# Patient Record
Sex: MALE | Race: WHITE | Employment: OTHER | URBAN - METROPOLITAN AREA
[De-identification: names, ages, dates, MRNs, and addresses within clinical notes are randomized per-mention and may not be internally consistent; named-entity substitution may affect disease eponyms.]

---

## 2017-09-05 ENCOUNTER — OFFICE VISIT (OUTPATIENT)
Dept: URGENT CARE | Facility: URGENT CARE | Age: 67
End: 2017-09-05
Payer: MEDICARE

## 2017-09-05 VITALS — SYSTOLIC BLOOD PRESSURE: 148 MMHG | DIASTOLIC BLOOD PRESSURE: 66 MMHG | HEART RATE: 80 BPM

## 2017-09-05 DIAGNOSIS — S01.81XA FACIAL LACERATION, INITIAL ENCOUNTER: Primary | ICD-10-CM

## 2017-09-05 PROCEDURE — 12011 RPR F/E/E/N/L/M 2.5 CM/<: CPT | Performed by: FAMILY MEDICINE

## 2017-09-05 NOTE — MR AVS SNAPSHOT
"              After Visit Summary   2017    Perfecto Reese    MRN: [id number]           Patient Information     Date Of Birth          1950        Visit Information        Provider Department      2017 8:20 PM Chela Sargent MD [facility]        Today's Diagnoses     Facial laceration, initial encounter    -  1       Follow-ups after your visit        Who to contact     If you have questions or need follow up information about today's clinic visit or your schedule please contact [facility] directly at [phone].  Normal or non-critical lab and imaging results will be communicated to you by 4Lesshart, letter or phone within 4 business days after the clinic has received the results. If you do not hear from us within 7 days, please contact the clinic through 4Lesshart or phone. If you have a critical or abnormal lab result, we will notify you by phone as soon as possible.  Submit refill requests through Lomaki or call your pharmacy and they will forward the refill request to us. Please allow 3 business days for your refill to be completed.          Additional Information About Your Visit        MyChart Information     Lomaki lets you send messages to your doctor, view your test results, renew your prescriptions, schedule appointments and more. To sign up, go to [email or web address] . Click on \"Log in\" on the left side of the screen, which will take you to the Welcome page. Then click on \"Sign up Now\" on the right side of the page.     You will be asked to enter the access code listed below, as well as some personal information. Please follow the directions to create your username and password.     Your access code is: [email or web address]  Expires: 2017  9:28 PM     Your access code will  in 90 days. If you need help or a new code, please call your [place] clinic or [phone].        Care EveryWhere ID     This is your Care EveryWhere " ID. This could be used by other organizations to access your [address] medical records  [id number]        Your Vitals Were     Pulse                   80            Blood Pressure from Last 3 Encounters:   09/05/17 148/66   01/21/16 125/53   01/20/16 106/60    Weight from Last 3 Encounters:   01/21/16 155 lb (70.3 kg)   01/20/16 155 lb (70.3 kg)   12/08/15 155 lb (70.3 kg)              We Performed the Following     CHEM CAUTERY GRANULATION TISSUE        Primary Care Provider Office Phone # Fax #    Polo Neil [phone]       [facility] [id number]        Equal Access to Services     FARRAH PARSON : Adelita Daniel, erika dukes, higinio méndez, sukhwinder pink. So [facility] [phone].    ATENCIÓN: Si habla español, tiene a avendano disposición servicios gratuitos de asistencia lingüística. Llame al [phone].    We comply with applicable federal civil rights laws and [place] laws. We do not discriminate on the basis of race, color, national origin, age, disability sex, sexual orientation or gender identity.            Thank you!     Thank you for choosing [facility]  for your care. Our goal is always to provide you with excellent care. Hearing back from our patients is one way we can continue to improve our services. Please take a few minutes to complete the written survey that you may receive in the mail after your visit with us. Thank you!             Your Updated Medication List - Protect others around you: Learn how to safely use, store and throw away your medicines at www.disposemymeds.org.          This list is accurate as of: 9/5/17  9:28 PM.  Always use your most recent med list.                   Brand Name Dispense Instructions for use Diagnosis    ALBUTEROL SULFATE HFA IN      Inhale into the lungs as needed        cyclobenzaprine 5 MG tablet    FLEXERIL    30 tablet     Take 1 tablet (5 mg) by mouth 3 times daily as needed    Other back pain       * ipratropium - albuterol 0.5 mg/2.5 mg/3 mL 0.5-2.5 (3) MG/3ML neb solution    DUONEB    1 vial    1 vial in neb    COPD exacerbation (H)       * Ipratropium-Albuterol  MCG/ACT inhaler    COMBIVENT RESPIMAT    1 Inhaler    Inhale 1 puff into the lungs 4 times daily Not to exceed 6 doses per day.    COPD exacerbation (H), Rales       * morphine 15 MG IR tablet    MSIR     Take 15 mg by mouth every 12 hours 75mg total, BID        * morphine 60 MG 12 hr tablet    MS CONTIN     Take 60 mg by mouth 2 times daily        Multi-vitamin Tabs tablet   Generic drug:  multivitamin, therapeutic with minerals      1 tablet daily        ondansetron 4 MG tablet    ZOFRAN    1 tablet    Once in clinic    Nausea       OXYCONTIN PO           predniSONE 20 MG tablet    DELTASONE    15 tablet    1 tab po bid for 5 days, then 1 tab po qd for 3 days, then 1/2 tab po qd for 4 days    COPD exacerbation (H), Rales       TYLENOL 325 MG tablet   Generic drug:  acetaminophen      prn        UNKNOWN TO PATIENT      2 inhalers and a nebulizer medication - doesn't know the name)        Zinc 15 MG Caps      Take  by mouth.        * Notice:  This list has 4 medication(s) that are the same as other medications prescribed for you. Read the directions carefully, and ask your doctor or other care provider to review them with you.

## 2017-09-06 NOTE — PROGRESS NOTES
Chief Complaint   Patient presents with     WOUND CARE     bleeding on chin,cut face while shaving aprox 2 hrs ago     SUBJECTIVE:     Chief Complaint   Patient presents with     WOUND CARE     bleeding on chin,cut face while shaving aprox 2 hrs ago     Perfecto Reese is a 67 year old male who presents to the clinic with a laceration on the chin , and left cheek area sustained 2 hours(s) ago.  This is a non-work related injury.    Mechanism of injury: razor blade while shaving .    Associated symptoms: Denies numbness, weakness, or loss of function  Last tetanus booster within 10 years: yes    EXAM:   The patient appears today in alert,no apparent distress distress  VITALS: /66 (BP Location: Right arm, Patient Position: Chair, Cuff Size: Adult Regular)  Pulse 80    Size of laceration: 0.25  Centimeters over the chin , 1mm over the left upper cheek and 1mm over the left lateral cheek area   Characteristics of the laceration: active bleeding  Tendon function intact: not applicable  Sensation to light touch intact: yes  Pulses intact: not applicable  Picture included in patient's chart: no    Assessment:  Facial laceration, initial encounter    PLAN:  PROCEDURE NOTE::    Wound cleaned with HIBICLENS  lumicaine was used to stop the bleeding   Area dressed with bandaid   After care instructions:  Signs of infection discussed today  Follow up if  symptoms fail to improve or worsens   Pt understood and agreed with plan

## 2017-09-06 NOTE — NURSING NOTE
"Chief Complaint   Patient presents with     WOUND CARE     bleeding on chin,cut face while shaving aprox 2 hrs ago       Initial /66 (BP Location: Right arm, Patient Position: Chair, Cuff Size: Adult Regular)  Pulse 80 Estimated body mass index is 25.79 kg/(m^2) as calculated from the following:    Height as of 1/21/16: 5' 5\" (1.651 m).    Weight as of 1/21/16: 155 lb (70.3 kg).  Medication Reconciliation: orlando Curtis LPN    "

## 2018-03-22 ENCOUNTER — OFFICE VISIT (OUTPATIENT)
Dept: URGENT CARE | Facility: URGENT CARE | Age: 68
End: 2018-03-22
Payer: MEDICARE

## 2018-03-22 VITALS
RESPIRATION RATE: 18 BRPM | OXYGEN SATURATION: 93 % | BODY MASS INDEX: 34.11 KG/M2 | WEIGHT: 205 LBS | TEMPERATURE: 98 F | DIASTOLIC BLOOD PRESSURE: 50 MMHG | SYSTOLIC BLOOD PRESSURE: 130 MMHG | HEART RATE: 76 BPM

## 2018-03-22 DIAGNOSIS — H61.23 BILATERAL IMPACTED CERUMEN: Primary | ICD-10-CM

## 2018-03-22 PROCEDURE — 69209 REMOVE IMPACTED EAR WAX UNI: CPT | Performed by: PHYSICIAN ASSISTANT

## 2018-03-22 NOTE — MR AVS SNAPSHOT
"              After Visit Summary   3/22/2018    Perfecto Reese    MRN: [id number]           Patient Information     Date Of Birth          1950        Visit Information        Provider Department      3/22/2018 2:00 PM Yola Nava PA-C [facility]        Today's Diagnoses     Bilateral impacted cerumen    -  1       Follow-ups after your visit        Who to contact     If you have questions or need follow up information about today's clinic visit or your schedule please contact [facility] directly at [phone].  Normal or non-critical lab and imaging results will be communicated to you by MyChart, letter or phone within 4 business days after the clinic has received the results. If you do not hear from us within 7 days, please contact the clinic through Pareto Biotechnologieshart or phone. If you have a critical or abnormal lab result, we will notify you by phone as soon as possible.  Submit refill requests through WorkThink or call your pharmacy and they will forward the refill request to us. Please allow 3 business days for your refill to be completed.          Additional Information About Your Visit        MyChart Information     WorkThink lets you send messages to your doctor, view your test results, renew your prescriptions, schedule appointments and more. To sign up, go to [email or web address] . Click on \"Log in\" on the left side of the screen, which will take you to the Welcome page. Then click on \"Sign up Now\" on the right side of the page.     You will be asked to enter the access code listed below, as well as some personal information. Please follow the directions to create your username and password.     Your access code is: [email or web address]  Expires: 2018  4:31 PM     Your access code will  in 90 days. If you need help or a new code, please call your [place] clinic or [phone].        Care EveryWhere ID     This is your Care " EveryWhere ID. This could be used by other organizations to access your [place] medical records  [id number]        Your Vitals Were     Pulse Temperature Respirations Pulse Oximetry BMI (Body Mass Index)       76 98  F (36.7  C) (Tympanic) 18 93% 34.11 kg/m2        Blood Pressure from Last 3 Encounters:   03/22/18 130/50   09/05/17 148/66   01/21/16 125/53    Weight from Last 3 Encounters:   03/22/18 205 lb (93 kg)   01/21/16 155 lb (70.3 kg)   01/20/16 155 lb (70.3 kg)              We Performed the Following     HC REMOVAL IMPACTED CERUMEN IRRIGATION/LVG UNILAT        Primary Care Provider Office Phone # Fax #    Polo Neil [phone]       [facility] [id number]        Equal Access to Services     FARRAH [facility] : Hadii aad ku hadasho Soranjana, waaxda luqadaha, qaybta kaalmada dev, sukhwinder farrell . So [facility] [phone].    ATENCIÓN: Si habla español, tiene a avendano disposición servicios gratuitos de asistencia lingüística. Rocky al [phone].    We comply with applicable federal civil rights laws and [place] laws. We do not discriminate on the basis of race, color, national origin, age, disability, sex, sexual orientation, or gender identity.            Thank you!     Thank you for choosing [facility]  for your care. Our goal is always to provide you with excellent care. Hearing back from our patients is one way we can continue to improve our services. Please take a few minutes to complete the written survey that you may receive in the mail after your visit with us. Thank you!             Your Updated Medication List - Protect others around you: Learn how to safely use, store and throw away your medicines at www.disposemymeds.org.          This list is accurate as of 3/22/18  4:31 PM.  Always use your most recent med list.                   Brand Name Dispense Instructions for use Diagnosis     ALBUTEROL SULFATE HFA IN      Inhale into the lungs as needed        cyclobenzaprine 5 MG tablet    FLEXERIL    30 tablet    Take 1 tablet (5 mg) by mouth 3 times daily as needed    Other back pain       * ipratropium - albuterol 0.5 mg/2.5 mg/3 mL 0.5-2.5 (3) MG/3ML neb solution    DUONEB    1 vial    1 vial in neb    COPD exacerbation (H)       * Ipratropium-Albuterol  MCG/ACT inhaler    COMBIVENT RESPIMAT    1 Inhaler    Inhale 1 puff into the lungs 4 times daily Not to exceed 6 doses per day.    COPD exacerbation (H), [place]       * morphine 15 MG IR tablet    MSIR     Take 15 mg by mouth every 12 hours 75mg total, BID        * morphine 60 MG 12 hr tablet    MS CONTIN     Take 60 mg by mouth 2 times daily        Multi-vitamin Tabs tablet   Generic drug:  multivitamin, therapeutic with minerals      1 tablet daily        ondansetron 4 MG tablet    ZOFRAN    1 tablet    Once in clinic    Nausea       OXYCONTIN PO           predniSONE 20 MG tablet    DELTASONE    15 tablet    1 tab po bid for 5 days, then 1 tab po qd for 3 days, then 1/2 tab po qd for 4 days    COPD exacerbation (H), [place]       TYLENOL 325 MG tablet   Generic drug:  acetaminophen      prn        UNKNOWN TO PATIENT      2 inhalers and a nebulizer medication - doesn't know the name)        Zinc 15 MG Caps      Take  by mouth.        * Notice:  This list has 4 medication(s) that are the same as other medications prescribed for you. Read the directions carefully, and ask your doctor or other care provider to review them with you.

## 2020-01-07 ENCOUNTER — TRANSFERRED RECORDS (OUTPATIENT)
Dept: HEALTH INFORMATION MANAGEMENT | Facility: CLINIC | Age: 70
End: 2020-01-07

## 2020-12-02 ENCOUNTER — APPOINTMENT (OUTPATIENT)
Dept: GENERAL RADIOLOGY | Facility: CLINIC | Age: 70
DRG: 871 | End: 2020-12-02
Attending: EMERGENCY MEDICINE
Payer: MEDICARE

## 2020-12-02 ENCOUNTER — HOSPITAL ENCOUNTER (INPATIENT)
Facility: CLINIC | Age: 70
LOS: 29 days | Discharge: NURSING, REHAB OR LONG-TERM CARE | DRG: 871 | End: 2020-12-31
Attending: EMERGENCY MEDICINE | Admitting: HOSPITALIST
Payer: MEDICARE

## 2020-12-02 ENCOUNTER — APPOINTMENT (OUTPATIENT)
Dept: CT IMAGING | Facility: CLINIC | Age: 70
DRG: 871 | End: 2020-12-02
Attending: EMERGENCY MEDICINE
Payer: MEDICARE

## 2020-12-02 DIAGNOSIS — R52 PAIN: ICD-10-CM

## 2020-12-02 DIAGNOSIS — I10 ESSENTIAL HYPERTENSION: Primary | ICD-10-CM

## 2020-12-02 DIAGNOSIS — R19.7 DIARRHEA, UNSPECIFIED TYPE: ICD-10-CM

## 2020-12-02 DIAGNOSIS — N17.9 ACUTE RENAL INJURY (H): ICD-10-CM

## 2020-12-02 DIAGNOSIS — J96.11 CHRONIC RESPIRATORY FAILURE WITH HYPOXIA (H): ICD-10-CM

## 2020-12-02 DIAGNOSIS — J18.9 PNEUMONIA OF RIGHT LOWER LOBE DUE TO INFECTIOUS ORGANISM: ICD-10-CM

## 2020-12-02 LAB
ALBUMIN SERPL-MCNC: 2.6 G/DL (ref 3.4–5)
ALP SERPL-CCNC: 98 U/L (ref 40–150)
ALT SERPL W P-5'-P-CCNC: 8 U/L (ref 0–70)
ANION GAP SERPL CALCULATED.3IONS-SCNC: 11 MMOL/L (ref 3–14)
AST SERPL W P-5'-P-CCNC: 6 U/L (ref 0–45)
BASOPHILS # BLD AUTO: 0 10E9/L (ref 0–0.2)
BASOPHILS NFR BLD AUTO: 0.2 %
BILIRUB SERPL-MCNC: 0.7 MG/DL (ref 0.2–1.3)
BUN SERPL-MCNC: 35 MG/DL (ref 7–30)
CALCIUM SERPL-MCNC: 7.9 MG/DL (ref 8.5–10.1)
CHLORIDE SERPL-SCNC: 109 MMOL/L (ref 94–109)
CO2 SERPL-SCNC: 20 MMOL/L (ref 20–32)
CREAT SERPL-MCNC: 2.89 MG/DL (ref 0.66–1.25)
DIFFERENTIAL METHOD BLD: ABNORMAL
EOSINOPHIL # BLD AUTO: 0 10E9/L (ref 0–0.7)
EOSINOPHIL NFR BLD AUTO: 0.1 %
ERYTHROCYTE [DISTWIDTH] IN BLOOD BY AUTOMATED COUNT: 14.2 % (ref 10–15)
GFR SERPL CREATININE-BSD FRML MDRD: 21 ML/MIN/{1.73_M2}
GLUCOSE SERPL-MCNC: 134 MG/DL (ref 70–99)
HCT VFR BLD AUTO: 28.5 % (ref 40–53)
HGB BLD-MCNC: 9.6 G/DL (ref 13.3–17.7)
IMM GRANULOCYTES # BLD: 0 10E9/L (ref 0–0.4)
IMM GRANULOCYTES NFR BLD: 0.2 %
LACTATE BLD-SCNC: 1.6 MMOL/L (ref 0.7–2)
LYMPHOCYTES # BLD AUTO: 0.7 10E9/L (ref 0.8–5.3)
LYMPHOCYTES NFR BLD AUTO: 4 %
MCH RBC QN AUTO: 30.7 PG (ref 26.5–33)
MCHC RBC AUTO-ENTMCNC: 33.7 G/DL (ref 31.5–36.5)
MCV RBC AUTO: 91 FL (ref 78–100)
MONOCYTES # BLD AUTO: 1.1 10E9/L (ref 0–1.3)
MONOCYTES NFR BLD AUTO: 6.6 %
NEUTROPHILS # BLD AUTO: 15.3 10E9/L (ref 1.6–8.3)
NEUTROPHILS NFR BLD AUTO: 88.9 %
NRBC # BLD AUTO: 0 10*3/UL
NRBC BLD AUTO-RTO: 0 /100
PLATELET # BLD AUTO: 201 10E9/L (ref 150–450)
POTASSIUM SERPL-SCNC: 3.3 MMOL/L (ref 3.4–5.3)
PROT SERPL-MCNC: 7.3 G/DL (ref 6.8–8.8)
RBC # BLD AUTO: 3.13 10E12/L (ref 4.4–5.9)
SARS-COV-2 RNA SPEC QL NAA+PROBE: NORMAL
SODIUM SERPL-SCNC: 140 MMOL/L (ref 133–144)
SPECIMEN SOURCE: NORMAL
WBC # BLD AUTO: 17.2 10E9/L (ref 4–11)

## 2020-12-02 PROCEDURE — 96361 HYDRATE IV INFUSION ADD-ON: CPT

## 2020-12-02 PROCEDURE — 85025 COMPLETE CBC W/AUTO DIFF WBC: CPT | Performed by: EMERGENCY MEDICINE

## 2020-12-02 PROCEDURE — 87040 BLOOD CULTURE FOR BACTERIA: CPT | Performed by: EMERGENCY MEDICINE

## 2020-12-02 PROCEDURE — 99223 1ST HOSP IP/OBS HIGH 75: CPT | Mod: AI | Performed by: HOSPITALIST

## 2020-12-02 PROCEDURE — 96375 TX/PRO/DX INJ NEW DRUG ADDON: CPT

## 2020-12-02 PROCEDURE — 83605 ASSAY OF LACTIC ACID: CPT | Performed by: EMERGENCY MEDICINE

## 2020-12-02 PROCEDURE — 96365 THER/PROPH/DIAG IV INF INIT: CPT

## 2020-12-02 PROCEDURE — U0003 INFECTIOUS AGENT DETECTION BY NUCLEIC ACID (DNA OR RNA); SEVERE ACUTE RESPIRATORY SYNDROME CORONAVIRUS 2 (SARS-COV-2) (CORONAVIRUS DISEASE [COVID-19]), AMPLIFIED PROBE TECHNIQUE, MAKING USE OF HIGH THROUGHPUT TECHNOLOGIES AS DESCRIBED BY CMS-2020-01-R: HCPCS | Performed by: EMERGENCY MEDICINE

## 2020-12-02 PROCEDURE — 80053 COMPREHEN METABOLIC PANEL: CPT | Performed by: EMERGENCY MEDICINE

## 2020-12-02 PROCEDURE — 250N000011 HC RX IP 250 OP 636: Performed by: EMERGENCY MEDICINE

## 2020-12-02 PROCEDURE — C9803 HOPD COVID-19 SPEC COLLECT: HCPCS

## 2020-12-02 PROCEDURE — 250N000013 HC RX MED GY IP 250 OP 250 PS 637: Performed by: EMERGENCY MEDICINE

## 2020-12-02 PROCEDURE — 99285 EMERGENCY DEPT VISIT HI MDM: CPT | Mod: 25

## 2020-12-02 PROCEDURE — 120N000001 HC R&B MED SURG/OB

## 2020-12-02 PROCEDURE — 94640 AIRWAY INHALATION TREATMENT: CPT

## 2020-12-02 PROCEDURE — 74176 CT ABD & PELVIS W/O CONTRAST: CPT

## 2020-12-02 PROCEDURE — 258N000003 HC RX IP 258 OP 636: Performed by: EMERGENCY MEDICINE

## 2020-12-02 PROCEDURE — 71045 X-RAY EXAM CHEST 1 VIEW: CPT

## 2020-12-02 PROCEDURE — 87493 C DIFF AMPLIFIED PROBE: CPT | Performed by: EMERGENCY MEDICINE

## 2020-12-02 RX ORDER — LIDOCAINE 50 MG/G
1 PATCH TOPICAL EVERY 24 HOURS
COMMUNITY
End: 2021-01-05

## 2020-12-02 RX ORDER — ALBUTEROL SULFATE 90 UG/1
6 AEROSOL, METERED RESPIRATORY (INHALATION)
Status: DISCONTINUED | OUTPATIENT
Start: 2020-12-02 | End: 2020-12-03

## 2020-12-02 RX ORDER — FERROUS GLUCONATE 324(37.5)
324 TABLET ORAL EVERY OTHER DAY
COMMUNITY
End: 2021-01-07 | Stop reason: ALTCHOICE

## 2020-12-02 RX ORDER — TIOTROPIUM BROMIDE 18 UG/1
18 CAPSULE ORAL; RESPIRATORY (INHALATION) DAILY
COMMUNITY
End: 2021-01-05

## 2020-12-02 RX ORDER — HYDROMORPHONE HYDROCHLORIDE 1 MG/ML
0.5 INJECTION, SOLUTION INTRAMUSCULAR; INTRAVENOUS; SUBCUTANEOUS
Status: COMPLETED | OUTPATIENT
Start: 2020-12-02 | End: 2020-12-02

## 2020-12-02 RX ORDER — IBUPROFEN 200 MG
4 CAPSULE ORAL DAILY
Status: ON HOLD | COMMUNITY
End: 2021-03-22

## 2020-12-02 RX ORDER — DULOXETIN HYDROCHLORIDE 30 MG/1
60 CAPSULE, DELAYED RELEASE ORAL DAILY
Status: ON HOLD | COMMUNITY
End: 2021-02-15

## 2020-12-02 RX ORDER — MORPHINE SULFATE 10 MG/5ML
2.5 SOLUTION ORAL 2 TIMES DAILY PRN
COMMUNITY
End: 2020-12-31

## 2020-12-02 RX ORDER — PRIMIDONE 50 MG/1
50 TABLET ORAL AT BEDTIME
Status: ON HOLD | COMMUNITY
End: 2021-03-22

## 2020-12-02 RX ORDER — AZITHROMYCIN 500 MG/1
500 INJECTION, POWDER, LYOPHILIZED, FOR SOLUTION INTRAVENOUS ONCE
Status: COMPLETED | OUTPATIENT
Start: 2020-12-02 | End: 2020-12-03

## 2020-12-02 RX ORDER — GABAPENTIN 300 MG/1
300 CAPSULE ORAL 3 TIMES DAILY
Status: ON HOLD | COMMUNITY
End: 2020-12-31

## 2020-12-02 RX ORDER — LIDOCAINE 50 MG/G
OINTMENT TOPICAL 3 TIMES DAILY PRN
Status: ON HOLD | COMMUNITY
End: 2021-03-22

## 2020-12-02 RX ORDER — OXYCODONE HYDROCHLORIDE 5 MG/1
5 TABLET ORAL EVERY 6 HOURS PRN
Status: ON HOLD | COMMUNITY
End: 2020-12-31

## 2020-12-02 RX ORDER — MULTIVIT-MIN/IRON/FOLIC ACID/K 18-600-40
1000 CAPSULE ORAL 2 TIMES DAILY
COMMUNITY
End: 2021-01-15

## 2020-12-02 RX ORDER — ACETAMINOPHEN 325 MG/1
975 TABLET ORAL ONCE
Status: COMPLETED | OUTPATIENT
Start: 2020-12-02 | End: 2020-12-02

## 2020-12-02 RX ORDER — LOSARTAN POTASSIUM 25 MG/1
12.5 TABLET ORAL DAILY
Status: ON HOLD | COMMUNITY
End: 2021-02-15

## 2020-12-02 RX ORDER — DEXAMETHASONE SODIUM PHOSPHATE 10 MG/ML
10 INJECTION, SOLUTION INTRAMUSCULAR; INTRAVENOUS ONCE
Status: COMPLETED | OUTPATIENT
Start: 2020-12-02 | End: 2020-12-02

## 2020-12-02 RX ORDER — CLOBETASOL PROPIONATE 0.5 MG/ML
SOLUTION TOPICAL AT BEDTIME
COMMUNITY

## 2020-12-02 RX ORDER — BUDESONIDE AND FORMOTEROL FUMARATE DIHYDRATE 160; 4.5 UG/1; UG/1
2 AEROSOL RESPIRATORY (INHALATION) 2 TIMES DAILY
COMMUNITY
End: 2021-01-05

## 2020-12-02 RX ORDER — TRIAMCINOLONE ACETONIDE 1 MG/G
CREAM TOPICAL PRN
COMMUNITY
End: 2021-01-05

## 2020-12-02 RX ORDER — PIPERACILLIN SODIUM, TAZOBACTAM SODIUM 4; .5 G/20ML; G/20ML
4.5 INJECTION, POWDER, LYOPHILIZED, FOR SOLUTION INTRAVENOUS ONCE
Status: COMPLETED | OUTPATIENT
Start: 2020-12-02 | End: 2020-12-02

## 2020-12-02 RX ORDER — TAMSULOSIN HYDROCHLORIDE 0.4 MG/1
0.8 CAPSULE ORAL DAILY
Status: ON HOLD | COMMUNITY
End: 2021-03-22

## 2020-12-02 RX ORDER — AMLODIPINE BESYLATE 10 MG/1
10 TABLET ORAL AT BEDTIME
Status: ON HOLD | COMMUNITY
End: 2020-12-17

## 2020-12-02 RX ORDER — ALBUTEROL SULFATE 90 UG/1
2 AEROSOL, METERED RESPIRATORY (INHALATION) EVERY 4 HOURS PRN
Status: ON HOLD | COMMUNITY
End: 2021-03-22

## 2020-12-02 RX ORDER — HYDROCORTISONE 20 MG/1
20 TABLET ORAL EVERY MORNING
Status: ON HOLD | COMMUNITY
End: 2021-03-22

## 2020-12-02 RX ORDER — ASPIRIN 81 MG/1
81 TABLET ORAL DAILY
COMMUNITY
End: 2021-01-15

## 2020-12-02 RX ADMIN — DEXAMETHASONE SODIUM PHOSPHATE 10 MG: 10 INJECTION, SOLUTION INTRAMUSCULAR; INTRAVENOUS at 18:49

## 2020-12-02 RX ADMIN — AZITHROMYCIN MONOHYDRATE 500 MG: 500 INJECTION, POWDER, LYOPHILIZED, FOR SOLUTION INTRAVENOUS at 23:01

## 2020-12-02 RX ADMIN — PIPERACILLIN SODIUM AND TAZOBACTAM SODIUM 4.5 G: 4; .5 INJECTION, POWDER, LYOPHILIZED, FOR SOLUTION INTRAVENOUS at 19:05

## 2020-12-02 RX ADMIN — SODIUM CHLORIDE 1000 ML: 9 INJECTION, SOLUTION INTRAVENOUS at 18:52

## 2020-12-02 RX ADMIN — ACETAMINOPHEN 975 MG: 325 TABLET, FILM COATED ORAL at 18:48

## 2020-12-02 RX ADMIN — SODIUM CHLORIDE 1000 ML: 9 INJECTION, SOLUTION INTRAVENOUS at 20:16

## 2020-12-02 RX ADMIN — HYDROMORPHONE HYDROCHLORIDE 0.5 MG: 1 INJECTION, SOLUTION INTRAMUSCULAR; INTRAVENOUS; SUBCUTANEOUS at 20:17

## 2020-12-02 RX ADMIN — ALBUTEROL SULFATE 6 PUFF: 90 AEROSOL, METERED RESPIRATORY (INHALATION) at 18:50

## 2020-12-02 ASSESSMENT — ENCOUNTER SYMPTOMS
ABDOMINAL PAIN: 1
HEADACHES: 0
SORE THROAT: 0
VOMITING: 0
CHILLS: 1
BLOOD IN STOOL: 0
DIARRHEA: 1
DYSURIA: 0
FEVER: 1
FATIGUE: 1
COUGH: 1
SHORTNESS OF BREATH: 1
NAUSEA: 0
PALPITATIONS: 0

## 2020-12-02 ASSESSMENT — MIFFLIN-ST. JEOR: SCORE: 1339.85

## 2020-12-03 ENCOUNTER — APPOINTMENT (OUTPATIENT)
Dept: CT IMAGING | Facility: CLINIC | Age: 70
DRG: 871 | End: 2020-12-03
Attending: PHYSICIAN ASSISTANT
Payer: MEDICARE

## 2020-12-03 PROBLEM — D50.9 IRON DEFICIENCY ANEMIA, UNSPECIFIED: Status: ACTIVE | Noted: 2020-12-03

## 2020-12-03 LAB
ALBUMIN UR-MCNC: 30 MG/DL
APPEARANCE UR: ABNORMAL
BILIRUB UR QL STRIP: NEGATIVE
COLOR UR AUTO: YELLOW
GLUCOSE UR STRIP-MCNC: NEGATIVE MG/DL
GRAM STN SPEC: NORMAL
HGB UR QL STRIP: ABNORMAL
HYALINE CASTS #/AREA URNS LPF: 2 /LPF (ref 0–2)
KETONES UR STRIP-MCNC: NEGATIVE MG/DL
L PNEUMO1 AG UR QL IA: NORMAL
LABORATORY COMMENT REPORT: NORMAL
LACTATE BLD-SCNC: 1.3 MMOL/L (ref 0.7–2)
LEUKOCYTE ESTERASE UR QL STRIP: ABNORMAL
Lab: NORMAL
NITRATE UR QL: NEGATIVE
PH UR STRIP: 5.5 PH (ref 5–7)
RBC #/AREA URNS AUTO: >182 /HPF (ref 0–2)
SARS-COV-2 RNA SPEC QL NAA+PROBE: NEGATIVE
SOURCE: ABNORMAL
SP GR UR STRIP: 1.01 (ref 1–1.03)
SPECIMEN SOURCE: NORMAL
SQUAMOUS #/AREA URNS AUTO: <1 /HPF (ref 0–1)
UROBILINOGEN UR STRIP-MCNC: NORMAL MG/DL (ref 0–2)
WBC #/AREA URNS AUTO: 34 /HPF (ref 0–5)

## 2020-12-03 PROCEDURE — 250N000011 HC RX IP 250 OP 636: Performed by: HOSPITALIST

## 2020-12-03 PROCEDURE — 87086 URINE CULTURE/COLONY COUNT: CPT | Performed by: HOSPITALIST

## 2020-12-03 PROCEDURE — 250N000013 HC RX MED GY IP 250 OP 250 PS 637: Performed by: PHYSICIAN ASSISTANT

## 2020-12-03 PROCEDURE — 250N000011 HC RX IP 250 OP 636: Performed by: EMERGENCY MEDICINE

## 2020-12-03 PROCEDURE — 87205 SMEAR GRAM STAIN: CPT | Performed by: HOSPITALIST

## 2020-12-03 PROCEDURE — 99207 PR NO CHARGE LOS: CPT | Performed by: PHYSICIAN ASSISTANT

## 2020-12-03 PROCEDURE — 87070 CULTURE OTHR SPECIMN AEROBIC: CPT | Performed by: HOSPITALIST

## 2020-12-03 PROCEDURE — G0463 HOSPITAL OUTPT CLINIC VISIT: HCPCS

## 2020-12-03 PROCEDURE — 87899 AGENT NOS ASSAY W/OPTIC: CPT | Performed by: EMERGENCY MEDICINE

## 2020-12-03 PROCEDURE — 258N000003 HC RX IP 258 OP 636: Performed by: INTERNAL MEDICINE

## 2020-12-03 PROCEDURE — 87077 CULTURE AEROBIC IDENTIFY: CPT | Performed by: HOSPITALIST

## 2020-12-03 PROCEDURE — 36415 COLL VENOUS BLD VENIPUNCTURE: CPT | Performed by: INTERNAL MEDICINE

## 2020-12-03 PROCEDURE — 120N000001 HC R&B MED SURG/OB

## 2020-12-03 PROCEDURE — 250N000013 HC RX MED GY IP 250 OP 250 PS 637: Performed by: HOSPITALIST

## 2020-12-03 PROCEDURE — 81001 URINALYSIS AUTO W/SCOPE: CPT | Performed by: EMERGENCY MEDICINE

## 2020-12-03 PROCEDURE — 71250 CT THORAX DX C-: CPT

## 2020-12-03 PROCEDURE — 87186 SC STD MICRODIL/AGAR DIL: CPT | Performed by: HOSPITALIST

## 2020-12-03 PROCEDURE — 80048 BASIC METABOLIC PNL TOTAL CA: CPT | Performed by: HOSPITALIST

## 2020-12-03 PROCEDURE — 258N000003 HC RX IP 258 OP 636: Performed by: HOSPITALIST

## 2020-12-03 PROCEDURE — 83605 ASSAY OF LACTIC ACID: CPT | Performed by: INTERNAL MEDICINE

## 2020-12-03 PROCEDURE — 99233 SBSQ HOSP IP/OBS HIGH 50: CPT | Performed by: INTERNAL MEDICINE

## 2020-12-03 RX ORDER — TAMSULOSIN HYDROCHLORIDE 0.4 MG/1
0.8 CAPSULE ORAL DAILY
Status: DISCONTINUED | OUTPATIENT
Start: 2020-12-03 | End: 2020-12-31 | Stop reason: HOSPADM

## 2020-12-03 RX ORDER — ACETAMINOPHEN 325 MG/1
650 TABLET ORAL EVERY 4 HOURS PRN
Status: DISCONTINUED | OUTPATIENT
Start: 2020-12-03 | End: 2020-12-31 | Stop reason: HOSPADM

## 2020-12-03 RX ORDER — HYDROCODONE BITARTRATE AND ACETAMINOPHEN 5; 325 MG/1; MG/1
1-2 TABLET ORAL EVERY 4 HOURS PRN
Status: DISCONTINUED | OUTPATIENT
Start: 2020-12-03 | End: 2020-12-03

## 2020-12-03 RX ORDER — ASPIRIN 81 MG/1
81 TABLET ORAL DAILY
Status: DISCONTINUED | OUTPATIENT
Start: 2020-12-03 | End: 2020-12-04

## 2020-12-03 RX ORDER — HYDROMORPHONE HYDROCHLORIDE 2 MG/1
2-4 TABLET ORAL
Status: DISCONTINUED | OUTPATIENT
Start: 2020-12-03 | End: 2020-12-08

## 2020-12-03 RX ORDER — AMOXICILLIN 250 MG
1 CAPSULE ORAL 2 TIMES DAILY
Status: DISCONTINUED | OUTPATIENT
Start: 2020-12-03 | End: 2020-12-03

## 2020-12-03 RX ORDER — POLYETHYLENE GLYCOL 3350 17 G/17G
17 POWDER, FOR SOLUTION ORAL DAILY
Status: DISCONTINUED | OUTPATIENT
Start: 2020-12-03 | End: 2020-12-31 | Stop reason: HOSPADM

## 2020-12-03 RX ORDER — OXYCODONE HYDROCHLORIDE 5 MG/1
5 TABLET ORAL EVERY 4 HOURS PRN
Status: DISCONTINUED | OUTPATIENT
Start: 2020-12-03 | End: 2020-12-03

## 2020-12-03 RX ORDER — PRIMIDONE 50 MG/1
50 TABLET ORAL AT BEDTIME
Status: DISCONTINUED | OUTPATIENT
Start: 2020-12-03 | End: 2020-12-31 | Stop reason: HOSPADM

## 2020-12-03 RX ORDER — PROCHLORPERAZINE MALEATE 5 MG
5 TABLET ORAL EVERY 6 HOURS PRN
Status: DISCONTINUED | OUTPATIENT
Start: 2020-12-03 | End: 2020-12-21

## 2020-12-03 RX ORDER — ALBUTEROL SULFATE 90 UG/1
2 AEROSOL, METERED RESPIRATORY (INHALATION) EVERY 4 HOURS PRN
Status: DISCONTINUED | OUTPATIENT
Start: 2020-12-03 | End: 2020-12-31 | Stop reason: HOSPADM

## 2020-12-03 RX ORDER — NALOXONE HYDROCHLORIDE 0.4 MG/ML
0.2 INJECTION, SOLUTION INTRAMUSCULAR; INTRAVENOUS; SUBCUTANEOUS
Status: DISCONTINUED | OUTPATIENT
Start: 2020-12-03 | End: 2020-12-31 | Stop reason: HOSPADM

## 2020-12-03 RX ORDER — ONDANSETRON 4 MG/1
4 TABLET, ORALLY DISINTEGRATING ORAL EVERY 6 HOURS PRN
Status: DISCONTINUED | OUTPATIENT
Start: 2020-12-03 | End: 2020-12-31 | Stop reason: HOSPADM

## 2020-12-03 RX ORDER — TRIAMCINOLONE ACETONIDE 1 MG/G
CREAM TOPICAL 2 TIMES DAILY PRN
Status: DISCONTINUED | OUTPATIENT
Start: 2020-12-03 | End: 2020-12-31 | Stop reason: HOSPADM

## 2020-12-03 RX ORDER — SODIUM CHLORIDE 9 MG/ML
INJECTION, SOLUTION INTRAVENOUS CONTINUOUS
Status: DISCONTINUED | OUTPATIENT
Start: 2020-12-03 | End: 2020-12-03

## 2020-12-03 RX ORDER — HYDROMORPHONE HYDROCHLORIDE 1 MG/ML
0.5 INJECTION, SOLUTION INTRAMUSCULAR; INTRAVENOUS; SUBCUTANEOUS
Status: DISCONTINUED | OUTPATIENT
Start: 2020-12-03 | End: 2020-12-09

## 2020-12-03 RX ORDER — LIDOCAINE 40 MG/G
CREAM TOPICAL
Status: DISCONTINUED | OUTPATIENT
Start: 2020-12-03 | End: 2020-12-31 | Stop reason: HOSPADM

## 2020-12-03 RX ORDER — PIPERACILLIN SODIUM, TAZOBACTAM SODIUM 3; .375 G/15ML; G/15ML
3.38 INJECTION, POWDER, LYOPHILIZED, FOR SOLUTION INTRAVENOUS EVERY 8 HOURS
Status: DISCONTINUED | OUTPATIENT
Start: 2020-12-03 | End: 2020-12-03

## 2020-12-03 RX ORDER — PROCHLORPERAZINE 25 MG
12.5 SUPPOSITORY, RECTAL RECTAL EVERY 12 HOURS PRN
Status: DISCONTINUED | OUTPATIENT
Start: 2020-12-03 | End: 2020-12-21

## 2020-12-03 RX ORDER — NALOXONE HYDROCHLORIDE 0.4 MG/ML
0.4 INJECTION, SOLUTION INTRAMUSCULAR; INTRAVENOUS; SUBCUTANEOUS
Status: DISCONTINUED | OUTPATIENT
Start: 2020-12-03 | End: 2020-12-31 | Stop reason: HOSPADM

## 2020-12-03 RX ORDER — HYDROCORTISONE 20 MG/1
20 TABLET ORAL EVERY MORNING
Status: DISCONTINUED | OUTPATIENT
Start: 2020-12-03 | End: 2020-12-31 | Stop reason: HOSPADM

## 2020-12-03 RX ORDER — AMOXICILLIN 250 MG
2 CAPSULE ORAL 2 TIMES DAILY
Status: DISCONTINUED | OUTPATIENT
Start: 2020-12-03 | End: 2020-12-03

## 2020-12-03 RX ORDER — ONDANSETRON 2 MG/ML
4 INJECTION INTRAMUSCULAR; INTRAVENOUS EVERY 6 HOURS PRN
Status: DISCONTINUED | OUTPATIENT
Start: 2020-12-03 | End: 2020-12-31 | Stop reason: HOSPADM

## 2020-12-03 RX ORDER — SODIUM CHLORIDE 9 MG/ML
INJECTION, SOLUTION INTRAVENOUS CONTINUOUS
Status: DISCONTINUED | OUTPATIENT
Start: 2020-12-03 | End: 2020-12-05

## 2020-12-03 RX ORDER — PIPERACILLIN SODIUM, TAZOBACTAM SODIUM 3; .375 G/15ML; G/15ML
3.38 INJECTION, POWDER, LYOPHILIZED, FOR SOLUTION INTRAVENOUS EVERY 6 HOURS
Status: DISCONTINUED | OUTPATIENT
Start: 2020-12-03 | End: 2020-12-05

## 2020-12-03 RX ADMIN — TAMSULOSIN HYDROCHLORIDE 0.8 MG: 0.4 CAPSULE ORAL at 08:01

## 2020-12-03 RX ADMIN — AZITHROMYCIN MONOHYDRATE 250 MG: 500 INJECTION, POWDER, LYOPHILIZED, FOR SOLUTION INTRAVENOUS at 22:40

## 2020-12-03 RX ADMIN — PIPERACILLIN SODIUM AND TAZOBACTAM SODIUM 3.38 G: 3; .375 INJECTION, POWDER, LYOPHILIZED, FOR SOLUTION INTRAVENOUS at 09:22

## 2020-12-03 RX ADMIN — PIPERACILLIN SODIUM AND TAZOBACTAM SODIUM 3.38 G: 3; .375 INJECTION, POWDER, LYOPHILIZED, FOR SOLUTION INTRAVENOUS at 21:06

## 2020-12-03 RX ADMIN — HYDROMORPHONE HYDROCHLORIDE 0.5 MG: 1 INJECTION, SOLUTION INTRAMUSCULAR; INTRAVENOUS; SUBCUTANEOUS at 05:43

## 2020-12-03 RX ADMIN — UMECLIDINIUM 1 PUFF: 62.5 AEROSOL, POWDER ORAL at 09:22

## 2020-12-03 RX ADMIN — PIPERACILLIN SODIUM AND TAZOBACTAM SODIUM 3.38 G: 3; .375 INJECTION, POWDER, LYOPHILIZED, FOR SOLUTION INTRAVENOUS at 01:51

## 2020-12-03 RX ADMIN — HYDROMORPHONE HYDROCHLORIDE 0.5 MG: 1 INJECTION, SOLUTION INTRAMUSCULAR; INTRAVENOUS; SUBCUTANEOUS at 17:14

## 2020-12-03 RX ADMIN — PIPERACILLIN SODIUM AND TAZOBACTAM SODIUM 3.38 G: 3; .375 INJECTION, POWDER, LYOPHILIZED, FOR SOLUTION INTRAVENOUS at 15:23

## 2020-12-03 RX ADMIN — TRIAMCINOLONE ACETONIDE: 1 CREAM TOPICAL at 02:27

## 2020-12-03 RX ADMIN — ACETAMINOPHEN 650 MG: 325 TABLET, FILM COATED ORAL at 08:01

## 2020-12-03 RX ADMIN — HYDROMORPHONE HYDROCHLORIDE 0.5 MG: 1 INJECTION, SOLUTION INTRAMUSCULAR; INTRAVENOUS; SUBCUTANEOUS at 19:20

## 2020-12-03 RX ADMIN — FLUTICASONE FUROATE AND VILANTEROL TRIFENATATE 1 PUFF: 200; 25 POWDER RESPIRATORY (INHALATION) at 09:22

## 2020-12-03 RX ADMIN — HYDROMORPHONE HYDROCHLORIDE 0.5 MG: 1 INJECTION, SOLUTION INTRAMUSCULAR; INTRAVENOUS; SUBCUTANEOUS at 01:52

## 2020-12-03 RX ADMIN — SODIUM CHLORIDE: 9 INJECTION, SOLUTION INTRAVENOUS at 01:51

## 2020-12-03 RX ADMIN — HYDROCODONE BITARTRATE AND ACETAMINOPHEN 1 TABLET: 5; 325 TABLET ORAL at 08:11

## 2020-12-03 RX ADMIN — HYDROMORPHONE HYDROCHLORIDE 0.5 MG: 1 INJECTION, SOLUTION INTRAMUSCULAR; INTRAVENOUS; SUBCUTANEOUS at 11:10

## 2020-12-03 RX ADMIN — HYDROCORTISONE 20 MG: 20 TABLET ORAL at 08:02

## 2020-12-03 RX ADMIN — HYDROMORPHONE HYDROCHLORIDE 4 MG: 2 TABLET ORAL at 22:26

## 2020-12-03 RX ADMIN — ASPIRIN 81 MG: 81 TABLET ORAL at 08:01

## 2020-12-03 RX ADMIN — PRIMIDONE 50 MG: 50 TABLET ORAL at 21:06

## 2020-12-03 RX ADMIN — HYDROMORPHONE HYDROCHLORIDE 0.5 MG: 1 INJECTION, SOLUTION INTRAMUSCULAR; INTRAVENOUS; SUBCUTANEOUS at 21:06

## 2020-12-03 RX ADMIN — SODIUM CHLORIDE: 9 INJECTION, SOLUTION INTRAVENOUS at 15:23

## 2020-12-03 RX ADMIN — OMEPRAZOLE 20 MG: 20 CAPSULE, DELAYED RELEASE ORAL at 08:02

## 2020-12-03 ASSESSMENT — ACTIVITIES OF DAILY LIVING (ADL)
ADLS_ACUITY_SCORE: 16

## 2020-12-03 NOTE — ED NOTES
Patient has home health nurse, and the patient told this nurse that he was having fevers and diarrhea. Patient has temp of 102.5 here orally, and is coughing frequently in the room.

## 2020-12-03 NOTE — PROGRESS NOTES
Thoracic Surgery:    Consult received and chart reviewed    Ordered CT chest without contrast to better define fluid collection in RLL vs in major fissure    Will see after CT completed    Sarah Biswas PA-C with Dr. Mejia Bar  [facility]  Cell [phone]

## 2020-12-03 NOTE — PROGRESS NOTES
Thoracic Surgery:    CT chest reviewed by myself and Dr. Bar. Imaging demonstrates an intraparenchymal right lower lobe lung abscess. No pleural space problem. No surgical intervention indicated. Recommend ID consult for prolonged antibiotic course. D/W Dr. Sinclair.    Sarah Biswas PA-C with Dr. Mejia Bar  [facility]  Cell [phone]

## 2020-12-03 NOTE — ED PROVIDER NOTES
History     Chief Complaint:  Fever and Diarrhea       The history is provided by the patient.     Perfecto Reese is a 70 year old male with a history of anemia, chronic hepatitis C, left BKA and COPD among others who presents for evaluation of fever, shortness of breath, chest pain, generalized abdominal pain, diarrhea and fatigue.  Patient was having his right lower extremity dressings changed by home health care nurse when she noted that he looked quite sick today.  Took a temperature at home and noted that it was 103.  EMS noted that he seemed to be struggling breathing with tachypnea.  In the ER the patient reports centralized chest pain and tightness as well as a productive cough.  He notes his sputum is white but occasionally will have some mild streaking of blood.  He also notes he has had multiple episodes of diarrhea today but denies any melena or blood in his stool.  Patient notes diffuse abdominal pain as well.  Patient reports that at baseline he is on 3 L of oxygen for his COPD but due to his increased work of breathing EMS put him on 10 L nasal cannula.      Allergies:  Darvocet   Vicodin      Medications:   Albuterol inhaler  Flexeril   Duoneb solution  Combivent respimat inhaler  Morphine  Zofran   Oxycodone  Prednisone    Medical History:   Chronic pain  Anemia  Insomnia  Low back pain  Hepatitis C, chronic  Psoriasis  Hyperlipidemia   GERD  Migraine  Thrombocytopenia  COPD    Surgical History   Sinus accessory procedure  Subtalar arthroereisis  ORIF left forearm/wrist  Tonsillectomy   Colonoscopy     Hardware removal left ankle  Upper dentures  Below knee amputation - left     Family History:   Father -  Colon cancer  Mother -  Osteoporosis, dementia   Brother - liver cancer    Social History:  The patient was accompanied to the ED by EMS.  Smoking Status: Former - 0.50 packs/day   Smokeless Tobacco: Never   Alcohol Use: No  Drug Use: No   Marital Status: Single  PCP: Polo Neil     Review of  "Systems   Constitutional: Positive for chills, fatigue and fever.   HENT: Negative for sore throat.    Respiratory: Positive for cough and shortness of breath.    Cardiovascular: Positive for chest pain. Negative for palpitations and leg swelling.   Gastrointestinal: Positive for abdominal pain and diarrhea. Negative for blood in stool, nausea and vomiting.   Genitourinary: Negative for dysuria.   Skin: Negative for rash.   Neurological: Negative for headaches.   All other systems reviewed and are negative.    Physical Exam     Patient Vitals for the past 24 hrs:   BP Temp Temp src Pulse Resp SpO2 Height Weight   12/02/20 1818 -- -- -- -- -- 97 % -- --   12/02/20 1817 132/84 102.5  F (39.2  C) Oral 127 14 -- 1.664 m (5' 5.5\") 63.5 kg (140 lb)        Physical Exam  General: Chronically ill-appearing gentleman in mild distress.  Head: The scalp, face, and head appear normal  Eyes: The pupils are equal, round, and reactive to light.  Dry cough crust surrounding the right eye.  ENT: Pressure sore behind the left ear the oropharynx is normal without erythema. Uvula is in the midline.  Mucous membranes appear dry  Neck: Normal range of motion. No anterior cervical lymphadenopathy noted  CV: Tachycardic but regular.  Resp: Tachypnea but generally clear lungs. mild respiratory distress.  Nasal cannula in place  GI: Abdomen is soft, no rigidity, guarding, or rebound. No distension. No tenderness to palpation in any quadrant.  Patient reports generalized discomfort    MS: Left-sided BKA.  Distal left lower extremity does not have any significant  Erythema, tenderness or ecchymosis.  Right lower extremity is wrapped and overlying a chronic pressure wound.   Skin: Pressure wound on the distal right lower extremity. Normal capillary refill noted  Neuro: Speech is normal and fluent. Face is symmetric.   Psych: Anxious appearing    Emergency Department Course     Imaging:  Radiology results were communicated with the patient who " voiced understanding of the findings.    XR Chest Port 1 View:  IMPRESSION: Right lower lobe infiltrate consistent with pneumonia. Lung volumes are low. No definite left lung infiltrate.   Reading per radiology.    CT Abdomen Pelvis w/o Contrast:  IMPRESSION:   1.  Extensive right lower lobe pneumonia. Mild infiltrate in the left   lung base as well.   2.  Air-fluid cavity at the right lung base appears to be loculated in   the major fissure. Suggest thoracic surgery consultation.   3.  Cirrhosis with signs of portal hypertension including   splenomegaly. No ascites.   Reading per radiology.    Laboratory:  Laboratory findings were communicated with the patient who voiced understanding of the findings.    CBC: WBC 17.2 (H), HGB 9.6 (L),   CMP: Potassium 3.3 (L), Glucose 134 (H), BUN 35 (H), Creatinine 2.89 (H), GFR 21 (L), Calcium 7.9 (L), Albumin 2.6 (L), o/w WNL   Lactic Acid (Resulted 1844): 1.6   Blood Culture x2: Pending     C difficile Toxin B PCR: Pending    UA with Microscopic: Pending  Legionella pneumonia antigen urine: Pending     Symptomatic COVID-19 (Coronavirus) PCR by Nasopharyngeal Swab: Pending     Interventions:   1848 Tylenol 975 mg PO   1849 Dexamethasone 10 mg IV  1850 Albuterol inhaler 6 puffs  1852 Normal Saline 1000 mL IV   1905 Zosyn 4.5 g IV  2016 Normal Saline 1000 mL IV   2017 Dilaudid 0.5 mg IV   2301 Azithromycin 500 mg IV    Emergency Department Course:    Nursing notes and vitals reviewed.    1825 I performed an exam of the patient as documented above.     IV was inserted and blood was drawn for laboratory testing, results above.    The patient provided a urine sample here in the emergency department. This was sent for laboratory testing, findings above.     The patient was sent for XR while in the emergency department, results above.     2002 The patient is refusing a catheter for collection of a urine sample.     The patient's nose was swabbed and this sample was sent for  COVID testing, findings above.      2239  I consulted with Dr. Vasquez of the hospitalist services. They are in agreement to accept the patient for admission. Findings and plan explained to the Patient who consents to admission. Discussed the patient with Dr. Vasquez, who will admit the patient to a bed for further monitoring, evaluation, and treatment.    Impression & Plan     Medical Decision Making:  Patient is a 70-year-old gentleman who appears chronically ill with a history of COPD who presents emergency department with fever, shortness of breath, chest pain, sided flank pain, diarrhea and fatigue.  Upon initial evaluation patient is febrile and tachycardic and has increased work of breathing.  He is on baseline 3 L of oxygen at home and came in on 10 L of nonrebreather.  We were able to titrate this down to 5 L nonrebreather and the patient remained stable with SPO2 levels in the mid 90s.  Patient was evaluated for sepsis and started on Zosyn based on my initial evaluation of the patient.  Blood cultures were collected and patient was started on fluid resuscitation.  Ultimately patient was found to have significant right-sided pneumonia which is likely the source of his infection.  I consulted with pharmacy and we will add on azithromycin to give us atypical coverage.  However his antibiotic regime can be further fine-tuned as an inpatient notably no clear source.  Covid 19 was also strongly considered and the patient was swabbed and kept under precautions.  Due to the patient's clinical presentation, he will be will be admitted for further evaluation and treatment.    Covid-19  Perfecto Reese was evaluated during a global COVID-19 pandemic, which necessitated consideration that the patient might be at risk for infection with the SARS-CoV-2 virus that causes COVID-19.   Applicable protocols for evaluation were followed during the patient's care.   COVID-19 was considered as part of the patient's evaluation. The  plan for testing is:  a test was obtained during this visit.     Diagnosis:     ICD-10-CM    1. Pneumonia of right lower lobe due to infectious organism  J18.9    2. Acute renal injury (H)  N17.9    3. Diarrhea, unspecified type  R19.7         Disposition:  Admitted to Dr. Vasquez.    Scribe Disclosure:  I, Abigail Mueller, am serving as a scribe at 6:22 PM on 12/2/2020 to document services personally performed by Roe Adam MD based on my observations and the provider's statements to me.      Roe Adam MD  12/03/20 0053

## 2020-12-03 NOTE — ED NOTES
Bed: ED11  Expected date:   Expected time:   Means of arrival:   Comments:  534-70M Fever, Nausea, Poss Covid

## 2020-12-03 NOTE — PROVIDER NOTIFICATION
RECEIVING UNIT ED HANDOFF REVIEW    ED Nurse Handoff Report was reviewed by: Tosin Mena RN on December 2, 2020 at 11:57 PM

## 2020-12-03 NOTE — H&P
[facility]    History and Physical  Hospitalist       Date of Admission:  12/2/2020    Assessment & Plan   Perfecto Reese is a 70 year old male with a history of chronic kidney disease stage III, right lower extremity wound, left ear wound, anemia, severe COPD, chronic hypoxic respiratory failure, adrenal insufficiency, tobacco use, psoriasis, hypertension, BPH, GERD, hepatitis, and cirrhosis who was sent into the ER due to fevers, shortness of breath, cough, and diarrhea.  In the ER, there was concern for community-acquired pneumonia and acute kidney injury.  He was given IV fluids and started on empiric antibiotics.  The hospitalist service was contacted to admit him for further evaluation management.    Community-acquired pneumonia  Possible empyema  -Has been feeling sick for a couple weeks with fevers, shortness of breath, cough, diarrhea, fatigue.  -Symptoms not improving.  -Home care nurse saw him on the day of admission for wound care and sent him to the ER.  -CXR shows right lower lobe pneumonia.  -CT abdomen/pelvis shows RLL pneumonia, air fluid cavity at the right lung base.  -Initially required 10 lpm of supplemental oxygen with EMS, now down to 3 lpm of supplemental oxygen.  -Admit to inpatient.  -Started on Zosyn and azithromycin in the ER, will continue the same for now.  -Consult thoracic surgery regarding possible empyema.  -Wean supplemental oxygen as able.  -Check urine antigen for legionella.  -Symptomatic COVID-19 testing obtained in the ER. Low suspicion for COVID-19 as he appears to have a bacterial pneumonia with possible empyema. If COVID-19 testing is negative, I would discontinue isolation precautions.    Suspected UTI  -Denies urinary symptoms.  -UA suggestive of UTI.  -Will be on Zosyn as noted above which would also cover possible UTI.  -Await urine culture results.    Diarrhea  -Check stool for c.diff and enteric panel.  -Check urine antigen for legionella  in setting of fever, pneumonia, diarrhea.  -Symptomatic management if above noted testing is negative.    Acute kidney injury  Chronic kidney disease, stage 3  -MICHAEL appears to be pre-renal due to dehydration.  -IV fluids.  -Monitor I&O.  -Recheck labs in AM.    Right lower extremity wound  Left ear wound  -Consult [facility] nurse regarding wound care.    Anemia  -Hemoglobin was about 6-7 in January 2020.  -Hemoglobin 9.6 in the ER, recheck in AM.  -Denies any recent signs/symptoms of bleeding.    Severe COPD  Chronic hypoxic respiratory failure  -Chronically on 3 L/min of supplemental oxygen.  -Continue PTA Symbicort, Spiriva, and PRN albuterol.    Adrenal insufficiency  -Continue PTA hydrocortisone 20 mg PO daily.  -Blood pressure has been OK, do not think he needs stress dose steroids at the moment, although will need to keep this in mind going forward.    Tobacco use  -Has not had a cigarette in the past few weeks since he has been feeling sick.  -Briefly counseled on tobacco cessation.    Psoariasis  -Appears to be on Humira, possibly for this.  -[facility]    Hypertension  -Blood pressure OK in the ER.  -Hold PTA amlodipine and losartan for now in setting of relatively low BP and MICHAEL.    BPH  -Continue PTA tamsulosin.    GERD  -Continue PTA omeprazole.    Hepatitis C  Cirrhosis  -Treated with interferon on the past.    DVT Prophylaxis: Pneumatic Compression Devices  Code Status: Full Code  Expected discharge: 2 - 3 days pending improvement in infection, consultant recommendations    Telly Vasquez MD    Primary Care Physician   Polo Neil    Chief Complaint   Fever, shortness of breath, diarrhea    History is obtained from the patient    History of Present Illness   Perfecto Reese is a 70 year old male with a history of chronic kidney disease stage III, right lower extremity wound, left ear wound, anemia, severe COPD, chronic hypoxic respiratory failure, adrenal insufficiency, tobacco  use, psoriasis, hypertension, BPH, GERD, hepatitis, and cirrhosis who was sent into the ER due to fevers, shortness of breath, cough, and diarrhea.  He has not been feeling well for couple weeks.  He has had intermittent fevers.  He has had worsening shortness of breath and a cough.  Cough has been productive of white phlegm mostly but has noticed some blood-tinged sputum over the last 24 hours.  He has also noted loose stools and abdominal discomfort.  He has felt fatigued.  He has chronic wounds on his left ear and his right lower extremity.  A home health care nurse came today for wound care and noted that he appeared quite sick and short of breath.  EMS was called and he was brought into the ER for further evaluation and management.    In the ER, he was evaluated by Dr. Roe Adam.  He initially had a fever of 102.5 and was tachycardic.  O2 sats were okay on 10 L initially and he was able to be weaned down to 5 L/min of supplemental oxygen.  Labs revealed acute kidney injury, leukocytosis, anemia.  UA was suggestive of UTI.  CT abdomen pelvis did not reveal any acute intra-abdominal issues, but did show extensive right lower lobe pneumonia and an air-fluid cavity at the right lung base.  Chest x-ray showed right lower lobe infiltrate.  He was started on empiric Zosyn and azithromycin.  Hospitalist service was contacted to admit him for further evaluation and management.    Past Medical History    I have reviewed this patient's medical history and updated it with pertinent information if needed.   Past Medical History:   Diagnosis Date     Adrenal insufficiency (H)      Anemia, iron deficiency      Back pain      COPD (chronic obstructive pulmonary disease) (H)      Depression      GERD (gastroesophageal reflux disease)      Hyperlipidemia      Migraine      Past Surgical History   I have reviewed this patient's surgical history and updated it with pertinent information if needed.  Past Surgical  History:   Procedure Laterality Date     AMPUTATION BELOW KNEE RT/LT Left      AS ARTHROSCOPY SUBTALAR JOINT SUBTALAR ARTHRODESIS       OPEN REDUCTION INTERNAL FIXATION FOREARM      left forearm/wrist     TONSILLECTOMY       Prior to Admission Medications   Prior to Admission Medications   Prescriptions Last Dose Informant Patient Reported? Taking?   DULoxetine (CYMBALTA) 30 MG capsule Last dispense 9/19/20 90 day supply  Yes No   Sig: Take 30 mg by mouth daily   Ferrous Gluconate 324 (37.5 Fe) MG TABS Last dispense 9/19/20 90 day supply  Yes No   Sig: Take 324 mg by mouth every other day   MENTHOL-METHYL SALICYLATE EX Last dispense 9/10/20 30 day supply  Yes No   Sig: Externally apply topically 2 times daily Menthol-methyl saliycylate 10-15% cream twice daily to lower back   Vitamin D, Cholecalciferol, 25 MCG (1000 UT) TABS Last dispense 9/19/20 90 day supply  Yes No   Sig: Take 1,000 Units by mouth 2 times daily   adalimumab (HUMIRA) 40 MG/0.8ML prefilled syringe kit Last dispense 9/19/20 30 day supply  Yes No   Sig: Inject 40 mg Subcutaneous every 14 days   albuterol (PROAIR HFA/PROVENTIL HFA/VENTOLIN HFA) 108 (90 Base) MCG/ACT inhaler Last dispense 9/19/20 30 day supply  Yes No   Sig: Inhale 2 puffs into the lungs every 4 hours as needed for shortness of breath / dyspnea or wheezing   amLODIPine (NORVASC) 10 MG tablet Last dispense 9/19/20 90 day supply  Yes No   Sig: Take 10 mg by mouth At Bedtime   aspirin 81 MG EC tablet Last dispense 9/19/20 #120 tab  Yes No   Sig: Take 81 mg by mouth daily   budesonide-formoterol (SYMBICORT) 160-4.5 MCG/ACT Inhaler Last dispense 9/19/20 30 day supply  Yes No   Sig: Inhale 2 puffs into the lungs 2 times daily   calcium citrate (CITRACAL) 950 MG tablet Last dispense 9/19/20 90 day supply  Yes No   Sig: Take 4 tablets by mouth daily   clobetasol (TEMOVATE) 0.05 % external solution Last dispense 9/19/20 30 day supply  Yes No   Sig: Apply topically At Bedtime Apply to scalp    diclofenac (VOLTAREN) 1 % topical gel Last dispense 9/19/20 30 day supply  Yes No   Sig: Apply 4 g topically every 6 hours as needed for moderate pain (R shoulder)   gabapentin (NEURONTIN) 300 MG capsule Last dispense 9/19/20 90 day supply  Yes No   Sig: Take 300 mg by mouth 3 times daily   hydrocortisone (CORTEF) 20 MG tablet Last dispense 9/19/20 90 day supply  Yes No   Sig: Take 20 mg by mouth every morning   lidocaine (LIDODERM) 5 % patch Last dispense 9/19/20 30 day supply  Yes No   Sig: Place 1 patch onto the skin every 24 hours To prevent lidocaine toxicity, patient should be patch free for 12 hrs daily.  Apply to right shoulder   lidocaine (XYLOCAINE) 5 % external ointment Last dispense 9/19/20 30 day supply  Yes No   Sig: Apply topically 3 times daily as needed for moderate pain (rib pain)   losartan (COZAAR) 25 MG tablet Last dispense 9/19/20 90 day supply  Yes No   Sig: Take 12.5 mg by mouth daily   morphine 10 MG/5ML solution Last dispense 9/19/20  Yes No   Sig: Take 2.5 mg by mouth 2 times daily as needed for severe pain (with dressing change)   nicotine (NICORETTE) 2 MG gum Last dispense 9/27/20  Yes No   Sig: Place 2 mg inside cheek as needed for smoking cessation   omeprazole (PRILOSEC) 20 MG DR capsule Last dispense 9/19/20 90 day supply  Yes No   Sig: Take 20 mg by mouth daily   oxyCODONE (ROXICODONE) 5 MG tablet Last dispense 9/19/20  Yes No   Sig: Take 5 mg by mouth every 6 hours as needed for severe pain Separate from morphine by 4 hours   primidone (MYSOLINE) 50 MG tablet Last dispense 9/19/20 90 day supply  Yes No   Sig: Take 50 mg by mouth At Bedtime   sodium hypochlorite (HYSEPT) external solution Last dispense 9/19/20 30 day supply  Yes No   Sig: Irrigate with as directed daily 0.25% for wound care   tamsulosin (FLOMAX) 0.4 MG capsule Last dispense 9/19/20 90 day supply  Yes No   Sig: Take 0.8 mg by mouth daily   tiotropium (SPIRIVA) 18 MCG inhaled capsule Last dispense 9/19/20 30 day  supply  Yes No   Sig: Inhale 18 mcg into the lungs daily   triamcinolone (KENALOG) 0.1 % external cream Last dispense 9/27/20  Yes No   Sig: Apply topically as needed for irritation (wound care)   vitamin B-12 (CYANOCOBALAMIN) 1000 MCG tablet Last dispense 9/19/20 90 day supply  Yes No   Sig: Take 1,000 mcg by mouth daily      Facility-Administered Medications: None     Allergies   Allergies   Allergen Reactions     Darvocet [Propoxyphene N-Apap] Nausea     Vicodin [Hydrocodone-Acetaminophen] Nausea     Can use if he eats with it     Social History   I have reviewed this patient's social history and updated it with pertinent information if needed. Perfecto Reese  reports that he quit smoking about 3 years ago. His smoking use included cigarettes. He has a 15.00 pack-year smoking history. He has never used smokeless tobacco. He reports that he does not drink alcohol or use drugs.    Family History   I have reviewed this patient's family history and updated it with pertinent information if needed.   Family History   Problem Relation Age of Onset     Colon Cancer Father      Coronary Artery Disease Father      Chronic Obstructive Pulmonary Disease Father      Osteoporosis Mother      Dementia Mother      Review of Systems   The 10 point Review of Systems is negative other than noted in the HPI or here.    Physical Exam   Temp: 98  F (36.7  C) Temp src: Oral BP: 115/48 Pulse: 89   Resp: 24 SpO2: 96 % O2 Device: Oxymask Oxygen Delivery: 3 LPM  Vital Signs with Ranges  Temp:  [98  F (36.7  C)-102.5  F (39.2  C)] 98  F (36.7  C)  Pulse:  [] 89  Resp:  [14-27] 24  BP: ()/(48-84) 115/48  SpO2:  [94 %-99 %] 96 %  140 lbs 0 oz    Constitutional: awake, alert, cooperative, mild distress, laying in bed  Eyes: pupils equal, round and reactive to light, conjunctiva normal  ENT: oral pharynx with moist mucous membranes, left ear with chronic pressure wound  Respiratory: clear to auscultation bilaterally, no crackles  or wheezing, slightly tachypneic  Cardiovascular: regular rate and rhythm, normal S1 and S2, no murmur noted  GI: normal bowel sounds, soft, non-distended, non-tender  Skin: warm, dry  Musculoskeletal: no lower extremity pitting edema present, left BKA, RLE with dressing and protective boot over chronic wound  Neurologic: awake, alert, answers questions appropriately    Data   Data reviewed today:  I personally reviewed the chest x-ray image(s) showing right lower lobe infiltrate.    Recent Labs   Lab 12/02/20  1845   WBC 17.2*   HGB 9.6*   MCV 91         POTASSIUM 3.3*   CHLORIDE 109   CO2 20   BUN 35*   CR 2.89*   ANIONGAP 11   MARTINE 7.9*   *   ALBUMIN 2.6*   PROTTOTAL 7.3   BILITOTAL 0.7   ALKPHOS 98   ALT 8   AST 6     Recent Results (from the past 24 hour(s))   XR Chest Port 1 View    Narrative    XR CHEST PORT 1 VW  12/2/2020 7:25 PM       INDICATION: Cough, fever, SOB  COMPARISON: 10/8/2015       Impression    IMPRESSION: Right lower lobe infiltrate consistent with pneumonia.  Lung volumes are low. No definite left lung infiltrate.    RAEANN MAYORGA MD   CT Abdomen Pelvis w/o Contrast    Narrative    CT ABDOMEN AND PELVIS WITHOUT CONTRAST 12/2/2020 9:08 PM    CLINICAL HISTORY: Left flank pain and fever.    TECHNIQUE: CT scan of the abdomen and pelvis was performed without IV  contrast. Multiplanar reformats were obtained. Dose reduction  techniques were used.    CONTRAST: None.    COMPARISON: None.    FINDINGS:   LOWER CHEST: Extensive airspace infiltrates in the right lower lobe  consistent with pneumonia. There is a 7.5 cm fluid collection with an  air-fluid level at the right lung base, which appears to be loculated  in the major fissure. Minimal infiltrate left lung base.    HEPATOBILIARY: Cirrhotic appearance of the liver. Cholelithiasis.    PANCREAS: Normal.    SPLEEN: Mild splenomegaly.    ADRENAL GLANDS: Normal.    KIDNEYS/BLADDER: Bilateral presumed renal cysts. No hydronephrosis  or  urinary tract calculi.    BOWEL: Normal.    LYMPH NODES: Normal.    VASCULATURE: Unremarkable.    PELVIC ORGANS: Normal.    OTHER: None.    MUSCULOSKELETAL: Degenerative changes in the spine. Mild  chronic-appearing compression fracture of L1. Old left ischial  fracture. No discrete bone lesions.      Impression    IMPRESSION:   1.  Extensive right lower lobe pneumonia. Mild infiltrate in the left  lung base as well.  2.  Air-fluid cavity at the right lung base appears to be loculated in  the major fissure. Suggest thoracic surgery consultation.  3.  Cirrhosis with signs of portal hypertension including  splenomegaly. No ascites.    RAEANN MAYORGA MD

## 2020-12-03 NOTE — PROGRESS NOTES
[facility]    Medicine Progress Note - Hospitalist Service       Date of Admission:  12/2/2020    Assessment & Plan   Perfecto Reese is a 70 year old male with complex PMHx including severe COPD on 3L home O2 with ongoing tobacco use, adrenal insufficiency on chronic steroids, psoriasis on Humira, chronic Hep C with cirrhosis, chronic wounds, and non-compliance who was admitted on 12/2/2020 for pneumonia with concern for empyema    Perfecto is a LOW SUSPICION PUI.  Follow these instructions:    If COVID test positive -> continue isolation precautions    If COVID test negative -> discontinue COVID-specific isolation precautions      # Acute on chronic hypoxic respiratory failure due to RLL community-acquired bacterial pneumonia with possible empyema, rule out COVID, Improved  # Severe COPD  * At baseline on 3 lpm/NC  * EMS activated by his home care RN for fever, cough, and acute respiratory distress (placed on 10L per OM in the field). Fever to 102.5, tachycardia, tachypnea, and leukocytosis (17.2k) present on arrival. CXR on arrival showed RLL infiltrate and CT abd/pelvis showed air fluid cavity at the right lung base. On admission, he was initiated on IV pip-tazo and azithromycin, and Thoracic Surgery was consulted  - Now at baseline 3L  - Chest CT ordered by Thoracic Surgery  - Continues on IV pip-tazo, 12/2 to present  - Continues on azithromycin to complete 5 day course (last day: 12/6)  - Continues on PTA inhalers  - Encourage incentive spirometry, ambulate as tolerated  - 12/2 blood cultures x2 NGTD  - Thoracic Surgery following, appreciate assistance     Possible UTI  UA on arrival grossly dirty  - On IV pip-tazo for pneumonia as noted above  - 12/3 urine culture pending    MICHAEL stage II  Creatinine 2.89 from baseline 1.2-1.3 in Jan 2020. Suspect pre-renal state in setting of pneumonia/sepsis and compounded by PTA losartan  - Continues on IV fluids  - Holding PTA losartan  - Repeat  BMP in AM     Essential hypertension  [PTA: amlodipine 10 mg qhs, losartan 12.5 mg daily]  - Intermittent hypotension noted on admission. Now improved and stable  - Continue holding amlodipine and losartan for now    Chronic adrenal insufficiency  - Continues on PTA hydrocortisone 20 mg PO daily, no plan for stress dose steroids at this time    History of psoriasis   - Holding PTA Humira    Chronic RLE wound  Chronic left ear wound  Present on admission. Receives wound cares per home care RN  - WOCN consult     Chronic anemia  Hgb 9.6 on admission, no recent baseline. He has had no s/sx of bleeding  - Check iron studies, B12, folate  - Repeat CBC in AM    Chronic Hepatitis C with cirrhosis  Treated with interferon on the past  - Appears compensated    GERD  Chronic and stable on omeprazole    BPH  Chronic and stable on tamsulosin      Tobacco use disorder  Has not had a cigarette in the past few weeks since he has been feeling sick.    Chronic pain syndrome  [PTA: lidocaine patch, oxycodone 5 mg q6h prn, morphine 2.5 mg BID prn]  - Continues on lidocaine patch, oxycodone prn  - Holding morphine due to MICHAEL, IV Dilaudid available     Diet: Regular Diet Adult    DVT Prophylaxis: Pneumatic Compression Devices  Bruce Catheter: not present  Code Status: Full Code      Disposition: Expected discharge pending ongoing improvement in pneumonia/sepsis, PT assessment tomorrow - possibly over the weekend    The patient's care was discussed with the Bedside Nurse and Patient.    Charis Sinclair MD  Hospitalist Service  [facility]  Contact information available via [facility] Paging/Directory    ______________________________________________________________________    Interval History   Admitted last night. Feels better. Still with productive cough, but shortness of breath improved. Denies loose stools. Wanting to eat.   - continue antibiotics, IV fluids  - chest CT as per Thoracic Surgery  - d/c stool  studies, diarrhea likely due to adrenal insufficiency which has now resolved/improved since resuming PTA dexamethasone  - check iron studies, B12/folate    Data reviewed today: I reviewed all medications, new labs and imaging results over the last 24 hours. I personally reviewed no images or EKG's today.    Physical Exam   Vital Signs: Temp: 96  F (35.6  C) Temp src: Oral BP: 115/46 Pulse: 71   Resp: 20 SpO2: (P) 97 % O2 Device: (P) Oxymask Oxygen Delivery: (P) 3 LPM  Weight: 140 lbs 0 oz  Constitutional: Resting comfortably, NAD  Respiratory: Breathing non-labored. +bibasilar crackles  Cardiovascular: Heart RRR, no m/r/g. No pedal edema  GI: +BS, abd soft/NT  Skin/Integument: RLE dressing in place for chronic wound  Musculoskeletal: Normal muscle bulk and tone  Neuro: Alert and appropriate, TSEWART  Psych: Calm and cooperative, irritable    Data   Recent Labs   Lab 12/02/20  1845   WBC 17.2*   HGB 9.6*   MCV 91         POTASSIUM 3.3*   CHLORIDE 109   CO2 20   BUN 35*   CR 2.89*   ANIONGAP 11   MARTINE 7.9*   *   ALBUMIN 2.6*   PROTTOTAL 7.3   BILITOTAL 0.7   ALKPHOS 98   ALT 8   AST 6         Recent Results (from the past 24 hour(s))   XR Chest Port 1 View    Narrative    XR CHEST PORT 1 VW  12/2/2020 7:25 PM       INDICATION: Cough, fever, SOB  COMPARISON: 10/8/2015       Impression    IMPRESSION: Right lower lobe infiltrate consistent with pneumonia.  Lung volumes are low. No definite left lung infiltrate.    RAEANN MAYORGA MD   CT Abdomen Pelvis w/o Contrast    Narrative    CT ABDOMEN AND PELVIS WITHOUT CONTRAST 12/2/2020 9:08 PM    CLINICAL HISTORY: Left flank pain and fever.    TECHNIQUE: CT scan of the abdomen and pelvis was performed without IV  contrast. Multiplanar reformats were obtained. Dose reduction  techniques were used.    CONTRAST: None.    COMPARISON: None.    FINDINGS:   LOWER CHEST: Extensive airspace infiltrates in the right lower lobe  consistent with pneumonia. There is a 7.5 cm  fluid collection with an  air-fluid level at the right lung base, which appears to be loculated  in the major fissure. Minimal infiltrate left lung base.    HEPATOBILIARY: Cirrhotic appearance of the liver. Cholelithiasis.    PANCREAS: Normal.    SPLEEN: Mild splenomegaly.    ADRENAL GLANDS: Normal.    KIDNEYS/BLADDER: Bilateral presumed renal cysts. No hydronephrosis or  urinary tract calculi.    BOWEL: Normal.    LYMPH NODES: Normal.    VASCULATURE: Unremarkable.    PELVIC ORGANS: Normal.    OTHER: None.    MUSCULOSKELETAL: Degenerative changes in the spine. Mild  chronic-appearing compression fracture of L1. Old left ischial  fracture. No discrete bone lesions.      Impression    IMPRESSION:   1.  Extensive right lower lobe pneumonia. Mild infiltrate in the left  lung base as well.  2.  Air-fluid cavity at the right lung base appears to be loculated in  the major fissure. Suggest thoracic surgery consultation.  3.  Cirrhosis with signs of portal hypertension including  splenomegaly. No ascites.    RAEANN MAYORGA MD   CT Chest w/o Contrast    Narrative    CT CHEST WITHOUT CONTRAST 12/3/2020 11:55 AM     HISTORY: Pneumonia, unresolved or complicated. 7.5 cm fluid collection  with air-fluid level seen on CT abdomen/pelvis.    COMPARISON: Abdomen and pelvis CT from December 2, 2020    TECHNIQUE: Volumetric helical acquisition of CT images of the chest  from the clavicles to the kidneys were acquired without IV contrast.  Radiation dose for this scan was reduced using automated exposure  control, adjustment of the mA and/or kV according to patient size, or  iterative reconstruction technique.    FINDINGS: Air-fluid level in a cavitary lesion in the right lower lobe  may represent a pulmonary abscess measuring 6.4 x 5.3 cm. Extensive  infiltrates throughout the right lower lobe. Mild dependent  atelectasis and/or additional infiltrate in the posterior aspect of  the right upper lobe. Trace right pleural fluid. Mild  left lower lobe  atelectasis and/or infiltrate. Marked bronchial wall thickening and  bronchiectasis seen in the left lower lobe with extensive areas of  mucous plugging. Emphysema. A few mildly prominent lymph nodes are  seen in the mediastinum which are nonspecific and may be reactive in  this setting. No left pleural or pericardial effusion. Possible  splenomegaly in the upper abdomen. Question of nodular contour to the  liver which could indicate cirrhosis. Cholelithiasis without  cholecystitis.      Impression    IMPRESSION:  1. Air-fluid level in a cavitary lesion in the right lower lobe  measuring 6.4 x 5.3 cm, differential includes a pulmonary abscess.  2. Extensive consolidation in the right lower lobe.  3. Extensive mucous plugging and bronchial wall thickening in the left  lower lobe with mild associated infiltrate.       Medications       aspirin  81 mg Oral Daily     azithromycin  250 mg Intravenous Q24H     fluticasone-vilanterol  1 puff Inhalation Daily     hydrocortisone  20 mg Oral QAM     omeprazole  20 mg Oral Daily     piperacillin-tazobactam  3.375 g Intravenous Q6H     polyethylene glycol  17 g Oral Daily     primidone  50 mg Oral At Bedtime     senna-docusate  1 tablet Oral BID    Or     senna-docusate  2 tablet Oral BID     sodium chloride (PF)  3 mL Intracatheter Q8H     tamsulosin  0.8 mg Oral Daily     umeclidinium  1 puff Inhalation Daily

## 2020-12-03 NOTE — PROVIDER NOTIFICATION
MD Notification    Notified Person: MD    Notified Person Name: Dr. Sinclair     Notification Date/Time: 12/3/2020 3:40pm     Notification Interaction: web page     Purpose of Notification: Obs 12 COVID negative. Low suspicion. OK to discontinue precautions and transfer? Please advise thanks MR RN    Orders Received: Patient is COVID-19 Negative. Dr. Sinclair notified of status. Received order from Provider to discontinue Special Precautions and transfer patient to med surg unit. Charge RN and patient/family updated, and Special Precautions door sign turned over.     Comments:

## 2020-12-03 NOTE — PROVIDER NOTIFICATION
MD Notification    Notified Person: MD    Notified Person Name:Pedro    Notification Date/Time: 12/3/20 0253    Notification Interaction: amcon text page    Purpose of Notification: FYI UA results available    Orders Received: No call back expected.    Comments:

## 2020-12-03 NOTE — CONSULTS
[facility] WO Nurse Inpatient Wound Assessment   Reason for consultation: R) lower post leg  wound     Assessment  R) lower post leg  Vascular wound   Status: initial assessment    Treatment Plan (Continue current plan of care)  1. R) lower post leg wound: Daily  (please premedicate)    Use MicroKlenz wound cleanser #[id number] to saturate dressing for removal and to clean wound    Dry and protect surrounding skin with no sting barrier film wipe #[id number]    Apply Aquacel AG 4x4 (x2 dressings) #[id number]    Cover with ABDs (x2)      Wrap with kerlix roll    Apply sween 24 lotion to dry skin on feet    Wrap feet and leg with ace bandage    Apply Rooke boot  2. L) ear scab-keep clean, dry, intact monitor for signs of infection  3. Pressure Injury prevention (please order supplies if not in room)    Turn every 2 hours, side to side avoid supine on pressure redistribution support surface     Float heels off bed with use of pillows under legs    Prevent sliding by limiting HOB to 30 degrees or less unless contraindicated, use knee gatch first if not contraindicated    Protective foam dressings to sacrum PRN,Change at least q 4 days, peel back, peek and replace for daily skin inspection.    Chair cushion (#[id number]) as needed    Optimize nutrition per RD    Orders Written  Recommended provider order: None, at this time  WO Nurse follow-up plan while in the hospital:weekly  Nursing to notify the Provider(s) and re-consult the WOC Nurse if wound(s) deteriorates or new skin concern.    Patient History  According to provider note(s):  70 year old male with complex PMHx including severe COPD on 3L home O2 with ongoing tobacco use, adrenal insufficiency on chronic steroids, psoriasis on Humira, chronic Hep C with cirrhosis, chronic wounds, and non-compliance  admitted on 12/2/2020 for pneumonia. Covid neg, diarrhea likely due to adrenal insufficiency resolved. CT showed lung abscess anticipating need for IV  "atb. ID consulted.    [facility]: [facility] at [facility] for \"Chronic Venous Hypertension (Idiopathic) with ulcer of right lower extremity\". L) BKA. Next scheduled visit is at the end of December. He has home care for dressing changes daily.    Data  Documented Allergies: Darvocet [propoxyphene n-apap] and Vicodin [hydrocodone-acetaminophen]     Recent Labs   Lab Test 20  1845   ALBUMIN 2.6*   HGB 9.6*   WBC 17.2*     Recent Labs   Lab 20  1845   *       Temperature: Temp (24hrs), Av.6  F (37  C), Min:96  F (35.6  C), Max:102.5  F (39.2  C)    Intake/Output Summary (Last 24 hours) at 12/3/2020 1801  Last data filed at 12/3/2020 1116  Gross per 24 hour   Intake 638 ml   Output 350 ml   Net 288 ml       Orders Placed This Encounter      Regular Diet Adult      Containment of urine/stool: Brief and Urinal   Medical Devices:Bruce Catheter: not present    Catheter securement Not applicable    Pressure Injury Risk Assessment (Donn Scale):  Sensory Perception: 3-->slightly limited    Moisture: 4-->rarely moist   Activity: 2-->chairfast     Mobility: 2-->very limited   Nutrition: 2-->probably inadequate   Friction and Shear: 2-->potential problem  Donn Score: 15    Current support surface: Standard  Atmos Air mattress  Current off-loading measures in bed: reposition side to side with use of Pillows  Current off-loading heels: Pillows under calves  Current off-loading measures chair: Chair cushion  Ordered as need  Focused WOC Nurse Skin/Wound Exam:   Subjective/Objective: Patient turns with assist x 1 sacrum intact, no erythema at this time, evidence of previously healed scar tissue    Wound Location:  R) posterior LE   L) Ear  Date of last photo 12/3/2020  Wound History:[facility] scheduled end of month. According to patient, receives home care for daily dressing changes  Measurements (length x width x depth, in cm) 15 x 6  x  0.2 cm   Wound Base: 100 % " non-granular tissue smooth, pale, pink  Tunneling N/A  Undermining N/A  Palpation of the wound bed: normal   Periwound skin: intact  Wound edge 11 o'clock adherent scab   Periwound Temperature: normal   Drainage:, moderate   Description of drainage: yellow  Odor: none  Pain: no grimacing or signs of discomfort, with dressing change after premed given  Leg warm, no edema. Feet and heels with dry scaly xerosis   Local signs of infection: None    Interventions  Visual inspection and assessment completed   Wound care completed  Orders with supply numbers placed in  orders for nursing staff  Education provided: importance of repositioning and plan of care  Discussed plan of care with Patient and Nurse    Susan Bae MS RN CWOCN

## 2020-12-03 NOTE — PROVIDER NOTIFICATION
MD Notification    Notified Person: MD    Notified Person Name: Ronald    Notification Date/Time: 12/3/20 0201    Notification Interaction: amcon text page    Purpose of Notification: Pt request to restart home kenalog cream for generalized itching.     Orders Received: Home kenalog cream reordered.    Comments:

## 2020-12-03 NOTE — PROGRESS NOTES
Hospitalist update note      Discussed CT findings with thoracic surgery. Appears to have lung abscess. No indication for surgery. Will likely need prolonged course of IV antibiotics, will consult ID in AM      COVID negative. d/c special precautions      Discontinue C.diff PCR and enteric stool viral panel. Diarrhea likely due to adrenal insufficiency. BP has recovered and diarrhea has resolved since resuming PTA hydrocortisone which patient was probably not taking. D/c enteric precautions    OK to transfer to floor    MINERVA Sinclair MD  Hospitalist  [phone]

## 2020-12-03 NOTE — PLAN OF CARE
[id number]: A&Ox4. VSS on 3L oxymask (baseline 3L NC). Reports moderate/severe L ribcage pain with deep breathing. LS diminished. ISIDRO. Loose prod cough, thick brown sputum, sample sent to lab. COVID swab pending, special precautions in place. R foot wound, ace wrapped, boot in place. L ear pressure injury, severe, open to air. R buttock blanchable erythema, mepilex applied. Reports generalized itching (baseline per pt), given prn kenalog cream. UA sent, looks negative, already on zosyn.    Need stool sample.

## 2020-12-03 NOTE — PHARMACY-ADMISSION MEDICATION HISTORY
"Pharmacy Medication History  Admission medication history interview status for the 12/2/2020  admission is complete. See EPIC admission navigator for prior to admission medications       Medication history sources: Patient and [facility] ([facility])  Location of interview: Phone, discussion thru RN in patient room  Adherence Assessment: Poor    Significant changes made to the medication list:  -Added all medications.    Additional medication history information:   -Medications [facility] are per [facility], last dispensed as noted in September. [facility], all medications put in a holding pattern in [facility] system, [facility] at some point, appears he is now at home. Medications have not yet been \"re-activated\" in [facility] system since September admission. He reports \"Not taking meds for a very long time but should be\".    Medication reconciliation completed by provider prior to medication history? No    Time spent in this activity: 45 min      Prior to Admission medications    Medication Sig Last Dose Taking? Auth Provider   adalimumab (HUMIRA) 40 MG/0.8ML prefilled syringe kit Inject 40 mg Subcutaneous every 14 days Last dispense 9/19/20 30 day supply  Unknown, Entered By History   albuterol (PROAIR HFA/PROVENTIL HFA/VENTOLIN HFA) 108 (90 Base) MCG/ACT inhaler Inhale 2 puffs into the lungs every 4 hours as needed for shortness of breath / dyspnea or wheezing Last dispense 9/19/20 30 day supply  Unknown, Entered By History   amLODIPine (NORVASC) 10 MG tablet Take 10 mg by mouth At Bedtime Last dispense 9/19/20 90 day supply  Unknown, Entered By History   aspirin 81 MG EC tablet Take 81 mg by mouth daily Last dispense 9/19/20 #120 tab  Unknown, Entered By History   budesonide-formoterol (SYMBICORT) 160-4.5 MCG/ACT Inhaler Inhale 2 puffs into the lungs 2 times daily Last dispense 9/19/20 30 day supply  Unknown, Entered By History   calcium citrate (CITRACAL) 950 MG tablet Take 4 tablets by mouth daily Last " dispense 9/19/20 90 day supply  Unknown, Entered By History   clobetasol (TEMOVATE) 0.05 % external solution Apply topically At Bedtime Apply to scalp Last dispense 9/19/20 30 day supply  Unknown, Entered By History   diclofenac (VOLTAREN) 1 % topical gel Apply 4 g topically every 6 hours as needed for moderate pain (R shoulder) Last dispense 9/19/20 30 day supply  Unknown, Entered By History   DULoxetine (CYMBALTA) 30 MG capsule Take 30 mg by mouth daily Last dispense 9/19/20 90 day supply  Unknown, Entered By History   Ferrous Gluconate 324 (37.5 Fe) MG TABS Take 324 mg by mouth every other day Last dispense 9/19/20 90 day supply  Unknown, Entered By History   gabapentin (NEURONTIN) 300 MG capsule Take 300 mg by mouth 3 times daily Last dispense 9/19/20 90 day supply  Unknown, Entered By History   hydrocortisone (CORTEF) 20 MG tablet Take 20 mg by mouth every morning Last dispense 9/19/20 90 day supply  Unknown, Entered By History   lidocaine (LIDODERM) 5 % patch Place 1 patch onto the skin every 24 hours To prevent lidocaine toxicity, patient should be patch free for 12 hrs daily.  Apply to right shoulder Last dispense 9/19/20 30 day supply  Unknown, Entered By History   lidocaine (XYLOCAINE) 5 % external ointment Apply topically 3 times daily as needed for moderate pain (rib pain) Last dispense 9/19/20 30 day supply  Unknown, Entered By History   losartan (COZAAR) 25 MG tablet Take 12.5 mg by mouth daily Last dispense 9/19/20 90 day supply  Unknown, Entered By History   MENTHOL-METHYL SALICYLATE EX Externally apply topically 2 times daily Menthol-methyl saliycylate 10-15% cream twice daily to lower back Last dispense 9/10/20 30 day supply  Unknown, Entered By History   morphine 10 MG/5ML solution Take 2.5 mg by mouth 2 times daily as needed for severe pain (with dressing change) Last dispense 9/19/20  Unknown, Entered By History   nicotine (NICORETTE) 2 MG gum Place 2 mg inside cheek as needed for smoking  cessation Last dispense 9/27/20  Unknown, Entered By History   omeprazole (PRILOSEC) 20 MG DR capsule Take 20 mg by mouth daily Last dispense 9/19/20 90 day supply  Unknown, Entered By History   oxyCODONE (ROXICODONE) 5 MG tablet Take 5 mg by mouth every 6 hours as needed for severe pain Separate from morphine by 4 hours Last dispense 9/19/20  Unknown, Entered By History   primidone (MYSOLINE) 50 MG tablet Take 50 mg by mouth At Bedtime Last dispense 9/19/20 90 day supply  Unknown, Entered By History   sodium hypochlorite (HYSEPT) external solution Irrigate with as directed daily 0.25% for wound care Last dispense 9/19/20 30 day supply  Unknown, Entered By History   tamsulosin (FLOMAX) 0.4 MG capsule Take 0.8 mg by mouth daily Last dispense 9/19/20 90 day supply  Unknown, Entered By History   tiotropium (SPIRIVA) 18 MCG inhaled capsule Inhale 18 mcg into the lungs daily Last dispense 9/19/20 30 day supply  Unknown, Entered By History   triamcinolone (KENALOG) 0.1 % external cream Apply topically as needed for irritation (wound care) Last dispense 9/27/20  Unknown, Entered By History   vitamin B-12 (CYANOCOBALAMIN) 1000 MCG tablet Take 1,000 mcg by mouth daily Last dispense 9/19/20 90 day supply  Unknown, Entered By History   Vitamin D, Cholecalciferol, 25 MCG (1000 UT) TABS Take 1,000 Units by mouth 2 times daily Last dispense 9/19/20 90 day supply  Unknown, Entered By History       The information provided in this note is only as accurate as the sources available at the time of the update(s).

## 2020-12-03 NOTE — PROGRESS NOTES
"BRIEF NUTRITION ASSESSMENT      REASON FOR ASSESSMENT:  Perfecto Reese is a 70 year old male assessed by Registered Dietitian for Admission Nutrition Risk Screen for stageable pressure injuries or large/non-healing wound or burn    NUTRITION HISTORY:  Chart reviewed  Note pt has not been feeling well for the past few weeks  Currently in COVID isolation  Attempted to reach pt via phone - no answer  Unable to obtain diet hx    CURRENT DIET AND INTAKE:  Diet:  NPO              Thoracic surgery consult pending (possible empyema)  WOCN consult pending (right foot wound, left ear wound)    IVF @ 100 mL/hr    ANTHROPOMETRICS:  Height: 5' 5\"  Weight:(12/2) 63.5 kg /  140 lbs 0 oz  Body mass index is 23.3 kg/m .   Weight Status: Normal BMI  IBW:  57.5 kg (pt with left BKA)  %IBW: 110%  Weight History:   Wt Readings from Last 10 Encounters:   12/02/20 63.5 kg (140 lb)   03/22/18 93 kg (205 lb)   01/21/16 70.3 kg (155 lb)   01/20/16 70.3 kg (155 lb)   12/08/15 70.3 kg (155 lb)   10/08/15 72.1 kg (159 lb)   07/15/15 76.2 kg (168 lb)   05/23/15 76.7 kg (169 lb)   03/01/15 78.9 kg (174 lb)   02/07/14 78.9 kg (174 lb)         LABS:  12/2: K 3.3 (L)           BUN 35 (H)           Cr 2.89 (pt with CKD)    MALNUTRITION:  Unable to assess malnutrition at this time    NUTRITION INTERVENTION:  Nutrition Diagnosis:  No nutrition diagnosis at this time.    Implementation:  Nutrition Education ---> Per Provider order if indicated  Recommend daily multivitamin  Will trial nutrition supplement once diet advanced    FOLLOW UP/MONITORING:   Will re-evaluate in 7 - 10 days, or sooner, if re-consulted.          "

## 2020-12-03 NOTE — PROGRESS NOTES
0023 - Pt admitted from ED in special COVID and enteric precautions. VSS on 3L oxymask (due to large L ear pressure injury). RR 20s. Reports moderate/severe L ribcage pain with deep breathing.

## 2020-12-03 NOTE — ED NOTES
[facility]  ED Nurse Handoff Report    ED Chief complaint: Fever and Diarrhea      ED Diagnosis:   Final diagnoses:   None       Code Status: to be assessed by admitting provider    Allergies:   Allergies   Allergen Reactions     Darvocet [Propoxyphene N-Apap] Nausea     Vicodin [Hydrocodone-Acetaminophen] Nausea     Can use if he eats with it       Patient Story: Patient arrives to the ED today from home with complaints of chest pain, shortness of breath, fever, generalized abdominal pain, diarrhea and fatigue. Patient was at home when his home health care nurse discovered that the patient had a temperature of 103. Patient is on 3L of home o2 at baseline however is on 5L o2 in the ED to maintain his sats while in the ED. Patient reports centralized chest pain and tightness as well as a frequent productive cough. Patient also notes that he has been having frequent episodes of diarrhea and abdominal pain. Of note patient has a wound on his left ear due to chronic o2 use.  Focused Assessment:    Resp: increased shortness of breath- 5L o2 via face mask- shortness of breath noted   Cardiac; Patient has chest pain that is midsternal   Neuro: Fatigue and generalized weakness   GI: patient complains of generalized abdominal pain and frequent episodes of diarrhea   :WDL   Musculoskeletal: patient has a BKA on his right leg. Patient gets wound care from home health care nurse   HEENT: Patient has a wound on his left ear.     Treatments and/or interventions provided: abx, fluids, pain meds   Patient's response to treatments and/or interventions: maintains status     To be done/followed up on inpatient unit:  control pain, continue to monitor     Does this patient have any cognitive concerns?: rosemary     Activity level - Baseline/Home:  Total Care  Activity Level - Current:   Total Care    Patient's Preferred language: English   Needed?: No    Isolation: COVID 19 Rule out and Enteric   Infection:    COVID r/o and special precautions  Patient tested for COVID 19 prior to admission:yes   Bariatric?: No    Vital Signs:   Vitals:    12/02/20 1930 12/02/20 1940 12/02/20 1950 12/02/20 2000   BP: 116/50   114/64   Pulse: 110 114 112 111   Resp:       Temp:       TempSrc:       SpO2: 97% 96% 97% 96%   Weight:       Height:           Cardiac Rhythm:Cardiac Rhythm: Sinus tachycardia    Was the PSS-3 completed:   Yes  What interventions are required if any?               Family Comments: none   OBS brochure/video discussed/provided to patient/family: No              Name of person given brochure if not patient: n.a               Relationship to patient: n.a     For the majority of the shift this patient's behavior was Green.   Behavioral interventions performed were n.a .    ED NURSE PHONE NUMBER: *[id number]

## 2020-12-03 NOTE — ED NOTES
Bed: ED08  Expected date:   Expected time:   Means of arrival:   Comments:  446-Altered Mental Status

## 2020-12-04 LAB
ANION GAP SERPL CALCULATED.3IONS-SCNC: 9 MMOL/L (ref 3–14)
BACTERIA SPEC CULT: NORMAL
BASOPHILS # BLD AUTO: 0 10E9/L (ref 0–0.2)
BASOPHILS NFR BLD AUTO: 0 %
BLD PROD TYP BPU: NORMAL
BLD UNIT ID BPU: 0
BLOOD PRODUCT CODE: NORMAL
BPU ID: NORMAL
BUN SERPL-MCNC: 47 MG/DL (ref 7–30)
CALCIUM SERPL-MCNC: 6.9 MG/DL (ref 8.5–10.1)
CHLORIDE SERPL-SCNC: 114 MMOL/L (ref 94–109)
CO2 SERPL-SCNC: 17 MMOL/L (ref 20–32)
CREAT SERPL-MCNC: 2.61 MG/DL (ref 0.66–1.25)
DIFFERENTIAL METHOD BLD: ABNORMAL
EOSINOPHIL # BLD AUTO: 0 10E9/L (ref 0–0.7)
EOSINOPHIL NFR BLD AUTO: 0.3 %
ERYTHROCYTE [DISTWIDTH] IN BLOOD BY AUTOMATED COUNT: 14.3 % (ref 10–15)
ERYTHROCYTE [DISTWIDTH] IN BLOOD BY AUTOMATED COUNT: 14.5 % (ref 10–15)
FERRITIN SERPL-MCNC: 515 NG/ML (ref 26–388)
FOLATE SERPL-MCNC: 6.7 NG/ML
GFR SERPL CREATININE-BSD FRML MDRD: 24 ML/MIN/{1.73_M2}
GLUCOSE SERPL-MCNC: 167 MG/DL (ref 70–99)
HCT VFR BLD AUTO: 18.6 % (ref 40–53)
HCT VFR BLD AUTO: 19.1 % (ref 40–53)
HGB BLD-MCNC: 6.1 G/DL (ref 13.3–17.7)
HGB BLD-MCNC: 6.4 G/DL (ref 13.3–17.7)
IMM GRANULOCYTES # BLD: 0 10E9/L (ref 0–0.4)
IMM GRANULOCYTES NFR BLD: 0.6 %
IRON SATN MFR SERPL: 27 % (ref 15–46)
IRON SERPL-MCNC: 30 UG/DL (ref 35–180)
LYMPHOCYTES # BLD AUTO: 0.2 10E9/L (ref 0.8–5.3)
LYMPHOCYTES NFR BLD AUTO: 6.6 %
Lab: NORMAL
MAGNESIUM SERPL-MCNC: 1.6 MG/DL (ref 1.6–2.3)
MCH RBC QN AUTO: 30.7 PG (ref 26.5–33)
MCH RBC QN AUTO: 31.1 PG (ref 26.5–33)
MCHC RBC AUTO-ENTMCNC: 32.8 G/DL (ref 31.5–36.5)
MCHC RBC AUTO-ENTMCNC: 33.5 G/DL (ref 31.5–36.5)
MCV RBC AUTO: 93 FL (ref 78–100)
MCV RBC AUTO: 94 FL (ref 78–100)
MONOCYTES # BLD AUTO: 0.2 10E9/L (ref 0–1.3)
MONOCYTES NFR BLD AUTO: 5.7 %
NEUTROPHILS # BLD AUTO: 2.8 10E9/L (ref 1.6–8.3)
NEUTROPHILS NFR BLD AUTO: 86.8 %
PLATELET # BLD AUTO: 70 10E9/L (ref 150–450)
PLATELET # BLD AUTO: 74 10E9/L (ref 150–450)
POTASSIUM SERPL-SCNC: 3.7 MMOL/L (ref 3.4–5.3)
RBC # BLD AUTO: 1.99 10E12/L (ref 4.4–5.9)
RBC # BLD AUTO: 2.06 10E12/L (ref 4.4–5.9)
RETICS # AUTO: 17.9 10E9/L (ref 25–95)
RETICS/RBC NFR AUTO: 0.9 % (ref 0.5–2)
SODIUM SERPL-SCNC: 140 MMOL/L (ref 133–144)
SPECIMEN SOURCE: NORMAL
TIBC SERPL-MCNC: 112 UG/DL (ref 240–430)
TRANSFUSION STATUS PATIENT QL: NORMAL
TRANSFUSION STATUS PATIENT QL: NORMAL
VIT B12 SERPL-MCNC: 1117 PG/ML (ref 193–986)
WBC # BLD AUTO: 3.2 10E9/L (ref 4–11)
WBC # BLD AUTO: 3.5 10E9/L (ref 4–11)

## 2020-12-04 PROCEDURE — 83550 IRON BINDING TEST: CPT | Performed by: INTERNAL MEDICINE

## 2020-12-04 PROCEDURE — P9016 RBC LEUKOCYTES REDUCED: HCPCS | Performed by: HOSPITALIST

## 2020-12-04 PROCEDURE — 99233 SBSQ HOSP IP/OBS HIGH 50: CPT | Performed by: INTERNAL MEDICINE

## 2020-12-04 PROCEDURE — 82746 ASSAY OF FOLIC ACID SERUM: CPT | Performed by: INTERNAL MEDICINE

## 2020-12-04 PROCEDURE — 85027 COMPLETE CBC AUTOMATED: CPT | Performed by: INTERNAL MEDICINE

## 2020-12-04 PROCEDURE — 250N000013 HC RX MED GY IP 250 OP 250 PS 637: Performed by: INTERNAL MEDICINE

## 2020-12-04 PROCEDURE — 258N000003 HC RX IP 258 OP 636: Performed by: INTERNAL MEDICINE

## 2020-12-04 PROCEDURE — 36415 COLL VENOUS BLD VENIPUNCTURE: CPT | Performed by: INTERNAL MEDICINE

## 2020-12-04 PROCEDURE — 120N000001 HC R&B MED SURG/OB

## 2020-12-04 PROCEDURE — 82728 ASSAY OF FERRITIN: CPT | Performed by: INTERNAL MEDICINE

## 2020-12-04 PROCEDURE — 82607 VITAMIN B-12: CPT | Performed by: INTERNAL MEDICINE

## 2020-12-04 PROCEDURE — 85060 BLOOD SMEAR INTERPRETATION: CPT | Performed by: PATHOLOGY

## 2020-12-04 PROCEDURE — 86900 BLOOD TYPING SEROLOGIC ABO: CPT | Performed by: HOSPITALIST

## 2020-12-04 PROCEDURE — 83540 ASSAY OF IRON: CPT | Performed by: INTERNAL MEDICINE

## 2020-12-04 PROCEDURE — 999N001109 HC STATISTIC MORPHOLOGY W/INTERP HISTOLOGY TC 85060: Performed by: INTERNAL MEDICINE

## 2020-12-04 PROCEDURE — 83735 ASSAY OF MAGNESIUM: CPT | Performed by: INTERNAL MEDICINE

## 2020-12-04 PROCEDURE — 85045 AUTOMATED RETICULOCYTE COUNT: CPT | Performed by: INTERNAL MEDICINE

## 2020-12-04 PROCEDURE — 86901 BLOOD TYPING SEROLOGIC RH(D): CPT | Performed by: HOSPITALIST

## 2020-12-04 PROCEDURE — 99221 1ST HOSP IP/OBS SF/LOW 40: CPT | Performed by: INTERNAL MEDICINE

## 2020-12-04 PROCEDURE — 86850 RBC ANTIBODY SCREEN: CPT | Performed by: HOSPITALIST

## 2020-12-04 PROCEDURE — 85004 AUTOMATED DIFF WBC COUNT: CPT | Performed by: INTERNAL MEDICINE

## 2020-12-04 PROCEDURE — 250N000011 HC RX IP 250 OP 636: Performed by: EMERGENCY MEDICINE

## 2020-12-04 PROCEDURE — 250N000013 HC RX MED GY IP 250 OP 250 PS 637: Performed by: PHYSICIAN ASSISTANT

## 2020-12-04 PROCEDURE — 80048 BASIC METABOLIC PNL TOTAL CA: CPT | Performed by: INTERNAL MEDICINE

## 2020-12-04 PROCEDURE — 36415 COLL VENOUS BLD VENIPUNCTURE: CPT | Performed by: HOSPITALIST

## 2020-12-04 PROCEDURE — 86923 COMPATIBILITY TEST ELECTRIC: CPT | Performed by: HOSPITALIST

## 2020-12-04 PROCEDURE — 250N000011 HC RX IP 250 OP 636: Performed by: INTERNAL MEDICINE

## 2020-12-04 RX ORDER — AZITHROMYCIN 250 MG/1
250 TABLET, FILM COATED ORAL DAILY
Status: DISCONTINUED | OUTPATIENT
Start: 2020-12-04 | End: 2020-12-04

## 2020-12-04 RX ORDER — MULTIPLE VITAMINS W/ MINERALS TAB 9MG-400MCG
1 TAB ORAL DAILY
Status: DISCONTINUED | OUTPATIENT
Start: 2020-12-04 | End: 2020-12-31 | Stop reason: HOSPADM

## 2020-12-04 RX ORDER — GABAPENTIN 100 MG/1
200 CAPSULE ORAL 3 TIMES DAILY
Status: DISCONTINUED | OUTPATIENT
Start: 2020-12-04 | End: 2020-12-31 | Stop reason: HOSPADM

## 2020-12-04 RX ORDER — DULOXETIN HYDROCHLORIDE 30 MG/1
30 CAPSULE, DELAYED RELEASE ORAL DAILY
Status: DISCONTINUED | OUTPATIENT
Start: 2020-12-04 | End: 2020-12-21

## 2020-12-04 RX ADMIN — PIPERACILLIN SODIUM AND TAZOBACTAM SODIUM 3.38 G: 3; .375 INJECTION, POWDER, LYOPHILIZED, FOR SOLUTION INTRAVENOUS at 10:38

## 2020-12-04 RX ADMIN — ASPIRIN 81 MG: 81 TABLET ORAL at 08:38

## 2020-12-04 RX ADMIN — HYDROMORPHONE HYDROCHLORIDE 4 MG: 2 TABLET ORAL at 04:23

## 2020-12-04 RX ADMIN — GABAPENTIN 200 MG: 100 CAPSULE ORAL at 21:34

## 2020-12-04 RX ADMIN — DULOXETINE HYDROCHLORIDE 30 MG: 30 CAPSULE, DELAYED RELEASE ORAL at 12:27

## 2020-12-04 RX ADMIN — HYDROMORPHONE HYDROCHLORIDE 4 MG: 2 TABLET ORAL at 21:33

## 2020-12-04 RX ADMIN — HYDROMORPHONE HYDROCHLORIDE 4 MG: 2 TABLET ORAL at 01:29

## 2020-12-04 RX ADMIN — HYDROCORTISONE 20 MG: 20 TABLET ORAL at 08:38

## 2020-12-04 RX ADMIN — PIPERACILLIN SODIUM AND TAZOBACTAM SODIUM 3.38 G: 3; .375 INJECTION, POWDER, LYOPHILIZED, FOR SOLUTION INTRAVENOUS at 04:22

## 2020-12-04 RX ADMIN — TRIAMCINOLONE ACETONIDE: 1 CREAM TOPICAL at 13:44

## 2020-12-04 RX ADMIN — TAMSULOSIN HYDROCHLORIDE 0.8 MG: 0.4 CAPSULE ORAL at 08:38

## 2020-12-04 RX ADMIN — HYDROMORPHONE HYDROCHLORIDE 4 MG: 2 TABLET ORAL at 10:40

## 2020-12-04 RX ADMIN — PRIMIDONE 50 MG: 50 TABLET ORAL at 21:34

## 2020-12-04 RX ADMIN — UMECLIDINIUM 1 PUFF: 62.5 AEROSOL, POWDER ORAL at 08:40

## 2020-12-04 RX ADMIN — FLUTICASONE FUROATE AND VILANTEROL TRIFENATATE 1 PUFF: 200; 25 POWDER RESPIRATORY (INHALATION) at 08:40

## 2020-12-04 RX ADMIN — MULTIPLE VITAMINS W/ MINERALS TAB 1 TABLET: TAB at 08:38

## 2020-12-04 RX ADMIN — SODIUM CHLORIDE: 9 INJECTION, SOLUTION INTRAVENOUS at 23:56

## 2020-12-04 RX ADMIN — POLYETHYLENE GLYCOL 3350 17 G: 17 POWDER, FOR SOLUTION ORAL at 08:38

## 2020-12-04 RX ADMIN — OMEPRAZOLE 20 MG: 20 CAPSULE, DELAYED RELEASE ORAL at 08:38

## 2020-12-04 RX ADMIN — PIPERACILLIN SODIUM AND TAZOBACTAM SODIUM 3.38 G: 3; .375 INJECTION, POWDER, LYOPHILIZED, FOR SOLUTION INTRAVENOUS at 17:20

## 2020-12-04 RX ADMIN — PIPERACILLIN SODIUM AND TAZOBACTAM SODIUM 3.38 G: 3; .375 INJECTION, POWDER, LYOPHILIZED, FOR SOLUTION INTRAVENOUS at 23:59

## 2020-12-04 RX ADMIN — AZITHROMYCIN 250 MG: 250 TABLET, FILM COATED ORAL at 12:27

## 2020-12-04 RX ADMIN — GABAPENTIN 200 MG: 100 CAPSULE ORAL at 17:20

## 2020-12-04 RX ADMIN — SODIUM CHLORIDE: 9 INJECTION, SOLUTION INTRAVENOUS at 04:23

## 2020-12-04 ASSESSMENT — ACTIVITIES OF DAILY LIVING (ADL)
ADLS_ACUITY_SCORE: 16

## 2020-12-04 ASSESSMENT — MIFFLIN-ST. JEOR: SCORE: 1347.88

## 2020-12-04 NOTE — PROGRESS NOTES
Paged by RN to discuss pain regimen. Pt utilizing IV dilaudid, states that oral oxy and norco are not working for him. Will try oral dilaudid. Orders placed.

## 2020-12-04 NOTE — PLAN OF CARE
Patient is A&O, VSS on 3L oxymask, CMS intact in upper arm, R leg wrapped and dressing CDI, and regular diet. IV fluid infusing, L BKA, L ear wound open to air, and PO and IV dilaudid given for pain control, and voiding in the urinal. He refuses to turn, but shift in bed himself. Will continue to monitor

## 2020-12-04 NOTE — PLAN OF CARE
Pt. A&o, Koi, vss, taking po dilaudid for pain, RLE wound dressing CDI, pt. Refused to change dressing, voiding adequate amount in urinal, Hemoglobin 6.1  and platelet count  74 MD notified, and MD wanted to recheck again. Will continue to monitor.

## 2020-12-04 NOTE — PROVIDER NOTIFICATION
.MD Notification    Notified Person: MD    Notified Person Name:  Dr. Sinclair    Notification Date/Time: 12/04/2020 9490    Notification Interaction: Text page    Purpose of Notification: Hemoglobin of 6.1     and platelet of 74    Orders Received:     Comments: MD wanted to rechck the hemoglobin

## 2020-12-04 NOTE — PROGRESS NOTES
[facility]    Medicine Progress Note - Hospitalist Service       Date of Admission:  12/2/2020    Assessment & Plan   Perfecto Reese is a 70 year old male with complex PMHx including severe COPD on 3L home O2 with ongoing tobacco use, adrenal insufficiency on chronic steroids, psoriasis on Humira, chronic Hep C with cirrhosis, PVD s/p left BKA, chronic wounds, and non-compliance who was admitted on 12/2/2020 for pneumonia, found to have a RLL lung abscess      # Acute on chronic hypoxic respiratory failure due to bilateral community-acquired bacterial pneumonia with RLL abscess, Improved  # Severe COPD  * At baseline on 3 lpm/NC  * EMS activated by his home care RN for fever, cough, and acute respiratory distress (placed on 10L per OM in the field). Fever to 102.5, tachycardia, tachypnea, and leukocytosis (17.2k) present on arrival. CXR on arrival showed RLL infiltrate and CT abd/pelvis showed air fluid cavity at the right lung base. On admission, he was initiated on IV pip-tazo and azithromycin, and Thoracic Surgery was consulted. Chest CT was ordered (12/3) which showed a 6.4 cm RLL pulmonary abscess and extensive left-sided mucus plugging with associated infiltrate. ID then consulted.  - On 3-6 lpm/OM, wean as tolerated  - Continues on IV pip-tazo, 12/2 to present. Will need extended course of IV antibiotics  - Azithromycin d/c'd by ID today, 12/4  - Continues on PTA inhalers  - Encourage incentive spirometry, ambulate as tolerated, RCAT  - 12/2 blood cultures x2 NGTD  - ID following, appreciate assistance    Asymptomatic pyuria  UA on arrival grossly dirty, but urine culture grew <10,000 normal adelina    MICHAEL stage II, Improved  Creatinine 2.89 from baseline 1.2-1.3 in Jan 2020. Suspect pre-renal state in setting of pneumonia/sepsis with possible progression to ATN in setting of hypotension. Improved with IV fluids  - Creatinine 2.65, will follow  - Continues on IV fluids   - Holding  PTA losartan    Non-anion gap metabolic acidosis  HCO3 17, AG 9. Likely due to recent diarrhea  - Diarrhea resolving  - Repeat BMP in AM     Chronic adrenal insufficiency  Intermittent hypotension and diarrhea noted on admission, likely due to adrenal crisis in the setting of acute illness and steroid non-compliance. Improved upon resumption of previously prescribed hydrocortisone  - Continues on PTA hydrocortisone 20 mg PO daily, no plan for stress dose steroids at this time    Essential hypertension  [PTA: amlodipine 10 mg qhs, losartan 12.5 mg daily]  - Intermittent hypotension noted on admission, likely due to adrenal insufficiency. Now improved and stable  - Continue holding amlodipine and losartan due to borderline blood pressures and MICHAEL    Chronic anemia  Hgb 9.6 on admission, no recent baseline. He has had no s/sx of bleeding  - Repeat labs pending, will follow  - Iron studies, B12, folate ordered and pending  Addendum: Notified that labs have returned, now with apparent pancytopenia with wbc 3.5, Hgb 6.1, platelets 74k. No s/sx of active bleeding. CBC normal on admission, recheck CBC ordered and pancytopenia confirmed. Blood consent signed, will transfuse 1u prbcs. Conditional unit in place for Hgb 7 or less. Peripheral smear and Heme/Onc consult also ordered    History of psoriasis   - Holding PTA Humira    Peripheral vascular disease s/p left BKA  Chronic RLE wound  Chronic left ear wound  Present on admission. Receives wound cares per home care RN  - Wound cares in place as per WOCN     Chronic Hepatitis C with cirrhosis  Treated with interferon on the past  - Appears compensated    GERD  Chronic and stable on omeprazole    BPH  Chronic and stable on tamsulosin      Tobacco use disorder  Has not had a cigarette in the past few weeks since he has been feeling sick.    Chronic pain syndrome  [PTA: gabapentin 300 mg TID, duloxetine 30 mg daily, lidocaine patch, oxycodone 5 mg q6h prn, morphine 2.5 mg BID  prn]  - Gabapentin decreased to 200 mg TID in the setting of renal failure  - Continues on lidocaine patch, duloxetine, oxycodone prn, PO morphine changed to Dilaudid PO prn due to MICHAEL     Diet: Regular Diet Adult  Snacks/Supplements Adult: Boost Plus; Between Meals    DVT Prophylaxis: Pneumatic Compression Devices  Bruce Catheter: not present  Code Status: Full Code      Disposition: Expected discharge in 2-3 days pending ongoing improvement in pneumonia/sepsis, PT assessment, definitive plan for IV antibiotics. Will need extended course of IV antibiotics, CC/SW consulted    The patient's care was discussed with the Bedside Nurse and Patient.    Charis Sinclair MD  Hospitalist Service  [facility]  Contact information available via [facility] Paging/Directory    ______________________________________________________________________    Interval History   No events overnight. Reports persistent productive cough and diffuse chest discomfort, but overall feels better. Tolerating PO.     Data reviewed today: I reviewed all medications, new labs and imaging results over the last 24 hours. I personally reviewed no images or EKG's today.    Physical Exam   Vital Signs: Temp: 97.7  F (36.5  C) Temp src: Axillary BP: 100/41 Pulse: 74   Resp: 16 SpO2: 96 % O2 Device: Oxymask Oxygen Delivery: 6 LPM  Weight: 143 lbs 8.31 oz  Constitutional: Resting comfortably, NAD  Respiratory: Breathing non-labored. Lungs CTAB no wheezes/crackles/rhonchi  Cardiovascular: Heart RRR, no m/r/g. No pedal edema  GI: +BS, abd soft/NT  Skin/Integument: RLE dressing/boot in place  Musculoskeletal: s/p LLE BKA  Neuro: Alert and appropriate, STEWART  Psych: Calm and cooperative, irritable    Data   Recent Labs   Lab 12/02/20  1845   WBC 17.2*   HGB 9.6*   MCV 91         POTASSIUM 3.3*   CHLORIDE 109   CO2 20   BUN 35*   CR 2.89*   ANIONGAP 11   MARTINE 7.9*   *   ALBUMIN 2.6*   PROTTOTAL 7.3   BILITOTAL 0.7    ALKPHOS 98   ALT 8   AST 6         Recent Results (from the past 24 hour(s))   CT Chest w/o Contrast    Narrative    CT CHEST WITHOUT CONTRAST 12/3/2020 11:55 AM     HISTORY: Pneumonia, unresolved or complicated. 7.5 cm fluid collection  with air-fluid level seen on CT abdomen/pelvis.    COMPARISON: Abdomen and pelvis CT from December 2, 2020    TECHNIQUE: Volumetric helical acquisition of CT images of the chest  from the clavicles to the kidneys were acquired without IV contrast.  Radiation dose for this scan was reduced using automated exposure  control, adjustment of the mA and/or kV according to patient size, or  iterative reconstruction technique.    FINDINGS: Air-fluid level in a cavitary lesion in the right lower lobe  may represent a pulmonary abscess measuring 6.4 x 5.3 cm. Extensive  infiltrates throughout the right lower lobe. Mild dependent  atelectasis and/or additional infiltrate in the posterior aspect of  the right upper lobe. Trace right pleural fluid. Mild left lower lobe  atelectasis and/or infiltrate. Marked bronchial wall thickening and  bronchiectasis seen in the left lower lobe with extensive areas of  mucous plugging. Emphysema. A few mildly prominent lymph nodes are  seen in the mediastinum which are nonspecific and may be reactive in  this setting. No left pleural or pericardial effusion. Possible  splenomegaly in the upper abdomen. Question of nodular contour to the  liver which could indicate cirrhosis. Cholelithiasis without  cholecystitis.      Impression    IMPRESSION:  1. Air-fluid level in a cavitary lesion in the right lower lobe  measuring 6.4 x 5.3 cm, differential includes a pulmonary abscess.  2. Extensive consolidation in the right lower lobe.  3. Extensive mucous plugging and bronchial wall thickening in the left  lower lobe with mild associated infiltrate.    LORRI TRINIDAD MD       Medications     sodium chloride 100 mL/hr at 12/04/20 0423       aspirin  81 mg Oral Daily      azithromycin  250 mg Oral Daily     fluticasone-vilanterol  1 puff Inhalation Daily     hydrocortisone  20 mg Oral QAM     multivitamin w/minerals  1 tablet Oral Daily     omeprazole  20 mg Oral Daily     piperacillin-tazobactam  3.375 g Intravenous Q6H     polyethylene glycol  17 g Oral Daily     primidone  50 mg Oral At Bedtime     sodium chloride (PF)  3 mL Intracatheter Q8H     tamsulosin  0.8 mg Oral Daily     umeclidinium  1 puff Inhalation Daily

## 2020-12-04 NOTE — PROVIDER NOTIFICATION
.MD Notification    Notified Person: MD    Notified Person Name: Dr. Sinclair    Notification Date/Time: 12/4/2020  7:40 am    Notification Interaction: Text page    Purpose of Notification: Low K+ 3.3    Orders Received: No    Comments:

## 2020-12-04 NOTE — CONSULTS
[facility]    Infectious Disease Consultation     Date of Admission:  12/2/2020  Date of Consult (When I saw the patient): 12/04/20    Assessment & Plan   Perfecto Reese is a 70 year old male who was admitted on 12/2/2020.     Impression:  1. 70 y.o male with COPD on Home oxygen.   2. Ongoing tobacco abuse.   3. Adrenal insufficiency on chronic steroids   4. Psoriasis on humira.   5. Chronic hep C. Cirrhosis.   6. PVD, S/P BKA.   7. Non compliance.   8. Admitted with pneumonia symptoms and imaging with lung abscesses.   9. On zosyn. And on azithromycin.   10. Sputum culture with proteus.     Recommendations:   For now continue on zosyn alone, stop azithro.   Follow up on the JIGNA on the proteus.       Rc Hu MD    Reason for Consult   Reason for consult: I was asked to evaluate this patient for lung abscess.    Primary Care Physician   Polo Neil    Chief Complaint   Cough     History is obtained from the patient and medical records    History of Present Illness    Perfecto Reese is a 70 year old male with PMHx including severe COPD on 3L home O2 with ongoing tobacco use, adrenal insufficiency on chronic steroids, psoriasis on Humira, chronic Hep C with cirrhosis, PVD s/p left BKA, chronic wounds, and non-compliance who was admitted on 12/2/2020 for pneumonia, found to have a RLL lung abscess    Past Medical History   I have reviewed this patient's medical history and updated it with pertinent information if needed.   Past Medical History:   Diagnosis Date     Adrenal insufficiency (H)      Anemia, iron deficiency      Back pain      COPD (chronic obstructive pulmonary disease) (H)      Depression      GERD (gastroesophageal reflux disease)      Hyperlipidemia      Migraine        Past Surgical History   I have reviewed this patient's surgical history and updated it with pertinent information if needed.  Past Surgical History:   Procedure Laterality Date     AMPUTATION BELOW KNEE  RT/LT Left      AS ARTHROSCOPY SUBTALAR JOINT SUBTALAR ARTHRODESIS       OPEN REDUCTION INTERNAL FIXATION FOREARM      left forearm/wrist     TONSILLECTOMY         Prior to Admission Medications   Prior to Admission Medications   Prescriptions Last Dose Informant Patient Reported? Taking?   DULoxetine (CYMBALTA) 30 MG capsule Last dispense 9/19/20 90 day supply  Yes No   Sig: Take 30 mg by mouth daily   Ferrous Gluconate 324 (37.5 Fe) MG TABS Last dispense 9/19/20 90 day supply  Yes No   Sig: Take 324 mg by mouth every other day   MENTHOL-METHYL SALICYLATE EX Last dispense 9/10/20 30 day supply  Yes No   Sig: Externally apply topically 2 times daily Menthol-methyl saliycylate 10-15% cream twice daily to lower back   Vitamin D, Cholecalciferol, 25 MCG (1000 UT) TABS Last dispense 9/19/20 90 day supply  Yes No   Sig: Take 1,000 Units by mouth 2 times daily   adalimumab (HUMIRA) 40 MG/0.8ML prefilled syringe kit Last dispense 9/19/20 30 day supply  Yes No   Sig: Inject 40 mg Subcutaneous every 14 days   albuterol (PROAIR HFA/PROVENTIL HFA/VENTOLIN HFA) 108 (90 Base) MCG/ACT inhaler Last dispense 9/19/20 30 day supply  Yes No   Sig: Inhale 2 puffs into the lungs every 4 hours as needed for shortness of breath / dyspnea or wheezing   amLODIPine (NORVASC) 10 MG tablet Last dispense 9/19/20 90 day supply  Yes No   Sig: Take 10 mg by mouth At Bedtime   aspirin 81 MG EC tablet Last dispense 9/19/20 #120 tab  Yes No   Sig: Take 81 mg by mouth daily   budesonide-formoterol (SYMBICORT) 160-4.5 MCG/ACT Inhaler Last dispense 9/19/20 30 day supply  Yes No   Sig: Inhale 2 puffs into the lungs 2 times daily   calcium citrate (CITRACAL) 950 MG tablet Last dispense 9/19/20 90 day supply  Yes No   Sig: Take 4 tablets by mouth daily   clobetasol (TEMOVATE) 0.05 % external solution Last dispense 9/19/20 30 day supply  Yes No   Sig: Apply topically At Bedtime Apply to scalp   diclofenac (VOLTAREN) 1 % topical gel Last dispense 9/19/20 30  day supply  Yes No   Sig: Apply 4 g topically every 6 hours as needed for moderate pain (R shoulder)   gabapentin (NEURONTIN) 300 MG capsule Last dispense 9/19/20 90 day supply  Yes No   Sig: Take 300 mg by mouth 3 times daily   hydrocortisone (CORTEF) 20 MG tablet Last dispense 9/19/20 90 day supply  Yes No   Sig: Take 20 mg by mouth every morning   lidocaine (LIDODERM) 5 % patch Last dispense 9/19/20 30 day supply  Yes No   Sig: Place 1 patch onto the skin every 24 hours To prevent lidocaine toxicity, patient should be patch free for 12 hrs daily.  Apply to right shoulder   lidocaine (XYLOCAINE) 5 % external ointment Last dispense 9/19/20 30 day supply  Yes No   Sig: Apply topically 3 times daily as needed for moderate pain (rib pain)   losartan (COZAAR) 25 MG tablet Last dispense 9/19/20 90 day supply  Yes No   Sig: Take 12.5 mg by mouth daily   morphine 10 MG/5ML solution Last dispense 9/19/20  Yes No   Sig: Take 2.5 mg by mouth 2 times daily as needed for severe pain (with dressing change)   nicotine (NICORETTE) 2 MG gum Last dispense 9/27/20  Yes No   Sig: Place 2 mg inside cheek as needed for smoking cessation   omeprazole (PRILOSEC) 20 MG DR capsule Last dispense 9/19/20 90 day supply  Yes No   Sig: Take 20 mg by mouth daily   oxyCODONE (ROXICODONE) 5 MG tablet Last dispense 9/19/20  Yes No   Sig: Take 5 mg by mouth every 6 hours as needed for severe pain Separate from morphine by 4 hours   primidone (MYSOLINE) 50 MG tablet Last dispense 9/19/20 90 day supply  Yes No   Sig: Take 50 mg by mouth At Bedtime   sodium hypochlorite (HYSEPT) external solution Last dispense 9/19/20 30 day supply  Yes No   Sig: Irrigate with as directed daily 0.25% for wound care   tamsulosin (FLOMAX) 0.4 MG capsule Last dispense 9/19/20 90 day supply  Yes No   Sig: Take 0.8 mg by mouth daily   tiotropium (SPIRIVA) 18 MCG inhaled capsule Last dispense 9/19/20 30 day supply  Yes No   Sig: Inhale 18 mcg into the lungs daily    triamcinolone (KENALOG) 0.1 % external cream Last dispense 9/27/20  Yes No   Sig: Apply topically as needed for irritation (wound care)   vitamin B-12 (CYANOCOBALAMIN) 1000 MCG tablet Last dispense 9/19/20 90 day supply  Yes No   Sig: Take 1,000 mcg by mouth daily      Facility-Administered Medications: None     Allergies   Allergies   Allergen Reactions     Darvocet [Propoxyphene N-Apap] Nausea     Vicodin [Hydrocodone-Acetaminophen] Nausea     Can use if he eats with it       Immunization History     There is no immunization history on file for this patient.    Social History   I have reviewed this patient's social history and updated it with pertinent information if needed. Perfecto Reese  reports that he quit smoking about 3 years ago. His smoking use included cigarettes. He has a 15.00 pack-year smoking history. He has never used smokeless tobacco. He reports that he does not drink alcohol or use drugs.    Family History   I have reviewed this patient's family history and updated it with pertinent information if needed.   Family History   Problem Relation Age of Onset     Colon Cancer Father      Coronary Artery Disease Father      Chronic Obstructive Pulmonary Disease Father      Osteoporosis Mother      Dementia Mother        Review of Systems   The 10 point Review of Systems is negative other than noted in the HPI or here.     Physical Exam   Temp: 98.5  F (36.9  C) Temp src: Oral BP: 102/46 Pulse: 80   Resp: 16 SpO2: 93 % O2 Device: Oxymask Oxygen Delivery: 6 LPM  Vital Signs with Ranges  Temp:  [97.5  F (36.4  C)-98.5  F (36.9  C)] 98.5  F (36.9  C)  Pulse:  [71-80] 80  Resp:  [16-20] 16  BP: (100-119)/(38-52) 102/46  SpO2:  [93 %-98 %] 93 %  143 lbs 8.31 oz  Body mass index is 23.88 kg/m .    GENERAL APPEARANCE:  On oxygen with oxymask appears older than the stated age   EYES: Eyes grossly normal to inspection, PERRL and conjunctivae and sclerae normal  HENT: ear canals and TM's normal and nose and  mouth without ulcers or lesions  NECK: no adenopathy, no asymmetry, masses, or scars and thyroid normal to palpation  RESP: Diminished bilateral  CV: regular rates and rhythm, normal S1 S2, no S3 or S4 and no murmur, click or rub  LYMPHATICS: normal ant/post cervical and supraclavicular nodes  ABDOMEN: soft, nontender, without hepatosplenomegaly or masses and bowel sounds normal  MS: multiple tattoos   SKIN: no suspicious lesions or rashes      Data   Lab Results   Component Value Date    WBC 17.2 (H) 12/02/2020    HGB 9.6 (L) 12/02/2020    HCT 28.5 (L) 12/02/2020     12/02/2020     12/02/2020    POTASSIUM 3.3 (L) 12/02/2020    CHLORIDE 109 12/02/2020    CO2 20 12/02/2020    BUN 35 (H) 12/02/2020    CR 2.89 (H) 12/02/2020     (H) 12/02/2020    SED 8 01/26/2005    AST 6 12/02/2020    ALT 8 12/02/2020    ALKPHOS 98 12/02/2020    BILITOTAL 0.7 12/02/2020     Recent Labs   Lab 12/03/20  0540 12/03/20  0230 12/02/20  1904 12/02/20  1844   CULT Light growth  Normal adelina    Light growth  Proteus mirabilis  Susceptibility testing in progress  *  Culture in progress <10,000 colonies/mL  mixed urogenital adelina  Susceptibility testing not routinely done   No growth after 2 days No growth after 2 days     Recent Labs   Lab Test 12/03/20  0540 12/03/20  0230 12/02/20  1904 12/02/20  1844   CULT Light growth  Normal adelina    Light growth  Proteus mirabilis  Susceptibility testing in progress  *  Culture in progress <10,000 colonies/mL  mixed urogenital adelina  Susceptibility testing not routinely done   No growth after 2 days No growth after 2 days       Amount of time performed on this consult: 45 minutes. This includes face to face assessment and care coordination with the primary team.

## 2020-12-04 NOTE — PLAN OF CARE
COVID pending. A&Ox4, VSS on 3L oxymask; baseline 3L NC. On oxymask due to L ear wound. C/o 9/10 chest and abdominal pain managed with PRN norco and tylenol and IV dilaudid. L BKA. R leg wound covered in ACE wrap and boot. Up Ax2, uses electric scooter at baseline. Reg diet, voiding adequately. No diarrhea this shift, stool sample and enteric precautions canceled by ID. IV SL, on intermittent abx. R buttock blanchable erythema, mepilex CDI. Offered turning/repositioning, pt refused stating he is comfortable. Able to turn with minimal assistance. Thoracic deferred surgery, plan for ID consult for lung abcess. Plan for PT and WOC consult.

## 2020-12-04 NOTE — CONSULTS
[facility]    Hematology/Oncology Consult Note      Date of Admission:  12/2/2020  Date of Consult:  12/04/20  Reason for Consult: pancytopenia      ASSESSMENT/PLAN:  Perfecto Reese is a 70 year old male with:    Pancytopenia: Patient with what seems like an acute drop in counts, but on chart review in Care Everywhere, back in January 2020, he also had similar counts, with WBC of 3.1K, hemoglobin 5.4, platelet count in the 60K's, so wonder if this is actually closer to his baseline.  He had thrombocytopenia in the 70K-80K range even dating back to 2006.  He does have history of liver cirrhosis with portal hypertension and splenomegaly, as well as chronic kidney disease and autoimmune disorder.  Pancytopenia may be from his chronic illnesses, and now acute illness with pneumonia with abscess on antibiotics on top of that.  He currently denies any bleeding symptoms.   -agree with transfusion support for hemoglobin <7  -agree with current lab work-up, including peripheral smear, iron and ferritin labs  -follow daily CBC/diff for now.  If there continues to significant downward trend or if concerning findings on peripheral smear, might consider bone marrow biopsy, but will hold off for now.    -we will continue to follow        HISTORY OF PRESENT ILLNESS: Perfecto Reese is a 70 year old male with PMHx of severe COPD, adrenal insufficiency on chronic steroids, psoriasis on Humira, chronic hepatitis C with cirrhosis, PVD s/p left BKA, chronic wounds, who presents with pneumonia, found to have a RLL lung abscess.   Patient is on antibiotics, ID following.      Hematology was consulted for pancytopenia.  On 12/2/20, his WBC was 17.2K, hemoglobin 9.6, platelet of 201K.  On 12/4/20, counts dropped significantly to WBC of 3.5K, hemoglobin of 6.1, platelet count of 74K.  Repeat check later today was similar.        REVIEW OF SYSTEMS:   14 point ROS was reviewed and is negative other than as noted  above in HPI.       MEDICATIONS:  Current Facility-Administered Medications   Medication     acetaminophen (TYLENOL) tablet 650 mg     albuterol (PROAIR HFA/PROVENTIL HFA/VENTOLIN HFA) 108 (90 Base) MCG/ACT inhaler 2 puff     DULoxetine (CYMBALTA) DR capsule 30 mg     fluticasone-vilanterol (BREO ELLIPTA) 200-25 MCG/INH inhaler 1 puff     gabapentin (NEURONTIN) capsule 200 mg     hydrocortisone (CORTEF) tablet 20 mg     HYDROmorphone (DILAUDID) tablet 2-4 mg     HYDROmorphone (PF) (DILAUDID) injection 0.5 mg     lidocaine (LMX4) cream     lidocaine 1 % 0.1-1 mL     melatonin tablet 1 mg     multivitamin w/minerals (THERA-VIT-M) tablet 1 tablet     naloxone (NARCAN) injection 0.2 mg    Or     naloxone (NARCAN) injection 0.4 mg    Or     naloxone (NARCAN) injection 0.2 mg    Or     naloxone (NARCAN) injection 0.4 mg     nicotine (NICORETTE) gum 2 mg     omeprazole (priLOSEC) CR capsule 20 mg     ondansetron (ZOFRAN-ODT) ODT tab 4 mg    Or     ondansetron (ZOFRAN) injection 4 mg     piperacillin-tazobactam (ZOSYN) 3.375 g vial to attach to  mL bag     polyethylene glycol (MIRALAX) Packet 17 g     primidone (MYSOLINE) tablet 50 mg     prochlorperazine (COMPAZINE) injection 5 mg    Or     prochlorperazine (COMPAZINE) tablet 5 mg    Or     prochlorperazine (COMPAZINE) suppository 12.5 mg     sodium chloride (PF) 0.9% PF flush 3 mL     sodium chloride (PF) 0.9% PF flush 3 mL     sodium chloride 0.9% infusion     tamsulosin (FLOMAX) capsule 0.8 mg     triamcinolone (KENALOG) 0.1 % cream     umeclidinium (INCRUSE ELLIPTA) 62.5 MCG/INH inhaler 1 puff         ALLERGIES:  Allergies   Allergen Reactions     Darvocet [Propoxyphene N-Apap] Nausea     Vicodin [Hydrocodone-Acetaminophen] Nausea     Can use if he eats with it         PAST MEDICAL HISTORY:  Past Medical History:   Diagnosis Date     Adrenal insufficiency (H)      Anemia, iron deficiency      Back pain      COPD (chronic obstructive pulmonary disease) (H)       Depression      GERD (gastroesophageal reflux disease)      Hyperlipidemia      Migraine          PAST SURGICAL HISTORY:  Past Surgical History:   Procedure Laterality Date     AMPUTATION BELOW KNEE RT/LT Left      AS ARTHROSCOPY SUBTALAR JOINT SUBTALAR ARTHRODESIS       OPEN REDUCTION INTERNAL FIXATION FOREARM      left forearm/wrist     TONSILLECTOMY           SOCIAL HISTORY:  Social History     Socioeconomic History     Marital status: Single     Spouse name: Not on file     Number of children: Not on file     Years of education: Not on file     Highest education level: Not on file   Occupational History     Not on file   Social Needs     Financial resource strain: Not on file     Food insecurity     Worry: Not on file     Inability: Not on file     Transportation needs     Medical: Not on file     Non-medical: Not on file   Tobacco Use     Smoking status: Former Smoker     Packs/day: 0.50     Years: 30.00     Pack years: 15.00     Types: Cigarettes     Quit date: 4/1/2017     Years since quitting: 3.6     Smokeless tobacco: Never Used     Tobacco comment: states he is down to 3QD   Substance and Sexual Activity     Alcohol use: No     Drug use: No     Sexual activity: Never   Lifestyle     Physical activity     Days per week: Not on file     Minutes per session: Not on file     Stress: Not on file   Relationships     Social connections     Talks on phone: Not on file     Gets together: Not on file     Attends Buddhist service: Not on file     Active member of club or organization: Not on file     Attends meetings of clubs or organizations: Not on file     Relationship status: Not on file     Intimate partner violence     Fear of current or ex partner: Not on file     Emotionally abused: Not on file     Physically abused: Not on file     Forced sexual activity: Not on file   Other Topics Concern     Parent/sibling w/ CABG, MI or angioplasty before 65F 55M? Not Asked   Social History Narrative     Not on file  "        FAMILY HISTORY:  Family History   Problem Relation Age of Onset     Colon Cancer Father      Coronary Artery Disease Father      Chronic Obstructive Pulmonary Disease Father      Osteoporosis Mother      Dementia Mother          PHYSICAL EXAM:  Vital signs:  Temp: 98.2  F (36.8  C) Temp src: Oral BP: 110/50 Pulse: 94   Resp: 16 SpO2: 96 % O2 Device: Oxymask Oxygen Delivery: 6 LPM Height: 165.1 cm (5' 5\") Weight: 65.1 kg (143 lb 8.3 oz)  Estimated body mass index is 23.88 kg/m  as calculated from the following:    Height as of this encounter: 1.651 m (5' 5\").    Weight as of this encounter: 65.1 kg (143 lb 8.3 oz).    GENERAL/CONSTITUTIONAL: No acute distress.  EYES: No scleral icterus.  ENT/MOUTH: Hard of hearing.  Oxygen mask in place.  NEUROLOGIC: Alert, oriented, answers questions appropriately.  INTEGUMENTARY: No jaundice.      LABS:  CBC RESULTS:   Recent Labs   Lab Test 12/04/20  1526   WBC 3.2*   RBC 2.06*   HGB 6.4*   HCT 19.1*   MCV 93   MCH 31.1   MCHC 33.5   RDW 14.3   PLT 70*       Recent Labs   Lab Test 12/04/20  1245 12/02/20  1845    140   POTASSIUM 3.7 3.3*   CHLORIDE 114* 109   CO2 17* 20   ANIONGAP 9 11   * 134*   BUN 47* 35*   CR 2.61* 2.89*   MARTINE 6.9* 7.9*       IMAGING:  CT chest 12/3/20:  1. Air-fluid level in a cavitary lesion in the right lower lobe  measuring 6.4 x 5.3 cm, differential includes a pulmonary abscess.  2. Extensive consolidation in the right lower lobe.  3. Extensive mucous plugging and bronchial wall thickening in the left  lower lobe with mild associated infiltrate.    CT abdomen/pelvis 12/2/20:  1.  Extensive right lower lobe pneumonia. Mild infiltrate in the left  lung base as well.  2.  Air-fluid cavity at the right lung base appears to be loculated in  the major fissure. Suggest thoracic surgery consultation.  3.  Cirrhosis with signs of portal hypertension including  splenomegaly. No ascites.      Thank you for the opportunity to participate in this " patient's care.  Please call with any questions.    Ina Mariscal MD  Hematology/Oncology  [facility]

## 2020-12-04 NOTE — PROGRESS NOTES
Care Management Follow Up    Length of Stay (days): 2    Expected Discharge Date: 12/07/20     Concerns to be Addressed:       Patient plan of care discussed at interdisciplinary rounds: Yes    Anticipated Discharge Disposition:       Anticipated Discharge Services:    Anticipated Discharge DME:      Patient/family educated on Medicare website which has current facility and service quality ratings:    Education Provided on the Discharge Plan:    Patient/Family in Agreement with the Plan:      Referrals Placed by CM/SW:    Private pay costs discussed: Not applicable    Additional Information:  Writer received a call from [facility] ([facility] [phone]) who stated pt is open to them for RN/PT/OT.      DIMITRI Black

## 2020-12-04 NOTE — PLAN OF CARE
PT: Orders received and chart reviewed. Attempted PT evaluation, pt states he is too fatigued to participate in PT session this AM. Pt on 5 LPM via oxymask, 3 LPM at baseline. Pt reports he lives alone in an apartment at baseline. Utilizes a manual wheelchair in the apartment and electric scooter outside of the apartment. Pt agreeable to participate in PT evaluation tomorrow, pt reports afternoons work best.

## 2020-12-05 ENCOUNTER — APPOINTMENT (OUTPATIENT)
Dept: PHYSICAL THERAPY | Facility: CLINIC | Age: 70
DRG: 871 | End: 2020-12-05
Payer: MEDICARE

## 2020-12-05 LAB
ANION GAP SERPL CALCULATED.3IONS-SCNC: 11 MMOL/L (ref 3–14)
BUN SERPL-MCNC: 47 MG/DL (ref 7–30)
C DIFF TOX B STL QL: NEGATIVE
CALCIUM SERPL-MCNC: 7.7 MG/DL (ref 8.5–10.1)
CHLORIDE SERPL-SCNC: 113 MMOL/L (ref 94–109)
CO2 SERPL-SCNC: 15 MMOL/L (ref 20–32)
CREAT SERPL-MCNC: 2.45 MG/DL (ref 0.66–1.25)
ERYTHROCYTE [DISTWIDTH] IN BLOOD BY AUTOMATED COUNT: 14.4 % (ref 10–15)
GFR SERPL CREATININE-BSD FRML MDRD: 26 ML/MIN/{1.73_M2}
GLUCOSE SERPL-MCNC: 110 MG/DL (ref 70–99)
HCT VFR BLD AUTO: 21.6 % (ref 40–53)
HGB BLD-MCNC: 7.2 G/DL (ref 13.3–17.7)
MCH RBC QN AUTO: 30.5 PG (ref 26.5–33)
MCHC RBC AUTO-ENTMCNC: 33.3 G/DL (ref 31.5–36.5)
MCV RBC AUTO: 92 FL (ref 78–100)
PLATELET # BLD AUTO: 55 10E9/L (ref 150–450)
POTASSIUM SERPL-SCNC: 3.5 MMOL/L (ref 3.4–5.3)
RBC # BLD AUTO: 2.36 10E12/L (ref 4.4–5.9)
SODIUM SERPL-SCNC: 139 MMOL/L (ref 133–144)
SPECIMEN SOURCE: NORMAL
WBC # BLD AUTO: 2.7 10E9/L (ref 4–11)

## 2020-12-05 PROCEDURE — 250N000011 HC RX IP 250 OP 636: Performed by: INTERNAL MEDICINE

## 2020-12-05 PROCEDURE — 97161 PT EVAL LOW COMPLEX 20 MIN: CPT | Mod: GP

## 2020-12-05 PROCEDURE — 258N000001 HC RX 258: Performed by: INTERNAL MEDICINE

## 2020-12-05 PROCEDURE — 80048 BASIC METABOLIC PNL TOTAL CA: CPT | Performed by: INTERNAL MEDICINE

## 2020-12-05 PROCEDURE — 99232 SBSQ HOSP IP/OBS MODERATE 35: CPT | Performed by: INTERNAL MEDICINE

## 2020-12-05 PROCEDURE — 250N000013 HC RX MED GY IP 250 OP 250 PS 637: Performed by: INTERNAL MEDICINE

## 2020-12-05 PROCEDURE — 250N000009 HC RX 250: Performed by: INTERNAL MEDICINE

## 2020-12-05 PROCEDURE — 258N000003 HC RX IP 258 OP 636: Performed by: INTERNAL MEDICINE

## 2020-12-05 PROCEDURE — 85027 COMPLETE CBC AUTOMATED: CPT | Performed by: INTERNAL MEDICINE

## 2020-12-05 PROCEDURE — 87493 C DIFF AMPLIFIED PROBE: CPT | Performed by: INTERNAL MEDICINE

## 2020-12-05 PROCEDURE — 250N000013 HC RX MED GY IP 250 OP 250 PS 637: Performed by: PHYSICIAN ASSISTANT

## 2020-12-05 PROCEDURE — 36415 COLL VENOUS BLD VENIPUNCTURE: CPT | Performed by: INTERNAL MEDICINE

## 2020-12-05 PROCEDURE — 120N000001 HC R&B MED SURG/OB

## 2020-12-05 RX ORDER — CLOBETASOL PROPIONATE 0.5 MG/ML
SOLUTION TOPICAL AT BEDTIME
Status: DISCONTINUED | OUTPATIENT
Start: 2020-12-05 | End: 2020-12-31 | Stop reason: HOSPADM

## 2020-12-05 RX ORDER — AMPICILLIN AND SULBACTAM 2; 1 G/1; G/1
3 INJECTION, POWDER, FOR SOLUTION INTRAMUSCULAR; INTRAVENOUS EVERY 12 HOURS
Status: DISCONTINUED | OUTPATIENT
Start: 2020-12-05 | End: 2020-12-08

## 2020-12-05 RX ADMIN — SODIUM CHLORIDE: 9 INJECTION, SOLUTION INTRAVENOUS at 10:37

## 2020-12-05 RX ADMIN — TAMSULOSIN HYDROCHLORIDE 0.8 MG: 0.4 CAPSULE ORAL at 08:44

## 2020-12-05 RX ADMIN — HYDROMORPHONE HYDROCHLORIDE 4 MG: 2 TABLET ORAL at 00:27

## 2020-12-05 RX ADMIN — SODIUM BICARBONATE: 84 INJECTION, SOLUTION INTRAVENOUS at 17:23

## 2020-12-05 RX ADMIN — DULOXETINE HYDROCHLORIDE 30 MG: 30 CAPSULE, DELAYED RELEASE ORAL at 08:43

## 2020-12-05 RX ADMIN — FLUTICASONE FUROATE AND VILANTEROL TRIFENATATE 1 PUFF: 200; 25 POWDER RESPIRATORY (INHALATION) at 08:44

## 2020-12-05 RX ADMIN — HYDROMORPHONE HYDROCHLORIDE 4 MG: 2 TABLET ORAL at 22:17

## 2020-12-05 RX ADMIN — GABAPENTIN 200 MG: 100 CAPSULE ORAL at 08:43

## 2020-12-05 RX ADMIN — GABAPENTIN 200 MG: 100 CAPSULE ORAL at 16:51

## 2020-12-05 RX ADMIN — AMPICILLIN AND SULBACTAM 3 G: 2; 1 INJECTION, POWDER, FOR SOLUTION INTRAMUSCULAR; INTRAVENOUS at 13:28

## 2020-12-05 RX ADMIN — UMECLIDINIUM 1 PUFF: 62.5 AEROSOL, POWDER ORAL at 08:44

## 2020-12-05 RX ADMIN — GABAPENTIN 200 MG: 100 CAPSULE ORAL at 21:42

## 2020-12-05 RX ADMIN — OMEPRAZOLE 20 MG: 20 CAPSULE, DELAYED RELEASE ORAL at 08:43

## 2020-12-05 RX ADMIN — PIPERACILLIN SODIUM AND TAZOBACTAM SODIUM 3.38 G: 3; .375 INJECTION, POWDER, LYOPHILIZED, FOR SOLUTION INTRAVENOUS at 05:49

## 2020-12-05 RX ADMIN — HYDROCORTISONE 20 MG: 20 TABLET ORAL at 08:43

## 2020-12-05 RX ADMIN — HYDROMORPHONE HYDROCHLORIDE 4 MG: 2 TABLET ORAL at 17:33

## 2020-12-05 RX ADMIN — MULTIPLE VITAMINS W/ MINERALS TAB 1 TABLET: TAB at 08:44

## 2020-12-05 RX ADMIN — HYDROMORPHONE HYDROCHLORIDE 4 MG: 2 TABLET ORAL at 05:49

## 2020-12-05 RX ADMIN — PRIMIDONE 50 MG: 50 TABLET ORAL at 21:42

## 2020-12-05 ASSESSMENT — ACTIVITIES OF DAILY LIVING (ADL)
ADLS_ACUITY_SCORE: 18
ADLS_ACUITY_SCORE: 16
ADLS_ACUITY_SCORE: 18
ADLS_ACUITY_SCORE: 16
ADLS_ACUITY_SCORE: 18
ADLS_ACUITY_SCORE: 16

## 2020-12-05 ASSESSMENT — MIFFLIN-ST. JEOR: SCORE: 1331.92

## 2020-12-05 NOTE — PROGRESS NOTES
[facility]    Medicine Progress Note - Hospitalist Service       Date of Admission:  12/2/2020    Assessment & Plan   Perfecto Reese is a 70 year old male with complex PMHx including severe COPD on 3L home O2 with ongoing tobacco use, adrenal insufficiency on chronic steroids, psoriasis on Humira, chronic Hep C with cirrhosis, PVD s/p left BKA, chronic wounds, and non-compliance who was admitted on 12/2/2020 for pneumonia, found to have a RLL lung abscess      # Acute on chronic hypoxic respiratory failure due to bilateral community-acquired bacterial pneumonia with RLL abscess, Improved  # Severe COPD  * At baseline on 3 lpm/NC  * EMS activated by his home care RN for fever, cough, and acute respiratory distress (placed on 10L per OM in the field). Fever to 102.5, tachycardia, tachypnea, and leukocytosis (17.2k) present on arrival. CXR on arrival showed RLL infiltrate and CT abd/pelvis showed air fluid cavity at the right lung base. On admission, he was initiated on IV pip-tazo and azithromycin, and Thoracic Surgery was consulted. Chest CT was ordered (12/3) which showed a 6.4 cm RLL pulmonary abscess and extensive left-sided mucus plugging with associated infiltrate. ID then consulted  - CT surgery saw, no surgical intervention indicated  - On 3-6 lpm/OM, wean as tolerated  - Azithromycin d/c'd by ID12/4  - Continues on PTA inhalers  - Encourage incentive spirometry, ambulate as tolerated, RCAT  - 12/2 blood cultures x2 NGTD 12/5  - sputum cx 12/4 growing proteus, fluoroquinolone resistant  - initial zosyn-> transitioned 12/5 to unasyn, possible discharge on oral augmentin x 4-6 weeks  - ID following, appreciate assistance    Asymptomatic pyuria  UA on arrival grossly dirty, but urine culture grew <10,000 normal adelina    MICHAEL stage II, Improving  Creatinine 2.89 from baseline 1.2-1.3 in Jan 2020. Suspect pre-renal state in setting of pneumonia/sepsis with possible progression to ATN in  setting of hypotension. Improved with IV fluids  - Creatinine 2.65->2.45 12/5  - Continues on IV fluids   - Holding PTA losartan    Non-anion gap metabolic acidosis  HCO3 17, AG 9. Likely due to recent diarrhea and IV fluids  - Diarrhea resolving  - add bicarb to fluids     Chronic adrenal insufficiency  Intermittent hypotension and diarrhea noted on admission, likely due to adrenal crisis in the setting of acute illness and steroid non-compliance. Improved upon resumption of previously prescribed hydrocortisone  - Continues on PTA hydrocortisone 20 mg PO daily, no plan for stress dose steroids at this time    Essential hypertension  [PTA: amlodipine 10 mg qhs, losartan 12.5 mg daily]  - Intermittent hypotension noted on admission, likely due to adrenal insufficiency. Now improved and stable  - Continue holding amlodipine and losartan due to borderline blood pressures and MICHAEL 12/5    Pancytopenia  Chronic anemia  Hgb 9.6 on admission, no recent baseline. He has had no s/sx of bleeding. Repeat labs  wbc 3.5, Hgb 6.1, platelets 74k 12/4, no signs of bleeding. Review of chart demonstrate pancytopenia 1/2020. Has h/o cirrhosis with portal hypotension, splenomegaly, CKD, autoimmune disorder.   - B12/ folate normal  - tsat 27, ferritin 515 c/w chronic disease  - peripheral smear is pending  - s/p transfusion 1 unit pRBCs 12/4  - transfuse hgb <7, consent signed and in chart  - hematology following, greatly appreciate assistance    History of psoriasis   - Holding PTA Humira  - topical clobetasol at HS    Peripheral vascular disease s/p left BKA  Chronic RLE wound  Chronic left ear wound  Present on admission. Receives wound cares per home care RN  - Wound cares in place as per WOCN     Chronic Hepatitis C with cirrhosis  Treated with interferon on the past  - Appears compensated    GERD  Chronic and stable on omeprazole 20 mg daily    BPH  Chronic and stable on tamsulosin 0.8 mg daily      Tobacco use disorder  Has not  had a cigarette in the past few weeks since he has been feeling sick.    Chronic pain syndrome  [PTA: gabapentin 300 mg TID, duloxetine 30 mg daily, lidocaine patch, oxycodone 5 mg q6h prn, morphine 2.5 mg BID prn]  - Gabapentin decreased to 200 mg TID in the setting of renal failure  - Continues on lidocaine patch, duloxetine, oxycodone prn, PO morphine changed to Dilaudid PO prn due to MICHAEL     Diet: Regular Diet Adult  Snacks/Supplements Adult: Boost Plus; Between Meals    DVT Prophylaxis: Pneumatic Compression Devices  Bruce Catheter: not present  Code Status: Full Code      Disposition: Expected discharge in 2-3 days pending ongoing improvement in pneumonia/sepsis, PT assessment, improvement in renal function and breathing    The patient's care was discussed with the Bedside Nurse and Patient.    Felix Palacio MD  Hospitalist Service  [facility]  Contact information available via [facility] Paging/Directory    ______________________________________________________________________    Interval History   Overnight events reviewed. Still sob and chest pain over R flank area. Eating ok. C/o some abdominal pain and diarrhea    Data reviewed today: I reviewed all medications, new labs and imaging results over the last 24 hours. I personally reviewed no images or EKG's today.    Physical Exam   Vital Signs: Temp: 97.6  F (36.4  C) Temp src: Oral BP: 120/65 Pulse: 69   Resp: 16 SpO2: 96 % O2 Device: Oxymask Oxygen Delivery: 4 LPM  Weight: 140 lbs 0 oz  Constitutional: Resting comfortably, NAD  Respiratory: Breathing non-labored. Lungs with diffuse wheezing, mildly diminished air movement  Cardiovascular: Heart RRR, soft PRANAY noted. No pedal edema  GI: +BS, abd soft/NT  Skin/Integument: RLE dressing/boot in place  Musculoskeletal: s/p LLE BKA  Neuro: Alert and appropriate, STEWART  Psych: Calm and cooperative    Data   Recent Labs   Lab 12/05/20  0804 12/04/20  1526 12/04/20  1245 12/02/20  1845   WBC  2.7* 3.2* 3.5* 17.2*   HGB 7.2* 6.4* 6.1* 9.6*   MCV 92 93 94 91   PLT 55* 70* 74* 201     --  140 140   POTASSIUM 3.5  --  3.7 3.3*   CHLORIDE 113*  --  114* 109   CO2 15*  --  17* 20   BUN 47*  --  47* 35*   CR 2.45*  --  2.61* 2.89*   ANIONGAP 11  --  9 11   MARTINE 7.7*  --  6.9* 7.9*   *  --  167* 134*   ALBUMIN  --   --   --  2.6*   PROTTOTAL  --   --   --  7.3   BILITOTAL  --   --   --  0.7   ALKPHOS  --   --   --  98   ALT  --   --   --  8   AST  --   --   --  6         No results found for this or any previous visit (from the past 24 hour(s)).    Medications     sodium chloride 100 mL/hr at 12/05/20 1037       ampicillin-sulbactam (UNASYN) IV  3 g Intravenous Q12H     DULoxetine  30 mg Oral Daily     fluticasone-vilanterol  1 puff Inhalation Daily     gabapentin  200 mg Oral TID     hydrocortisone  20 mg Oral QAM     multivitamin w/minerals  1 tablet Oral Daily     omeprazole  20 mg Oral Daily     polyethylene glycol  17 g Oral Daily     primidone  50 mg Oral At Bedtime     sodium chloride (PF)  3 mL Intracatheter Q8H     tamsulosin  0.8 mg Oral Daily     umeclidinium  1 puff Inhalation Daily

## 2020-12-05 NOTE — PROGRESS NOTES
12/05/20 1401   Quick Adds   Type of Visit Initial PT Evaluation   Living Environment   People in home alone   Current Living Arrangements apartment   Home Accessibility no concerns   Living Environment Comments has family/neighbors who could pop in to help, but no one to stay 24/7   Self-Care   Usual Activity Tolerance moderate   Current Activity Tolerance moderate   Equipment Currently Used at Home wheelchair, manual;other (see comments);grab bar, toilet;grab bar, tub/shower;raised toilet seat;shower chair  (power scooter)   Activity/Exercise/Self-Care Comment uses WC in home and power scooter out of home; 3L O2 at baseline   Disability/Function   Hearing Difficulty or Deaf no   Wear Glasses or Blind no   Concentrating, Remembering or Making Decisions Difficulty no   Difficulty Communicating no   Difficulty Eating/Swallowing no   Walking or Climbing Stairs Difficulty yes   Walking or Climbing Stairs ambulation difficulty, dependent;stair climbing difficulty, dependent   Mobility Management uses manual WC IND   Dressing/Bathing Difficulty no   Toileting issues no   Doing Errands Independently Difficulty (such as shopping) no   Errands Management friend does errands   Fall history within last six months no   Change in Functional Status Since Onset of Current Illness/Injury yes   General Information   Onset of Illness/Injury or Date of Surgery 12/02/20   Referring Physician Charis Sinclair MD   Patient/Family Therapy Goals Statement (PT) go back home   Pertinent History of Current Problem (include personal factors and/or comorbidities that impact the POC) Pt reports that his HH RN was looking at his leg wound and noted fever and recommended he go to the ED.  Per MD note 70 year old male with PMHx of severe COPD, adrenal insufficiency on chronic steroids, psoriasis on Humira, chronic hepatitis C with cirrhosis, PVD s/p left BKA, chronic wounds, who presents with pneumonia, found to have a RLL lung abscess.    Existing Precautions/Restrictions fall;oxygen therapy device and L/min;other (see comments)  (4LPM; L BKA)   General Observations Pt having frequent drops in O2 throughout evaluation while answering questions about PLOF and working to set up for his lunch. O2 drops to mid 80s and then recovers to 90+ with deep breaths.  Pt became agitated and did not want to stop and take breaths despite low O2 sats   Cognition   Orientation Status (Cognition) oriented to;person;place;situation;other (see comments)  (knows month and year)   Affect/Mental Status (Cognition) WNL;agitated  (becomes more agitated as eval progressed)   Follows Commands (Cognition) WFL   Behavioral Issues other (see comments)  (frustrated he wanted to eat lunch when directed to move)   Safety Deficit (Cognition) unable/difficult to assess  (pt refusing OOB transfer)   Pain Assessment   Patient Currently in Pain Yes, see Vital Sign flowsheet  (8/10 in R side and shoulder)   Integumentary/Edema   Integumentary/Edema Comments pt has boot on RLE with known wounds   Range of Motion (ROM)   ROM Comment WFL RLE knee and hip but unable to assess ankle d/t boot, WFL LLE knee and hip   Strength   Strength Comments MMT grossly 4+/5 RLE/LLE, functional strength assessment not completed d/t refusal to transfer today   Bed Mobility   Comment (Bed Mobility) Pt performs supine<>sit with SBA.  scoots higher in bed modIND   Transfers   Transfer Safety Comments refused transfer to chair   Gait/Stairs (Locomotion)   Distance in Feet (Required for LE Total Joints) WC bound at baseline, but does self propel in apartment.   Balance   Balance Comments sitting balance WFL, able to perform sitting EOB without UE support, does very minial reaching to ipsilateral side without LOB   Sensory Examination   Sensory Perception patient reports no sensory changes   Clinical Impression   Criteria for Skilled Therapeutic Intervention yes, treatment indicated   PT Diagnosis (PT) impaired  transfers   Influenced by the following impairments L BKA, mild weakness, drops in O2 sats   Functional limitations due to impairments decreased IND and safety with pt requiring SBA for bed mobility at this time   Clinical Presentation Stable/Uncomplicated   Clinical Decision Making (Complexity) low complexity   Therapy Frequency (PT) Daily   Predicted Duration of Therapy Intervention (days/wks) 5days   Planned Therapy Interventions (PT) bed mobility training;neuromuscular re-education;strengthening;transfer training   Risk & Benefits of therapy have been explained care plan/treatment goals reviewed;participants voiced agreement with care plan;participants included;patient   PT Discharge Planning    PT Discharge Recommendation (DC Rec)   (anticipate home with assist/HH, see rationale)   PT Rationale for DC Rec anticiapte with continued therapy pt to be able to perform necessary transfers to be able to return home, however he refused to trasnfer OOB to chair during evaluation today so unable to fully assess ability.  He sat EOB without LOB and perfoms supine<>sit with SBA.   PT Brief overview of current status  O2 sats dropping to mid 80s on 4LPM periodically throughout eval as pt talking and trying to set up his lunch.  Occasional recoveries into 90+ and was >90 upon entry before start of eval.   Total Evaluation Time   Total Evaluation Time (Minutes) 20

## 2020-12-05 NOTE — PLAN OF CARE
Patient alert and oriented, vitals are wnl, hemoglobin 6.4, a unit of red blood cell transfused, recheck with am labs, Left below the knee amputee, and right leg wound. Iv antibiotics and fluids.

## 2020-12-05 NOTE — PLAN OF CARE
"Patient sleeping most of this shift but oriented x4 and able to communicate needs. On 6L supplemental oxygen via OM. He refuses to switch O2 to nasal cannula. O2 sats drops to 85% when eating because he doesn't want nasal cannula and he takes the OM off. Otherwise vitally stable, afebrile. IVF infusing at 100mL/hr. No acute events this shift.     /65   Pulse 69   Temp 97.6  F (36.4  C) (Oral)   Resp 16   Ht 1.651 m (5' 5\")   Wt 63.5 kg (140 lb)   SpO2 96%   BMI 23.30 kg/m       "

## 2020-12-05 NOTE — PROGRESS NOTES
[facility]    Infectious Disease Progress Note    Date of Service (when I saw the patient): 12/05/2020     Assessment & Plan   Perfecto Reese is a 70 year old male who was admitted on 12/2/2020.       Impression:  1. 70 y.o male with COPD on Home oxygen.   2. Ongoing tobacco abuse.   3. Adrenal insufficiency on chronic steroids   4. Psoriasis on humira.   5. Chronic hep C. Cirrhosis.   6. PVD, S/P BKA.   7. Non compliance.   8. Admitted with pneumonia symptoms and imaging with lung abscesses.   9. On zosyn. And on azithromycin.   10. Sputum culture with proteus.      Recommendations:   Switch to Unasyn   Anticipate IV unasyn while admitted with possible discharge on oral Augmentin for 4- 6 weeks      Rc Hu MD    Interval History   Afebrile   Cultures noted     Physical Exam   Temp: 97.8  F (36.6  C) Temp src: Oral BP: 113/52 Pulse: 71   Resp: 16 SpO2: 96 % O2 Device: Oxymask Oxygen Delivery: 4 LPM  Vitals:    12/02/20 1817 12/04/20 0300 12/05/20 0500   Weight: 63.5 kg (140 lb) 65.1 kg (143 lb 8.3 oz) 63.5 kg (140 lb)     Vital Signs with Ranges  Temp:  [97.7  F (36.5  C)-98.7  F (37.1  C)] 97.8  F (36.6  C)  Pulse:  [64-94] 71  Resp:  [14-16] 16  BP: (110-126)/(41-56) 113/52  SpO2:  [92 %-98 %] 96 %    Constitutional: Awake  Lungs: Clear to auscultation bilaterally, no crackles or wheezing  Cardiovascular: Regular rate and rhythm, normal S1 and S2, and no murmur noted  Abdomen: Normal bowel sounds, soft, non-distended, non-tender  Skin: No rashes, no cyanosis, no edema  Other:    Medications     sodium chloride 100 mL/hr at 12/05/20 1037       ampicillin-sulbactam (UNASYN) IV  3 g Intravenous Q12H     DULoxetine  30 mg Oral Daily     fluticasone-vilanterol  1 puff Inhalation Daily     gabapentin  200 mg Oral TID     hydrocortisone  20 mg Oral QAM     multivitamin w/minerals  1 tablet Oral Daily     omeprazole  20 mg Oral Daily     polyethylene glycol  17 g Oral Daily     primidone   50 mg Oral At Bedtime     sodium chloride (PF)  3 mL Intracatheter Q8H     tamsulosin  0.8 mg Oral Daily     umeclidinium  1 puff Inhalation Daily       Data   All microbiology laboratory data reviewed.  Recent Labs   Lab Test 12/05/20  0804 12/04/20  1526 12/04/20  1245   WBC 2.7* 3.2* 3.5*   HGB 7.2* 6.4* 6.1*   HCT 21.6* 19.1* 18.6*   MCV 92 93 94   PLT 55* 70* 74*     Recent Labs   Lab Test 12/05/20  0804 12/04/20  1245 12/02/20  1845   CR 2.45* 2.61* 2.89*     No lab results found.  Recent Labs   Lab Test 12/03/20  0540 12/03/20  0230 12/02/20  1904 12/02/20  1844   CULT Light growth  Normal adelina    Light growth  Proteus mirabilis  *  Culture in progress <10,000 colonies/mL  mixed urogenital adelina  Susceptibility testing not routinely done   No growth after 3 days No growth after 3 days       Attestation:  Total time on the floor involved in the patient's care: 35 minutes. Total time spent in counseling/care coordination: >50%

## 2020-12-05 NOTE — PROGRESS NOTES
[facility]    Hematology/Oncology chart check      Date of Admission:  12/2/2020  Date of visit:  12/05/20  Reason for Consult: pancytopenia      ASSESSMENT/PLAN:  Perfecto Reese is a 70 year old male with:    Pancytopenia: Patient with what seems like an acute drop in counts, but on chart review in Care Everywhere, back in January 2020, he also had similar counts, with WBC of 3.1K, hemoglobin 5.4, platelet count in the 60K's, so wonder if this is actually closer to his baseline.  He had thrombocytopenia in the 70K-80K range even dating back to 2006.  He does have history of liver cirrhosis with portal hypertension and splenomegaly, as well as chronic kidney disease and autoimmune disorder.  Pancytopenia may be from his chronic illnesses, and now acute illness with pneumonia with abscess on antibiotics on top of that.  B12 and folate normal, iron studies c/w chronic disease, peripheral smear pending.  -agree with transfusion support for hemoglobin <7  -agree with current lab work-up, including peripheral smear, iron and ferritin labs  -follow daily CBC/diff for now.  While counts are slightly lower today we will just continue to observe for now.        HISTORY OF PRESENT ILLNESS: Perfecto Reese is a 70 year old male with PMHx of severe COPD, adrenal insufficiency on chronic steroids, psoriasis on Humira, chronic hepatitis C with cirrhosis, PVD s/p left BKA, chronic wounds, who presents with pneumonia, found to have a RLL lung abscess.   Patient is on antibiotics, ID following.      Hematology was consulted for pancytopenia.  On 12/2/20, his WBC was 17.2K, hemoglobin 9.6, platelet of 201K.  On 12/4/20, counts dropped significantly to WBC of 3.5K, hemoglobin of 6.1, platelet count of 74K.  Repeat check later today was similar.          MEDICATIONS:  Current Facility-Administered Medications   Medication     acetaminophen (TYLENOL) tablet 650 mg     albuterol (PROAIR HFA/PROVENTIL  HFA/VENTOLIN HFA) 108 (90 Base) MCG/ACT inhaler 2 puff     DULoxetine (CYMBALTA) DR capsule 30 mg     fluticasone-vilanterol (BREO ELLIPTA) 200-25 MCG/INH inhaler 1 puff     gabapentin (NEURONTIN) capsule 200 mg     hydrocortisone (CORTEF) tablet 20 mg     HYDROmorphone (DILAUDID) tablet 2-4 mg     HYDROmorphone (PF) (DILAUDID) injection 0.5 mg     lidocaine (LMX4) cream     lidocaine 1 % 0.1-1 mL     melatonin tablet 1 mg     multivitamin w/minerals (THERA-VIT-M) tablet 1 tablet     naloxone (NARCAN) injection 0.2 mg    Or     naloxone (NARCAN) injection 0.4 mg    Or     naloxone (NARCAN) injection 0.2 mg    Or     naloxone (NARCAN) injection 0.4 mg     nicotine (NICORETTE) gum 2 mg     omeprazole (priLOSEC) CR capsule 20 mg     ondansetron (ZOFRAN-ODT) ODT tab 4 mg    Or     ondansetron (ZOFRAN) injection 4 mg     piperacillin-tazobactam (ZOSYN) 3.375 g vial to attach to  mL bag     polyethylene glycol (MIRALAX) Packet 17 g     primidone (MYSOLINE) tablet 50 mg     prochlorperazine (COMPAZINE) injection 5 mg    Or     prochlorperazine (COMPAZINE) tablet 5 mg    Or     prochlorperazine (COMPAZINE) suppository 12.5 mg     sodium chloride (PF) 0.9% PF flush 3 mL     sodium chloride (PF) 0.9% PF flush 3 mL     sodium chloride 0.9% infusion     tamsulosin (FLOMAX) capsule 0.8 mg     triamcinolone (KENALOG) 0.1 % cream     umeclidinium (INCRUSE ELLIPTA) 62.5 MCG/INH inhaler 1 puff         ALLERGIES:  Allergies   Allergen Reactions     Darvocet [Propoxyphene N-Apap] Nausea     Vicodin [Hydrocodone-Acetaminophen] Nausea     Can use if he eats with it         PAST MEDICAL HISTORY:  Past Medical History:   Diagnosis Date     Adrenal insufficiency (H)      Anemia, iron deficiency      Back pain      COPD (chronic obstructive pulmonary disease) (H)      Depression      GERD (gastroesophageal reflux disease)      Hyperlipidemia      Migraine          PAST SURGICAL HISTORY:  Past Surgical History:   Procedure Laterality  Date     AMPUTATION BELOW KNEE RT/LT Left      AS ARTHROSCOPY SUBTALAR JOINT SUBTALAR ARTHRODESIS       OPEN REDUCTION INTERNAL FIXATION FOREARM      left forearm/wrist     TONSILLECTOMY           SOCIAL HISTORY:  Social History     Socioeconomic History     Marital status: Single     Spouse name: Not on file     Number of children: Not on file     Years of education: Not on file     Highest education level: Not on file   Occupational History     Not on file   Social Needs     Financial resource strain: Not on file     Food insecurity     Worry: Not on file     Inability: Not on file     Transportation needs     Medical: Not on file     Non-medical: Not on file   Tobacco Use     Smoking status: Former Smoker     Packs/day: 0.50     Years: 30.00     Pack years: 15.00     Types: Cigarettes     Quit date: 4/1/2017     Years since quitting: 3.6     Smokeless tobacco: Never Used     Tobacco comment: states he is down to 3QD   Substance and Sexual Activity     Alcohol use: No     Drug use: No     Sexual activity: Never   Lifestyle     Physical activity     Days per week: Not on file     Minutes per session: Not on file     Stress: Not on file   Relationships     Social connections     Talks on phone: Not on file     Gets together: Not on file     Attends Mu-ism service: Not on file     Active member of club or organization: Not on file     Attends meetings of clubs or organizations: Not on file     Relationship status: Not on file     Intimate partner violence     Fear of current or ex partner: Not on file     Emotionally abused: Not on file     Physically abused: Not on file     Forced sexual activity: Not on file   Other Topics Concern     Parent/sibling w/ CABG, MI or angioplasty before 65F 55M? Not Asked   Social History Narrative     Not on file         FAMILY HISTORY:  Family History   Problem Relation Age of Onset     Colon Cancer Father      Coronary Artery Disease Father      Chronic Obstructive Pulmonary  "Disease Father      Osteoporosis Mother      Dementia Mother          PHYSICAL EXAM:  Vital signs:  Temp: 97.8  F (36.6  C) Temp src: Oral BP: 113/52 Pulse: 71   Resp: 16 SpO2: 96 % O2 Device: Oxymask Oxygen Delivery: 4 LPM Height: 165.1 cm (5' 5\") Weight: 63.5 kg (140 lb)  Estimated body mass index is 23.3 kg/m  as calculated from the following:    Height as of this encounter: 1.651 m (5' 5\").    Weight as of this encounter: 63.5 kg (140 lb).        LABS:  Recent Labs   Lab Test 12/05/20  0804 12/04/20  1245 12/02/20  1845    140 140   POTASSIUM 3.5 3.7 3.3*   CHLORIDE 113* 114* 109   CO2 15* 17* 20   ANIONGAP 11 9 11   BUN 47* 47* 35*   CR 2.45* 2.61* 2.89*   * 167* 134*   MARTINE 7.7* 6.9* 7.9*     Recent Labs   Lab Test 12/04/20  1245   MAG 1.6     Recent Labs   Lab Test 12/05/20  0804 12/04/20  1526 12/04/20  1245 12/02/20  1845   WBC 2.7* 3.2* 3.5* 17.2*   HGB 7.2* 6.4* 6.1* 9.6*   PLT 55* 70* 74* 201   MCV 92 93 94 91   NEUTROPHIL  --  86.8  --  88.9     Recent Labs   Lab Test 12/02/20  1845   BILITOTAL 0.7   ALKPHOS 98   ALT 8   AST 6   ALBUMIN 2.6*     No results found for: TSH  No results for input(s): CEA in the last 82336 hours.  Results for orders placed or performed during the hospital encounter of 12/02/20   XR Chest Port 1 View    Narrative    XR CHEST PORT 1 VW  12/2/2020 7:25 PM       INDICATION: Cough, fever, SOB  COMPARISON: 10/8/2015       Impression    IMPRESSION: Right lower lobe infiltrate consistent with pneumonia.  Lung volumes are low. No definite left lung infiltrate.    RAEANN MAYORGA MD   CT Abdomen Pelvis w/o Contrast    Narrative    CT ABDOMEN AND PELVIS WITHOUT CONTRAST 12/2/2020 9:08 PM    CLINICAL HISTORY: Left flank pain and fever.    TECHNIQUE: CT scan of the abdomen and pelvis was performed without IV  contrast. Multiplanar reformats were obtained. Dose reduction  techniques were used.    CONTRAST: None.    COMPARISON: None.    FINDINGS:   LOWER CHEST: Extensive " airspace infiltrates in the right lower lobe  consistent with pneumonia. There is a 7.5 cm fluid collection with an  air-fluid level at the right lung base, which appears to be loculated  in the major fissure. Minimal infiltrate left lung base.    HEPATOBILIARY: Cirrhotic appearance of the liver. Cholelithiasis.    PANCREAS: Normal.    SPLEEN: Mild splenomegaly.    ADRENAL GLANDS: Normal.    KIDNEYS/BLADDER: Bilateral presumed renal cysts. No hydronephrosis or  urinary tract calculi.    BOWEL: Normal.    LYMPH NODES: Normal.    VASCULATURE: Unremarkable.    PELVIC ORGANS: Normal.    OTHER: None.    MUSCULOSKELETAL: Degenerative changes in the spine. Mild  chronic-appearing compression fracture of L1. Old left ischial  fracture. No discrete bone lesions.      Impression    IMPRESSION:   1.  Extensive right lower lobe pneumonia. Mild infiltrate in the left  lung base as well.  2.  Air-fluid cavity at the right lung base appears to be loculated in  the major fissure. Suggest thoracic surgery consultation.  3.  Cirrhosis with signs of portal hypertension including  splenomegaly. No ascites.    RAEANN MAYORGA MD   CT Chest w/o Contrast    Narrative    CT CHEST WITHOUT CONTRAST 12/3/2020 11:55 AM     HISTORY: Pneumonia, unresolved or complicated. 7.5 cm fluid collection  with air-fluid level seen on CT abdomen/pelvis.    COMPARISON: Abdomen and pelvis CT from December 2, 2020    TECHNIQUE: Volumetric helical acquisition of CT images of the chest  from the clavicles to the kidneys were acquired without IV contrast.  Radiation dose for this scan was reduced using automated exposure  control, adjustment of the mA and/or kV according to patient size, or  iterative reconstruction technique.    FINDINGS: Air-fluid level in a cavitary lesion in the right lower lobe  may represent a pulmonary abscess measuring 6.4 x 5.3 cm. Extensive  infiltrates throughout the right lower lobe. Mild dependent  atelectasis and/or additional  infiltrate in the posterior aspect of  the right upper lobe. Trace right pleural fluid. Mild left lower lobe  atelectasis and/or infiltrate. Marked bronchial wall thickening and  bronchiectasis seen in the left lower lobe with extensive areas of  mucous plugging. Emphysema. A few mildly prominent lymph nodes are  seen in the mediastinum which are nonspecific and may be reactive in  this setting. No left pleural or pericardial effusion. Possible  splenomegaly in the upper abdomen. Question of nodular contour to the  liver which could indicate cirrhosis. Cholelithiasis without  cholecystitis.      Impression    IMPRESSION:  1. Air-fluid level in a cavitary lesion in the right lower lobe  measuring 6.4 x 5.3 cm, differential includes a pulmonary abscess.  2. Extensive consolidation in the right lower lobe.  3. Extensive mucous plugging and bronchial wall thickening in the left  lower lobe with mild associated infiltrate.    LORIR TRINIDAD MD

## 2020-12-05 NOTE — PLAN OF CARE
A&Ox4. VSS on 4L oxy mask, baseline 3L NC. Oral dilaudid given for pain. Rt leg wound covered with ACE wrap and boot. Dressing CDI. Voiding in urinal. NS infusing at 100 ml/hr. Continue to monitor.

## 2020-12-06 ENCOUNTER — APPOINTMENT (OUTPATIENT)
Dept: PHYSICAL THERAPY | Facility: CLINIC | Age: 70
DRG: 871 | End: 2020-12-06
Payer: MEDICARE

## 2020-12-06 LAB
ANION GAP SERPL CALCULATED.3IONS-SCNC: 10 MMOL/L (ref 3–14)
BACTERIA SPEC CULT: ABNORMAL
BASOPHILS # BLD AUTO: 0 10E9/L (ref 0–0.2)
BASOPHILS NFR BLD AUTO: 0 %
BUN SERPL-MCNC: 42 MG/DL (ref 7–30)
CALCIUM SERPL-MCNC: 7.8 MG/DL (ref 8.5–10.1)
CHLORIDE SERPL-SCNC: 112 MMOL/L (ref 94–109)
CO2 SERPL-SCNC: 18 MMOL/L (ref 20–32)
CREAT SERPL-MCNC: 2.32 MG/DL (ref 0.66–1.25)
CREAT UR-MCNC: 61 MG/DL
DIFFERENTIAL METHOD BLD: ABNORMAL
EOSINOPHIL # BLD AUTO: 0.2 10E9/L (ref 0–0.7)
EOSINOPHIL NFR BLD AUTO: 4.5 %
ERYTHROCYTE [DISTWIDTH] IN BLOOD BY AUTOMATED COUNT: 14.4 % (ref 10–15)
GFR SERPL CREATININE-BSD FRML MDRD: 28 ML/MIN/{1.73_M2}
GLUCOSE SERPL-MCNC: 119 MG/DL (ref 70–99)
HCT VFR BLD AUTO: 22.6 % (ref 40–53)
HGB BLD-MCNC: 7.3 G/DL (ref 13.3–17.7)
IMM GRANULOCYTES # BLD: 0 10E9/L (ref 0–0.4)
IMM GRANULOCYTES NFR BLD: 0.9 %
LYMPHOCYTES # BLD AUTO: 0.2 10E9/L (ref 0.8–5.3)
LYMPHOCYTES NFR BLD AUTO: 6.9 %
MCH RBC QN AUTO: 29.8 PG (ref 26.5–33)
MCHC RBC AUTO-ENTMCNC: 32.3 G/DL (ref 31.5–36.5)
MCV RBC AUTO: 92 FL (ref 78–100)
MONOCYTES # BLD AUTO: 0.3 10E9/L (ref 0–1.3)
MONOCYTES NFR BLD AUTO: 7.5 %
NEUTROPHILS # BLD AUTO: 2.7 10E9/L (ref 1.6–8.3)
NEUTROPHILS NFR BLD AUTO: 80.2 %
NRBC # BLD AUTO: 0 10*3/UL
NRBC BLD AUTO-RTO: 1 /100
PLATELET # BLD AUTO: 62 10E9/L (ref 150–450)
POTASSIUM SERPL-SCNC: 3.3 MMOL/L (ref 3.4–5.3)
PROT UR-MCNC: 0.72 G/L
PROT/CREAT 24H UR: 1.18 G/G CR (ref 0–0.2)
RBC # BLD AUTO: 2.45 10E12/L (ref 4.4–5.9)
SODIUM SERPL-SCNC: 140 MMOL/L (ref 133–144)
SPECIMEN SOURCE: ABNORMAL
WBC # BLD AUTO: 3.4 10E9/L (ref 4–11)

## 2020-12-06 PROCEDURE — 85025 COMPLETE CBC W/AUTO DIFF WBC: CPT | Performed by: INTERNAL MEDICINE

## 2020-12-06 PROCEDURE — 258N000001 HC RX 258: Performed by: INTERNAL MEDICINE

## 2020-12-06 PROCEDURE — 36415 COLL VENOUS BLD VENIPUNCTURE: CPT | Performed by: INTERNAL MEDICINE

## 2020-12-06 PROCEDURE — 250N000013 HC RX MED GY IP 250 OP 250 PS 637: Performed by: INTERNAL MEDICINE

## 2020-12-06 PROCEDURE — 97110 THERAPEUTIC EXERCISES: CPT | Mod: GP

## 2020-12-06 PROCEDURE — 99232 SBSQ HOSP IP/OBS MODERATE 35: CPT | Performed by: INTERNAL MEDICINE

## 2020-12-06 PROCEDURE — 250N000009 HC RX 250: Performed by: INTERNAL MEDICINE

## 2020-12-06 PROCEDURE — 80048 BASIC METABOLIC PNL TOTAL CA: CPT | Performed by: INTERNAL MEDICINE

## 2020-12-06 PROCEDURE — 97530 THERAPEUTIC ACTIVITIES: CPT | Mod: GP

## 2020-12-06 PROCEDURE — 84156 ASSAY OF PROTEIN URINE: CPT | Performed by: INTERNAL MEDICINE

## 2020-12-06 PROCEDURE — 99233 SBSQ HOSP IP/OBS HIGH 50: CPT | Performed by: INTERNAL MEDICINE

## 2020-12-06 PROCEDURE — 250N000011 HC RX IP 250 OP 636: Performed by: INTERNAL MEDICINE

## 2020-12-06 PROCEDURE — 120N000001 HC R&B MED SURG/OB

## 2020-12-06 PROCEDURE — 250N000013 HC RX MED GY IP 250 OP 250 PS 637: Performed by: PHYSICIAN ASSISTANT

## 2020-12-06 RX ORDER — POTASSIUM CHLORIDE 1.5 G/1.58G
20 POWDER, FOR SOLUTION ORAL ONCE
Status: COMPLETED | OUTPATIENT
Start: 2020-12-06 | End: 2020-12-06

## 2020-12-06 RX ORDER — LOPERAMIDE HCL 2 MG
2 CAPSULE ORAL 4 TIMES DAILY PRN
Status: DISCONTINUED | OUTPATIENT
Start: 2020-12-06 | End: 2020-12-31 | Stop reason: HOSPADM

## 2020-12-06 RX ADMIN — SODIUM BICARBONATE: 84 INJECTION, SOLUTION INTRAVENOUS at 18:54

## 2020-12-06 RX ADMIN — HYDROMORPHONE HYDROCHLORIDE 4 MG: 2 TABLET ORAL at 20:13

## 2020-12-06 RX ADMIN — MULTIPLE VITAMINS W/ MINERALS TAB 1 TABLET: TAB at 08:25

## 2020-12-06 RX ADMIN — TAMSULOSIN HYDROCHLORIDE 0.8 MG: 0.4 CAPSULE ORAL at 08:25

## 2020-12-06 RX ADMIN — CLOBETASOL PROPIONATE: 0.5 SOLUTION TOPICAL at 22:34

## 2020-12-06 RX ADMIN — POTASSIUM CHLORIDE 20 MEQ: 1.5 POWDER, FOR SOLUTION ORAL at 17:39

## 2020-12-06 RX ADMIN — HYDROMORPHONE HYDROCHLORIDE 4 MG: 2 TABLET ORAL at 15:57

## 2020-12-06 RX ADMIN — GABAPENTIN 200 MG: 100 CAPSULE ORAL at 22:33

## 2020-12-06 RX ADMIN — SODIUM BICARBONATE: 84 INJECTION, SOLUTION INTRAVENOUS at 06:07

## 2020-12-06 RX ADMIN — DULOXETINE HYDROCHLORIDE 30 MG: 30 CAPSULE, DELAYED RELEASE ORAL at 08:25

## 2020-12-06 RX ADMIN — UMECLIDINIUM 1 PUFF: 62.5 AEROSOL, POWDER ORAL at 08:28

## 2020-12-06 RX ADMIN — AMPICILLIN AND SULBACTAM 3 G: 2; 1 INJECTION, POWDER, FOR SOLUTION INTRAMUSCULAR; INTRAVENOUS at 00:55

## 2020-12-06 RX ADMIN — OMEPRAZOLE 20 MG: 20 CAPSULE, DELAYED RELEASE ORAL at 08:25

## 2020-12-06 RX ADMIN — HYDROMORPHONE HYDROCHLORIDE 4 MG: 2 TABLET ORAL at 11:59

## 2020-12-06 RX ADMIN — GABAPENTIN 200 MG: 100 CAPSULE ORAL at 15:57

## 2020-12-06 RX ADMIN — GABAPENTIN 200 MG: 100 CAPSULE ORAL at 08:25

## 2020-12-06 RX ADMIN — TRIAMCINOLONE ACETONIDE: 1 CREAM TOPICAL at 08:27

## 2020-12-06 RX ADMIN — HYDROCORTISONE 20 MG: 20 TABLET ORAL at 08:25

## 2020-12-06 RX ADMIN — HYDROMORPHONE HYDROCHLORIDE 4 MG: 2 TABLET ORAL at 04:48

## 2020-12-06 RX ADMIN — AMPICILLIN AND SULBACTAM 3 G: 2; 1 INJECTION, POWDER, FOR SOLUTION INTRAMUSCULAR; INTRAVENOUS at 11:59

## 2020-12-06 RX ADMIN — PRIMIDONE 50 MG: 50 TABLET ORAL at 22:33

## 2020-12-06 RX ADMIN — FLUTICASONE FUROATE AND VILANTEROL TRIFENATATE 1 PUFF: 200; 25 POWDER RESPIRATORY (INHALATION) at 08:28

## 2020-12-06 ASSESSMENT — ACTIVITIES OF DAILY LIVING (ADL)
ADLS_ACUITY_SCORE: 18
ADLS_ACUITY_SCORE: 19
ADLS_ACUITY_SCORE: 19
ADLS_ACUITY_SCORE: 18

## 2020-12-06 NOTE — PLAN OF CARE
A/Ox4. VSS on 4 L oxymask. Voiding in urinal. L BKA, dressing to RLE CDI. PRN dilaudid for pain given x1. WOC consult for L ear and RLE wound, also small area of breakdown on R buttock. T/R q2, pt refused at times. Will continue to monitor.

## 2020-12-06 NOTE — PROGRESS NOTES
[facility]    Hematology/Oncology chart check      Date of Admission:  12/2/2020  Date of visit:  12/06/20  Reason for Consult: pancytopenia      ASSESSMENT/PLAN:  Perfecto Reese is a 70 year old male with:    Pancytopenia: Patient with what seems like an acute drop in counts, but on chart review in Care Everywhere, back in January 2020, he also had similar counts, with WBC of 3.1K, hemoglobin 5.4, platelet count in the 60K's, so wonder if this is actually closer to his baseline.  He had thrombocytopenia in the 70K-80K range even dating back to 2006.  He does have history of liver cirrhosis with portal hypertension and splenomegaly, as well as chronic kidney disease and autoimmune disorder.  Pancytopenia may be from his chronic illnesses, and now acute illness with pneumonia with abscess on antibiotics on top of that.  B12 and folate normal, iron studies c/w chronic disease, peripheral smear pending.    -agree with transfusion support for hemoglobin <7  -peripheral smear pending  -follow daily CBC/diff for now.  Counts are slightly better today we will just continue to observe for now.        HISTORY OF PRESENT ILLNESS: Perfecto Reese is a 70 year old male with PMHx of severe COPD, adrenal insufficiency on chronic steroids, psoriasis on Humira, chronic hepatitis C with cirrhosis, PVD s/p left BKA, chronic wounds, who presents with pneumonia, found to have a RLL lung abscess.   Patient is on antibiotics, ID following.      Hematology was consulted for pancytopenia.  On 12/2/20, his WBC was 17.2K, hemoglobin 9.6, platelet of 201K.  On 12/4/20, counts dropped significantly to WBC of 3.5K, hemoglobin of 6.1, platelet count of 74K.  Repeat check later today was similar.          MEDICATIONS:  Current Facility-Administered Medications   Medication     acetaminophen (TYLENOL) tablet 650 mg     albuterol (PROAIR HFA/PROVENTIL HFA/VENTOLIN HFA) 108 (90 Base) MCG/ACT inhaler 2 puff      ampicillin-sulbactam (UNASYN) 3 g vial to attach to  mL bag     clobetasol (TEMOVATE) 0.05 % external solution     dextrose 5% and 0.45% NaCl 1,000 mL with sodium bicarbonate 75 mEq/L infusion     DULoxetine (CYMBALTA) DR capsule 30 mg     fluticasone-vilanterol (BREO ELLIPTA) 200-25 MCG/INH inhaler 1 puff     gabapentin (NEURONTIN) capsule 200 mg     hydrocortisone (CORTEF) tablet 20 mg     HYDROmorphone (DILAUDID) tablet 2-4 mg     HYDROmorphone (PF) (DILAUDID) injection 0.5 mg     lidocaine (LMX4) cream     lidocaine 1 % 0.1-1 mL     melatonin tablet 1 mg     multivitamin w/minerals (THERA-VIT-M) tablet 1 tablet     naloxone (NARCAN) injection 0.2 mg    Or     naloxone (NARCAN) injection 0.4 mg    Or     naloxone (NARCAN) injection 0.2 mg    Or     naloxone (NARCAN) injection 0.4 mg     nicotine (NICORETTE) gum 2 mg     omeprazole (priLOSEC) CR capsule 20 mg     ondansetron (ZOFRAN-ODT) ODT tab 4 mg    Or     ondansetron (ZOFRAN) injection 4 mg     polyethylene glycol (MIRALAX) Packet 17 g     primidone (MYSOLINE) tablet 50 mg     prochlorperazine (COMPAZINE) injection 5 mg    Or     prochlorperazine (COMPAZINE) tablet 5 mg    Or     prochlorperazine (COMPAZINE) suppository 12.5 mg     sodium chloride (PF) 0.9% PF flush 3 mL     sodium chloride (PF) 0.9% PF flush 3 mL     tamsulosin (FLOMAX) capsule 0.8 mg     triamcinolone (KENALOG) 0.1 % cream     umeclidinium (INCRUSE ELLIPTA) 62.5 MCG/INH inhaler 1 puff         ALLERGIES:  Allergies   Allergen Reactions     Darvocet [Propoxyphene N-Apap] Nausea     Vicodin [Hydrocodone-Acetaminophen] Nausea     Can use if he eats with it         PAST MEDICAL HISTORY:  Past Medical History:   Diagnosis Date     Adrenal insufficiency (H)      Anemia, iron deficiency      Back pain      COPD (chronic obstructive pulmonary disease) (H)      Depression      GERD (gastroesophageal reflux disease)      Hyperlipidemia      Migraine          PAST SURGICAL HISTORY:  Past Surgical  History:   Procedure Laterality Date     AMPUTATION BELOW KNEE RT/LT Left      AS ARTHROSCOPY SUBTALAR JOINT SUBTALAR ARTHRODESIS       OPEN REDUCTION INTERNAL FIXATION FOREARM      left forearm/wrist     TONSILLECTOMY           SOCIAL HISTORY:  Social History     Socioeconomic History     Marital status: Single     Spouse name: Not on file     Number of children: Not on file     Years of education: Not on file     Highest education level: Not on file   Occupational History     Not on file   Social Needs     Financial resource strain: Not on file     Food insecurity     Worry: Not on file     Inability: Not on file     Transportation needs     Medical: Not on file     Non-medical: Not on file   Tobacco Use     Smoking status: Former Smoker     Packs/day: 0.50     Years: 30.00     Pack years: 15.00     Types: Cigarettes     Quit date: 4/1/2017     Years since quitting: 3.6     Smokeless tobacco: Never Used     Tobacco comment: states he is down to 3QD   Substance and Sexual Activity     Alcohol use: No     Drug use: No     Sexual activity: Never   Lifestyle     Physical activity     Days per week: Not on file     Minutes per session: Not on file     Stress: Not on file   Relationships     Social connections     Talks on phone: Not on file     Gets together: Not on file     Attends Scientologist service: Not on file     Active member of club or organization: Not on file     Attends meetings of clubs or organizations: Not on file     Relationship status: Not on file     Intimate partner violence     Fear of current or ex partner: Not on file     Emotionally abused: Not on file     Physically abused: Not on file     Forced sexual activity: Not on file   Other Topics Concern     Parent/sibling w/ CABG, MI or angioplasty before 65F 55M? Not Asked   Social History Narrative     Not on file         FAMILY HISTORY:  Family History   Problem Relation Age of Onset     Colon Cancer Father      Coronary Artery Disease Father       "Chronic Obstructive Pulmonary Disease Father      Osteoporosis Mother      Dementia Mother          PHYSICAL EXAM:  Vital signs:  Temp: 98  F (36.7  C) Temp src: Oral BP: 134/57 Pulse: 99   Resp: 16 SpO2: 94 % O2 Device: Oxymask Oxygen Delivery: 3 LPM Height: 165.1 cm (5' 5\") Weight: 63.5 kg (140 lb)  Estimated body mass index is 23.3 kg/m  as calculated from the following:    Height as of this encounter: 1.651 m (5' 5\").    Weight as of this encounter: 63.5 kg (140 lb).        LABS:  Recent Labs   Lab Test 12/05/20  0804 12/04/20  1245 12/02/20  1845    140 140   POTASSIUM 3.5 3.7 3.3*   CHLORIDE 113* 114* 109   CO2 15* 17* 20   ANIONGAP 11 9 11   BUN 47* 47* 35*   CR 2.45* 2.61* 2.89*   * 167* 134*   MARTINE 7.7* 6.9* 7.9*     Recent Labs   Lab Test 12/04/20  1245   MAG 1.6     Recent Labs   Lab Test 12/05/20  0804 12/04/20  1526 12/04/20  1245 12/02/20  1845   WBC 2.7* 3.2* 3.5* 17.2*   HGB 7.2* 6.4* 6.1* 9.6*   PLT 55* 70* 74* 201   MCV 92 93 94 91   NEUTROPHIL  --  86.8  --  88.9     Recent Labs   Lab Test 12/02/20  1845   BILITOTAL 0.7   ALKPHOS 98   ALT 8   AST 6   ALBUMIN 2.6*     No results found for: TSH  No results for input(s): CEA in the last 94675 hours.  Results for orders placed or performed during the hospital encounter of 12/02/20   XR Chest Port 1 View    Narrative    XR CHEST PORT 1 VW  12/2/2020 7:25 PM       INDICATION: Cough, fever, SOB  COMPARISON: 10/8/2015       Impression    IMPRESSION: Right lower lobe infiltrate consistent with pneumonia.  Lung volumes are low. No definite left lung infiltrate.    RAEANN MAYORGA MD   CT Abdomen Pelvis w/o Contrast    Narrative    CT ABDOMEN AND PELVIS WITHOUT CONTRAST 12/2/2020 9:08 PM    CLINICAL HISTORY: Left flank pain and fever.    TECHNIQUE: CT scan of the abdomen and pelvis was performed without IV  contrast. Multiplanar reformats were obtained. Dose reduction  techniques were used.    CONTRAST: None.    COMPARISON: None.    FINDINGS: "   LOWER CHEST: Extensive airspace infiltrates in the right lower lobe  consistent with pneumonia. There is a 7.5 cm fluid collection with an  air-fluid level at the right lung base, which appears to be loculated  in the major fissure. Minimal infiltrate left lung base.    HEPATOBILIARY: Cirrhotic appearance of the liver. Cholelithiasis.    PANCREAS: Normal.    SPLEEN: Mild splenomegaly.    ADRENAL GLANDS: Normal.    KIDNEYS/BLADDER: Bilateral presumed renal cysts. No hydronephrosis or  urinary tract calculi.    BOWEL: Normal.    LYMPH NODES: Normal.    VASCULATURE: Unremarkable.    PELVIC ORGANS: Normal.    OTHER: None.    MUSCULOSKELETAL: Degenerative changes in the spine. Mild  chronic-appearing compression fracture of L1. Old left ischial  fracture. No discrete bone lesions.      Impression    IMPRESSION:   1.  Extensive right lower lobe pneumonia. Mild infiltrate in the left  lung base as well.  2.  Air-fluid cavity at the right lung base appears to be loculated in  the major fissure. Suggest thoracic surgery consultation.  3.  Cirrhosis with signs of portal hypertension including  splenomegaly. No ascites.    RAEANN MAYORGA MD   CT Chest w/o Contrast    Narrative    CT CHEST WITHOUT CONTRAST 12/3/2020 11:55 AM     HISTORY: Pneumonia, unresolved or complicated. 7.5 cm fluid collection  with air-fluid level seen on CT abdomen/pelvis.    COMPARISON: Abdomen and pelvis CT from December 2, 2020    TECHNIQUE: Volumetric helical acquisition of CT images of the chest  from the clavicles to the kidneys were acquired without IV contrast.  Radiation dose for this scan was reduced using automated exposure  control, adjustment of the mA and/or kV according to patient size, or  iterative reconstruction technique.    FINDINGS: Air-fluid level in a cavitary lesion in the right lower lobe  may represent a pulmonary abscess measuring 6.4 x 5.3 cm. Extensive  infiltrates throughout the right lower lobe. Mild  dependent  atelectasis and/or additional infiltrate in the posterior aspect of  the right upper lobe. Trace right pleural fluid. Mild left lower lobe  atelectasis and/or infiltrate. Marked bronchial wall thickening and  bronchiectasis seen in the left lower lobe with extensive areas of  mucous plugging. Emphysema. A few mildly prominent lymph nodes are  seen in the mediastinum which are nonspecific and may be reactive in  this setting. No left pleural or pericardial effusion. Possible  splenomegaly in the upper abdomen. Question of nodular contour to the  liver which could indicate cirrhosis. Cholelithiasis without  cholecystitis.      Impression    IMPRESSION:  1. Air-fluid level in a cavitary lesion in the right lower lobe  measuring 6.4 x 5.3 cm, differential includes a pulmonary abscess.  2. Extensive consolidation in the right lower lobe.  3. Extensive mucous plugging and bronchial wall thickening in the left  lower lobe with mild associated infiltrate.    MD Angie ALMARAZ MD on 12/6/2020 at 10:21 AM

## 2020-12-06 NOTE — PROGRESS NOTES
[facility]    Medicine Progress Note - Hospitalist Service       Date of Admission:  12/2/2020    Assessment & Plan   Perfecto Reese is a 70 year old male with complex PMHx including severe COPD on 3L home O2 with ongoing tobacco use, adrenal insufficiency on chronic steroids, psoriasis on Humira, chronic Hep C with cirrhosis, PVD s/p left BKA, chronic wounds, and non-compliance who was admitted on 12/2/2020 for pneumonia, found to have a RLL lung abscess      # Acute on chronic hypoxic respiratory failure due to bilateral community-acquired bacterial pneumonia with RLL abscess, Improved  # Severe COPD  * At baseline on 3 lpm/NC  * EMS activated by his home care RN for fever, cough, and acute respiratory distress (placed on 10L per OM in the field). Fever to 102.5, tachycardia, tachypnea, and leukocytosis (17.2k) present on arrival. CXR on arrival showed RLL infiltrate and CT abd/pelvis showed air fluid cavity at the right lung base. On admission, he was initiated on IV pip-tazo and azithromycin, and Thoracic Surgery was consulted. Chest CT was ordered (12/3) which showed a 6.4 cm RLL pulmonary abscess and extensive left-sided mucus plugging with associated infiltrate. ID then consulted  - CT surgery saw, no surgical intervention indicated  - On 3-6 lpm/OM, wean as tolerated-> improved and 3-4 L 12/6  - Azithromycin d/c'd by ID12/4  - Continues on PTA inhalers  - Encourage incentive spirometry, ambulate as tolerated, RCAT  - 12/2 blood cultures x2 NGTD 12/6  - sputum cx 12/4 growing proteus, fluoroquinolone resistant. Also corynebacterium striatum 12/6  - initial zosyn-> transitioned 12/5 to unasyn, possible discharge on oral augmentin x 4-6 weeks  - ID following, appreciate assistance    Asymptomatic pyuria  UA on arrival grossly dirty, but urine culture grew <10,000 normal adelina    MICHAEL   CKD  Creatinine 2.89 from baseline 1.2-1.3 in Jan 2020. Suspect pre-renal state in setting of  pneumonia/sepsis with possible progression to ATN in setting of hypotension. Improved with IV fluids  - Creatinine 2.65->2.45->2.32 12/6  - Continues on IV fluids   - Holding PTA losartan  - check urine pr/cr ratio  - with infection and active urine sediment as well as stalled creatinine could imply active GN in setting of infection. Await pr/cr ratio, possible nephrology involvement    Non-anion gap metabolic acidosis  HCO3 17. Likely due to recent diarrhea and IV fluids. No anion gap  - Diarrhea resolving  - improved with IV bicarb in fluids     Chronic adrenal insufficiency  Intermittent hypotension and diarrhea noted on admission, likely due to adrenal crisis in the setting of acute illness and steroid non-compliance. Improved upon resumption of previously prescribed hydrocortisone  - Continues on PTA hydrocortisone 20 mg PO daily, no plan for stress dose steroids at this time    Essential hypertension  [PTA: amlodipine 10 mg qhs, losartan 12.5 mg daily]  - Intermittent hypotension noted on admission, likely due to adrenal insufficiency. Now improved and stable  - Continue holding amlodipine and losartan due to borderline blood pressures and MICHAEL 12/6    Pancytopenia  Chronic anemia  Hgb 9.6 on admission, no recent baseline. He has had no s/sx of bleeding. Repeat labs  wbc 3.5, Hgb 6.1, platelets 74k 12/4, no signs of bleeding. Review of chart demonstrate pancytopenia 1/2020. Has h/o cirrhosis with portal hypotension, splenomegaly, CKD, autoimmune disorder.   - B12/ folate normal  - tsat 27, ferritin 515 c/w chronic disease  - peripheral smear is pending  - s/p transfusion 1 unit pRBCs 12/4  - transfuse hgb <7, consent signed and in chart  - hematology following, greatly appreciate assistance  - counts stable 12/6    History of psoriasis   - Holding PTA Humira  - topical clobetasol at HS    Peripheral vascular disease s/p left BKA  Chronic RLE wound  Chronic left ear wound  Present on admission. Receives wound  cares per home care RN  - Wound cares in place as per WOCN     Chronic Hepatitis C with cirrhosis  Treated with interferon on the past  - Appears compensated    GERD  Chronic and stable on omeprazole 20 mg daily    BPH  Chronic and stable on tamsulosin 0.8 mg daily      Tobacco use disorder  Has not had a cigarette in the past few weeks since he has been feeling sick.    Chronic pain syndrome  [PTA: gabapentin 300 mg TID, duloxetine 30 mg daily, lidocaine patch, oxycodone 5 mg q6h prn, morphine 2.5 mg BID prn]  - Gabapentin decreased to 200 mg TID in the setting of renal failure  - Continues on lidocaine patch, duloxetine, oxycodone prn, PO morphine changed to Dilaudid PO prn due to MICHAEL     Diet: Regular Diet Adult  Snacks/Supplements Adult: Boost Plus; Between Meals    DVT Prophylaxis: Pneumatic Compression Devices  Bruce Catheter: not present  Code Status: Full Code      Disposition: Expected discharge in 2-3 days pending ongoing improvement in pneumonia/sepsis, PT assessment, improvement in renal function and breathing    The patient's care was discussed with the Bedside Nurse and Patient.    Felix Palacio MD  Hospitalist Service  [facility]  Contact information available via [facility] Paging/Directory    ______________________________________________________________________    Interval History   Overnight events reviewed. Breathing better, still with pain along his R flank. No other cp. Eating ok, no abdominal pain    Data reviewed today: I reviewed all medications, new labs and imaging results over the last 24 hours. I personally reviewed no images or EKG's today.    Physical Exam   Vital Signs: Temp: 99  F (37.2  C) Temp src: Oral BP: 116/64 Pulse: 195   Resp: 18 SpO2: 94 % O2 Device: Oxymizer cannula Oxygen Delivery: 3 LPM  Weight: 140 lbs 0 oz  Constitutional: Resting comfortably, NAD  Respiratory: Breathing non-labored. Lungs largely clear  Cardiovascular: Heart RRR, soft PRANAY  noted. No pedal edema  GI: +BS, abd soft/NT  Skin/Integument: RLE dressing/boot in place  Musculoskeletal: s/p LLE BKA  Neuro: Alert and appropriate, STEWART  Psych: Calm and cooperative    Data   Recent Labs   Lab 12/06/20  0857 12/05/20  0804 12/04/20  1526 12/04/20  1245 12/02/20  1845   WBC 3.4* 2.7* 3.2* 3.5* 17.2*   HGB 7.3* 7.2* 6.4* 6.1* 9.6*   MCV 92 92 93 94 91   PLT 62* 55* 70* 74* 201    139  --  140 140   POTASSIUM 3.3* 3.5  --  3.7 3.3*   CHLORIDE 112* 113*  --  114* 109   CO2 18* 15*  --  17* 20   BUN 42* 47*  --  47* 35*   CR 2.32* 2.45*  --  2.61* 2.89*   ANIONGAP 10 11  --  9 11   MARTINE 7.8* 7.7*  --  6.9* 7.9*   * 110*  --  167* 134*   ALBUMIN  --   --   --   --  2.6*   PROTTOTAL  --   --   --   --  7.3   BILITOTAL  --   --   --   --  0.7   ALKPHOS  --   --   --   --  98   ALT  --   --   --   --  8   AST  --   --   --   --  6         No results found for this or any previous visit (from the past 24 hour(s)).    Medications     IV infusion builder WITH additives 100 mL/hr at 12/06/20 0607       ampicillin-sulbactam (UNASYN) IV  3 g Intravenous Q12H     clobetasol   Topical At Bedtime     DULoxetine  30 mg Oral Daily     fluticasone-vilanterol  1 puff Inhalation Daily     gabapentin  200 mg Oral TID     hydrocortisone  20 mg Oral QAM     multivitamin w/minerals  1 tablet Oral Daily     omeprazole  20 mg Oral Daily     polyethylene glycol  17 g Oral Daily     primidone  50 mg Oral At Bedtime     sodium chloride (PF)  3 mL Intracatheter Q8H     tamsulosin  0.8 mg Oral Daily     umeclidinium  1 puff Inhalation Daily

## 2020-12-06 NOTE — PLAN OF CARE
A&Ox4. 3L oxymask maintaining sats. Other VSS. L BKA, Dressing CDI to R leg wound. Tolerating diet. Dilaudid x1 for pain. Cdiff pending- isolation precautions initiated. Encourage repositioning- pt refusing. Noticed small skin breakdown on R buttocks. Educated patient about risk factors. Pt still refused side to side repositioning. L top of ear detaching from head but no signs of infection.

## 2020-12-07 LAB
ABO + RH BLD: NORMAL
ABO + RH BLD: NORMAL
ANION GAP SERPL CALCULATED.3IONS-SCNC: 9 MMOL/L (ref 3–14)
BASOPHILS # BLD AUTO: 0 10E9/L (ref 0–0.2)
BASOPHILS NFR BLD AUTO: 0 %
BLD GP AB SCN SERPL QL: NORMAL
BLD PROD TYP BPU: NORMAL
BLD PROD TYP BPU: NORMAL
BLD UNIT ID BPU: 0
BLOOD BANK CMNT PATIENT-IMP: NORMAL
BLOOD PRODUCT CODE: NORMAL
BPU ID: NORMAL
BUN SERPL-MCNC: 43 MG/DL (ref 7–30)
CALCIUM SERPL-MCNC: 7.8 MG/DL (ref 8.5–10.1)
CHLORIDE SERPL-SCNC: 112 MMOL/L (ref 94–109)
CO2 SERPL-SCNC: 21 MMOL/L (ref 20–32)
COPATH REPORT: NORMAL
CREAT SERPL-MCNC: 2.24 MG/DL (ref 0.66–1.25)
DIFFERENTIAL METHOD BLD: ABNORMAL
EOSINOPHIL # BLD AUTO: 0.2 10E9/L (ref 0–0.7)
EOSINOPHIL NFR BLD AUTO: 8.2 %
ERYTHROCYTE [DISTWIDTH] IN BLOOD BY AUTOMATED COUNT: 14.6 % (ref 10–15)
GFR SERPL CREATININE-BSD FRML MDRD: 29 ML/MIN/{1.73_M2}
GLUCOSE SERPL-MCNC: 135 MG/DL (ref 70–99)
HCT VFR BLD AUTO: 19.1 % (ref 40–53)
HGB BLD-MCNC: 6.2 G/DL (ref 13.3–17.7)
HGB BLD-MCNC: 7.3 G/DL (ref 13.3–17.7)
IMM GRANULOCYTES # BLD: 0 10E9/L (ref 0–0.4)
IMM GRANULOCYTES NFR BLD: 1.1 %
LYMPHOCYTES # BLD AUTO: 0.3 10E9/L (ref 0.8–5.3)
LYMPHOCYTES NFR BLD AUTO: 10.5 %
MCH RBC QN AUTO: 30 PG (ref 26.5–33)
MCHC RBC AUTO-ENTMCNC: 32.5 G/DL (ref 31.5–36.5)
MCV RBC AUTO: 92 FL (ref 78–100)
MONOCYTES # BLD AUTO: 0.2 10E9/L (ref 0–1.3)
MONOCYTES NFR BLD AUTO: 7.9 %
NEUTROPHILS # BLD AUTO: 1.9 10E9/L (ref 1.6–8.3)
NEUTROPHILS NFR BLD AUTO: 72.3 %
NRBC # BLD AUTO: 0 10*3/UL
NRBC BLD AUTO-RTO: 0 /100
NUM BPU REQUESTED: 2
PLATELET # BLD AUTO: 55 10E9/L (ref 150–450)
POTASSIUM SERPL-SCNC: 3.3 MMOL/L (ref 3.4–5.3)
RBC # BLD AUTO: 2.07 10E12/L (ref 4.4–5.9)
SODIUM SERPL-SCNC: 142 MMOL/L (ref 133–144)
SPECIMEN EXP DATE BLD: NORMAL
TRANSFUSION STATUS PATIENT QL: NORMAL
TRANSFUSION STATUS PATIENT QL: NORMAL
WBC # BLD AUTO: 2.7 10E9/L (ref 4–11)

## 2020-12-07 PROCEDURE — 250N000013 HC RX MED GY IP 250 OP 250 PS 637: Performed by: PHYSICIAN ASSISTANT

## 2020-12-07 PROCEDURE — 258N000001 HC RX 258: Performed by: INTERNAL MEDICINE

## 2020-12-07 PROCEDURE — P9016 RBC LEUKOCYTES REDUCED: HCPCS | Performed by: HOSPITALIST

## 2020-12-07 PROCEDURE — 250N000013 HC RX MED GY IP 250 OP 250 PS 637: Performed by: INTERNAL MEDICINE

## 2020-12-07 PROCEDURE — 250N000009 HC RX 250: Performed by: INTERNAL MEDICINE

## 2020-12-07 PROCEDURE — 85025 COMPLETE CBC W/AUTO DIFF WBC: CPT | Performed by: INTERNAL MEDICINE

## 2020-12-07 PROCEDURE — 80048 BASIC METABOLIC PNL TOTAL CA: CPT | Performed by: INTERNAL MEDICINE

## 2020-12-07 PROCEDURE — 85018 HEMOGLOBIN: CPT | Performed by: INTERNAL MEDICINE

## 2020-12-07 PROCEDURE — 99232 SBSQ HOSP IP/OBS MODERATE 35: CPT | Performed by: INTERNAL MEDICINE

## 2020-12-07 PROCEDURE — 250N000011 HC RX IP 250 OP 636: Performed by: INTERNAL MEDICINE

## 2020-12-07 PROCEDURE — 120N000001 HC R&B MED SURG/OB

## 2020-12-07 PROCEDURE — 36415 COLL VENOUS BLD VENIPUNCTURE: CPT | Performed by: INTERNAL MEDICINE

## 2020-12-07 RX ORDER — POTASSIUM CHLORIDE 1.5 G/1.58G
40 POWDER, FOR SOLUTION ORAL ONCE
Status: COMPLETED | OUTPATIENT
Start: 2020-12-07 | End: 2020-12-07

## 2020-12-07 RX ADMIN — HYDROMORPHONE HYDROCHLORIDE 4 MG: 2 TABLET ORAL at 23:59

## 2020-12-07 RX ADMIN — OMEPRAZOLE 20 MG: 20 CAPSULE, DELAYED RELEASE ORAL at 08:06

## 2020-12-07 RX ADMIN — FLUTICASONE FUROATE AND VILANTEROL TRIFENATATE 1 PUFF: 200; 25 POWDER RESPIRATORY (INHALATION) at 08:07

## 2020-12-07 RX ADMIN — HYDROMORPHONE HYDROCHLORIDE 4 MG: 2 TABLET ORAL at 15:13

## 2020-12-07 RX ADMIN — AMPICILLIN AND SULBACTAM 3 G: 2; 1 INJECTION, POWDER, FOR SOLUTION INTRAMUSCULAR; INTRAVENOUS at 13:19

## 2020-12-07 RX ADMIN — HYDROMORPHONE HYDROCHLORIDE 4 MG: 2 TABLET ORAL at 10:50

## 2020-12-07 RX ADMIN — UMECLIDINIUM 1 PUFF: 62.5 AEROSOL, POWDER ORAL at 08:07

## 2020-12-07 RX ADMIN — SODIUM BICARBONATE: 84 INJECTION, SOLUTION INTRAVENOUS at 21:06

## 2020-12-07 RX ADMIN — GABAPENTIN 200 MG: 100 CAPSULE ORAL at 15:13

## 2020-12-07 RX ADMIN — PRIMIDONE 50 MG: 50 TABLET ORAL at 21:06

## 2020-12-07 RX ADMIN — HYDROMORPHONE HYDROCHLORIDE 4 MG: 2 TABLET ORAL at 02:41

## 2020-12-07 RX ADMIN — TRIAMCINOLONE ACETONIDE: 1 CREAM TOPICAL at 08:07

## 2020-12-07 RX ADMIN — POTASSIUM CHLORIDE 40 MEQ: 1.5 POWDER, FOR SOLUTION ORAL at 08:10

## 2020-12-07 RX ADMIN — MULTIPLE VITAMINS W/ MINERALS TAB 1 TABLET: TAB at 08:06

## 2020-12-07 RX ADMIN — CLOBETASOL PROPIONATE: 0.5 SOLUTION TOPICAL at 21:06

## 2020-12-07 RX ADMIN — GABAPENTIN 200 MG: 100 CAPSULE ORAL at 21:06

## 2020-12-07 RX ADMIN — TAMSULOSIN HYDROCHLORIDE 0.8 MG: 0.4 CAPSULE ORAL at 08:06

## 2020-12-07 RX ADMIN — AMPICILLIN AND SULBACTAM 3 G: 2; 1 INJECTION, POWDER, FOR SOLUTION INTRAMUSCULAR; INTRAVENOUS at 00:06

## 2020-12-07 RX ADMIN — DULOXETINE HYDROCHLORIDE 30 MG: 30 CAPSULE, DELAYED RELEASE ORAL at 08:06

## 2020-12-07 RX ADMIN — HYDROCORTISONE 20 MG: 20 TABLET ORAL at 08:06

## 2020-12-07 RX ADMIN — AMPICILLIN AND SULBACTAM 3 G: 2; 1 INJECTION, POWDER, FOR SOLUTION INTRAMUSCULAR; INTRAVENOUS at 23:55

## 2020-12-07 RX ADMIN — LOPERAMIDE HYDROCHLORIDE 2 MG: 2 CAPSULE ORAL at 16:34

## 2020-12-07 RX ADMIN — HYDROMORPHONE HYDROCHLORIDE 4 MG: 2 TABLET ORAL at 19:45

## 2020-12-07 RX ADMIN — GABAPENTIN 200 MG: 100 CAPSULE ORAL at 08:06

## 2020-12-07 RX ADMIN — SODIUM BICARBONATE: 84 INJECTION, SOLUTION INTRAVENOUS at 08:11

## 2020-12-07 ASSESSMENT — ACTIVITIES OF DAILY LIVING (ADL)
ADLS_ACUITY_SCORE: 19

## 2020-12-07 ASSESSMENT — MIFFLIN-ST. JEOR: SCORE: 1332.82

## 2020-12-07 NOTE — PLAN OF CARE
A/O, lethargic. VSS on 3L oxymask. Pain managed with PO dilaudid. T/R q2, non-compliant w turns. Incontinent of stool, voiding adequately in urinal. RLE dressing CDI. Small open area on buttock open to air. Will continue to monitor.

## 2020-12-07 NOTE — PLAN OF CARE
A&O, lethargic.  VSS, 3L Oxy mask.  Pain managed with po dilaudid.  Up with 2 and lift.  Hgb 6.2, transfused 1 unit.  Voiding adequate amount in urinal, 2 BM this shift.  Continue to monitor.

## 2020-12-07 NOTE — PLAN OF CARE
A&O.  VSS, 3L oxy mask baseline.  Pain managed with po dilaudid.  Repo q2hrs.  Incontinent of stool, voiding adequate amount in urinal.  Up with 2 and lift.  Dressing on RLE CDI, in rook boot.  Redness and small open area on coccyx MARBIN.  WOC consult pending.

## 2020-12-07 NOTE — PROGRESS NOTES
[facility]    Medicine Progress Note - Hospitalist Service       Date of Admission:  12/2/2020    Assessment & Plan   Perfecto Reese is a 70 year old male with complex PMHx including severe COPD on 3L home O2 with ongoing tobacco use, adrenal insufficiency on chronic steroids, psoriasis on Humira, chronic Hep C with cirrhosis, PVD s/p left BKA, chronic wounds, and non-compliance who was admitted on 12/2/2020 for pneumonia, found to have a RLL lung abscess      Acute on chronic hypoxic respiratory failure   Bilateral community-acquired bacterial pneumonia with RLL abscess Severe COPD  * At baseline on 3 lpm/NC  * EMS activated by his home care RN for fever, cough, and acute respiratory distress (placed on 10L per OM in the field). Fever to 102.5, tachycardia, tachypnea, and leukocytosis (17.2k) present on arrival. CXR on arrival showed RLL infiltrate and CT abd/pelvis showed air fluid cavity at the right lung base. On admission, he was initiated on IV pip-tazo and azithromycin, and Thoracic Surgery was consulted. Chest CT was ordered (12/3) which showed a 6.4 cm RLL pulmonary abscess and extensive left-sided mucus plugging with associated infiltrate. ID then consulted  - CT surgery saw, no surgical intervention indicated  - On 3-6 lpm/OM, wean as tolerated-> baseline O2 needs 12/7  - Azithromycin d/c'd by ID12/4  - Continues on PTA inhalers (prn albuterol, symbicort (Brreo), spiriva (Incruse))  - Encourage incentive spirometry, ambulate as tolerated, RCAT  - 12/2 blood cultures x2 NGTD 12/7  - sputum cx 12/4 growing proteus, fluoroquinolone resistant. Also corynebacterium striatum 12/6  - initial zosyn-> transitioned 12/5 to unasyn, possible discharge on oral augmentin x 4-6 weeks  - ID following, greatly appreciate assistance    Asymptomatic pyuria  UA on arrival grossly dirty, but urine culture grew <10,000 normal adelina    MICHAEL   CKD  Creatinine 2.89 from baseline 1.2-1.3 in Jan 2020.  Suspect pre-renal state in setting of pneumonia/sepsis with possible progression to ATN in setting of hypotension. Improved with IV fluids  - Creatinine 2.65->2.45->2.32-> 2.24 12/7  - Continues on IV fluids   - Holding PTA losartan  - check urine pr/cr ratio  - with infection and active urine sediment as well as slowly improving creatinine could imply active GN in setting of infection. Pr/cr at 1.18. will ask nephrology to see    Non-anion gap metabolic acidosis  HCO3 17. Likely due to recent diarrhea and IV fluids. No anion gap  - Diarrhea resolving  - resolved with IV bicarb     Chronic adrenal insufficiency  Intermittent hypotension and diarrhea noted on admission, likely due to adrenal crisis in the setting of acute illness and steroid non-compliance. Improved upon resumption of previously prescribed hydrocortisone  - Continues on PTA hydrocortisone 20 mg PO daily, no plan for stress dose steroids at this time    Essential hypertension  [PTA: amlodipine 10 mg qhs, losartan 12.5 mg daily]  - Intermittent hypotension noted on admission, likely due to adrenal insufficiency. Now improved and stable  - Continue holding amlodipine and losartan due to borderline blood pressures and MICHAEL 12/7    Pancytopenia  Chronic anemia  Hgb 9.6 on admission, no recent baseline. He has had no s/sx of bleeding. Repeat labs  wbc 3.5, Hgb 6.1, platelets 74k 12/4, no signs of bleeding. Review of chart demonstrate pancytopenia 1/2020. Has h/o cirrhosis with portal hypotension, splenomegaly, CKD, autoimmune disorder.   - B12/ folate normal  - tsat 27, ferritin 515 c/w chronic disease  - peripheral smear with mod-marked pancytopenia without dysplasia, rare neutrophil myelocyte seen on scan without blasts  - s/p transfusion 1 unit pRBCs 12/4, 12/7  - transfuse hgb <7, consent signed and in chart  - hematology following, greatly appreciate assistance  - counts down 12/7 needed transfusion x 1 unit 12/7    History of psoriasis   - Holding PTA  "Humira  - topical clobetasol at HS    Peripheral vascular disease s/p left BKA  Chronic RLE wound  Chronic left ear wound  Present on admission. Receives wound cares per home care RN  - Wound cares in place as per WOCN     Chronic Hepatitis C with cirrhosis  Treated with interferon on the past  - Appears compensated    GERD  Chronic and stable on omeprazole 20 mg daily    BPH  Chronic and stable on tamsulosin 0.8 mg daily      Tobacco use disorder  Has not had a cigarette in the past few weeks since he has been feeling sick.    Chronic pain syndrome  [PTA: gabapentin 300 mg TID, duloxetine 30 mg daily, lidocaine patch, oxycodone 5 mg q6h prn, morphine 2.5 mg BID prn]  - Gabapentin decreased to 200 mg TID in the setting of renal failure  - Continues on lidocaine patch, duloxetine, PO morphine changed to Dilaudid PO prn due to MICHAEL     Diet: Regular Diet Adult  Snacks/Supplements Adult: Boost Plus; Between Meals    DVT Prophylaxis: Pneumatic Compression Devices  Bruce Catheter: not present  Code Status: Full Code      Disposition: Expected discharge later in week (week of 12/7) after services (ID, hematology, nephrology) have cleared, will need TCU at discharge    The patient's care was discussed with the Bedside Nurse and Patient.    Felix Palacio MD  Hospitalist Service  [facility]  Contact information available via [facility] Paging/Directory    ______________________________________________________________________    Interval History   Overnight events reviewed. \"same\", breathing stable, pain appears better. Eating ok. Ongoing c/o diarrhea but hasn't asked for imodium    Data reviewed today: I reviewed all medications, new labs and imaging results over the last 24 hours. I personally reviewed no images or EKG's today.    Physical Exam   Vital Signs: Temp: 98.8  F (37.1  C) Temp src: Oral BP: 131/53 Pulse: 88   Resp: 16 SpO2: 94 % O2 Device: Oxymask Oxygen Delivery: 3 LPM  Weight: 140 lbs " 3.2 oz  Constitutional: Resting comfortably, NAD  Respiratory: Breathing non-labored. Mild diffuse wheezing  Cardiovascular: Heart RRR, soft PRANAY noted. No pedal edema  GI: +BS, abd soft/NT  Skin/Integument: RLE dressing/boot in place  Musculoskeletal: s/p LLE BKA  Neuro: Alert and appropriate, STEWART  Psych: Calm and cooperative    Data   Recent Labs   Lab 12/07/20  0656 12/06/20  0857 12/05/20  0804 12/02/20  1845 12/02/20  1845   WBC 2.7* 3.4* 2.7*   < > 17.2*   HGB 6.2* 7.3* 7.2*   < > 9.6*   MCV 92 92 92   < > 91   PLT 55* 62* 55*   < > 201    140 139   < > 140   POTASSIUM 3.3* 3.3* 3.5   < > 3.3*   CHLORIDE 112* 112* 113*   < > 109   CO2 21 18* 15*   < > 20   BUN 43* 42* 47*   < > 35*   CR 2.24* 2.32* 2.45*   < > 2.89*   ANIONGAP 9 10 11   < > 11   MARTINE 7.8* 7.8* 7.7*   < > 7.9*   * 119* 110*   < > 134*   ALBUMIN  --   --   --   --  2.6*   PROTTOTAL  --   --   --   --  7.3   BILITOTAL  --   --   --   --  0.7   ALKPHOS  --   --   --   --  98   ALT  --   --   --   --  8   AST  --   --   --   --  6    < > = values in this interval not displayed.         No results found for this or any previous visit (from the past 24 hour(s)).    Medications     IV infusion builder WITH additives 100 mL/hr at 12/07/20 0811       ampicillin-sulbactam (UNASYN) IV  3 g Intravenous Q12H     clobetasol   Topical At Bedtime     DULoxetine  30 mg Oral Daily     fluticasone-vilanterol  1 puff Inhalation Daily     gabapentin  200 mg Oral TID     hydrocortisone  20 mg Oral QAM     multivitamin w/minerals  1 tablet Oral Daily     omeprazole  20 mg Oral Daily     polyethylene glycol  17 g Oral Daily     primidone  50 mg Oral At Bedtime     sodium chloride (PF)  3 mL Intracatheter Q8H     tamsulosin  0.8 mg Oral Daily     umeclidinium  1 puff Inhalation Daily

## 2020-12-07 NOTE — PROGRESS NOTES
Service Date: 12/07/2020      SUBJECTIVE:  Mr. Negron is a 70-year-old gentleman with multiple medical problems including chronic kidney disease, COPD, hypertension and adrenal insufficiency.  Hematology was consulted for pancytopenia.  The patient has chronic cytopenia.  On 01/07/2020 (care everywhere), WBC of 3.1 with hemoglobin of 5.4 and platelets of 68.      For workup of cytopenia, multiple labs were done.  Iron was low at 30.  No deficiency of vitamin B12 or folate.  Peripheral blood smear reviewed reveals pancytopenia without dysplasia.  CT scan on 12/02/2020 reveals liver cirrhosis, signs of portal hypertension and splenomegaly.  There is also extensive right lower lobe pneumonia.      I met with the patient.  The patient is on antibiotics.  He still feels weak.  Not in any severe pain.  No vomiting.  No bleeding.  He is afebrile.      PHYSICAL EXAMINATION:   GENERAL:  He was alert.  Not in any distress.   VITAL SIGNS:  Reviewed.   The rest of the systems not examined.      LABORATORY DATA:  Reviewed.      ASSESSMENT:   1.  A 70-year-old gentleman with pancytopenia.  This is due to liver cirrhosis and splenomegaly.  Anemia is also due to renal disease and anemia of chronic disease.   2.  Other medical problems including psoriasis, chronic kidney disease and peripheral vascular disease.      PLAN:     1.  CBC was reviewed with him.  I explained to him that his pancytopenia is overall stable.  This is multifactorial from his liver cirrhosis, splenomegaly, renal disease, chronic kidney disease and also psoriasis.      He should be continued on transfusion support.  He should be transfused for hemoglobin below 7.  Platelets will be transfused if bleeding or if it goes below 10.      2.  Oncology will sign off.  Please call us with any questions.         FLORENCE MCMILLAN MD             D: 12/07/2020   T: 12/07/2020   MT: JENNIFER      Name:     DANILO CHESTER   MRN:      [id number]        Account:      [id number]    :      1950           Service Date: 2020      Document: [id number]

## 2020-12-07 NOTE — PROGRESS NOTES
[facility]    Infectious Disease Progress Note    Date of Service (when I saw the patient): 12/07/2020     Assessment & Plan   Perfecto Reese is a 70 year old male who was admitted on 12/2/2020.       Impression:  1. 70 y.o male with COPD on Home oxygen.   2. Ongoing tobacco abuse.   3. Adrenal insufficiency on chronic steroids   4. Psoriasis on humira.   5. Chronic hep C. Cirrhosis.   6. PVD, S/P BKA.   7. Non compliance.   8. Admitted with pneumonia symptoms and imaging with lung abscesses.   9. On zosyn. And on azithromycin.   10. Sputum culture with proteus.      Recommendations:   On  Unasyn   Anticipate IV unasyn while admitted with possible discharge on oral Augmentin for 4- 6 weeks      Rc Hu MD    Interval History   Afebrile   Cultures noted     Physical Exam   Temp: 98.8  F (37.1  C) Temp src: Oral BP: 139/50 Pulse: 85   Resp: 16 SpO2: 94 % O2 Device: Oxymask Oxygen Delivery: 3 LPM  Vitals:    12/04/20 0300 12/05/20 0500 12/07/20 0254   Weight: 65.1 kg (143 lb 8.3 oz) 63.5 kg (140 lb) 63.6 kg (140 lb 3.2 oz)     Vital Signs with Ranges  Temp:  [98.8  F (37.1  C)-99.2  F (37.3  C)] 98.8  F (37.1  C)  Pulse:  [] 85  Resp:  [16-18] 16  BP: (116-139)/(50-64) 139/50  SpO2:  [94 %-96 %] 94 %    Constitutional: Awake  Lungs: Clear to auscultation bilaterally, no crackles or wheezing  Cardiovascular: Regular rate and rhythm, normal S1 and S2, and no murmur noted  Abdomen: Normal bowel sounds, soft, non-distended, non-tender  Skin: No rashes, no cyanosis, no edema  Other:    Medications     IV infusion builder WITH additives 100 mL/hr at 12/07/20 0811       ampicillin-sulbactam (UNASYN) IV  3 g Intravenous Q12H     clobetasol   Topical At Bedtime     DULoxetine  30 mg Oral Daily     fluticasone-vilanterol  1 puff Inhalation Daily     gabapentin  200 mg Oral TID     hydrocortisone  20 mg Oral QAM     multivitamin w/minerals  1 tablet Oral Daily     omeprazole  20 mg Oral  Daily     polyethylene glycol  17 g Oral Daily     primidone  50 mg Oral At Bedtime     sodium chloride (PF)  3 mL Intracatheter Q8H     tamsulosin  0.8 mg Oral Daily     umeclidinium  1 puff Inhalation Daily       Data   All microbiology laboratory data reviewed.  Recent Labs   Lab Test 12/07/20  0656 12/06/20  0857 12/05/20  0804   WBC 2.7* 3.4* 2.7*   HGB 6.2* 7.3* 7.2*   HCT 19.1* 22.6* 21.6*   MCV 92 92 92   PLT 55* 62* 55*     Recent Labs   Lab Test 12/07/20  0656 12/06/20  0857 12/05/20  0804   CR 2.24* 2.32* 2.45*     No lab results found.  Recent Labs   Lab Test 12/03/20  0540 12/03/20  0230 12/02/20  1904 12/02/20  1844   CULT Light growth  Normal adelina    Light growth  Proteus mirabilis  *  Moderate growth  Corynebacterium striatum  Identification obtained by MALDI-TOF mass spectrometry research use only database. Test   characteristics determined and verified by the Infectious Diseases Diagnostic Laboratory   ([facility]) [place], [place].  Susceptibility testing not routinely done  * <10,000 colonies/mL  mixed urogenital adelina  Susceptibility testing not routinely done   No growth after 5 days No growth after 5 days       Attestation:  Total time on the floor involved in the patient's care: 35 minutes. Total time spent in counseling/care coordination: >50%

## 2020-12-07 NOTE — PLAN OF CARE
PT: therapist spoke with RN who reports pt to be getting a transfusion d/t low Hgb and requests PT hold for today.

## 2020-12-07 NOTE — PROGRESS NOTES
[facility]    Received referral last Friday 12/4/20 for potential home IV abx (Dr. Hu's note dated 12/5/20 indicates potential for po abx upon discharge).  Benefits verified. Perfecto has IV abx coverage through his [facility] plan, coverage is 100% once his spenddown of $904.00 is satisfied, the plan also has a monthly ded of $3.35. Drug is billed to his Medicare part D pharmacy plan and he may have a co-pay per drug dispense.    I will wait to talk to patient until final discharge plan determined.       Thank you for the referral.    Fela Montalvo RN  [facility] Liaison  [phone] (Mon thru Fri 8am - 5pm)  [phone] Office

## 2020-12-08 LAB
ANION GAP SERPL CALCULATED.3IONS-SCNC: 4 MMOL/L (ref 3–14)
BACTERIA SPEC CULT: NO GROWTH
BACTERIA SPEC CULT: NO GROWTH
BASE DEFICIT BLDA-SCNC: 0.9 MMOL/L
BASO STIPL BLD QL SMEAR: PRESENT
BASOPHILS # BLD AUTO: 0 10E9/L (ref 0–0.2)
BASOPHILS NFR BLD AUTO: 0 %
BUN SERPL-MCNC: 40 MG/DL (ref 7–30)
CALCIUM SERPL-MCNC: 8 MG/DL (ref 8.5–10.1)
CHLORIDE SERPL-SCNC: 112 MMOL/L (ref 94–109)
CO2 SERPL-SCNC: 27 MMOL/L (ref 20–32)
CREAT SERPL-MCNC: 2.06 MG/DL (ref 0.66–1.25)
DIFFERENTIAL METHOD BLD: ABNORMAL
EOSINOPHIL # BLD AUTO: 0.2 10E9/L (ref 0–0.7)
EOSINOPHIL NFR BLD AUTO: 6 %
ERYTHROCYTE [DISTWIDTH] IN BLOOD BY AUTOMATED COUNT: 14.6 % (ref 10–15)
GFR SERPL CREATININE-BSD FRML MDRD: 32 ML/MIN/{1.73_M2}
GLUCOSE SERPL-MCNC: 132 MG/DL (ref 70–99)
HCO3 BLD-SCNC: 27 MMOL/L (ref 21–28)
HCT VFR BLD AUTO: 22.3 % (ref 40–53)
HGB BLD-MCNC: 7.1 G/DL (ref 13.3–17.7)
LYMPHOCYTES # BLD AUTO: 0.1 10E9/L (ref 0.8–5.3)
LYMPHOCYTES NFR BLD AUTO: 4 %
MCH RBC QN AUTO: 30.2 PG (ref 26.5–33)
MCHC RBC AUTO-ENTMCNC: 31.8 G/DL (ref 31.5–36.5)
MCV RBC AUTO: 95 FL (ref 78–100)
METAMYELOCYTES # BLD: 0.1 10E9/L
METAMYELOCYTES NFR BLD MANUAL: 2 %
MONOCYTES # BLD AUTO: 0.2 10E9/L (ref 0–1.3)
MONOCYTES NFR BLD AUTO: 7 %
NEUTROPHILS # BLD AUTO: 2.3 10E9/L (ref 1.6–8.3)
NEUTROPHILS NFR BLD AUTO: 81 %
OVALOCYTES BLD QL SMEAR: SLIGHT
OXYHGB MFR BLD: 98 % (ref 92–100)
PCO2 BLD: 60 MM HG (ref 35–45)
PH BLD: 7.27 PH (ref 7.35–7.45)
PLATELET # BLD AUTO: 68 10E9/L (ref 150–450)
PLATELET # BLD EST: ABNORMAL 10*3/UL
PO2 BLD: 116 MM HG (ref 80–105)
POTASSIUM SERPL-SCNC: 3.8 MMOL/L (ref 3.4–5.3)
RBC # BLD AUTO: 2.35 10E12/L (ref 4.4–5.9)
SODIUM SERPL-SCNC: 143 MMOL/L (ref 133–144)
SPECIMEN SOURCE: NORMAL
SPECIMEN SOURCE: NORMAL
WBC # BLD AUTO: 2.9 10E9/L (ref 4–11)

## 2020-12-08 PROCEDURE — 80048 BASIC METABOLIC PNL TOTAL CA: CPT | Performed by: INTERNAL MEDICINE

## 2020-12-08 PROCEDURE — 120N000001 HC R&B MED SURG/OB

## 2020-12-08 PROCEDURE — 36415 COLL VENOUS BLD VENIPUNCTURE: CPT | Performed by: INTERNAL MEDICINE

## 2020-12-08 PROCEDURE — 258N000001 HC RX 258: Performed by: INTERNAL MEDICINE

## 2020-12-08 PROCEDURE — 250N000009 HC RX 250: Performed by: INTERNAL MEDICINE

## 2020-12-08 PROCEDURE — 85025 COMPLETE CBC W/AUTO DIFF WBC: CPT | Performed by: INTERNAL MEDICINE

## 2020-12-08 PROCEDURE — G0463 HOSPITAL OUTPT CLINIC VISIT: HCPCS | Mod: 25

## 2020-12-08 PROCEDURE — 99233 SBSQ HOSP IP/OBS HIGH 50: CPT | Performed by: INTERNAL MEDICINE

## 2020-12-08 PROCEDURE — 250N000013 HC RX MED GY IP 250 OP 250 PS 637: Performed by: INTERNAL MEDICINE

## 2020-12-08 PROCEDURE — 97602 WOUND(S) CARE NON-SELECTIVE: CPT

## 2020-12-08 PROCEDURE — 36600 WITHDRAWAL OF ARTERIAL BLOOD: CPT

## 2020-12-08 PROCEDURE — 94660 CPAP INITIATION&MGMT: CPT

## 2020-12-08 PROCEDURE — 82805 BLOOD GASES W/O2 SATURATION: CPT | Performed by: INTERNAL MEDICINE

## 2020-12-08 PROCEDURE — 999N000157 HC STATISTIC RCP TIME EA 10 MIN

## 2020-12-08 RX ORDER — AMOXICILLIN AND CLAVULANATE POTASSIUM 500; 125 MG/1; MG/1
1 TABLET, FILM COATED ORAL EVERY 12 HOURS SCHEDULED
Status: DISCONTINUED | OUTPATIENT
Start: 2020-12-08 | End: 2020-12-31 | Stop reason: HOSPADM

## 2020-12-08 RX ORDER — CARBOXYMETHYLCELLULOSE SODIUM 5 MG/ML
1 SOLUTION/ DROPS OPHTHALMIC
Status: DISCONTINUED | OUTPATIENT
Start: 2020-12-08 | End: 2020-12-31 | Stop reason: HOSPADM

## 2020-12-08 RX ORDER — HYDROMORPHONE HYDROCHLORIDE 2 MG/1
2 TABLET ORAL EVERY 4 HOURS PRN
Status: DISCONTINUED | OUTPATIENT
Start: 2020-12-08 | End: 2020-12-31 | Stop reason: HOSPADM

## 2020-12-08 RX ADMIN — HYDROCORTISONE 20 MG: 20 TABLET ORAL at 08:23

## 2020-12-08 RX ADMIN — FLUTICASONE FUROATE AND VILANTEROL TRIFENATATE 1 PUFF: 200; 25 POWDER RESPIRATORY (INHALATION) at 08:25

## 2020-12-08 RX ADMIN — AMOXICILLIN AND CLAVULANATE POTASSIUM 1 TABLET: 500; 125 TABLET, FILM COATED ORAL at 18:56

## 2020-12-08 RX ADMIN — SODIUM BICARBONATE: 84 INJECTION, SOLUTION INTRAVENOUS at 09:06

## 2020-12-08 RX ADMIN — GABAPENTIN 200 MG: 100 CAPSULE ORAL at 08:24

## 2020-12-08 RX ADMIN — UMECLIDINIUM 1 PUFF: 62.5 AEROSOL, POWDER ORAL at 08:25

## 2020-12-08 RX ADMIN — MULTIPLE VITAMINS W/ MINERALS TAB 1 TABLET: TAB at 08:24

## 2020-12-08 RX ADMIN — ACETAMINOPHEN 650 MG: 325 TABLET, FILM COATED ORAL at 08:24

## 2020-12-08 RX ADMIN — AMOXICILLIN AND CLAVULANATE POTASSIUM 1 TABLET: 500; 125 TABLET, FILM COATED ORAL at 11:05

## 2020-12-08 RX ADMIN — POLYETHYLENE GLYCOL 3350 17 G: 17 POWDER, FOR SOLUTION ORAL at 08:24

## 2020-12-08 RX ADMIN — DULOXETINE HYDROCHLORIDE 30 MG: 30 CAPSULE, DELAYED RELEASE ORAL at 08:24

## 2020-12-08 RX ADMIN — CLOBETASOL PROPIONATE: 0.5 SOLUTION TOPICAL at 22:28

## 2020-12-08 RX ADMIN — TAMSULOSIN HYDROCHLORIDE 0.8 MG: 0.4 CAPSULE ORAL at 08:24

## 2020-12-08 RX ADMIN — OMEPRAZOLE 20 MG: 20 CAPSULE, DELAYED RELEASE ORAL at 08:24

## 2020-12-08 ASSESSMENT — ACTIVITIES OF DAILY LIVING (ADL)
ADLS_ACUITY_SCORE: 25
ADLS_ACUITY_SCORE: 21
ADLS_ACUITY_SCORE: 19
ADLS_ACUITY_SCORE: 21
ADLS_ACUITY_SCORE: 25
ADLS_ACUITY_SCORE: 21

## 2020-12-08 ASSESSMENT — MIFFLIN-ST. JEOR: SCORE: 1341.88

## 2020-12-08 NOTE — PLAN OF CARE
Nursing note  Pt a/o x 3, but very forgetful. Pt very lethargic, sleeps between cares/conversation. Pt will woke up and ask for pain medication and falls back to sleep. Pt has prn tylenol and dilaudid available but have received only tylenol. Pt is total care. Incontinent to urine/stool, but uses urinal at times. VSS. On 5 liters of simple face mask sating in low-mid 90's. Repo/turn q 2 hrs. Dressing changed this morning by the WOC RN. Skin dry/flaky. Generalized 2-3+ edema. Right stump also swollen. Will continue to monitor.

## 2020-12-08 NOTE — PROGRESS NOTES
[facility]    Medicine Progress Note - Hospitalist Service       Date of Admission:  12/2/2020    Assessment & Plan   Perfecto Reese is a 70 year old male with complex PMHx including severe COPD on 3L home O2 with ongoing tobacco use, adrenal insufficiency on chronic steroids, psoriasis on Humira, chronic Hep C with cirrhosis, PVD s/p left BKA, chronic wounds, and non-compliance who was admitted on 12/2/2020 for pneumonia, found to have a RLL lung abscess      Acute on chronic hypoxic respiratory failure   Bilateral community-acquired bacterial pneumonia with RLL abscess Severe COPD  * At baseline on 3 lpm/NC  * EMS activated by his home care RN for fever, cough, and acute respiratory distress (placed on 10L per OM in the field). Fever to 102.5, tachycardia, tachypnea, and leukocytosis (17.2k) present on arrival. CXR on arrival showed RLL infiltrate and CT abd/pelvis showed air fluid cavity at the right lung base. On admission, he was initiated on IV pip-tazo and azithromycin, and Thoracic Surgery was consulted. Chest CT was ordered (12/3) which showed a 6.4 cm RLL pulmonary abscess and extensive left-sided mucus plugging with associated infiltrate. ID then consulted  - CT surgery saw, no surgical intervention indicated  - On 3-6 lpm/OM, wean as tolerated-> baseline O2 needs as of 12/7  - Azithromycin d/c'd by ID12/4  - Continues on PTA inhalers (prn albuterol, symbicort (Brreo), spiriva (Incruse))  - Encourage incentive spirometry, ambulate as tolerated, RCAT  - 12/2 blood cultures x2 no growth  - sputum cx 12/4 growing proteus, fluoroquinolone resistant. Also corynebacterium striatum 12/6  - initial zosyn->unasyn-> transitioned 12/8 to Augmentin x 5 weeks (through 1/12/2021)  - repeat CT scan in 4 weeks (~1/8/2021)  - ID following, greatly appreciate assistance  - PT- rec TCU, pt refusing    Encephalopathy, multifactorial  Markedly somnolent 12/8. Arouses appropriately but only with  sternal rub. Denies c/o  - check ABG  - decrease hydromorphone to 2 mg q4 hours, further decrease 12/9    MICHAEL   CKD  Asymptomatic pyuria  Creatinine 2.89 from baseline 1.2-1.3 in Jan 2020. Suspect pre-renal state in setting of pneumonia/sepsis with possible progression to ATN in setting of hypotension. Improved with IV fluids  - UA on arrival grossly dirty, but urine culture grew <10,000 normal adelina  - Holding PTA losartan  - with infection and active urine sediment as well as slowly improving creatinine could imply active GN in setting of infection. Pr/cr at 1.18. will ask nephrology to see  - Creatinine 2.65->2.45->2.32-> 2.24->2.06 12/8, improving slowly      Pancytopenia  Chronic anemia  Hgb 9.6 on admission, no recent baseline. He has had no s/sx of bleeding. Repeat labs  wbc 3.5, Hgb 6.1, platelets 74k 12/4, no signs of bleeding. Review of chart demonstrate pancytopenia 1/2020. Has h/o cirrhosis with portal hypotension, splenomegaly, CKD, autoimmune disorder.   - B12/ folate normal  - tsat 27, ferritin 515 c/w chronic disease  - peripheral smear with mod-marked pancytopenia without dysplasia, rare neutrophil myelocyte seen on scan without blasts  - s/p transfusion 1 unit pRBCs 12/4, 12/7  - transfuse hgb <7, consent signed and in chart  - hematology following, greatly appreciate assistance  - counts stable 12/8, pancytopenia 2/2 liver cirrhosis, splenomegaly, renal disease, CKD and psoriasis    Non-anion gap metabolic acidosis  HCO3 17. Likely due to recent diarrhea and IV fluids. No anion gap  - Diarrhea resolving  - resolved with IV bicarb     Chronic adrenal insufficiency  Intermittent hypotension and diarrhea noted on admission, likely due to adrenal crisis in the setting of acute illness and steroid non-compliance. Improved upon resumption of previously prescribed hydrocortisone  - Continues on PTA hydrocortisone 20 mg PO daily, no plan for stress dose steroids at this time    Essential  hypertension  [PTA: amlodipine 10 mg qhs, losartan 12.5 mg daily]  - Intermittent hypotension noted on admission, likely due to adrenal insufficiency. Now improved and stable  - BP creeping up 12/8, likely restart amlodipine if persistent    History of psoriasis   - Holding PTA Humira  - topical clobetasol at HS    Peripheral vascular disease s/p left BKA  Chronic RLE wound  Chronic left ear wound  Present on admission. Receives wound cares per home care RN  - Wound cares in place as per WOCN     Chronic Hepatitis C with cirrhosis  Treated with interferon on the past  - Appears compensated    GERD  Chronic and stable on omeprazole 20 mg daily    BPH  Chronic and stable on tamsulosin 0.8 mg daily      Tobacco use disorder  Has not had a cigarette in the past few weeks since he has been feeling sick.    Chronic pain syndrome  [PTA: gabapentin 300 mg TID, duloxetine 30 mg daily, lidocaine patch, oxycodone 5 mg q6h prn, morphine 2.5 mg BID prn]  - Gabapentin decreased to 200 mg TID in the setting of renal failure  - Continues on lidocaine patch, duloxetine, PO morphine changed to Dilaudid PO prn due to MICHAEL     Diet: Regular Diet Adult  Snacks/Supplements Adult: Boost Plus; Between Meals    DVT Prophylaxis: Pneumatic Compression Devices  Bruce Catheter: not present  Code Status: Full Code      Disposition: Expected discharge later in week (week of 12/7) after services (ID, hematology, nephrology) have cleared, will need TCU at discharge    The patient's care was discussed with the Bedside Nurse and Patient.    Felix Palacio MD  Hospitalist Service  [facility]  Contact information available via [facility] Paging/Directory    ______________________________________________________________________    Interval History   Overnight events reviewed. Very somnolent today, awakens to briefly answer questions but then falls back asleep. Unable to assess further.    Data reviewed today: I reviewed all  medications, new labs and imaging results over the last 24 hours. I personally reviewed no images or EKG's today.    Physical Exam   Vital Signs: Temp: 98.5  F (36.9  C) Temp src: Axillary BP: 136/51 Pulse: 84   Resp: 14 SpO2: 97 % O2 Device: Nasal cannula Oxygen Delivery: 3 LPM  Weight: 142 lbs 3.15 oz  Constitutional: Sleeping, arousable with sternal rub  Respiratory: Breathing non-labored. Mild diffuse wheezing  Cardiovascular: Heart RRR, soft PRANAY noted. No pedal edema  GI: +BS, abd soft/NT  Skin/Integument: RLE dressing/boot in place  Musculoskeletal: s/p LLE BKA  Neuro: STEWART  Psych: somnolent    Data   Recent Labs   Lab 12/08/20  1226 12/07/20  1853 12/07/20  0656 12/06/20  0857 12/02/20  1845 12/02/20  1845   WBC 2.9*  --  2.7* 3.4*   < > 17.2*   HGB 7.1* 7.3* 6.2* 7.3*   < > 9.6*   MCV 95  --  92 92   < > 91   PLT 68*  --  55* 62*   < > 201     --  142 140   < > 140   POTASSIUM 3.8  --  3.3* 3.3*   < > 3.3*   CHLORIDE 112*  --  112* 112*   < > 109   CO2 27  --  21 18*   < > 20   BUN 40*  --  43* 42*   < > 35*   CR 2.06*  --  2.24* 2.32*   < > 2.89*   ANIONGAP 4  --  9 10   < > 11   MARTINE 8.0*  --  7.8* 7.8*   < > 7.9*   *  --  135* 119*   < > 134*   ALBUMIN  --   --   --   --   --  2.6*   PROTTOTAL  --   --   --   --   --  7.3   BILITOTAL  --   --   --   --   --  0.7   ALKPHOS  --   --   --   --   --  98   ALT  --   --   --   --   --  8   AST  --   --   --   --   --  6    < > = values in this interval not displayed.         No results found for this or any previous visit (from the past 24 hour(s)).    Medications       amoxicillin-clavulanate  1 tablet Oral Q12H ISIS     clobetasol   Topical At Bedtime     DULoxetine  30 mg Oral Daily     fluticasone-vilanterol  1 puff Inhalation Daily     gabapentin  200 mg Oral TID     hydrocortisone  20 mg Oral QAM     multivitamin w/minerals  1 tablet Oral Daily     omeprazole  20 mg Oral Daily     polyethylene glycol  17 g Oral Daily     primidone  50 mg Oral At  Bedtime     sodium chloride (PF)  3 mL Intracatheter Q8H     tamsulosin  0.8 mg Oral Daily     umeclidinium  1 puff Inhalation Daily

## 2020-12-08 NOTE — PROGRESS NOTES
Care Transitions Note:    Page to Dr Hu; Discharge planning, Will pt go home on oral Augmentin or should CC make a home infusion referral?  Await call back or  Note specifying plan at discharge.  FLORA Price  [facility]  Inpatient Care Coordinator  M: [phone]

## 2020-12-08 NOTE — CONSULTS
Care Management Initial Consult    General Information  Assessment completed with: Patient,    Type of CM/SW Visit: Initial Assessment  Primary Care Provider verified and updated as needed:     Readmission within the last 30 days: no previous admission in last 30 days      Reason for Consult: discharge planning  Advance Care Planning:            Communication Assessment  Patient's communication style: spoken language (English or Bilingual)    Hearing Difficulty or Deaf: no   Wear Glasses or Blind: no    Cognitive  Cognitive/Neuro/Behavioral: .WDL except  Level of Consciousness: lethargic  Arousal Level: arouses to voice  Orientation: oriented x 4  Mood/Behavior: calm;cooperative  Best Language: 0 - No aphasia  Speech: clear    Living Environment:   People in home: alone(has home health rn)     Current living Arrangements: apartment      Able to return to prior arrangements:         Family/Social Support:  Care provided by:    Provides care for:                  Description of Support System:           Current Resources:   Skilled Home Care Services:    Community Resources:    Equipment currently used at home: wheelchair, manual;other (see comments);grab bar, toilet;grab bar, tub/shower;raised toilet seat;shower chair(power scooter)  Supplies currently used at home:      Employment/Financial:  Employment Status:          Financial Concerns:             Lifestyle & Psychosocial Needs:        Socioeconomic History     Marital status: Single     Spouse name: Not on file     Number of children: Not on file     Years of education: Not on file     Highest education level: Not on file     Tobacco Use     Smoking status: Former Smoker     Packs/day: 0.50     Years: 30.00     Pack years: 15.00     Types: Cigarettes     Quit date: 4/1/2017     Years since quitting: 3.6     Smokeless tobacco: Never Used     Tobacco comment: states he is down to 3QD   Substance and Sexual Activity     Alcohol use: No     Drug use: No     Sexual  activity: Never       Functional Status:  Prior to admission patient needed assistance:              Mental Health Status:          Chemical Dependency Status:                Values/Beliefs:  Spiritual, Cultural Beliefs, Gnosticist Practices, Values that affect care:                 Additional Information:  Writer met with patient at bedside.  Discussed discontinue needs.  Pt using [facility] and it is going well.  Currently getting home RN, PT and OT.  Patient states he will need a ride home.    Marylou Napoles RN

## 2020-12-08 NOTE — CONSULTS
RENAL CONSULTATION NOTE    REFERRING MD:  Felix Palacio MD    REASON FOR CONSULTATION:  MICHAEL in setting of R lung abscess, active urine sediment    HPI:  70 y.o man with severe COPD, adrenal insufficiency with hypertension (?) and HCV, who was admitted on 12/2 for fever, productive cough and worsening shortness of breath. He has has these symptoms for a couple weeks. He had a temperature of 102.5 in the ER. CT without contrast showed a 6.4 cavitary lesion and extensive consolidation in the RLL. He was diagnosed with lung abscess. He had been on Zosyn and Zithromax, which is being switched to Augmentin. Sputum culture grew proteus.     He had a serum creatinine of 1.2-1.3 in January. He was admitted on 12/2 with a serum creatinine of 2.89 mg/dl, which as been slowly improving. UA has large blood with 1.2 g/g proteinuria.     Currently, pt is requiring O2 via a facemask. He says he feels short of breath. IVF is running. He is up 3 liters since admission. No other complaints.     ROS:  A complete review of systems was performed and is negative except as noted above.    PMH:    Past Medical History:   Diagnosis Date     Adrenal insufficiency (H)      Anemia, iron deficiency      Back pain      COPD (chronic obstructive pulmonary disease) (H)      Depression      GERD (gastroesophageal reflux disease)      Hyperlipidemia      Migraine        PSH:    Past Surgical History:   Procedure Laterality Date     AMPUTATION BELOW KNEE RT/LT Left      AS ARTHROSCOPY SUBTALAR JOINT SUBTALAR ARTHRODESIS       OPEN REDUCTION INTERNAL FIXATION FOREARM      left forearm/wrist     TONSILLECTOMY         MEDICATIONS:      amoxicillin-clavulanate  1 tablet Oral Q12H ISIS     clobetasol   Topical At Bedtime     DULoxetine  30 mg Oral Daily     fluticasone-vilanterol  1 puff Inhalation Daily     gabapentin  200 mg Oral TID     hydrocortisone  20 mg Oral QAM     multivitamin w/minerals  1 tablet Oral Daily     omeprazole  20 mg Oral  Daily     polyethylene glycol  17 g Oral Daily     primidone  50 mg Oral At Bedtime     sodium chloride (PF)  3 mL Intracatheter Q8H     tamsulosin  0.8 mg Oral Daily     umeclidinium  1 puff Inhalation Daily       ALLERGIES:    Allergies as of 12/02/2020 - Reviewed 12/02/2020   Allergen Reaction Noted     Darvocet [propoxyphene n-apap] Nausea 07/22/2006     Vicodin [hydrocodone-acetaminophen] Nausea 10/18/2005       FH:    Family History   Problem Relation Age of Onset     Colon Cancer Father      Coronary Artery Disease Father      Chronic Obstructive Pulmonary Disease Father      Osteoporosis Mother      Dementia Mother        SH:    Social History     Socioeconomic History     Marital status: Single     Spouse name: Not on file     Number of children: Not on file     Years of education: Not on file     Highest education level: Not on file   Occupational History     Not on file   Social Needs     Financial resource strain: Not on file     Food insecurity     Worry: Not on file     Inability: Not on file     Transportation needs     Medical: Not on file     Non-medical: Not on file   Tobacco Use     Smoking status: Former Smoker     Packs/day: 0.50     Years: 30.00     Pack years: 15.00     Types: Cigarettes     Quit date: 4/1/2017     Years since quitting: 3.6     Smokeless tobacco: Never Used     Tobacco comment: states he is down to 3QD   Substance and Sexual Activity     Alcohol use: No     Drug use: No     Sexual activity: Never   Lifestyle     Physical activity     Days per week: Not on file     Minutes per session: Not on file     Stress: Not on file   Relationships     Social connections     Talks on phone: Not on file     Gets together: Not on file     Attends Yazidism service: Not on file     Active member of club or organization: Not on file     Attends meetings of clubs or organizations: Not on file     Relationship status: Not on file     Intimate partner violence     Fear of current or ex partner:  "Not on file     Emotionally abused: Not on file     Physically abused: Not on file     Forced sexual activity: Not on file   Other Topics Concern     Parent/sibling w/ CABG, MI or angioplasty before 65F 55M? Not Asked   Social History Narrative     Not on file       PHYSICAL EXAM:    /51 (BP Location: Right arm)   Pulse 84   Temp 98.5  F (36.9  C) (Axillary)   Resp 14   Ht 1.651 m (5' 5\")   Wt 64.5 kg (142 lb 3.2 oz)   SpO2 97%   BMI 23.66 kg/m    GENERAL: NAD.   HEENT:  Normocephalic. No gross abnormalities.  Pupils equal.  MMM.  Using O2 via facemask.  CV: RRR, no murmurs, no clicks, gallops, or rubs, 1+ edema  RESP: Diminish BS. No wheezes. No crackles.  GI: Abdomen obese, soft, NT  MUSCULOSKELETAL: L VKA 1+ pitting edmema  SKIN: no suspicious lesions or rashes, dry to touch. Multiple tattoos.   NEURO:  Sleepy. Arousable.  PSYCH: mood good, affect appropriate  LYMPH: No palpable ant/post cervical and supraclavicular adenopathy    LABS:      CBC RESULTS:     Recent Labs   Lab 12/08/20  1226 12/07/20  1853 12/07/20  0656 12/06/20  0857 12/05/20  0804 12/04/20  1526 12/04/20  1245   WBC 2.9*  --  2.7* 3.4* 2.7* 3.2* 3.5*   RBC 2.35*  --  2.07* 2.45* 2.36* 2.06* 1.99*   HGB 7.1* 7.3* 6.2* 7.3* 7.2* 6.4* 6.1*   HCT 22.3*  --  19.1* 22.6* 21.6* 19.1* 18.6*   PLT 68*  --  55* 62* 55* 70* 74*       BMP RESULTS:  Recent Labs   Lab 12/08/20  1226 12/07/20  0656 12/06/20  0857 12/05/20  0804 12/04/20  1245 12/02/20  1845    142 140 139 140 140   POTASSIUM 3.8 3.3* 3.3* 3.5 3.7 3.3*   CHLORIDE 112* 112* 112* 113* 114* 109   CO2 27 21 18* 15* 17* 20   BUN 40* 43* 42* 47* 47* 35*   CR 2.06* 2.24* 2.32* 2.45* 2.61* 2.89*   * 135* 119* 110* 167* 134*   MARTINE 8.0* 7.8* 7.8* 7.7* 6.9* 7.9*       INRNo lab results found in last 7 days.     DIAGNOSTICS:  Reviewed    CT wo contrast of abdomen/pelvis  1.  Extensive right lower lobe pneumonia. Mild infiltrate in the left  lung base as well.  2.  Air-fluid cavity " at the right lung base appears to be loculated in  the major fissure. Suggest thoracic surgery consultation.  3.  Cirrhosis with signs of portal hypertension including  splenomegaly. No ascites.  4. Bilateral renal cysts.     A/P:  70 y.o man with lung abscess, consult for acute kidney and an active urine sediment.     # Patient had a serum creatinine of 1.2-1.3 mg/dl in January.     # Acute kidney injury in the setting of PNA/lung abscess and diarrhea. Improving.    -active sendiment   -UPCR 1.2 g/g   -infectious GN?, kidney disease due to liver cirrhosis (IgAN)?    -no obstruction on CT    # PNA/lung abscess:    -abx   -manage per ID    # Adrenal insufficiency and hypertension? Was on Norvasc and losartan.    # Pancytopenia attributed to liver disease per oncology    # HCV and liver cirrhosis: No ascites mentioned on CT.     # Severe COPD:    Plan:  # Recheck UA with micro, UPCR, FENa  # Send ANCA, DRAKE, complements, SPEP/UPEP  # Stop IVF  # IV diuretic as needed for shortness of breath and edema  # He has adrenal insufficiency and hypertension? Does seem to go together. Adrenal insufficiency should cause hypotension and not hypetension  # Daily renal panel  # Avoid NSAIDS and IV dye    Will follow with you thank you.    Antonio Suh MD  [facility] - Nephrology  Office Phone: [phone]  Pager: [phone]

## 2020-12-08 NOTE — PLAN OF CARE
PT: Attempted treatment x3. Pt declining/refusing d/t fatigue and wanting to rest. Unable to engage pt in EOB/OOB activity despite max encouragement.

## 2020-12-08 NOTE — PLAN OF CARE
Patient is A&O, VSS on RA, CMS intact, L BKA, RLE wound wrapped, and dressing CDI. PO dilaudid given for pain control, IVF infusing, voiding in the urinal and incontinence at times. On IV antibiotic. Will continue to monitor

## 2020-12-08 NOTE — PLAN OF CARE
Patient turns with assist of 1 to reposition.  Pain managed with PO Dilaudid.  VSS on 3L Oxymask.  Lethargic; otherwise, oriented x4.  Wound care completed.  IV fluids infusing.  Good PO intake.  Voiding adequately.  BM x2.  Hgb recheck 7.3, redraw in am.

## 2020-12-08 NOTE — PROGRESS NOTES
[facility]    Infectious Disease Progress Note    Date of Service (when I saw the patient): 12/08/2020     Assessment & Plan   Perfecto Reese is a 70 year old male who was admitted on 12/2/2020.       Impression:  1. 70 y.o male with COPD on Home oxygen.   2. Ongoing tobacco abuse.   3. Adrenal insufficiency on chronic steroids   4. Psoriasis on humira.   5. Chronic hep C. Cirrhosis.   6. PVD, S/P BKA.   7. Non compliance.   8. Admitted with pneumonia symptoms and imaging with lung abscesses.   9. On zosyn. And on azithromycin.   10. Sputum culture with proteus.      Recommendations:   Switch to oral Augmentin, dose reduced given kidney function, discharge on 5 more weeks of oral augmentin   Repeat a CT scan in 4 weeks.     Rc Hu MD    Interval History   Afebrile   Cultures noted     Physical Exam   Temp: 98.5  F (36.9  C) Temp src: Axillary BP: 136/51 Pulse: 84   Resp: 14 SpO2: 97 % O2 Device: Nasal cannula Oxygen Delivery: 3 LPM  Vitals:    12/05/20 0500 12/07/20 0254 12/08/20 0703   Weight: 63.5 kg (140 lb) 63.6 kg (140 lb 3.2 oz) 64.5 kg (142 lb 3.2 oz)     Vital Signs with Ranges  Temp:  [98.5  F (36.9  C)-98.9  F (37.2  C)] 98.5  F (36.9  C)  Pulse:  [80-91] 84  Resp:  [14-16] 14  BP: (120-145)/(49-55) 136/51  SpO2:  [92 %-97 %] 97 %    Constitutional: Awake  Lungs: Clear to auscultation bilaterally, no crackles or wheezing  Cardiovascular: Regular rate and rhythm, normal S1 and S2, and no murmur noted  Abdomen: Normal bowel sounds, soft, non-distended, non-tender  Skin: No rashes, no cyanosis, no edema  Other:    Medications     IV infusion builder WITH additives 100 mL/hr at 12/08/20 0906       amoxicillin-clavulanate  1 tablet Oral Q12H ISIS     clobetasol   Topical At Bedtime     DULoxetine  30 mg Oral Daily     fluticasone-vilanterol  1 puff Inhalation Daily     gabapentin  200 mg Oral TID     hydrocortisone  20 mg Oral QAM     multivitamin w/minerals  1 tablet Oral Daily      omeprazole  20 mg Oral Daily     polyethylene glycol  17 g Oral Daily     primidone  50 mg Oral At Bedtime     sodium chloride (PF)  3 mL Intracatheter Q8H     tamsulosin  0.8 mg Oral Daily     umeclidinium  1 puff Inhalation Daily       Data   All microbiology laboratory data reviewed.  Recent Labs   Lab Test 12/07/20  1853 12/07/20  0656 12/06/20  0857 12/05/20  0804   WBC  --  2.7* 3.4* 2.7*   HGB 7.3* 6.2* 7.3* 7.2*   HCT  --  19.1* 22.6* 21.6*   MCV  --  92 92 92   PLT  --  55* 62* 55*     Recent Labs   Lab Test 12/07/20  0656 12/06/20  0857 12/05/20  0804   CR 2.24* 2.32* 2.45*     No lab results found.  Recent Labs   Lab Test 12/03/20  0540 12/03/20  0230 12/02/20  1904 12/02/20  1844   CULT Light growth  Normal adelina    Light growth  Proteus mirabilis  *  Moderate growth  Corynebacterium striatum  Identification obtained by MALDI-TOF mass spectrometry research use only database. Test   characteristics determined and verified by the Infectious Diseases Diagnostic Laboratory   ([facility]) [place], [place].  Susceptibility testing not routinely done  * <10,000 colonies/mL  mixed urogenital adelina  Susceptibility testing not routinely done   No growth after 5 days No growth after 5 days       Attestation:  Total time on the floor involved in the patient's care: 35 minutes. Total time spent in counseling/care coordination: >50%

## 2020-12-08 NOTE — PROGRESS NOTES
WOC Nurse Inpatient Wound Assessment   Reason for follow up visit : Evaluate and treat  RLE wounds    Assessment  RLE wounds due to Mixed Etiology: poor circulation, edema, pressure   Status: stable but decrease in drainage  Wound has a chronic biofilm. Will add in vashe solution to decrease biofilm, adding endoform to turn chronic wound into an acute wound and reducing pressure due to the natural tendency for his leg to turn out while in bed. Wound is filling in and the dressing change was not painful as it was intially.   Treatment Plan  RLE  wounds: Every other day    1. R) lower post leg wound: every other day  (please premedicate)  Use MicroKlenz wound cleanser #110810 to saturate dressing for removal and to clean wound,  Apply Vashe moistened gauze to wound bed and let sit x 5 minutes - do not rinse. #[id number]  Dry and protect surrounding skin with no sting barrier film wipe #[id number],  Apply endoform 2x2 may need 3 #[id number]  Apply Aquacel AG 4x4 (x2 dressings) #[id number],  Cover with ABD pad,    Wrap with kerlix roll,  Apply sween 24 lotion to dry skin on feet,  Wrap feet and leg with ace bandage,  Apply Rooke boot,  Use zflow to offload wound use 2  2. L) ear scab-keep clean, dry, intact monitor for signs of infection  3. Pressure Injury prevention (please order supplies if not in room)  Turn every 2 hours, side to side avoid supine on pressure redistribution support surface   Float heels off bed with use of pillows under legs  Prevent sliding by limiting HOB to 30 degrees or less unless contraindicated, use knee gatch first if not contraindicated  Protective foam dressings to sacrum PRN,Change at least q 4 days, peel back, peek and replace for daily skin inspection.  Chair cushion (#[id number]) as needed  Orders Updated  Recommended provider order: none   WOC Nurse follow-up plan:weekly  Nursing to notify the Provider(s) and re-consult the WOC Nurse if wound(s) deteriorates or new skin concern.    Patient  History  According to provider note(s):  Perfecto Reese is a 70 year old male with a history of chronic kidney disease stage III, right lower extremity wound, left ear wound, anemia, severe COPD, chronic hypoxic respiratory failure, adrenal insufficiency, tobacco use, psoriasis, hypertension, BPH, GERD, hepatitis, and cirrhosis who was sent into the ER due to fevers, shortness of breath, cough, and diarrhea.  In the ER, there was concern for community-acquired pneumonia and acute kidney injury.  He was given IV fluids and started on empiric antibiotics.  The hospitalist service was contacted to admit him for further evaluation management.    Objective Data  Containment of urine/stool: Continent of bladder and Continent of bowel    Active Diet Order  Orders Placed This Encounter      Regular Diet Adult      Output:   I/O last 3 completed shifts:  In: 1886 [P.O.:750; I.V.:836]  Out: 525 [Urine:525]    Risk Assessment:   Sensory Perception: 3-->slightly limited  Moisture: 3-->occasionally moist  Activity: 2-->chairfast  Mobility: 2-->very limited  Nutrition: 3-->adequate  Friction and Shear: 2-->potential problem  Donn Score: 15                          Labs:   Recent Labs   Lab 12/07/20  1853 12/07/20  0656 12/02/20  1845 12/02/20  1845   ALBUMIN  --   --   --  2.6*   HGB 7.3* 6.2*   < > 9.6*   WBC  --  2.7*   < > 17.2*    < > = values in this interval not displayed.       Physical Exam  Areas of skin assessed: focused rle     Wound Location:  RLE   Date of last photo 12/3/2020  Wound History: chronic mixed disease    Wound Base: 100 % pale pink slimy tissue     Palpation of the wound bed: normal      Drainage: small     Description of drainage: serosanguinous and yellow     Measurements (length x width x depth, in cm) 13.0  x 6.0  x  0.1 cm      Tunneling N/A     Undermining N/A  Periwound skin: thick hyperkerratotic black eschar at the inferior edge of the wound 7 oclock .      Color: normal and consistent with  surrounding tissue      Temperature: normal   Odor: none  Pain: denies , none      Interventions  Visual inspection and assessment completed 12/8/2020  Wound Care Rationale Promote moist wound healing without tissue dehydration , Provide selective debridement (autolysis) of nonviable tissue  and Decrease bacterial load  Wound Care: done per plan of care  Supplies: ordered: endoform, vashe zflows for offloading the wound , gathered and placed at the bedside  Current off-loading measures: Heel off-loading boot(s) and Pillows  Current support surface: Standard  Atmos Air mattress  Education provided to: importance of repositioning and Off-loading pressure  Discussed plan of care with Patient and Nurse    Vida Ventura RN BS [facility]

## 2020-12-09 ENCOUNTER — APPOINTMENT (OUTPATIENT)
Dept: GENERAL RADIOLOGY | Facility: CLINIC | Age: 70
DRG: 871 | End: 2020-12-09
Attending: INTERNAL MEDICINE
Payer: MEDICARE

## 2020-12-09 ENCOUNTER — APPOINTMENT (OUTPATIENT)
Dept: PHYSICAL THERAPY | Facility: CLINIC | Age: 70
DRG: 871 | End: 2020-12-09
Payer: MEDICARE

## 2020-12-09 LAB
ALBUMIN UR-MCNC: 30 MG/DL
ANION GAP SERPL CALCULATED.3IONS-SCNC: 3 MMOL/L (ref 3–14)
APPEARANCE UR: ABNORMAL
BASE EXCESS BLDV CALC-SCNC: 0.3 MMOL/L
BASE EXCESS BLDV CALC-SCNC: 0.7 MMOL/L
BASOPHILS # BLD AUTO: 0 10E9/L (ref 0–0.2)
BASOPHILS NFR BLD AUTO: 0.5 %
BILIRUB UR QL STRIP: NEGATIVE
BUN SERPL-MCNC: 39 MG/DL (ref 7–30)
CALCIUM SERPL-MCNC: 8 MG/DL (ref 8.5–10.1)
CHLORIDE SERPL-SCNC: 112 MMOL/L (ref 94–109)
CO2 SERPL-SCNC: 28 MMOL/L (ref 20–32)
COLOR UR AUTO: YELLOW
CREAT SERPL-MCNC: 1.96 MG/DL (ref 0.66–1.25)
CREAT UR-MCNC: 42 MG/DL
DIFFERENTIAL METHOD BLD: ABNORMAL
EOSINOPHIL # BLD AUTO: 0.3 10E9/L (ref 0–0.7)
EOSINOPHIL NFR BLD AUTO: 6.3 %
ERYTHROCYTE [DISTWIDTH] IN BLOOD BY AUTOMATED COUNT: 14.6 % (ref 10–15)
GFR SERPL CREATININE-BSD FRML MDRD: 34 ML/MIN/{1.73_M2}
GLUCOSE SERPL-MCNC: 75 MG/DL (ref 70–99)
GLUCOSE UR STRIP-MCNC: NEGATIVE MG/DL
HCO3 BLDV-SCNC: 27 MMOL/L (ref 21–28)
HCO3 BLDV-SCNC: 28 MMOL/L (ref 21–28)
HCT VFR BLD AUTO: 24.6 % (ref 40–53)
HGB BLD-MCNC: 7.7 G/DL (ref 13.3–17.7)
HGB UR QL STRIP: ABNORMAL
IMM GRANULOCYTES # BLD: 0.1 10E9/L (ref 0–0.4)
IMM GRANULOCYTES NFR BLD: 3 %
KETONES UR STRIP-MCNC: NEGATIVE MG/DL
LEUKOCYTE ESTERASE UR QL STRIP: ABNORMAL
LYMPHOCYTES # BLD AUTO: 0.3 10E9/L (ref 0.8–5.3)
LYMPHOCYTES NFR BLD AUTO: 7.4 %
MCH RBC QN AUTO: 29.8 PG (ref 26.5–33)
MCHC RBC AUTO-ENTMCNC: 31.3 G/DL (ref 31.5–36.5)
MCV RBC AUTO: 95 FL (ref 78–100)
MONOCYTES # BLD AUTO: 0.3 10E9/L (ref 0–1.3)
MONOCYTES NFR BLD AUTO: 6.9 %
MUCOUS THREADS #/AREA URNS LPF: PRESENT /LPF
NEUTROPHILS # BLD AUTO: 3.3 10E9/L (ref 1.6–8.3)
NEUTROPHILS NFR BLD AUTO: 75.9 %
NITRATE UR QL: NEGATIVE
NRBC # BLD AUTO: 0 10*3/UL
NRBC BLD AUTO-RTO: 0 /100
O2/TOTAL GAS SETTING VFR VENT: 50 %
OXYHGB MFR BLDV: 82 %
OXYHGB MFR BLDV: 97 %
PCO2 BLDV: 52 MM HG (ref 40–50)
PCO2 BLDV: 56 MM HG (ref 40–50)
PH BLDV: 7.29 PH (ref 7.32–7.43)
PH BLDV: 7.33 PH (ref 7.32–7.43)
PH UR STRIP: 5.5 PH (ref 5–7)
PLATELET # BLD AUTO: 90 10E9/L (ref 150–450)
PO2 BLDV: 46 MM HG (ref 25–47)
PO2 BLDV: 95 MM HG (ref 25–47)
POTASSIUM SERPL-SCNC: 3.6 MMOL/L (ref 3.4–5.3)
PROT UR-MCNC: 0.61 G/L
PROT/CREAT 24H UR: 1.48 G/G CR (ref 0–0.2)
RBC # BLD AUTO: 2.58 10E12/L (ref 4.4–5.9)
RBC #/AREA URNS AUTO: >182 /HPF (ref 0–2)
SODIUM SERPL-SCNC: 143 MMOL/L (ref 133–144)
SOURCE: ABNORMAL
SP GR UR STRIP: 1.01 (ref 1–1.03)
UROBILINOGEN UR STRIP-MCNC: NORMAL MG/DL (ref 0–2)
WBC # BLD AUTO: 4.3 10E9/L (ref 4–11)
WBC #/AREA URNS AUTO: >182 /HPF (ref 0–5)
WBC CLUMPS #/AREA URNS HPF: PRESENT /HPF

## 2020-12-09 PROCEDURE — 250N000011 HC RX IP 250 OP 636: Performed by: INTERNAL MEDICINE

## 2020-12-09 PROCEDURE — 250N000013 HC RX MED GY IP 250 OP 250 PS 637: Performed by: INTERNAL MEDICINE

## 2020-12-09 PROCEDURE — 36415 COLL VENOUS BLD VENIPUNCTURE: CPT | Performed by: INTERNAL MEDICINE

## 2020-12-09 PROCEDURE — 86255 FLUORESCENT ANTIBODY SCREEN: CPT | Performed by: INTERNAL MEDICINE

## 2020-12-09 PROCEDURE — 84166 PROTEIN E-PHORESIS/URINE/CSF: CPT | Mod: 26 | Performed by: PATHOLOGY

## 2020-12-09 PROCEDURE — 84165 PROTEIN E-PHORESIS SERUM: CPT | Mod: 26 | Performed by: PATHOLOGY

## 2020-12-09 PROCEDURE — 94640 AIRWAY INHALATION TREATMENT: CPT

## 2020-12-09 PROCEDURE — 250N000009 HC RX 250: Performed by: INTERNAL MEDICINE

## 2020-12-09 PROCEDURE — 86038 ANTINUCLEAR ANTIBODIES: CPT | Performed by: INTERNAL MEDICINE

## 2020-12-09 PROCEDURE — 94640 AIRWAY INHALATION TREATMENT: CPT | Mod: 76

## 2020-12-09 PROCEDURE — 71045 X-RAY EXAM CHEST 1 VIEW: CPT

## 2020-12-09 PROCEDURE — 86160 COMPLEMENT ANTIGEN: CPT | Performed by: INTERNAL MEDICINE

## 2020-12-09 PROCEDURE — 94660 CPAP INITIATION&MGMT: CPT

## 2020-12-09 PROCEDURE — 36415 COLL VENOUS BLD VENIPUNCTURE: CPT | Performed by: HOSPITALIST

## 2020-12-09 PROCEDURE — 99233 SBSQ HOSP IP/OBS HIGH 50: CPT | Performed by: INTERNAL MEDICINE

## 2020-12-09 PROCEDURE — 81001 URINALYSIS AUTO W/SCOPE: CPT | Performed by: INTERNAL MEDICINE

## 2020-12-09 PROCEDURE — 97110 THERAPEUTIC EXERCISES: CPT | Mod: GP | Performed by: PHYSICAL THERAPIST

## 2020-12-09 PROCEDURE — 82805 BLOOD GASES W/O2 SATURATION: CPT | Performed by: INTERNAL MEDICINE

## 2020-12-09 PROCEDURE — 84156 ASSAY OF PROTEIN URINE: CPT | Performed by: INTERNAL MEDICINE

## 2020-12-09 PROCEDURE — 80048 BASIC METABOLIC PNL TOTAL CA: CPT | Performed by: INTERNAL MEDICINE

## 2020-12-09 PROCEDURE — 999N001036 HC STATISTIC TOTAL PROTEIN: Performed by: INTERNAL MEDICINE

## 2020-12-09 PROCEDURE — 999N000157 HC STATISTIC RCP TIME EA 10 MIN

## 2020-12-09 PROCEDURE — 120N000001 HC R&B MED SURG/OB

## 2020-12-09 PROCEDURE — 84165 PROTEIN E-PHORESIS SERUM: CPT | Mod: TC | Performed by: INTERNAL MEDICINE

## 2020-12-09 PROCEDURE — 84166 PROTEIN E-PHORESIS/URINE/CSF: CPT | Mod: TC | Performed by: INTERNAL MEDICINE

## 2020-12-09 PROCEDURE — 85025 COMPLETE CBC W/AUTO DIFF WBC: CPT | Performed by: INTERNAL MEDICINE

## 2020-12-09 RX ORDER — FUROSEMIDE 10 MG/ML
40 INJECTION INTRAMUSCULAR; INTRAVENOUS ONCE
Status: COMPLETED | OUTPATIENT
Start: 2020-12-09 | End: 2020-12-09

## 2020-12-09 RX ORDER — AMLODIPINE BESYLATE 5 MG/1
5 TABLET ORAL DAILY
Status: DISCONTINUED | OUTPATIENT
Start: 2020-12-09 | End: 2020-12-31 | Stop reason: HOSPADM

## 2020-12-09 RX ORDER — IPRATROPIUM BROMIDE AND ALBUTEROL SULFATE 2.5; .5 MG/3ML; MG/3ML
3 SOLUTION RESPIRATORY (INHALATION)
Status: DISCONTINUED | OUTPATIENT
Start: 2020-12-09 | End: 2020-12-30

## 2020-12-09 RX ADMIN — HYDROMORPHONE HYDROCHLORIDE 2 MG: 2 TABLET ORAL at 23:27

## 2020-12-09 RX ADMIN — UMECLIDINIUM 1 PUFF: 62.5 AEROSOL, POWDER ORAL at 11:00

## 2020-12-09 RX ADMIN — AMLODIPINE BESYLATE 5 MG: 5 TABLET ORAL at 15:05

## 2020-12-09 RX ADMIN — FLUTICASONE FUROATE AND VILANTEROL TRIFENATATE 1 PUFF: 200; 25 POWDER RESPIRATORY (INHALATION) at 11:00

## 2020-12-09 RX ADMIN — DULOXETINE HYDROCHLORIDE 30 MG: 30 CAPSULE, DELAYED RELEASE ORAL at 10:42

## 2020-12-09 RX ADMIN — HYDROMORPHONE HYDROCHLORIDE 2 MG: 2 TABLET ORAL at 10:08

## 2020-12-09 RX ADMIN — FUROSEMIDE 40 MG: 10 INJECTION, SOLUTION INTRAVENOUS at 10:43

## 2020-12-09 RX ADMIN — PRIMIDONE 50 MG: 50 TABLET ORAL at 22:28

## 2020-12-09 RX ADMIN — TAMSULOSIN HYDROCHLORIDE 0.8 MG: 0.4 CAPSULE ORAL at 10:42

## 2020-12-09 RX ADMIN — HYDROMORPHONE HYDROCHLORIDE 2 MG: 2 TABLET ORAL at 15:05

## 2020-12-09 RX ADMIN — GABAPENTIN 200 MG: 100 CAPSULE ORAL at 15:05

## 2020-12-09 RX ADMIN — IPRATROPIUM BROMIDE AND ALBUTEROL SULFATE 3 ML: .5; 3 SOLUTION RESPIRATORY (INHALATION) at 16:23

## 2020-12-09 RX ADMIN — AMOXICILLIN AND CLAVULANATE POTASSIUM 1 TABLET: 500; 125 TABLET, FILM COATED ORAL at 22:28

## 2020-12-09 RX ADMIN — HYDROCORTISONE 20 MG: 20 TABLET ORAL at 10:42

## 2020-12-09 RX ADMIN — GABAPENTIN 200 MG: 100 CAPSULE ORAL at 10:42

## 2020-12-09 RX ADMIN — MULTIPLE VITAMINS W/ MINERALS TAB 1 TABLET: TAB at 10:42

## 2020-12-09 RX ADMIN — GABAPENTIN 200 MG: 100 CAPSULE ORAL at 22:28

## 2020-12-09 RX ADMIN — AMOXICILLIN AND CLAVULANATE POTASSIUM 1 TABLET: 500; 125 TABLET, FILM COATED ORAL at 10:41

## 2020-12-09 RX ADMIN — CLOBETASOL PROPIONATE: 0.5 SOLUTION TOPICAL at 22:33

## 2020-12-09 RX ADMIN — OMEPRAZOLE 20 MG: 20 CAPSULE, DELAYED RELEASE ORAL at 10:41

## 2020-12-09 RX ADMIN — IPRATROPIUM BROMIDE AND ALBUTEROL SULFATE 3 ML: .5; 3 SOLUTION RESPIRATORY (INHALATION) at 19:36

## 2020-12-09 ASSESSMENT — ACTIVITIES OF DAILY LIVING (ADL)
ADLS_ACUITY_SCORE: 29
ADLS_ACUITY_SCORE: 27

## 2020-12-09 ASSESSMENT — MIFFLIN-ST. JEOR: SCORE: 1341.88

## 2020-12-09 NOTE — PLAN OF CARE
Patient is A+OX4 though forgetful. VSS. Weaned off of Bipap, now stable on 5L oxymask. RLE wound dressing changed by [facility] nurse, next due to be changed on 12/11. Not OOB today. Patient was encouraged to reposition q2h but he often refuses. Incontinent of bowel/bladder. Regular diet with good appetite. Discharge plan pending.

## 2020-12-09 NOTE — PROGRESS NOTES
[facility]    Infectious Disease Progress Note    Date of Service (when I saw the patient): 12/09/2020     Assessment & Plan   Perfecto Reese is a 70 year old male who was admitted on 12/2/2020.       Impression:  1. 70 y.o male with COPD on Home oxygen.   2. Ongoing tobacco abuse.   3. Adrenal insufficiency on chronic steroids   4. Psoriasis on humira.   5. Chronic hep C. Cirrhosis.   6. PVD, S/P BKA.   7. Non compliance.   8. Admitted with pneumonia symptoms and imaging with lung abscesses.   9. On zosyn. And on azithromycin.   10. Sputum culture with proteus.      Recommendations:   Continue on  oral Augmentin, dose reduced given kidney function, discharge on 5 more weeks of oral augmentin   Repeat a CT scan in 4 weeks.     Rc Hu MD    Interval History   Afebrile   Cultures noted   Major complain in right sided chest pain     Physical Exam   Temp: 98.8  F (37.1  C) Temp src: Axillary BP: (!) 156/51 Pulse: 83   Resp: 16 SpO2: 91 % O2 Device: Oxymask Oxygen Delivery: 10 LPM  Vitals:    12/07/20 0254 12/08/20 0703 12/09/20 0506   Weight: 63.6 kg (140 lb 3.2 oz) 64.5 kg (142 lb 3.2 oz) 64.5 kg (142 lb 3.2 oz)     Vital Signs with Ranges  Temp:  [98.5  F (36.9  C)-99  F (37.2  C)] 98.8  F (37.1  C)  Pulse:  [83-88] 83  Resp:  [14-18] 16  BP: (135-156)/(51-57) 156/51  SpO2:  [91 %-97 %] 91 %    Constitutional: lethargic today, wakes up though   Lungs: Clear to auscultation bilaterally, no crackles or wheezing  Cardiovascular: Regular rate and rhythm, normal S1 and S2, and no murmur noted  Abdomen: Normal bowel sounds, soft, non-distended, non-tender  Skin: No rashes, no cyanosis, no edema  Other:    Medications     - MEDICATION INSTRUCTIONS -       - MEDICATION INSTRUCTIONS -         amoxicillin-clavulanate  1 tablet Oral Q12H ISIS     clobetasol   Topical At Bedtime     DULoxetine  30 mg Oral Daily     fluticasone-vilanterol  1 puff Inhalation Daily     gabapentin  200 mg Oral TID      hydrocortisone  20 mg Oral QAM     multivitamin w/minerals  1 tablet Oral Daily     omeprazole  20 mg Oral Daily     polyethylene glycol  17 g Oral Daily     primidone  50 mg Oral At Bedtime     sodium chloride (PF)  3 mL Intracatheter Q8H     tamsulosin  0.8 mg Oral Daily     umeclidinium  1 puff Inhalation Daily       Data   All microbiology laboratory data reviewed.  Recent Labs   Lab Test 12/09/20  0756 12/08/20  1226 12/07/20  1853 12/07/20  0656   WBC 4.3 2.9*  --  2.7*   HGB 7.7* 7.1* 7.3* 6.2*   HCT 24.6* 22.3*  --  19.1*   MCV 95 95  --  92   PLT 90* 68*  --  55*     Recent Labs   Lab Test 12/09/20  0756 12/08/20  1226 12/07/20  0656   CR 1.96* 2.06* 2.24*     No lab results found.  Recent Labs   Lab Test 12/03/20  0540 12/03/20  0230 12/02/20  1904 12/02/20  1844   CULT Light growth  Normal adelina    Light growth  Proteus mirabilis  *  Moderate growth  Corynebacterium striatum  Identification obtained by MALDI-TOF mass spectrometry research use only database. Test   characteristics determined and verified by the Infectious Diseases Diagnostic Laboratory   ([facility]) [place], [place].  Susceptibility testing not routinely done  * <10,000 colonies/mL  mixed urogenital adelina  Susceptibility testing not routinely done   No growth No growth       Attestation:  Total time on the floor involved in the patient's care: 35 minutes. Total time spent in counseling/care coordination: >50%

## 2020-12-09 NOTE — PROGRESS NOTES
Renal Medicine Progress Note            Assessment/Plan:     A/P:  70 y.o man with lung abscess, consult for acute kidney and an active urine sediment.      # Patient had a serum creatinine of 1.2-1.3 mg/dl in January.      # Acute kidney injury in the setting of PNA/lung abscess and diarrhea. Improving.                -active sendiment               -UPCR 1.2 g/g               -infectious GN?, kidney disease due to liver cirrhosis/HVB (IgAN)?                -no obstruction on CT     # PNA/lung abscess:                -abx               -manage per ID     # Adrenal insufficiency and hypertension? Was on Norvasc and losartan.     # Pancytopenia attributed to liver disease per oncology     # HCV and liver cirrhosis: No ascites mentioned on CT.      # Severe COPD: 3 liters O2 at home     Plan:  # ANCA, DRAKE, complements, SPEP/UPEP pending  # Lasix 40 mg IV x 1. May need a second dose later today.   # Avoid NSAIDS and IV dye        Interval History:     Afebrile. DBP is low. Using 10 liters O2. He uses 3 liters O2 at home. Breathing is better. Pleuritic right chest pain. Urinary incontinence.          Medications and Allergies:       amoxicillin-clavulanate  1 tablet Oral Q12H ISIS     clobetasol   Topical At Bedtime     DULoxetine  30 mg Oral Daily     fluticasone-vilanterol  1 puff Inhalation Daily     gabapentin  200 mg Oral TID     hydrocortisone  20 mg Oral QAM     multivitamin w/minerals  1 tablet Oral Daily     omeprazole  20 mg Oral Daily     polyethylene glycol  17 g Oral Daily     primidone  50 mg Oral At Bedtime     sodium chloride (PF)  3 mL Intracatheter Q8H     tamsulosin  0.8 mg Oral Daily     umeclidinium  1 puff Inhalation Daily        Allergies   Allergen Reactions     Darvocet [Propoxyphene N-Apap] Nausea     Vicodin [Hydrocodone-Acetaminophen] Nausea     Can use if he eats with it            Physical Exam:   Vitals were reviewed   , Blood pressure (!) 156/51, pulse 83, temperature 98.8  F (37.1  C),  "temperature source Axillary, resp. rate 18, height 1.651 m (5' 5\"), weight 64.5 kg (142 lb 3.2 oz), SpO2 91 %.    Wt Readings from Last 3 Encounters:   12/09/20 64.5 kg (142 lb 3.2 oz)   03/22/18 93 kg (205 lb)   01/21/16 70.3 kg (155 lb)       Intake/Output Summary (Last 24 hours) at 12/9/2020 0942  Last data filed at 12/8/2020 1850  Gross per 24 hour   Intake --   Output 150 ml   Net -150 ml     GENERAL: NAD.   HEENT:  Normocephalic. No gross abnormalities.  Pupils equal.  MMM.    CV: RRR, no murmurs, no clicks, gallops, or rubs, 1+ edema  RESP: Diminish BS. No wheezes. No crackles.  GI: Abdomen obese, soft, NT  MUSCULOSKELETAL: L BKA 1+ pitting edmema  SKIN: no suspicious lesions or rashes, dry to touch. Multiple tattoos.   NEURO:  Awake and answering questions appropriately.  PSYCH: mood good, affect appropriate  LYMPH: No palpable ant/post cervical and supraclavicular adenopathy         Data:     CBC RESULTS:     Recent Labs   Lab 12/09/20  0756 12/08/20  1226 12/07/20  1853 12/07/20  0656 12/06/20  0857 12/05/20  0804 12/04/20  1526   WBC 4.3 2.9*  --  2.7* 3.4* 2.7* 3.2*   RBC 2.58* 2.35*  --  2.07* 2.45* 2.36* 2.06*   HGB 7.7* 7.1* 7.3* 6.2* 7.3* 7.2* 6.4*   HCT 24.6* 22.3*  --  19.1* 22.6* 21.6* 19.1*   PLT 90* 68*  --  55* 62* 55* 70*       Basic Metabolic Panel:  Recent Labs   Lab 12/09/20  0756 12/08/20  1226 12/07/20  0656 12/06/20  0857 12/05/20  0804 12/04/20  1245    143 142 140 139 140   POTASSIUM 3.6 3.8 3.3* 3.3* 3.5 3.7   CHLORIDE 112* 112* 112* 112* 113* 114*   CO2 28 27 21 18* 15* 17*   BUN 39* 40* 43* 42* 47* 47*   CR 1.96* 2.06* 2.24* 2.32* 2.45* 2.61*   GLC 75 132* 135* 119* 110* 167*   MARTINE 8.0* 8.0* 7.8* 7.8* 7.7* 6.9*       INRNo lab results found in last 7 days.   Attestation:   I have reviewed today's relevant vital signs, notes, medications, labs and imaging.    Antonio Suh MD  [facility] - Nephrology  Office phone :[phone]  Pager: [phone]  "

## 2020-12-09 NOTE — PLAN OF CARE
Patient alert and oriented x2 with confusion. Inc bowel and bladder. Soft stool x1. A2 with ceiling lift. Uncooperative with cares. U/A ordered, not collected. NPO on shift due to use of Bipap. ABG results abnormal. Provider notified and advised Bipap. RT placed Bipap ~ 1942. Due to have ABG re-drawn. Tele Sinus Rhythm.

## 2020-12-09 NOTE — PROGRESS NOTES
I was paged for ongoing lethargy while on BIPAP and have ordered STAT VBG, and will follow up these results closely in conjunction with RT services.    Addendum: VBG shows some improvement in acidosis, now at 7.29/56 (an ABG earlier had pH 7.27). RT to be called with results to determine if settings should be adjusted, and we continue to monitor him closely overnight.

## 2020-12-09 NOTE — PROGRESS NOTES
[facility] Nurse Inpatient Wound Assessment   Reason for follow up visit : Evaluate and treat  RLE wounds    Assessment  RLE wounds due to Mixed Etiology: poor circulation, edema, pressure   Status: stable, minimal change since last assessment. Pt is BKA to Left LE    Treatment Plan  RLE  wounds: Every other day     1. Cleanse with  Vashe moistened gauze to wound bed and let sit x 5 minutes - do not rinse. #[id number]  2. Dry and protect surrounding skin with no sting barrier film wipe #[id number],  3. Apply endoform 2x2 may need 3 #[id number]  4. Cover Endoform with  Aquacel AG 4x4 (x2 dressings) #[id number],  5. Apply edema wear directly over this dressing from base of toes to below knees  6. Place Covidien pad underneath wound area and apply offloading boot.     EDEMAWEAR stockings- apply fresh pair daily- DAILY   - DO NOT CUT OR TRIM THESE STOCKINGS.  NO STOCKINGS ARE EVER TRIMMED, INCLUDING THESE.    - Carefully remove old pair of EdemaWear- lifting over any wounds as you remove, and wash with hot water and soap, hang dry   1. Clean feet and legs with gentle wash   2. Heavily spray intact skin, especially plaques or dry skin, with Gurdeep Cleanse and Protect, rub in and let sit about 20minutes.    3. If wounds are present, perform wound care while Gurdeep soaks into intact skin   4. Wipe skin or rinse clean, if plaques/ dry skin you could spray again with Gurdeep and leave on   5. Apply Edemawear from toes to knees with a cuff just below the knee-YOU WILL NEED TWO PEOPLE TO APPLY, BUNCH UP STOCKING & PULL AGAINST EACH OTHER   6. DO NOT THROW AWAY THE OLD PAIR OF EDEMAWEAR, WASH IT.   o Wash stocking(s) in really hot water with surgical soap/ and or baby shampoo, may need to do this several times  o Rinse then wash in baby shampoo  o Hang dry       EdemaWear    size Stripe color PS #   small navy [id number]   medium yellow [id number]   large red [id number]   X Large aqua [id number]              L) ear scab-keep clean, dry, intact monitor for signs of  infection    Orders Updated  Recommended provider order: none   WOC Nurse follow-up plan:weekly  Nursing to notify the Provider(s) and re-consult the WOC Nurse if wound(s) deteriorates or new skin concern.    Patient History  According to provider note(s):  Perfecto Reese is a 70 year old male with a history of chronic kidney disease stage III, right lower extremity wound, left ear wound, anemia, severe COPD, chronic hypoxic respiratory failure, adrenal insufficiency, tobacco use, psoriasis, hypertension, BPH, GERD, hepatitis, and cirrhosis who was sent into the ER due to fevers, shortness of breath, cough, and diarrhea.  In the ER, there was concern for community-acquired pneumonia and acute kidney injury.  He was given IV fluids and started on empiric antibiotics.  The hospitalist service was contacted to admit him for further evaluation management.    Objective Data  Containment of urine/stool: Continent of bladder and Continent of bowel    Active Diet Order  Orders Placed This Encounter      Regular Diet Adult      Output:   I/O last 3 completed shifts:  In: 800 [I.V.:800]  Out: 300 [Urine:300]    Risk Assessment:   Sensory Perception: 3-->slightly limited  Moisture: 3-->occasionally moist  Activity: 1-->bedfast  Mobility: 2-->very limited  Nutrition: 3-->adequate  Friction and Shear: 2-->potential problem  Donn Score: 14                          Labs:   Recent Labs   Lab 12/09/20  0756 12/02/20  1845 12/02/20  1845   ALBUMIN  --   --  2.6*   HGB 7.7*   < > 9.6*   WBC 4.3   < > 17.2*    < > = values in this interval not displayed.       Physical Exam  Areas of skin assessed: focused rle     Wound Location:  RLE         Date of last photo 12/9/2020  Wound History: chronic mixed disease  Wound Base: 100 % non-granular tissue     Palpation of the wound bed: normal      Drainage: small     Description of drainage: serosanguinous and yellow     Measurements (length x width x depth, in cm) 13.0  x 6.0  x  0.1 cm       Tunneling N/A     Undermining N/A  Periwound skin: thick hyperkerratotic scabbing at edges      Color: normal and consistent with surrounding tissue      Temperature: normal   Odor: none  Pain: denies , none      Interventions  Visual inspection and assessment completed   Wound Care Rationale Promote moist wound healing without tissue dehydration , Provide selective debridement (autolysis) of nonviable tissue  and Decrease bacterial load  Wound Care: done per plan of care  Supplies: at bedside  Current off-loading measures: Heel off-loading boot(s) and Pillows  Current support surface: Standard  Atmos Air mattress  Education provided to: importance of repositioning and Off-loading pressure  Discussed plan of care with Patient and Nurse    Juan Ramirez RN CWOCN

## 2020-12-09 NOTE — PROVIDER NOTIFICATION
Hospitalist XCover    Called RE abnormal ABG.     ABG appears primary respiratory acidosis chronic with a secondary metabolic acidosis.  Known chronic severe COPD on 3L baseline, bilateral CAP, MICHAEL, encephalopathy requiring sternal rub today to arouse. Decreased opioids by Hospitalist Dr. Palacio.    Plan:  - Trial BiPAP 4x/d  - No PO while on BiPAP  - Recheck ABG at 2200  - D/w bedside RN    Fela Hubbard (Renato), PA-C  Hospitalist [facility]  Pager: [phone]

## 2020-12-09 NOTE — PROVIDER NOTIFICATION
Text paged Dr Brown about abnormal VBG and patient status of being more alert and responsive  than before.    0104: spoke with Dr. Brown - writer will call RT to see if they want to make any changes.     0105: left voicemail for RT    0107: Spoke with RT, they are coming to adjust settings.

## 2020-12-09 NOTE — PROGRESS NOTES
[facility]    Medicine Progress Note - Hospitalist Service       Date of Admission:  12/2/2020    Assessment & Plan   Perfecto Reese is a 70 year old male with complex PMHx including severe COPD on 3L home O2 with ongoing tobacco use, adrenal insufficiency on chronic steroids, psoriasis on Humira, chronic Hep C with cirrhosis, PVD s/p left BKA, chronic wounds, and non-compliance who was admitted on 12/2/2020 for pneumonia, found to have a RLL lung abscess      Acute on chronic hypoxic and hypercarbic respiratory failure   Bilateral community-acquired bacterial pneumonia with RLL abscess Severe COPD  [albuterol, symbicort, spiriva]  * At baseline on 3 lpm/NC  * EMS activated by his home care RN for fever, cough, and acute respiratory distress (placed on 10L per OM in the field). Fever to 102.5, tachycardia, tachypnea, and leukocytosis (17.2k) present on arrival. CXR on arrival showed RLL infiltrate and CT abd/pelvis showed air fluid cavity at the right lung base. On admission, he was initiated on IV pip-tazo and azithromycin, and Thoracic Surgery was consulted. Chest CT was ordered (12/3) which showed a 6.4 cm RLL pulmonary abscess and extensive left-sided mucus plugging with associated infiltrate. ID then consulted  - Azithromycin d/c'd by ID12/4   - 12/2 blood cultures x2 no growth  - sputum cx 12/4 growing proteus, fluoroquinolone resistant. Also corynebacterium striatum 12/6  - CT surgery saw, no surgical intervention indicated  - Continues on PTA inhalers (prn albuterol, symbicort (Brreo), spiriva (Incruse))  - Encourage incentive spirometry, ambulate as tolerated, RCAT  - initial zosyn->unasyn-> transitioned 12/8 to Augmentin x 5 weeks (through 1/12/2021)  - repeat CT scan in 4 weeks (~1/8/2021)  - ID following, greatly appreciate assistance  - PT- rec TCU, pt reluctantly agreed  - wean O2 as tolerated, was on BiPAP overnight for hypercapnia as below    Encephalopathy 2/2 acute  hypercarbic respiratory failure  Markedly somnolent 12/8. Aroused appropriately but only with sternal rub. Denies c/o. ABG 7.27/60/116/27  - acute hypercarbic respiratory failure noted 12/8-9, placed on BiPAP. Suspect large component of this was 2/2 sedation from narcotics  - decreased hydromorphone to 2 mg q4 hours  - check CXR  - scheduled nebs  - given furosemide x 1 12/9    MICHAEL   CKD  Asymptomatic pyuria  Creatinine 2.89 from baseline 1.2-1.3 in Jan 2020. Suspect pre-renal state in setting of pneumonia/sepsis with possible progression to ATN in setting of hypotension. Improved with IV fluids  - UA on arrival grossly dirty, but urine culture grew <10,000 normal adelina  - Holding PTA losartan  - with infection and active urine sediment as well as slowly improving creatinine could imply active GN in setting of infection. Pr/cr at 1.18.   - nephrology following, appreciate assistance  - Creatinine 2.65->2.45->2.32-> 2.24->2.06-> 1.96 12/9, improving slowly    Pancytopenia  Chronic anemia  Hgb 9.6 on admission, no recent baseline. He has had no s/sx of bleeding. Repeat labs wbc 3.5, Hgb 6.1, platelets 74k 12/4, no signs of bleeding. Review of chart demonstrate pancytopenia 1/2020. Has h/o cirrhosis with portal hypotension, splenomegaly, CKD, autoimmune disorder.   - B12/ folate normal  - tsat 27, ferritin 515 c/w chronic disease  - peripheral smear with mod-marked pancytopenia without dysplasia, rare neutrophil myelocyte seen on scan without blasts  - s/p transfusion 1 unit pRBCs 12/4, 12/7  - transfuse hgb <7, consent signed and in chart  - hematology following, greatly appreciate assistance  - pancytopenia 2/2 liver cirrhosis, splenomegaly, renal disease, CKD and psoriasis.   - counts stable/ improved 12/9    Non-anion gap metabolic acidosis-resolved  Suspected 2/2 diarrhea and hyperchloremic fluid resuscitation     Chronic adrenal insufficiency  Intermittent hypotension and diarrhea noted on admission, likely due  to adrenal crisis in the setting of acute illness and steroid non-compliance. Improved upon resumption of previously prescribed hydrocortisone  - Continues on PTA hydrocortisone 20 mg PO daily, no plan for stress dose steroids at this time    Essential hypertension  [PTA: amlodipine 10 mg qhs, losartan 12.5 mg daily]  Intermittent hypotension noted on admission, likely due to adrenal insufficiency. Now improved and stable  - will restart amlodipine 5 mg daily 12/9    History of psoriasis   - Holding PTA Humira  - topical clobetasol at HS    Peripheral vascular disease s/p left BKA  Chronic RLE wound  Chronic left ear wound  Present on admission. Receives wound cares per home care RN  - Wound cares in place as per WOCN     Chronic Hepatitis C with cirrhosis  Treated with interferon on the past  - Appears compensated    GERD  Chronic and stable on omeprazole 20 mg daily    BPH  Chronic and stable on tamsulosin 0.8 mg daily      Tobacco use disorder  Has not had a cigarette in the past few weeks since he has been feeling sick.    Chronic pain syndrome  [PTA: gabapentin 300 mg TID, duloxetine 30 mg daily, lidocaine patch, oxycodone 5 mg q6h prn, morphine 2.5 mg BID prn]  - Gabapentin decreased to 200 mg TID in the setting of renal failure  - Continues on lidocaine patch, duloxetine, PO morphine changed to Dilaudid PO prn due to MICHAEL     Diet: Regular Diet Adult  Snacks/Supplements Adult: Boost Plus; Between Meals    DVT Prophylaxis: Pneumatic Compression Devices  Bruce Catheter: not present  Code Status: Full Code      Disposition: Expected discharge later in week (week of 12/7) after services (ID, hematology, nephrology) have cleared, respiratory status stable, will need TCU at discharge, pt now agreeable to this    The patient's care was discussed with the Bedside Nurse and Patient.    Felix Palacio MD  Hospitalist Service  [facility]  Contact information available via [facility]  Negin/y    ______________________________________________________________________    Interval History   Overnight events reviewed. More alert today. Still with pain along R flank. Breathing short. No abdominal pain, eating ok.    Data reviewed today: I reviewed all medications, new labs and imaging results over the last 24 hours. I personally reviewed no images or EKG's today.    Physical Exam   Vital Signs: Temp: 98.8  F (37.1  C) Temp src: Axillary BP: (!) 156/51 Pulse: 83   Resp: 16 SpO2: 92 % O2 Device: Oxymask Oxygen Delivery: 5 LPM  Weight: 142 lbs 3.15 oz  Constitutional: Sleeping, arousable with sternal rub  Respiratory: Breathing non-labored. Coarse with occasional wheezing  Cardiovascular: Heart RRR, soft PRANAY noted. No pedal edema  GI: +BS, abd soft/NT  Skin/Integument: RLE dressing/boot in place  Musculoskeletal: s/p LLE BKA  Neuro: STEWART  Psych: mood congruent    Data   Recent Labs   Lab 12/09/20  0756 12/08/20  1226 12/07/20  1853 12/07/20  0656 12/02/20  1845 12/02/20  1845   WBC 4.3 2.9*  --  2.7*   < > 17.2*   HGB 7.7* 7.1* 7.3* 6.2*   < > 9.6*   MCV 95 95  --  92   < > 91   PLT 90* 68*  --  55*   < > 201    143  --  142   < > 140   POTASSIUM 3.6 3.8  --  3.3*   < > 3.3*   CHLORIDE 112* 112*  --  112*   < > 109   CO2 28 27  --  21   < > 20   BUN 39* 40*  --  43*   < > 35*   CR 1.96* 2.06*  --  2.24*   < > 2.89*   ANIONGAP 3 4  --  9   < > 11   MARTINE 8.0* 8.0*  --  7.8*   < > 7.9*   GLC 75 132*  --  135*   < > 134*   ALBUMIN  --   --   --   --   --  2.6*   PROTTOTAL  --   --   --   --   --  7.3   BILITOTAL  --   --   --   --   --  0.7   ALKPHOS  --   --   --   --   --  98   ALT  --   --   --   --   --  8   AST  --   --   --   --   --  6    < > = values in this interval not displayed.         No results found for this or any previous visit (from the past 24 hour(s)).    Medications     - MEDICATION INSTRUCTIONS -       - MEDICATION INSTRUCTIONS -         amoxicillin-clavulanate  1 tablet  Oral Q12H ISIS     clobetasol   Topical At Bedtime     DULoxetine  30 mg Oral Daily     fluticasone-vilanterol  1 puff Inhalation Daily     gabapentin  200 mg Oral TID     hydrocortisone  20 mg Oral QAM     multivitamin w/minerals  1 tablet Oral Daily     omeprazole  20 mg Oral Daily     polyethylene glycol  17 g Oral Daily     primidone  50 mg Oral At Bedtime     sodium chloride (PF)  3 mL Intracatheter Q8H     tamsulosin  0.8 mg Oral Daily     umeclidinium  1 puff Inhalation Daily

## 2020-12-09 NOTE — PLAN OF CARE
Pt a&ox2-3, vss on bipap - ex slightly hypertensive, incontinent of bowel and bladder, generalized edema - 3+ on scrotum, need UA pt refusing to void in urinal with a lot of encouragement, turn and repo q2, ivsl, npo while on bipap, patient slept between cares. Patient was able to answer questions and gradually became more alert as the shift went on.

## 2020-12-09 NOTE — PROVIDER NOTIFICATION
Notified MILLIE Tyler regarding patient's ABG results. PA advised Bipap for a couple of hours and re-check ABG.

## 2020-12-10 ENCOUNTER — APPOINTMENT (OUTPATIENT)
Dept: PHYSICAL THERAPY | Facility: CLINIC | Age: 70
DRG: 871 | End: 2020-12-10
Payer: MEDICARE

## 2020-12-10 LAB
ALBUMIN MFR UR ELPH: 22.6 %
ALBUMIN SERPL ELPH-MCNC: 1.9 G/DL (ref 3.7–5.1)
ALPHA1 GLOB MFR UR ELPH: 6.3 %
ALPHA1 GLOB SERPL ELPH-MCNC: 0.5 G/DL (ref 0.2–0.4)
ALPHA2 GLOB MFR UR ELPH: 5.9 %
ALPHA2 GLOB SERPL ELPH-MCNC: 0.8 G/DL (ref 0.5–0.9)
ANA SER QL IF: NEGATIVE
ANCA AB PATTERN SER IF-IMP: NORMAL
B-GLOBULIN MFR UR ELPH: 30.5 %
B-GLOBULIN SERPL ELPH-MCNC: 0.9 G/DL (ref 0.6–1)
C-ANCA TITR SER IF: NORMAL {TITER}
C3 SERPL-MCNC: 149 MG/DL (ref 81–157)
C4 SERPL-MCNC: 37 MG/DL (ref 13–39)
GAMMA GLOB MFR UR ELPH: 34.7 %
GAMMA GLOB SERPL ELPH-MCNC: 1.1 G/DL (ref 0.7–1.6)
M PROTEIN MFR UR ELPH: 0 %
M PROTEIN SERPL ELPH-MCNC: 0 G/DL
PROT PATTERN SERPL ELPH-IMP: ABNORMAL
PROT PATTERN UR ELPH-IMP: ABNORMAL

## 2020-12-10 PROCEDURE — 250N000009 HC RX 250: Performed by: INTERNAL MEDICINE

## 2020-12-10 PROCEDURE — 94640 AIRWAY INHALATION TREATMENT: CPT | Mod: 76

## 2020-12-10 PROCEDURE — 94640 AIRWAY INHALATION TREATMENT: CPT

## 2020-12-10 PROCEDURE — 250N000013 HC RX MED GY IP 250 OP 250 PS 637: Performed by: INTERNAL MEDICINE

## 2020-12-10 PROCEDURE — 120N000001 HC R&B MED SURG/OB

## 2020-12-10 PROCEDURE — 999N000157 HC STATISTIC RCP TIME EA 10 MIN

## 2020-12-10 PROCEDURE — 97530 THERAPEUTIC ACTIVITIES: CPT | Mod: GP

## 2020-12-10 PROCEDURE — 97110 THERAPEUTIC EXERCISES: CPT | Mod: GP

## 2020-12-10 PROCEDURE — 99233 SBSQ HOSP IP/OBS HIGH 50: CPT | Performed by: HOSPITALIST

## 2020-12-10 RX ORDER — FUROSEMIDE 10 MG/ML
40 INJECTION INTRAMUSCULAR; INTRAVENOUS 2 TIMES DAILY
Status: DISCONTINUED | OUTPATIENT
Start: 2020-12-10 | End: 2020-12-10

## 2020-12-10 RX ORDER — FUROSEMIDE 40 MG
80 TABLET ORAL ONCE
Status: COMPLETED | OUTPATIENT
Start: 2020-12-10 | End: 2020-12-10

## 2020-12-10 RX ADMIN — HYDROMORPHONE HYDROCHLORIDE 2 MG: 2 TABLET ORAL at 05:25

## 2020-12-10 RX ADMIN — ACETAMINOPHEN 650 MG: 325 TABLET, FILM COATED ORAL at 02:24

## 2020-12-10 RX ADMIN — IPRATROPIUM BROMIDE AND ALBUTEROL SULFATE 3 ML: .5; 3 SOLUTION RESPIRATORY (INHALATION) at 15:08

## 2020-12-10 RX ADMIN — GABAPENTIN 200 MG: 100 CAPSULE ORAL at 21:20

## 2020-12-10 RX ADMIN — AMOXICILLIN AND CLAVULANATE POTASSIUM 1 TABLET: 500; 125 TABLET, FILM COATED ORAL at 21:20

## 2020-12-10 RX ADMIN — DULOXETINE HYDROCHLORIDE 30 MG: 30 CAPSULE, DELAYED RELEASE ORAL at 08:44

## 2020-12-10 RX ADMIN — GABAPENTIN 200 MG: 100 CAPSULE ORAL at 08:44

## 2020-12-10 RX ADMIN — GABAPENTIN 200 MG: 100 CAPSULE ORAL at 16:06

## 2020-12-10 RX ADMIN — TAMSULOSIN HYDROCHLORIDE 0.8 MG: 0.4 CAPSULE ORAL at 08:44

## 2020-12-10 RX ADMIN — OMEPRAZOLE 20 MG: 20 CAPSULE, DELAYED RELEASE ORAL at 08:44

## 2020-12-10 RX ADMIN — MULTIPLE VITAMINS W/ MINERALS TAB 1 TABLET: TAB at 08:44

## 2020-12-10 RX ADMIN — PRIMIDONE 50 MG: 50 TABLET ORAL at 21:20

## 2020-12-10 RX ADMIN — IPRATROPIUM BROMIDE AND ALBUTEROL SULFATE 3 ML: .5; 3 SOLUTION RESPIRATORY (INHALATION) at 11:22

## 2020-12-10 RX ADMIN — HYDROMORPHONE HYDROCHLORIDE 2 MG: 2 TABLET ORAL at 18:43

## 2020-12-10 RX ADMIN — IPRATROPIUM BROMIDE AND ALBUTEROL SULFATE 3 ML: .5; 3 SOLUTION RESPIRATORY (INHALATION) at 19:49

## 2020-12-10 RX ADMIN — FLUTICASONE FUROATE AND VILANTEROL TRIFENATATE 1 PUFF: 200; 25 POWDER RESPIRATORY (INHALATION) at 08:50

## 2020-12-10 RX ADMIN — ACETAMINOPHEN 650 MG: 325 TABLET, FILM COATED ORAL at 17:22

## 2020-12-10 RX ADMIN — HYDROMORPHONE HYDROCHLORIDE 2 MG: 2 TABLET ORAL at 14:05

## 2020-12-10 RX ADMIN — HYDROCORTISONE 20 MG: 20 TABLET ORAL at 08:44

## 2020-12-10 RX ADMIN — AMOXICILLIN AND CLAVULANATE POTASSIUM 1 TABLET: 500; 125 TABLET, FILM COATED ORAL at 10:39

## 2020-12-10 RX ADMIN — HYDROMORPHONE HYDROCHLORIDE 2 MG: 2 TABLET ORAL at 23:38

## 2020-12-10 RX ADMIN — CLOBETASOL PROPIONATE: 0.5 SOLUTION TOPICAL at 21:24

## 2020-12-10 RX ADMIN — FUROSEMIDE 80 MG: 40 TABLET ORAL at 18:43

## 2020-12-10 RX ADMIN — AMLODIPINE BESYLATE 5 MG: 5 TABLET ORAL at 08:44

## 2020-12-10 ASSESSMENT — ACTIVITIES OF DAILY LIVING (ADL)
ADLS_ACUITY_SCORE: 24
ADLS_ACUITY_SCORE: 25
ADLS_ACUITY_SCORE: 24
ADLS_ACUITY_SCORE: 24
ADLS_ACUITY_SCORE: 25
ADLS_ACUITY_SCORE: 29

## 2020-12-10 NOTE — PLAN OF CARE
Patient is alert and oriented X4. Complained of severe pain rated at 8/10. Patient given tylenol 650mg for pain. Patient said tylenol was not helpful. Will continue to assess for pain and provide interventions.

## 2020-12-10 NOTE — PLAN OF CARE
A&Ox4. Incontinent. Coccyx and R buttocks covered with mepilex. Encouraged repositioning but pt often refuses. Tolerating diet. Generalized edema. Scrotum and penis 3+ edema. Micantin ordered for groin. VSS on 5L oxymask.

## 2020-12-10 NOTE — PROGRESS NOTES
[facility]    Medicine Progress Note - Hospitalist Service       Date of Admission:  12/2/2020  Assessment & Plan       Perfecto Reese is a 70 year old male with complex PMHx including severe COPD on 3L home O2 with ongoing tobacco use, adrenal insufficiency on chronic steroids, psoriasis on Humira, chronic Hep C with cirrhosis, PVD s/p left BKA, chronic wounds, and non-compliance who was admitted on 12/2/2020 for pneumonia, found to have a RLL lung abscess.    Acute on chronic hypoxic and hypercarbic respiratory failure   Bilateral community-acquired bacterial pneumonia with RLL abscess Severe COPD  [albuterol, symbicort, spiriva]  * Baseline on 3 lpm/NC  * EMS activated by his home care RN for fever, cough, and acute respiratory distress (placed on 10L per OM in the field). Fever to 102.5, tachycardia, tachypnea, and leukocytosis (17.2k) present on arrival. CXR on arrival showed RLL infiltrate and CT abd/pelvis showed air fluid cavity at the right lung base. On admission, he was initiated on IV pip-tazo and azithromycin, and Thoracic Surgery was consulted. Chest CT was ordered (12/3) which showed a 6.4 cm RLL pulmonary abscess and extensive left-sided mucus plugging with associated infiltrate. ID then consulted  - Azithromycin d/c'd by ID12/4.  - 12/2 blood cultures x2 no growth.  - Sputum cx 12/4 growing proteus, fluoroquinolone resistant. Also corynebacterium striatum 12/6.  - CT surgery saw, no surgical intervention indicated.  - Continues on PTA inhalers (prn albuterol, symbicort (Brreo), spiriva (Incruse)).  - Encourage incentive spirometry, ambulate as tolerated, RCAT.  - Initial zosyn->unasyn-> transitioned 12/8 to Augmentin x 5 weeks (through 1/12/2021).  - Repeat CT scan in 4 weeks (~1/8/2021).  - ID following, greatly appreciate assistance.  - PT- rec TCU, patient reluctantly agreed, wants to go home.  - Wean O2 as tolerated, now on 5 lpm of supplemental  oxygen.    Encephalopathy 2/2 acute hypercarbic respiratory failure  Markedly somnolent 12/8. Aroused appropriately but only with sternal rub. Denies c/o. ABG 7.27/60/116/27  - Acute hypercarbic respiratory failure noted 12/8-9, placed on BiPAP. Suspect large component of this was 2/2 sedation from narcotics. Doing well off BiPAP now.  - Decreased hydromorphone to 2 mg q4 hours on 12/8.  - CXR on 12/9 with likely atelectasis in addition to previously seen cavitary lesion.  - Scheduled nebs.  - Given furosemide x 1 12/9, defer further dosing to nephrology.  - Encephalopathy improving. More alert today, answering questions appropriately.    Acute kidney injury  Chronic kidney disease, stage 3  Asymptomatic pyuria  Creatinine 2.89 from baseline 1.2-1.3 in Jan 2020. Suspect pre-renal state in setting of pneumonia/sepsis with possible progression to ATN in setting of hypotension. Improved with IV fluids  - UA on arrival grossly dirty, but urine culture grew <10,000 normal adelina.  - Holding PTA losartan.  - With infection and active urine sediment as well as slowly improving creatinine could imply active GN in setting of infection. Prior creatinine at 1.2-1.3.   - Nephrology following, appreciate assistance  - Creatinine 2.65->2.45->2.32-> 2.24->2.06-> 1.96 12/9, labs pending this morning.    Pancytopenia  Chronic anemia  Hgb 9.6 on admission, no recent baseline. He has had no s/sx of bleeding. Repeat labs wbc 3.5, Hgb 6.1, platelets 74k 12/4, no signs of bleeding. Review of chart demonstrate pancytopenia 1/2020. Has h/o cirrhosis with portal hypotension, splenomegaly, CKD, autoimmune disorder.   - B12/ folate normal.  - tsat 27, ferritin 515 c/w chronic disease.  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  - S/p transfusion 1 unit pRBCs 12/4, 12/7.  - Transfuse hgb <7, consent signed and in chart.  - Hematology following, greatly appreciate  assistance.  - Pancytopenia 2/2 liver cirrhosis, splenomegaly, renal disease, CKD and psoriasis.   - Counts stable/improved 12/9, labs pending this morning.    Non-anion gap metabolic acidosis-resolved  - Suspected 2/2 diarrhea and hyperchloremic fluid resuscitation.     Chronic adrenal insufficiency  Intermittent hypotension and diarrhea noted on admission, likely due to adrenal crisis in the setting of acute illness and steroid non-compliance. Improved upon resumption of previously prescribed hydrocortisone  - Continues on PTA hydrocortisone 20 mg PO daily, no plan for stress dose steroids at this time.    Essential hypertension  [PTA: amlodipine 10 mg qhs, losartan 12.5 mg daily]  Intermittent hypotension noted on admission, likely due to adrenal insufficiency. Now improved and stable  - Restarted amlodipine at 5 mg daily on 12/9.  - PTA losartan on hold for now in setting of MICHAEL.    History of psoriasis   - Holding PTA Humira.  - Topical clobetasol at HS.    Peripheral vascular disease s/p left BKA  Chronic RLE wound  Chronic left ear wound  Present on admission. Receives wound cares per home care RN.  - Wound cares in place as per WOCN.     Chronic Hepatitis C with cirrhosis  Treated with interferon on the past  - Appears compensated.    GERD  - Chronic and stable on omeprazole 20 mg daily.    BPH  - Chronic and stable on tamsulosin 0.8 mg daily.      Tobacco use disorder  - Has not had a cigarette in the past few weeks since he has been feeling sick.    Chronic pain syndrome  [PTA: gabapentin 300 mg TID, duloxetine 30 mg daily, lidocaine patch, oxycodone 5 mg q6h prn, morphine 2.5 mg BID prn]  - Gabapentin decreased to 200 mg TID in the setting of renal failure  - Continues on lidocaine patch, duloxetine, PO morphine changed to Dilaudid PO prn due to MICHAEL. Dilaudid dose decreased on 12/8 due to concerns about excessive sedation.     Diet: Regular Diet Adult  Snacks/Supplements Adult: Boost Plus; Between Meals     DVT Prophylaxis: Pneumatic Compression Devices  Bruce Catheter: not present  Code Status: Full Code           Disposition Plan   Expected discharge: Possibly to TCU in the next 1-2 days pending improvement in respiratory status, consultant recommendations  Entered: Telly Vasquez MD 12/10/2020, 9:40 AM       The patient's care was discussed with the Bedside Nurse and Patient.    Telly Vasquez MD  Hospitalist Service  [facility]  Contact information available via [facility] Paging/Directory    ______________________________________________________________________    Interval History   Perfecto Reese feels OK this morning. Continue to have right sided pain, fairly well controlled with current pain medications. Denies shortness of breath. Denies fevers, nausea. Some loose stools.    Data reviewed today: I reviewed all medications, new labs and imaging results over the last 24 hours. I personally reviewed no images or EKG's today.    Physical Exam   Vital Signs: Temp: 97.9  F (36.6  C) Temp src: Oral BP: 122/50 Pulse: 86   Resp: 17 SpO2: 94 % O2 Device: Nasal cannula Oxygen Delivery: 5 LPM  Weight: 142 lbs 3.15 oz  Constitutional: awake, alert, cooperative, no apparent distress  Respiratory: few wheezes  Cardiovascular: regular rate and rhythm, normal S1 and S2  GI: normal bowel sounds, soft, non-distended, non-tender  Skin: warm, dry  Musculoskeletal: s/p left BKA, dressing and offloading boot in place on RLE  Neurologic: awake, alert, answers questions appropriately    Data   Recent Labs   Lab 12/09/20  0756 12/08/20  1226 12/07/20  1853 12/07/20  0656   WBC 4.3 2.9*  --  2.7*   HGB 7.7* 7.1* 7.3* 6.2*   MCV 95 95  --  92   PLT 90* 68*  --  55*    143  --  142   POTASSIUM 3.6 3.8  --  3.3*   CHLORIDE 112* 112*  --  112*   CO2 28 27  --  21   BUN 39* 40*  --  43*   CR 1.96* 2.06*  --  2.24*   ANIONGAP 3 4  --  9   MARTINE 8.0* 8.0*  --  7.8*   GLC 75 132*  --  135*     Medications      - MEDICATION INSTRUCTIONS -       - MEDICATION INSTRUCTIONS -         amLODIPine  5 mg Oral Daily     amoxicillin-clavulanate  1 tablet Oral Q12H ISIS     clobetasol   Topical At Bedtime     DULoxetine  30 mg Oral Daily     fluticasone-vilanterol  1 puff Inhalation Daily     gabapentin  200 mg Oral TID     hydrocortisone  20 mg Oral QAM     ipratropium - albuterol 0.5 mg/2.5 mg/3 mL  3 mL Nebulization 4x daily     multivitamin w/minerals  1 tablet Oral Daily     omeprazole  20 mg Oral Daily     polyethylene glycol  17 g Oral Daily     primidone  50 mg Oral At Bedtime     sodium chloride (PF)  3 mL Intracatheter Q8H     tamsulosin  0.8 mg Oral Daily

## 2020-12-10 NOTE — PROVIDER NOTIFICATION
Notified Dr Suh that IV lasix couldn't be administered because pt IV is leaking and pt will not agree to have a new one. Dr Suh said to give lasix 80mg one time dose. Dr Vasquez was also notified because pt is on telemetry. MD said to keep pt on Tele still and maybe try later to see if pt will agree to the IV start. Will continue to monitor.

## 2020-12-10 NOTE — PLAN OF CARE
Pt A&Ox4, forgetful and [place]. L BKA and R leg wound. VSS on 5 L oxymask. Up w/ A2-pt refusing OOB and turning. +3 edema on scrotum, and mepilexsin place on bottom. Taking PO dilaudid and tylenol for pain. Incontinent of bowel and bladder overnight. Continue to monitor.

## 2020-12-10 NOTE — PROGRESS NOTES
[facility]    Infectious Disease Progress Note    Date of Service (when I saw the patient): 12/10/2020     Assessment & Plan   Perfecto Reese is a 70 year old male who was admitted on 12/2/2020.       Impression:  1. 70 y.o male with COPD on Home oxygen.   2. Ongoing tobacco abuse.   3. Adrenal insufficiency on chronic steroids   4. Psoriasis on humira.   5. Chronic hep C. Cirrhosis.   6. PVD, S/P BKA.   7. Non compliance.   8. Admitted with pneumonia symptoms and imaging with lung abscesses.   9. On zosyn. And on azithromycin.   10. Sputum culture with proteus.      Recommendations:   Continue on  oral Augmentin, dose reduced given kidney function, discharge on 5 more weeks of oral augmentin   Repeat a CT scan in 4 weeks.     Rc Hu MD    Interval History   Afebrile   Cultures noted   Major complain in right sided chest pain     Physical Exam   Temp: 97.9  F (36.6  C) Temp src: Oral BP: 122/50 Pulse: 86   Resp: 17 SpO2: 93 % O2 Device: Nasal cannula Oxygen Delivery: 5 LPM  Vitals:    12/07/20 0254 12/08/20 0703 12/09/20 0506   Weight: 63.6 kg (140 lb 3.2 oz) 64.5 kg (142 lb 3.2 oz) 64.5 kg (142 lb 3.2 oz)     Vital Signs with Ranges  Temp:  [97.9  F (36.6  C)-98.3  F (36.8  C)] 97.9  F (36.6  C)  Pulse:  [] 86  Resp:  [15-17] 17  BP: (122-146)/(50-63) 122/50  SpO2:  [78 %-99 %] 93 %    Constitutional: lethargic today, wakes up though   Lungs: Clear to auscultation bilaterally, no crackles or wheezing  Cardiovascular: Regular rate and rhythm, normal S1 and S2, and no murmur noted  Abdomen: Normal bowel sounds, soft, non-distended, non-tender  Skin: No rashes, no cyanosis, no edema  Other:    Medications     - MEDICATION INSTRUCTIONS -       - MEDICATION INSTRUCTIONS -         amLODIPine  5 mg Oral Daily     amoxicillin-clavulanate  1 tablet Oral Q12H ISIS     clobetasol   Topical At Bedtime     DULoxetine  30 mg Oral Daily     fluticasone-vilanterol  1 puff Inhalation Daily      gabapentin  200 mg Oral TID     hydrocortisone  20 mg Oral QAM     ipratropium - albuterol 0.5 mg/2.5 mg/3 mL  3 mL Nebulization 4x daily     multivitamin w/minerals  1 tablet Oral Daily     omeprazole  20 mg Oral Daily     polyethylene glycol  17 g Oral Daily     primidone  50 mg Oral At Bedtime     sodium chloride (PF)  3 mL Intracatheter Q8H     tamsulosin  0.8 mg Oral Daily       Data   All microbiology laboratory data reviewed.  Recent Labs   Lab Test 12/09/20  0756 12/08/20  1226 12/07/20  1853 12/07/20  0656   WBC 4.3 2.9*  --  2.7*   HGB 7.7* 7.1* 7.3* 6.2*   HCT 24.6* 22.3*  --  19.1*   MCV 95 95  --  92   PLT 90* 68*  --  55*     Recent Labs   Lab Test 12/09/20  0756 12/08/20  1226 12/07/20  0656   CR 1.96* 2.06* 2.24*     No lab results found.  Recent Labs   Lab Test 12/03/20  0540 12/03/20  0230 12/02/20  1904 12/02/20  1844   CULT Light growth  Normal adelina    Light growth  Proteus mirabilis  *  Moderate growth  Corynebacterium striatum  Identification obtained by MALDI-TOF mass spectrometry research use only database. Test   characteristics determined and verified by the Infectious Diseases Diagnostic Laboratory   ([facility]) [place], [place].  Susceptibility testing not routinely done  * <10,000 colonies/mL  mixed urogenital adelina  Susceptibility testing not routinely done   No growth No growth       Attestation:  Total time on the floor involved in the patient's care: 35 minutes. Total time spent in counseling/care coordination: >50%

## 2020-12-10 NOTE — PROGRESS NOTES
Renal Medicine Progress Note            Assessment/Plan:     A/P:  70 y.o man with lung abscess, consult for acute kidney and an active urine sediment.      # Patient had a serum creatinine of 1.2-1.3 mg/dl in January.      # Acute kidney injury in the setting of PNA/lung abscess and diarrhea. Improving.                -active sendiment               -UPCR 1.2 g/g               -infectious GN?, kidney disease due to liver cirrhosis/HVB (IgAN)?                -no obstruction on CT     # PNA/lung abscess:                -abx               -manage per ID     # Adrenal insufficiency and hypertension? Was on Norvasc and losartan.     # Pancytopenia attributed to liver disease per oncology. Leukopenia and thrombocytopenia are improving. Severe anemia. Fe storage is low.      # HCV and liver cirrhosis: No ascites mentioned on CT.      # Severe COPD: 3 liters O2 at home     Plan:  # Lasix 40 mg IV BID  # Vasculitis markers pending  # He may need a renal biopsy  # IV iron when appropriate from infectious standpoint         Interval History:     Afebrile. VSS. He says he is feeling a little more short of breath. O2 requirement is down to 5 liter. Urinary incontinence. Lab not done today.           Medications and Allergies:       amLODIPine  5 mg Oral Daily     amoxicillin-clavulanate  1 tablet Oral Q12H ISIS     clobetasol   Topical At Bedtime     DULoxetine  30 mg Oral Daily     fluticasone-vilanterol  1 puff Inhalation Daily     gabapentin  200 mg Oral TID     hydrocortisone  20 mg Oral QAM     ipratropium - albuterol 0.5 mg/2.5 mg/3 mL  3 mL Nebulization 4x daily     multivitamin w/minerals  1 tablet Oral Daily     omeprazole  20 mg Oral Daily     polyethylene glycol  17 g Oral Daily     primidone  50 mg Oral At Bedtime     sodium chloride (PF)  3 mL Intracatheter Q8H     tamsulosin  0.8 mg Oral Daily        Allergies   Allergen Reactions     Darvocet [Propoxyphene N-Apap] Nausea     Vicodin [Hydrocodone-Acetaminophen]  "Nausea     Can use if he eats with it            Physical Exam:   Vitals were reviewed   , Blood pressure 122/50, pulse 86, temperature 97.9  F (36.6  C), temperature source Oral, resp. rate 17, height 1.651 m (5' 5\"), weight 64.5 kg (142 lb 3.2 oz), SpO2 93 %.    Wt Readings from Last 3 Encounters:   12/09/20 64.5 kg (142 lb 3.2 oz)   03/22/18 93 kg (205 lb)   01/21/16 70.3 kg (155 lb)       Intake/Output Summary (Last 24 hours) at 12/10/2020 1126  Last data filed at 12/10/2020 0851  Gross per 24 hour   Intake 1008 ml   Output 465 ml   Net 543 ml     GENERAL: NAD.   HEENT:  Normocephalic. No gross abnormalities.  Pupils equal.  MMM.    CV: RRR, no murmurs, no clicks, gallops, or rubs, 1-2+ edema  RESP: Diminish BS. No wheezes. No crackles.  GI: Abdomen obese, soft, NT  MUSCULOSKELETAL: L BKA 1-2+ pitting edmema  SKIN: no suspicious lesions or rashes, dry to touch. Multiple tattoos.   NEURO:  Awake and answering questions appropriately.  LYMPH: No palpable ant/post cervical and supraclavicular adenopathy         Data:     CBC RESULTS:     Recent Labs   Lab 12/09/20  0756 12/08/20  1226 12/07/20  1853 12/07/20  0656 12/06/20  0857 12/05/20  0804 12/04/20  1526   WBC 4.3 2.9*  --  2.7* 3.4* 2.7* 3.2*   RBC 2.58* 2.35*  --  2.07* 2.45* 2.36* 2.06*   HGB 7.7* 7.1* 7.3* 6.2* 7.3* 7.2* 6.4*   HCT 24.6* 22.3*  --  19.1* 22.6* 21.6* 19.1*   PLT 90* 68*  --  55* 62* 55* 70*       Basic Metabolic Panel:  Recent Labs   Lab 12/09/20  0756 12/08/20  1226 12/07/20  0656 12/06/20  0857 12/05/20  0804 12/04/20  1245    143 142 140 139 140   POTASSIUM 3.6 3.8 3.3* 3.3* 3.5 3.7   CHLORIDE 112* 112* 112* 112* 113* 114*   CO2 28 27 21 18* 15* 17*   BUN 39* 40* 43* 42* 47* 47*   CR 1.96* 2.06* 2.24* 2.32* 2.45* 2.61*   GLC 75 132* 135* 119* 110* 167*   MARTINE 8.0* 8.0* 7.8* 7.8* 7.7* 6.9*       INRNo lab results found in last 7 days.   Attestation:   I have reviewed today's relevant vital signs, notes, medications, labs and " imaging.    Antonio Suh MD  [facility] - Nephrology  Office phone :[phone]  Pager: [phone]

## 2020-12-10 NOTE — PLAN OF CARE
Denies pain. Turn and repo as tolerated, pt often refuses. L ear wound, oxymask on so pt doesn't have tube on ear. Tele NSR. Incontinent. Coccyx dressing changed.    Pt refusing labs, MD aware.

## 2020-12-11 ENCOUNTER — APPOINTMENT (OUTPATIENT)
Dept: PHYSICAL THERAPY | Facility: CLINIC | Age: 70
DRG: 871 | End: 2020-12-11
Payer: MEDICARE

## 2020-12-11 LAB
ABO + RH BLD: NORMAL
ABO + RH BLD: NORMAL
ANION GAP SERPL CALCULATED.3IONS-SCNC: 6 MMOL/L (ref 3–14)
BLD GP AB SCN SERPL QL: NORMAL
BLD PROD TYP BPU: NORMAL
BLD PROD TYP BPU: NORMAL
BLD UNIT ID BPU: 0
BLOOD BANK CMNT PATIENT-IMP: NORMAL
BLOOD PRODUCT CODE: NORMAL
BPU ID: NORMAL
BUN SERPL-MCNC: 37 MG/DL (ref 7–30)
CALCIUM SERPL-MCNC: 7.8 MG/DL (ref 8.5–10.1)
CHLORIDE SERPL-SCNC: 106 MMOL/L (ref 94–109)
CO2 SERPL-SCNC: 28 MMOL/L (ref 20–32)
CREAT SERPL-MCNC: 1.87 MG/DL (ref 0.66–1.25)
ERYTHROCYTE [DISTWIDTH] IN BLOOD BY AUTOMATED COUNT: 14.1 % (ref 10–15)
GFR SERPL CREATININE-BSD FRML MDRD: 36 ML/MIN/{1.73_M2}
GLUCOSE SERPL-MCNC: 91 MG/DL (ref 70–99)
HCT VFR BLD AUTO: 20.5 % (ref 40–53)
HGB BLD-MCNC: 6.5 G/DL (ref 13.3–17.7)
MCH RBC QN AUTO: 30 PG (ref 26.5–33)
MCHC RBC AUTO-ENTMCNC: 31.7 G/DL (ref 31.5–36.5)
MCV RBC AUTO: 95 FL (ref 78–100)
NUM BPU REQUESTED: 1
PLATELET # BLD AUTO: 75 10E9/L (ref 150–450)
POTASSIUM SERPL-SCNC: 3.2 MMOL/L (ref 3.4–5.3)
RBC # BLD AUTO: 2.17 10E12/L (ref 4.4–5.9)
SODIUM SERPL-SCNC: 140 MMOL/L (ref 133–144)
SPECIMEN EXP DATE BLD: NORMAL
TRANSFUSION STATUS PATIENT QL: NORMAL
TRANSFUSION STATUS PATIENT QL: NORMAL
WBC # BLD AUTO: 5.2 10E9/L (ref 4–11)

## 2020-12-11 PROCEDURE — 250N000013 HC RX MED GY IP 250 OP 250 PS 637: Performed by: INTERNAL MEDICINE

## 2020-12-11 PROCEDURE — 36415 COLL VENOUS BLD VENIPUNCTURE: CPT | Performed by: HOSPITALIST

## 2020-12-11 PROCEDURE — 250N000009 HC RX 250: Performed by: INTERNAL MEDICINE

## 2020-12-11 PROCEDURE — 999N000157 HC STATISTIC RCP TIME EA 10 MIN

## 2020-12-11 PROCEDURE — 120N000001 HC R&B MED SURG/OB

## 2020-12-11 PROCEDURE — 86923 COMPATIBILITY TEST ELECTRIC: CPT | Performed by: HOSPITALIST

## 2020-12-11 PROCEDURE — 36415 COLL VENOUS BLD VENIPUNCTURE: CPT | Performed by: INTERNAL MEDICINE

## 2020-12-11 PROCEDURE — 85027 COMPLETE CBC AUTOMATED: CPT | Performed by: HOSPITALIST

## 2020-12-11 PROCEDURE — P9016 RBC LEUKOCYTES REDUCED: HCPCS | Performed by: HOSPITALIST

## 2020-12-11 PROCEDURE — 94640 AIRWAY INHALATION TREATMENT: CPT

## 2020-12-11 PROCEDURE — 250N000013 HC RX MED GY IP 250 OP 250 PS 637: Performed by: HOSPITALIST

## 2020-12-11 PROCEDURE — 94640 AIRWAY INHALATION TREATMENT: CPT | Mod: 76

## 2020-12-11 PROCEDURE — 86850 RBC ANTIBODY SCREEN: CPT | Performed by: HOSPITALIST

## 2020-12-11 PROCEDURE — 97530 THERAPEUTIC ACTIVITIES: CPT | Mod: GP

## 2020-12-11 PROCEDURE — 80048 BASIC METABOLIC PNL TOTAL CA: CPT | Performed by: HOSPITALIST

## 2020-12-11 PROCEDURE — 86900 BLOOD TYPING SEROLOGIC ABO: CPT | Performed by: HOSPITALIST

## 2020-12-11 PROCEDURE — 86901 BLOOD TYPING SEROLOGIC RH(D): CPT | Performed by: HOSPITALIST

## 2020-12-11 PROCEDURE — 87522 HEPATITIS C REVRS TRNSCRPJ: CPT | Performed by: HOSPITALIST

## 2020-12-11 PROCEDURE — 83516 IMMUNOASSAY NONANTIBODY: CPT | Performed by: INTERNAL MEDICINE

## 2020-12-11 PROCEDURE — 99233 SBSQ HOSP IP/OBS HIGH 50: CPT | Performed by: HOSPITALIST

## 2020-12-11 RX ORDER — FUROSEMIDE 40 MG
80 TABLET ORAL DAILY
Status: DISCONTINUED | OUTPATIENT
Start: 2020-12-11 | End: 2020-12-22

## 2020-12-11 RX ORDER — POTASSIUM CHLORIDE 1500 MG/1
40 TABLET, EXTENDED RELEASE ORAL ONCE
Status: COMPLETED | OUTPATIENT
Start: 2020-12-11 | End: 2020-12-11

## 2020-12-11 RX ADMIN — ACETAMINOPHEN 650 MG: 325 TABLET, FILM COATED ORAL at 02:19

## 2020-12-11 RX ADMIN — AMLODIPINE BESYLATE 5 MG: 5 TABLET ORAL at 08:34

## 2020-12-11 RX ADMIN — GABAPENTIN 200 MG: 100 CAPSULE ORAL at 15:49

## 2020-12-11 RX ADMIN — DULOXETINE HYDROCHLORIDE 30 MG: 30 CAPSULE, DELAYED RELEASE ORAL at 08:34

## 2020-12-11 RX ADMIN — MULTIPLE VITAMINS W/ MINERALS TAB 1 TABLET: TAB at 08:34

## 2020-12-11 RX ADMIN — AMOXICILLIN AND CLAVULANATE POTASSIUM 1 TABLET: 500; 125 TABLET, FILM COATED ORAL at 10:29

## 2020-12-11 RX ADMIN — TAMSULOSIN HYDROCHLORIDE 0.8 MG: 0.4 CAPSULE ORAL at 08:33

## 2020-12-11 RX ADMIN — GABAPENTIN 200 MG: 100 CAPSULE ORAL at 21:11

## 2020-12-11 RX ADMIN — HYDROCORTISONE 20 MG: 20 TABLET ORAL at 08:34

## 2020-12-11 RX ADMIN — GABAPENTIN 200 MG: 100 CAPSULE ORAL at 08:34

## 2020-12-11 RX ADMIN — FLUTICASONE FUROATE AND VILANTEROL TRIFENATATE 1 PUFF: 200; 25 POWDER RESPIRATORY (INHALATION) at 08:39

## 2020-12-11 RX ADMIN — HYDROMORPHONE HYDROCHLORIDE 2 MG: 2 TABLET ORAL at 20:18

## 2020-12-11 RX ADMIN — PRIMIDONE 50 MG: 50 TABLET ORAL at 21:11

## 2020-12-11 RX ADMIN — FUROSEMIDE 80 MG: 40 TABLET ORAL at 08:34

## 2020-12-11 RX ADMIN — HYDROMORPHONE HYDROCHLORIDE 2 MG: 2 TABLET ORAL at 15:49

## 2020-12-11 RX ADMIN — IPRATROPIUM BROMIDE AND ALBUTEROL SULFATE 3 ML: .5; 3 SOLUTION RESPIRATORY (INHALATION) at 15:55

## 2020-12-11 RX ADMIN — HYDROMORPHONE HYDROCHLORIDE 2 MG: 2 TABLET ORAL at 11:03

## 2020-12-11 RX ADMIN — MICONAZOLE NITRATE: 20 POWDER TOPICAL at 17:19

## 2020-12-11 RX ADMIN — IPRATROPIUM BROMIDE AND ALBUTEROL SULFATE 3 ML: .5; 3 SOLUTION RESPIRATORY (INHALATION) at 11:36

## 2020-12-11 RX ADMIN — HYDROMORPHONE HYDROCHLORIDE 2 MG: 2 TABLET ORAL at 05:49

## 2020-12-11 RX ADMIN — CLOBETASOL PROPIONATE: 0.5 SOLUTION TOPICAL at 21:11

## 2020-12-11 RX ADMIN — POTASSIUM CHLORIDE 40 MEQ: 1500 TABLET, EXTENDED RELEASE ORAL at 08:33

## 2020-12-11 RX ADMIN — IPRATROPIUM BROMIDE AND ALBUTEROL SULFATE 3 ML: .5; 3 SOLUTION RESPIRATORY (INHALATION) at 07:49

## 2020-12-11 RX ADMIN — AMOXICILLIN AND CLAVULANATE POTASSIUM 1 TABLET: 500; 125 TABLET, FILM COATED ORAL at 22:30

## 2020-12-11 RX ADMIN — IPRATROPIUM BROMIDE AND ALBUTEROL SULFATE 3 ML: .5; 3 SOLUTION RESPIRATORY (INHALATION) at 20:02

## 2020-12-11 RX ADMIN — OMEPRAZOLE 20 MG: 20 CAPSULE, DELAYED RELEASE ORAL at 08:34

## 2020-12-11 ASSESSMENT — ACTIVITIES OF DAILY LIVING (ADL)
ADLS_ACUITY_SCORE: 22

## 2020-12-11 ASSESSMENT — MIFFLIN-ST. JEOR: SCORE: 1438.88

## 2020-12-11 NOTE — PLAN OF CARE
Pt A&Ox4. VSS on 3-4 L NC-pt refusing to wear oxymask to protect ear. Wheezing at times, scheduled nebs completed. Taking dilaudid for pain. Incontinent of Bowel and bladder. Pt is noncompliant with cares. Refused repo most times. Wounds of RLE and coccyx to be addressed and changed by WOC today. Continue to monitor.

## 2020-12-11 NOTE — PROGRESS NOTES
CLINICAL NUTRITION SERVICES - REASSESSMENT NOTE    Recommendations Ordered by Registered Dietitian (RD):   - Continue diet as ordered + Boost supplements BID between meals    Malnutrition: (12/11)   % Weight Loss:  None noted  % Intake:  Decreased intake does not meet criteria for malnutrition   Subcutaneous Fat Loss:  Orbital region mild-moderate depletion and Upper arm region mild-moderate depletion  Muscle Loss:  Temporal region mild depletion and Clavicle bone region mild depletion  Fluid Retention:  Mild-Moderate    Malnutrition Diagnosis: Non-Severe malnutrition  In Context of:  Chronic illness or disease     EVALUATION OF PROGRESS TOWARD GOALS   Diet: Regular + Boost Plus BID    Intake/Tolerance:   - Attempted to see patient in room this afternoon, however he was soundly sleeping and did not awaken to voice or knocking on door.   - Intakes reviewed since last assessment on 12/03 --> continues to consume % of meals consistently.   - Orders 2 adequate meals/day most often, occasionally up to 3 meals/day. Also receives 2 boost/day to provide 360 kcal and 14 g pro per serving.     - Last BM x1 12/11, x5 on 12/10. Stooling daily, up to 2-5x.   - Weight elevated, likely r/t fluid status --> 2-3+ edema bilaterally   Date/Time Weight   12/11/20 0500 74.2 kg (163 lb 9.3 oz)   12/09/20 0506 64.5 kg (142 lb 3.2 oz)   12/08/20 0703 64.5 kg (142 lb 3.2 oz)   12/07/20 0254 63.6 kg (140 lb 3.2 oz)   12/05/20 0500 63.5 kg (140 lb)   12/04/20 0300 65.1 kg (143 lb 8.3 oz)   12/02/20 1817 63.5 kg (140 lb)     NEW FINDINGS:   K 3.2 (L), BUN 37 (H), Cr 1.87 (H)  Lasix 80 mg daily   Thera vit M daily    WOCN 12/09 -->  RLE wounds due to Mixed Etiology: poor circulation, edema, pressure   Status: stable, minimal change since last assessment. Pt is BKA to Left LE    Previous Nutrition Diagnosis:   No nutrition diagnosis identified at this time.   Evaluation: Declining    MALNUTRITION  % Weight Loss:  None noted  % Intake:   Decreased intake does not meet criteria for malnutrition   Subcutaneous Fat Loss:  Orbital region mild-moderate depletion and Upper arm region mild-moderate depletion  Muscle Loss:  Temporal region mild depletion and Clavicle bone region mild depletion  Fluid Retention:  Mild-Moderate    Malnutrition Diagnosis: Non-Severe malnutrition  In Context of:  Chronic illness or disease    CURRENT NUTRITION DIAGNOSIS  Predicted inadequate nutrient intake (energy / protein) r/t prolonged hospitalization and feeling unwell.     INTERVENTIONS  Recommendations / Nutrition Prescription  Regular diet  Recommend adequate meals TID  Boost Plus BID between meals     Implementation  None new today    Goals  Intake of at least 75% meals BID + 2 supplements daily.     MONITORING AND EVALUATION:  Progress towards goals will be monitored and evaluated per protocol and Practice Guidelines    Winifred Polanco RD, LD  [facility], [address], [place]   Pager: [phone]  Weekend Pager: [phone]

## 2020-12-11 NOTE — PROGRESS NOTES
[facility]    Medicine Progress Note - Hospitalist Service       Date of Admission:  12/2/2020  Assessment & Plan       Perfecto Reese is a 70 year old male with complex PMHx including severe COPD on 3L home O2 with ongoing tobacco use, adrenal insufficiency on chronic steroids, psoriasis on Humira, chronic Hep C with cirrhosis, PVD s/p left BKA, chronic wounds, and non-compliance who was admitted on 12/2/2020 for pneumonia, found to have a RLL lung abscess.    Acute on chronic hypoxic and hypercarbic respiratory failure   Bilateral community-acquired bacterial pneumonia with RLL abscess Severe COPD  [albuterol, symbicort, spiriva]  * Baseline on 3 lpm/NC  * EMS activated by his home care RN for fever, cough, and acute respiratory distress (placed on 10L per OM in the field). Fever to 102.5, tachycardia, tachypnea, and leukocytosis (17.2k) present on arrival. CXR on arrival showed RLL infiltrate and CT abd/pelvis showed air fluid cavity at the right lung base. On admission, he was initiated on IV pip-tazo and azithromycin, and Thoracic Surgery was consulted. Chest CT was ordered (12/3) which showed a 6.4 cm RLL pulmonary abscess and extensive left-sided mucus plugging with associated infiltrate. ID then consulted  - Azithromycin d/c'd by ID12/4.  - 12/2 blood cultures x2 no growth.  - Sputum cx 12/4 growing proteus, fluoroquinolone resistant. Also corynebacterium striatum 12/6.  - CT surgery saw, no surgical intervention indicated.  - Continues on PTA inhalers (prn albuterol, symbicort (Brreo), spiriva (Incruse)).  - Encourage incentive spirometry, ambulate as tolerated, RCAT.  - Initial zosyn->unasyn-> transitioned 12/8 to Augmentin x 5 weeks (through 1/12/2021).  - Repeat CT scan in 4 weeks (~1/8/2021).  - ID following, greatly appreciate assistance.  - PT- rec TCU, patient reluctantly agreed, wants to go home.  - Wean O2 as tolerated back to baseline, now on 4 lpm of supplemental  oxygen.    Encephalopathy 2/2 acute hypercarbic respiratory failure  Markedly somnolent 12/8. Aroused appropriately but only with sternal rub. Denies c/o. ABG 7.27/60/116/27  - Acute hypercarbic respiratory failure noted 12/8-9, placed on BiPAP. Suspect large component of this was 2/2 sedation from narcotics. Doing well off BiPAP now.  - Decreased hydromorphone to 2 mg q4 hours on 12/8.  - CXR on 12/9 with likely atelectasis in addition to previously seen cavitary lesion.  - Scheduled nebs.  - Encephalopathy improved.    Acute kidney injury  Chronic kidney disease, stage 3  Asymptomatic pyuria  Creatinine 2.89 from baseline 1.2-1.3 in Jan 2020. Suspect pre-renal state in setting of pneumonia/sepsis with possible progression to ATN in setting of hypotension. Improved with IV fluids  - UA on arrival grossly dirty, but urine culture grew <10,000 normal adelina.  - Holding PTA losartan.  - With infection and active urine sediment as well as slowly improving creatinine could imply active GN in setting of infection. Prior creatinine at 1.2-1.3.   - Nephrology following, appreciate assistance  - Creatinine 2.65->2.45->2.32-> 2.24->2.06-> 1.96-> 187 on 12/11.  - Lasix started on 12/9 per nephrology.    Pancytopenia  Chronic anemia  Hgb 9.6 on admission, no recent baseline. He has had no s/sx of bleeding. Repeat labs wbc 3.5, Hgb 6.1, platelets 74k 12/4, no signs of bleeding. Review of chart demonstrate pancytopenia 1/2020. Has h/o cirrhosis with portal hypotension, splenomegaly, CKD, autoimmune disorder.   - B12/ folate normal.  - tsat 27, ferritin 515 c/w chronic disease.  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  - S/p transfusion 1 unit pRBCs 12/4, 12/7, 12/11.  - Transfuse hgb <7, consent signed and in chart.  - Hematology followed, greatly appreciate assistance.  - Pancytopenia 2/2 liver cirrhosis, splenomegaly, renal disease, CKD and  psoriasis.   - WBC and platelets OK today, hemoglobin down to 6.5. Will transfuse 1 unit PRBCs today, recheck labs in AM.    Non-anion gap metabolic acidosis-resolved  - Suspected 2/2 diarrhea and hyperchloremic fluid resuscitation.     Chronic adrenal insufficiency  Intermittent hypotension and diarrhea noted on admission, likely due to adrenal crisis in the setting of acute illness and steroid non-compliance. Improved upon resumption of previously prescribed hydrocortisone  - Continues on PTA hydrocortisone 20 mg PO daily, no plan for stress dose steroids at this time.    Essential hypertension  [PTA: amlodipine 10 mg qhs, losartan 12.5 mg daily]  Intermittent hypotension noted on admission, likely due to adrenal insufficiency. Now improved and stable  - Restarted amlodipine at 5 mg daily on 12/9.  - PTA losartan on hold for now in setting of MICHAEL.    History of psoriasis   - Holding PTA Humira.  - Topical clobetasol at HS.    Peripheral vascular disease s/p left BKA  Chronic RLE wound  Chronic left ear wound  Present on admission. Receives wound cares per home care RN.  - Wound cares in place as per WOCN.     Chronic Hepatitis C with cirrhosis  Treated with interferon on the past  - Appears compensated.    GERD  - Chronic and stable on omeprazole 20 mg daily.    BPH  - Chronic and stable on tamsulosin 0.8 mg daily.      Tobacco use disorder  - Has not had a cigarette in the past few weeks since he has been feeling sick.    Chronic pain syndrome  [PTA: gabapentin 300 mg TID, duloxetine 30 mg daily, lidocaine patch, oxycodone 5 mg q6h prn, morphine 2.5 mg BID prn]  - Gabapentin decreased to 200 mg TID in the setting of renal failure  - Continues on lidocaine patch, duloxetine, PO morphine changed to Dilaudid PO prn due to MICHAEL. Dilaudid dose decreased on 12/8 due to concerns about excessive sedation.     Diet: Regular Diet Adult  Snacks/Supplements Adult: Boost Plus; Between Meals    DVT Prophylaxis: Pneumatic  Compression Devices  Bruce Catheter: not present  Code Status: Full Code           Disposition Plan   Expected discharge: possible discharge to TCU in the next few days pending stable hemoglobin, stable respiratory status, consultant recommendations, safe discharge plan  Entered: Telly Vasquez MD 12/11/2020, 10:03 AM       The patient's care was discussed with the Bedside Nurse, Care Coordinator/ and Patient.    Telly Vasquez MD  Hospitalist Service  [facility]  Contact information available via [facility] Paging/Directory    ______________________________________________________________________    Interval History   Perfecto Reese was seen this morning. Continues to have right sided chest pain and shortness of breath, similar to yesterday. Denies fevers, abdominal pain, nausea. Physical therapy is planning to see him later this morning, strongly encouraged him to participate. Discussed potential discharge plans, he wants to go home, discussed recommendations for TCU for ongoing therapies, wound care, etc; he states he will think about it.    Data reviewed today: I reviewed all medications, new labs and imaging results over the last 24 hours. I personally reviewed no images or EKG's today.    Physical Exam   Vital Signs: Temp: 98.9  F (37.2  C) Temp src: Oral BP: 128/47 Pulse: 84   Resp: 16 SpO2: 95 % O2 Device: Nasal cannula Oxygen Delivery: 4 LPM  Weight: 163 lbs 9.3 oz  Constitutional: awake, alert, cooperative, no apparent distress  Respiratory: clear to auscultation bilaterally, no crackles or wheezing  Cardiovascular: regular rate and rhythm, normal S1 and S2, no murmur noted  GI: normal bowel sounds, soft, non-distended, non-tender  Skin: warm, dry  Musculoskeletal: 1+ lower extremity pitting edema present, s/p L BKA, dressing and offloading boot in place on RLE  Neurologic: awake, alert, oriented to name, place and time. motor is 5 out of 5 bilaterally. sensory  is intact.    Data   Recent Labs   Lab 12/11/20  0713 12/09/20  0756 12/08/20  1226   WBC 5.2 4.3 2.9*   HGB 6.5* 7.7* 7.1*   MCV 95 95 95   PLT 75* 90* 68*    143 143   POTASSIUM 3.2* 3.6 3.8   CHLORIDE 106 112* 112*   CO2 28 28 27   BUN 37* 39* 40*   CR 1.87* 1.96* 2.06*   ANIONGAP 6 3 4   MARTINE 7.8* 8.0* 8.0*   GLC 91 75 132*     Medications     - MEDICATION INSTRUCTIONS -       - MEDICATION INSTRUCTIONS -         amLODIPine  5 mg Oral Daily     amoxicillin-clavulanate  1 tablet Oral Q12H ISIS     clobetasol   Topical At Bedtime     DULoxetine  30 mg Oral Daily     fluticasone-vilanterol  1 puff Inhalation Daily     furosemide  80 mg Oral Daily     gabapentin  200 mg Oral TID     hydrocortisone  20 mg Oral QAM     ipratropium - albuterol 0.5 mg/2.5 mg/3 mL  3 mL Nebulization 4x daily     multivitamin w/minerals  1 tablet Oral Daily     omeprazole  20 mg Oral Daily     polyethylene glycol  17 g Oral Daily     primidone  50 mg Oral At Bedtime     sodium chloride (PF)  3 mL Intracatheter Q8H     tamsulosin  0.8 mg Oral Daily

## 2020-12-11 NOTE — PROVIDER NOTIFICATION
Dr Vasquez was paged for critical hgb value of 6.5. Awaiting return call or orders. Will continue to monitor.

## 2020-12-11 NOTE — PROGRESS NOTES
Renal Medicine Progress Note            Assessment/Plan:     A/P:  70 y.o man with lung abscess, consult for acute kidney and an active urine sediment.      # Patient had a serum creatinine of 1.2-1.3 mg/dl in January.      # Acute kidney injury in the setting of PNA/lung abscess and diarrhea. Improving.                -active sendiment               -UPCR 1.4 g/g   -Complements, DRAKE and ANCA are normal   -SPEP/UPEP seem unremarkable               -infectious GN?, kidney disease due to liver cirrhosis? Humira?               -no obstruction on CT     # PNA/lung abscess:                -Augmentin x 6 weeks     # Adrenal insufficiency and hypertension? Was on Norvasc and losartan. Typically once would be hypotensive with adrenal insufficiency and not hypertensive.      # Pancytopenia attributed to liver disease per oncology. Leukopenia and thrombocytopenia are improving. Severe anemia. Fe storage is low.    -from Humira?     # HCV and liver cirrhosis: No ascites mentioned on CT.      # Severe COPD: 3 liters O2 at home    # Psoriasis: Humira on hold     Plan:  # Lasix 80 mg oral x 1 dose. Now that he has IV, start lasix 40 mg q8hrs  # IV iron when appropriate from infectious standpoint   # Pt has acute kidney injury, nephritic range proteinuria and an active urine sediment. Vasculitis markers are so far unremarkable. A biopsy is needed for a definitive diagnosis. However, given his current situation with worsening anemia, thrombocytopenia and lung abscess, I don't think it is a good idea to do a renal biopsy at this time. I am reluctant to start him on empiric steroid given his lung abscess. Will continue to monitor for now. I discussed the case with Dr. Vasquez.           Interval History:     Low grade feer. BP and O2 sat are stable. Pt complains of right-sided pain. Breathing is the same. No N/V. Intermittent refuses care. Getting blood transfusion.           Medications and Allergies:       amLODIPine  5 mg Oral  "Daily     amoxicillin-clavulanate  1 tablet Oral Q12H IISS     clobetasol   Topical At Bedtime     DULoxetine  30 mg Oral Daily     fluticasone-vilanterol  1 puff Inhalation Daily     furosemide  80 mg Oral Daily     gabapentin  200 mg Oral TID     hydrocortisone  20 mg Oral QAM     ipratropium - albuterol 0.5 mg/2.5 mg/3 mL  3 mL Nebulization 4x daily     multivitamin w/minerals  1 tablet Oral Daily     omeprazole  20 mg Oral Daily     polyethylene glycol  17 g Oral Daily     primidone  50 mg Oral At Bedtime     sodium chloride (PF)  3 mL Intracatheter Q8H     tamsulosin  0.8 mg Oral Daily        Allergies   Allergen Reactions     Darvocet [Propoxyphene N-Apap] Nausea     Vicodin [Hydrocodone-Acetaminophen] Nausea     Can use if he eats with it            Physical Exam:   Vitals were reviewed   , Blood pressure 134/63, pulse 101, temperature 99.6  F (37.6  C), temperature source Oral, resp. rate 18, height 1.651 m (5' 5\"), weight 74.2 kg (163 lb 9.3 oz), SpO2 94 %.    Wt Readings from Last 3 Encounters:   12/11/20 74.2 kg (163 lb 9.3 oz)   03/22/18 93 kg (205 lb)   01/21/16 70.3 kg (155 lb)       Intake/Output Summary (Last 24 hours) at 12/11/2020 1333  Last data filed at 12/11/2020 1325  Gross per 24 hour   Intake 300 ml   Output --   Net 300 ml     GENERAL: NAD.   HEENT:  Normocephalic. No gross abnormalities.  Pupils equal.  MMM.    CV: RRR, no murmurs, no clicks, gallops, or rubs, 1-2+ edema  RESP: Diminish BS. No wheezes. No crackles.  GI: Abdomen obese, soft, NT  MUSCULOSKELETAL: L BKA 1-2+ pitting edmema  SKIN: no suspicious lesions or rashes, dry to touch. Multiple tattoos.   NEURO:  Awake and answering questions appropriately.  LYMPH: No palpable ant/post cervical and supraclavicular adenopathy         Data:     CBC RESULTS:     Recent Labs   Lab 12/11/20  0713 12/09/20  0756 12/08/20  1226 12/07/20  1853 12/07/20  0656 12/06/20  0857 12/05/20  0804   WBC 5.2 4.3 2.9*  --  2.7* 3.4* 2.7*   RBC 2.17* 2.58* " 2.35*  --  2.07* 2.45* 2.36*   HGB 6.5* 7.7* 7.1* 7.3* 6.2* 7.3* 7.2*   HCT 20.5* 24.6* 22.3*  --  19.1* 22.6* 21.6*   PLT 75* 90* 68*  --  55* 62* 55*       Basic Metabolic Panel:  Recent Labs   Lab 12/11/20  0713 12/09/20  0756 12/08/20  1226 12/07/20  0656 12/06/20  0857 12/05/20  0804    143 143 142 140 139   POTASSIUM 3.2* 3.6 3.8 3.3* 3.3* 3.5   CHLORIDE 106 112* 112* 112* 112* 113*   CO2 28 28 27 21 18* 15*   BUN 37* 39* 40* 43* 42* 47*   CR 1.87* 1.96* 2.06* 2.24* 2.32* 2.45*   GLC 91 75 132* 135* 119* 110*   MARTINE 7.8* 8.0* 8.0* 7.8* 7.8* 7.7*       INRNo lab results found in last 7 days.   Attestation:   I have reviewed today's relevant vital signs, notes, medications, labs and imaging.    Antonio Suh MD  [facility] - Nephrology  Office phone :[phone]  Pager: [phone]

## 2020-12-11 NOTE — PLAN OF CARE
Pt is A & O x 4. Lungs sound diminished on 5L of O2. Bowel sounds active, incontinent of bladder and bowel. Generalized edema, received lasix today. Had a loose BM this evening. Repositioned, at times refuse cares. Received tylenol and dilaudid for pain. Tele was NSR. Will continue to monitor.

## 2020-12-11 NOTE — PROGRESS NOTES
[facility]    Infectious Disease Progress Note    Date of Service (when I saw the patient): 12/11/2020     Assessment & Plan   Perfecto Reese is a 70 year old male who was admitted on 12/2/2020.       Impression:  1. 70 y.o male with COPD on Home oxygen.   2. Ongoing tobacco abuse.   3. Adrenal insufficiency on chronic steroids   4. Psoriasis on humira.   5. Chronic hep C. Cirrhosis.   6. PVD, S/P BKA.   7. Non compliance.   8. Admitted with pneumonia symptoms and imaging with lung abscesses.   9. On zosyn. And on azithromycin.   10. Sputum culture with proteus.      Recommendations:   Continue on  oral Augmentin, dose reduced given kidney function, discharge on a total of 6 weeks of oral augmentin   Repeat a CT scan in 4 weeks.     Rc Hu MD    Interval History   Afebrile   Cultures noted   Major complain in right sided chest pain     Physical Exam   Temp: 98.5  F (36.9  C) Temp src: Oral BP: 137/48 Pulse: 81   Resp: 18 SpO2: 95 % O2 Device: Nasal cannula Oxygen Delivery: 4 LPM  Vitals:    12/08/20 0703 12/09/20 0506 12/11/20 0500   Weight: 64.5 kg (142 lb 3.2 oz) 64.5 kg (142 lb 3.2 oz) 74.2 kg (163 lb 9.3 oz)     Vital Signs with Ranges  Temp:  [98.1  F (36.7  C)-98.9  F (37.2  C)] 98.5  F (36.9  C)  Pulse:  [81-96] 81  Resp:  [16-18] 18  BP: (126-148)/(47-58) 137/48  SpO2:  [92 %-97 %] 95 %    Constitutional: lethargic today, wakes up though   Lungs: Clear to auscultation bilaterally, no crackles or wheezing  Cardiovascular: Regular rate and rhythm, normal S1 and S2, and no murmur noted  Abdomen: Normal bowel sounds, soft, non-distended, non-tender  Skin: No rashes, no cyanosis, no edema  Other:    Medications     - MEDICATION INSTRUCTIONS -       - MEDICATION INSTRUCTIONS -         amLODIPine  5 mg Oral Daily     amoxicillin-clavulanate  1 tablet Oral Q12H ISIS     clobetasol   Topical At Bedtime     DULoxetine  30 mg Oral Daily     fluticasone-vilanterol  1 puff Inhalation  Daily     furosemide  80 mg Oral Daily     gabapentin  200 mg Oral TID     hydrocortisone  20 mg Oral QAM     ipratropium - albuterol 0.5 mg/2.5 mg/3 mL  3 mL Nebulization 4x daily     multivitamin w/minerals  1 tablet Oral Daily     omeprazole  20 mg Oral Daily     polyethylene glycol  17 g Oral Daily     primidone  50 mg Oral At Bedtime     sodium chloride (PF)  3 mL Intracatheter Q8H     tamsulosin  0.8 mg Oral Daily       Data   All microbiology laboratory data reviewed.  Recent Labs   Lab Test 12/11/20  0713 12/09/20  0756 12/08/20  1226   WBC 5.2 4.3 2.9*   HGB 6.5* 7.7* 7.1*   HCT 20.5* 24.6* 22.3*   MCV 95 95 95   PLT 75* 90* 68*     Recent Labs   Lab Test 12/11/20  0713 12/09/20  0756 12/08/20  1226   CR 1.87* 1.96* 2.06*     No lab results found.  Recent Labs   Lab Test 12/03/20  0540 12/03/20  0230 12/02/20  1904 12/02/20  1844   CULT Light growth  Normal adelina    Light growth  Proteus mirabilis  *  Moderate growth  Corynebacterium striatum  Identification obtained by MALDI-TOF mass spectrometry research use only database. Test   characteristics determined and verified by the Infectious Diseases Diagnostic Laboratory   ([facility]) [place], [place].  Susceptibility testing not routinely done  * <10,000 colonies/mL  mixed urogenital adelina  Susceptibility testing not routinely done   No growth No growth       Attestation:  Total time on the floor involved in the patient's care: 35 minutes. Total time spent in counseling/care coordination: >50%

## 2020-12-12 LAB
ANION GAP SERPL CALCULATED.3IONS-SCNC: 5 MMOL/L (ref 3–14)
BUN SERPL-MCNC: 34 MG/DL (ref 7–30)
CALCIUM SERPL-MCNC: 7.7 MG/DL (ref 8.5–10.1)
CHLORIDE SERPL-SCNC: 104 MMOL/L (ref 94–109)
CO2 SERPL-SCNC: 30 MMOL/L (ref 20–32)
CREAT SERPL-MCNC: 1.68 MG/DL (ref 0.66–1.25)
ERYTHROCYTE [DISTWIDTH] IN BLOOD BY AUTOMATED COUNT: 13.9 % (ref 10–15)
GFR SERPL CREATININE-BSD FRML MDRD: 41 ML/MIN/{1.73_M2}
GLUCOSE SERPL-MCNC: 88 MG/DL (ref 70–99)
HCT VFR BLD AUTO: 24.3 % (ref 40–53)
HGB BLD-MCNC: 7.9 G/DL (ref 13.3–17.7)
MAGNESIUM SERPL-MCNC: 1.3 MG/DL (ref 1.6–2.3)
MCH RBC QN AUTO: 30.3 PG (ref 26.5–33)
MCHC RBC AUTO-ENTMCNC: 32.5 G/DL (ref 31.5–36.5)
MCV RBC AUTO: 93 FL (ref 78–100)
PLATELET # BLD AUTO: 91 10E9/L (ref 150–450)
POTASSIUM SERPL-SCNC: 3.3 MMOL/L (ref 3.4–5.3)
POTASSIUM SERPL-SCNC: 4.2 MMOL/L (ref 3.4–5.3)
RBC # BLD AUTO: 2.61 10E12/L (ref 4.4–5.9)
SODIUM SERPL-SCNC: 139 MMOL/L (ref 133–144)
WBC # BLD AUTO: 6.8 10E9/L (ref 4–11)

## 2020-12-12 PROCEDURE — 250N000013 HC RX MED GY IP 250 OP 250 PS 637: Performed by: INTERNAL MEDICINE

## 2020-12-12 PROCEDURE — 999N000157 HC STATISTIC RCP TIME EA 10 MIN

## 2020-12-12 PROCEDURE — 84132 ASSAY OF SERUM POTASSIUM: CPT | Performed by: HOSPITALIST

## 2020-12-12 PROCEDURE — 94640 AIRWAY INHALATION TREATMENT: CPT | Mod: 76

## 2020-12-12 PROCEDURE — 250N000009 HC RX 250: Performed by: INTERNAL MEDICINE

## 2020-12-12 PROCEDURE — 94640 AIRWAY INHALATION TREATMENT: CPT

## 2020-12-12 PROCEDURE — 250N000013 HC RX MED GY IP 250 OP 250 PS 637: Performed by: HOSPITALIST

## 2020-12-12 PROCEDURE — 250N000011 HC RX IP 250 OP 636: Performed by: HOSPITALIST

## 2020-12-12 PROCEDURE — 80048 BASIC METABOLIC PNL TOTAL CA: CPT | Performed by: HOSPITALIST

## 2020-12-12 PROCEDURE — 85027 COMPLETE CBC AUTOMATED: CPT | Performed by: HOSPITALIST

## 2020-12-12 PROCEDURE — 120N000001 HC R&B MED SURG/OB

## 2020-12-12 PROCEDURE — 36415 COLL VENOUS BLD VENIPUNCTURE: CPT | Performed by: HOSPITALIST

## 2020-12-12 PROCEDURE — 83735 ASSAY OF MAGNESIUM: CPT | Performed by: HOSPITALIST

## 2020-12-12 PROCEDURE — 99232 SBSQ HOSP IP/OBS MODERATE 35: CPT | Performed by: HOSPITALIST

## 2020-12-12 RX ORDER — LORAZEPAM 0.5 MG/1
.25-.5 TABLET ORAL 2 TIMES DAILY PRN
Status: DISCONTINUED | OUTPATIENT
Start: 2020-12-12 | End: 2020-12-31 | Stop reason: HOSPADM

## 2020-12-12 RX ORDER — POTASSIUM CHLORIDE 1500 MG/1
40 TABLET, EXTENDED RELEASE ORAL ONCE
Status: COMPLETED | OUTPATIENT
Start: 2020-12-12 | End: 2020-12-12

## 2020-12-12 RX ORDER — MAGNESIUM SULFATE HEPTAHYDRATE 40 MG/ML
2 INJECTION, SOLUTION INTRAVENOUS ONCE
Status: COMPLETED | OUTPATIENT
Start: 2020-12-12 | End: 2020-12-12

## 2020-12-12 RX ORDER — POTASSIUM CHLORIDE 1500 MG/1
20 TABLET, EXTENDED RELEASE ORAL ONCE
Status: COMPLETED | OUTPATIENT
Start: 2020-12-12 | End: 2020-12-12

## 2020-12-12 RX ADMIN — HYDROMORPHONE HYDROCHLORIDE 2 MG: 2 TABLET ORAL at 16:49

## 2020-12-12 RX ADMIN — IPRATROPIUM BROMIDE AND ALBUTEROL SULFATE 3 ML: .5; 3 SOLUTION RESPIRATORY (INHALATION) at 15:48

## 2020-12-12 RX ADMIN — HYDROMORPHONE HYDROCHLORIDE 2 MG: 2 TABLET ORAL at 05:20

## 2020-12-12 RX ADMIN — GABAPENTIN 200 MG: 100 CAPSULE ORAL at 16:49

## 2020-12-12 RX ADMIN — AMLODIPINE BESYLATE 5 MG: 5 TABLET ORAL at 09:24

## 2020-12-12 RX ADMIN — PRIMIDONE 50 MG: 50 TABLET ORAL at 21:16

## 2020-12-12 RX ADMIN — HYDROMORPHONE HYDROCHLORIDE 2 MG: 2 TABLET ORAL at 00:17

## 2020-12-12 RX ADMIN — IPRATROPIUM BROMIDE AND ALBUTEROL SULFATE 3 ML: .5; 3 SOLUTION RESPIRATORY (INHALATION) at 12:13

## 2020-12-12 RX ADMIN — FUROSEMIDE 80 MG: 40 TABLET ORAL at 09:24

## 2020-12-12 RX ADMIN — LORAZEPAM 0.5 MG: 0.5 TABLET ORAL at 16:49

## 2020-12-12 RX ADMIN — DULOXETINE HYDROCHLORIDE 30 MG: 30 CAPSULE, DELAYED RELEASE ORAL at 09:24

## 2020-12-12 RX ADMIN — HYDROMORPHONE HYDROCHLORIDE 2 MG: 2 TABLET ORAL at 11:52

## 2020-12-12 RX ADMIN — LORAZEPAM 0.5 MG: 0.5 TABLET ORAL at 22:38

## 2020-12-12 RX ADMIN — CLOBETASOL PROPIONATE: 0.5 SOLUTION TOPICAL at 21:16

## 2020-12-12 RX ADMIN — AMOXICILLIN AND CLAVULANATE POTASSIUM 1 TABLET: 500; 125 TABLET, FILM COATED ORAL at 21:16

## 2020-12-12 RX ADMIN — TAMSULOSIN HYDROCHLORIDE 0.8 MG: 0.4 CAPSULE ORAL at 09:24

## 2020-12-12 RX ADMIN — AMOXICILLIN AND CLAVULANATE POTASSIUM 1 TABLET: 500; 125 TABLET, FILM COATED ORAL at 09:58

## 2020-12-12 RX ADMIN — GABAPENTIN 200 MG: 100 CAPSULE ORAL at 21:16

## 2020-12-12 RX ADMIN — FLUTICASONE FUROATE AND VILANTEROL TRIFENATATE 1 PUFF: 200; 25 POWDER RESPIRATORY (INHALATION) at 09:24

## 2020-12-12 RX ADMIN — OMEPRAZOLE 20 MG: 20 CAPSULE, DELAYED RELEASE ORAL at 09:24

## 2020-12-12 RX ADMIN — HYDROCORTISONE 20 MG: 20 TABLET ORAL at 09:24

## 2020-12-12 RX ADMIN — IPRATROPIUM BROMIDE AND ALBUTEROL SULFATE 3 ML: .5; 3 SOLUTION RESPIRATORY (INHALATION) at 19:55

## 2020-12-12 RX ADMIN — HYDROMORPHONE HYDROCHLORIDE 2 MG: 2 TABLET ORAL at 22:39

## 2020-12-12 RX ADMIN — MULTIPLE VITAMINS W/ MINERALS TAB 1 TABLET: TAB at 09:24

## 2020-12-12 RX ADMIN — MAGNESIUM SULFATE HEPTAHYDRATE 2 G: 40 INJECTION, SOLUTION INTRAVENOUS at 17:27

## 2020-12-12 RX ADMIN — IPRATROPIUM BROMIDE AND ALBUTEROL SULFATE 3 ML: .5; 3 SOLUTION RESPIRATORY (INHALATION) at 07:42

## 2020-12-12 RX ADMIN — TRIAMCINOLONE ACETONIDE: 1 CREAM TOPICAL at 09:23

## 2020-12-12 RX ADMIN — POTASSIUM CHLORIDE 40 MEQ: 1500 TABLET, EXTENDED RELEASE ORAL at 09:58

## 2020-12-12 RX ADMIN — ACETAMINOPHEN 650 MG: 325 TABLET, FILM COATED ORAL at 17:27

## 2020-12-12 RX ADMIN — POTASSIUM CHLORIDE 20 MEQ: 1500 TABLET, EXTENDED RELEASE ORAL at 17:26

## 2020-12-12 RX ADMIN — GABAPENTIN 200 MG: 100 CAPSULE ORAL at 09:24

## 2020-12-12 ASSESSMENT — ACTIVITIES OF DAILY LIVING (ADL)
ADLS_ACUITY_SCORE: 24
ADLS_ACUITY_SCORE: 22
ADLS_ACUITY_SCORE: 22
ADLS_ACUITY_SCORE: 24
ADLS_ACUITY_SCORE: 22
ADLS_ACUITY_SCORE: 22

## 2020-12-12 ASSESSMENT — MIFFLIN-ST. JEOR: SCORE: 1438.88

## 2020-12-12 NOTE — PLAN OF CARE
Pt is A&Ox4, VSS on 3 L oxygen via NC ex. hypertension. Lung sounds diminished with expiratory wheezes. Incontinent of urine x1. Regular diet. CMS intact ex. baseline neuropathy and L BKA, RLE dressing CDI. Pain managed with PRN PO Dilaudid x1. IV SL. Will continue to follow plan of care.

## 2020-12-12 NOTE — PLAN OF CARE
PT: Attempted to see pt in AM, pt soundly sleeping, not awaking to voice or sternal rub. Cancel

## 2020-12-12 NOTE — PROGRESS NOTES
Medicine Progress Note - Hospitalist Service       Date of Admission:  12/2/2020  Assessment & Plan       Pefrecto Reese is a 70 year old male with complex PMHx including severe COPD on 3L home O2 with ongoing tobacco use, adrenal insufficiency on chronic steroids, psoriasis on Humira, chronic Hep C with cirrhosis, PVD s/p left BKA, chronic wounds, and non-compliance who was admitted on 12/2/2020 for pneumonia, found to have a RLL lung abscess.    Acute on chronic hypoxic and hypercarbic respiratory failure   Bilateral community-acquired bacterial pneumonia with RLL abscess Severe COPD  [albuterol, symbicort, spiriva]  * Baseline on 3 lpm/NC  * EMS activated by his home care RN for fever, cough, and acute respiratory distress (placed on 10L per OM in the field). Fever to 102.5, tachycardia, tachypnea, and leukocytosis (17.2k) present on arrival. CXR on arrival showed RLL infiltrate and CT abd/pelvis showed air fluid cavity at the right lung base. On admission, he was initiated on IV pip-tazo and azithromycin, and Thoracic Surgery was consulted. Chest CT was ordered (12/3) which showed a 6.4 cm RLL pulmonary abscess and extensive left-sided mucus plugging with associated infiltrate. ID then consulted  - Azithromycin d/c'd by ID12/4.  - 12/2 blood cultures x2 no growth.  - Sputum cx 12/4 growing proteus, fluoroquinolone resistant. Also corynebacterium striatum 12/6.  - CT surgery saw, no surgical intervention indicated.  - Continues on PTA inhalers (prn albuterol, symbicort (Brreo), spiriva (Incruse)).  - Encourage incentive spirometry, ambulate as tolerated, RCAT.  - Initial zosyn->unasyn-> transitioned 12/8 to Augmentin x 5 weeks (through 1/12/2021).  - Repeat CT scan in 4 weeks (~1/8/2021).  - ID following, greatly appreciate assistance.  - PT- rec TCU, patient reluctantly agreed, wants to go home. Encouraged patient to work with PT.  - O2 weaned back to baseline of 3  lpm.    Encephalopathy 2/2 acute hypercarbic respiratory failure  Markedly somnolent 12/8. Aroused appropriately but only with sternal rub. Denies c/o. ABG 7.27/60/116/27  - Acute hypercarbic respiratory failure noted 12/8-9, placed on BiPAP. Suspect large component of this was 2/2 sedation from narcotics. Doing well off BiPAP now.  - Decreased hydromorphone to 2 mg q4 hours on 12/8.  - CXR on 12/9 with likely atelectasis in addition to previously seen cavitary lesion.  - Scheduled nebs.  - Encephalopathy improved.    Acute kidney injury  Chronic kidney disease, stage 3  Asymptomatic pyuria  Creatinine 2.89 from baseline 1.2-1.3 in Jan 2020. Suspect pre-renal state in setting of pneumonia/sepsis with possible progression to ATN in setting of hypotension. Improved with IV fluids  - UA on arrival grossly dirty, but urine culture grew <10,000 normal adelina.  - Holding PTA losartan.  - With infection and active urine sediment as well as slowly improving creatinine could imply active GN in setting of infection. Prior creatinine at 1.2-1.3.   - Nephrology following, appreciate assistance  - Creatinine 2.65->2.45->2.32-> 2.24->2.06-> 1.96-> 1.87-> 1.68 on 12/12.  - Lasix started on 12/9 per nephrology.    Pancytopenia  Chronic anemia  Hgb 9.6 on admission, no recent baseline. He has had no s/sx of bleeding. Repeat labs wbc 3.5, Hgb 6.1, platelets 74k 12/4, no signs of bleeding. Review of chart demonstrate pancytopenia 1/2020. Has h/o cirrhosis with portal hypotension, splenomegaly, CKD, autoimmune disorder.   - B12/ folate normal.  - tsat 27, ferritin 515 c/w chronic disease.  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  - S/p transfusion 1 unit pRBCs 12/4, 12/7, 12/11.  - Transfuse hgb <7, consent signed and in chart.  - Hematology followed, greatly appreciate assistance.  - Pancytopenia 2/2 liver cirrhosis, splenomegaly, renal disease, CKD and  psoriasis.   - WBC and platelets OK today. Hemoglobin improved to 7.9 this morning after getting one unit of PRBCs yesterday.  - Recheck in AM.    Non-anion gap metabolic acidosis-resolved  - Suspected 2/2 diarrhea and hyperchloremic fluid resuscitation.     Chronic adrenal insufficiency  Intermittent hypotension and diarrhea noted on admission, likely due to adrenal crisis in the setting of acute illness and steroid non-compliance. Improved upon resumption of previously prescribed hydrocortisone  - Continues on PTA hydrocortisone 20 mg PO daily, no plan for stress dose steroids at this time.    Essential hypertension  [PTA: amlodipine 10 mg qhs, losartan 12.5 mg daily]  Intermittent hypotension noted on admission, likely due to adrenal insufficiency. Now improved and stable  - Restarted amlodipine at 5 mg daily on 12/9.  - PTA losartan on hold for now in setting of MICHAEL.    History of psoriasis   - Holding PTA Humira.  - Topical clobetasol at HS.    Peripheral vascular disease s/p left BKA  Chronic RLE wound  Chronic left ear wound  Present on admission. Receives wound cares per home care RN.  - Wound cares in place as per WOCN.     Chronic Hepatitis C with cirrhosis  Treated with interferon on the past  - Appears compensated.    GERD  - Chronic and stable on omeprazole 20 mg daily.    BPH  - Chronic and stable on tamsulosin 0.8 mg daily.      Tobacco use disorder  - Has not had a cigarette in the past few weeks since he has been feeling sick.    Chronic pain syndrome  [PTA: gabapentin 300 mg TID, duloxetine 30 mg daily, lidocaine patch, oxycodone 5 mg q6h prn, morphine 2.5 mg BID prn]  - Gabapentin decreased to 200 mg TID in the setting of renal failure  - Continues on lidocaine patch, duloxetine, PO morphine changed to Dilaudid PO prn due to MICHAEL. Dilaudid dose decreased on 12/8 due to concerns about excessive sedation.    Non-severe malnutrition    - Level of malnutrition: Non-Severe   - Based on: mild (or  greater) subcutaneous fat loss, mild (or greater) muscle loss, mild (or greater) fluid retention     Diet: Regular Diet Adult  Snacks/Supplements Adult: Boost Plus; Between Meals    DVT Prophylaxis: Pneumatic Compression Devices  Bruce Catheter: not present  Code Status: Full Code           Disposition Plan   Expected discharge: recommend TCU in 1-2 days pending stable hemoglobin, nephrology recommendations  Entered: Telly Vasquez MD 12/12/2020, 10:50 AM       The patient's care was discussed with the Bedside Nurse and Patient.    Telly Vasquez MD  Hospitalist Service  [facility]  Contact information available via [facility] Paging/Directory    ______________________________________________________________________    Interval History   Perfecto LEIGH ANN Reese feels OK this morning. Anxious at times, requesting medication for anxiety. Continues to have ongoing right sided chest pain. Shortness of breath similar to the past few days. Denies fevers, nausea, abdominal pain.    Data reviewed today: I reviewed all medications, new labs and imaging results over the last 24 hours. I personally reviewed no images or EKG's today.    Physical Exam   Vital Signs: Temp: 99.6  F (37.6  C) Temp src: Axillary BP: 135/52 Pulse: 80   Resp: 16 SpO2: 95 % O2 Device: Nasal cannula Oxygen Delivery: 3 LPM  Weight: 163 lbs 9.3 oz  Constitutional: awake, alert, cooperative, no apparent distress, initially sleeping but woke up and answered my questions  Respiratory: clear to auscultation bilaterally, no crackles or wheezing  Cardiovascular: regular rate and rhythm, normal S1 and S2, no murmur noted  GI: normal bowel sounds, soft, non-distended, non-tender  Skin: warm, dry  Musculoskeletal: no lower extremity pitting edema present, s/p L BKA, dressing and offloading boot in place on RLE  Neurologic: awake, alert, oriented to name, place and time    Data   Recent Labs   Lab 12/12/20  0801 12/11/20  0713 12/09/20  0756    WBC 6.8 5.2 4.3   HGB 7.9* 6.5* 7.7*   MCV 93 95 95   PLT 91* 75* 90*    140 143   POTASSIUM 3.3* 3.2* 3.6   CHLORIDE 104 106 112*   CO2 30 28 28   BUN 34* 37* 39*   CR 1.68* 1.87* 1.96*   ANIONGAP 5 6 3   MARTINE 7.7* 7.8* 8.0*   GLC 88 91 75     Medications     - MEDICATION INSTRUCTIONS -       - MEDICATION INSTRUCTIONS -         amLODIPine  5 mg Oral Daily     amoxicillin-clavulanate  1 tablet Oral Q12H ISIS     clobetasol   Topical At Bedtime     DULoxetine  30 mg Oral Daily     fluticasone-vilanterol  1 puff Inhalation Daily     furosemide  80 mg Oral Daily     gabapentin  200 mg Oral TID     hydrocortisone  20 mg Oral QAM     ipratropium - albuterol 0.5 mg/2.5 mg/3 mL  3 mL Nebulization 4x daily     multivitamin w/minerals  1 tablet Oral Daily     omeprazole  20 mg Oral Daily     polyethylene glycol  17 g Oral Daily     primidone  50 mg Oral At Bedtime     sodium chloride (PF)  3 mL Intracatheter Q8H     tamsulosin  0.8 mg Oral Daily

## 2020-12-12 NOTE — PROGRESS NOTES
[facility]     Renal Progress Note       SHORTHAND KEY FOR MY NOTES:  c = with, s = without, p = after, a = before, x = except, asx = asymptomatic, tx = transplant or treatment, sx = symptoms or symptomatic, cx = canceled or culture, rxn = reaction, yday = yesterday, nl = normal, abx = antibiotics, fxn = function, dx = diagnosis, dz = disease, m/h = melena/hematochezia, c/d/l/ha = cramping/dizziness/lightheadedness/headache, d/c = discharge or diarrhea/constipation, f/c/n/v = fevers/chills/nausea/vomiting, cp/sob = chest pain/shortness of breath, tbv = total body volume, rxn = reaction, tdc = tunneled dialysis catheter, pta = prior to admission, hd = hemodialysis, pd = peritoneal dialysis, hhd = home hemodialysis         Assessment/Plan:     1.  MICHAEL c proteinuria.  Pt's cr is trending lower each day and he is making a good amt of urine.  All serologies have been neg so far.  SPEP neg, UPEP mixed, so immunofix studies are pending for completion.  TBV is up.    A.  Follow labs, clinically.  B.  Avoid nephrotoxics.  C.  Continue oral furos.    2.  Fe def anemia.  Pt rec'd some blood yday.  With this, he rec'd some IV iron.  A.  Follow hb, clinically.  B.  Transfuse prn.    3.  FEN.  K is low.    A.  Start k protocol.  B.  Check mg and replace prn.        Interval History:     Pt feels ok and has no major complaints.  Denies any f/c/n/v.  He has some sob, which is unchanged.  His L stump hurts at times.            Medications and Allergies:       amLODIPine  5 mg Oral Daily     amoxicillin-clavulanate  1 tablet Oral Q12H ISIS     clobetasol   Topical At Bedtime     DULoxetine  30 mg Oral Daily     fluticasone-vilanterol  1 puff Inhalation Daily     furosemide  80 mg Oral Daily     gabapentin  200 mg Oral TID     hydrocortisone  20 mg Oral QAM     ipratropium - albuterol 0.5 mg/2.5 mg/3 mL  3 mL Nebulization 4x daily     multivitamin w/minerals  1 tablet Oral Daily     omeprazole  20 mg Oral  "Daily     polyethylene glycol  17 g Oral Daily     primidone  50 mg Oral At Bedtime     sodium chloride (PF)  3 mL Intracatheter Q8H     tamsulosin  0.8 mg Oral Daily     Allergies   Allergen Reactions     Darvocet [Propoxyphene N-Apap] Nausea     Vicodin [Hydrocodone-Acetaminophen] Nausea     Can use if he eats with it          Physical Exam:     Vitals were reviewed     , Blood pressure 135/60, pulse 98, temperature 99.7  F (37.6  C), temperature source Oral, resp. rate 16, height 1.651 m (5' 5\"), weight 74.2 kg (163 lb 9.3 oz), SpO2 92 %.  Wt Readings from Last 3 Encounters:   12/12/20 74.2 kg (163 lb 9.3 oz)   03/22/18 93 kg (205 lb)   01/21/16 70.3 kg (155 lb)     Intake/Output Summary (Last 24 hours) at 12/12/2020 1456  Last data filed at 12/11/2020 2040  Gross per 24 hour   Intake --   Output 300 ml   Net -300 ml     GENERAL APPEARANCE: pleasant, NAD, alert  HEENT:  eyes/ears/nose/neck grossly nl  RESP: lungs cta b c good efforts, no crackles  CV: RRR, nl S1/S2   ABDOMEN: s/nt/nd  EXTREMITIES/SKIN: RLE - braced/wrapped; L stump - 1+ edema; many tattoos         Data:     CBC RESULTS:     Recent Labs   Lab 12/12/20  0801 12/11/20  0713 12/09/20  0756 12/08/20  1226 12/07/20  1853 12/07/20  0656 12/06/20  0857   WBC 6.8 5.2 4.3 2.9*  --  2.7* 3.4*   RBC 2.61* 2.17* 2.58* 2.35*  --  2.07* 2.45*   HGB 7.9* 6.5* 7.7* 7.1* 7.3* 6.2* 7.3*   HCT 24.3* 20.5* 24.6* 22.3*  --  19.1* 22.6*   PLT 91* 75* 90* 68*  --  55* 62*     Basic Metabolic Panel:  Recent Labs   Lab 12/12/20  0801 12/11/20  0713 12/09/20  0756 12/08/20  1226 12/07/20  0656 12/06/20  0857    140 143 143 142 140   POTASSIUM 3.3* 3.2* 3.6 3.8 3.3* 3.3*   CHLORIDE 104 106 112* 112* 112* 112*   CO2 30 28 28 27 21 18*   BUN 34* 37* 39* 40* 43* 42*   CR 1.68* 1.87* 1.96* 2.06* 2.24* 2.32*   GLC 88 91 75 132* 135* 119*   MARTINE 7.7* 7.8* 8.0* 8.0* 7.8* 7.8*     INRNo lab results found in last 7 days.   Attestation:   I have reviewed today's relevant vital " signs, notes, medications, labs and imaging.    James Hawley MD  [facility] - Nephrology  [phone]

## 2020-12-12 NOTE — PLAN OF CARE
Pt was A & O x 4. Lungs sound diminished with expiratory wheezes, on neb treat and 3L of O2. Generalized edema, on lasix. Received 1unit of PRBC for hgb of 6.5. potassium was 3.2, received 40mEq of Potassium. SL. Received dilaudid for pain. Dressing on right leg changed. Incontinent of bowel and bladder. Had 3 loose BM today. Participated with PT today. Will continue to monitor.

## 2020-12-12 NOTE — PLAN OF CARE
A/Ox4, [place]. VSS on 3L NC (pts baseline). LS diminished with expiratory wheezes. T/R q2. Incontinent of B&B. 1 BM this am. Reg diet. Baseline neuropathy. L BKA. RLE would. PO dilaudid for pain.

## 2020-12-13 LAB
ANION GAP SERPL CALCULATED.3IONS-SCNC: 5 MMOL/L (ref 3–14)
BUN SERPL-MCNC: 30 MG/DL (ref 7–30)
CALCIUM SERPL-MCNC: 8.1 MG/DL (ref 8.5–10.1)
CHLORIDE SERPL-SCNC: 104 MMOL/L (ref 94–109)
CO2 SERPL-SCNC: 30 MMOL/L (ref 20–32)
CREAT SERPL-MCNC: 1.54 MG/DL (ref 0.66–1.25)
ERYTHROCYTE [DISTWIDTH] IN BLOOD BY AUTOMATED COUNT: 13.7 % (ref 10–15)
GFR SERPL CREATININE-BSD FRML MDRD: 46 ML/MIN/{1.73_M2}
GLUCOSE SERPL-MCNC: 169 MG/DL (ref 70–99)
HCT VFR BLD AUTO: 23.5 % (ref 40–53)
HGB BLD-MCNC: 7.5 G/DL (ref 13.3–17.7)
MAGNESIUM SERPL-MCNC: 1.7 MG/DL (ref 1.6–2.3)
MCH RBC QN AUTO: 29.9 PG (ref 26.5–33)
MCHC RBC AUTO-ENTMCNC: 31.9 G/DL (ref 31.5–36.5)
MCV RBC AUTO: 94 FL (ref 78–100)
PLATELET # BLD AUTO: 96 10E9/L (ref 150–450)
POTASSIUM SERPL-SCNC: 3.9 MMOL/L (ref 3.4–5.3)
RBC # BLD AUTO: 2.51 10E12/L (ref 4.4–5.9)
SODIUM SERPL-SCNC: 139 MMOL/L (ref 133–144)
WBC # BLD AUTO: 7.7 10E9/L (ref 4–11)

## 2020-12-13 PROCEDURE — 250N000013 HC RX MED GY IP 250 OP 250 PS 637: Performed by: INTERNAL MEDICINE

## 2020-12-13 PROCEDURE — 82784 ASSAY IGA/IGD/IGG/IGM EACH: CPT | Performed by: HOSPITALIST

## 2020-12-13 PROCEDURE — 94640 AIRWAY INHALATION TREATMENT: CPT | Mod: 76

## 2020-12-13 PROCEDURE — 120N000001 HC R&B MED SURG/OB

## 2020-12-13 PROCEDURE — 94640 AIRWAY INHALATION TREATMENT: CPT

## 2020-12-13 PROCEDURE — 80048 BASIC METABOLIC PNL TOTAL CA: CPT | Performed by: HOSPITALIST

## 2020-12-13 PROCEDURE — 85027 COMPLETE CBC AUTOMATED: CPT | Performed by: HOSPITALIST

## 2020-12-13 PROCEDURE — 999N000157 HC STATISTIC RCP TIME EA 10 MIN

## 2020-12-13 PROCEDURE — 99232 SBSQ HOSP IP/OBS MODERATE 35: CPT | Performed by: HOSPITALIST

## 2020-12-13 PROCEDURE — 86334 IMMUNOFIX E-PHORESIS SERUM: CPT | Mod: TC | Performed by: HOSPITALIST

## 2020-12-13 PROCEDURE — 250N000009 HC RX 250: Performed by: INTERNAL MEDICINE

## 2020-12-13 PROCEDURE — 250N000013 HC RX MED GY IP 250 OP 250 PS 637: Performed by: HOSPITALIST

## 2020-12-13 PROCEDURE — 86334 IMMUNOFIX E-PHORESIS SERUM: CPT | Mod: 26 | Performed by: PATHOLOGY

## 2020-12-13 PROCEDURE — 36415 COLL VENOUS BLD VENIPUNCTURE: CPT | Performed by: HOSPITALIST

## 2020-12-13 PROCEDURE — 83735 ASSAY OF MAGNESIUM: CPT | Performed by: HOSPITALIST

## 2020-12-13 RX ADMIN — IPRATROPIUM BROMIDE AND ALBUTEROL SULFATE 3 ML: .5; 3 SOLUTION RESPIRATORY (INHALATION) at 15:05

## 2020-12-13 RX ADMIN — AMOXICILLIN AND CLAVULANATE POTASSIUM 1 TABLET: 500; 125 TABLET, FILM COATED ORAL at 09:34

## 2020-12-13 RX ADMIN — TAMSULOSIN HYDROCHLORIDE 0.8 MG: 0.4 CAPSULE ORAL at 09:34

## 2020-12-13 RX ADMIN — FUROSEMIDE 80 MG: 40 TABLET ORAL at 09:34

## 2020-12-13 RX ADMIN — OMEPRAZOLE 20 MG: 20 CAPSULE, DELAYED RELEASE ORAL at 09:34

## 2020-12-13 RX ADMIN — LORAZEPAM 0.5 MG: 0.5 TABLET ORAL at 22:09

## 2020-12-13 RX ADMIN — IPRATROPIUM BROMIDE AND ALBUTEROL SULFATE 3 ML: .5; 3 SOLUTION RESPIRATORY (INHALATION) at 11:31

## 2020-12-13 RX ADMIN — GABAPENTIN 200 MG: 100 CAPSULE ORAL at 22:48

## 2020-12-13 RX ADMIN — HYDROCORTISONE 20 MG: 20 TABLET ORAL at 09:34

## 2020-12-13 RX ADMIN — FLUTICASONE FUROATE AND VILANTEROL TRIFENATATE 1 PUFF: 200; 25 POWDER RESPIRATORY (INHALATION) at 09:33

## 2020-12-13 RX ADMIN — AMLODIPINE BESYLATE 5 MG: 5 TABLET ORAL at 09:34

## 2020-12-13 RX ADMIN — GABAPENTIN 200 MG: 100 CAPSULE ORAL at 09:34

## 2020-12-13 RX ADMIN — GABAPENTIN 200 MG: 100 CAPSULE ORAL at 15:00

## 2020-12-13 RX ADMIN — DULOXETINE HYDROCHLORIDE 30 MG: 30 CAPSULE, DELAYED RELEASE ORAL at 09:34

## 2020-12-13 RX ADMIN — MULTIPLE VITAMINS W/ MINERALS TAB 1 TABLET: TAB at 09:34

## 2020-12-13 RX ADMIN — HYDROMORPHONE HYDROCHLORIDE 2 MG: 2 TABLET ORAL at 22:48

## 2020-12-13 RX ADMIN — LORAZEPAM 0.5 MG: 0.5 TABLET ORAL at 14:57

## 2020-12-13 RX ADMIN — IPRATROPIUM BROMIDE AND ALBUTEROL SULFATE 3 ML: .5; 3 SOLUTION RESPIRATORY (INHALATION) at 07:13

## 2020-12-13 RX ADMIN — PRIMIDONE 50 MG: 50 TABLET ORAL at 22:48

## 2020-12-13 RX ADMIN — HYDROMORPHONE HYDROCHLORIDE 2 MG: 2 TABLET ORAL at 19:38

## 2020-12-13 RX ADMIN — AMOXICILLIN AND CLAVULANATE POTASSIUM 1 TABLET: 500; 125 TABLET, FILM COATED ORAL at 22:48

## 2020-12-13 RX ADMIN — ACETAMINOPHEN 650 MG: 325 TABLET, FILM COATED ORAL at 23:59

## 2020-12-13 RX ADMIN — IPRATROPIUM BROMIDE AND ALBUTEROL SULFATE 3 ML: .5; 3 SOLUTION RESPIRATORY (INHALATION) at 19:23

## 2020-12-13 RX ADMIN — HYDROMORPHONE HYDROCHLORIDE 2 MG: 2 TABLET ORAL at 14:57

## 2020-12-13 RX ADMIN — TRIAMCINOLONE ACETONIDE: 1 CREAM TOPICAL at 17:34

## 2020-12-13 ASSESSMENT — ACTIVITIES OF DAILY LIVING (ADL)
ADLS_ACUITY_SCORE: 22

## 2020-12-13 NOTE — PLAN OF CARE
Pt. A&o forgetful at time, vss, up with 2 and lift, turn reposition, taking po dilaudid for pain, RLE dressing CDI, LLE BKA, K+ replaced, recheck in AM, magnisium also replaced, incontinent of B & B, Will continue to monitor.

## 2020-12-13 NOTE — PROGRESS NOTES
[facility]    Medicine Progress Note - Hospitalist Service       Date of Admission:  12/2/2020  Assessment & Plan       Perfecto Reese is a 70 year old male with complex PMHx including severe COPD on 3L home O2 with ongoing tobacco use, adrenal insufficiency on chronic steroids, psoriasis on Humira, chronic Hep C with cirrhosis, PVD s/p left BKA, chronic wounds, and non-compliance who was admitted on 12/2/2020 for pneumonia, found to have a RLL lung abscess.    Acute on chronic hypoxic and hypercarbic respiratory failure   Bilateral community-acquired bacterial pneumonia with RLL abscess Severe COPD  [albuterol, symbicort, spiriva]  * Baseline on 3 lpm/NC  * EMS activated by his home care RN for fever, cough, and acute respiratory distress (placed on 10L per OM in the field). Fever to 102.5, tachycardia, tachypnea, and leukocytosis (17.2k) present on arrival. CXR on arrival showed RLL infiltrate and CT abd/pelvis showed air fluid cavity at the right lung base. On admission, he was initiated on IV pip-tazo and azithromycin, and Thoracic Surgery was consulted. Chest CT was ordered (12/3) which showed a 6.4 cm RLL pulmonary abscess and extensive left-sided mucus plugging with associated infiltrate. ID then consulted  - Azithromycin d/c'd by ID12/4.  - 12/2 blood cultures x2 no growth.  - Sputum cx 12/4 growing proteus, fluoroquinolone resistant. Also corynebacterium striatum 12/6.  - CT surgery saw, no surgical intervention indicated.  - Continues on PTA inhalers (prn albuterol, symbicort (Brreo), spiriva (Incruse)).  - Encourage incentive spirometry, ambulate as tolerated, RCAT.  - Initial zosyn->unasyn-> transitioned 12/8 to Augmentin x 5 weeks (through 1/12/2021).  - Repeat CT scan in 4 weeks (~1/8/2021).  - ID following, greatly appreciate assistance.  - PT- rec TCU, patient reluctantly agreed, wants to go home. Encouraged patient to work with PT.  - O2 weaned back to baseline of 3  lpm.    Encephalopathy 2/2 acute hypercarbic respiratory failure  Markedly somnolent 12/8. Aroused appropriately but only with sternal rub. Denies c/o. ABG 7.27/60/116/27  - Acute hypercarbic respiratory failure noted 12/8-9, placed on BiPAP. Suspect large component of this was 2/2 sedation from narcotics. Doing well off BiPAP now.  - Decreased hydromorphone to 2 mg q4 hours on 12/8.  - CXR on 12/9 with likely atelectasis in addition to previously seen cavitary lesion.  - Scheduled nebs.  - Encephalopathy improved.    Acute kidney injury  Chronic kidney disease, stage 3  Asymptomatic pyuria  Creatinine 2.89 from baseline 1.2-1.3 in Jan 2020. Suspect pre-renal state in setting of pneumonia/sepsis with possible progression to ATN in setting of hypotension. Improved with IV fluids  - UA on arrival grossly dirty, but urine culture grew <10,000 normal adelina.  - Holding PTA losartan.  - With infection and active urine sediment as well as slowly improving creatinine could imply active GN in setting of infection. Prior creatinine at 1.2-1.3.   - Nephrology following, appreciate assistance  - Creatinine 2.65->2.45->2.32-> 2.24->2.06-> 1.96-> 1.87-> 1.68 on 12/12. Labs pending this morning.  - Lasix started on 12/9 per nephrology.    Pancytopenia  Chronic anemia  Hgb 9.6 on admission, no recent baseline. He has had no s/sx of bleeding. Repeat labs wbc 3.5, Hgb 6.1, platelets 74k 12/4, no signs of bleeding. Review of chart demonstrate pancytopenia 1/2020. Has h/o cirrhosis with portal hypotension, splenomegaly, CKD, autoimmune disorder.   - B12/ folate normal.  - tsat 27, ferritin 515 c/w chronic disease.  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  - S/p transfusion 1 unit pRBCs 12/4, 12/7, 12/11.  - Transfuse hgb <7, consent signed and in chart.  - Hematology followed, greatly appreciate assistance.  - Pancytopenia 2/2 liver cirrhosis,  splenomegaly, renal disease, CKD and psoriasis.   - WBC and platelets OK today. Hemoglobin improved to 7.9 on 12/12 after getting one unit of PRBCs on 12/11.  - Labs pending today.    Non-anion gap metabolic acidosis-resolved  - Suspected 2/2 diarrhea and hyperchloremic fluid resuscitation.     Chronic adrenal insufficiency  Intermittent hypotension and diarrhea noted on admission, likely due to adrenal crisis in the setting of acute illness and steroid non-compliance. Improved upon resumption of previously prescribed hydrocortisone  - Continues on PTA hydrocortisone 20 mg PO daily, no plan for stress dose steroids at this time.    Essential hypertension  [PTA: amlodipine 10 mg qhs, losartan 12.5 mg daily]  Intermittent hypotension noted on admission, likely due to adrenal insufficiency. Now improved and stable  - Restarted amlodipine at 5 mg daily on 12/9.  - PTA losartan on hold for now in setting of MICHAEL.    History of psoriasis   - Holding PTA Humira.  - Topical clobetasol at HS.    Peripheral vascular disease s/p left BKA  Chronic RLE wound  Chronic left ear wound  Present on admission. Receives wound cares per home care RN.  - Wound cares in place as per WOCN.     Chronic Hepatitis C with cirrhosis  Treated with interferon on the past  - Appears compensated.    GERD  - Chronic and stable on omeprazole 20 mg daily.    BPH  - Chronic and stable on tamsulosin 0.8 mg daily.      Tobacco use disorder  - Has not had a cigarette in the past few weeks since he has been feeling sick.    Chronic pain syndrome  [PTA: gabapentin 300 mg TID, duloxetine 30 mg daily, lidocaine patch, oxycodone 5 mg q6h prn, morphine 2.5 mg BID prn]  - Gabapentin decreased to 200 mg TID in the setting of renal failure  - Continues on lidocaine patch, duloxetine, PO morphine changed to Dilaudid PO prn due to MICHAEL. Dilaudid dose decreased on 12/8 due to concerns about excessive sedation.    Non-severe malnutrition    - Level of malnutrition:  Non-Severe   - Based on: mild (or greater) subcutaneous fat loss, mild (or greater) muscle loss, mild (or greater) fluid retention     Diet: Regular Diet Adult  Snacks/Supplements Adult: Boost Plus; Between Meals    DVT Prophylaxis: Pneumatic Compression Devices  Bruce Catheter: not present  Code Status: Full Code           Disposition Plan   Expected discharge: TCU soon if patient agreeable, hemoglobin stable, bed available, pending nephrology recommendations  Entered: Telly Vasquez MD 12/13/2020, 11:36 AM       The patient's care was discussed with the Bedside Nurse and Patient.    Telly Vasquez MD  Hospitalist Service  [facility]  Contact information available via [facility] Paging/Directory    ______________________________________________________________________    Interval History   Perfecto Reese feels OK today. Denies fevers, nausea, abdominal pain, diarrhea. Continues to have right sided chest pain and shortness of breath.    Data reviewed today: I reviewed all medications, new labs and imaging results over the last 24 hours. I personally reviewed no images or EKG's today.    Physical Exam   Vital Signs: Temp: 99.3  F (37.4  C) Temp src: Oral BP: 126/57 Pulse: 87   Resp: 16 SpO2: 95 % O2 Device: Nasal cannula Oxygen Delivery: 3 LPM  Weight: 163 lbs 9.3 oz  Constitutional: awake, alert, cooperative, no apparent distress, laying in bed  Respiratory: clear to auscultation bilaterally, no crackles or wheezing  Cardiovascular: regular rate and rhythm, normal S1 and S2, no murmur noted  GI: normal bowel sounds, soft, non-distended, non-tender  Skin: warm, dry  Musculoskeletal: 1-2+ lower extremity pitting edema present, s/p L BKA, right lower extremity with dressing and offloading boot in place  Neurologic: awake, alert, oriented to name, place and time    Data   Recent Labs   Lab 12/12/20  1532 12/12/20  0801 12/11/20  0713 12/09/20  0756   WBC  --  6.8 5.2 4.3   HGB  --  7.9*  6.5* 7.7*   MCV  --  93 95 95   PLT  --  91* 75* 90*   NA  --  139 140 143   POTASSIUM 4.2 3.3* 3.2* 3.6   CHLORIDE  --  104 106 112*   CO2  --  30 28 28   BUN  --  34* 37* 39*   CR  --  1.68* 1.87* 1.96*   ANIONGAP  --  5 6 3   MARTINE  --  7.7* 7.8* 8.0*   GLC  --  88 91 75     Medications     - MEDICATION INSTRUCTIONS -       - MEDICATION INSTRUCTIONS -         amLODIPine  5 mg Oral Daily     amoxicillin-clavulanate  1 tablet Oral Q12H ISIS     clobetasol   Topical At Bedtime     DULoxetine  30 mg Oral Daily     fluticasone-vilanterol  1 puff Inhalation Daily     furosemide  80 mg Oral Daily     gabapentin  200 mg Oral TID     hydrocortisone  20 mg Oral QAM     ipratropium - albuterol 0.5 mg/2.5 mg/3 mL  3 mL Nebulization 4x daily     multivitamin w/minerals  1 tablet Oral Daily     omeprazole  20 mg Oral Daily     polyethylene glycol  17 g Oral Daily     primidone  50 mg Oral At Bedtime     sodium chloride (PF)  3 mL Intracatheter Q8H     tamsulosin  0.8 mg Oral Daily

## 2020-12-13 NOTE — PLAN OF CARE
Pt A/Ox4, VSS on 3L02, refusing cares at times, refusing repositioning, explained risks, PO Dilaudid, incontinent, will continue to monitor

## 2020-12-13 NOTE — PROGRESS NOTES
[facility]     Renal Progress Note       SHORTHAND KEY FOR MY NOTES:  c = with, s = without, p = after, a = before, x = except, asx = asymptomatic, tx = transplant or treatment, sx = symptoms or symptomatic, cx = canceled or culture, rxn = reaction, yday = yesterday, nl = normal, abx = antibiotics, fxn = function, dx = diagnosis, dz = disease, m/h = melena/hematochezia, c/d/l/ha = cramping/dizziness/lightheadedness/headache, d/c = discharge or diarrhea/constipation, f/c/n/v = fevers/chills/nausea/vomiting, cp/sob = chest pain/shortness of breath, tbv = total body volume, rxn = reaction, tdc = tunneled dialysis catheter, pta = prior to admission, hd = hemodialysis, pd = peritoneal dialysis, hhd = home hemodialysis         Assessment/Plan:     1.  MICHAEL c proteinuria.  Pt's labs have not yet been done, but are pending.  Immunofix pending.    A.  Follow labs daily.  Await today's results.  B.  Continue diuretics.    2.  Fe def anemia.  Pt's hb is trending lower again.  He was transfused 2d ago.  A.  Follow hb, clinically.  B.  Transfuse prn.    3.  FEN.  K is better p replacement.  Mg was replaced yday; labs pending.  A.  Continue k, mg protocols.  B.  Follow electrolytes.        Interval History:     Pt feels fine today.  No major issues noted - denies any f/c/n/v/itching.  Breathing is the same.    He refused labs earlier today.          Medications and Allergies:       amLODIPine  5 mg Oral Daily     amoxicillin-clavulanate  1 tablet Oral Q12H ISIS     clobetasol   Topical At Bedtime     DULoxetine  30 mg Oral Daily     fluticasone-vilanterol  1 puff Inhalation Daily     furosemide  80 mg Oral Daily     gabapentin  200 mg Oral TID     hydrocortisone  20 mg Oral QAM     ipratropium - albuterol 0.5 mg/2.5 mg/3 mL  3 mL Nebulization 4x daily     multivitamin w/minerals  1 tablet Oral Daily     omeprazole  20 mg Oral Daily     polyethylene glycol  17 g Oral Daily     primidone  50 mg Oral At  "Bedtime     sodium chloride (PF)  3 mL Intracatheter Q8H     tamsulosin  0.8 mg Oral Daily     Allergies   Allergen Reactions     Darvocet [Propoxyphene N-Apap] Nausea     Vicodin [Hydrocodone-Acetaminophen] Nausea     Can use if he eats with it          Physical Exam:     Vitals were reviewed     , Blood pressure (!) 146/59, pulse 96, temperature 99.7  F (37.6  C), temperature source Oral, resp. rate 16, height 1.651 m (5' 5\"), weight 74.2 kg (163 lb 9.3 oz), SpO2 95 %.  Wt Readings from Last 3 Encounters:   12/12/20 74.2 kg (163 lb 9.3 oz)   03/22/18 93 kg (205 lb)   01/21/16 70.3 kg (155 lb)     Intake/Output Summary (Last 24 hours) at 12/13/2020 1540  Last data filed at 12/12/2020 2000  Gross per 24 hour   Intake 270 ml   Output --   Net 270 ml     GENERAL APPEARANCE: pleasant, NAD, alert, lying in bed  HEENT:  eyes/ears/nose/neck grossly nl  RESP: lungs cta b c good efforts, no crackles; + nc o2  CV: RRR, nl S1/S2   ABDOMEN: s/nt/nd  EXTREMITIES/SKIN: RLE - braced/wrapped; L stump - 1+ edema; many tattoos         Data:     CBC RESULTS:     Recent Labs   Lab 12/13/20  1505 12/12/20  0801 12/11/20  0713 12/09/20  0756 12/08/20  1226 12/07/20  1853 12/07/20  0656   WBC 7.7 6.8 5.2 4.3 2.9*  --  2.7*   RBC 2.51* 2.61* 2.17* 2.58* 2.35*  --  2.07*   HGB 7.5* 7.9* 6.5* 7.7* 7.1* 7.3* 6.2*   HCT 23.5* 24.3* 20.5* 24.6* 22.3*  --  19.1*   PLT 96* 91* 75* 90* 68*  --  55*     Basic Metabolic Panel:  Recent Labs   Lab 12/12/20  1532 12/12/20  0801 12/11/20  0713 12/09/20  0756 12/08/20  1226 12/07/20  0656   NA  --  139 140 143 143 142   POTASSIUM 4.2 3.3* 3.2* 3.6 3.8 3.3*   CHLORIDE  --  104 106 112* 112* 112*   CO2  --  30 28 28 27 21   BUN  --  34* 37* 39* 40* 43*   CR  --  1.68* 1.87* 1.96* 2.06* 2.24*   GLC  --  88 91 75 132* 135*   MARTINE  --  7.7* 7.8* 8.0* 8.0* 7.8*     INRNo lab results found in last 7 days.   Attestation:   I have reviewed today's relevant vital signs, notes, medications, labs and " imaging.    James Hawley MD  [facility] - Nephrology  [phone]

## 2020-12-14 LAB
GBM IGG SER IA-ACNC: <0.2 AI (ref 0–0.9)
HCV RNA SERPL NAA+PROBE-ACNC: NORMAL [IU]/ML
HCV RNA SERPL NAA+PROBE-LOG IU: NORMAL LOG IU/ML
IGA SERPL-MCNC: 702 MG/DL (ref 84–499)
IGG SERPL-MCNC: 1687 MG/DL (ref 610–1616)
IGM SERPL-MCNC: 38 MG/DL (ref 35–242)
PROT PATTERN SERPL IFE-IMP: ABNORMAL

## 2020-12-14 PROCEDURE — 250N000013 HC RX MED GY IP 250 OP 250 PS 637: Performed by: HOSPITALIST

## 2020-12-14 PROCEDURE — 250N000013 HC RX MED GY IP 250 OP 250 PS 637: Performed by: INTERNAL MEDICINE

## 2020-12-14 PROCEDURE — 94640 AIRWAY INHALATION TREATMENT: CPT | Mod: 76

## 2020-12-14 PROCEDURE — 250N000009 HC RX 250: Performed by: INTERNAL MEDICINE

## 2020-12-14 PROCEDURE — 94640 AIRWAY INHALATION TREATMENT: CPT

## 2020-12-14 PROCEDURE — 99232 SBSQ HOSP IP/OBS MODERATE 35: CPT | Performed by: INTERNAL MEDICINE

## 2020-12-14 PROCEDURE — 999N000157 HC STATISTIC RCP TIME EA 10 MIN

## 2020-12-14 PROCEDURE — 120N000001 HC R&B MED SURG/OB

## 2020-12-14 RX ADMIN — GABAPENTIN 200 MG: 100 CAPSULE ORAL at 17:49

## 2020-12-14 RX ADMIN — IPRATROPIUM BROMIDE AND ALBUTEROL SULFATE 3 ML: .5; 3 SOLUTION RESPIRATORY (INHALATION) at 11:05

## 2020-12-14 RX ADMIN — GABAPENTIN 200 MG: 100 CAPSULE ORAL at 13:59

## 2020-12-14 RX ADMIN — FUROSEMIDE 80 MG: 40 TABLET ORAL at 13:59

## 2020-12-14 RX ADMIN — IPRATROPIUM BROMIDE AND ALBUTEROL SULFATE 3 ML: .5; 3 SOLUTION RESPIRATORY (INHALATION) at 14:40

## 2020-12-14 RX ADMIN — GABAPENTIN 200 MG: 100 CAPSULE ORAL at 21:11

## 2020-12-14 RX ADMIN — DULOXETINE HYDROCHLORIDE 30 MG: 30 CAPSULE, DELAYED RELEASE ORAL at 13:59

## 2020-12-14 RX ADMIN — AMLODIPINE BESYLATE 5 MG: 5 TABLET ORAL at 13:59

## 2020-12-14 RX ADMIN — PRIMIDONE 50 MG: 50 TABLET ORAL at 21:11

## 2020-12-14 RX ADMIN — IPRATROPIUM BROMIDE AND ALBUTEROL SULFATE 3 ML: .5; 3 SOLUTION RESPIRATORY (INHALATION) at 19:44

## 2020-12-14 RX ADMIN — HYDROMORPHONE HYDROCHLORIDE 2 MG: 2 TABLET ORAL at 14:18

## 2020-12-14 RX ADMIN — TAMSULOSIN HYDROCHLORIDE 0.8 MG: 0.4 CAPSULE ORAL at 14:00

## 2020-12-14 RX ADMIN — AMOXICILLIN AND CLAVULANATE POTASSIUM 1 TABLET: 500; 125 TABLET, FILM COATED ORAL at 14:08

## 2020-12-14 RX ADMIN — MULTIPLE VITAMINS W/ MINERALS TAB 1 TABLET: TAB at 13:59

## 2020-12-14 RX ADMIN — OMEPRAZOLE 20 MG: 20 CAPSULE, DELAYED RELEASE ORAL at 13:59

## 2020-12-14 RX ADMIN — HYDROCORTISONE 20 MG: 20 TABLET ORAL at 14:00

## 2020-12-14 RX ADMIN — LORAZEPAM 0.5 MG: 0.5 TABLET ORAL at 15:25

## 2020-12-14 RX ADMIN — IPRATROPIUM BROMIDE AND ALBUTEROL SULFATE 3 ML: .5; 3 SOLUTION RESPIRATORY (INHALATION) at 06:48

## 2020-12-14 ASSESSMENT — ACTIVITIES OF DAILY LIVING (ADL)
ADLS_ACUITY_SCORE: 27
ADLS_ACUITY_SCORE: 23
ADLS_ACUITY_SCORE: 22
ADLS_ACUITY_SCORE: 22
ADLS_ACUITY_SCORE: 27
ADLS_ACUITY_SCORE: 22

## 2020-12-14 ASSESSMENT — MIFFLIN-ST. JEOR: SCORE: 1432.88

## 2020-12-14 NOTE — PLAN OF CARE
PRN Dilaudid x1 for pain, patient refused repositioning/meds/cares most of shift, agitated/angry/withdrawn, tele - NSR, refused meals, and MD updated with patient's behavior. Continue to monitor.

## 2020-12-14 NOTE — PROGRESS NOTES
[facility]    Medicine Progress Note - Hospitalist Service       Date of Admission:  12/2/2020  Assessment & Plan       Perfecto Reese is a 70 year old male with complex PMHx including severe COPD on 3L home O2 with ongoing tobacco use, adrenal insufficiency on chronic steroids, psoriasis on Humira, chronic Hep C with cirrhosis, PVD s/p left BKA, chronic wounds, and non-compliance who was admitted on 12/2/2020 for pneumonia, found to have a RLL lung abscess.    Acute on chronic hypoxic and hypercarbic respiratory failure   Bilateral community-acquired bacterial pneumonia with RLL abscess Severe COPD  [albuterol, symbicort, spiriva]  * Baseline on 3 lpm/NC  * EMS activated by his home care RN for fever, cough, and acute respiratory distress (placed on 10L per OM in the field). Fever to 102.5, tachycardia, tachypnea, and leukocytosis (17.2k) present on arrival. CXR on arrival showed RLL infiltrate and CT abd/pelvis showed air fluid cavity at the right lung base. On admission, he was initiated on IV pip-tazo and azithromycin, and Thoracic Surgery was consulted. Chest CT was ordered (12/3) which showed a 6.4 cm RLL pulmonary abscess and extensive left-sided mucus plugging with associated infiltrate. ID then consulted  - 12/2 blood cultures x2 no growth.  - Sputum cx 12/4 growing proteus, fluoroquinolone resistant. Also corynebacterium striatum 12/6.  - CT surgery saw, no surgical intervention indicated.  - Continues on PTA inhalers (prn albuterol, symbicort (Brreo), spiriva (Incruse)).  - Encourage incentive spirometry, ambulate as tolerated, RCAT.  - Initial zosyn->unasyn-> transitioned 12/8 to Augmentin x 5 weeks (through 1/12/2021).  - Repeat CT scan in 4 weeks (~1/8/2021).  - ID following, greatly appreciate assistance.  - O2 weaned back to baseline of 3 lpm  - PT- rec TCU, patient refusing. If hgb is stable 12/14 will discharge    Encephalopathy 2/2 acute hypercarbic respiratory  failure  Markedly somnolent 12/8. Aroused appropriately but only with sternal rub. Denies c/o. ABG 7.27/60/116/27  - Acute hypercarbic respiratory failure noted 12/8-9, placed on BiPAP. Suspect large component of this was 2/2 sedation from narcotics. Doing well off BiPAP now.  - Decreased hydromorphone to 2 mg q4 hours on 12/8.  - CXR on 12/9 with likely atelectasis in addition to previously seen cavitary lesion.  - Scheduled nebs.  - Encephalopathy improved, mental status at baseline    Acute kidney injury  Chronic kidney disease, stage 3  Asymptomatic pyuria  Creatinine 2.89 from baseline 1.2-1.3 in Jan 2020. Suspect pre-renal state in setting of pneumonia/sepsis with possible progression to ATN in setting of hypotension. Improved with IV fluids  - UA on arrival grossly dirty, but urine culture grew <10,000 normal adelina.  - Holding PTA losartan.  - With infection and active urine sediment as well as slowly improving creatinine could imply active GN in setting of infection. Prior creatinine at 1.2-1.3.   - Nephrology following, appreciate assistance  - Creatinine 2.65----> 1.68->1.54 on 12/14  - Lasix started on 12/9 per nephrology, currently on 80 mg daily  - defer to nephrology on diuretic at discharge    Pancytopenia  Chronic anemia  Hgb 9.6 on admission, no recent baseline. He has had no s/sx of bleeding. Repeat labs wbc 3.5, Hgb 6.1, platelets 74k 12/4, no signs of bleeding. Review of chart demonstrate pancytopenia 1/2020. Has h/o cirrhosis with portal hypotension, splenomegaly, CKD, autoimmune disorder.   - B12/ folate normal.  - tsat 27, ferritin 515 c/w chronic disease.  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  - Hematology followed, greatly appreciate assistance.  - Pancytopenia 2/2 liver cirrhosis, splenomegaly, renal disease, CKD and psoriasis.   - Transfuse hgb <7, consent signed and in chart.  - S/p transfusion 1 unit  pRBCs 12/4, 12/7, 12/11.  - hgb 7.5 12/13 (7.9 12/12). Last transfusion 12/11  - repeat labs in the am    Non-anion gap metabolic acidosis-resolved  - Suspected 2/2 diarrhea and hyperchloremic fluid resuscitation.     Chronic adrenal insufficiency  Intermittent hypotension and diarrhea noted on admission, likely due to adrenal crisis in the setting of acute illness and steroid non-compliance. Improved upon resumption of previously prescribed hydrocortisone  - Continues on PTA hydrocortisone 20 mg PO daily, no plan for stress dose steroids at this time.    Essential hypertension  [PTA: amlodipine 10 mg qhs, losartan 12.5 mg daily]  Intermittent hypotension noted on admission, likely due to adrenal insufficiency. Now improved and stable  - Restarted amlodipine at 5 mg daily on 12/9.  - PTA losartan on hold for now in setting of MICHAEL.  - on furosemide, weights appear unchanged  - likely discharge on lower dose of amlodipine, diuretic and hold losartan until follow up    History of psoriasis   - Holding PTA Humira.  - Topical clobetasol at HS.    Peripheral vascular disease s/p left BKA  Chronic RLE wound  Chronic left ear wound  Present on admission. Receives wound cares per home care RN.  - Wound cares in place as per WOCN.     Chronic Hepatitis C with cirrhosis  Treated with interferon on the past  - Appears compensated.    GERD  - Chronic and stable on omeprazole 20 mg daily.    BPH  - Chronic and stable on tamsulosin 0.8 mg daily.      Tobacco use disorder  - Has not had a cigarette in the past few weeks since he has been feeling sick.    Chronic pain syndrome  [PTA: gabapentin 300 mg TID, duloxetine 30 mg daily, lidocaine patch, oxycodone 5 mg q6h prn, morphine 2.5 mg BID prn]  - Gabapentin decreased to 200 mg TID in the setting of renal failure  - Continues on lidocaine patch, duloxetine, PO morphine changed to Dilaudid PO prn due to MICHAEL. Dilaudid dose decreased on 12/8 due to concerns about excessive  sedation.    Non-severe malnutrition    - Level of malnutrition: Non-Severe   - Based on: mild (or greater) subcutaneous fat loss, mild (or greater) muscle loss, mild (or greater) fluid retention     Diet: Regular Diet Adult  Snacks/Supplements Adult: Boost Plus; Between Meals    DVT Prophylaxis: Pneumatic Compression Devices  Bruce Catheter: not present  Code Status: Full Code           Disposition Plan   Expected discharge: likely discharge 12/15 pending stable hgb and cleared by nephrology for discharge  Entered: Felix Palacio MD 12/14/2020, 3:53 PM       The patient's care was discussed with the Bedside Nurse and Patient.    Felix Palacio MD  Hospitalist Service  [facility]  Contact information available via [facility] Paging/Directory    ______________________________________________________________________    Interval History   Overnight events reviewed. Denies cp/sob. Pain along R flank. Eating ok.    Data reviewed today: I reviewed all medications, new labs and imaging results over the last 24 hours. I personally reviewed no images or EKG's today.    Physical Exam   Vital Signs: Temp: 99.1  F (37.3  C) Temp src: Oral BP: 126/42 Pulse: 86   Resp: 16 SpO2: 91 % O2 Device: Nasal cannula Oxygen Delivery: 3 LPM  Weight: 162 lbs 4.14 oz  Constitutional: awake, alert, cooperative, no apparent distress, laying in bed  Respiratory: clear to auscultation bilaterally, no crackles or wheezing  Cardiovascular: regular rate and rhythm, normal S1 and S2, no murmur noted  GI: normal bowel sounds, soft, non-distended, non-tender  Skin: warm, dry  Musculoskeletal: 1-2+ lower extremity pitting edema present, s/p L BKA, right lower extremity with dressing and offloading boot in place  Neurologic: awake, alert, oriented to name, place and time    Data   Recent Labs   Lab 12/13/20  1505 12/12/20  1532 12/12/20  0801 12/11/20  0713   WBC 7.7  --  6.8 5.2   HGB 7.5*  --  7.9* 6.5*   MCV 94  --  93 95    PLT 96*  --  91* 75*     --  139 140   POTASSIUM 3.9 4.2 3.3* 3.2*   CHLORIDE 104  --  104 106   CO2 30  --  30 28   BUN 30  --  34* 37*   CR 1.54*  --  1.68* 1.87*   ANIONGAP 5  --  5 6   MARTINE 8.1*  --  7.7* 7.8*   *  --  88 91     Medications     - MEDICATION INSTRUCTIONS -       - MEDICATION INSTRUCTIONS -         amLODIPine  5 mg Oral Daily     amoxicillin-clavulanate  1 tablet Oral Q12H ISIS     clobetasol   Topical At Bedtime     DULoxetine  30 mg Oral Daily     fluticasone-vilanterol  1 puff Inhalation Daily     furosemide  80 mg Oral Daily     gabapentin  200 mg Oral TID     hydrocortisone  20 mg Oral QAM     ipratropium - albuterol 0.5 mg/2.5 mg/3 mL  3 mL Nebulization 4x daily     multivitamin w/minerals  1 tablet Oral Daily     omeprazole  20 mg Oral Daily     polyethylene glycol  17 g Oral Daily     primidone  50 mg Oral At Bedtime     sodium chloride (PF)  3 mL Intracatheter Q8H     tamsulosin  0.8 mg Oral Daily

## 2020-12-14 NOTE — PROGRESS NOTES
SPIRITUAL HEALTH SERVICES Progress Note  [facility]    Referral Source: Length of Stay    PT indicated that he is experiencing pain and feeling tired and declined a visit at this time.    PT indicated that he is open to a visit in the next few days when he is feeling up to it.     Follow-up with PT in next 1-2 days if he remains in hospital.      Ye Lovelace  Chaplain Resident

## 2020-12-14 NOTE — PROGRESS NOTES
Renal Medicine Progress Note                                Perfecto Reese MRN# [id number]   Age: 70 year old YOB: 1950   Date of Admission: 12/2/2020 Hospital LOS: 12                  Assessment/Plan:     70 year old male with complex PMHx including severe COPD on 3L home O2 with ongoing tobacco use, adrenal insufficiency on chronic steroids, psoriasis on Humira, chronic Hep C with cirrhosis, PVD s/p left BKA, chronic wounds, and non-compliance who was admitted on 12/2/2020 for pneumonia, found to have a RLL lung abscess.      1.  CKD   -baseline creatinine 1.2 to 1.3 mg/dl  2.  Proteinuria   -non nephrotic   -all serologic studies negative  3.  Acute Kidney Injury   -non oliguric (IO incomplete)   -improving  4.  PNA       Continue current meds  Follow labs  Renal follow-up post discharge       Interval History:     Sleeping but arouses  No complaints  IO incomplete    Labs improving          ROS:     GENERAL: NAD, No fever,chills  R: NEGATIVE for significant cough or SOB  CV: NEGATIVE for chest pain, palpitations  EXT: no change edema  ROS otherwise negative    Medications and Allergies:     Reviewed    Physical Exam:     Vitals were reviewed  Patient Vitals for the past 8 hrs:   BP Temp Temp src Pulse Resp SpO2 Weight   12/14/20 0749 130/59 99.3  F (37.4  C) Oral 83 16 96 % --   12/14/20 0408 -- -- -- -- -- -- 73.6 kg (162 lb 4.1 oz)     I/O last 3 completed shifts:  In: 840 [P.O.:840]  Out: -     Vitals:    12/08/20 0703 12/09/20 0506 12/11/20 0500 12/12/20 0646   Weight: 64.5 kg (142 lb 3.2 oz) 64.5 kg (142 lb 3.2 oz) 74.2 kg (163 lb 9.3 oz) 74.2 kg (163 lb 9.3 oz)    12/14/20 0408   Weight: 73.6 kg (162 lb 4.1 oz)         GENERAL: awake, alert, follows  HEENT: NC/AT, PERRLA, EOMI, non icteric, pharynx moist without lesion  RESP:  clear anteriorly  CV: RRR, normal S1 S2  ABDOMEN: soft, nontender, no HSM or masses and bowel sounds normal  MS: no clubbing, cyanosis   SKIN: clear without  significant rashes or lesions  EXT: left BKA    Data:     Recent Labs   Lab 12/13/20  1505 12/12/20  1532 12/12/20  0801 12/11/20  0713 12/09/20  0756     --  139 140 143   POTASSIUM 3.9 4.2 3.3* 3.2* 3.6   CHLORIDE 104  --  104 106 112*   CO2 30  --  30 28 28   ANIONGAP 5  --  5 6 3   *  --  88 91 75   BUN 30  --  34* 37* 39*   CR 1.54*  --  1.68* 1.87* 1.96*   GFRESTIMATED 46*  --  41* 36* 34*   GFRESTBLACK 53*  --  48* 42* 39*   MARTINE 8.1*  --  7.7* 7.8* 8.0*         Recent Labs   Lab Test 12/13/20  1505 12/12/20  0801 12/11/20  0713 12/09/20  0756 12/08/20  1226 12/07/20  0656 12/06/20  0857 12/05/20  0804 12/04/20  1245 12/02/20  1845   CR 1.54* 1.68* 1.87* 1.96* 2.06* 2.24* 2.32* 2.45* 2.61* 2.89*     No lab results found in last 7 days.  Recent Labs   Lab 12/13/20  1505 12/12/20  0801 12/11/20  0713 12/09/20  0756   HGB 7.5* 7.9* 6.5* 7.7*     No lab results found in last 7 days.      ASA Fischer MD    [facility] - Nephrology  [phone]

## 2020-12-14 NOTE — PLAN OF CARE
Pt. A&o VSS on 3L NC forgetful at times, vss, turn repositioned every 2 hrs, taking po dilaudid for pain, poor appetite dressing to RLE changed per order, incontinent of B&B, had 2x b.m today, Will continue to monitor.

## 2020-12-14 NOTE — PROGRESS NOTES
[facility]    Infectious Disease Progress Note    Date of Service (when I saw the patient): 12/14/2020     Assessment & Plan   Perfecto Reese is a 70 year old male who was admitted on 12/2/2020.       Impression:  1. 70 y.o male with COPD on Home oxygen.   2. Ongoing tobacco abuse.   3. Adrenal insufficiency on chronic steroids   4. Psoriasis on humira.   5. Chronic hep C. Cirrhosis.   6. PVD, S/P BKA.   7. Non compliance.   8. Admitted with pneumonia symptoms and imaging with lung abscesses.   9. On zosyn. And on azithromycin.   10. Sputum culture with proteus.      Recommendations:   Continue on  oral Augmentin, dose reduced given kidney function, discharge on a total of 6 weeks of oral augmentin   Repeat a CT scan in 4 weeks.     Rc Hu MD    Interval History   Afebrile   Cultures noted   Major complain in right sided chest pain     Physical Exam   Temp: 99.3  F (37.4  C) Temp src: Oral BP: 130/59 Pulse: 83   Resp: 16 SpO2: 96 % O2 Device: Nasal cannula Oxygen Delivery: 2 LPM  Vitals:    12/11/20 0500 12/12/20 0646 12/14/20 0408   Weight: 74.2 kg (163 lb 9.3 oz) 74.2 kg (163 lb 9.3 oz) 73.6 kg (162 lb 4.1 oz)     Vital Signs with Ranges  Temp:  [99.2  F (37.3  C)-99.8  F (37.7  C)] 99.3  F (37.4  C)  Pulse:  [83-96] 83  Resp:  [14-16] 16  BP: (116-146)/(50-62) 130/59  SpO2:  [94 %-96 %] 96 %    Constitutional: lethargic today, wakes up though   Lungs: Clear to auscultation bilaterally, no crackles or wheezing  Cardiovascular: Regular rate and rhythm, normal S1 and S2, and no murmur noted  Abdomen: Normal bowel sounds, soft, non-distended, non-tender  Skin: No rashes, no cyanosis, no edema  Other:    Medications     - MEDICATION INSTRUCTIONS -       - MEDICATION INSTRUCTIONS -         amLODIPine  5 mg Oral Daily     amoxicillin-clavulanate  1 tablet Oral Q12H ISIS     clobetasol   Topical At Bedtime     DULoxetine  30 mg Oral Daily     fluticasone-vilanterol  1 puff Inhalation  Daily     furosemide  80 mg Oral Daily     gabapentin  200 mg Oral TID     hydrocortisone  20 mg Oral QAM     ipratropium - albuterol 0.5 mg/2.5 mg/3 mL  3 mL Nebulization 4x daily     multivitamin w/minerals  1 tablet Oral Daily     omeprazole  20 mg Oral Daily     polyethylene glycol  17 g Oral Daily     primidone  50 mg Oral At Bedtime     sodium chloride (PF)  3 mL Intracatheter Q8H     tamsulosin  0.8 mg Oral Daily       Data   All microbiology laboratory data reviewed.  Recent Labs   Lab Test 12/13/20  1505 12/12/20  0801 12/11/20  0713   WBC 7.7 6.8 5.2   HGB 7.5* 7.9* 6.5*   HCT 23.5* 24.3* 20.5*   MCV 94 93 95   PLT 96* 91* 75*     Recent Labs   Lab Test 12/13/20  1505 12/12/20  0801 12/11/20  0713   CR 1.54* 1.68* 1.87*     No lab results found.  Recent Labs   Lab Test 12/03/20  0540 12/03/20  0230 12/02/20  1904 12/02/20  1844   CULT Light growth  Normal adelina    Light growth  Proteus mirabilis  *  Moderate growth  Corynebacterium striatum  Identification obtained by MALDI-TOF mass spectrometry research use only database. Test   characteristics determined and verified by the [facility]   ([facility]) [place], [place].  Susceptibility testing not routinely done  * <10,000 colonies/mL  mixed urogenital adelina  Susceptibility testing not routinely done   No growth No growth       Attestation:  Total time on the floor involved in the patient's care: 35 minutes. Total time spent in counseling/care coordination: >50%

## 2020-12-14 NOTE — PLAN OF CARE
Pt A&Ox 4. VSS on RA. CMS intact except baseline neuropathy. Dressing c//d/I, boot on. Incontinent of b/b. Turn side to side in the night. Pain managed with PRN dilaudid. IV SL. Will continue to monitor.

## 2020-12-14 NOTE — PROVIDER NOTIFICATION
Notified Dr. Palacio that patient is refusing cares and medications. NA who worked with this patient stated that this is new. MD returned call and stated that this isn't new for patient and to re-approach later.

## 2020-12-15 LAB
ANION GAP SERPL CALCULATED.3IONS-SCNC: 6 MMOL/L (ref 3–14)
BASOPHILS # BLD AUTO: 0 10E9/L (ref 0–0.2)
BASOPHILS NFR BLD AUTO: 0.4 %
BUN SERPL-MCNC: 27 MG/DL (ref 7–30)
CALCIUM SERPL-MCNC: 7.9 MG/DL (ref 8.5–10.1)
CHLORIDE SERPL-SCNC: 100 MMOL/L (ref 94–109)
CO2 SERPL-SCNC: 32 MMOL/L (ref 20–32)
CREAT SERPL-MCNC: 1.57 MG/DL (ref 0.66–1.25)
DIFFERENTIAL METHOD BLD: ABNORMAL
EOSINOPHIL # BLD AUTO: 0.2 10E9/L (ref 0–0.7)
EOSINOPHIL NFR BLD AUTO: 2.5 %
ERYTHROCYTE [DISTWIDTH] IN BLOOD BY AUTOMATED COUNT: 13.6 % (ref 10–15)
GFR SERPL CREATININE-BSD FRML MDRD: 44 ML/MIN/{1.73_M2}
GLUCOSE SERPL-MCNC: 114 MG/DL (ref 70–99)
HCT VFR BLD AUTO: 22.2 % (ref 40–53)
HGB BLD-MCNC: 7.1 G/DL (ref 13.3–17.7)
IMM GRANULOCYTES # BLD: 0 10E9/L (ref 0–0.4)
IMM GRANULOCYTES NFR BLD: 0.6 %
LYMPHOCYTES # BLD AUTO: 0.4 10E9/L (ref 0.8–5.3)
LYMPHOCYTES NFR BLD AUTO: 5 %
MCH RBC QN AUTO: 30.1 PG (ref 26.5–33)
MCHC RBC AUTO-ENTMCNC: 32 G/DL (ref 31.5–36.5)
MCV RBC AUTO: 94 FL (ref 78–100)
MONOCYTES # BLD AUTO: 0.3 10E9/L (ref 0–1.3)
MONOCYTES NFR BLD AUTO: 4.2 %
NEUTROPHILS # BLD AUTO: 6.3 10E9/L (ref 1.6–8.3)
NEUTROPHILS NFR BLD AUTO: 87.3 %
NRBC # BLD AUTO: 0 10*3/UL
NRBC BLD AUTO-RTO: 0 /100
PLATELET # BLD AUTO: 101 10E9/L (ref 150–450)
POTASSIUM SERPL-SCNC: 3.5 MMOL/L (ref 3.4–5.3)
RBC # BLD AUTO: 2.36 10E12/L (ref 4.4–5.9)
SODIUM SERPL-SCNC: 138 MMOL/L (ref 133–144)
WBC # BLD AUTO: 7.2 10E9/L (ref 4–11)

## 2020-12-15 PROCEDURE — 250N000013 HC RX MED GY IP 250 OP 250 PS 637: Performed by: HOSPITALIST

## 2020-12-15 PROCEDURE — 250N000013 HC RX MED GY IP 250 OP 250 PS 637: Performed by: INTERNAL MEDICINE

## 2020-12-15 PROCEDURE — 94640 AIRWAY INHALATION TREATMENT: CPT

## 2020-12-15 PROCEDURE — 85025 COMPLETE CBC W/AUTO DIFF WBC: CPT | Performed by: INTERNAL MEDICINE

## 2020-12-15 PROCEDURE — 94640 AIRWAY INHALATION TREATMENT: CPT | Mod: 76

## 2020-12-15 PROCEDURE — 99233 SBSQ HOSP IP/OBS HIGH 50: CPT | Performed by: INTERNAL MEDICINE

## 2020-12-15 PROCEDURE — 80048 BASIC METABOLIC PNL TOTAL CA: CPT | Performed by: INTERNAL MEDICINE

## 2020-12-15 PROCEDURE — 250N000009 HC RX 250: Performed by: INTERNAL MEDICINE

## 2020-12-15 PROCEDURE — 999N000157 HC STATISTIC RCP TIME EA 10 MIN

## 2020-12-15 PROCEDURE — 36415 COLL VENOUS BLD VENIPUNCTURE: CPT | Performed by: INTERNAL MEDICINE

## 2020-12-15 PROCEDURE — 120N000001 HC R&B MED SURG/OB

## 2020-12-15 RX ADMIN — AMLODIPINE BESYLATE 5 MG: 5 TABLET ORAL at 13:10

## 2020-12-15 RX ADMIN — HYDROMORPHONE HYDROCHLORIDE 2 MG: 2 TABLET ORAL at 13:48

## 2020-12-15 RX ADMIN — FLUTICASONE FUROATE AND VILANTEROL TRIFENATATE 1 PUFF: 200; 25 POWDER RESPIRATORY (INHALATION) at 13:26

## 2020-12-15 RX ADMIN — HYDROMORPHONE HYDROCHLORIDE 2 MG: 2 TABLET ORAL at 23:49

## 2020-12-15 RX ADMIN — GABAPENTIN 200 MG: 100 CAPSULE ORAL at 21:04

## 2020-12-15 RX ADMIN — AMOXICILLIN AND CLAVULANATE POTASSIUM 1 TABLET: 500; 125 TABLET, FILM COATED ORAL at 13:22

## 2020-12-15 RX ADMIN — HYDROMORPHONE HYDROCHLORIDE 2 MG: 2 TABLET ORAL at 18:11

## 2020-12-15 RX ADMIN — IPRATROPIUM BROMIDE AND ALBUTEROL SULFATE 3 ML: .5; 3 SOLUTION RESPIRATORY (INHALATION) at 19:53

## 2020-12-15 RX ADMIN — OMEPRAZOLE 20 MG: 20 CAPSULE, DELAYED RELEASE ORAL at 13:10

## 2020-12-15 RX ADMIN — PRIMIDONE 50 MG: 50 TABLET ORAL at 21:04

## 2020-12-15 RX ADMIN — FUROSEMIDE 80 MG: 40 TABLET ORAL at 13:10

## 2020-12-15 RX ADMIN — AMOXICILLIN AND CLAVULANATE POTASSIUM 1 TABLET: 500; 125 TABLET, FILM COATED ORAL at 02:21

## 2020-12-15 RX ADMIN — GABAPENTIN 200 MG: 100 CAPSULE ORAL at 13:10

## 2020-12-15 RX ADMIN — LORAZEPAM 0.25 MG: 0.5 TABLET ORAL at 13:48

## 2020-12-15 RX ADMIN — ACETAMINOPHEN 650 MG: 325 TABLET, FILM COATED ORAL at 19:23

## 2020-12-15 RX ADMIN — DULOXETINE HYDROCHLORIDE 30 MG: 30 CAPSULE, DELAYED RELEASE ORAL at 13:10

## 2020-12-15 RX ADMIN — IPRATROPIUM BROMIDE AND ALBUTEROL SULFATE 3 ML: .5; 3 SOLUTION RESPIRATORY (INHALATION) at 15:51

## 2020-12-15 RX ADMIN — LORAZEPAM 0.25 MG: 0.5 TABLET ORAL at 23:19

## 2020-12-15 RX ADMIN — HYDROMORPHONE HYDROCHLORIDE 2 MG: 2 TABLET ORAL at 02:27

## 2020-12-15 RX ADMIN — MULTIPLE VITAMINS W/ MINERALS TAB 1 TABLET: TAB at 13:13

## 2020-12-15 RX ADMIN — TAMSULOSIN HYDROCHLORIDE 0.8 MG: 0.4 CAPSULE ORAL at 13:09

## 2020-12-15 RX ADMIN — IPRATROPIUM BROMIDE AND ALBUTEROL SULFATE 3 ML: .5; 3 SOLUTION RESPIRATORY (INHALATION) at 07:26

## 2020-12-15 RX ADMIN — HYDROCORTISONE 20 MG: 20 TABLET ORAL at 13:13

## 2020-12-15 ASSESSMENT — ACTIVITIES OF DAILY LIVING (ADL)
ADLS_ACUITY_SCORE: 23
ADLS_ACUITY_SCORE: 23
ADLS_ACUITY_SCORE: 25
ADLS_ACUITY_SCORE: 23
ADLS_ACUITY_SCORE: 23
ADLS_ACUITY_SCORE: 25

## 2020-12-15 ASSESSMENT — MIFFLIN-ST. JEOR: SCORE: 1433.38

## 2020-12-15 NOTE — PROGRESS NOTES
Renal Medicine Progress Note                                Perfecto Reese MRN# [id number]   Age: 70 year old YOB: 1950   Date of Admission: 12/2/2020 Hospital LOS: 13                  Assessment/Plan:     70 year old male with complex PMHx including severe COPD on 3L home O2 with ongoing tobacco use, adrenal insufficiency on chronic steroids, psoriasis on Humira, chronic Hep C with cirrhosis, PVD s/p left BKA, chronic wounds, and non-compliance who was admitted on 12/2/2020 for pneumonia, found to have a RLL lung abscess.      1.  CKD   -baseline creatinine 1.2 to 1.3 mg/dl  2.  Proteinuria   -non nephrotic   -all serologic studies negative  3.  Acute Kidney Injury   -non oliguric (IO incomplete)   -improving  4.  PNA       Lasix to 40 mg daily  OK for discharge from renal standpoint    Follow-up with us   Renal NP 4 weeks    Seems concerned regarding transportation  [facility]        Interval History:     Await and alert  IO and UO reviewed    Continued gradual improvement in renal function    No CP or SOB      ROS:     GENERAL: NAD, No fever,chills  R: NEGATIVE for significant cough or SOB  CV: NEGATIVE for chest pain, palpitations  EXT: no change edema  ROS otherwise negative    Medications and Allergies:     Reviewed    Physical Exam:     Vitals were reviewed  Patient Vitals for the past 8 hrs:   BP Temp Temp src Pulse Resp SpO2 Weight   12/15/20 0750 130/58 99.6  F (37.6  C) Oral 85 16 96 % --   12/15/20 0515 -- -- -- -- -- -- 73.6 kg (162 lb 5.9 oz)     I/O last 3 completed shifts:  In: 100 [P.O.:100]  Out: -     Vitals:    12/09/20 0506 12/11/20 0500 12/12/20 0646 12/14/20 0408   Weight: 64.5 kg (142 lb 3.2 oz) 74.2 kg (163 lb 9.3 oz) 74.2 kg (163 lb 9.3 oz) 73.6 kg (162 lb 4.1 oz)    12/15/20 0515   Weight: 73.6 kg (162 lb 5.9 oz)         GENERAL: awake, alert, follows  HEENT: NC/AT, PERRLA, EOMI, non icteric, pharynx moist without lesion  RESP:  clear  anteriorly  CV: RRR, normal S1 S2  ABDOMEN: soft, nontender, no HSM or masses and bowel sounds normal  MS: no clubbing, cyanosis   SKIN: clear without significant rashes or lesions  EXT: left BKA    Data:     Recent Labs   Lab 12/13/20  1505 12/12/20  1532 12/12/20  0801 12/11/20  0713 12/09/20  0756     --  139 140 143   POTASSIUM 3.9 4.2 3.3* 3.2* 3.6   CHLORIDE 104  --  104 106 112*   CO2 30  --  30 28 28   ANIONGAP 5  --  5 6 3   *  --  88 91 75   BUN 30  --  34* 37* 39*   CR 1.54*  --  1.68* 1.87* 1.96*   GFRESTIMATED 46*  --  41* 36* 34*   GFRESTBLACK 53*  --  48* 42* 39*   MARTINE 8.1*  --  7.7* 7.8* 8.0*         Recent Labs   Lab Test 12/13/20  1505 12/12/20  0801 12/11/20  0713 12/09/20  0756 12/08/20  1226 12/07/20  0656 12/06/20  0857 12/05/20  0804 12/04/20  1245 12/02/20  1845   CR 1.54* 1.68* 1.87* 1.96* 2.06* 2.24* 2.32* 2.45* 2.61* 2.89*     No lab results found in last 7 days.  Recent Labs   Lab 12/13/20  1505 12/12/20  0801 12/11/20  0713 12/09/20  0756   HGB 7.5* 7.9* 6.5* 7.7*     No lab results found in last 7 days.      ASA Fischer MD    [facility]  [phone]

## 2020-12-15 NOTE — PLAN OF CARE
Pt a&ox3-4, vss on 3L baseline, up with 2 and lift, rook boot and woc dressing on RLE, reg diet, po dilaudid for pain, refused positioning and changing throughout the night, ivsl, incontinent of bowel and bladder, tele NSR patient slept for most of the night.

## 2020-12-15 NOTE — PLAN OF CARE
"PT: Patient declined participating in therapy stating he is fatigued from washing up with assist of CNA. Provided patient with encouragement to participate in session to prevent further muscle weakness, pt became irritated with therapist stating, \"Go away.\"   "

## 2020-12-15 NOTE — PLAN OF CARE
VSS, pt on 3L of oxygen which is baseline for patient. PT withdrawn, refusing cares. Incontinent of bowel and bladder does not call to be changed. Tele is sinus rhythm. Right leg dressing is c/d/I. PO ativan given x1.

## 2020-12-15 NOTE — PROGRESS NOTES
[facility]    Medicine Progress Note - Hospitalist Service       Date of Admission:  12/2/2020  Assessment & Plan       Perfecto Reese is a 70 year old male with complex PMHx including severe COPD on 3L home O2 with ongoing tobacco use, adrenal insufficiency on chronic steroids, psoriasis on Humira, chronic Hep C with cirrhosis, PVD s/p left BKA, chronic wounds, and non-compliance who was admitted on 12/2/2020 for pneumonia, found to have a RLL lung abscess.    Acute on chronic hypoxic and hypercarbic respiratory failure   Bilateral community-acquired bacterial pneumonia with RLL abscess Severe COPD  [albuterol, symbicort, spiriva]  * Baseline on 3 lpm/NC  * EMS activated by his home care RN for fever, cough, and acute respiratory distress (placed on 10L per OM in the field). Fever to 102.5, tachycardia, tachypnea, and leukocytosis (17.2k) present on arrival. CXR on arrival showed RLL infiltrate and CT abd/pelvis showed air fluid cavity at the right lung base. On admission, he was initiated on IV pip-tazo and azithromycin, and Thoracic Surgery was consulted. Chest CT was ordered (12/3) which showed a 6.4 cm RLL pulmonary abscess and extensive left-sided mucus plugging with associated infiltrate. ID then consulted  - 12/2 blood cultures x2 no growth.  - Sputum cx 12/4 growing proteus, fluoroquinolone resistant. Also corynebacterium striatum 12/6.  - CT surgery saw, no surgical intervention indicated.  - Continues on PTA inhalers (prn albuterol, symbicort (Brreo), spiriva (Incruse)).  - Encourage incentive spirometry, ambulate as tolerated, RCAT.  - Initial zosyn->unasyn-> transitioned 12/8 to Augmentin x 5 weeks (through 1/12/2021).  - Repeat CT scan in 4 weeks (~1/8/2021).  - ID following, greatly appreciate assistance.  - O2 weaned back to baseline of 3 lpm  - PT- rec TCU, patient refusing. Likely discharge home once hgb is stable    Pancytopenia  Chronic anemia  [facility],  underlying chronic anemia. Hgb 9.6 on admission, no recent baseline. He has had no s/sx of bleeding. Repeat labs wbc 3.5, Hgb 6.1, platelets 74k 12/4, no signs of bleeding. Review of chart demonstrate pancytopenia 1/2020. Has h/o cirrhosis with portal hypotension, splenomegaly, CKD, autoimmune disorder.   - B12/ folate normal.  - tsat 27, ferritin 515 c/w chronic disease.  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  - Hematology followed, greatly appreciate assistance.  - Pancytopenia 2/2 liver cirrhosis, splenomegaly, renal disease, CKD and psoriasis.   - Transfuse hgb <7, consent signed and in chart.  - S/p transfusion 1 unit pRBCs 12/4, 12/7, 12/11.  - hgb 7.1, will repeat in am to see if needs transfusion. If not GI blood loss may need transfusions as outpatient  - check stool guaiac 12/15. I discussed with patient as he has had anemia for a long time. He states the [facility] wanted [facility] do endoscopy and colonoscopy but he refused to allow. He is currently open to a GI workup if his stool guaiac is positive.     Acute kidney injury  Chronic kidney disease, stage 3  Asymptomatic pyuria  Creatinine 2.89 from baseline 1.2-1.3 in Jan 2020. Suspect pre-renal state in setting of pneumonia/sepsis with possible progression to ATN in setting of hypotension. Improved with IV fluids  - UA on arrival grossly dirty, but urine culture grew <10,000 normal adelina.  - Holding PTA losartan.  - With infection and active urine sediment as well as slowly improving creatinine could imply active GN in setting of infection. Prior creatinine at 1.2-1.3.   - Nephrology following, appreciate assistance  - Creatinine 2.65--->1.54->1.57 on 12/15  - Lasix started on 12/9 per nephrology  - lasix 40 mg daily at discharge  - follow up outpatient with nephrology/ Dr. Nava or through [facility]    Encephalopathy 2/2 acute hypercarbic respiratory failure  Markedly somnolent 12/8. Aroused  appropriately but only with sternal rub. Denies c/o. ABG 7.27/60/116/27  - Acute hypercarbic respiratory failure noted 12/8-9, placed on BiPAP. Suspect large component of this was 2/2 sedation from narcotics. Doing well off BiPAP now.  - Decreased hydromorphone to 2 mg q4 hours on 12/8.  - CXR on 12/9 with likely atelectasis in addition to previously seen cavitary lesion.  - Scheduled nebs.  - Encephalopathy improved, mental status at baseline    Non-anion gap metabolic acidosis-resolved  - Suspected 2/2 diarrhea and hyperchloremic fluid resuscitation.     Chronic adrenal insufficiency  Intermittent hypotension and diarrhea noted on admission, likely due to adrenal crisis in the setting of acute illness and steroid non-compliance. Improved upon resumption of previously prescribed hydrocortisone  - Continues on PTA hydrocortisone 20 mg PO daily, no plan for stress dose steroids at this time.    Essential hypertension  [PTA: amlodipine 10 mg qhs, losartan 12.5 mg daily]  Intermittent hypotension noted on admission, likely due to adrenal insufficiency. Now improved and stable  - Restarted amlodipine at 5 mg daily on 12/9.  - PTA losartan on hold for now in setting of MICHAEL.  - on furosemide, weights appear unchanged  - likely discharge on lower dose of amlodipine, lasix 40 mg daily and hold losartan until follow up    History of psoriasis   - Holding PTA Humira.  - Topical clobetasol at HS.    Peripheral vascular disease s/p left BKA  Chronic RLE wound  Chronic left ear wound  Present on admission. Receives wound cares per home care RN.  - Wound cares in place as per WOCN.     Chronic Hepatitis C with cirrhosis  Treated with interferon on the past  - Appears compensated.    GERD  - Chronic and stable on omeprazole 20 mg daily.    BPH  - Chronic and stable on tamsulosin 0.8 mg daily.      Tobacco use disorder  - Has not had a cigarette in the past few weeks since he has been feeling sick.    Chronic pain syndrome  [PTA:  gabapentin 300 mg TID, duloxetine 30 mg daily, lidocaine patch, oxycodone 5 mg q6h prn, morphine 2.5 mg BID prn]  - Gabapentin decreased to 200 mg TID in the setting of renal failure  - Continues on lidocaine patch, duloxetine, PO morphine changed to Dilaudid PO prn due to MICHAEL. Dilaudid dose decreased on 12/8 due to concerns about excessive sedation.    Non-severe malnutrition    - Level of malnutrition: Non-Severe   - Based on: mild (or greater) subcutaneous fat loss, mild (or greater) muscle loss, mild (or greater) fluid retention     Diet: Regular Diet Adult  Snacks/Supplements Adult: Boost Plus; Between Meals  Room Service    DVT Prophylaxis: Pneumatic Compression Devices  Bruce Catheter: not present  Code Status: Full Code           Disposition Plan   Expected discharge: if hgb is stable and no GI workup is indicated possibly discharge home on 12/16. If guaiac positive will try to convince pt to do GI workup   Entered: Felix Palacio MD 12/15/2020, 4:50 PM       The patient's care was discussed with the Bedside Nurse and Patient.    Felix Palacio MD  Hospitalist Service  [facility]  Contact information available via [facility] Paging/Directory    ______________________________________________________________________    Interval History   Overnight events reviewed. Breathing ok, difficult to some degree 2/2 R sided pain in area of lung abscess. Otherwise no chest pain. No abdominal pain. Not aware if any blood/ black stool    Data reviewed today: I reviewed all medications, new labs and imaging results over the last 24 hours. I personally reviewed no images or EKG's today.    Physical Exam   Vital Signs: Temp: 100.1  F (37.8  C) Temp src: Oral BP: 132/52 Pulse: 88   Resp: 16 SpO2: 98 % O2 Device: Nasal cannula Oxygen Delivery: 3 LPM  Weight: 162 lbs 5.9 oz  Constitutional: awake, alert, cooperative, no apparent distress, laying in bed  Respiratory: clear to auscultation bilaterally,  no crackles or wheezing  Cardiovascular: regular rate and rhythm, normal S1 and S2, no murmur noted  GI: normal bowel sounds, soft, non-distended, non-tender  Skin: warm, dry  Musculoskeletal: 1-2+ lower extremity pitting edema present, s/p L BKA, right lower extremity with dressing and offloading boot in place  Neurologic: awake, alert, oriented to name, place and time    Data   Recent Labs   Lab 12/15/20  1502 12/13/20  1505 12/12/20  1532 12/12/20  0801   WBC 7.2 7.7  --  6.8   HGB 7.1* 7.5*  --  7.9*   MCV 94 94  --  93   * 96*  --  91*    139  --  139   POTASSIUM 3.5 3.9 4.2 3.3*   CHLORIDE 100 104  --  104   CO2 32 30  --  30   BUN 27 30  --  34*   CR 1.57* 1.54*  --  1.68*   ANIONGAP 6 5  --  5   MARTINE 7.9* 8.1*  --  7.7*   * 169*  --  88     Medications     - MEDICATION INSTRUCTIONS -       - MEDICATION INSTRUCTIONS -         amLODIPine  5 mg Oral Daily     amoxicillin-clavulanate  1 tablet Oral Q12H ISIS     clobetasol   Topical At Bedtime     DULoxetine  30 mg Oral Daily     fluticasone-vilanterol  1 puff Inhalation Daily     furosemide  80 mg Oral Daily     gabapentin  200 mg Oral TID     hydrocortisone  20 mg Oral QAM     ipratropium - albuterol 0.5 mg/2.5 mg/3 mL  3 mL Nebulization 4x daily     multivitamin w/minerals  1 tablet Oral Daily     omeprazole  20 mg Oral Daily     polyethylene glycol  17 g Oral Daily     primidone  50 mg Oral At Bedtime     sodium chloride (PF)  3 mL Intracatheter Q8H     tamsulosin  0.8 mg Oral Daily

## 2020-12-15 NOTE — PLAN OF CARE
Patient angry/uncooperative/withdrawn most of shift - refusing cares/repositioning/labs/meds most attempts this shift, incontinent of bladder and bowel - loose stools, PRN PO Dilaudid and Ativan x1, refused wound care, tele - NSR, and refused meals. Will continue to monitor.

## 2020-12-16 LAB
ABO + RH BLD: NORMAL
ABO + RH BLD: NORMAL
BASOPHILS # BLD AUTO: 0 10E9/L (ref 0–0.2)
BASOPHILS NFR BLD AUTO: 0.3 %
BLD GP AB SCN SERPL QL: NORMAL
BLD PROD TYP BPU: NORMAL
BLD PROD TYP BPU: NORMAL
BLD UNIT ID BPU: 0
BLOOD BANK CMNT PATIENT-IMP: NORMAL
BLOOD PRODUCT CODE: NORMAL
BPU ID: NORMAL
CREAT SERPL-MCNC: 1.51 MG/DL (ref 0.66–1.25)
DIFFERENTIAL METHOD BLD: ABNORMAL
EOSINOPHIL # BLD AUTO: 0.2 10E9/L (ref 0–0.7)
EOSINOPHIL NFR BLD AUTO: 3.2 %
ERYTHROCYTE [DISTWIDTH] IN BLOOD BY AUTOMATED COUNT: 13.3 % (ref 10–15)
GFR SERPL CREATININE-BSD FRML MDRD: 47 ML/MIN/{1.73_M2}
HCT VFR BLD AUTO: 20.7 % (ref 40–53)
HEMOCCULT STL QL: POSITIVE
HGB BLD-MCNC: 6.6 G/DL (ref 13.3–17.7)
HGB BLD-MCNC: 7.6 G/DL (ref 13.3–17.7)
IMM GRANULOCYTES # BLD: 0 10E9/L (ref 0–0.4)
IMM GRANULOCYTES NFR BLD: 0.6 %
LYMPHOCYTES # BLD AUTO: 0.4 10E9/L (ref 0.8–5.3)
LYMPHOCYTES NFR BLD AUTO: 7.1 %
MCH RBC QN AUTO: 29.6 PG (ref 26.5–33)
MCHC RBC AUTO-ENTMCNC: 31.9 G/DL (ref 31.5–36.5)
MCV RBC AUTO: 93 FL (ref 78–100)
MONOCYTES # BLD AUTO: 0.3 10E9/L (ref 0–1.3)
MONOCYTES NFR BLD AUTO: 5 %
NEUTROPHILS # BLD AUTO: 5.2 10E9/L (ref 1.6–8.3)
NEUTROPHILS NFR BLD AUTO: 83.8 %
NRBC # BLD AUTO: 0 10*3/UL
NRBC BLD AUTO-RTO: 0 /100
NUM BPU REQUESTED: 1
PLATELET # BLD AUTO: 89 10E9/L (ref 150–450)
RBC # BLD AUTO: 2.23 10E12/L (ref 4.4–5.9)
SPECIMEN EXP DATE BLD: NORMAL
TRANSFUSION STATUS PATIENT QL: NORMAL
TRANSFUSION STATUS PATIENT QL: NORMAL
WBC # BLD AUTO: 6.2 10E9/L (ref 4–11)

## 2020-12-16 PROCEDURE — 99232 SBSQ HOSP IP/OBS MODERATE 35: CPT | Performed by: INTERNAL MEDICINE

## 2020-12-16 PROCEDURE — 999N000157 HC STATISTIC RCP TIME EA 10 MIN

## 2020-12-16 PROCEDURE — 94640 AIRWAY INHALATION TREATMENT: CPT

## 2020-12-16 PROCEDURE — 85025 COMPLETE CBC W/AUTO DIFF WBC: CPT | Performed by: INTERNAL MEDICINE

## 2020-12-16 PROCEDURE — 86901 BLOOD TYPING SEROLOGIC RH(D): CPT | Performed by: HOSPITALIST

## 2020-12-16 PROCEDURE — 86850 RBC ANTIBODY SCREEN: CPT | Performed by: HOSPITALIST

## 2020-12-16 PROCEDURE — 82565 ASSAY OF CREATININE: CPT | Performed by: INTERNAL MEDICINE

## 2020-12-16 PROCEDURE — 120N000001 HC R&B MED SURG/OB

## 2020-12-16 PROCEDURE — 85018 HEMOGLOBIN: CPT | Performed by: INTERNAL MEDICINE

## 2020-12-16 PROCEDURE — 94640 AIRWAY INHALATION TREATMENT: CPT | Mod: 76

## 2020-12-16 PROCEDURE — P9040 RBC LEUKOREDUCED IRRADIATED: HCPCS | Performed by: HOSPITALIST

## 2020-12-16 PROCEDURE — 250N000013 HC RX MED GY IP 250 OP 250 PS 637: Performed by: INTERNAL MEDICINE

## 2020-12-16 PROCEDURE — 36415 COLL VENOUS BLD VENIPUNCTURE: CPT | Performed by: INTERNAL MEDICINE

## 2020-12-16 PROCEDURE — 86900 BLOOD TYPING SEROLOGIC ABO: CPT | Performed by: HOSPITALIST

## 2020-12-16 PROCEDURE — 82272 OCCULT BLD FECES 1-3 TESTS: CPT | Performed by: INTERNAL MEDICINE

## 2020-12-16 PROCEDURE — 250N000013 HC RX MED GY IP 250 OP 250 PS 637: Performed by: HOSPITALIST

## 2020-12-16 PROCEDURE — 86923 COMPATIBILITY TEST ELECTRIC: CPT | Performed by: HOSPITALIST

## 2020-12-16 PROCEDURE — 250N000009 HC RX 250: Performed by: INTERNAL MEDICINE

## 2020-12-16 PROCEDURE — G0463 HOSPITAL OUTPT CLINIC VISIT: HCPCS

## 2020-12-16 RX ADMIN — IPRATROPIUM BROMIDE AND ALBUTEROL SULFATE 3 ML: .5; 3 SOLUTION RESPIRATORY (INHALATION) at 10:48

## 2020-12-16 RX ADMIN — TAMSULOSIN HYDROCHLORIDE 0.8 MG: 0.4 CAPSULE ORAL at 09:24

## 2020-12-16 RX ADMIN — MULTIPLE VITAMINS W/ MINERALS TAB 1 TABLET: TAB at 09:24

## 2020-12-16 RX ADMIN — AMOXICILLIN AND CLAVULANATE POTASSIUM 1 TABLET: 500; 125 TABLET, FILM COATED ORAL at 01:38

## 2020-12-16 RX ADMIN — OMEPRAZOLE 20 MG: 20 CAPSULE, DELAYED RELEASE ORAL at 09:24

## 2020-12-16 RX ADMIN — LORAZEPAM 0.25 MG: 0.5 TABLET ORAL at 20:58

## 2020-12-16 RX ADMIN — HYDROMORPHONE HYDROCHLORIDE 2 MG: 2 TABLET ORAL at 09:50

## 2020-12-16 RX ADMIN — FLUTICASONE FUROATE AND VILANTEROL TRIFENATATE 1 PUFF: 200; 25 POWDER RESPIRATORY (INHALATION) at 09:34

## 2020-12-16 RX ADMIN — IPRATROPIUM BROMIDE AND ALBUTEROL SULFATE 3 ML: .5; 3 SOLUTION RESPIRATORY (INHALATION) at 07:18

## 2020-12-16 RX ADMIN — IPRATROPIUM BROMIDE AND ALBUTEROL SULFATE 3 ML: .5; 3 SOLUTION RESPIRATORY (INHALATION) at 19:54

## 2020-12-16 RX ADMIN — AMLODIPINE BESYLATE 5 MG: 5 TABLET ORAL at 09:23

## 2020-12-16 RX ADMIN — GABAPENTIN 200 MG: 100 CAPSULE ORAL at 15:23

## 2020-12-16 RX ADMIN — HYDROMORPHONE HYDROCHLORIDE 2 MG: 2 TABLET ORAL at 04:56

## 2020-12-16 RX ADMIN — GABAPENTIN 200 MG: 100 CAPSULE ORAL at 09:24

## 2020-12-16 RX ADMIN — FUROSEMIDE 80 MG: 40 TABLET ORAL at 09:25

## 2020-12-16 RX ADMIN — AMOXICILLIN AND CLAVULANATE POTASSIUM 1 TABLET: 500; 125 TABLET, FILM COATED ORAL at 15:23

## 2020-12-16 RX ADMIN — DULOXETINE HYDROCHLORIDE 30 MG: 30 CAPSULE, DELAYED RELEASE ORAL at 09:24

## 2020-12-16 RX ADMIN — GABAPENTIN 200 MG: 100 CAPSULE ORAL at 20:59

## 2020-12-16 RX ADMIN — HYDROMORPHONE HYDROCHLORIDE 2 MG: 2 TABLET ORAL at 20:58

## 2020-12-16 RX ADMIN — PRIMIDONE 50 MG: 50 TABLET ORAL at 20:59

## 2020-12-16 RX ADMIN — IPRATROPIUM BROMIDE AND ALBUTEROL SULFATE 3 ML: .5; 3 SOLUTION RESPIRATORY (INHALATION) at 15:01

## 2020-12-16 RX ADMIN — TRIAMCINOLONE ACETONIDE: 1 CREAM TOPICAL at 05:02

## 2020-12-16 RX ADMIN — HYDROCORTISONE 20 MG: 20 TABLET ORAL at 09:25

## 2020-12-16 ASSESSMENT — ACTIVITIES OF DAILY LIVING (ADL)
ADLS_ACUITY_SCORE: 25
ADLS_ACUITY_SCORE: 24
ADLS_ACUITY_SCORE: 25

## 2020-12-16 NOTE — PLAN OF CARE
A/O x4 VSS on 3L. Asst 2 turn and repo. Incontinent b/b. 1 large BM this shift. Refused wound care to RLE, rook boot in place. Po dilaudid and tylenol given for left sided pain. Tolerating regular diet. PIV SL.

## 2020-12-16 NOTE — PROGRESS NOTES
Renal Medicine Progress Note                                Perfecto Reese MRN# [id number]   Age: 70 year old YOB: 1950   Date of Admission: 12/2/2020 Hospital LOS: 14                  Assessment/Plan:     70 year old male with complex PMHx including severe COPD on 3L home O2 with ongoing tobacco use, adrenal insufficiency on chronic steroids, psoriasis on Humira, chronic Hep C with cirrhosis, PVD s/p left BKA, chronic wounds, and non-compliance who was admitted on 12/2/2020 for pneumonia, found to have a RLL lung abscess.      1.  CKD   -baseline creatinine 1.2 to 1.3 mg/dl  2.  Proteinuria   -non nephrotic   -all serologic studies negative  3.  Acute Kidney Injury   -non oliguric (IO incomplete)   -improving  4.  PNA       Lasix to 40 mg daily  OK for discharge from renal standpoint    Prefers follow-up at [facility]   Please schedule with [facility] primary who can then refer  To nephrology clinic    Call with questions  Signing off      Interval History:     Await and alert  IO and UO reviewed    No clinical chnge     Continued gradual improvement in renal function    No CP or SOB      ROS:     GENERAL: NAD, No fever,chills  R: NEGATIVE for significant cough or SOB  CV: NEGATIVE for chest pain, palpitations  EXT: no change edema  ROS otherwise negative    Medications and Allergies:     Reviewed    Physical Exam:     Vitals were reviewed  Patient Vitals for the past 8 hrs:   BP Temp Temp src Pulse Resp SpO2   12/16/20 1015 138/54 99.6  F (37.6  C) Oral 75 16 94 %   12/16/20 0950 124/49 99.2  F (37.3  C) Oral 84 16 93 %   12/16/20 0812 128/46 99.3  F (37.4  C) Oral 78 16 91 %   12/16/20 0536 -- -- -- -- 18 --   12/16/20 0456 -- -- -- -- 18 --     No intake/output data recorded.    Vitals:    12/09/20 0506 12/11/20 0500 12/12/20 0646 12/14/20 0408   Weight: 64.5 kg (142 lb 3.2 oz) 74.2 kg (163 lb 9.3 oz) 74.2 kg (163 lb 9.3 oz) 73.6 kg (162 lb 4.1 oz)    12/15/20 0515   Weight: 73.6 kg (162 lb 5.9 oz)        GENERAL: awake, alert, follows  HEENT: NC/AT, PERRLA, EOMI, non icteric, pharynx moist without lesion  RESP:  clear anteriorly  CV: RRR, normal S1 S2  ABDOMEN: soft, nontender, no HSM or masses and bowel sounds normal  MS: no clubbing, cyanosis   SKIN: clear without significant rashes or lesions  EXT: left BKA    Data:     Recent Labs   Lab 12/16/20  0702 12/15/20  1502 12/13/20  1505 12/12/20  1532 12/12/20  0801 12/11/20  0713   NA  --  138 139  --  139 140   POTASSIUM  --  3.5 3.9 4.2 3.3* 3.2*   CHLORIDE  --  100 104  --  104 106   CO2  --  32 30  --  30 28   ANIONGAP  --  6 5  --  5 6   GLC  --  114* 169*  --  88 91   BUN  --  27 30  --  34* 37*   CR 1.51* 1.57* 1.54*  --  1.68* 1.87*   GFRESTIMATED 47* 44* 46*  --  41* 36*   GFRESTBLACK 54* 52* 53*  --  48* 42*   MARTINE  --  7.9* 8.1*  --  7.7* 7.8*         Recent Labs   Lab Test 12/16/20  0702 12/15/20  1502 12/13/20  1505 12/12/20  0801 12/11/20  0713 12/09/20  0756 12/08/20  1226 12/07/20  0656 12/06/20  0857 12/05/20  0804   CR 1.51* 1.57* 1.54* 1.68* 1.87* 1.96* 2.06* 2.24* 2.32* 2.45*     No lab results found in last 7 days.  Recent Labs   Lab 12/16/20  0702 12/15/20  1502 12/13/20  1505 12/12/20  0801   HGB 6.6* 7.1* 7.5* 7.9*         G Mark Fischer MD    [facility] - Nephrology  [phone]

## 2020-12-16 NOTE — PROGRESS NOTES
CLINICAL NUTRITION SERVICES - REASSESSMENT NOTE    Recommendations Ordered by Registered Dietitian (RD):  - Continue diet as ordered + Boost Plus BID between meals (chocolate)     Recommend pt order at least 2 meals/day   Malnutrition: (12/11)   % Weight Loss:  None noted  % Intake:  Decreased intake does not meet criteria for malnutrition   Subcutaneous Fat Loss:  Orbital region mild-moderate depletion and Upper arm region mild-moderate depletion  Muscle Loss:  Temporal region mild depletion and Clavicle bone region mild depletion  Fluid Retention:  Mild-Moderate     Malnutrition Diagnosis: Non-Severe malnutrition  In Context of:  Chronic illness or disease     EVALUATION OF PROGRESS TOWARD GOALS   Diet: Regular diet + Boost Plus BID (each provides 360 kcal, 14 g pro)   Intake/Tolerance:   - Patient seen in room this afternoon. States that his appetite has been pretty good this admission, and has been liking the food.   - Eating % of meals consistently, however per meal review he actually has only been ordering 1 meal/day since 12/11.   - Today states that he has not eaten lunch yet, not feeling too hungry.   - Likes the boost but would prefer to have chocolate only.     - Stooling daily, multiple times  - Last Wt 73.6 kg (162 lb 5.9 oz)  - elevated from admission    NEW FINDINGS:   - GI, Nephrology, ID all following     Previous Goals:   Intake of at least 75% meals BID + 2 supplements daily.   Evaluation: Not met (only ordering 1 meal/day)     Previous Nutrition Diagnosis:   Predicted inadequate nutrient intake (energy / protein) r/t prolonged hospitalization and feeling unwell.   Evaluation: declining     CURRENT NUTRITION DIAGNOSIS  Inadequate oral intake r/t prolonged hospitalization and feeling unwell AEB patient consumes % of 1 meal/day since 12/11.     INTERVENTIONS  Recommendations / Nutrition Prescription  Diet per MD (recommend at least 2 meals/day)  Continue Boost Plus BID between meals (360  kcal, 14 g pro per serving)    Implementation  None new today    Goals  Intake of at least % of meals BID + 2 supplements daily.       MONITORING AND EVALUATION:  Progress towards goals will be monitored and evaluated per protocol and Practice Guidelines    Winifred Polanco RD, LD  [facility] [place], [address], [place]   Pager: [phone]  Weekend Pager: [phone]

## 2020-12-16 NOTE — PROGRESS NOTES
[facility]    Hospitalist Progress Note    Interval History   - Hgb dropped to 6.6 this morning, received another unit PRBC. Seen by GI, no benefit to acute workup, outpatient follow up  - Patient denies any melena or blood loss.  - Possible discharge tomorrow, patient previously refused TCU    Assessment & Plan   Summary:  Perfecto Reese is a 70 year old male with complex PMHx including severe COPD on 3L home O2 with ongoing tobacco use, adrenal insufficiency on chronic steroids, psoriasis on Humira, chronic Hep C with cirrhosis, PVD s/p left BKA, chronic wounds, and non-compliance who was admitted on 12/2/2020 for pneumonia, found to have a RLL lung abscess.     Acute on chronic hypoxic and hypercarbic respiratory failure , improved  Bilateral community-acquired bacterial pneumonia  RLL abscess  Severe COPD   EMS activated by his home care RN for fever, cough, and acute respiratory distress (placed on 10L per OM in the field). Fever to 102.5, tachycardia, tachypnea, and leukocytosis (17.2k) present on arrival. CXR on arrival showed RLL infiltrate and CT abd/pelvis showed air fluid cavity at the right lung base. On admission, he was initiated on IV pip-tazo and azithromycin, and Thoracic Surgery was consulted. Chest CT was ordered (12/3) which showed a 6.4 cm RLL pulmonary abscess and extensive left-sided mucus plugging with associated infiltrate. 12/2 blood cultures x2 no growth. Sputum cx 12/4 growing proteus, fluoroquinolone resistant. Also corynebacterium striatum 12/6.   CT surgery saw, no surgical intervention indicated. Continues on PTA inhalers (prn albuterol, symbicort (Brreo), spiriva (Incruse)). Encourage incentive spirometry, ambulate as tolerated, RCAT.  - ID following, greatly appreciate assistance.   - Initial zosyn->unasyn-> transitioned 12/8 to Augmentin x 6 weeks (through 1/19/2021).   Repeat CT scan in 4 weeks (~1/8/2021).  - O2 weaned back to baseline of 3 lpm  - PT-  rec TCU, patient refusing. Likely discharge home once hgb is stable     Chronic normocytic anemia, likely ACD  Chronic thrombocytopenia  Anemia and thrombocytopenia likely multifactorial from liver cirrhosis, splenomegaly, renal disease, CKD and psoriasis.   Hgb 9.6 on admission, no recent baseline. He has had no s/sx of bleeding. Repeat labs  wbc 3.5, Hgb 6.1, platelets 74k 12/4, no signs of bleeding. Review of chart demonstrate pancytopenia 1/2020. Has h/o cirrhosis with portal hypotension, splenomegaly, CKD, autoimmune disorder. Iron labs suggestive of anemia of chronic disease. 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  - S/p transfusion 1 unit pRBCs 12/4, 12/7, 12/11. Received another pRBC on 12/16/2020 for Hgb 6.6  - Appreciate GI consult 12/16/2020, likely no acute GI bleed, no acute interventions, outpatient follow up    Acute kidney injury on CKD 3, improved  Asymptomatic pyuria  Baseline creatinine 1.2-1.3 in Jan 2020, on admission 2.89. Suspect pre-renal state in setting of pneumonia/sepsis with possible progression to ATN in setting of hypotension. Improved with IV fluids, creatinine down to 1.5 on 12/16.  - Holding PTA losartan.  - Appreciate Nephrology consult   - Lasix started on 12/9 per nephrology   - lasix 40 mg daily at discharge  - [facility]    Chronic/Stable/Resolved   Encephalopathy 2/2 acute hypercarbic respiratory failure on 12/8  Markedly somnolent 12/8. Aroused appropriately but only with sternal rub. Denies c/o. ABG 7.27/60/116/27. Acute hypercarbic respiratory failure noted 12/8-9, placed on BiPAP. Suspect large component of this was 2/2 sedation from narcotics. Doing well off BiPAP now.  - Decreased hydromorphone to 2 mg q4 hours on 12/8.     Non-anion gap metabolic acidosis-resolved  - Suspected 2/2 diarrhea and hyperchloremic fluid resuscitation.     Chronic adrenal insufficiency  Intermittent  hypotension and diarrhea noted on admission, likely due to adrenal crisis in the setting of acute illness and steroid non-compliance. Improved upon resumption of previously prescribed hydrocortisone  - Continues on PTA hydrocortisone 20 mg PO daily, no plan for stress dose steroids at this time.     Essential hypertension  [PTA: amlodipine 10 mg qhs, losartan 12.5 mg daily]  Intermittent hypotension noted on admission, likely due to adrenal insufficiency. Now improved and stable  - Restarted amlodipine at 5 mg daily on 12/9.  - PTA losartan on hold for now in setting of MICHAEL.  - on furosemide, weights appear unchanged  - likely discharge on lower dose of amlodipine, lasix 40 mg daily and hold losartan until follow up     History of psoriasis   - Holding PTA Humira.  - Topical clobetasol at HS.     Peripheral vascular disease s/p left BKA  Chronic RLE wound  Chronic left ear wound  Present on admission. Receives wound cares per home care RN.  - Wound cares in place as per WOCN.     Chronic Hepatitis C with cirrhosis  Treated with interferon on the past  - Appears compensated.     GERD: Chronic and stable on omeprazole 20 mg daily.     BPH: Chronic and stable on tamsulosin 0.8 mg daily.      Tobacco use disorder: Encourage cessation     Chronic pain syndrome  [PTA: gabapentin 300 mg TID, duloxetine 30 mg daily, lidocaine patch, oxycodone 5 mg q6h prn, morphine 2.5 mg BID prn]  - Gabapentin decreased to 200 mg TID in the setting of renal failure  - Continues on lidocaine patch, duloxetine, PO morphine changed to Dilaudid PO prn due to MICHAEL. Dilaudid dose decreased on 12/8 due to concerns about excessive sedation.     Non-severe malnutrition       DVT Prophylaxis: PCDs  Code Status: Full Code  PT/OT: PT ordered  Diet: Regular Diet Adult  Snacks/Supplements Adult: Boost Plus; Between Meals  Room Service      Disposition: Expected discharge tomorrow, home vs TCU    Neeraj Valdez MD  Text Page  (7am to 6pm)  -Data  reviewed today: I reviewed all new labs and imaging results over the last 24 hours.    Physical Exam   Temp: 98.5  F (36.9  C) Temp src: Oral BP: 129/54 Pulse: 85   Resp: 16 SpO2: 93 % O2 Device: Nasal cannula Oxygen Delivery: 2.5 LPM  Vitals:    12/12/20 0646 12/14/20 0408 12/15/20 0515   Weight: 74.2 kg (163 lb 9.3 oz) 73.6 kg (162 lb 4.1 oz) 73.6 kg (162 lb 5.9 oz)     Vital Signs with Ranges  Temp:  [98.5  F (36.9  C)-100.1  F (37.8  C)] 98.5  F (36.9  C)  Pulse:  [75-88] 85  Resp:  [16-20] 16  BP: (120-139)/(46-54) 129/54  SpO2:  [91 %-98 %] 93 %  No intake/output data recorded.  O2 requirements: none    Constitutional: Male in NAD  HEENT: Eyes nonicteric, oral mucosa moist  Cardiovascular: RRR, normal S1/2, no m/r/g  Respiratory: CTAB, no wheezing or crackles  Vascular: LLE BKA, RLE in boot  GI: Normoactive bowel sounds, nontender  Skin/Integumen: No rashes  Neuro/Psych: Appropriate affect and mood, generally quiet. A&Ox3, moves all extremities    Medications     - MEDICATION INSTRUCTIONS -       - MEDICATION INSTRUCTIONS -         amLODIPine  5 mg Oral Daily     amoxicillin-clavulanate  1 tablet Oral Q12H ISIS     clobetasol   Topical At Bedtime     DULoxetine  30 mg Oral Daily     fluticasone-vilanterol  1 puff Inhalation Daily     furosemide  80 mg Oral Daily     gabapentin  200 mg Oral TID     hydrocortisone  20 mg Oral QAM     ipratropium - albuterol 0.5 mg/2.5 mg/3 mL  3 mL Nebulization 4x daily     multivitamin w/minerals  1 tablet Oral Daily     omeprazole  20 mg Oral Daily     polyethylene glycol  17 g Oral Daily     primidone  50 mg Oral At Bedtime     sodium chloride (PF)  3 mL Intracatheter Q8H     tamsulosin  0.8 mg Oral Daily       Data   Recent Labs   Lab 12/16/20  0702 12/15/20  1502 12/13/20  1505 12/12/20  1532 12/12/20  0801   WBC 6.2 7.2 7.7  --  6.8   HGB 6.6* 7.1* 7.5*  --  7.9*   MCV 93 94 94  --  93   PLT 89* 101* 96*  --  91*   NA  --  138 139  --  139   POTASSIUM  --  3.5 3.9 4.2 3.3*    CHLORIDE  --  100 104  --  104   CO2  --  32 30  --  30   BUN  --  27 30  --  34*   CR 1.51* 1.57* 1.54*  --  1.68*   ANIONGAP  --  6 5  --  5   MARTINE  --  7.9* 8.1*  --  7.7*   GLC  --  114* 169*  --  88       Imaging:   No results found for this or any previous visit (from the past 24 hour(s)).

## 2020-12-16 NOTE — PLAN OF CARE
Pt a&ox4, [place], vss on 3 L - baseline, up with 2 and lift, reg diet, po dilaudid for pain, incontinent of bowel and bladder, ivsl, coccyx/groin area redness blanchable, rook boot on RLE, L BKA, tele - NSR, was cooperative with cares/turns overnight, did not sleep much overnight.

## 2020-12-16 NOTE — PROVIDER NOTIFICATION
FYI page to hospitalist on call about occult blood test positive. Will pass onto days and following hospitalist.

## 2020-12-16 NOTE — PROGRESS NOTES
[facility]    Infectious Disease Progress Note    Date of Service (when I saw the patient): 12/16/2020     Assessment & Plan   Perfecto Reese is a 70 year old male who was admitted on 12/2/2020.       Impression:  1. 70 y.o male with COPD on Home oxygen.   2. Ongoing tobacco abuse.   3. Adrenal insufficiency on chronic steroids   4. Psoriasis on humira.   5. Chronic hep C. Cirrhosis.   6. PVD, S/P BKA.   7. Non compliance.   8. Admitted with pneumonia symptoms and imaging with lung abscesses.   9. On zosyn. And on azithromycin.   10. Sputum culture with proteus.      Recommendations:   Continue on  oral Augmentin, dose reduced given kidney function, discharge on a total of 6 weeks of oral augmentin   Repeat a CT scan in 4 weeks.       Rc Hu MD    Interval History   Afebrile   Cultures noted       Physical Exam   Temp: 99.3  F (37.4  C) Temp src: Oral BP: 128/46 Pulse: 78   Resp: 16 SpO2: 91 % O2 Device: Nasal cannula Oxygen Delivery: 3 LPM  Vitals:    12/12/20 0646 12/14/20 0408 12/15/20 0515   Weight: 74.2 kg (163 lb 9.3 oz) 73.6 kg (162 lb 4.1 oz) 73.6 kg (162 lb 5.9 oz)     Vital Signs with Ranges  Temp:  [99.3  F (37.4  C)-100.1  F (37.8  C)] 99.3  F (37.4  C)  Pulse:  [78-88] 78  Resp:  [16-18] 16  BP: (120-132)/(45-52) 128/46  SpO2:  [91 %-98 %] 91 %    Constitutional: lethargic today, wakes up though   Lungs: Clear to auscultation bilaterally, no crackles or wheezing  Cardiovascular: Regular rate and rhythm, normal S1 and S2, and no murmur noted  Abdomen: Normal bowel sounds, soft, non-distended, non-tender  Skin: No rashes, no cyanosis, no edema  Other:    Medications     - MEDICATION INSTRUCTIONS -       - MEDICATION INSTRUCTIONS -         amLODIPine  5 mg Oral Daily     amoxicillin-clavulanate  1 tablet Oral Q12H ISIS     clobetasol   Topical At Bedtime     DULoxetine  30 mg Oral Daily     fluticasone-vilanterol  1 puff Inhalation Daily     furosemide  80 mg Oral Daily      gabapentin  200 mg Oral TID     hydrocortisone  20 mg Oral QAM     ipratropium - albuterol 0.5 mg/2.5 mg/3 mL  3 mL Nebulization 4x daily     multivitamin w/minerals  1 tablet Oral Daily     omeprazole  20 mg Oral Daily     polyethylene glycol  17 g Oral Daily     primidone  50 mg Oral At Bedtime     sodium chloride (PF)  3 mL Intracatheter Q8H     tamsulosin  0.8 mg Oral Daily       Data   All microbiology laboratory data reviewed.  Recent Labs   Lab Test 12/16/20  0702 12/15/20  1502 12/13/20  1505   WBC 6.2 7.2 7.7   HGB 6.6* 7.1* 7.5*   HCT 20.7* 22.2* 23.5*   MCV 93 94 94   PLT 89* 101* 96*     Recent Labs   Lab Test 12/16/20  0702 12/15/20  1502 12/13/20  1505   CR 1.51* 1.57* 1.54*     No lab results found.  Recent Labs   Lab Test 12/03/20  0540 12/03/20  0230 12/02/20  1904 12/02/20  1844   CULT Light growth  Normal adelina    Light growth  Proteus mirabilis  *  Moderate growth  Corynebacterium striatum  Identification obtained by MALDI-TOF mass spectrometry research use only database. Test   characteristics determined and verified by the Infect[facility] Diagnostic Laboratory   ([facility]) [place], [place].  Susceptibility testing not routinely done  * <10,000 colonies/mL  mixed urogenital adelina  Susceptibility testing not routinely done   No growth No growth       Attestation:  Total time on the floor involved in the patient's care: 35 minutes. Total time spent in counseling/care coordination: >50%

## 2020-12-16 NOTE — PROGRESS NOTES
WO Nurse Inpatient Wound Assessment   Reason for follow up visit : Evaluate and treat  RLE wounds    Assessment  RLE wounds due to Mixed Etiology: poor circulation, edema, pressure   Status: improving, see picture and measurements below, decreased edema and patient reported no pain    Treatment Plan  RLE  wounds: Every other day    1. R) lower post leg wound: every other day  (please premedicate)  Use MicroKlenz wound cleanser #[id number] to saturate dressing for removal and to clean wound,  Apply Vashe moistened gauze to wound bed and let sit x 5 minutes - do not rinse. #[id number]  Dry and protect surrounding skin with no sting barrier film wipe #[id number],  Apply endoform 2x2 may need 3 #[id number]  Apply Aquacel AG 4x4 (x2 dressings) #[id number],  Cover with ABD pad,    Wrap with kerlix roll,  Apply sween 24 lotion to dry skin on feet,  Wrap feet and leg with ace bandage,  Apply Rooke boot,  Use zflow to offload wound use 2  2. L) ear scab-keep clean, dry, intact monitor for signs of infection  3. Pressure Injury prevention (please order supplies if not in room)  Turn every 2 hours, side to side avoid supine on pressure redistribution support surface   Float heels off bed with use of pillows under legs  Prevent sliding by limiting HOB to 30 degrees or less unless contraindicated, use knee gatch first if not contraindicated  Protective foam dressings to sacrum PRN,Change at least q 4 days, peel back, peek and replace for daily skin inspection.  Chair cushion (#[id number]) as needed  Orders Updated  Recommended provider order: none   WO Nurse follow-up plan:weekly  Nursing to notify the Provider(s) and re-consult the WOC Nurse if wound(s) deteriorates or new skin concern.    Patient History  According to provider note(s):  Perfecto Reese is a 70 year old male with a history of chronic kidney disease stage III, right lower extremity wound, left ear wound, anemia, severe COPD, chronic hypoxic respiratory failure, adrenal  insufficiency, tobacco use, psoriasis, hypertension, BPH, GERD, hepatitis, and cirrhosis who was sent into the ER due to fevers, shortness of breath, cough, and diarrhea.  In the ER, there was concern for community-acquired pneumonia and acute kidney injury.  He was given IV fluids and started on empiric antibiotics.  The hospitalist service was contacted to admit him for further evaluation management.    Objective Data  Containment of urine/stool: Continent of bladder and Continent of bowel    Active Diet Order  Orders Placed This Encounter      Regular Diet Adult      Output:   No intake/output data recorded.    Risk Assessment:   Sensory Perception: 3-->slightly limited  Moisture: 3-->occasionally moist  Activity: 1-->bedfast  Mobility: 2-->very limited  Nutrition: 3-->adequate  Friction and Shear: 2-->potential problem  Donn Score: 14                          Labs:   Recent Labs   Lab 12/16/20  0702   HGB 6.6*   WBC 6.2       Physical Exam  Areas of skin assessed: focused rle     Wound Location:  RLE   Date of last photo 12/16/2020      Wound History: chronic mixed disease    Wound Base:30% healthy granular 70%pale/shiny     Palpation of the wound bed: normal      Drainage: small     Description of drainage: serous, small     Measurements (length x width x depth, in cm) 11.5  x 4  x  0.1 cm      Tunneling N/A     Undermining N/A  Periwound skin: scar tissue      Color: normal and consistent with surrounding tissue      Temperature: normal   Odor: none  Pain: denies , none      Interventions  Visual inspection and assessment completed 12/8/2020  Wound Care Rationale Promote moist wound healing without tissue dehydration , Provide selective debridement (autolysis) of nonviable tissue  and Decrease bacterial load  Wound Care: done per plan of care  Supplies: ordered: endoform, vashe zflows for offloading the wound , gathered and placed at the bedside  Current off-loading measures: Heel off-loading boot(s) and  Pillows  Current support surface: Standard  Atmos Air mattress  Education provided to: importance of repositioning and Off-loading pressure  Discussed plan of care with Patient and Nurse    Terra Rand, RN BS [facility]ON

## 2020-12-16 NOTE — CONSULTS
GASTROENTEROLOGY CONSULTATION     Perfecto Reese   [address]    [facility] [address]   70 year old male   Admission Date/Time: 12/2/2020   Encounter Date: 12/16/2020  Primary Care Provider: Polo Neil     Referring / Attending Physician: Felix Palacio   We were asked to see the patient in consultation by Dr. Palacio for evaluation of anemia.     HPI: Perfecto Reese is a 70 year old male who with a past medical history significant for severe COPD on 3 L of home oxygen with ongoing tobacco use, adrenal insufficiency on chronic steroids, psoriasis on Humira, chronic hepatitis C cirrhosis, PVD status post left BKA, chronic wounds and noncompliance who was admitted 14 days ago with pneumonia and was found to have a right lower lobe lung abscess.  The patient has chronic anemia and throughout his hospitalization has had a hemoglobin ranging in the 6-7 range.  He also has thrombocytopenia secondary to his cirrhosis.  He had a stool guaiac checked yesterday which was positive and GI was consulted for further evaluation.  [facility] upper endoscopy and colonoscopy but the patient has refused this in the past.  The patient denies any change in his bowel habits.  He denies any hematochezia or melena.  His appetite has been good and his weight has been stable.  Currently the patient states that he continues to have some shortness of breath while in his bed on 3 L of oxygen.  He denies any family history of GI malignancy, polyps or inflammatory bowel disease.  He states that his last colonoscopy was around 15 to 20 years ago and was normal.  [facility]  He was seen by gastroenterology at that time for evaluation of his chronic normocytic anemia.  His iron studies were normal at that time and it was felt that he would not benefit from an upper endoscopy or colonoscopy.  The patient has  been seen by hematology during this hospitalization.  They felt that his anemia was likely multifactorial but related to chronic disease secondary to his liver disease and renal disease.    Past Medical History  Past Medical History:   Diagnosis Date     Adrenal insufficiency (H)      Anemia, iron deficiency      Back pain      COPD (chronic obstructive pulmonary disease) (H)      Depression      GERD (gastroesophageal reflux disease)      Hyperlipidemia      Migraine        Past Surgical History  Past Surgical History:   Procedure Laterality Date     AMPUTATION BELOW KNEE RT/LT Left      AS ARTHROSCOPY SUBTALAR JOINT SUBTALAR ARTHRODESIS       OPEN REDUCTION INTERNAL FIXATION FOREARM      left forearm/wrist     TONSILLECTOMY         Family History  Family History   Problem Relation Age of Onset     Colon Cancer Father      Coronary Artery Disease Father      Chronic Obstructive Pulmonary Disease Father      Osteoporosis Mother      Dementia Mother        Social History  Social History     Socioeconomic History     Marital status: Single     Spouse name: Not on file     Number of children: Not on file     Years of education: Not on file     Highest education level: Not on file   Occupational History     Not on file   Social Needs     Financial resource strain: Not on file     Food insecurity     Worry: Not on file     Inability: Not on file     Transportation needs     Medical: Not on file     Non-medical: Not on file   Tobacco Use     Smoking status: Former Smoker     Packs/day: 0.50     Years: 30.00     Pack years: 15.00     Types: Cigarettes     Quit date: 4/1/2017     Years since quitting: 3.7     Smokeless tobacco: Never Used     Tobacco comment: states he is down to 3QD   Substance and Sexual Activity     Alcohol use: No     Drug use: No     Sexual activity: Never   Lifestyle     Physical activity     Days per week: Not on file     Minutes per session: Not on file     Stress: Not on file   Relationships      Social connections     Talks on phone: Not on file     Gets together: Not on file     Attends [employer] service: Not on file     Active member of club or organization: Not on file     Attends meetings of clubs or organizations: Not on file     Relationship status: Not on file     Intimate partner violence     Fear of current or ex partner: Not on file     Emotionally abused: Not on file     Physically abused: Not on file     Forced sexual activity: Not on file   Other Topics Concern     Parent/sibling w/ CABG, MI or angioplasty before 65F 55M? Not Asked   Social History Narrative     Not on file       Medications  Prior to Admission medications    Medication Sig Start Date End Date Taking? Authorizing Provider   adalimumab (HUMIRA) 40 MG/0.8ML prefilled syringe kit Inject 40 mg Subcutaneous every 14 days    Unknown, Entered By History   albuterol (PROAIR HFA/PROVENTIL HFA/VENTOLIN HFA) 108 (90 Base) MCG/ACT inhaler Inhale 2 puffs into the lungs every 4 hours as needed for shortness of breath / dyspnea or wheezing    Unknown, Entered By History   amLODIPine (NORVASC) 10 MG tablet Take 10 mg by mouth At Bedtime    Unknown, Entered By History   aspirin 81 MG EC tablet Take 81 mg by mouth daily    Unknown, Entered By History   budesonide-formoterol (SYMBICORT) 160-4.5 MCG/ACT Inhaler Inhale 2 puffs into the lungs 2 times daily    Unknown, Entered By History   calcium citrate (CITRACAL) 950 MG tablet Take 4 tablets by mouth daily    Unknown, Entered By History   clobetasol (TEMOVATE) 0.05 % external solution Apply topically At Bedtime Apply to scalp    Unknown, Entered By History   diclofenac (VOLTAREN) 1 % topical gel Apply 4 g topically every 6 hours as needed for moderate pain (R shoulder)    Unknown, Entered By History   DULoxetine (CYMBALTA) 30 MG capsule Take 30 mg by mouth daily    Unknown, Entered By History   Ferrous Gluconate 324 (37.5 Fe) MG TABS Take 324 mg by mouth every other day    Unknown, Entered By  History   gabapentin (NEURONTIN) 300 MG capsule Take 300 mg by mouth 3 times daily    Unknown, Entered By History   hydrocortisone (CORTEF) 20 MG tablet Take 20 mg by mouth every morning    Unknown, Entered By History   lidocaine (LIDODERM) 5 % patch Place 1 patch onto the skin every 24 hours To prevent lidocaine toxicity, patient should be patch free for 12 hrs daily.  Apply to right shoulder    Unknown, Entered By History   lidocaine (XYLOCAINE) 5 % external ointment Apply topically 3 times daily as needed for moderate pain (rib pain)    Unknown, Entered By History   losartan (COZAAR) 25 MG tablet Take 12.5 mg by mouth daily    Unknown, Entered By History   MENTHOL-METHYL SALICYLATE EX Externally apply topically 2 times daily Menthol-methyl saliycylate 10-15% cream twice daily to lower back    Unknown, Entered By History   morphine 10 MG/5ML solution Take 2.5 mg by mouth 2 times daily as needed for severe pain (with dressing change)    Unknown, Entered By History   nicotine (NICORETTE) 2 MG gum Place 2 mg inside cheek as needed for smoking cessation    Unknown, Entered By History   omeprazole (PRILOSEC) 20 MG DR capsule Take 20 mg by mouth daily    Unknown, Entered By History   oxyCODONE (ROXICODONE) 5 MG tablet Take 5 mg by mouth every 6 hours as needed for severe pain Separate from morphine by 4 hours    Unknown, Entered By History   primidone (MYSOLINE) 50 MG tablet Take 50 mg by mouth At Bedtime    Unknown, Entered By History   sodium hypochlorite (HYSEPT) external solution Irrigate with as directed daily 0.25% for wound care    Unknown, Entered By History   tamsulosin (FLOMAX) 0.4 MG capsule Take 0.8 mg by mouth daily    Unknown, Entered By History   tiotropium (SPIRIVA) 18 MCG inhaled capsule Inhale 18 mcg into the lungs daily    Unknown, Entered By History   triamcinolone (KENALOG) 0.1 % external cream Apply topically as needed for irritation (wound care)    Unknown, Entered By History   vitamin B-12  "(CYANOCOBALAMIN) 1000 MCG tablet Take 1,000 mcg by mouth daily    Unknown, Entered By History   Vitamin D, Cholecalciferol, 25 MCG (1000 UT) TABS Take 1,000 Units by mouth 2 times daily    Unknown, Entered By History       Allergies:  Darvocet [propoxyphene n-apap] and Vicodin [hydrocodone-acetaminophen]    ROS: A ten point review of systems was obtained and negative other than the symptoms noted above in the HPI.     Physical Exam:   /46 (BP Location: Left arm)   Pulse 78   Temp 99.3  F (37.4  C) (Oral)   Resp 16   Ht 1.651 m (5' 5\")   Wt 73.6 kg (162 lb 5.9 oz)   SpO2 91%   BMI 27.02 kg/m     Constitutional: age appropriate, alert, no acute distress  Cardiovascular: regular rate and rhythm, no murmurs,rubs or gallops  Respiratory: clear to auscultation bilaterally  Psychiatric: normal pleasant affect  Head: atraumatic, normocephalic  Neck: supple, no thyromegaly  ENT: mucous membranes are moist, no oral lesions are noted  Abdomen: soft, non-tender, non-distended, normally active bowel sound. No masses or hepatosplenomegaly is appreciated. No rebound tenderness or guarding  Neuro: Neurologically intact grossly  Skin: warm, dry, no rashes are noted    ADDITIONAL COMMENTS:   I reviewed the patient's new clinical lab test results.   Recent Labs   Lab Test 12/16/20  0702 12/15/20  1502 12/13/20  1505   WBC 6.2 7.2 7.7   HGB 6.6* 7.1* 7.5*   MCV 93 94 94   PLT 89* 101* 96*      Recent Labs   Lab Test 12/16/20  0702 12/15/20  1502 12/13/20  1505 12/12/20  1532 12/12/20  0801   NA  --  138 139  --  139   POTASSIUM  --  3.5 3.9 4.2 3.3*   CHLORIDE  --  100 104  --  104   CO2  --  32 30  --  30   BUN  --  27 30  --  34*   CR 1.51* 1.57* 1.54*  --  1.68*   ANIONGAP  --  6 5  --  5   MARTINE  --  7.9* 8.1*  --  7.7*      Recent Labs   Lab Test 12/09/20  0945 12/03/20  0230 12/02/20  1845   ALBUMIN  --   --  2.6*   BILITOTAL  --   --  0.7   ALT  --   --  8   AST  --   --  6   ALKPHOS  --   --  98   PROTEIN 30* 30*  -- "       I reviewed the patient's new imaging results.     Assessment: 70-year-old male currently admitted with pneumonia and a right lower lobe lung abscess complicated by severe COPD requiring 3 L of oxygen at home.  The patient does have chronic hepatitis C and cirrhosis.  His cirrhosis is complicated by thrombocytopenia and chronic kidney disease.  His hemoglobin has been around his baseline since admission and there have been no signs of any GI blood loss since admission.  His stool guaiac was positive.  We discussed potential further evaluation of his anemia with an upper endoscopy and colonoscopy however with his current respiratory status and pneumonia the procedures would be of higher risk.  Given the lack of GI blood loss we will plan to hold off on endoscopic evaluation for now.  [facility] in [facility] and I have discussed this with [facility].    Plan:   -Diet as tolerated  -Continue to follow hgb and stool output  -If active bleeding is seen would consider EGD if needed  -Plan to [facility] for potential endoscopic procedures if he is felt to be stable from a respiratory standpoint  -Current cares per IM/ID    I discussed the patient's findings and plan with Dr. Husam Wayne who will also independently see and examine the patient.     Duy Monique PA-C  [facility]  Cell:  [phone] Monday through Friday 4815-8827  Office: [phone]

## 2020-12-17 LAB — HGB BLD-MCNC: 8.1 G/DL (ref 13.3–17.7)

## 2020-12-17 PROCEDURE — 94640 AIRWAY INHALATION TREATMENT: CPT | Mod: 76

## 2020-12-17 PROCEDURE — 250N000009 HC RX 250: Performed by: INTERNAL MEDICINE

## 2020-12-17 PROCEDURE — 250N000013 HC RX MED GY IP 250 OP 250 PS 637: Performed by: INTERNAL MEDICINE

## 2020-12-17 PROCEDURE — 999N000157 HC STATISTIC RCP TIME EA 10 MIN

## 2020-12-17 PROCEDURE — 85018 HEMOGLOBIN: CPT | Performed by: INTERNAL MEDICINE

## 2020-12-17 PROCEDURE — 94640 AIRWAY INHALATION TREATMENT: CPT

## 2020-12-17 PROCEDURE — 99232 SBSQ HOSP IP/OBS MODERATE 35: CPT | Performed by: INTERNAL MEDICINE

## 2020-12-17 PROCEDURE — 120N000001 HC R&B MED SURG/OB

## 2020-12-17 PROCEDURE — 36415 COLL VENOUS BLD VENIPUNCTURE: CPT | Performed by: INTERNAL MEDICINE

## 2020-12-17 PROCEDURE — 99207 PR CDG-CODE CATEGORY CHANGED: CPT | Performed by: INTERNAL MEDICINE

## 2020-12-17 PROCEDURE — 250N000013 HC RX MED GY IP 250 OP 250 PS 637: Performed by: HOSPITALIST

## 2020-12-17 RX ORDER — MULTIPLE VITAMINS W/ MINERALS TAB 9MG-400MCG
1 TAB ORAL DAILY
Qty: 30 TABLET | Refills: 0 | Status: SHIPPED | OUTPATIENT
Start: 2020-12-17 | End: 2020-12-31

## 2020-12-17 RX ORDER — LOPERAMIDE HCL 2 MG
2 CAPSULE ORAL 4 TIMES DAILY PRN
Qty: 15 CAPSULE | Refills: 0 | Status: SHIPPED | OUTPATIENT
Start: 2020-12-17 | End: 2020-12-31

## 2020-12-17 RX ORDER — AMLODIPINE BESYLATE 5 MG/1
5 TABLET ORAL DAILY
Qty: 30 TABLET | Refills: 0 | Status: SHIPPED | OUTPATIENT
Start: 2020-12-17 | End: 2020-12-31

## 2020-12-17 RX ORDER — AMOXICILLIN AND CLAVULANATE POTASSIUM 500; 125 MG/1; MG/1
1 TABLET, FILM COATED ORAL EVERY 12 HOURS
Qty: 54 TABLET | Refills: 0 | Status: SHIPPED | OUTPATIENT
Start: 2020-12-17 | End: 2020-12-31

## 2020-12-17 RX ORDER — FUROSEMIDE 40 MG
40 TABLET ORAL DAILY
Qty: 30 TABLET | Refills: 0 | Status: ON HOLD | OUTPATIENT
Start: 2020-12-17 | End: 2021-02-15

## 2020-12-17 RX ADMIN — AMLODIPINE BESYLATE 5 MG: 5 TABLET ORAL at 10:22

## 2020-12-17 RX ADMIN — IPRATROPIUM BROMIDE AND ALBUTEROL SULFATE 3 ML: .5; 3 SOLUTION RESPIRATORY (INHALATION) at 19:29

## 2020-12-17 RX ADMIN — LORAZEPAM 0.25 MG: 0.5 TABLET ORAL at 10:32

## 2020-12-17 RX ADMIN — HYDROMORPHONE HYDROCHLORIDE 2 MG: 2 TABLET ORAL at 18:59

## 2020-12-17 RX ADMIN — IPRATROPIUM BROMIDE AND ALBUTEROL SULFATE 3 ML: .5; 3 SOLUTION RESPIRATORY (INHALATION) at 10:47

## 2020-12-17 RX ADMIN — LORAZEPAM 0.5 MG: 0.5 TABLET ORAL at 20:53

## 2020-12-17 RX ADMIN — HYDROMORPHONE HYDROCHLORIDE 2 MG: 2 TABLET ORAL at 10:32

## 2020-12-17 RX ADMIN — PRIMIDONE 50 MG: 50 TABLET ORAL at 20:58

## 2020-12-17 RX ADMIN — AMOXICILLIN AND CLAVULANATE POTASSIUM 1 TABLET: 500; 125 TABLET, FILM COATED ORAL at 02:22

## 2020-12-17 RX ADMIN — IPRATROPIUM BROMIDE AND ALBUTEROL SULFATE 3 ML: .5; 3 SOLUTION RESPIRATORY (INHALATION) at 07:18

## 2020-12-17 RX ADMIN — DULOXETINE HYDROCHLORIDE 30 MG: 30 CAPSULE, DELAYED RELEASE ORAL at 10:22

## 2020-12-17 RX ADMIN — IPRATROPIUM BROMIDE AND ALBUTEROL SULFATE 3 ML: .5; 3 SOLUTION RESPIRATORY (INHALATION) at 15:43

## 2020-12-17 RX ADMIN — AMOXICILLIN AND CLAVULANATE POTASSIUM 1 TABLET: 500; 125 TABLET, FILM COATED ORAL at 16:18

## 2020-12-17 RX ADMIN — FLUTICASONE FUROATE AND VILANTEROL TRIFENATATE 1 PUFF: 200; 25 POWDER RESPIRATORY (INHALATION) at 10:28

## 2020-12-17 RX ADMIN — OMEPRAZOLE 20 MG: 20 CAPSULE, DELAYED RELEASE ORAL at 10:22

## 2020-12-17 RX ADMIN — FUROSEMIDE 80 MG: 40 TABLET ORAL at 10:21

## 2020-12-17 RX ADMIN — GABAPENTIN 200 MG: 100 CAPSULE ORAL at 20:59

## 2020-12-17 RX ADMIN — MULTIPLE VITAMINS W/ MINERALS TAB 1 TABLET: TAB at 10:21

## 2020-12-17 RX ADMIN — TAMSULOSIN HYDROCHLORIDE 0.8 MG: 0.4 CAPSULE ORAL at 10:21

## 2020-12-17 RX ADMIN — HYDROMORPHONE HYDROCHLORIDE 2 MG: 2 TABLET ORAL at 23:32

## 2020-12-17 RX ADMIN — GABAPENTIN 200 MG: 100 CAPSULE ORAL at 16:18

## 2020-12-17 RX ADMIN — GABAPENTIN 200 MG: 100 CAPSULE ORAL at 10:21

## 2020-12-17 RX ADMIN — HYDROCORTISONE 20 MG: 20 TABLET ORAL at 10:22

## 2020-12-17 ASSESSMENT — ACTIVITIES OF DAILY LIVING (ADL)
ADLS_ACUITY_SCORE: 23
ADLS_ACUITY_SCORE: 25
ADLS_ACUITY_SCORE: 24
ADLS_ACUITY_SCORE: 23
ADLS_ACUITY_SCORE: 23
ADLS_ACUITY_SCORE: 24

## 2020-12-17 ASSESSMENT — MIFFLIN-ST. JEOR: SCORE: 1438.06

## 2020-12-17 NOTE — PROGRESS NOTES
Care Management Discharge Note    Discharge Date: 12/17/20       Discharge Disposition: Home, Home Care    Discharge Services:  RN, PT OT HHA    Discharge DME:  Has own w/c and scooter    Discharge Transportation: agency    PAS Confirmation Code:    Patient/family educated on Medicare website which has current facility and service quality ratings: no    Education Provided on the Discharge Plan:    Persons Notified of Discharge Plans: Patient and bedside Tammi HINES  Patient/Family in Agreement with the Plan: yes    Handoff Referral Completed: Yes; [facility] notified of need for appointment but will be calling back today or  tomorrow    Additional Information:  Confirmed with the patient he has keys, has O2 at home and will cont with [facility] as prior to this hospitalization.    Discharge order and MAR faxed to [phone].  Writer left  with FLORA mcelroy with number for call back if needed.  Informed of the discharge today.        Marylou Napoles RN

## 2020-12-17 NOTE — PROGRESS NOTES
Care Transitions Note:  Writer left message for pts PCP Rashaad, for a follow up appointment within 1 week.  They will call back today or tomorrow.  Gave Shilpi Zaman's number if they call back tomorrow.    Late Entry : Writer phoned [facility] and spoke with RN about care received from them.  She confirmed PT/OT and SNV are open, however the patient does not answer his phone and mailbox is full.  The RN has been able to see patient most days for dressing change.  The OT and PT both tried to see the patient 3 times and were not successful, so stopped.  The A has never been able to get hold of patient by phone to set up a visit time.      CAMELIA

## 2020-12-17 NOTE — PROGRESS NOTES
Care Transitions Note:  Writer met with patient along with bedside FLORA Cadena this afternoon.  Encouraged and requested patient get out of bed and transfer to a chair and he refused.  Patient very withdrawn and not willing to move or work with PT/OT.      Writer called pts [facility] and spoke with RN,  Who stated pt's voice mail is full, and he does not answer his phone so  HHA cannot go see him and never have seen him.  She stated the RN goes to pts home daily for wound care although there are a few missed visits.  PT and OT have never been able to contact patient for services either.    This was discussed with FLORA Cadena, and it was decided to cancel the ride home as patient would not be safe to be home alone.    Charr cancelled ride for  Today.  Tammi has paged Dr. Valdez regarding this.      FLORA [facility]  Inpatient Care Coordinator  M: [phone]

## 2020-12-17 NOTE — DISCHARGE SUMMARY
[facility]    Hospitalist Discharge Summary       Date of Admission:  12/2/2020  Date of Discharge:  12/17/2020  Discharging Provider: Neeraj Valdez MD      Discharge Diagnoses   Bilateral community acquired pneumonia with RLL abscess  Acute on chronic hypoxic and hypercarbic respiratory failure, improved  Severe COPD with chronic respiratory failure  Chronic normocytic anemia, multifactorial  Chronic thrombocytopenia  Acute on CKD3, impoved  Acute metabolic encephalopathy secondary to hypercarbic respiratory failure, improved  NAGMA, resolved  Left BKA  Chronic RLE wound    Follow-ups Needed After Discharge   Follow-up Appointments     Follow-up and recommended labs and tests       Follow up with primary care provider, Polo Neil, within 7 days for   hospital follow- up.  Check BMP and CBC at follow up.  - Please obtain a repeat CT chest around 1/8/2020 for follow up of your   pneumonia.  - [facility]  - [facility]           Hospital Course   Perfecto Reese is a 70 year old male with complex PMHx including severe COPD on 3L home O2 with ongoing tobacco use, adrenal insufficiency on chronic steroids, psoriasis on Humira, chronic Hep C with cirrhosis, PVD s/p left BKA, chronic wounds, and non-compliance who was admitted on 12/2/2020 for pneumonia, found to have a RLL lung abscess.    EMS activated by his home care RN for fever, cough, and acute respiratory distress (placed on 10L per OM in the field). Fever to 102.5, tachycardia, tachypnea, and leukocytosis (17.2k) present on arrival. CXR on arrival showed RLL infiltrate and CT abd/pelvis showed air fluid cavity at the right lung base. On admission, he was initiated on IV pip-tazo and azithromycin, and Thoracic Surgery was consulted. Chest CT was ordered (12/3) which showed a 6.4 cm RLL pulmonary abscess and extensive left-sided mucus plugging with associated infiltrate. 12/2  blood cultures x2 no growth. Sputum cx 12/4 growing proteus, fluoroquinolone resistant. Also corynebacterium striatum 12/6.   CT surgery saw, no surgical intervention indicated. Patient was treated with IV then PO antibiotics, with the assistance of infectious disease. Initially on zosyn->unasyn-> transitioned 12/8 to Augmentin x 6 weeks (through approximately 1/13/2021).  - Repeat CT scan in 4 weeks (~1/8/2021).  - O2 weaned back to baseline of 3 lpm  - PT- rec TCU, patient refusing, patient discharging home with home therapies and nurse     Chronic normocytic anemia, likely ACD  Chronic thrombocytopenia  Anemia and thrombocytopenia likely multifactorial from liver cirrhosis, splenomegaly, renal disease, CKD and psoriasis. Iron labs suggestive of anemia of chronic disease. 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 Patient required four units of pRBCs this admission for Hgb < 7.0. No acute GI bleed identified, likely blood loss from decreased erythropoiesis. Appreciate GI consult 12/16/2020, no acute GI bleed, no acute interventions, [facility]    Acute kidney injury on CKD 3, improved  Asymptomatic pyuria  Baseline creatinine 1.2-1.3 in Jan 2020, on admission 2.89. Suspect pre-renal state in setting of pneumonia/sepsis with possible progression to ATN in setting of hypotension. Improved with IV fluids, creatinine down to 1.5 on 12/16.  - restart PTA losartan at discharge  - Appreciate Nephrology consult--lasix 40 mg daily at discharge  - [facility]    Chronic adrenal insufficiency  - Continues on PTA hydrocortisone 20 mg PO daily     Essential hypertension: Amlodipine decreased to 5mg daily due to soft blood pressures    History of psoriasis   - Holding PTA Humira this admission--continue to hold at discharge  - Follow up with outpatient provider     Peripheral  vascular disease s/p left BKA  Chronic RLE wound  Chronic left ear wound  Present on admission. Receives wound cares per home care RN.      Chronic pain syndrome: Resume PTA regimen (gabapentin 300 mg TID, duloxetine 30 mg daily, lidocaine patch, oxycodone 5 mg q6h prn, morphine 2.5 mg BID prn)     Non-severe malnutrition     Consultations This Hospital Stay   THORACIC SURGERY IP CONSULT  PHYSICAL THERAPY ADULT IP CONSULT  WOUND OSTOMY CONTINENCE NURSE  IP CONSULT  INFECTIOUS DISEASES IP CONSULT  CARE MANAGEMENT / SOCIAL WORK IP CONSULT  HEMATOLOGY & ONCOLOGY IP CONSULT  NEPHROLOGY IP CONSULT  GASTROENTEROLOGY IP CONSULT    Code Status   Full Code    Time Spent on this Encounter   I, Neeraj Valdez, personally saw the patient today and spent approximately 35 minutes discharging this patient.       Neeraj Valdez MD  [facility]  ______________________________________________________________________    Physical Exam   Vital Signs: Temp: 99.1  F (37.3  C) Temp src: Axillary BP: 131/58 Pulse: 85   Resp: 16 SpO2: 94 % O2 Device: Nasal cannula Oxygen Delivery: 3 LPM  Weight: 163 lbs 6.4 oz    Constitutional: Male in NAD, chronically ill appearing  HEENT: Eyes nonicteric, oral mucosa moist  Cardiovascular: RRR, normal S1/2, no m/r/g  Respiratory: CTAB, no wheezing or crackles  Vascular: LLE BKA, RLE in boot  GI: Normoactive bowel sounds, nontender  Skin/Integumen: No rashes  Neuro/Psych: Appropriate affect and mood, generally quiet. A&Ox3, moves all extremities       Primary Care Physician   Polo Neil    Discharge Disposition   Discharged to home  Condition at discharge: Stable    Significant Results and Procedures   Most Recent 3 CBC's:  Recent Labs   Lab Test 12/17/20  0655 12/16/20  1823 12/16/20  0702 12/15/20  1502 12/13/20  1505   WBC  --   --  6.2 7.2 7.7   HGB 8.1* 7.6* 6.6* 7.1* 7.5*   MCV  --   --  93 94 94   PLT  --   --  89* 101* 96*     Most Recent 3 BMP's:  Recent Labs   Lab  Test 12/16/20  0702 12/15/20  1502 12/13/20  1505 12/12/20  1532 12/12/20  0801   NA  --  138 139  --  139   POTASSIUM  --  3.5 3.9 4.2 3.3*   CHLORIDE  --  100 104  --  104   CO2  --  32 30  --  30   BUN  --  27 30  --  34*   CR 1.51* 1.57* 1.54*  --  1.68*   ANIONGAP  --  6 5  --  5   MARTINE  --  7.9* 8.1*  --  7.7*   GLC  --  114* 169*  --  88     Most Recent 2 LFT's:  Recent Labs   Lab Test 12/02/20  1845   AST 6   ALT 8   ALKPHOS 98   BILITOTAL 0.7     Most Recent INR's and Anticoagulation Dosing History:  Anticoagulation Dose History     There is no flowsheet data to display.        Most Recent 3 Troponin's:No lab results found.  Most Recent 3 BNP's:No lab results found.  Most Recent D-dimer:No lab results found.,   Results for orders placed or performed during the hospital encounter of 12/02/20   XR Chest Port 1 View    Narrative    XR CHEST PORT 1 VW  12/2/2020 7:25 PM       INDICATION: Cough, fever, SOB  COMPARISON: 10/8/2015       Impression    IMPRESSION: Right lower lobe infiltrate consistent with pneumonia.  Lung volumes are low. No definite left lung infiltrate.    RAEANN MAYORGA MD   CT Abdomen Pelvis w/o Contrast    Narrative    CT ABDOMEN AND PELVIS WITHOUT CONTRAST 12/2/2020 9:08 PM    CLINICAL HISTORY: Left flank pain and fever.    TECHNIQUE: CT scan of the abdomen and pelvis was performed without IV  contrast. Multiplanar reformats were obtained. Dose reduction  techniques were used.    CONTRAST: None.    COMPARISON: None.    FINDINGS:   LOWER CHEST: Extensive airspace infiltrates in the right lower lobe  consistent with pneumonia. There is a 7.5 cm fluid collection with an  air-fluid level at the right lung base, which appears to be loculated  in the major fissure. Minimal infiltrate left lung base.    HEPATOBILIARY: Cirrhotic appearance of the liver. Cholelithiasis.    PANCREAS: Normal.    SPLEEN: Mild splenomegaly.    ADRENAL GLANDS: Normal.    KIDNEYS/BLADDER: Bilateral presumed renal cysts. No  hydronephrosis or  urinary tract calculi.    BOWEL: Normal.    LYMPH NODES: Normal.    VASCULATURE: Unremarkable.    PELVIC ORGANS: Normal.    OTHER: None.    MUSCULOSKELETAL: Degenerative changes in the spine. Mild  chronic-appearing compression fracture of L1. Old left ischial  fracture. No discrete bone lesions.      Impression    IMPRESSION:   1.  Extensive right lower lobe pneumonia. Mild infiltrate in the left  lung base as well.  2.  Air-fluid cavity at the right lung base appears to be loculated in  the major fissure. Suggest thoracic surgery consultation.  3.  Cirrhosis with signs of portal hypertension including  splenomegaly. No ascites.    RAEANN MAYORGA MD   CT Chest w/o Contrast    Narrative    CT CHEST WITHOUT CONTRAST 12/3/2020 11:55 AM     HISTORY: Pneumonia, unresolved or complicated. 7.5 cm fluid collection  with air-fluid level seen on CT abdomen/pelvis.    COMPARISON: Abdomen and pelvis CT from December 2, 2020    TECHNIQUE: Volumetric helical acquisition of CT images of the chest  from the clavicles to the kidneys were acquired without IV contrast.  Radiation dose for this scan was reduced using automated exposure  control, adjustment of the mA and/or kV according to patient size, or  iterative reconstruction technique.    FINDINGS: Air-fluid level in a cavitary lesion in the right lower lobe  may represent a pulmonary abscess measuring 6.4 x 5.3 cm. Extensive  infiltrates throughout the right lower lobe. Mild dependent  atelectasis and/or additional infiltrate in the posterior aspect of  the right upper lobe. Trace right pleural fluid. Mild left lower lobe  atelectasis and/or infiltrate. Marked bronchial wall thickening and  bronchiectasis seen in the left lower lobe with extensive areas of  mucous plugging. Emphysema. A few mildly prominent lymph nodes are  seen in the mediastinum which are nonspecific and may be reactive in  this setting. No left pleural or pericardial effusion.  Possible  splenomegaly in the upper abdomen. Question of nodular contour to the  liver which could indicate cirrhosis. Cholelithiasis without  cholecystitis.      Impression    IMPRESSION:  1. Air-fluid level in a cavitary lesion in the right lower lobe  measuring 6.4 x 5.3 cm, differential includes a pulmonary abscess.  2. Extensive consolidation in the right lower lobe.  3. Extensive mucous plugging and bronchial wall thickening in the left  lower lobe with mild associated infiltrate.    LORRI TRINIDAD MD   XR Chest Port 1 View    Narrative    CHEST PORTABLE ONE VIEW   12/9/2020 6:54 PM     HISTORY: Hypoxia.    COMPARISON: Chest CT on 12/3/2020.      Impression    IMPRESSION: Single portable AP view of the chest was obtained. Stable  cardiomediastinal silhouette. Small mildly loculated pleural effusion  with associated basilar atelectasis/consolidation. Round lucency in  the right lung base, corresponds to the previously seen cavitary  lesion on 12/3/2020 CT. Left basilar pulmonary opacities, could  represent atelectasis versus infection. No significant pneumothorax.    PANDA HERRERA MD       Discharge Orders      CT Chest w contrast*     Home care nursing referral      Home Care PT Referral for Hospital Discharge      Home Care OT Referral for Hospital Discharge      Reason for your hospital stay    You were hospitalized for a severe lung pneumonia which turned into an abscess. You are on antibiotics for several weeks.     Activity    Your activity upon discharge: activity as tolerated in house     Discharge Instructions     MD face to face encounter    Documentation of Face to Face and Certification for Home Health Services    I certify that patient: Perfecto Reese is under my care and that I, or a nurse practitioner or physician's assistant working with me, had a face-to-face encounter that meets the physician face-to-face encounter requirements with this patient on: 12/17/2020.    This encounter with the  patient was in whole, or in part, for the following medical condition, which is the primary reason for home health care: RLL lung abscess.    I certify that, based on my findings, the following services are medically necessary home health services: Nursing, Occupational Therapy and Physical Therapy.    My clinical findings support the need for the above services because: Nurse is needed: To assess respiratory status, mobility, home safety after changes in medications or other medical regimen.., Occupational Therapy Services are needed to assess and treat cognitive ability and address ADL safety due to impairment in deconditioning. and Physical Therapy Services are needed to assess and treat the following functional impairments: deconditioning.    Further, I certify that my clinical findings support that this patient is homebound (i.e. absences from home require considerable and taxing effort and are for medical reasons or Jew services or infrequently or of short duration when for other reasons) because: Leaving home is medically contraindicated for the following reason(s): Dyspnea on exertion that makes it so they cannot leave their home for needed services without clinical deterioration...    Based on the above findings. I certify that this patient is confined to the home and needs intermittent skilled nursing care, physical therapy and/or speech therapy.  The patient is under my care, and I have initiated the establishment of the plan of care.  This patient will be followed by a physician who will periodically review the plan of care.  Physician/Provider to provide follow up care: Polo Neil    Attending [facility] physician (the Medicare certified [place] provider): Neeraj Valdez MD  Physician Signature: See electronic signature associated with these discharge orders.  Date: 12/17/2020     Follow-up and recommended labs and tests     Follow up with primary care provider, Polo Neil, within 7 days for  hospital follow- up.  Check BMP and CBC at follow up.  - Please obtain a repeat CT chest around 1/8/2020 for follow up of your pneumonia.  - [facility]  - [facility]     Full Code     Oxygen Adult/Peds    Oxygen Documentation:   I certify that this patient, Perfecto Reese has been under my care (or a nurse practitioner or physican's assistant working with me). This is the face-to-face encounter for oxygen medical necessity.      Perfecto Reese is now in a chronic stable state and continues to require supplemental oxygen. Patient has continued oxygen desaturation due to Chronic Respiratory Failure with Hypoxia J93.11.    Alternative treatment(s) tried or considered and deemed clinically infective for treatment of Chronic Respiratory Failure with Hypoxia J93.11 include nebulizers and inhalers.  If portability is ordered, is the patient mobile within the home? yes    **Patients who qualify for home O2 coverage under the CMS guidelines require ABG tests or O2 sat readings obtained closest to, but no earlier than 2 days prior to the discharge, as evidence of the need for home oxygen therapy. Testing must be performed while patient is in the chronic stable state. See notes for O2 sats.**     Diet    Follow this diet upon discharge:      Regular Diet Adult     Discharge Medications   Current Discharge Medication List      START taking these medications    Details   amoxicillin-clavulanate (AUGMENTIN) 500-125 MG tablet Take 1 tablet by mouth every 12 hours for 27 days  Qty: 54 tablet, Refills: 0    Associated Diagnoses: Pneumonia of right lower lobe due to infectious organism      furosemide (LASIX) 40 MG tablet Take 1 tablet (40 mg) by mouth daily  Qty: 30 tablet, Refills: 0    Associated Diagnoses: Pneumonia of right lower lobe due to infectious organism      loperamide (IMODIUM) 2 MG capsule Take 1 capsule (2 mg) by mouth 4 times daily as needed for  diarrhea  Qty: 15 capsule, Refills: 0    Associated Diagnoses: Pneumonia of right lower lobe due to infectious organism      multivitamin w/minerals (THERA-VIT-M) tablet Take 1 tablet by mouth daily  Qty: 30 tablet, Refills: 0    Associated Diagnoses: Pneumonia of right lower lobe due to infectious organism         CONTINUE these medications which have CHANGED    Details   amLODIPine (NORVASC) 5 MG tablet Take 1 tablet (5 mg) by mouth daily  Qty: 30 tablet, Refills: 0    Associated Diagnoses: Essential hypertension         CONTINUE these medications which have NOT CHANGED    Details   albuterol (PROAIR HFA/PROVENTIL HFA/VENTOLIN HFA) 108 (90 Base) MCG/ACT inhaler Inhale 2 puffs into the lungs every 4 hours as needed for shortness of breath / dyspnea or wheezing    Comments: Pharmacy may dispense brand covered by insurance (Proair, or proventil or ventolin or generic albuterol inhaler)      aspirin 81 MG EC tablet Take 81 mg by mouth daily      budesonide-formoterol (SYMBICORT) 160-4.5 MCG/ACT Inhaler Inhale 2 puffs into the lungs 2 times daily      calcium citrate (CITRACAL) 950 MG tablet Take 4 tablets by mouth daily      clobetasol (TEMOVATE) 0.05 % external solution Apply topically At Bedtime Apply to scalp      diclofenac (VOLTAREN) 1 % topical gel Apply 4 g topically every 6 hours as needed for moderate pain (R shoulder)      DULoxetine (CYMBALTA) 30 MG capsule Take 30 mg by mouth daily      Ferrous Gluconate 324 (37.5 Fe) MG TABS Take 324 mg by mouth every other day      gabapentin (NEURONTIN) 300 MG capsule Take 300 mg by mouth 3 times daily      hydrocortisone (CORTEF) 20 MG tablet Take 20 mg by mouth every morning      lidocaine (LIDODERM) 5 % patch Place 1 patch onto the skin every 24 hours To prevent lidocaine toxicity, patient should be patch free for 12 hrs daily.  Apply to right shoulder      lidocaine (XYLOCAINE) 5 % external ointment Apply topically 3 times daily as needed for moderate pain (rib  pain)      losartan (COZAAR) 25 MG tablet Take 12.5 mg by mouth daily      MENTHOL-METHYL SALICYLATE EX Externally apply topically 2 times daily Menthol-methyl saliycylate 10-15% cream twice daily to lower back      morphine 10 MG/5ML solution Take 2.5 mg by mouth 2 times daily as needed for severe pain (with dressing change)      nicotine (NICORETTE) 2 MG gum Place 2 mg inside cheek as needed for smoking cessation      omeprazole (PRILOSEC) 20 MG DR capsule Take 20 mg by mouth daily      oxyCODONE (ROXICODONE) 5 MG tablet Take 5 mg by mouth every 6 hours as needed for severe pain Separate from morphine by 4 hours      primidone (MYSOLINE) 50 MG tablet Take 50 mg by mouth At Bedtime      sodium hypochlorite (HYSEPT) external solution Irrigate with as directed daily 0.25% for wound care      tamsulosin (FLOMAX) 0.4 MG capsule Take 0.8 mg by mouth daily      tiotropium (SPIRIVA) 18 MCG inhaled capsule Inhale 18 mcg into the lungs daily      triamcinolone (KENALOG) 0.1 % external cream Apply topically as needed for irritation (wound care)      vitamin B-12 (CYANOCOBALAMIN) 1000 MCG tablet Take 1,000 mcg by mouth daily      Vitamin D, Cholecalciferol, 25 MCG (1000 UT) TABS Take 1,000 Units by mouth 2 times daily         STOP taking these medications       adalimumab (HUMIRA) 40 MG/0.8ML prefilled syringe kit Comments:   Reason for Stopping:             Allergies   Allergies   Allergen Reactions     Darvocet [Propoxyphene N-Apap] Nausea     Vicodin [Hydrocodone-Acetaminophen] Nausea     Can use if he eats with it

## 2020-12-17 NOTE — PLAN OF CARE
Pt compliant with cares. Pt's pain on (R) side of the body is 8-10, managed well with prn dilaudid. RLE dressing changed by WOC. Is incontinent of bowel/bladder/ T/R q 2 hrs/prn. Pt's coccyx is reddened but blanchable. Po intake is fair. Refused to get OOB. Received a unit of blood for hgb of 6.6, recheck was 7.6. Will cont to monitor.

## 2020-12-17 NOTE — PLAN OF CARE
Alert/Oriented x 4. Pain managed with prn dilaudid. Administered a dose of ativan for anxiety. T/R as pt allows. Is incontinent of bowel/bladder. Discharge to home d/c'd due to unsafe disposition plan. Pt agreed to work with PT tomorrow, scheduled at 1100. PO intake is adequate and is voiding in large amount. Will cont to monitor.

## 2020-12-17 NOTE — PROGRESS NOTES
Hsopitalist paged with the following message: Pt is refusing to get OOB. Unsure how pt will transfer from w/c into his scooter/bed once at home. Pt doesn't think he is ready to discharge. Are you able to talk to pt and ask him to try getting OOB safely while in the hospital?  thank-you!  Tammi Davis RN

## 2020-12-18 ENCOUNTER — APPOINTMENT (OUTPATIENT)
Dept: PHYSICAL THERAPY | Facility: CLINIC | Age: 70
DRG: 871 | End: 2020-12-18
Payer: MEDICARE

## 2020-12-18 PROCEDURE — 250N000013 HC RX MED GY IP 250 OP 250 PS 637: Performed by: INTERNAL MEDICINE

## 2020-12-18 PROCEDURE — 99232 SBSQ HOSP IP/OBS MODERATE 35: CPT | Performed by: INTERNAL MEDICINE

## 2020-12-18 PROCEDURE — 999N000157 HC STATISTIC RCP TIME EA 10 MIN

## 2020-12-18 PROCEDURE — 250N000009 HC RX 250: Performed by: INTERNAL MEDICINE

## 2020-12-18 PROCEDURE — 94640 AIRWAY INHALATION TREATMENT: CPT | Mod: 76

## 2020-12-18 PROCEDURE — 2894A VOIDCORRECT: CPT | Mod: 95 | Performed by: PSYCHIATRY & NEUROLOGY

## 2020-12-18 PROCEDURE — 99223 1ST HOSP IP/OBS HIGH 75: CPT | Mod: 95 | Performed by: PSYCHIATRY & NEUROLOGY

## 2020-12-18 PROCEDURE — 99207 PR CDG-CODE CATEGORY CHANGED: CPT | Performed by: PSYCHIATRY & NEUROLOGY

## 2020-12-18 PROCEDURE — 94640 AIRWAY INHALATION TREATMENT: CPT

## 2020-12-18 PROCEDURE — 97530 THERAPEUTIC ACTIVITIES: CPT | Mod: GP | Performed by: PHYSICAL THERAPIST

## 2020-12-18 PROCEDURE — 250N000013 HC RX MED GY IP 250 OP 250 PS 637: Performed by: HOSPITALIST

## 2020-12-18 PROCEDURE — 97110 THERAPEUTIC EXERCISES: CPT | Mod: GP | Performed by: PHYSICAL THERAPIST

## 2020-12-18 PROCEDURE — 120N000001 HC R&B MED SURG/OB

## 2020-12-18 RX ADMIN — HYDROMORPHONE HYDROCHLORIDE 2 MG: 2 TABLET ORAL at 15:36

## 2020-12-18 RX ADMIN — IPRATROPIUM BROMIDE AND ALBUTEROL SULFATE 3 ML: .5; 3 SOLUTION RESPIRATORY (INHALATION) at 07:40

## 2020-12-18 RX ADMIN — FUROSEMIDE 80 MG: 40 TABLET ORAL at 10:22

## 2020-12-18 RX ADMIN — AMOXICILLIN AND CLAVULANATE POTASSIUM 1 TABLET: 500; 125 TABLET, FILM COATED ORAL at 15:20

## 2020-12-18 RX ADMIN — TAMSULOSIN HYDROCHLORIDE 0.8 MG: 0.4 CAPSULE ORAL at 10:21

## 2020-12-18 RX ADMIN — GABAPENTIN 200 MG: 100 CAPSULE ORAL at 10:22

## 2020-12-18 RX ADMIN — GABAPENTIN 200 MG: 100 CAPSULE ORAL at 15:20

## 2020-12-18 RX ADMIN — HYDROMORPHONE HYDROCHLORIDE 2 MG: 2 TABLET ORAL at 20:23

## 2020-12-18 RX ADMIN — LORAZEPAM 0.25 MG: 0.5 TABLET ORAL at 22:58

## 2020-12-18 RX ADMIN — AMLODIPINE BESYLATE 5 MG: 5 TABLET ORAL at 10:22

## 2020-12-18 RX ADMIN — DULOXETINE HYDROCHLORIDE 30 MG: 30 CAPSULE, DELAYED RELEASE ORAL at 10:22

## 2020-12-18 RX ADMIN — PRIMIDONE 50 MG: 50 TABLET ORAL at 22:58

## 2020-12-18 RX ADMIN — MULTIPLE VITAMINS W/ MINERALS TAB 1 TABLET: TAB at 10:22

## 2020-12-18 RX ADMIN — HYDROCORTISONE 20 MG: 20 TABLET ORAL at 10:22

## 2020-12-18 RX ADMIN — LORAZEPAM 0.25 MG: 0.5 TABLET ORAL at 15:36

## 2020-12-18 RX ADMIN — IPRATROPIUM BROMIDE AND ALBUTEROL SULFATE 3 ML: .5; 3 SOLUTION RESPIRATORY (INHALATION) at 11:26

## 2020-12-18 RX ADMIN — HYDROMORPHONE HYDROCHLORIDE 2 MG: 2 TABLET ORAL at 10:23

## 2020-12-18 RX ADMIN — FLUTICASONE FUROATE AND VILANTEROL TRIFENATATE 1 PUFF: 200; 25 POWDER RESPIRATORY (INHALATION) at 10:23

## 2020-12-18 RX ADMIN — IPRATROPIUM BROMIDE AND ALBUTEROL SULFATE 3 ML: .5; 3 SOLUTION RESPIRATORY (INHALATION) at 19:58

## 2020-12-18 RX ADMIN — AMOXICILLIN AND CLAVULANATE POTASSIUM 1 TABLET: 500; 125 TABLET, FILM COATED ORAL at 02:40

## 2020-12-18 RX ADMIN — GABAPENTIN 200 MG: 100 CAPSULE ORAL at 22:58

## 2020-12-18 RX ADMIN — OMEPRAZOLE 20 MG: 20 CAPSULE, DELAYED RELEASE ORAL at 10:22

## 2020-12-18 RX ADMIN — IPRATROPIUM BROMIDE AND ALBUTEROL SULFATE 3 ML: .5; 3 SOLUTION RESPIRATORY (INHALATION) at 16:26

## 2020-12-18 ASSESSMENT — ACTIVITIES OF DAILY LIVING (ADL)
ADLS_ACUITY_SCORE: 22

## 2020-12-18 NOTE — PLAN OF CARE
PT: Attempted to see pt in AM for PT. Pt strongly refusing despite significant encouragement, pt keeping eyes closed and stopped responding to PT. Just shaking head. Pt has frequently been refusing PT despite education and encouragement. RN aware of pt refusal

## 2020-12-18 NOTE — CONSULTS
[facility]    Psychiatry Consultation     Date of Admission:  12/2/2020   Date of Consult (When I saw the patient): 12/18/20    Assessment & Plan   Perfecto Reese is a 70 year old male who was admitted on 12/2/2020. I was asked to see the patient for asses decision making.    Principal Diagnosis:   No psychiatric diagnosis    Medications: None  Laboratory/Imaging: None    Secondary psychiatric diagnoses of concern this admission:  Active Problems:    Acute renal injury (H)    Pneumonia of right lower lobe due to infectious organism    Diarrhea, unspecified type      Recommendations.    Medical stabilization as per internal medicine    Patient is aware of risk and benefits of going to TCU, doing physical therapy.  He is aware of the alternatives, noncompliance with treatment recommendations.  He is aware of his medications and medical problems.  He does have some cognition problems but he says these are present at baseline.  Formal testing would be beneficial.  I would concur with the primary treatment team to follow as per their recommendations for appropriate follow-up.  However patient patient has the capacity to make decisions in his treatment care.  This may not be ideal because patient may need help with walking and getting food, but he does have the capacity to make a decision albieght a poor decision.     The risks, benefits, alternatives and side effects have been discussed and are understood by the patient and other caregivers.    Anai Mcelroy    Reason for Consult   Reason for consult: I was asked by Dr. Valdez to evaluate this patient for decision-making capacity.    Primary Care Physician   *Polo Neil    Chief Complaint   I was admitted here for pneumonia    History is obtained from the patient    History of Present Illness   Perfecto Reese is a 70 year old male who presents with with pneumonia    He has numerous medical problems1. 70 y.o male with COPD on Home oxygen.    2. Ongoing tobacco abuse.   3. Adrenal insufficiency on chronic steroids   4. Psoriasis on humira.   5. Chronic hep C. Cirrhosis.   6. PVD, S/P BKA.   7. Non compliance.   8. Admitted with pneumonia symptoms and imaging with lung abscesses.   9. On zosyn. And on azithromycin.   10. Sputum culture with proteus.       He was admitted here because he had a pneumonia and a kidney infection.  He is able to tell me that the infection is getting better he has COPD.  He is able to tell me that he takes Lasix Dilaudid..    Patient declined physical therapy and Occupational Therapy because he believes that they do not help much.  He was in TCU before and the it was a good experience for him but he believes that the doctors always ask him to go to TCU,  He is ambivalent about going home because he does not know how it will go based on his past experience but he is not afraid of going home.  He says his house is equipped for handicapped.  He can do activities of daily living walking and showering but he needs help he says at his home it is set up for him to use that.  He manages his own money  He knew this was PT PAL[facility] he could tell me the season for the winter and Friday he could tell me the past presidents was able to tell me the month year but the date he thought was the 21st.  He was able to tell me the month of forward but not backward        He denies any symptoms of depression denies any anxiety he denies any psychosis he denies any suicidal ideation plan or intent  Past Medical History   I have reviewed this patient's medical history and updated it with pertinent information if needed.   Past Medical History:   Diagnosis Date     Adrenal insufficiency (H)      Anemia, iron deficiency      Back pain      COPD (chronic obstructive pulmonary disease) (H)      Depression      GERD (gastroesophageal reflux disease)      Hyperlipidemia      Migraine        Past Psychiatric History   No significant past  psychiatric history    Past Surgical History   I have reviewed this patient's surgical history and updated it with pertinent information if needed.  Past Surgical History:   Procedure Laterality Date     AMPUTATION BELOW KNEE RT/LT Left      AS ARTHROSCOPY SUBTALAR JOINT SUBTALAR ARTHRODESIS       OPEN REDUCTION INTERNAL FIXATION FOREARM      left forearm/wrist     TONSILLECTOMY         Prior to Admission Medications   Prior to Admission Medications   Prescriptions Last Dose Informant Patient Reported? Taking?   DULoxetine (CYMBALTA) 30 MG capsule Last dispense 9/19/20 90 day supply  Yes No   Sig: Take 30 mg by mouth daily   Ferrous Gluconate 324 (37.5 Fe) MG TABS Last dispense 9/19/20 90 day supply  Yes No   Sig: Take 324 mg by mouth every other day   MENTHOL-METHYL SALICYLATE EX Last dispense 9/10/20 30 day supply  Yes No   Sig: Externally apply topically 2 times daily Menthol-methyl saliycylate 10-15% cream twice daily to lower back   Vitamin D, Cholecalciferol, 25 MCG (1000 UT) TABS Last dispense 9/19/20 90 day supply  Yes No   Sig: Take 1,000 Units by mouth 2 times daily   adalimumab (HUMIRA) 40 MG/0.8ML prefilled syringe kit Last dispense 9/19/20 30 day supply  Yes No   Sig: Inject 40 mg Subcutaneous every 14 days   albuterol (PROAIR HFA/PROVENTIL HFA/VENTOLIN HFA) 108 (90 Base) MCG/ACT inhaler Last dispense 9/19/20 30 day supply  Yes No   Sig: Inhale 2 puffs into the lungs every 4 hours as needed for shortness of breath / dyspnea or wheezing   amLODIPine (NORVASC) 10 MG tablet Last dispense 9/19/20 90 day supply  Yes No   Sig: Take 10 mg by mouth At Bedtime   aspirin 81 MG EC tablet Last dispense 9/19/20 #120 tab  Yes No   Sig: Take 81 mg by mouth daily   budesonide-formoterol (SYMBICORT) 160-4.5 MCG/ACT Inhaler Last dispense 9/19/20 30 day supply  Yes No   Sig: Inhale 2 puffs into the lungs 2 times daily   calcium citrate (CITRACAL) 950 MG tablet Last dispense 9/19/20 90 day supply  Yes No   Sig: Take 4  tablets by mouth daily   clobetasol (TEMOVATE) 0.05 % external solution Last dispense 9/19/20 30 day supply  Yes No   Sig: Apply topically At Bedtime Apply to scalp   diclofenac (VOLTAREN) 1 % topical gel Last dispense 9/19/20 30 day supply  Yes No   Sig: Apply 4 g topically every 6 hours as needed for moderate pain (R shoulder)   gabapentin (NEURONTIN) 300 MG capsule Last dispense 9/19/20 90 day supply  Yes No   Sig: Take 300 mg by mouth 3 times daily   hydrocortisone (CORTEF) 20 MG tablet Last dispense 9/19/20 90 day supply  Yes No   Sig: Take 20 mg by mouth every morning   lidocaine (LIDODERM) 5 % patch Last dispense 9/19/20 30 day supply  Yes No   Sig: Place 1 patch onto the skin every 24 hours To prevent lidocaine toxicity, patient should be patch free for 12 hrs daily.  Apply to right shoulder   lidocaine (XYLOCAINE) 5 % external ointment Last dispense 9/19/20 30 day supply  Yes No   Sig: Apply topically 3 times daily as needed for moderate pain (rib pain)   losartan (COZAAR) 25 MG tablet Last dispense 9/19/20 90 day supply  Yes No   Sig: Take 12.5 mg by mouth daily   morphine 10 MG/5ML solution Last dispense 9/19/20  Yes No   Sig: Take 2.5 mg by mouth 2 times daily as needed for severe pain (with dressing change)   nicotine (NICORETTE) 2 MG gum Last dispense 9/27/20  Yes No   Sig: Place 2 mg inside cheek as needed for smoking cessation   omeprazole (PRILOSEC) 20 MG DR capsule Last dispense 9/19/20 90 day supply  Yes No   Sig: Take 20 mg by mouth daily   oxyCODONE (ROXICODONE) 5 MG tablet Last dispense 9/19/20  Yes No   Sig: Take 5 mg by mouth every 6 hours as needed for severe pain Separate from morphine by 4 hours   primidone (MYSOLINE) 50 MG tablet Last dispense 9/19/20 90 day supply  Yes No   Sig: Take 50 mg by mouth At Bedtime   sodium hypochlorite (HYSEPT) external solution Last dispense 9/19/20 30 day supply  Yes No   Sig: Irrigate with as directed daily 0.25% for wound care   tamsulosin (FLOMAX) 0.4 MG  capsule Last dispense 9/19/20 90 day supply  Yes No   Sig: Take 0.8 mg by mouth daily   tiotropium (SPIRIVA) 18 MCG inhaled capsule Last dispense 9/19/20 30 day supply  Yes No   Sig: Inhale 18 mcg into the lungs daily   triamcinolone (KENALOG) 0.1 % external cream Last dispense 9/27/20  Yes No   Sig: Apply topically as needed for irritation (wound care)   vitamin B-12 (CYANOCOBALAMIN) 1000 MCG tablet Last dispense 9/19/20 90 day supply  Yes No   Sig: Take 1,000 mcg by mouth daily      Facility-Administered Medications: None     Allergies   Allergies   Allergen Reactions     Darvocet [Propoxyphene N-Apap] Nausea     Vicodin [Hydrocodone-Acetaminophen] Nausea     Can use if he eats with it       Social History   I have reviewed this patient's social history and updated it with pertinent information if needed. Perfecto Reese  reports that he quit smoking about 3 years ago. His smoking use included cigarettes. He has a 15.00 pack-year smoking history. He has never used smokeless tobacco. He reports that he does not drink alcohol or use drugs.    Family History   I have reviewed this patient's family history and updated it with pertinent information if needed.   Family History   Problem Relation Age of Onset     Colon Cancer Father      Coronary Artery Disease Father      Chronic Obstructive Pulmonary Disease Father      Osteoporosis Mother      Dementia Mother        Review of Systems   The 10 point Review of Systems is negative other than noted in the HPI by Dr. Hu DiPi12/18/2020    Physical Exam     Vital Signs with Ranges  Temp:  [98.7  F (37.1  C)-99.5  F (37.5  C)] 99.5  F (37.5  C)  Pulse:  [75-89] 85  Resp:  [16-18] 16  BP: (115-134)/(53-59) 115/53  SpO2:  [94 %-97 %] 95 %  163 lbs 6.4 oz    Appearance:  No apparent distress  Behavior/relationship to examiner/demeanor:  Cooperative  Orientation: Oriented to person, place and situation  Speech Rate:  Normal  Speech Spontaneity:  Normal  Mood:   "\"okay\"  Affect:  Appropriate/mood-congruent  Thought Process:  Logical  Associations: No loosening of associations  Thought Content:  no overt psychosis, denies suicidal ideation, intent or thoughts, patient denies auditory and visual hallucinations and patient does not appear to be responding to internal stimuli  Abnormal Perception:  None  Attention/Concentration:  Normal  Memory: Immediate recall intact and Short-term memory impaired  Language:  Intact and Neologisms  Fund of Knowledge: Average  Abstraction: Normal  Insight:  Limited  Judgment:  Limited    Gait not tested    Telemedicine Visit: The patient's condition can be safely assessed and treated via synchronous audio and visual telemedicine encounter.   Start Time: 5.01  Stop Time: 5.18  Reason for Telemedicine Visit: Covid-19   [facility]  Distant Site (Provider Location): Provider Remote Setting   Consent: The patient/guardian has verbally consented to: the potential risks and benefits of telemedicine (video visit) versus in person care; bill my insurance or make self-payment for services provided; and responsibility for payment of non-covered services.   Mode of Communication: Video Conference via polycom  As the provider I attest to compliance with applicable laws and regulations related to telemedicine.       The patient/guardian has been notified of the following:   This telemedicine visit is conducted live between you and your clinician. We have found that certain health care needs can be provided without the need for a physical exam. This service lets us provide the care you need with a telemedicine conversation.          "

## 2020-12-18 NOTE — PROGRESS NOTES
Patient given nebulizer treatments as ordered. BS are clear but diminished pre and post. SpO2 is 97% on 3 lpm decreased to 2 lpm n.c.

## 2020-12-18 NOTE — PROGRESS NOTES
"[facility]    Hospitalist Progress Note    Interval History   - Discharge canceled yesterday as nurses were concerned about him thriving at home as he is not participating with PT/OT here and also refusing home cares historically.  - Patient does not provide any reason as to why he continues to refuse TCU. When pressed he says \"I'll just go home\" and no longer wants to talk.  - Psychiatry consult today to determine whether he is decisional  - SW/CC to reach out to family members or friends who may be able to help  - Patient remains medically ready to discharge    Assessment & Plan   Summary:  Perfecto Reese is a 70 year old male with complex PMHx including severe COPD on 3L home O2 with ongoing tobacco use, adrenal insufficiency on chronic steroids, psoriasis on Humira, chronic Hep C with cirrhosis, PVD s/p left BKA, chronic wounds, and non-compliance who was admitted on 12/2/2020 for pneumonia, found to have a RLL lung abscess.                EMS activated by his home care RN for fever, cough, and acute respiratory distress (placed on 10L per OM in the field). Fever to 102.5, tachycardia, tachypnea, and leukocytosis (17.2k) present on arrival. CXR on arrival showed RLL infiltrate and CT abd/pelvis showed air fluid cavity at the right lung base. On admission, he was initiated on IV pip-tazo and azithromycin, and Thoracic Surgery was consulted. Chest CT was ordered (12/3) which showed a 6.4 cm RLL pulmonary abscess and extensive left-sided mucus plugging with associated infiltrate. 12/2 blood cultures x2 no growth. Sputum cx 12/4 growing proteus, fluoroquinolone resistant. Also corynebacterium striatum 12/6.                CT surgery saw, no surgical intervention indicated. Patient was treated with IV then PO antibiotics, with the assistance of infectious disease. Initially on zosyn->unasyn-> transitioned 12/8 to Augmentin x 6 weeks (through approximately 1/13/2021).  - Repeat CT scan in 4 " weeks (~1/8/2021).  - O2 weaned back to baseline of 3 lpm  - PT- rec TCU, patient refusing, patient discharging home with home therapies and nurse     Chronic normocytic anemia, likely ACD  Chronic thrombocytopenia  Anemia and thrombocytopenia likely multifactorial from liver cirrhosis, splenomegaly, renal disease, CKD and psoriasis. Iron labs suggestive of anemia of chronic disease. 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 Patient required four units of pRBCs this admission for Hgb < 7.0. No acute GI bleed identified, likely blood loss from decreased erythropoiesis. Appreciate GI consult 12/16/2020, no acute GI bleed, no acute interventions, [facility]     Acute kidney injury on CKD 3, improved  Asymptomatic pyuria  Baseline creatinine 1.2-1.3 in Jan 2020, on admission 2.89. Suspect pre-renal state in setting of pneumonia/sepsis with possible progression to ATN in setting of hypotension. Improved with IV fluids, creatinine down to 1.5 on 12/16.  - restart PTA losartan at discharge  - Appreciate Nephrology consult--lasix 40 mg daily at discharge  - [facility]     Chronic adrenal insufficiency  - Continues on PTA hydrocortisone 20 mg PO daily     Essential hypertension: Amlodipine decreased to 5mg daily due to soft blood pressures     History of psoriasis   - Holding PTA Humira this admission--continue to hold at discharge  - Follow up with outpatient provider     Peripheral vascular disease s/p left BKA  Chronic RLE wound  Chronic left ear wound  Present on admission. Receives wound cares per home care RN.      Chronic pain syndrome: Resume PTA regimen (gabapentin 300 mg TID, duloxetine 30 mg daily, lidocaine patch, oxycodone 5 mg q6h prn, morphine 2.5 mg BID prn)     Non-severe malnutrition       DVT Prophylaxis: PCDs  Code Status: Full Code  PT/OT: PT ordered  Diet:  Regular Diet Adult  Snacks/Supplements Adult: Boost Plus; Between Meals  Room Service  Diet      Disposition: Expected discharge pending psychiatry consult, SW/CC evaluation, patient is medically stable to discharge    Neeraj Valdez MD  Text Page  (7am to 6pm)  -Data reviewed today: I reviewed all new labs and imaging results over the last 24 hours.    Physical Exam   Temp: 98.7  F (37.1  C) Temp src: Axillary BP: 126/56 Pulse: 75   Resp: 18 SpO2: 97 % O2 Device: Nasal cannula Oxygen Delivery: 3 LPM  Vitals:    12/14/20 0408 12/15/20 0515 12/17/20 0455   Weight: 73.6 kg (162 lb 4.1 oz) 73.6 kg (162 lb 5.9 oz) 74.1 kg (163 lb 6.4 oz)     Vital Signs with Ranges  Temp:  [98.7  F (37.1  C)-99.8  F (37.7  C)] 98.7  F (37.1  C)  Pulse:  [75-89] 75  Resp:  [16-18] 18  BP: (126-134)/(54-59) 126/56  SpO2:  [94 %-97 %] 97 %  I/O last 3 completed shifts:  In: 180 [P.O.:180]  Out: -   O2 requirements: none    Constitutional: Male in NAD  HEENT: Eyes nonicteric, oral mucosa moist  Cardiovascular: RRR, normal S1/2, no m/r/g  Respiratory: CTAB, no wheezing or crackles  Vascular: LLE BKA, RLE in boot  GI: Normoactive bowel sounds, nontender  Skin/Integumen: No rashes  Neuro/Psych: Quiet, restricted affect. A&Ox3, moves all extremities    Medications     - MEDICATION INSTRUCTIONS -       - MEDICATION INSTRUCTIONS -         amLODIPine  5 mg Oral Daily     amoxicillin-clavulanate  1 tablet Oral Q12H ISIS     clobetasol   Topical At Bedtime     DULoxetine  30 mg Oral Daily     fluticasone-vilanterol  1 puff Inhalation Daily     furosemide  80 mg Oral Daily     gabapentin  200 mg Oral TID     hydrocortisone  20 mg Oral QAM     ipratropium - albuterol 0.5 mg/2.5 mg/3 mL  3 mL Nebulization 4x daily     multivitamin w/minerals  1 tablet Oral Daily     omeprazole  20 mg Oral Daily     polyethylene glycol  17 g Oral Daily     primidone  50 mg Oral At Bedtime     sodium chloride (PF)  3 mL Intracatheter Q8H     tamsulosin  0.8 mg Oral  Daily       Data   Recent Labs   Lab 12/17/20  0655 12/16/20  1823 12/16/20  0702 12/15/20  1502 12/13/20  1505 12/12/20  1532 12/12/20  0801   WBC  --   --  6.2 7.2 7.7  --  6.8   HGB 8.1* 7.6* 6.6* 7.1* 7.5*  --  7.9*   MCV  --   --  93 94 94  --  93   PLT  --   --  89* 101* 96*  --  91*   NA  --   --   --  138 139  --  139   POTASSIUM  --   --   --  3.5 3.9 4.2 3.3*   CHLORIDE  --   --   --  100 104  --  104   CO2  --   --   --  32 30  --  30   BUN  --   --   --  27 30  --  34*   CR  --   --  1.51* 1.57* 1.54*  --  1.68*   ANIONGAP  --   --   --  6 5  --  5   MARTINE  --   --   --  7.9* 8.1*  --  7.7*   GLC  --   --   --  114* 169*  --  88       Imaging:   No results found for this or any previous visit (from the past 24 hour(s)).

## 2020-12-18 NOTE — PROGRESS NOTES
[facility]    Infectious Disease Progress Note    Date of Service (when I saw the patient): 12/18/2020     Assessment & Plan   Perfecto Reese is a 70 year old male who was admitted on 12/2/2020.       Impression:  1. 70 y.o male with COPD on Home oxygen.   2. Ongoing tobacco abuse.   3. Adrenal insufficiency on chronic steroids   4. Psoriasis on humira.   5. Chronic hep C. Cirrhosis.   6. PVD, S/P BKA.   7. Non compliance.   8. Admitted with pneumonia symptoms and imaging with lung abscesses.   9. On zosyn. And on azithromycin.   10. Sputum culture with proteus.      Recommendations:   Continue on  oral Augmentin, dose reduced given kidney function, discharge on a total of 6 weeks of oral augmentin   Repeat a CT scan in 4 weeks.   Day 16/ 42 of antibiotics today.         Rc Hu MD    Interval History   Afebrile   Cultures noted       Physical Exam   Temp: 98.7  F (37.1  C) Temp src: Axillary BP: 126/56 Pulse: 75   Resp: 18 SpO2: 97 % O2 Device: Nasal cannula Oxygen Delivery: 3 LPM  Vitals:    12/14/20 0408 12/15/20 0515 12/17/20 0455   Weight: 73.6 kg (162 lb 4.1 oz) 73.6 kg (162 lb 5.9 oz) 74.1 kg (163 lb 6.4 oz)     Vital Signs with Ranges  Temp:  [98.7  F (37.1  C)-99.8  F (37.7  C)] 98.7  F (37.1  C)  Pulse:  [75-89] 75  Resp:  [16-18] 18  BP: (126-134)/(54-59) 126/56  SpO2:  [94 %-97 %] 97 %    Constitutional: lethargic today, wakes up though   Lungs: Clear to auscultation bilaterally, no crackles or wheezing  Cardiovascular: Regular rate and rhythm, normal S1 and S2, and no murmur noted  Abdomen: Normal bowel sounds, soft, non-distended, non-tender  Skin: No rashes, no cyanosis, no edema  Other:    Medications     - MEDICATION INSTRUCTIONS -       - MEDICATION INSTRUCTIONS -         amLODIPine  5 mg Oral Daily     amoxicillin-clavulanate  1 tablet Oral Q12H ISIS     clobetasol   Topical At Bedtime     DULoxetine  30 mg Oral Daily     fluticasone-vilanterol  1 puff Inhalation  Daily     furosemide  80 mg Oral Daily     gabapentin  200 mg Oral TID     hydrocortisone  20 mg Oral QAM     ipratropium - albuterol 0.5 mg/2.5 mg/3 mL  3 mL Nebulization 4x daily     multivitamin w/minerals  1 tablet Oral Daily     omeprazole  20 mg Oral Daily     polyethylene glycol  17 g Oral Daily     primidone  50 mg Oral At Bedtime     sodium chloride (PF)  3 mL Intracatheter Q8H     tamsulosin  0.8 mg Oral Daily       Data   All microbiology laboratory data reviewed.  Recent Labs   Lab Test 12/17/20  0655 12/16/20  1823 12/16/20  0702 12/15/20  1502 12/13/20  1505   WBC  --   --  6.2 7.2 7.7   HGB 8.1* 7.6* 6.6* 7.1* 7.5*   HCT  --   --  20.7* 22.2* 23.5*   MCV  --   --  93 94 94   PLT  --   --  89* 101* 96*     Recent Labs   Lab Test 12/16/20  0702 12/15/20  1502 12/13/20  1505   CR 1.51* 1.57* 1.54*     No lab results found.  Recent Labs   Lab Test 12/03/20  0540 12/03/20  0230 12/02/20  1904 12/02/20  1844   CULT Light growth  Normal adelina    Light growth  Proteus mirabilis  *  Moderate growth  Corynebacterium striatum  Identification obtained by MALDI-TOF mass spectrometry research use only database. Test   characteristics determined and verified by the [facility]   ([facility]) [place], [place].  Susceptibility testing not routinely done  * <10,000 colonies/mL  mixed urogenital adelina  Susceptibility testing not routinely done   No growth No growth       Attestation:  Total time on the floor involved in the patient's care: 35 minutes. Total time spent in counseling/care coordination: >50%

## 2020-12-18 NOTE — PLAN OF CARE
Pt A&Ox 4. VSS on 3L. L BKA. Dressing to RLE, c/d/I, boot on. Incontinent b/b. Turn side to side in bed. Blanchable redness to coccyx, scrotum. Pain managed with PRN dilaudid x1 this shift. Tele NSR. IV. Will continue to monitor.

## 2020-12-18 NOTE — PROGRESS NOTES
Paged out to psych (Dr. Up) to see when pt will be seen. Awaiting call back.   Per Dr. Up, pt will be seen tomorrow.

## 2020-12-19 PROCEDURE — 250N000013 HC RX MED GY IP 250 OP 250 PS 637: Performed by: HOSPITALIST

## 2020-12-19 PROCEDURE — 999N000157 HC STATISTIC RCP TIME EA 10 MIN

## 2020-12-19 PROCEDURE — 250N000013 HC RX MED GY IP 250 OP 250 PS 637: Performed by: INTERNAL MEDICINE

## 2020-12-19 PROCEDURE — 99207 PR CDG-CUT & PASTE-POTENTIAL IMPACT ON LEVEL: CPT | Performed by: INTERNAL MEDICINE

## 2020-12-19 PROCEDURE — 120N000001 HC R&B MED SURG/OB

## 2020-12-19 PROCEDURE — 250N000009 HC RX 250: Performed by: INTERNAL MEDICINE

## 2020-12-19 PROCEDURE — 94640 AIRWAY INHALATION TREATMENT: CPT | Mod: 76

## 2020-12-19 PROCEDURE — 94640 AIRWAY INHALATION TREATMENT: CPT

## 2020-12-19 PROCEDURE — 99232 SBSQ HOSP IP/OBS MODERATE 35: CPT | Performed by: INTERNAL MEDICINE

## 2020-12-19 RX ADMIN — FUROSEMIDE 80 MG: 40 TABLET ORAL at 09:29

## 2020-12-19 RX ADMIN — MULTIPLE VITAMINS W/ MINERALS TAB 1 TABLET: TAB at 09:30

## 2020-12-19 RX ADMIN — AMLODIPINE BESYLATE 5 MG: 5 TABLET ORAL at 09:30

## 2020-12-19 RX ADMIN — DULOXETINE HYDROCHLORIDE 30 MG: 30 CAPSULE, DELAYED RELEASE ORAL at 09:29

## 2020-12-19 RX ADMIN — GABAPENTIN 200 MG: 100 CAPSULE ORAL at 09:30

## 2020-12-19 RX ADMIN — TRIAMCINOLONE ACETONIDE: 1 CREAM TOPICAL at 05:02

## 2020-12-19 RX ADMIN — AMOXICILLIN AND CLAVULANATE POTASSIUM 1 TABLET: 500; 125 TABLET, FILM COATED ORAL at 13:58

## 2020-12-19 RX ADMIN — GABAPENTIN 200 MG: 100 CAPSULE ORAL at 22:39

## 2020-12-19 RX ADMIN — GABAPENTIN 200 MG: 100 CAPSULE ORAL at 16:58

## 2020-12-19 RX ADMIN — HYDROMORPHONE HYDROCHLORIDE 2 MG: 2 TABLET ORAL at 13:58

## 2020-12-19 RX ADMIN — HYDROMORPHONE HYDROCHLORIDE 2 MG: 2 TABLET ORAL at 09:36

## 2020-12-19 RX ADMIN — OMEPRAZOLE 20 MG: 20 CAPSULE, DELAYED RELEASE ORAL at 09:29

## 2020-12-19 RX ADMIN — FLUTICASONE FUROATE AND VILANTEROL TRIFENATATE 1 PUFF: 200; 25 POWDER RESPIRATORY (INHALATION) at 09:37

## 2020-12-19 RX ADMIN — IPRATROPIUM BROMIDE AND ALBUTEROL SULFATE 3 ML: .5; 3 SOLUTION RESPIRATORY (INHALATION) at 19:49

## 2020-12-19 RX ADMIN — IPRATROPIUM BROMIDE AND ALBUTEROL SULFATE 3 ML: .5; 3 SOLUTION RESPIRATORY (INHALATION) at 07:45

## 2020-12-19 RX ADMIN — IPRATROPIUM BROMIDE AND ALBUTEROL SULFATE 3 ML: .5; 3 SOLUTION RESPIRATORY (INHALATION) at 15:38

## 2020-12-19 RX ADMIN — HYDROMORPHONE HYDROCHLORIDE 2 MG: 2 TABLET ORAL at 02:21

## 2020-12-19 RX ADMIN — PRIMIDONE 50 MG: 50 TABLET ORAL at 22:39

## 2020-12-19 RX ADMIN — HYDROMORPHONE HYDROCHLORIDE 2 MG: 2 TABLET ORAL at 22:44

## 2020-12-19 RX ADMIN — LORAZEPAM 0.25 MG: 0.5 TABLET ORAL at 22:44

## 2020-12-19 RX ADMIN — TAMSULOSIN HYDROCHLORIDE 0.8 MG: 0.4 CAPSULE ORAL at 09:30

## 2020-12-19 RX ADMIN — ACETAMINOPHEN 650 MG: 325 TABLET, FILM COATED ORAL at 09:30

## 2020-12-19 RX ADMIN — HYDROCORTISONE 20 MG: 20 TABLET ORAL at 09:30

## 2020-12-19 RX ADMIN — AMOXICILLIN AND CLAVULANATE POTASSIUM 1 TABLET: 500; 125 TABLET, FILM COATED ORAL at 02:21

## 2020-12-19 ASSESSMENT — ACTIVITIES OF DAILY LIVING (ADL)
ADLS_ACUITY_SCORE: 22
ADLS_ACUITY_SCORE: 22
ADLS_ACUITY_SCORE: 23
ADLS_ACUITY_SCORE: 22

## 2020-12-19 ASSESSMENT — MIFFLIN-ST. JEOR: SCORE: 1419.46

## 2020-12-19 NOTE — PLAN OF CARE
"Pt has been quite challenging in terms of participating with PT. Refused to participate during 1st PT session but did participate during 2nd session upon multiple request. Pt sat on edge of the bed and did dangle for few mins. Pt was quite unhappy. Refused to get out of the bed to w/c with PT. Is incontinent of bowel/bladder. Coccyx is reddened/scrotum swollen. Is on 3L O2 sating low to mid 90's. Did allow dressing on (R) LE to be changed upon multiple attempts. Po intake fair to good. IV SL. When asked about the discharge plan, pt kept saying \"I don't know.\" Psych saw pt today. Discharge pending either to home vs TCU but pt is quite adamant about going to TCU. Per pt, once in TCU, he will be kept there forever. Pt educated that once he is stable enough to go home from TCU, pt will be discharged; refused to agree.   "

## 2020-12-19 NOTE — PLAN OF CARE
Patient A&Ox4. VSS on 3L, baseline. Tele NSR. Uncooperative at times. Pt did not get out of bed this shift. Incontinent of bowel/bladder. Refused side-to-side repositioning. Coccyx is reddened/scrotum swollen. PO Dilaudid given PRN for pain. Slept between cares. Will continue to monitor.

## 2020-12-19 NOTE — PLAN OF CARE
PT: PT attempted to see pt for therapy session and provided encouragement and education for participation however pt continued to refuse and sat with his eyes closed.  RN notified of refusal.

## 2020-12-19 NOTE — PROGRESS NOTES
[facility]    Hospitalist Progress Note    Interval History   Patient seen and examine in his room today, was sleeping comfortably, offer no complaints this morning.  Denies any chest pain, SOB, fever, chills, nausea or vomiting, patient has refused TCU before.    No other significant event overnight.       Assessment & Plan           Acute on chronic hypoxic and hypercarbic respiratory failure   Bilateral community-acquired bacterial pneumonia with RLL abscess Severe COPD      Perfecto Reese is a 70 year old male with complex PMHx including severe COPD on 3L home O2 with ongoing tobacco use, adrenal insufficiency on chronic steroids, psoriasis on Humira, chronic Hep C with cirrhosis, PVD s/p left BKA, chronic wounds, and non-compliance who was admitted on 12/2/2020 for pneumonia, found to have a RLL lung abscess.                EMS activated by his home care RN for fever, cough, and acute respiratory distress (placed on 10L per OM in the field). Fever to 102.5, tachycardia, tachypnea, and leukocytosis (17.2k) present on arrival. CXR on arrival showed RLL infiltrate and CT abd/pelvis showed air fluid cavity at the right lung base. On admission, he was initiated on IV pip-tazo and azithromycin, and Thoracic Surgery was consulted. Chest CT was ordered (12/3) which showed a 6.4 cm RLL pulmonary abscess and extensive left-sided mucus plugging with associated infiltrate. 12/2 blood cultures x2 no growth. Sputum cx 12/4 growing proteus, fluoroquinolone resistant. Also corynebacterium striatum 12/6.                CT surgery saw, no surgical intervention indicated. Patient was treated with IV then PO antibiotics, with the assistance of infectious disease. Initially on zosyn->unasyn-> transitioned 12/8 to Augmentin x 6 weeks (through approximately 1/13/2021).  - Repeat CT scan in 4 weeks (~1/8/2021).  - O2 weaned back to baseline of 3 lpm  - PT- rec TCU, patient refusing, patient discharging home  with home therapies and nurse.  I had a long discussion with the patient regarding his care after discharge from the hospital, and recommend that he should consider TCU as recommended by the TCU till he able to take care of himself, consider home health if no TCU on discharge. Patient told me he will think about it and will let us know.   Patient has Decision making capacity as per Psychiatry evaluation.      Chronic normocytic anemia, likely ACD  Chronic thrombocytopenia  Anemia and thrombocytopenia likely multifactorial from liver cirrhosis, splenomegaly, renal disease, CKD and psoriasis. Iron labs suggestive of anemia of chronic disease. 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 Patient required four units of pRBCs this admission for Hgb < 7.0. No acute GI bleed identified, likely blood loss from decreased erythropoiesis. Appreciate GI consult 12/16/2020, no acute GI bleed, no acute interventions, [facility]     Acute kidney injury on CKD 3, improved  Asymptomatic pyuria  Baseline creatinine 1.2-1.3 in Jan 2020, on admission 2.89. Suspect pre-renal state in setting of pneumonia/sepsis with possible progression to ATN in setting of hypotension. Improved with IV fluids, creatinine down to 1.5 on 12/16.  - restart PTA losartan at discharge  - Appreciate Nephrology consult--lasix 40 mg daily at discharge  - [facility]     Chronic adrenal insufficiency  - Continues on PTA hydrocortisone 20 mg PO daily     Essential hypertension: Amlodipine decreased to 5mg daily due to soft blood pressures     History of psoriasis   - Holding PTA Humira this admission--continue to hold at discharge  - Follow up with outpatient provider     Peripheral vascular disease s/p left BKA  Chronic RLE wound  Chronic left ear wound  Present on admission. Receives wound cares per home care  RN.      Chronic pain syndrome: Resume PTA regimen (gabapentin 300 mg TID, duloxetine 30 mg daily, lidocaine patch, oxycodone 5 mg q6h prn, morphine 2.5 mg BID prn)     Non-severe malnutrition       DVT Prophylaxis: PCDs  Code Status: Full Code  PT/OT: PT ordered  Diet: Regular Diet Adult  Snacks/Supplements Adult: Boost Plus; Between Meals  Room Service  Diet      Overall stable.  Repeat labs in AM      Disposition: Expected discharge TCU vs Home with home health as per patient decision.     Davonte Naranjo MD  Text Page  (7am to 6pm)  -Data reviewed today: I reviewed all new labs and imaging results over the last 24 hours.    Physical Exam   Temp: 99.8  F (37.7  C) Temp src: Oral BP: 135/60 Pulse: 88   Resp: 16 SpO2: 96 % O2 Device: Nasal cannula Oxygen Delivery: 2 LPM  Vitals:    12/15/20 0515 12/17/20 0455 12/19/20 0703   Weight: 73.6 kg (162 lb 5.9 oz) 74.1 kg (163 lb 6.4 oz) 72.3 kg (159 lb 4.8 oz)     Vital Signs with Ranges  Temp:  [99.2  F (37.3  C)-99.8  F (37.7  C)] 99.8  F (37.7  C)  Pulse:  [78-88] 88  Resp:  [16-20] 16  BP: (115-135)/(50-60) 135/60  SpO2:  [92 %-96 %] 96 %  I/O last 3 completed shifts:  In: 200 [P.O.:200]  Out: -   O2 requirements: none    Constitutional: Male in NAD  HEENT: Eyes nonicteric, oral mucosa moist  Cardiovascular: RRR, normal S1/2, no m/r/g  Respiratory: CTAB, no wheezing or crackles  Vascular: LLE BKA, RLE in boot with ace wrap in place.   GI: Normoactive bowel sounds, nontender  Skin/Integumen: No rashes  Neuro/Psych: Quiet, restricted affect. A&Ox3, moves all extremities    Medications     - MEDICATION INSTRUCTIONS -       - MEDICATION INSTRUCTIONS -         amLODIPine  5 mg Oral Daily     amoxicillin-clavulanate  1 tablet Oral Q12H ISIS     clobetasol   Topical At Bedtime     DULoxetine  30 mg Oral Daily     fluticasone-vilanterol  1 puff Inhalation Daily     furosemide  80 mg Oral Daily     gabapentin  200 mg Oral TID     hydrocortisone  20 mg Oral QAM     ipratropium -  albuterol 0.5 mg/2.5 mg/3 mL  3 mL Nebulization 4x daily     multivitamin w/minerals  1 tablet Oral Daily     omeprazole  20 mg Oral Daily     polyethylene glycol  17 g Oral Daily     primidone  50 mg Oral At Bedtime     sodium chloride (PF)  3 mL Intracatheter Q8H     tamsulosin  0.8 mg Oral Daily       Data   Recent Labs   Lab 12/17/20  0655 12/16/20  1823 12/16/20  0702 12/15/20  1502 12/13/20  1505 12/12/20  1532 12/12/20  1532   WBC  --   --  6.2 7.2 7.7  --   --    HGB 8.1* 7.6* 6.6* 7.1* 7.5*   < >  --    MCV  --   --  93 94 94  --   --    PLT  --   --  89* 101* 96*  --   --    NA  --   --   --  138 139  --   --    POTASSIUM  --   --   --  3.5 3.9  --  4.2   CHLORIDE  --   --   --  100 104  --   --    CO2  --   --   --  32 30  --   --    BUN  --   --   --  27 30  --   --    CR  --   --  1.51* 1.57* 1.54*  --   --    ANIONGAP  --   --   --  6 5  --   --    MARTINE  --   --   --  7.9* 8.1*  --   --    GLC  --   --   --  114* 169*  --   --     < > = values in this interval not displayed.       Imaging:   No results found for this or any previous visit (from the past 24 hour(s)).

## 2020-12-20 LAB
ALBUMIN SERPL-MCNC: 1.6 G/DL (ref 3.4–5)
ANION GAP SERPL CALCULATED.3IONS-SCNC: 2 MMOL/L (ref 3–14)
BASOPHILS # BLD AUTO: 0 10E9/L (ref 0–0.2)
BASOPHILS NFR BLD AUTO: 0.6 %
BUN SERPL-MCNC: 29 MG/DL (ref 7–30)
CALCIUM SERPL-MCNC: 7.9 MG/DL (ref 8.5–10.1)
CHLORIDE SERPL-SCNC: 101 MMOL/L (ref 94–109)
CO2 SERPL-SCNC: 37 MMOL/L (ref 20–32)
CREAT SERPL-MCNC: 1.49 MG/DL (ref 0.66–1.25)
DIFFERENTIAL METHOD BLD: ABNORMAL
EOSINOPHIL # BLD AUTO: 0.3 10E9/L (ref 0–0.7)
EOSINOPHIL NFR BLD AUTO: 5.4 %
ERYTHROCYTE [DISTWIDTH] IN BLOOD BY AUTOMATED COUNT: 13.3 % (ref 10–15)
GFR SERPL CREATININE-BSD FRML MDRD: 47 ML/MIN/{1.73_M2}
GLUCOSE SERPL-MCNC: 82 MG/DL (ref 70–99)
HCT VFR BLD AUTO: 22.6 % (ref 40–53)
HGB BLD-MCNC: 7.2 G/DL (ref 13.3–17.7)
IMM GRANULOCYTES # BLD: 0.1 10E9/L (ref 0–0.4)
IMM GRANULOCYTES NFR BLD: 1 %
LYMPHOCYTES # BLD AUTO: 0.5 10E9/L (ref 0.8–5.3)
LYMPHOCYTES NFR BLD AUTO: 9.8 %
MCH RBC QN AUTO: 30.3 PG (ref 26.5–33)
MCHC RBC AUTO-ENTMCNC: 31.9 G/DL (ref 31.5–36.5)
MCV RBC AUTO: 95 FL (ref 78–100)
MONOCYTES # BLD AUTO: 0.2 10E9/L (ref 0–1.3)
MONOCYTES NFR BLD AUTO: 4.6 %
NEUTROPHILS # BLD AUTO: 3.9 10E9/L (ref 1.6–8.3)
NEUTROPHILS NFR BLD AUTO: 78.6 %
NRBC # BLD AUTO: 0 10*3/UL
NRBC BLD AUTO-RTO: 0 /100
PHOSPHATE SERPL-MCNC: 4.7 MG/DL (ref 2.5–4.5)
PLATELET # BLD AUTO: 88 10E9/L (ref 150–450)
POTASSIUM SERPL-SCNC: 3.4 MMOL/L (ref 3.4–5.3)
RBC # BLD AUTO: 2.38 10E12/L (ref 4.4–5.9)
SODIUM SERPL-SCNC: 140 MMOL/L (ref 133–144)
WBC # BLD AUTO: 5 10E9/L (ref 4–11)

## 2020-12-20 PROCEDURE — 120N000001 HC R&B MED SURG/OB

## 2020-12-20 PROCEDURE — 85025 COMPLETE CBC W/AUTO DIFF WBC: CPT | Performed by: INTERNAL MEDICINE

## 2020-12-20 PROCEDURE — 36415 COLL VENOUS BLD VENIPUNCTURE: CPT | Performed by: INTERNAL MEDICINE

## 2020-12-20 PROCEDURE — 250N000013 HC RX MED GY IP 250 OP 250 PS 637: Performed by: INTERNAL MEDICINE

## 2020-12-20 PROCEDURE — 80069 RENAL FUNCTION PANEL: CPT | Performed by: INTERNAL MEDICINE

## 2020-12-20 PROCEDURE — 94640 AIRWAY INHALATION TREATMENT: CPT

## 2020-12-20 PROCEDURE — 999N000157 HC STATISTIC RCP TIME EA 10 MIN

## 2020-12-20 PROCEDURE — 250N000013 HC RX MED GY IP 250 OP 250 PS 637: Performed by: HOSPITALIST

## 2020-12-20 PROCEDURE — 250N000009 HC RX 250: Performed by: INTERNAL MEDICINE

## 2020-12-20 PROCEDURE — 99232 SBSQ HOSP IP/OBS MODERATE 35: CPT | Performed by: INTERNAL MEDICINE

## 2020-12-20 PROCEDURE — 94640 AIRWAY INHALATION TREATMENT: CPT | Mod: 76

## 2020-12-20 PROCEDURE — 99207 PR CDG-MDM COMPONENT: MEETS MODERATE - DOWN CODED: CPT | Performed by: INTERNAL MEDICINE

## 2020-12-20 RX ADMIN — IPRATROPIUM BROMIDE AND ALBUTEROL SULFATE 3 ML: .5; 3 SOLUTION RESPIRATORY (INHALATION) at 16:27

## 2020-12-20 RX ADMIN — OMEPRAZOLE 20 MG: 20 CAPSULE, DELAYED RELEASE ORAL at 08:41

## 2020-12-20 RX ADMIN — AMOXICILLIN AND CLAVULANATE POTASSIUM 1 TABLET: 500; 125 TABLET, FILM COATED ORAL at 13:31

## 2020-12-20 RX ADMIN — IPRATROPIUM BROMIDE AND ALBUTEROL SULFATE 3 ML: .5; 3 SOLUTION RESPIRATORY (INHALATION) at 12:33

## 2020-12-20 RX ADMIN — IPRATROPIUM BROMIDE AND ALBUTEROL SULFATE 3 ML: .5; 3 SOLUTION RESPIRATORY (INHALATION) at 20:37

## 2020-12-20 RX ADMIN — AMOXICILLIN AND CLAVULANATE POTASSIUM 1 TABLET: 500; 125 TABLET, FILM COATED ORAL at 02:33

## 2020-12-20 RX ADMIN — GABAPENTIN 200 MG: 100 CAPSULE ORAL at 08:41

## 2020-12-20 RX ADMIN — AMLODIPINE BESYLATE 5 MG: 5 TABLET ORAL at 08:41

## 2020-12-20 RX ADMIN — TAMSULOSIN HYDROCHLORIDE 0.8 MG: 0.4 CAPSULE ORAL at 08:41

## 2020-12-20 RX ADMIN — LORAZEPAM 0.5 MG: 0.5 TABLET ORAL at 21:10

## 2020-12-20 RX ADMIN — HYDROCORTISONE 20 MG: 20 TABLET ORAL at 08:41

## 2020-12-20 RX ADMIN — GABAPENTIN 200 MG: 100 CAPSULE ORAL at 21:05

## 2020-12-20 RX ADMIN — HYDROMORPHONE HYDROCHLORIDE 2 MG: 2 TABLET ORAL at 21:05

## 2020-12-20 RX ADMIN — PRIMIDONE 50 MG: 50 TABLET ORAL at 21:05

## 2020-12-20 RX ADMIN — GABAPENTIN 200 MG: 100 CAPSULE ORAL at 16:37

## 2020-12-20 RX ADMIN — MULTIPLE VITAMINS W/ MINERALS TAB 1 TABLET: TAB at 08:41

## 2020-12-20 RX ADMIN — FLUTICASONE FUROATE AND VILANTEROL TRIFENATATE 1 PUFF: 200; 25 POWDER RESPIRATORY (INHALATION) at 08:45

## 2020-12-20 RX ADMIN — HYDROMORPHONE HYDROCHLORIDE 2 MG: 2 TABLET ORAL at 14:53

## 2020-12-20 RX ADMIN — DULOXETINE HYDROCHLORIDE 30 MG: 30 CAPSULE, DELAYED RELEASE ORAL at 08:41

## 2020-12-20 RX ADMIN — FUROSEMIDE 80 MG: 40 TABLET ORAL at 08:41

## 2020-12-20 ASSESSMENT — ACTIVITIES OF DAILY LIVING (ADL)
ADLS_ACUITY_SCORE: 23
ADLS_ACUITY_SCORE: 22

## 2020-12-20 ASSESSMENT — MIFFLIN-ST. JEOR: SCORE: 1419.91

## 2020-12-20 NOTE — PROGRESS NOTES
[facility]    Infectious Disease Progress Note    Date of Service (when I saw the patient): 12/20/2020     Assessment & Plan   Perfecto Reese is a 70 year old male who was admitted on 12/2/2020.       Impression:  1. 70 y.o male with COPD on Home oxygen.   2. Ongoing tobacco abuse.   3. Adrenal insufficiency on chronic steroids   4. Psoriasis on humira.   5. Chronic hep C. Cirrhosis.   6. PVD, S/P BKA.   7. Non compliance.   8. Admitted with pneumonia symptoms and imaging with lung abscesses.   9. On zosyn. And on azithromycin.   10. Sputum culture with proteus.      Recommendations:   Continue on  oral Augmentin, dose reduced given kidney function, discharge on a total of 6 weeks of oral augmentin   Repeat a CT scan in 4 weeks.   Day 18/ 42 of antibiotics today.         Rc Hu MD    Interval History   Afebrile   Cultures noted       Physical Exam   Temp: 98.8  F (37.1  C) Temp src: Oral BP: 133/56 Pulse: 80   Resp: 16 SpO2: 97 % O2 Device: Nasal cannula Oxygen Delivery: 3 LPM  Vitals:    12/17/20 0455 12/19/20 0703 12/20/20 0425   Weight: 74.1 kg (163 lb 6.4 oz) 72.3 kg (159 lb 4.8 oz) 72.3 kg (159 lb 6.4 oz)     Vital Signs with Ranges  Temp:  [98.7  F (37.1  C)-99  F (37.2  C)] 98.8  F (37.1  C)  Pulse:  [76-80] 80  Resp:  [16] 16  BP: (123-133)/(55-56) 133/56  SpO2:  [94 %-97 %] 97 %    Constitutional: lethargic today, wakes up though   Lungs: Clear to auscultation bilaterally, no crackles or wheezing  Cardiovascular: Regular rate and rhythm, normal S1 and S2, and no murmur noted  Abdomen: Normal bowel sounds, soft, non-distended, non-tender  Skin: No rashes, no cyanosis, no edema  Other:    Medications     - MEDICATION INSTRUCTIONS -       - MEDICATION INSTRUCTIONS -         amLODIPine  5 mg Oral Daily     amoxicillin-clavulanate  1 tablet Oral Q12H ISIS     clobetasol   Topical At Bedtime     DULoxetine  30 mg Oral Daily     fluticasone-vilanterol  1 puff Inhalation Daily      furosemide  80 mg Oral Daily     gabapentin  200 mg Oral TID     hydrocortisone  20 mg Oral QAM     ipratropium - albuterol 0.5 mg/2.5 mg/3 mL  3 mL Nebulization 4x daily     multivitamin w/minerals  1 tablet Oral Daily     omeprazole  20 mg Oral Daily     polyethylene glycol  17 g Oral Daily     primidone  50 mg Oral At Bedtime     sodium chloride (PF)  3 mL Intracatheter Q8H     tamsulosin  0.8 mg Oral Daily       Data   All microbiology laboratory data reviewed.  Recent Labs   Lab Test 12/20/20  0748 12/17/20  0655 12/16/20  1823 12/16/20  0702 12/15/20  1502   WBC 5.0  --   --  6.2 7.2   HGB 7.2* 8.1* 7.6* 6.6* 7.1*   HCT 22.6*  --   --  20.7* 22.2*   MCV 95  --   --  93 94   PLT 88*  --   --  89* 101*     Recent Labs   Lab Test 12/20/20  0748 12/16/20  0702 12/15/20  1502   CR 1.49* 1.51* 1.57*     No lab results found.  Recent Labs   Lab Test 12/03/20  0540 12/03/20  0230 12/02/20  1904 12/02/20  1844   CULT Light growth  Normal adelina    Light growth  Proteus mirabilis  *  Moderate growth  Corynebacterium striatum  Identification obtained by MALDI-TOF mass spectrometry research use only database. Test   characteristics determined and verified by the [facility] Diagnostic Laboratory   ([facility]) [place], [place].  Susceptibility testing not routinely done  * <10,000 colonies/mL  mixed urogenital adelina  Susceptibility testing not routinely done   No growth No growth       Attestation:  Total time on the floor involved in the patient's care: 35 minutes. Total time spent in counseling/care coordination: >50%

## 2020-12-20 NOTE — PLAN OF CARE
"PT: PT attempted to see pt for therapy but he refuses.  Asked pt if he was feeling limited by pain or too tired and seemed to deny both those possibilities and gave no response/reason as to why he was refusing.  He just stated for therapist to \"go away\".  "

## 2020-12-20 NOTE — PLAN OF CARE
Patient A&Ox4. VSS on 3L, baseline. Tele NSR. Uncooperative at times. Pt did not get out of bed this shift. Voiding per urinal, adequate output. Incontinent of stool. One BM this shift. Refused side-to-side repositioning. Coccyx is reddened/scrotum swollen. PO Dilaudid given PRN for pain. Slept between cares. Will continue to monitor.

## 2020-12-20 NOTE — PLAN OF CARE
A/OX4, 3L nasal canula baseline.Voiding per urinal,incontinence at times.Refused care and refused to turned.Unable to assess the pt coccyx.Dreg to right foot CDI,pt refused to changed his dreg,despite education.Lt OBI.Tele reads NSR.Up with 2 and lift.Slept most of the shift.Discharge TCU pending.Will continue to monitor.

## 2020-12-20 NOTE — PROGRESS NOTES
[facility]    PATIENT'S NAME: Kendall Jey   ATTENDING PHYSICIAN: Martha Bella M.D. OPERATING PHYSICIAN: Martha Bella M.D.    PATIENT ACCOUNT#:   [de-identified]    LOCATION:  PREOP[facility]  PRE ASCC 13 EDWP 10  MEDICAL RECORD #:   [id number]       DA SUBJECTIVE:  Perfecto Reese is a 67 year old male who presents with bilateral ear blockage for 2 day(s).   Severity: moderate   Timing:sudden onset  Additional symptoms include none.      History of recurrent otitis: no    No past medical history on file.  Current Outpatient Prescriptions   Medication Sig Dispense Refill     ondansetron (ZOFRAN) 4 MG tablet Once in clinic 1 tablet 0     cyclobenzaprine (FLEXERIL) 5 MG tablet Take 1 tablet (5 mg) by mouth 3 times daily as needed 30 tablet 0     ipratropium - albuterol 0.5 mg/2.5 mg/3 mL (DUONEB) 0.5-2.5 (3) MG/3ML nebulization 1 vial in neb 1 vial 0     predniSONE (DELTASONE) 20 MG tablet 1 tab po bid for 5 days, then 1 tab po qd for 3 days, then 1/2 tab po qd for 4 days 15 tablet 0     Ipratropium-Albuterol (COMBIVENT RESPIMAT)  MCG/ACT inhaler Inhale 1 puff into the lungs 4 times daily Not to exceed 6 doses per day. 1 Inhaler 1     OxyCODONE HCl (OXYCONTIN PO)        ALBUTEROL SULFATE HFA IN Inhale into the lungs as needed       UNKNOWN TO PATIENT 2 inhalers and a nebulizer medication - doesn't know the name)       morphine (MS CONTIN) 60 MG 12 hr tablet Take 60 mg by mouth 2 times daily       morphine (MSIR) 15 MG tablet Take 15 mg by mouth every 12 hours 75mg total, BID       Zinc 15 MG CAPS Take  by mouth.       TYLENOL 325 MG OR TABS prn       MULTI-VITAMIN OR TABS 1 tablet daily       Social History   Substance Use Topics     Smoking status: Former Smoker     Packs/day: 0.50     Years: 30.00     Types: Cigarettes     Quit date: 4/1/2017     Smokeless tobacco: Never Used      Comment: states he is down to 3QD     Alcohol use No       ROS:   CONSTITUTIONAL:NEGATIVE for fever, chills, change in weight  INTEGUMENTARY/SKIN: NEGATIVE for worrisome rashes, moles or lesions  ENT/MOUTH: asa per HPI    OBJECTIVE:  /50  Pulse 76  Temp 98  F (36.7  C) (Tympanic)  Resp 18  Wt 205 lb (93 kg)  SpO2 93%  BMI 34.11 kg/m2   EXAM:  Initially both ear canals  are obstructed by cerumen.  After ear lavage by nursing, the ear exam is as follows:  The right TM is normal: no effusions, no erythema, and normal landmarks     The right auditory canal is normal and without drainage, edema or erythema  The left TM is normal: no effusions, no erythema, and normal landmarks  The left auditory canal is normal and without drainage, edema or erythema  SKIN: no rashes or lesions.      (H61.23) Bilateral impacted cerumen  (primary encounter diagnosis)  Comment:   Plan: HC REMOVAL IMPACTED CERUMEN IRRIGATION/LVG         UNILAT               in the scrotum. The Penrose was seen nicely around the testes. The wound was then closed in layers using 2-0 Vicryl to approximate the dartos muscle and fascia. A subcuticular skin approximation was then performed using interrupted 3-0 Vicryl.   A 2-0 Vi

## 2020-12-20 NOTE — PROGRESS NOTES
[facility]    Hospitalist Progress Note    Interval History   Patient look least interested in communication today, keep his eyes closed and answer yes and no. He said he did not think much about the discharge plans, he is not sure where to go from discharge. He said has mild right sided chest pain and no change, mild SOB as well. He is not participating much in PT as well.     Does get irritated easily.     No other significant event overnight.       Assessment & Plan           Acute on chronic hypoxic and hypercarbic respiratory failure   Bilateral community-acquired bacterial pneumonia with RLL abscess Severe COPD      Perfecto Reese is a 70 year old male with complex PMHx including severe COPD on 3L home O2 with ongoing tobacco use, adrenal insufficiency on chronic steroids, psoriasis on Humira, chronic Hep C with cirrhosis, PVD s/p left BKA, chronic wounds, and non-compliance who was admitted on 12/2/2020 for pneumonia, found to have a RLL lung abscess.                EMS activated by his home care RN for fever, cough, and acute respiratory distress (placed on 10L per OM in the field). Fever to 102.5, tachycardia, tachypnea, and leukocytosis (17.2k) present on arrival. CXR on arrival showed RLL infiltrate and CT abd/pelvis showed air fluid cavity at the right lung base. On admission, he was initiated on IV pip-tazo and azithromycin, and Thoracic Surgery was consulted. Chest CT was ordered (12/3) which showed a 6.4 cm RLL pulmonary abscess and extensive left-sided mucus plugging with associated infiltrate. 12/2 blood cultures x2 no growth. Sputum cx 12/4 growing proteus, fluoroquinolone resistant. Also corynebacterium striatum 12/6.                CT surgery saw, no surgical intervention indicated. Patient was treated with IV then PO antibiotics, with the assistance of infectious disease. Initially on zosyn->unasyn-> transitioned 12/8 to Augmentin x 6 weeks (through approximately  1/13/2021).  - Repeat CT scan in 4 weeks (~1/8/2021).  - O2 weaned back to baseline of 3 lpm  - PT- rec TCU, patient refusing, patient discharging home with home therapies and nurse.  I had a long discussion with the patient regarding his care after discharge from the hospital, and recommend that he should consider TCU as recommended by the TCU till he able to take care of himself, consider home health if no TCU on discharge.   Patient has not much insight into his discharge plan at this time, as per my evaluation I don't think he is safe to be discharge home as look least interested in taking care of himself. Will ask PT and OT to re-evaluate him and SW to follow him again.   Patient has Decision making capacity as per Psychiatry evaluation.      Chronic normocytic anemia, likely ACD  Chronic thrombocytopenia  Anemia and thrombocytopenia likely multifactorial from liver cirrhosis, splenomegaly, renal disease, CKD and psoriasis. Iron labs suggestive of anemia of chronic disease. 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 Patient required four units of pRBCs this admission for Hgb < 7.0. No acute GI bleed identified, likely blood loss from decreased erythropoiesis. Appreciate GI consult 12/16/2020, no acute GI bleed, no acute interventions, [facility]  Hb stable to 7.2     Acute kidney injury on CKD 3, improved  Asymptomatic pyuria  Baseline creatinine 1.2-1.3 in Jan 2020, on admission 2.89. Suspect pre-renal state in setting of pneumonia/sepsis with possible progression to ATN in setting of hypotension. Improved with IV fluids, creatinine down to 1.5 on 12/16, now recheck shows improvement to 1.49 and calculated GFR is 36  - restart PTA losartan at discharge  - Appreciate Nephrology consult--lasix 40 mg daily at discharge, currently on 80 mg of Lasix.   - [facility]     Chronic adrenal insufficiency  - Continues on PTA hydrocortisone 20 mg PO daily     Essential hypertension: Amlodipine decreased to 5mg daily due to soft blood pressures     History of psoriasis   - Holding PTA Humira this admission--continue to hold at discharge  - Follow up with outpatient provider     Peripheral vascular disease s/p left BKA  Chronic RLE wound  Chronic left ear wound  Present on admission. Receives wound cares per home care RN.      Chronic pain syndrome: Resume PTA regimen (gabapentin 300 mg TID, duloxetine 30 mg daily, lidocaine patch, oxycodone 5 mg q6h prn, morphine 2.5 mg BID prn)     Non-severe malnutrition       DVT Prophylaxis: PCDs  Code Status: Full Code  PT/OT: PT ordered  Diet: Regular Diet Adult  Snacks/Supplements Adult: Boost Plus; Between Meals  Room Service  Diet            Disposition: Expected discharge TCU vs Home with home health as per patient decision.     Davonte Naranjo MD  Text Page  (7am to 6pm)  -Data reviewed today: I reviewed all new labs and imaging results over the last 24 hours.    Physical Exam   Temp: 98.8  F (37.1  C) Temp src: Oral BP: 133/56 Pulse: 80   Resp: 16 SpO2: 97 % O2 Device: Nasal cannula Oxygen Delivery: 3 LPM  Vitals:    12/17/20 0455 12/19/20 0703 12/20/20 0425   Weight: 74.1 kg (163 lb 6.4 oz) 72.3 kg (159 lb 4.8 oz) 72.3 kg (159 lb 6.4 oz)     Vital Signs with Ranges  Temp:  [98.7  F (37.1  C)-99  F (37.2  C)] 98.8  F (37.1  C)  Pulse:  [76-80] 80  Resp:  [16] 16  BP: (123-133)/(55-56) 133/56  SpO2:  [94 %-97 %] 97 %  I/O last 3 completed shifts:  In: 440 [P.O.:440]  Out: 1650 [Urine:1650]  O2 requirements: none    Constitutional: patient lethargic, sleepy, keep his eyes close mostly.   HEENT: Eyes nonicteric, oral mucosa moist  Cardiovascular: RRR, normal S1/2, no m/r/g  Respiratory: CTAB, no wheezing or crackles  Vascular: LLE BKA, RLE off loading boot, has wound, but not infected..   GI: Normoactive bowel sounds,  nontender  Skin/Integumen: No rashes,  Neuro/Psych: Quiet, restricted affect. A&Ox3, moves all extremities    Medications     - MEDICATION INSTRUCTIONS -       - MEDICATION INSTRUCTIONS -         amLODIPine  5 mg Oral Daily     amoxicillin-clavulanate  1 tablet Oral Q12H ISIS     clobetasol   Topical At Bedtime     DULoxetine  30 mg Oral Daily     fluticasone-vilanterol  1 puff Inhalation Daily     furosemide  80 mg Oral Daily     gabapentin  200 mg Oral TID     hydrocortisone  20 mg Oral QAM     ipratropium - albuterol 0.5 mg/2.5 mg/3 mL  3 mL Nebulization 4x daily     multivitamin w/minerals  1 tablet Oral Daily     omeprazole  20 mg Oral Daily     polyethylene glycol  17 g Oral Daily     primidone  50 mg Oral At Bedtime     sodium chloride (PF)  3 mL Intracatheter Q8H     tamsulosin  0.8 mg Oral Daily       Data   Recent Labs   Lab 12/20/20  0748 12/17/20  0655 12/16/20  1823 12/16/20  0702 12/15/20  1502 12/13/20  1505   WBC 5.0  --   --  6.2 7.2 7.7   HGB 7.2* 8.1* 7.6* 6.6* 7.1* 7.5*   MCV 95  --   --  93 94 94   PLT 88*  --   --  89* 101* 96*     --   --   --  138 139   POTASSIUM 3.4  --   --   --  3.5 3.9   CHLORIDE 101  --   --   --  100 104   CO2 37*  --   --   --  32 30   BUN 29  --   --   --  27 30   CR 1.49*  --   --  1.51* 1.57* 1.54*   ANIONGAP 2*  --   --   --  6 5   MARTINE 7.9*  --   --   --  7.9* 8.1*   GLC 82  --   --   --  114* 169*   ALBUMIN 1.6*  --   --   --   --   --        Imaging:   No results found for this or any previous visit (from the past 24 hour(s)).

## 2020-12-21 ENCOUNTER — APPOINTMENT (OUTPATIENT)
Dept: PHYSICAL THERAPY | Facility: CLINIC | Age: 70
DRG: 871 | End: 2020-12-21
Attending: INTERNAL MEDICINE
Payer: MEDICARE

## 2020-12-21 ENCOUNTER — APPOINTMENT (OUTPATIENT)
Dept: GENERAL RADIOLOGY | Facility: CLINIC | Age: 70
DRG: 871 | End: 2020-12-21
Attending: INTERNAL MEDICINE
Payer: MEDICARE

## 2020-12-21 LAB
TROPONIN I SERPL-MCNC: <0.015 UG/L (ref 0–0.04)
TROPONIN I SERPL-MCNC: <0.015 UG/L (ref 0–0.04)

## 2020-12-21 PROCEDURE — 93005 ELECTROCARDIOGRAM TRACING: CPT

## 2020-12-21 PROCEDURE — 250N000013 HC RX MED GY IP 250 OP 250 PS 637: Performed by: INTERNAL MEDICINE

## 2020-12-21 PROCEDURE — 84484 ASSAY OF TROPONIN QUANT: CPT | Performed by: INTERNAL MEDICINE

## 2020-12-21 PROCEDURE — 94640 AIRWAY INHALATION TREATMENT: CPT

## 2020-12-21 PROCEDURE — 93010 ELECTROCARDIOGRAM REPORT: CPT | Performed by: INTERNAL MEDICINE

## 2020-12-21 PROCEDURE — 71046 X-RAY EXAM CHEST 2 VIEWS: CPT

## 2020-12-21 PROCEDURE — 250N000009 HC RX 250: Performed by: INTERNAL MEDICINE

## 2020-12-21 PROCEDURE — 999N000157 HC STATISTIC RCP TIME EA 10 MIN

## 2020-12-21 PROCEDURE — 99233 SBSQ HOSP IP/OBS HIGH 50: CPT | Performed by: INTERNAL MEDICINE

## 2020-12-21 PROCEDURE — 36415 COLL VENOUS BLD VENIPUNCTURE: CPT | Performed by: INTERNAL MEDICINE

## 2020-12-21 PROCEDURE — 250N000013 HC RX MED GY IP 250 OP 250 PS 637: Performed by: PSYCHIATRY & NEUROLOGY

## 2020-12-21 PROCEDURE — 99232 SBSQ HOSP IP/OBS MODERATE 35: CPT | Performed by: PSYCHIATRY & NEUROLOGY

## 2020-12-21 PROCEDURE — 97530 THERAPEUTIC ACTIVITIES: CPT | Mod: GP | Performed by: PHYSICAL THERAPIST

## 2020-12-21 PROCEDURE — 94640 AIRWAY INHALATION TREATMENT: CPT | Mod: 76

## 2020-12-21 PROCEDURE — 120N000001 HC R&B MED SURG/OB

## 2020-12-21 RX ORDER — DULOXETIN HYDROCHLORIDE 60 MG/1
60 CAPSULE, DELAYED RELEASE ORAL DAILY
Status: DISCONTINUED | OUTPATIENT
Start: 2020-12-22 | End: 2020-12-31 | Stop reason: HOSPADM

## 2020-12-21 RX ORDER — LANOLIN ALCOHOL/MO/W.PET/CERES
3 CREAM (GRAM) TOPICAL AT BEDTIME
Status: DISCONTINUED | OUTPATIENT
Start: 2020-12-21 | End: 2020-12-31 | Stop reason: HOSPADM

## 2020-12-21 RX ADMIN — AMOXICILLIN AND CLAVULANATE POTASSIUM 1 TABLET: 500; 125 TABLET, FILM COATED ORAL at 14:35

## 2020-12-21 RX ADMIN — IPRATROPIUM BROMIDE AND ALBUTEROL SULFATE 3 ML: .5; 3 SOLUTION RESPIRATORY (INHALATION) at 11:15

## 2020-12-21 RX ADMIN — HYDROMORPHONE HYDROCHLORIDE 2 MG: 2 TABLET ORAL at 21:10

## 2020-12-21 RX ADMIN — ACETAMINOPHEN 650 MG: 325 TABLET, FILM COATED ORAL at 22:39

## 2020-12-21 RX ADMIN — GABAPENTIN 200 MG: 100 CAPSULE ORAL at 09:05

## 2020-12-21 RX ADMIN — IPRATROPIUM BROMIDE AND ALBUTEROL SULFATE 3 ML: .5; 3 SOLUTION RESPIRATORY (INHALATION) at 20:21

## 2020-12-21 RX ADMIN — PRIMIDONE 50 MG: 50 TABLET ORAL at 21:09

## 2020-12-21 RX ADMIN — HYDROCORTISONE 20 MG: 20 TABLET ORAL at 09:05

## 2020-12-21 RX ADMIN — GABAPENTIN 200 MG: 100 CAPSULE ORAL at 16:09

## 2020-12-21 RX ADMIN — GABAPENTIN 200 MG: 100 CAPSULE ORAL at 21:10

## 2020-12-21 RX ADMIN — OMEPRAZOLE 20 MG: 20 CAPSULE, DELAYED RELEASE ORAL at 09:05

## 2020-12-21 RX ADMIN — MELATONIN TAB 3 MG 3 MG: 3 TAB at 21:10

## 2020-12-21 RX ADMIN — HYDROMORPHONE HYDROCHLORIDE 2 MG: 2 TABLET ORAL at 16:09

## 2020-12-21 RX ADMIN — DULOXETINE HYDROCHLORIDE 30 MG: 30 CAPSULE, DELAYED RELEASE ORAL at 09:05

## 2020-12-21 RX ADMIN — IPRATROPIUM BROMIDE AND ALBUTEROL SULFATE 3 ML: .5; 3 SOLUTION RESPIRATORY (INHALATION) at 06:33

## 2020-12-21 RX ADMIN — AMOXICILLIN AND CLAVULANATE POTASSIUM 1 TABLET: 500; 125 TABLET, FILM COATED ORAL at 01:11

## 2020-12-21 RX ADMIN — IPRATROPIUM BROMIDE AND ALBUTEROL SULFATE 3 ML: .5; 3 SOLUTION RESPIRATORY (INHALATION) at 14:46

## 2020-12-21 RX ADMIN — TAMSULOSIN HYDROCHLORIDE 0.8 MG: 0.4 CAPSULE ORAL at 09:05

## 2020-12-21 RX ADMIN — MULTIPLE VITAMINS W/ MINERALS TAB 1 TABLET: TAB at 09:05

## 2020-12-21 RX ADMIN — ACETAMINOPHEN 650 MG: 325 TABLET, FILM COATED ORAL at 09:29

## 2020-12-21 RX ADMIN — ACETAMINOPHEN 650 MG: 325 TABLET, FILM COATED ORAL at 16:09

## 2020-12-21 RX ADMIN — AMLODIPINE BESYLATE 5 MG: 5 TABLET ORAL at 09:05

## 2020-12-21 RX ADMIN — FUROSEMIDE 80 MG: 40 TABLET ORAL at 09:05

## 2020-12-21 ASSESSMENT — ACTIVITIES OF DAILY LIVING (ADL)
ADLS_ACUITY_SCORE: 23
ADLS_ACUITY_SCORE: 25
ADLS_ACUITY_SCORE: 23

## 2020-12-21 NOTE — PLAN OF CARE
PT: Patient in supine upon arrival of therapist and nurse manager. Patient refused participation in therapy, becoming irritated with encouragement to participate and swearing at therapist. Provided extensive amount of education on importance of participating in OOB mobility. Pt unwilling to attempt. Pt then closed eyes and quit responding to therapist. MD in room during attempt and reiterated the importance of participating in PT, pt continued to decline. Discussed with pt that PT will return 12/22 at 1530 to assist pt up to chair.

## 2020-12-21 NOTE — CONSULTS
[facility] Psychiatric Consult Progress Note    Interval History:   Pt seen, chart reviewed, case discussed with nursing staff and treating clinicians.  I met with Perfecto on station 55.  He is currently oriented x3, he looks a bit debilitated, passive, answers questions appropriately but his speech is a little bit impoverished.  At first glance he appears to understand that he is in the hospital because of pneumonia, understands that transitional care is being proposed.  Occupational Therapy is currently evaluating him.  Dr. Roque Moore saw him the other day and felt that he was is decisional.  Today he was not resisting discussions of needing more support with possible rehabilitation.  He does not identify being depressed but clinically he looks at the part.  He is on a small dose of Cymbalta, not sleeping well at night.  We talked about increasing this and adding a small dose of melatonin.     Review of systems:   10 point Review of Systems completed by Dr. Parrish, and is  is negative other than noted in the HPI     Medications:       amLODIPine  5 mg Oral Daily     amoxicillin-clavulanate  1 tablet Oral Q12H ISIS     clobetasol   Topical At Bedtime     DULoxetine  30 mg Oral Daily     fluticasone-vilanterol  1 puff Inhalation Daily     furosemide  80 mg Oral Daily     gabapentin  200 mg Oral TID     hydrocortisone  20 mg Oral QAM     ipratropium - albuterol 0.5 mg/2.5 mg/3 mL  3 mL Nebulization 4x daily     multivitamin w/minerals  1 tablet Oral Daily     omeprazole  20 mg Oral Daily     polyethylene glycol  17 g Oral Daily     primidone  50 mg Oral At Bedtime     sodium chloride (PF)  3 mL Intracatheter Q8H     tamsulosin  0.8 mg Oral Daily     acetaminophen, albuterol, artificial tears ophthalmic solution, HYDROmorphone, lidocaine 4%, lidocaine (buffered or not buffered), loperamide, LORazepam, melatonin, miconazole, naloxone **OR** naloxone **OR** naloxone **OR** naloxone, nicotine, -  MEDICATION INSTRUCTIONS -, ondansetron **OR** ondansetron, - MEDICATION INSTRUCTIONS -, prochlorperazine **OR** prochlorperazine **OR** prochlorperazine, sodium chloride (PF), triamcinolone, [Rx hold ] umeclidinium    Mental Status Examination:     Appearance:  adequately groomed, appeared older than stated age, poorly groomed and fatigued  Eye Contact:  intense  Speech:  paucity of speech  Language:Normal  Psychomotor Behavior:  no evidence of tardive dyskinesia, dystonia, or tics  Mood:  depressed  Affect:  mood congruent and intensity is flat  Thought Process:  logical, linear and goal oriented no loose associations  Thought Content:  no evidence of suicidal ideation or homicidal ideation and no evidence of psychotic thought  Oriented to:  time, person, and place  Attention Span and Concentration:  intact  Recent and Remote Memory:  fair  Fund of Knowledge: appropriate  Muscle Strength and Tone: weak  Gait and Station: Normal  Insight:  fair  Judgment:  fair        Labs/Vitals:   No results found for this or any previous visit (from the past 24 hour(s)).  B/P: 131/55, T: 99.9, P: 86, R: 16    Impression:   Perfecto presents with severe pneumonia, COPD. He likely has some elements of delirium from his hypoxia/infection He appears decisional at this time but occupational therapy assessment will better define what he needs, I asked them to do a SLUMS evaluation.  I would optimize his antidepressant, add a little bit of melatonin to target sleep.  If he does have immediate family available it would be helpful to have them involved his care to at least be appraised of recommendations because his cognitive exam may fluctuate given the severity of his medical problems.      DIagnoses:   1.  Delirium, multifactorial  2.  Pneumonia with severe COPD  3.  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 Written information given on medications. Side effects, risks, benefits  reviewed.  2.  Increase Cymbalta to 60 mg daily, add melatonin 3 mg nightly to assist with sleep  3.  Occupational Therapy evaluation including slums  4.  If family is available it would be good to engage them in some assistance with decision making, but technically he appears decisional to me as he understands his medical condition, his options for treatment, the risks of accepting or refusing treatment, seems to be exercising a choice that is not based on delusion.      Attestation:  Patient has been seen and evaluated by me,  Ye Parrish MD

## 2020-12-21 NOTE — PROGRESS NOTES
[facility]    Hospitalist Progress Note    Interval History   Patient was seen by rehab and nursing supervisor at time of my visit, he keep his eye shut and not gave any answer.    He refusing care, PT, OT and not talking to providers. He has no good plan what he will do or discharge plans. He does not get involved in discussion. He then complaints about chest pain.     No other significant event overnight.       Assessment & Plan           Acute on chronic hypoxic and hypercarbic respiratory failure   Bilateral community-acquired bacterial pneumonia with RLL abscess Severe COPD      Perfecto Reese is a 70 year old male with complex PMHx including severe COPD on 3L home O2 with ongoing tobacco use, adrenal insufficiency on chronic steroids, psoriasis on Humira, chronic Hep C with cirrhosis, PVD s/p left BKA, chronic wounds, and non-compliance who was admitted on 12/2/2020 for pneumonia, found to have a RLL lung abscess.                EMS activated by his home care RN for fever, cough, and acute respiratory distress (placed on 10L per OM in the field). Fever to 102.5, tachycardia, tachypnea, and leukocytosis (17.2k) present on arrival. CXR on arrival showed RLL infiltrate and CT abd/pelvis showed air fluid cavity at the right lung base. On admission, he was initiated on IV pip-tazo and azithromycin, and Thoracic Surgery was consulted. Chest CT was ordered (12/3) which showed a 6.4 cm RLL pulmonary abscess and extensive left-sided mucus plugging with associated infiltrate. 12/2 blood cultures x2 no growth. Sputum cx 12/4 growing proteus, fluoroquinolone resistant. Also corynebacterium striatum 12/6.                CT surgery saw, no surgical intervention indicated. Patient was treated with IV then PO antibiotics, with the assistance of infectious disease. Initially on zosyn->unasyn-> transitioned 12/8 to Augmentin x 6 weeks (through approximately 1/13/2021).  - Repeat CT scan in 4 weeks  (~1/8/2021).  - O2 weaned back to baseline of 3 lpm  - PT- rec TCU, patient refusing.  I had a long discussion with the patient regarding his care after discharge from the hospital, and recommend that he should consider TCU as recommended by the TCU till he able to take care of himself, consider home health if no TCU on discharge.   Patient has not much insight into his discharge plan at this time, as per my evaluation I don't think he is safe to be discharge home as look least interested in taking care of himself. He refused to work with PT and OT.   I will ask the SW to see him again, will have Psychiatry to re-evaluate him about his decision making capacity, and  Possible some underlying depression.   If he deem compatible with decision making capacity then SW should start working on discharge plan.   In the mean time I will repeat his xray chest today, do EKG and  Serial troponin as complaint of chest pain, although its reproducible and there for few days as per patient.        Chronic normocytic anemia, likely ACD  Chronic thrombocytopenia  Anemia and thrombocytopenia likely multifactorial from liver cirrhosis, splenomegaly, renal disease, CKD and psoriasis. Iron labs suggestive of anemia of chronic disease. 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 Patient required four units of pRBCs this admission for Hgb < 7.0. No acute GI bleed identified, likely blood loss from decreased erythropoiesis. Appreciate GI consult 12/16/2020, no acute GI bleed, no acute interventions, [facility]  Hb stable to 7.2     Acute kidney injury on CKD 3, improved  Asymptomatic pyuria  Baseline creatinine 1.2-1.3 in Jan 2020, on admission 2.89. Suspect pre-renal state in setting of pneumonia/sepsis with possible progression to ATN in setting of hypotension. Improved with IV fluids, creatinine down to 1.5 on 12/16,  now recheck shows improvement to 1.49 and calculated GFR is 36  - restart PTA losartan at discharge  - Appreciate Nephrology consult--lasix 40 mg daily at discharge, currently on 80 mg of Lasix.   - [facility]     Chronic adrenal insufficiency  - Continues on PTA hydrocortisone 20 mg PO daily     Essential hypertension: Amlodipine decreased to 5mg daily due to soft blood pressures     History of psoriasis   - Holding PTA Humira this admission--continue to hold at discharge  - Follow up with outpatient provider     Peripheral vascular disease s/p left BKA  Chronic RLE wound  Chronic left ear wound  Present on admission. Receives wound cares per home care RN.      Chronic pain syndrome: Resume PTA regimen (gabapentin 300 mg TID, duloxetine 30 mg daily, lidocaine patch, oxycodone 5 mg q6h prn, morphine 2.5 mg BID prn)     Non-severe malnutrition       DVT Prophylaxis: PCDs  Code Status: Full Code  PT/OT: PT ordered  Diet: Regular Diet Adult  Snacks/Supplements Adult: Boost Plus; Between Meals  Diet            Disposition: Expected discharge TCU vs Home with home health as per patient decision.     Davonte Naranjo MD  Text Page  (7am to 6pm)  -Data reviewed today: I reviewed all new labs and imaging results over the last 24 hours.    Physical Exam   Temp: 99.9  F (37.7  C) Temp src: Oral BP: 131/55 Pulse: 86   Resp: 16 SpO2: 93 % O2 Device: Nasal cannula Oxygen Delivery: 2 LPM  Vitals:    12/17/20 0455 12/19/20 0703 12/20/20 0425   Weight: 74.1 kg (163 lb 6.4 oz) 72.3 kg (159 lb 4.8 oz) 72.3 kg (159 lb 6.4 oz)     Vital Signs with Ranges  Temp:  [98.7  F (37.1  C)-99.9  F (37.7  C)] 99.9  F (37.7  C)  Pulse:  [73-91] 86  Resp:  [16] 16  BP: (126-137)/(55-57) 131/55  SpO2:  [93 %-98 %] 93 %  I/O last 3 completed shifts:  In: 410 [P.O.:410]  Out: 500 [Urine:500]  O2 requirements: none    Constitutional: patient kept his eyes close, not in any distress..   HEENT: Eyes nonicteric, oral mucosa  moist  Cardiovascular: RRR, normal S1/2, no m/r/g  Respiratory: CTAB, no wheezing or crackles, anterior chest tenderness.   Vascular: LLE BKA, RLE off loading boot, has wound, but not infected..   GI: Normoactive bowel sounds, nontender  Skin/Integumen: No rashes,  Neuro/Psych: Quiet, restricted affect. A&Ox3, moves all extremities    Medications     - MEDICATION INSTRUCTIONS -       - MEDICATION INSTRUCTIONS -         amLODIPine  5 mg Oral Daily     amoxicillin-clavulanate  1 tablet Oral Q12H ISIS     clobetasol   Topical At Bedtime     [START ON 12/22/2020] DULoxetine  60 mg Oral Daily     fluticasone-vilanterol  1 puff Inhalation Daily     furosemide  80 mg Oral Daily     gabapentin  200 mg Oral TID     hydrocortisone  20 mg Oral QAM     ipratropium - albuterol 0.5 mg/2.5 mg/3 mL  3 mL Nebulization 4x daily     melatonin  3 mg Oral At Bedtime     multivitamin w/minerals  1 tablet Oral Daily     omeprazole  20 mg Oral Daily     polyethylene glycol  17 g Oral Daily     primidone  50 mg Oral At Bedtime     sodium chloride (PF)  3 mL Intracatheter Q8H     tamsulosin  0.8 mg Oral Daily       Data   Recent Labs   Lab 12/20/20  0748 12/17/20  0655 12/16/20  1823 12/16/20  0702 12/15/20  1502   WBC 5.0  --   --  6.2 7.2   HGB 7.2* 8.1* 7.6* 6.6* 7.1*   MCV 95  --   --  93 94   PLT 88*  --   --  89* 101*     --   --   --  138   POTASSIUM 3.4  --   --   --  3.5   CHLORIDE 101  --   --   --  100   CO2 37*  --   --   --  32   BUN 29  --   --   --  27   CR 1.49*  --   --  1.51* 1.57*   ANIONGAP 2*  --   --   --  6   MARTINE 7.9*  --   --   --  7.9*   GLC 82  --   --   --  114*   ALBUMIN 1.6*  --   --   --   --        Imaging:   No results found for this or any previous visit (from the past 24 hour(s)).

## 2020-12-21 NOTE — PROGRESS NOTES
[facility]    Infectious Disease Progress Note    Date of Service (when I saw the patient): 12/21/2020     Assessment & Plan   Perfecto Reese is a 70 year old male who was admitted on 12/2/2020.       Impression:  1. 70 y.o male with COPD on Home oxygen.   2. Ongoing tobacco abuse.   3. Adrenal insufficiency on chronic steroids   4. Psoriasis on humira.   5. Chronic hep C. Cirrhosis.   6. PVD, S/P BKA.   7. Non compliance.   8. Admitted with pneumonia symptoms and imaging with lung abscesses.   9. On zosyn. And on azithromycin.   10. Sputum culture with proteus.      Recommendations:   Continue on  oral Augmentin, dose reduced given kidney function, discharge on a total of 6 weeks of oral augmentin   Repeat a CT scan in 4 weeks.   Day 19/ 42 of antibiotics today. following        Neeraj Stoll MD    Interval History   Afebrile stable  Cultures noted       Physical Exam   Temp: 99.9  F (37.7  C) Temp src: Oral BP: 131/55 Pulse: 86   Resp: 16 SpO2: 93 % O2 Device: Nasal cannula Oxygen Delivery: 2 LPM  Vitals:    12/17/20 0455 12/19/20 0703 12/20/20 0425   Weight: 74.1 kg (163 lb 6.4 oz) 72.3 kg (159 lb 4.8 oz) 72.3 kg (159 lb 6.4 oz)     Vital Signs with Ranges  Temp:  [98.7  F (37.1  C)-99.9  F (37.7  C)] 99.9  F (37.7  C)  Pulse:  [73-91] 86  Resp:  [16] 16  BP: (126-137)/(55-57) 131/55  SpO2:  [93 %-98 %] 93 %    Constitutional: lethargic today, wakes up though   Lungs: Clear to auscultation bilaterally, no crackles or wheezing  Cardiovascular: Regular rate and rhythm, normal S1 and S2, and no murmur noted  Abdomen: Normal bowel sounds, soft, non-distended, non-tender  Skin: No rashes, no cyanosis, no edema  Other:    Medications     - MEDICATION INSTRUCTIONS -       - MEDICATION INSTRUCTIONS -         amLODIPine  5 mg Oral Daily     amoxicillin-clavulanate  1 tablet Oral Q12H ISIS     clobetasol   Topical At Bedtime     DULoxetine  30 mg Oral Daily     fluticasone-vilanterol  1 puff  Inhalation Daily     furosemide  80 mg Oral Daily     gabapentin  200 mg Oral TID     hydrocortisone  20 mg Oral QAM     ipratropium - albuterol 0.5 mg/2.5 mg/3 mL  3 mL Nebulization 4x daily     multivitamin w/minerals  1 tablet Oral Daily     omeprazole  20 mg Oral Daily     polyethylene glycol  17 g Oral Daily     primidone  50 mg Oral At Bedtime     sodium chloride (PF)  3 mL Intracatheter Q8H     tamsulosin  0.8 mg Oral Daily       Data   All microbiology laboratory data reviewed.  Recent Labs   Lab Test 12/20/20  0748 12/17/20  0655 12/16/20  1823 12/16/20  0702 12/15/20  1502   WBC 5.0  --   --  6.2 7.2   HGB 7.2* 8.1* 7.6* 6.6* 7.1*   HCT 22.6*  --   --  20.7* 22.2*   MCV 95  --   --  93 94   PLT 88*  --   --  89* 101*     Recent Labs   Lab Test 12/20/20  0748 12/16/20  0702 12/15/20  1502   CR 1.49* 1.51* 1.57*     No lab results found.  Recent Labs   Lab Test 12/03/20  0540 12/03/20  0230 12/02/20  1904 12/02/20  1844   CULT Light growth  Normal adelina    Light growth  Proteus mirabilis  *  Moderate growth  Corynebacterium striatum  Identification obtained by MALDI-TOF mass spectrometry research use only database. Test   characteristics determined and verified by the Infect[facility] Diagnostic Laboratory   ([facility]) [place], [place].  Susceptibility testing not routinely done  * <10,000 colonies/mL  mixed urogenital adelina  Susceptibility testing not routinely done   No growth No growth       Attestation:  Total time on the floor involved in the patient's care: 35 minutes. Total time spent in counseling/care coordination: >50%

## 2020-12-21 NOTE — PROGRESS NOTES
Care Management Follow Up    Length of Stay (days): 19    Expected Discharge Date: 12/22/20  Expected Time of Departure: 6:15 PM [facility] transport  Concerns to be Addressed: discharge planning     Patient plan of care discussed at interdisciplinary rounds:yes    Anticipated Discharge Disposition: Home, Home Care     Anticipated Discharge Services:    Anticipated Discharge DME: Oxygen, Wheelchair(resuming O2/DME and HHC as prior to hosp  stay)    Patient/family educated on Medicare website which has current facility and service quality ratings: no  Education Provided on the Discharge Plan:    Patient/Family in Agreement with the Plan: yes    Referrals Placed by CM/SW: Home Infusion, Transportation  Private pay costs discussed: Not applicable    Additional Information:  Call placed to [facility] front door intake line to discuss options through [facility].  reported that if patient is agreeable to engage in seeing if he qualifies for any additional waivered services then a referral can be submitted. Writer explained that patient has been deemed decisional, refuses TCU, isn't engaging with therapy, refusing services and there is concerns for him discharging home and being safe. She suggested that writer put in a [facility] report now before patient discharge so that when he does, services can be coordinated prior to him leaving so that he cannot refuse to answer his phone or door.     Writer will discuss what services would be helpful to patient to see if he'd be willing to participate in an assessment for potential services at home for Homemaking, PCA or other needs.       DIMITRI Drake

## 2020-12-21 NOTE — PLAN OF CARE
"OT:Orders received and chart reviewed. Attempted evaluation, however, pt. refused at this time despite encouragement. Provided education on importance of mobility. Dr. Parrish/psychiatry in room and also encouraging, requested SLUMS be completed. Pt. stated to OT after explanation of cognitive screening:\"I am not going to do that anyway.\"    "

## 2020-12-21 NOTE — PLAN OF CARE
VSS, on 3L of oxygen which is baseline. PO Dilaudid given prn. Pt incontinent of bowel and bladder. Pt at times refusing cares.

## 2020-12-21 NOTE — PLAN OF CARE
VSS, on 3L of oxygen which is baseline. Denies pain. Patient incontinent of bowel and bladder, patient let nurse change him and take VS between [id number]. Tele NSR. Plan is pending TCU vs. Home with home care. Will continue to monitor.

## 2020-12-21 NOTE — PLAN OF CARE
A&O, non compliant with cares at times. VSS, 2L NC.  CMS intact.  Refused RLE dressing change. Pt takes medication one at a time will apple sauce.  Repo 2 hrs in bed.  Incontinent of B&B.  Pain managed with tylenol.  Tele SR.  Continue to monitor.

## 2020-12-22 ENCOUNTER — APPOINTMENT (OUTPATIENT)
Dept: OCCUPATIONAL THERAPY | Facility: CLINIC | Age: 70
DRG: 871 | End: 2020-12-22
Attending: INTERNAL MEDICINE
Payer: MEDICARE

## 2020-12-22 ENCOUNTER — APPOINTMENT (OUTPATIENT)
Dept: PHYSICAL THERAPY | Facility: CLINIC | Age: 70
DRG: 871 | End: 2020-12-22
Payer: MEDICARE

## 2020-12-22 LAB
BASOPHILS # BLD AUTO: 0 10E9/L (ref 0–0.2)
BASOPHILS NFR BLD AUTO: 0.7 %
DIFFERENTIAL METHOD BLD: ABNORMAL
EOSINOPHIL # BLD AUTO: 0.4 10E9/L (ref 0–0.7)
EOSINOPHIL NFR BLD AUTO: 5.9 %
ERYTHROCYTE [DISTWIDTH] IN BLOOD BY AUTOMATED COUNT: 13.3 % (ref 10–15)
HCT VFR BLD AUTO: 24.5 % (ref 40–53)
HGB BLD-MCNC: 7.9 G/DL (ref 13.3–17.7)
IMM GRANULOCYTES # BLD: 0 10E9/L (ref 0–0.4)
IMM GRANULOCYTES NFR BLD: 0.5 %
LYMPHOCYTES # BLD AUTO: 0.5 10E9/L (ref 0.8–5.3)
LYMPHOCYTES NFR BLD AUTO: 8.7 %
MCH RBC QN AUTO: 30.5 PG (ref 26.5–33)
MCHC RBC AUTO-ENTMCNC: 32.2 G/DL (ref 31.5–36.5)
MCV RBC AUTO: 95 FL (ref 78–100)
MONOCYTES # BLD AUTO: 0.3 10E9/L (ref 0–1.3)
MONOCYTES NFR BLD AUTO: 5.8 %
NEUTROPHILS # BLD AUTO: 4.6 10E9/L (ref 1.6–8.3)
NEUTROPHILS NFR BLD AUTO: 78.4 %
NRBC # BLD AUTO: 0 10*3/UL
NRBC BLD AUTO-RTO: 0 /100
PLATELET # BLD AUTO: 116 10E9/L (ref 150–450)
RBC # BLD AUTO: 2.59 10E12/L (ref 4.4–5.9)
TROPONIN I SERPL-MCNC: <0.015 UG/L (ref 0–0.04)
TROPONIN I SERPL-MCNC: <0.015 UG/L (ref 0–0.04)
WBC # BLD AUTO: 5.9 10E9/L (ref 4–11)

## 2020-12-22 PROCEDURE — 36415 COLL VENOUS BLD VENIPUNCTURE: CPT | Performed by: INTERNAL MEDICINE

## 2020-12-22 PROCEDURE — 250N000013 HC RX MED GY IP 250 OP 250 PS 637: Performed by: INTERNAL MEDICINE

## 2020-12-22 PROCEDURE — 85025 COMPLETE CBC W/AUTO DIFF WBC: CPT | Performed by: INTERNAL MEDICINE

## 2020-12-22 PROCEDURE — 97535 SELF CARE MNGMENT TRAINING: CPT | Mod: GO | Performed by: OCCUPATIONAL THERAPIST

## 2020-12-22 PROCEDURE — 97110 THERAPEUTIC EXERCISES: CPT | Mod: GO | Performed by: OCCUPATIONAL THERAPIST

## 2020-12-22 PROCEDURE — 250N000013 HC RX MED GY IP 250 OP 250 PS 637: Performed by: PSYCHIATRY & NEUROLOGY

## 2020-12-22 PROCEDURE — 250N000009 HC RX 250: Performed by: INTERNAL MEDICINE

## 2020-12-22 PROCEDURE — 94640 AIRWAY INHALATION TREATMENT: CPT | Mod: 76

## 2020-12-22 PROCEDURE — 84484 ASSAY OF TROPONIN QUANT: CPT | Performed by: INTERNAL MEDICINE

## 2020-12-22 PROCEDURE — 99231 SBSQ HOSP IP/OBS SF/LOW 25: CPT | Performed by: INTERNAL MEDICINE

## 2020-12-22 PROCEDURE — 250N000013 HC RX MED GY IP 250 OP 250 PS 637: Performed by: HOSPITALIST

## 2020-12-22 PROCEDURE — 97166 OT EVAL MOD COMPLEX 45 MIN: CPT | Mod: GO | Performed by: OCCUPATIONAL THERAPIST

## 2020-12-22 PROCEDURE — 99207 PR CDG-MDM COMPONENT: MEETS LOW - DOWN CODED: CPT | Performed by: INTERNAL MEDICINE

## 2020-12-22 PROCEDURE — 120N000001 HC R&B MED SURG/OB

## 2020-12-22 PROCEDURE — 97530 THERAPEUTIC ACTIVITIES: CPT | Mod: GP | Performed by: PHYSICAL THERAPIST

## 2020-12-22 PROCEDURE — 999N000157 HC STATISTIC RCP TIME EA 10 MIN

## 2020-12-22 PROCEDURE — 94640 AIRWAY INHALATION TREATMENT: CPT

## 2020-12-22 RX ORDER — FUROSEMIDE 40 MG
40 TABLET ORAL DAILY
Status: DISCONTINUED | OUTPATIENT
Start: 2020-12-22 | End: 2020-12-31 | Stop reason: HOSPADM

## 2020-12-22 RX ADMIN — HYDROMORPHONE HYDROCHLORIDE 2 MG: 2 TABLET ORAL at 21:51

## 2020-12-22 RX ADMIN — IPRATROPIUM BROMIDE AND ALBUTEROL SULFATE 3 ML: .5; 3 SOLUTION RESPIRATORY (INHALATION) at 15:26

## 2020-12-22 RX ADMIN — AMOXICILLIN AND CLAVULANATE POTASSIUM 1 TABLET: 500; 125 TABLET, FILM COATED ORAL at 02:01

## 2020-12-22 RX ADMIN — LORAZEPAM 0.5 MG: 0.5 TABLET ORAL at 15:09

## 2020-12-22 RX ADMIN — AMOXICILLIN AND CLAVULANATE POTASSIUM 1 TABLET: 500; 125 TABLET, FILM COATED ORAL at 15:05

## 2020-12-22 RX ADMIN — GABAPENTIN 200 MG: 100 CAPSULE ORAL at 15:04

## 2020-12-22 RX ADMIN — DULOXETINE HYDROCHLORIDE 60 MG: 60 CAPSULE, DELAYED RELEASE ORAL at 15:04

## 2020-12-22 RX ADMIN — AMLODIPINE BESYLATE 5 MG: 5 TABLET ORAL at 15:16

## 2020-12-22 RX ADMIN — MELATONIN TAB 3 MG 3 MG: 3 TAB at 21:51

## 2020-12-22 RX ADMIN — HYDROMORPHONE HYDROCHLORIDE 2 MG: 2 TABLET ORAL at 15:04

## 2020-12-22 RX ADMIN — CLOBETASOL PROPIONATE: 0.5 SOLUTION TOPICAL at 21:58

## 2020-12-22 RX ADMIN — HYDROCORTISONE 20 MG: 20 TABLET ORAL at 15:05

## 2020-12-22 RX ADMIN — IPRATROPIUM BROMIDE AND ALBUTEROL SULFATE 3 ML: .5; 3 SOLUTION RESPIRATORY (INHALATION) at 18:55

## 2020-12-22 RX ADMIN — GABAPENTIN 200 MG: 100 CAPSULE ORAL at 21:51

## 2020-12-22 RX ADMIN — FLUTICASONE FUROATE AND VILANTEROL TRIFENATATE 1 PUFF: 200; 25 POWDER RESPIRATORY (INHALATION) at 15:14

## 2020-12-22 RX ADMIN — PRIMIDONE 50 MG: 50 TABLET ORAL at 21:51

## 2020-12-22 RX ADMIN — MULTIPLE VITAMINS W/ MINERALS TAB 1 TABLET: TAB at 15:05

## 2020-12-22 RX ADMIN — FUROSEMIDE 40 MG: 40 TABLET ORAL at 15:04

## 2020-12-22 RX ADMIN — TAMSULOSIN HYDROCHLORIDE 0.8 MG: 0.4 CAPSULE ORAL at 15:05

## 2020-12-22 ASSESSMENT — MIFFLIN-ST. JEOR: SCORE: 1391.88

## 2020-12-22 ASSESSMENT — ACTIVITIES OF DAILY LIVING (ADL)
ADLS_ACUITY_SCORE: 23
PREVIOUS_RESPONSIBILITIES: MEAL PREP;HOUSEKEEPING;LAUNDRY;SHOPPING;MEDICATION MANAGEMENT;FINANCES
ADLS_ACUITY_SCORE: 25
ADLS_ACUITY_SCORE: 25
ADLS_ACUITY_SCORE: 23

## 2020-12-22 NOTE — PROGRESS NOTES
"Spoke with patient.  He wants to go to [facility]CU, \"If I can't go there I want to go home.\"  SW aware.    Reinforced with patient that he needs to participate in a therapy evaluation in order for TCU referrals to go out.   Stopped in patient room at the time of therapy session to re-reinforce.  Pt requested CM-RN \"get the f* out,\" but hopefully will participate in an evaluation.     "

## 2020-12-22 NOTE — PLAN OF CARE
Pt is AOx4, VSS, agreed to dressing change for shift, pain is managed with dilaudid and tylenol, non compliant with cares at time, tele SR, ADLs encouraged, declined ambulation or sitting in chair, refused repositioning at times, incontinent BnB, condom cath placed for moisture management. Pending possible discharge to TCU or home.

## 2020-12-22 NOTE — PROGRESS NOTES
Care Management Follow Up    Length of Stay (days): 20    Expected Discharge Date: 12/22/20  Expected Time of Departure: 6:15 PM [facility] transport  Concerns to be Addressed: discharge planning     Patient plan of care discussed at interdisciplinary rounds: Yes    Anticipated Discharge Disposition: TCU   Anticipated Discharge Services:    Anticipated Discharge DME: Oxygen, Wheelchair(resuming O2/DME and HHC as prior to hosp  stay)    Patient/family educated on Medicare website which has current facility and service quality ratings: no  Education Provided on the Discharge Plan:    Patient/Family in Agreement with the Plan: yes    Referrals Placed by CM/SW: Home Infusion, Transportation, TCU  Private pay costs discussed: Not applicable    Additional Information:  Patient requested a TCU referral be sent to [facility]. Referral sent via [facility].    Discussed what services he will need when he returns home and he agreed that he will need help with laundry, meals, transfers and other daily needs. Call placed to [facility] to submit a referral requesting an assessment for waivered services. Writer spoke to Maryanne at [facility] and she informed writer that the spend down for patient each month is $725. Previously patient was on an AC Waiver and ended January 2020 but it was not documented why it ended. Maryanne suggested that writer speak to patient to determine who assists with his bills or if he is responsible for all of his financials and if his income is still $2000/month. At this time, he would be eligible for the Elderly Waiver.     Writer spoke to patient to confirm that he manages his own finances and he confirmed that he does. Writer explained that if he is willing to complete the assessment over the phone, it is likely that he would qualify for waivered services including: transportation, meals, homemaking and PCA. Patient is agreeable to do this, however he completed OT and PT today and is willing to  begin this process tomorrow or at TCU when he transfers. A call will need to be made to eligibility line at [phone] and his case # [id number] to confirm spend down is $750.     Will continue to follow      DIMITRI Drake

## 2020-12-22 NOTE — PROGRESS NOTES
SPIRITUAL HEALTH SERVICES  SPIRITUAL ASSESSMENT Progress Note  [facility]     REFERRAL SOURCE: Follow up and length of stay    Attempted to visit patient on 3 separate occasions throughout the day. Patient was sleeping or receiving cares each attempt.    PLAN: [facility] is available for support as requested.    Valery Dukes  Associate   Pager [phone]  Phone [phone]

## 2020-12-22 NOTE — PROGRESS NOTES
"CLINICAL NUTRITION SERVICES - REASSESSMENT NOTE    Malnutrition: (12/11)   % Weight Loss:  None noted  % Intake:  Decreased intake does not meet criteria for malnutrition   Subcutaneous Fat Loss:  Orbital region mild-moderate depletion and Upper arm region mild-moderate depletion  Muscle Loss:  Temporal region mild depletion and Clavicle bone region mild depletion  Fluid Retention:  Mild-Moderate     Malnutrition Diagnosis: Non-Severe malnutrition  In Context of:  Chronic illness or disease     EVALUATION OF PROGRESS TOWARD GOALS   Diet: Regular Diet + Boost Plus (each provides 360 kcal, 14 g pro)   Intake/Tolerance:   - Patient soundly sleeping during attempt to visit today.   - Since last assessment 12/16 patient has been ordering more consistently at each meal period, usually receiving at least 2-3 meals/day.   - Intakes are ranging from %, with good appetite per RN documentation.   - Stooling daily.     WOCN 12/16 -->   \"RLE wounds due to Mixed Etiology: poor circulation, edema, pressure   Status: improving, see picture and measurements below, decreased edema and patient reported no pain\"    Previous Goals:   Intake of at least % of meals BID + 2 supplements daily.   Evaluation: Not met consistently     Previous Nutrition Diagnosis:   Inadequate oral intake r/t prolonged hospitalization and feeling unwell AEB patient consumes % of 1 meal/day since 12/11.   Evaluation: Improving    CURRENT NUTRITION DIAGNOSIS  Predicted inadequate nutrient intake (energy/protein) related to prolonged hospitalization and feeling unwell     INTERVENTIONS  Recommendations / Nutrition Prescription  Continue diet and supplements as ordered    Implementation  None new today     Goals  Intake of at least 75% meals BID + 2 supplements daily.       MONITORING AND EVALUATION:  Progress towards goals will be monitored and evaluated per protocol and Practice Guidelines    Winifred Polanco RD, LD  [facility] [place], [address] 57, [place] "   Pager: [phone]  Weekend Pager: [phone]

## 2020-12-22 NOTE — PLAN OF CARE
A/Ox4. VSS on 2.5L NC. PRN dilaudid for pain. T/R q2. Incont of B & B. 1 BM this shift. Condom cath in place. Tele NSR.

## 2020-12-22 NOTE — PROGRESS NOTES
"   12/22/20 1429   Quick Adds   Type of Visit Initial Occupational Therapy Evaluation   Living Environment   People in home alone   Current Living Arrangements apartment   Home Accessibility no concerns   Transportation Anticipated agency   Living Environment Comments pt states he was not doing well at home, hasn't done laundry in \"who know's how long\" and doesn't take medication because he forgets and that he just doesn't eat   Self-Care   Usual Activity Tolerance moderate   Current Activity Tolerance poor   Regular Exercise No   Equipment Currently Used at Home wheelchair, manual;shower chair   Activity/Exercise/Self-Care Comment uses WC in home, power scooter when out. states at baseline he is able to use his arms to boost into his chair. states he can't do it   General Information   Onset of Illness/Injury or Date of Surgery 12/02/20   Referring Physician Davonte Naranjo MD   Patient/Family Therapy Goal Statement (OT) pt needing encouragment from multiple staff to participate   Additional Occupational Profile Info/Pertinent History of Current Problem Perfecto Reese is a 70 year old male with complex PMHx including severe COPD on 3L home O2 with ongoing tobacco use, adrenal insufficiency on chronic steroids, psoriasis on Humira, chronic Hep C with cirrhosis, PVD s/p left BKA, chronic wounds, and non-compliance who was admitted on 12/2/2020 for pneumonia, found to have a RLL lung abscess   Performance Patterns (Routines, Roles, Habits) wheelchair bound at baseline for mobility. was I with ADL's   Existing Precautions/Restrictions fall   General Observations and Info pt participated in very minimal eval   Cognitive Status Examination   Cognitive Status Comments difficult to test as he refused to participate in cog screen   Sensory   Sensory Quick Adds No deficits were identified   Pain Assessment   Patient Currently in Pain Yes, see Vital Sign flowsheet   Integumentary/Edema   Integumentary/Edema no deficits were " identifed   Range of Motion Comprehensive   General Range of Motion no range of motion deficits identified   Strength Comprehensive (MMT)   Comment, General Manual Muscle Testing (MMT) Assessment overall 3+/5   Muscle Tone Assessment   Muscle Tone Quick Adds No deficits were identified   Coordination   Upper Extremity Coordination No deficits were identified   Bed Mobility   Bed Mobility rolling left;rolling right;sit-supine;supine-sit   Comment (Bed Mobility) SBA   Balance   Balance Comments decreased dynamic sitting balance   Instrumental Activities of Daily Living (IADL)   Previous Responsibilities meal prep;housekeeping;laundry;shopping;medication management;finances   IADL Comments but he states he is not able to manage these things   Clinical Impression   Criteria for Skilled Therapeutic Interventions Met (OT) yes   OT Diagnosis decreased I with ADL's and functional mobility   OT Problem List-Impairments impacting ADL problems related to;activity tolerance impaired;cognition;balance;pain;strength   ADL comments/analysis decreased  I with dressing, bathing, BR transfers and IADL's   Assessment of Occupational Performance 3-5 Performance Deficits   Identified Performance Deficits all home mgmt impaired, dressing, bathing   Planned Therapy Interventions (OT) ADL retraining;transfer training;strengthening   Clinical Decision Making Complexity (OT) low complexity   Therapy Frequency (OT) Daily   Predicted Duration of Therapy 1 week   Risks and Benefits of Treatment have been explained. Yes   Patient, Family & other staff in agreement with plan of care Yes   Comment-Clinical Impression would place patient on a trial for OT, pt is not been very motivated to participate   OT Discharge Planning    OT Discharge Recommendation (DC Rec) Transitional Care Facility   OT Rationale for DC Rec pt is below baseline, not managing well at home. anticipate he needs to transition from ind living to LTC.    Total Evaluation Time  (Minutes)   Total Evaluation Time (Minutes) 10

## 2020-12-22 NOTE — PROGRESS NOTES
[facility]    Hospitalist Progress Note    Interval History   Patient seen and evaluated in his room.  He was sleeping comfortably woke up easily.  Offer no complaints initially.  Told me that he is doing okay.  No fever chills headache dizziness lightheadedness.  Some baseline shortness of breath and occasional chest pain.    No other significant event overnight.       Assessment & Plan           Acute on chronic hypoxic and hypercarbic respiratory failure   Bilateral community-acquired bacterial pneumonia with RLL abscess Severe COPD      Perfecto Reese is a 70 year old male with complex PMHx including severe COPD on 3L home O2 with ongoing tobacco use, adrenal insufficiency on chronic steroids, psoriasis on Humira, chronic Hep C with cirrhosis, PVD s/p left BKA, chronic wounds, and non-compliance who was admitted on 12/2/2020 for pneumonia, found to have a RLL lung abscess.                EMS activated by his home care RN for fever, cough, and acute respiratory distress (placed on 10L per OM in the field). Fever to 102.5, tachycardia, tachypnea, and leukocytosis (17.2k) present on arrival. CXR on arrival showed RLL infiltrate and CT abd/pelvis showed air fluid cavity at the right lung base. On admission, he was initiated on IV pip-tazo and azithromycin, and Thoracic Surgery was consulted. Chest CT was ordered (12/3) which showed a 6.4 cm RLL pulmonary abscess and extensive left-sided mucus plugging with associated infiltrate. 12/2 blood cultures x2 no growth. Sputum cx 12/4 growing proteus, fluoroquinolone resistant. Also corynebacterium striatum 12/6.                CT surgery saw, no surgical intervention indicated. Patient was treated with IV then PO antibiotics, with the assistance of infectious disease. Initially on zosyn->unasyn-> transitioned 12/8 to Augmentin x 6 weeks (through approximately 1/13/2021).  - Repeat CT scan in 4 weeks (~1/8/2021).  - O2 weaned back to baseline of  3 lpm  - PT- rec TCU, patient refusing.  I had a long discussion with the patient regarding his care after discharge from the hospital, and recommend that he should consider TCU as recommended by the TCU till he able to take care of himself, consider home health if no TCU on discharge.   Patient has not much insight into his discharge plan at this time, as per my evaluation I don't think he is safe to be discharge home as look least interested in taking care of himself. He refused to work with PT and OT.  But he has decision-making capacity.  If he continues refused to go to TCU, will plan to send him home with home health at least.  Patient was reevaluated by psychiatrist and as per their evaluation he has decision-making capacity.    If he deem compatible with decision making capacity then SW should start working on discharge plan.   Repeat chest x-ray shows improvement in his pneumonia, no pleural effusion, EKG no acute ischemic changes serial troponins remain negative.  His chest pain is musculoskeletal and reproducible nature unlikely to be cardiac in origin.       Chronic normocytic anemia, likely ACD  Chronic thrombocytopenia  Anemia and thrombocytopenia likely multifactorial from liver cirrhosis, splenomegaly, renal disease, CKD and psoriasis. Iron labs suggestive of anemia of chronic disease. 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 Patient required four units of pRBCs this admission for Hgb < 7.0. No acute GI bleed identified, likely blood loss from decreased erythropoiesis. Appreciate GI consult 12/16/2020, no acute GI bleed, no acute interventions, [facility]  Hb stable to 7.2 check hemoglobin if still on the lower side we will transfuse him 1 unit packed RBCs at least prior to discharge     Acute kidney injury on CKD 3, improved  Asymptomatic pyuria  Baseline creatinine 1.2-1.3 in  Jan 2020, on admission 2.89. Suspect pre-renal state in setting of pneumonia/sepsis with possible progression to ATN in setting of hypotension. Improved with IV fluids, creatinine down to 1.5 on 12/16, now recheck shows improvement to 1.49 and calculated GFR is 36  - Continue to hold PTA losartan at this time, most likely can discontinue if creatinine does not improve  - Appreciate Nephrology consult--lasix 40 mg daily at discharge, currently on 80 mg of Lasix, not fluid overloaded anymore we can decrease the Lasix to 40 mg daily now.  - [facility]     Chronic adrenal insufficiency  - Continues on PTA hydrocortisone 20 mg PO daily     Essential hypertension: Amlodipine decreased to 5mg daily due to soft blood pressures     History of psoriasis   - Holding PTA Humira this admission--continue to hold at discharge  - Follow up with outpatient provider     Peripheral vascular disease s/p left BKA  Chronic RLE wound  Chronic left ear wound  Present on admission. Receives wound cares per home care RN.      Chronic pain syndrome: Resume PTA regimen (gabapentin 300 mg TID, duloxetine 30 mg daily, lidocaine patch, oxycodone 5 mg q6h prn, morphine 2.5 mg BID prn)     Non-severe malnutrition     Chest x-ray improving, EKG troponin unremarkable.  Check CBC today and transfuse if hemoglobin less than 7.5 today   to start on discharge planning if continue to refuse TCU can be discharged home with possible home health.  Cussed with patient in detail, he think that he will be stay in the hospital till [facility].  He tells need to work with PT OT and  to find out the safe discharge plan.      DVT Prophylaxis: PCDs  Code Status: Full Code  PT/OT: PT ordered  Diet: Regular Diet Adult  Snacks/Supplements Adult: Boost Plus; Between Meals  Diet            Disposition: Expected discharge TCU vs Home with home health as per patient decision.     Davonte Naranjo MD  Text Page  (7am to  6pm)  -Data reviewed today: I reviewed all new labs and imaging results over the last 24 hours.    Physical Exam   Temp: 98.1  F (36.7  C) Temp src: Oral BP: 126/50 Pulse: 73   Resp: 16 SpO2: 94 % O2 Device: Nasal cannula Oxygen Delivery: 2.5 LPM  Vitals:    12/19/20 0703 12/20/20 0425 12/22/20 0500   Weight: 72.3 kg (159 lb 4.8 oz) 72.3 kg (159 lb 6.4 oz) 69.5 kg (153 lb 3.5 oz)     Vital Signs with Ranges  Temp:  [98.1  F (36.7  C)-98.2  F (36.8  C)] 98.1  F (36.7  C)  Pulse:  [73-89] 73  Resp:  [14-19] 16  BP: (126-128)/(50-55) 126/50  SpO2:  [94 %-95 %] 94 %  I/O last 3 completed shifts:  In: 200 [P.O.:200]  Out: 1100 [Urine:1100]  O2 requirements: none    Constitutional: patient kept his eyes close, not in any distress.  Woke up easily this morning  HEENT: Eyes nonicteric, oral mucosa moist  Cardiovascular: RRR, normal S1/2, no m/r/g  Respiratory: CTAB, no wheezing or crackles, anterior chest tenderness.   Vascular: LLE BKA, RLE off loading boot, has wound, but not infected..   GI: Normoactive bowel sounds, nontender  Skin/Integumen: No rashes,  Neuro/Psych: No focal deficit    Medications     - MEDICATION INSTRUCTIONS -       - MEDICATION INSTRUCTIONS -         amLODIPine  5 mg Oral Daily     amoxicillin-clavulanate  1 tablet Oral Q12H ISIS     clobetasol   Topical At Bedtime     DULoxetine  60 mg Oral Daily     fluticasone-vilanterol  1 puff Inhalation Daily     furosemide  80 mg Oral Daily     gabapentin  200 mg Oral TID     hydrocortisone  20 mg Oral QAM     ipratropium - albuterol 0.5 mg/2.5 mg/3 mL  3 mL Nebulization 4x daily     melatonin  3 mg Oral At Bedtime     multivitamin w/minerals  1 tablet Oral Daily     omeprazole  20 mg Oral Daily     polyethylene glycol  17 g Oral Daily     primidone  50 mg Oral At Bedtime     sodium chloride (PF)  3 mL Intracatheter Q8H     tamsulosin  0.8 mg Oral Daily       Data   Recent Labs   Lab 12/22/20  0028 12/21/20  1753 12/21/20  1244 12/20/20  0748 12/17/20  0655  12/16/20  1823 12/16/20  0702 12/15/20  1502   WBC  --   --   --  5.0  --   --  6.2 7.2   HGB  --   --   --  7.2* 8.1* 7.6* 6.6* 7.1*   MCV  --   --   --  95  --   --  93 94   PLT  --   --   --  88*  --   --  89* 101*   NA  --   --   --  140  --   --   --  138   POTASSIUM  --   --   --  3.4  --   --   --  3.5   CHLORIDE  --   --   --  101  --   --   --  100   CO2  --   --   --  37*  --   --   --  32   BUN  --   --   --  29  --   --   --  27   CR  --   --   --  1.49*  --   --  1.51* 1.57*   ANIONGAP  --   --   --  2*  --   --   --  6   MARTINE  --   --   --  7.9*  --   --   --  7.9*   GLC  --   --   --  82  --   --   --  114*   ALBUMIN  --   --   --  1.6*  --   --   --   --    TROPI <0.015 <0.015 <0.015  --   --   --   --   --        Imaging:   Recent Results (from the past 24 hour(s))   XR Chest 2 Views    Narrative    CHEST TWO VIEWS December 21, 2020 1:17 PM     HISTORY: Chest pain, follow up lung abscess.    COMPARISON: Chest x-ray on 12/9/2020.      Impression    IMPRESSION: The AP and lateral views of the chest were obtained.  Stable cardiomediastinal silhouette. Apparent elevation of the right  hemidiaphragm versus right lower lobe consolidative pulmonary  opacities. The previously seen right lower lobe, cavitary lesion  appears less conspicuous on today's exam. No significant pleural  effusion or pneumothorax.    PANDA HERRERA MD

## 2020-12-23 LAB
ANION GAP SERPL CALCULATED.3IONS-SCNC: 4 MMOL/L (ref 3–14)
BUN SERPL-MCNC: 26 MG/DL (ref 7–30)
CALCIUM SERPL-MCNC: 7.7 MG/DL (ref 8.5–10.1)
CHLORIDE SERPL-SCNC: 96 MMOL/L (ref 94–109)
CO2 SERPL-SCNC: 32 MMOL/L (ref 20–32)
CREAT SERPL-MCNC: 1.33 MG/DL (ref 0.66–1.25)
GFR SERPL CREATININE-BSD FRML MDRD: 54 ML/MIN/{1.73_M2}
GLUCOSE SERPL-MCNC: 73 MG/DL (ref 70–99)
POTASSIUM SERPL-SCNC: 3.7 MMOL/L (ref 3.4–5.3)
SODIUM SERPL-SCNC: 132 MMOL/L (ref 133–144)

## 2020-12-23 PROCEDURE — 99207 PR CDG-MDM COMPONENT: MEETS LOW - DOWN CODED: CPT | Performed by: INTERNAL MEDICINE

## 2020-12-23 PROCEDURE — 250N000013 HC RX MED GY IP 250 OP 250 PS 637: Performed by: INTERNAL MEDICINE

## 2020-12-23 PROCEDURE — 99231 SBSQ HOSP IP/OBS SF/LOW 25: CPT | Performed by: INTERNAL MEDICINE

## 2020-12-23 PROCEDURE — 80048 BASIC METABOLIC PNL TOTAL CA: CPT | Performed by: INTERNAL MEDICINE

## 2020-12-23 PROCEDURE — 120N000001 HC R&B MED SURG/OB

## 2020-12-23 PROCEDURE — 36415 COLL VENOUS BLD VENIPUNCTURE: CPT | Performed by: INTERNAL MEDICINE

## 2020-12-23 PROCEDURE — G0463 HOSPITAL OUTPT CLINIC VISIT: HCPCS

## 2020-12-23 PROCEDURE — 250N000013 HC RX MED GY IP 250 OP 250 PS 637: Performed by: HOSPITALIST

## 2020-12-23 PROCEDURE — 250N000013 HC RX MED GY IP 250 OP 250 PS 637: Performed by: PSYCHIATRY & NEUROLOGY

## 2020-12-23 RX ADMIN — LORAZEPAM 0.5 MG: 0.5 TABLET ORAL at 22:42

## 2020-12-23 RX ADMIN — GABAPENTIN 200 MG: 100 CAPSULE ORAL at 22:38

## 2020-12-23 RX ADMIN — DULOXETINE HYDROCHLORIDE 60 MG: 60 CAPSULE, DELAYED RELEASE ORAL at 08:40

## 2020-12-23 RX ADMIN — AMOXICILLIN AND CLAVULANATE POTASSIUM 1 TABLET: 500; 125 TABLET, FILM COATED ORAL at 01:18

## 2020-12-23 RX ADMIN — HYDROMORPHONE HYDROCHLORIDE 2 MG: 2 TABLET ORAL at 22:42

## 2020-12-23 RX ADMIN — OMEPRAZOLE 20 MG: 20 CAPSULE, DELAYED RELEASE ORAL at 08:40

## 2020-12-23 RX ADMIN — AMOXICILLIN AND CLAVULANATE POTASSIUM 1 TABLET: 500; 125 TABLET, FILM COATED ORAL at 16:07

## 2020-12-23 RX ADMIN — FLUTICASONE FUROATE AND VILANTEROL TRIFENATATE 1 PUFF: 200; 25 POWDER RESPIRATORY (INHALATION) at 08:41

## 2020-12-23 RX ADMIN — HYDROMORPHONE HYDROCHLORIDE 2 MG: 2 TABLET ORAL at 01:53

## 2020-12-23 RX ADMIN — FUROSEMIDE 40 MG: 40 TABLET ORAL at 08:40

## 2020-12-23 RX ADMIN — GABAPENTIN 200 MG: 100 CAPSULE ORAL at 16:07

## 2020-12-23 RX ADMIN — PRIMIDONE 50 MG: 50 TABLET ORAL at 22:37

## 2020-12-23 RX ADMIN — HYDROMORPHONE HYDROCHLORIDE 2 MG: 2 TABLET ORAL at 10:00

## 2020-12-23 RX ADMIN — HYDROCORTISONE 20 MG: 20 TABLET ORAL at 08:40

## 2020-12-23 RX ADMIN — AMLODIPINE BESYLATE 5 MG: 5 TABLET ORAL at 08:40

## 2020-12-23 RX ADMIN — MULTIPLE VITAMINS W/ MINERALS TAB 1 TABLET: TAB at 08:40

## 2020-12-23 RX ADMIN — TAMSULOSIN HYDROCHLORIDE 0.8 MG: 0.4 CAPSULE ORAL at 08:40

## 2020-12-23 RX ADMIN — GABAPENTIN 200 MG: 100 CAPSULE ORAL at 08:40

## 2020-12-23 ASSESSMENT — MIFFLIN-ST. JEOR: SCORE: 1391.88

## 2020-12-23 ASSESSMENT — ACTIVITIES OF DAILY LIVING (ADL)
ADLS_ACUITY_SCORE: 26
ADLS_ACUITY_SCORE: 27
ADLS_ACUITY_SCORE: 27
ADLS_ACUITY_SCORE: 26
ADLS_ACUITY_SCORE: 26
ADLS_ACUITY_SCORE: 27

## 2020-12-23 NOTE — PROGRESS NOTES
Filing therapist's assessment from yesterday per  request for TCU referral followup       12/22/20 2020   Signing Clinician's Name / Credentials   Signing clinician's name / credentials Aminta Avila DPT   Quick Adds   Rehab Discipline PT   Therapeutic Activity   Minutes of Treatment 15 minutes   Symptoms Noted During/After Treatment Fatigue   Treatment Detail PT: Pt in supine upon arrival of therapist, initially refused to participate in PT session. With encouragement and education on importance of OOB mobility, pt agreeable to sit EOB. Pt performed supine <> sit with close SBA. Pt tolerated sitting EOB x 5 mins. Discussed with pt benefits of mobilizing and gaining strength in order to return home. Pt in supine at end of session.   PT Discharge Planning    PT Discharge Recommendation (DC Rec) Transitional Care Facility   PT Rationale for DC Rec Pt will require increased assist upon discharge.    Additional Documentation   PT Plan PT: Trial transfer to chair   Total Session Time   Total Session Time (minutes) 15 minutes

## 2020-12-23 NOTE — PLAN OF CARE
Pt A&Ox4, flat affect, frustrated at times with refusal of cares, VSS, CMS intact with baseline neuropathy to RLE, wound dressing C,D,I with rooke boot in place, incontinent of bowel and bladder, foam dressing to coccyx, IV SL, O2@2.5L/NC baseline due to COPD, poor po intake, pt is drinking boost supplements, dilaudid po for pain, participated with PT/OT today, generalized weakness. Discharge planning to TCU.

## 2020-12-23 NOTE — PROGRESS NOTES
WO Nurse Inpatient Wound Assessment   Reason for follow up visit : Evaluate and treat  RLE wounds    Assessment  RLE wounds due to Mixed Etiology: poor circulation, edema, stasis, pressure  Status: improving    Treatment Plan  RLE  wounds: Every other day     1. Cleanse with  Vashe moistened gauze to wound bed and let sit x 5 minutes - do not rinse. #[id number]  2. Dry and protect surrounding skin with no sting barrier film wipe #[id number],  3. Apply Aquacel AG 4x4 #[id number] to wound bed  4. Apply sween 24 to all intact skin from base of toes to below knees.  5. Apply edema wear directly over this dressing from base of toes to below knees  6. Place Covidien pad underneath wound area and apply offloading boot.       EDEMAWEAR stockings- apply fresh pair daily- DAILY   - DO NOT CUT OR TRIM THESE STOCKINGS.  NO STOCKINGS ARE EVER TRIMMED, INCLUDING THESE.    - Carefully remove old pair of EdemaWear- lifting over any wounds as you remove, and wash with hot water and soap, hang dry   Apply Edemawear from toes to knees with a cuff just below the knee-YOU WILL NEED TWO PEOPLE TO APPLY, BUNCH UP STOCKING & PULL AGAINST EACH OTHER   DO NOT THROW AWAY THE OLD PAIR OF EDEMAWEAR, WASH IT.   Wash stocking(s) in really hot water with surgical soap/ and or baby shampoo, may need to do this several times  Rinse then wash in baby shampoo  Hang dry       EdemaWear   size Stripe color PS # small navy [id number]    Orders Reviewed and Updated  Recommended provider order: none   WOC Nurse follow-up plan:weekly  Nursing to notify the Provider(s) and re-consult the WOC Nurse if wound(s) deteriorates or new skin concern.    Patient History  According to provider note(s):  Perfecto Reese is a 70 year old male with a history of chronic kidney disease stage III, right lower extremity wound, left ear wound, anemia, severe COPD, chronic hypoxic respiratory failure, adrenal insufficiency, tobacco use, psoriasis, hypertension, BPH, GERD, hepatitis, and  cirrhosis who was sent into the ER due to fevers, shortness of breath, cough, and diarrhea.  In the ER, there was concern for community-acquired pneumonia and acute kidney injury.  He was given IV fluids and started on empiric antibiotics.  The hospitalist service was contacted to admit him for further evaluation management.    Objective Data  Containment of urine/stool: Continent of bladder and Continent of bowel    Active Diet Order  Orders Placed This Encounter      Regular Diet Adult      Diet      Output:   I/O last 3 completed shifts:  In: 640 [P.O.:640]  Out: 300 [Urine:300]    Risk Assessment:   Sensory Perception: 4-->no impairment  Moisture: 3-->occasionally moist  Activity: 1-->bedfast  Mobility: 2-->very limited  Nutrition: 3-->adequate  Friction and Shear: 2-->potential problem  Donn Score: 15                          Labs:   Recent Labs   Lab 12/22/20  1156 12/20/20  0748   ALBUMIN  --  1.6*   HGB 7.9* 7.2*   WBC 5.9 5.0       Physical Exam  Areas of skin assessed: focused rle     Wound Location:  RLE   Date of last photo 12/16/2020        Wound History: chronic mixed disease    Wound Base:70% healthy granular 30% pink moist     Palpation of the wound bed: normal      Drainage: small     Description of drainage: serous, small     Measurements (length x width x depth, in cm) 10  x 6  x  0.1 cm      Tunneling N/A     Undermining N/A  Periwound skin: maceration      Color: normal and consistent with surrounding tissue      Temperature: normal   Odor: none  Pain: denies , none      Interventions  Visual inspection and assessment completed   Wound Care Rationale Promote moist wound healing without tissue dehydration  and Decrease bacterial load  Wound Care: done per plan of care  Supplies: ordered: Aquacel AG  Current off-loading measures: Heel off-loading boot(s) and Pillows  Current support surface: Standard  Atmos Air mattress  Education provided to: importance of repositioning and Off-loading  pressure  Discussed plan of care with Patient and Nurse    Juan Ramirez, RN BS CWOCN

## 2020-12-23 NOTE — PROGRESS NOTES
[facility]    Hospitalist Progress Note    Interval History   More awake and alert, calm, able to answer appropriately today. Asking why he is still having chest pain on right side.   No fever, chills, nausea, vomiting, headache or dizziness.     No other significant event overnight.       Assessment & Plan           Acute on chronic hypoxic and hypercarbic respiratory failure   Bilateral community-acquired bacterial pneumonia with RLL abscess Severe COPD      Perfecto Reese is a 70 year old male with complex PMHx including severe COPD on 3L home O2 with ongoing tobacco use, adrenal insufficiency on chronic steroids, psoriasis on Humira, chronic Hep C with cirrhosis, PVD s/p left BKA, chronic wounds, and non-compliance who was admitted on 12/2/2020 for pneumonia, found to have a RLL lung abscess.                EMS activated by his home care RN for fever, cough, and acute respiratory distress (placed on 10L per OM in the field). Fever to 102.5, tachycardia, tachypnea, and leukocytosis (17.2k) present on arrival. CXR on arrival showed RLL infiltrate and CT abd/pelvis showed air fluid cavity at the right lung base. On admission, he was initiated on IV pip-tazo and azithromycin, and Thoracic Surgery was consulted. Chest CT was ordered (12/3) which showed a 6.4 cm RLL pulmonary abscess and extensive left-sided mucus plugging with associated infiltrate. 12/2 blood cultures x2 no growth. Sputum cx 12/4 growing proteus, fluoroquinolone resistant. Also corynebacterium striatum 12/6.                CT surgery saw, no surgical intervention indicated. Patient was treated with IV then PO antibiotics, with the assistance of infectious disease. Initially on zosyn->unasyn-> transitioned 12/8 to Augmentin x 6 weeks (through approximately 1/13/2021).  - Repeat CT scan in 4 weeks (~1/8/2021).  - O2 weaned back to baseline of 3 lpm  - PT- rec TCU, patient refusing.  I had a long discussion with the patient  regarding his care after discharge from the hospital, and recommend that he should consider TCU as recommended by the TCU till he able to take care of himself, consider home health if no TCU on discharge.   Patient has not much insight into his discharge plan at this time, as per my evaluation I don't think he is safe to be discharge home as look least interested in taking care of himself. He refused to work with PT and OT.  But he has decision-making capacity.  Patient was reevaluated by psychiatrist and as per their evaluation he has decision-making capacity.    Now agree to go to the TCU, SW on board and working on disposition.  Repeat chest x-ray shows improvement in his pneumonia, no pleural effusion, EKG no acute ischemic changes serial troponins remain negative.  His chest pain is musculoskeletal and reproducible nature unlikely to be cardiac in origin, [place] him, ask him to use tylenol for pain control and us IS and flutter.        Chronic normocytic anemia, likely ACD  Chronic thrombocytopenia  Anemia and thrombocytopenia likely multifactorial from liver cirrhosis, splenomegaly, renal disease, CKD and psoriasis. Iron labs suggestive of anemia of chronic disease. 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 Patient required four units of pRBCs this admission for Hgb < 7.0. No acute GI bleed identified, likely blood loss from decreased erythropoiesis. Appreciate GI consult 12/16/2020, no acute GI bleed, no acute interventions, [facility]  Hb now stable and improve to 7.9.       Acute kidney injury on CKD 3, improved  Asymptomatic pyuria  Baseline creatinine 1.2-1.3 in Jan 2020, on admission 2.89. Suspect pre-renal state in setting of pneumonia/sepsis with possible progression to ATN in setting of hypotension. Improved with IV fluids, creatinine down to 1.5 on 12/16, now recheck shows  improvement to 1.49 and calculated GFR is 36  - Continue to hold PTA losartan at this time, most likely can discontinue if creatinine does not improve  - Appreciate Nephrology consult--lasix 40 mg daily at discharge, currently on 80 mg of Lasix, not fluid overloaded anymore,  decrease the Lasix to 40 mg daily his home dose from12/22  - [facility]     Chronic adrenal insufficiency  - Continues on PTA hydrocortisone 20 mg PO daily     Essential hypertension: Amlodipine decreased to 5mg daily due to soft blood pressures     History of psoriasis   - Holding PTA Humira this admission--continue to hold at discharge  - Follow up with outpatient provider     Peripheral vascular disease s/p left BKA  Chronic RLE wound  Chronic left ear wound  Present on admission. Receives wound cares per home care RN.      Chronic pain syndrome: Resume PTA regimen (gabapentin 300 mg TID, duloxetine 30 mg daily, lidocaine patch, oxycodone 5 mg q6h prn, morphine 2.5 mg BID prn)     Non-severe malnutrition     Awaiting placement at this time.       DVT Prophylaxis: PCDs  Code Status: Full Code  PT/OT: PT ordered  Diet: Regular Diet Adult  Snacks/Supplements Adult: Boost Plus; Between Meals  Diet            Disposition: Expected discharge awaiting placement.     Davonte Naranjo MD  Text Page  (7am to 6pm)  -Data reviewed today: I reviewed all new labs and imaging results over the last 24 hours.    Physical Exam   Temp: 98.8  F (37.1  C) Temp src: Oral BP: 122/51 Pulse: 75   Resp: 16 SpO2: 98 % O2 Device: Nasal cannula Oxygen Delivery: 3 LPM  Vitals:    12/20/20 0425 12/22/20 0500 12/23/20 0517   Weight: 72.3 kg (159 lb 6.4 oz) 69.5 kg (153 lb 3.5 oz) 69.5 kg (153 lb 3.5 oz)     Vital Signs with Ranges  Temp:  [98.8  F (37.1  C)-100.5  F (38.1  C)] 98.8  F (37.1  C)  Pulse:  [72-85] 75  Resp:  [16-20] 16  BP: (114-135)/(44-55) 122/51  SpO2:  [92 %-98 %] 98 %  I/O last 3 completed shifts:  In: 640 [P.O.:640]  Out: 300  [Urine:300]  O2 requirements: none    Constitutional: patient kept his eyes close, not in any distress.  Woke up easily this morning  HEENT: Eyes nonicteric, oral mucosa moist  Cardiovascular: RRR, normal S1/2, no m/r/g  Respiratory: CTAB, no wheezing or crackles, anterior chest tenderness.   Vascular: LLE BKA, RLE off loading boot, has wound, but not infected..   GI: Normoactive bowel sounds, nontender  Skin/Integumen: No rashes,  Neuro/Psych: No focal deficit    Medications     - MEDICATION INSTRUCTIONS -       - MEDICATION INSTRUCTIONS -         amLODIPine  5 mg Oral Daily     amoxicillin-clavulanate  1 tablet Oral Q12H ISIS     clobetasol   Topical At Bedtime     DULoxetine  60 mg Oral Daily     fluticasone-vilanterol  1 puff Inhalation Daily     furosemide  40 mg Oral Daily     gabapentin  200 mg Oral TID     hydrocortisone  20 mg Oral QAM     ipratropium - albuterol 0.5 mg/2.5 mg/3 mL  3 mL Nebulization 4x daily     melatonin  3 mg Oral At Bedtime     multivitamin w/minerals  1 tablet Oral Daily     omeprazole  20 mg Oral Daily     polyethylene glycol  17 g Oral Daily     primidone  50 mg Oral At Bedtime     sodium chloride (PF)  3 mL Intracatheter Q8H     tamsulosin  0.8 mg Oral Daily       Data   Recent Labs   Lab 12/23/20  0700 12/22/20  1156 12/22/20  0028 12/21/20  1753 12/20/20  0748 12/20/20  0748 12/17/20  0655   WBC  --  5.9  --   --   --  5.0  --    HGB  --  7.9*  --   --   --  7.2* 8.1*   MCV  --  95  --   --   --  95  --    PLT  --  116*  --   --   --  88*  --    *  --   --   --   --  140  --    POTASSIUM 3.7  --   --   --   --  3.4  --    CHLORIDE 96  --   --   --   --  101  --    CO2 32  --   --   --   --  37*  --    BUN 26  --   --   --   --  29  --    CR 1.33*  --   --   --   --  1.49*  --    ANIONGAP 4  --   --   --   --  2*  --    MARTINE 7.7*  --   --   --   --  7.9*  --    GLC 73  --   --   --   --  82  --    ALBUMIN  --   --   --   --   --  1.6*  --    TROPI  --  <0.015 <0.015 <0.015   <  >  --   --     < > = values in this interval not displayed.       Imaging:   No results found for this or any previous visit (from the past 24 hour(s)).

## 2020-12-23 NOTE — PROGRESS NOTES
"SPIRITUAL HEALTH SERVICES  SPIRITUAL ASSESSMENT Progress Note  [facility]    REFERRAL SOURCE: follow up visit    Pt declined a visit today. States that he's \"doing fine.\" His only request is for [employer], which is currently not available due to COVID restrictions.     PLAN: [facility] remains available for support as requested during hospitalization.    Valery Dukes  Associate   Pager [phone]  Phone [phone]    "

## 2020-12-23 NOTE — PROGRESS NOTES
Care Management Follow Up    Length of Stay (days): 21    Expected Discharge Date: 12/24/20  Expected Time of Departure: 6:15 PM [facility] transport  Concerns to be Addressed: discharge planning     Patient plan of care discussed at interdisciplinary rounds: Yes    Anticipated Discharge Disposition: Home, Home Care     Anticipated Discharge Services:    Anticipated Discharge DME: Oxygen, Wheelchair(resuming O2/DME and HHC as prior to hosp  stay)    Patient/family educated on Medicare website which has current facility and service quality ratings: no  Education Provided on the Discharge Plan:    Patient/Family in Agreement with the Plan: yes    Referrals Placed by CM/SW: Home Infusion, Transportation  Private pay costs discussed: Not applicable    Additional Information:  BENEDICT left voicemail with Kerri requesting information on whether pt is accepted for TCU.        Ct Quarles, LICSW

## 2020-12-23 NOTE — PLAN OF CARE
Aox4, VSS, on 3L of oxygen which is baseline. PRN oral dilaudid given for R shoulder pain. Patient incontinent of bowel and bladder, patient let nurse change him and do nursing cares through the night. Plan is pending TCU. Will continue to monitor.

## 2020-12-24 ENCOUNTER — APPOINTMENT (OUTPATIENT)
Dept: GENERAL RADIOLOGY | Facility: CLINIC | Age: 70
DRG: 871 | End: 2020-12-24
Attending: INTERNAL MEDICINE
Payer: MEDICARE

## 2020-12-24 LAB — POTASSIUM SERPL-SCNC: 3.7 MMOL/L (ref 3.4–5.3)

## 2020-12-24 PROCEDURE — 250N000013 HC RX MED GY IP 250 OP 250 PS 637: Performed by: INTERNAL MEDICINE

## 2020-12-24 PROCEDURE — 99231 SBSQ HOSP IP/OBS SF/LOW 25: CPT | Performed by: INTERNAL MEDICINE

## 2020-12-24 PROCEDURE — 99207 PR CDG-MDM COMPONENT: MEETS LOW - DOWN CODED: CPT | Performed by: INTERNAL MEDICINE

## 2020-12-24 PROCEDURE — 120N000001 HC R&B MED SURG/OB

## 2020-12-24 PROCEDURE — 36415 COLL VENOUS BLD VENIPUNCTURE: CPT | Performed by: INTERNAL MEDICINE

## 2020-12-24 PROCEDURE — 250N000013 HC RX MED GY IP 250 OP 250 PS 637: Performed by: HOSPITALIST

## 2020-12-24 PROCEDURE — 84132 ASSAY OF SERUM POTASSIUM: CPT | Performed by: INTERNAL MEDICINE

## 2020-12-24 PROCEDURE — 71101 X-RAY EXAM UNILAT RIBS/CHEST: CPT | Mod: RT

## 2020-12-24 PROCEDURE — 250N000013 HC RX MED GY IP 250 OP 250 PS 637: Performed by: PSYCHIATRY & NEUROLOGY

## 2020-12-24 RX ADMIN — MULTIPLE VITAMINS W/ MINERALS TAB 1 TABLET: TAB at 12:30

## 2020-12-24 RX ADMIN — HYDROMORPHONE HYDROCHLORIDE 2 MG: 2 TABLET ORAL at 21:37

## 2020-12-24 RX ADMIN — TAMSULOSIN HYDROCHLORIDE 0.8 MG: 0.4 CAPSULE ORAL at 12:30

## 2020-12-24 RX ADMIN — GABAPENTIN 200 MG: 100 CAPSULE ORAL at 21:37

## 2020-12-24 RX ADMIN — AMOXICILLIN AND CLAVULANATE POTASSIUM 1 TABLET: 500; 125 TABLET, FILM COATED ORAL at 12:36

## 2020-12-24 RX ADMIN — MELATONIN TAB 3 MG 3 MG: 3 TAB at 21:37

## 2020-12-24 RX ADMIN — GABAPENTIN 200 MG: 100 CAPSULE ORAL at 17:30

## 2020-12-24 RX ADMIN — HYDROMORPHONE HYDROCHLORIDE 2 MG: 2 TABLET ORAL at 17:31

## 2020-12-24 RX ADMIN — HYDROCORTISONE 20 MG: 20 TABLET ORAL at 12:31

## 2020-12-24 RX ADMIN — PRIMIDONE 50 MG: 50 TABLET ORAL at 21:37

## 2020-12-24 RX ADMIN — FUROSEMIDE 40 MG: 40 TABLET ORAL at 12:29

## 2020-12-24 RX ADMIN — OMEPRAZOLE 20 MG: 20 CAPSULE, DELAYED RELEASE ORAL at 12:30

## 2020-12-24 RX ADMIN — LORAZEPAM 0.5 MG: 0.5 TABLET ORAL at 17:31

## 2020-12-24 RX ADMIN — GABAPENTIN 200 MG: 100 CAPSULE ORAL at 12:31

## 2020-12-24 RX ADMIN — DULOXETINE HYDROCHLORIDE 60 MG: 60 CAPSULE, DELAYED RELEASE ORAL at 12:31

## 2020-12-24 RX ADMIN — AMLODIPINE BESYLATE 5 MG: 5 TABLET ORAL at 12:31

## 2020-12-24 RX ADMIN — AMOXICILLIN AND CLAVULANATE POTASSIUM 1 TABLET: 500; 125 TABLET, FILM COATED ORAL at 01:07

## 2020-12-24 ASSESSMENT — ACTIVITIES OF DAILY LIVING (ADL)
ADLS_ACUITY_SCORE: 24

## 2020-12-24 NOTE — PLAN OF CARE
Date/Time 12/24 Days    Trauma/Ortho/Medical (Choose one) [place]    Diagnosis:Acute Renal injury, Hx L PKA and Pneumonia  Mental Status:4  Activity/dangle:  Pt refused to be turned, refused to have dressing changed and new edema wear applied for today.  Diet: Reg  Pain: Denies  Bruce/Voiding:Incontinent   Tele/Restraints/Iso:N/A  02/LDA:SL  D/C Date: today or tomorrow  Other Info:Pt directed his cares today.

## 2020-12-24 NOTE — PROGRESS NOTES
[facility]    Hospitalist Progress Note    Interval History   Complaints of right sided chest pain again, otherwise no complaints.     No other significant event overnight.       Assessment & Plan           Acute on chronic hypoxic and hypercarbic respiratory failure   Bilateral community-acquired bacterial pneumonia with RLL abscess Severe COPD  Musculoskeletal chest pain       Perfecto Reese is a 70 year old male with complex PMHx including severe COPD on 3L home O2 with ongoing tobacco use, adrenal insufficiency on chronic steroids, psoriasis on Humira, chronic Hep C with cirrhosis, PVD s/p left BKA, chronic wounds, and non-compliance who was admitted on 12/2/2020 for pneumonia, found to have a RLL lung abscess.                EMS activated by his home care RN for fever, cough, and acute respiratory distress (placed on 10L per OM in the field). Fever to 102.5, tachycardia, tachypnea, and leukocytosis (17.2k) present on arrival. CXR on arrival showed RLL infiltrate and CT abd/pelvis showed air fluid cavity at the right lung base. On admission, he was initiated on IV pip-tazo and azithromycin, and Thoracic Surgery was consulted. Chest CT was ordered (12/3) which showed a 6.4 cm RLL pulmonary abscess and extensive left-sided mucus plugging with associated infiltrate. 12/2 blood cultures x2 no growth. Sputum cx 12/4 growing proteus, fluoroquinolone resistant. Also corynebacterium striatum 12/6.                CT surgery saw, no surgical intervention indicated. Patient was treated with IV then PO antibiotics, with the assistance of infectious disease. Initially on zosyn->unasyn-> transitioned 12/8 to Augmentin x 6 weeks (through approximately 1/13/2021).  - Repeat CT scan in 4 weeks (~1/8/2021).  - O2 weaned back to baseline of 3 lpm  - PT- rec TCU, patient refusing.  I had a long discussion with the patient regarding his care after discharge from the hospital, and recommend that he should  consider TCU as recommended by the TCU till he able to take care of himself, consider home health if no TCU on discharge.   Patient has not much insight into his discharge plan at this time, as per my evaluation I don't think he is safe to be discharge home as look least interested in taking care of himself. He refused to work with PT and OT.  But he has decision-making capacity.  Patient was reevaluated by psychiatrist and as per their evaluation he has decision-making capacity.    Now agree to go to the TCU, SW on board and working on disposition.  Repeat chest x-ray shows improvement in his pneumonia, no pleural effusion, EKG no acute ischemic changes serial troponins remain negative.  His chest pain is musculoskeletal and reproducible nature unlikely to be cardiac in origin, [place] him, ask him to use tylenol for pain control and us IS and flutter.   Will do right sided rib xray to rule out any occult rib fracture.        Chronic normocytic anemia, likely ACD  Chronic thrombocytopenia  Anemia and thrombocytopenia likely multifactorial from liver cirrhosis, splenomegaly, renal disease, CKD and psoriasis. Iron labs suggestive of anemia of chronic disease. 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 Patient required four units of pRBCs this admission for Hgb < 7.0. No acute GI bleed identified, likely blood loss from decreased erythropoiesis. Appreciate GI consult 12/16/2020, no acute GI bleed, no acute interventions, [facility]  Hb now stable and improve to 7.9.       Acute kidney injury on CKD 3, improved  Asymptomatic pyuria  Baseline creatinine 1.2-1.3 in Jan 2020, on admission 2.89. Suspect pre-renal state in setting of pneumonia/sepsis with possible progression to ATN in setting of hypotension. Improved with IV fluids, creatinine down to 1.5 on 12/16, now recheck shows improvement to 1.49  and calculated GFR is 36  - Continue to hold PTA losartan at this time, most likely can discontinue if creatinine does not improve  - Appreciate Nephrology consult--lasix 40 mg daily at discharge, currently on 80 mg of Lasix, not fluid overloaded anymore,  decrease the Lasix to 40 mg daily his home dose from12/22  - [facility]     Chronic adrenal insufficiency  - Continues on PTA hydrocortisone 20 mg PO daily     Essential hypertension: Amlodipine decreased to 5mg daily due to soft blood pressures     History of psoriasis   - Holding PTA Humira this admission--continue to hold at discharge  - Follow up with outpatient provider     Peripheral vascular disease s/p left BKA  Chronic RLE wound  Chronic left ear wound  Present on admission. Receives wound cares per home care RN.      Chronic pain syndrome: Resume PTA regimen (gabapentin 300 mg TID, duloxetine 30 mg daily, lidocaine patch, oxycodone 5 mg q6h prn, morphine 2.5 mg BID prn)     Non-severe malnutrition     Awaiting placement at this time.   Overall remain stable.   PT and OT to continue  Right sided ribs xray today       DVT Prophylaxis: PCDs  Code Status: Full Code  PT/OT: PT ordered  Diet: Regular Diet Adult  Snacks/Supplements Adult: Boost Plus; Between Meals  Diet            Disposition: Expected discharge awaiting placement.     Davonte Naranjo MD  Text Page  (7am to 6pm)  -Data reviewed today: I reviewed all new labs and imaging results over the last 24 hours.    Physical Exam   Temp: 98.2  F (36.8  C) Temp src: Oral BP: 112/56 Pulse: 69   Resp: 16 SpO2: 95 % O2 Device: Nasal cannula Oxygen Delivery: 3 LPM  Vitals:    12/20/20 0425 12/22/20 0500 12/23/20 0517   Weight: 72.3 kg (159 lb 6.4 oz) 69.5 kg (153 lb 3.5 oz) 69.5 kg (153 lb 3.5 oz)     Vital Signs with Ranges  Temp:  [98.2  F (36.8  C)-99  F (37.2  C)] 98.2  F (36.8  C)  Pulse:  [69-77] 69  Resp:  [14-16] 16  BP: (112-129)/(44-56) 112/56  SpO2:  [95 %-97 %] 95 %  I/O  last 3 completed shifts:  In: 450 [P.O.:450]  Out: -   O2 requirements: none    Constitutional: patient kept his eyes close, not in any distress.  Woke up easily this morning  HEENT: Eyes nonicteric, oral mucosa moist  Cardiovascular: RRR, normal S1/2, no m/r/g, tenderness to right chest, reproducible.   Respiratory: CTAB, no wheezing or crackles, anterior chest tenderness.   Vascular: LLE BKA, RLE off loading boot, has wound, but not infected..   GI: Normoactive bowel sounds, nontender  Skin/Integumen: No rashes,  Neuro/Psych: No focal deficit    Medications     - MEDICATION INSTRUCTIONS -       - MEDICATION INSTRUCTIONS -         amLODIPine  5 mg Oral Daily     amoxicillin-clavulanate  1 tablet Oral Q12H ISIS     clobetasol   Topical At Bedtime     DULoxetine  60 mg Oral Daily     fluticasone-vilanterol  1 puff Inhalation Daily     furosemide  40 mg Oral Daily     gabapentin  200 mg Oral TID     hydrocortisone  20 mg Oral QAM     ipratropium - albuterol 0.5 mg/2.5 mg/3 mL  3 mL Nebulization 4x daily     melatonin  3 mg Oral At Bedtime     multivitamin w/minerals  1 tablet Oral Daily     omeprazole  20 mg Oral Daily     polyethylene glycol  17 g Oral Daily     primidone  50 mg Oral At Bedtime     sodium chloride (PF)  3 mL Intracatheter Q8H     tamsulosin  0.8 mg Oral Daily       Data   Recent Labs   Lab 12/24/20  0705 12/23/20  0700 12/22/20  1156 12/22/20  0028 12/21/20  1753 12/20/20  0748 12/20/20  0748   WBC  --   --  5.9  --   --   --  5.0   HGB  --   --  7.9*  --   --   --  7.2*   MCV  --   --  95  --   --   --  95   PLT  --   --  116*  --   --   --  88*   NA  --  132*  --   --   --   --  140   POTASSIUM 3.7 3.7  --   --   --   --  3.4   CHLORIDE  --  96  --   --   --   --  101   CO2  --  32  --   --   --   --  37*   BUN  --  26  --   --   --   --  29   CR  --  1.33*  --   --   --   --  1.49*   ANIONGAP  --  4  --   --   --   --  2*   MARTINE  --  7.7*  --   --   --   --  7.9*   GLC  --  73  --   --   --   --  82    ALBUMIN  --   --   --   --   --   --  1.6*   TROPI  --   --  <0.015 <0.015 <0.015   < >  --     < > = values in this interval not displayed.       Imaging:   No results found for this or any previous visit (from the past 24 hour(s)).

## 2020-12-24 NOTE — PROGRESS NOTES
[facility]    Infectious Disease Progress Note    Date of Service (when I saw the patient): 12/24/2020     Assessment & Plan   Perfecto Reese is a 70 year old male who was admitted on 12/2/2020.       Impression:  1. 70 y.o male with COPD on Home oxygen.   2. Ongoing tobacco abuse.   3. Adrenal insufficiency on chronic steroids   4. Psoriasis on humira.   5. Chronic hep C. Cirrhosis.   6. PVD, S/P BKA.   7. Non compliance.   8. Admitted with pneumonia symptoms and imaging with lung abscesses.   9. On zosyn. And on azithromycin.   10. Sputum culture with proteus.      Recommendations:   Continue on  oral Augmentin, dose reduced given kidney function, discharge on a total of 6 weeks of oral augmentin   Repeat a CT scan in 4 weeks.   Day 22/ 42 of antibiotics today. following        Rc Hu MD    Interval History   Afebrile stable  Cultures noted   Only complaint is right sided chest tightness     Physical Exam   Temp: 98.2  F (36.8  C) Temp src: Oral BP: 112/56 Pulse: 69   Resp: 16 SpO2: 95 % O2 Device: Nasal cannula Oxygen Delivery: 3 LPM  Vitals:    12/20/20 0425 12/22/20 0500 12/23/20 0517   Weight: 72.3 kg (159 lb 6.4 oz) 69.5 kg (153 lb 3.5 oz) 69.5 kg (153 lb 3.5 oz)     Vital Signs with Ranges  Temp:  [98.2  F (36.8  C)-99  F (37.2  C)] 98.2  F (36.8  C)  Pulse:  [69-77] 69  Resp:  [14-16] 16  BP: (112-129)/(44-56) 112/56  SpO2:  [95 %-97 %] 95 %    Constitutional: lethargic today, wakes up though   Lungs: Clear to auscultation bilaterally, no crackles or wheezing  Cardiovascular: Regular rate and rhythm, normal S1 and S2, and no murmur noted  Abdomen: Normal bowel sounds, soft, non-distended, non-tender  Skin: No rashes, no cyanosis, no edema  Other:    Medications     - MEDICATION INSTRUCTIONS -       - MEDICATION INSTRUCTIONS -         amLODIPine  5 mg Oral Daily     amoxicillin-clavulanate  1 tablet Oral Q12H ISIS     clobetasol   Topical At Bedtime     DULoxetine  60 mg Oral  Daily     fluticasone-vilanterol  1 puff Inhalation Daily     furosemide  40 mg Oral Daily     gabapentin  200 mg Oral TID     hydrocortisone  20 mg Oral QAM     ipratropium - albuterol 0.5 mg/2.5 mg/3 mL  3 mL Nebulization 4x daily     melatonin  3 mg Oral At Bedtime     multivitamin w/minerals  1 tablet Oral Daily     omeprazole  20 mg Oral Daily     polyethylene glycol  17 g Oral Daily     primidone  50 mg Oral At Bedtime     sodium chloride (PF)  3 mL Intracatheter Q8H     tamsulosin  0.8 mg Oral Daily       Data   All microbiology laboratory data reviewed.  Recent Labs   Lab Test 12/22/20  1156 12/20/20  0748 12/17/20  0655 12/16/20  0702 12/16/20  0702   WBC 5.9 5.0  --   --  6.2   HGB 7.9* 7.2* 8.1*   < > 6.6*   HCT 24.5* 22.6*  --   --  20.7*   MCV 95 95  --   --  93   * 88*  --   --  89*    < > = values in this interval not displayed.     Recent Labs   Lab Test 12/23/20  0700 12/20/20  0748 12/16/20  0702   CR 1.33* 1.49* 1.51*     No lab results found.  Recent Labs   Lab Test 12/03/20  0540 12/03/20  0230 12/02/20  1904 12/02/20  1844   CULT Light growth  Normal adelina    Light growth  Proteus mirabilis  *  Moderate growth  Corynebacterium striatum  Identification obtained by MALDI-TOF mass spectrometry research use only database. Test   characteristics determined and verified by the Infect[facility] Diagnostic Laboratory   ([facility]) [place], [place].  Susceptibility testing not routinely done  * <10,000 colonies/mL  mixed urogenital adelina  Susceptibility testing not routinely done   No growth No growth       Attestation:  Total time on the floor involved in the patient's care: 35 minutes. Total time spent in counseling/care coordination: >50%

## 2020-12-24 NOTE — PROGRESS NOTES
Pt is refusing cares and medications today.  Pt also refusing to turn.  Pt very upset after this writer informed him of his discharge to TCU at 1830.  Pt also refused to let this writer change his dressing and change his edema wear.  Will continue to monitor.

## 2020-12-24 NOTE — PROGRESS NOTES
Care Management Follow Up    Length of Stay (days): 22    Expected Discharge Date: 12/24/20  Expected Time of Departure: 6:15 PM [facility] transport  Concerns to be Addressed: discharge planning     Patient plan of care discussed at interdisciplinary rounds: Yes    Anticipated Discharge Disposition: TCU     Anticipated Discharge Services:  TCU  Anticipated Discharge DME: Oxygen, Wheelchair(resuming O2/DME and HHC as prior to hosp  stay)    Patient/family educated on Medicare website which has current facility and service quality ratings: no  Education Provided on the Discharge Plan:    Patient/Family in Agreement with the Plan: yes    Referrals Placed by CM/SW: Home Infusion, Transportation  Private pay costs discussed: Not applicable    Additional Information:  Call received from Samina at [facility] regarding referral. Writer was notified that patient has a $900 bill. Writer spoke to patient and explained and he reported that he had just been there. Writer acknowledged this and noted that it could be a left over bill from that stay but would call to find out. Writer asked if he could pay this and he stated that he could not pay the whole bill but would consider paying half the bill. Writer asked if there was another TCU he'd consider or if he still planned on going to [facility] or [place] and patient reported that he can't go home and isn't aware of other options.     Writer called Samina to ask her about the bill and after contacting her DON she called back to report that at this time, their unit is very acute and they don't think in reviewing notes that they can manage his needs at this time. Email/referral sent to [facility] to see if there are any beds available that could meet his needs. Writer explained this to patient and he is willing to see what might be available and decide from there. Additional referrals sent to [facility], [facility] and [facility] via [email or web address].     Will continue to  follow      DIMITRI Drake

## 2020-12-24 NOTE — PLAN OF CARE
Pt A/Ox4, vss on 3lo2 at baseline, dilaudid given for pain, incontinent, possible discharge to [facility] at 6:30 today, will continue to monitor

## 2020-12-24 NOTE — PLAN OF CARE
Alert and oriented x 4. VS stable, on 3L NC and intermittent complaints of pain but declined any pain medication today. He refused repositioning for the most part today. Dressing on right leg changed today. Plan for possible discharge tomorrow.

## 2020-12-25 LAB — INTERPRETATION ECG - MUSE: NORMAL

## 2020-12-25 PROCEDURE — 99231 SBSQ HOSP IP/OBS SF/LOW 25: CPT | Performed by: INTERNAL MEDICINE

## 2020-12-25 PROCEDURE — 250N000013 HC RX MED GY IP 250 OP 250 PS 637: Performed by: PSYCHIATRY & NEUROLOGY

## 2020-12-25 PROCEDURE — 250N000013 HC RX MED GY IP 250 OP 250 PS 637: Performed by: INTERNAL MEDICINE

## 2020-12-25 PROCEDURE — 120N000001 HC R&B MED SURG/OB

## 2020-12-25 PROCEDURE — 99207 PR CDG-MDM COMPONENT: MEETS LOW - DOWN CODED: CPT | Performed by: INTERNAL MEDICINE

## 2020-12-25 RX ADMIN — GABAPENTIN 200 MG: 100 CAPSULE ORAL at 21:06

## 2020-12-25 RX ADMIN — AMOXICILLIN AND CLAVULANATE POTASSIUM 1 TABLET: 500; 125 TABLET, FILM COATED ORAL at 15:12

## 2020-12-25 RX ADMIN — FLUTICASONE FUROATE AND VILANTEROL TRIFENATATE 1 PUFF: 200; 25 POWDER RESPIRATORY (INHALATION) at 08:18

## 2020-12-25 RX ADMIN — HYDROMORPHONE HYDROCHLORIDE 2 MG: 2 TABLET ORAL at 02:01

## 2020-12-25 RX ADMIN — OMEPRAZOLE 20 MG: 20 CAPSULE, DELAYED RELEASE ORAL at 08:14

## 2020-12-25 RX ADMIN — GABAPENTIN 200 MG: 100 CAPSULE ORAL at 15:12

## 2020-12-25 RX ADMIN — PRIMIDONE 50 MG: 50 TABLET ORAL at 21:06

## 2020-12-25 RX ADMIN — MULTIPLE VITAMINS W/ MINERALS TAB 1 TABLET: TAB at 08:14

## 2020-12-25 RX ADMIN — ACETAMINOPHEN 650 MG: 325 TABLET, FILM COATED ORAL at 08:14

## 2020-12-25 RX ADMIN — AMOXICILLIN AND CLAVULANATE POTASSIUM 1 TABLET: 500; 125 TABLET, FILM COATED ORAL at 02:01

## 2020-12-25 RX ADMIN — HYDROMORPHONE HYDROCHLORIDE 2 MG: 2 TABLET ORAL at 21:06

## 2020-12-25 RX ADMIN — DULOXETINE HYDROCHLORIDE 60 MG: 60 CAPSULE, DELAYED RELEASE ORAL at 08:13

## 2020-12-25 RX ADMIN — AMLODIPINE BESYLATE 5 MG: 5 TABLET ORAL at 08:14

## 2020-12-25 RX ADMIN — HYDROCORTISONE 20 MG: 20 TABLET ORAL at 08:14

## 2020-12-25 RX ADMIN — GABAPENTIN 200 MG: 100 CAPSULE ORAL at 08:13

## 2020-12-25 RX ADMIN — MELATONIN TAB 3 MG 3 MG: 3 TAB at 21:05

## 2020-12-25 RX ADMIN — FUROSEMIDE 40 MG: 40 TABLET ORAL at 08:14

## 2020-12-25 RX ADMIN — TAMSULOSIN HYDROCHLORIDE 0.8 MG: 0.4 CAPSULE ORAL at 08:13

## 2020-12-25 ASSESSMENT — MIFFLIN-ST. JEOR: SCORE: 1365.88

## 2020-12-25 ASSESSMENT — ACTIVITIES OF DAILY LIVING (ADL)
ADLS_ACUITY_SCORE: 24
ADLS_ACUITY_SCORE: 22
ADLS_ACUITY_SCORE: 22
ADLS_ACUITY_SCORE: 24

## 2020-12-25 NOTE — PLAN OF CARE
Pt A/Ox4, vss on 3lo2 at baseline, dilaudid given for pain, incontinent, refusing some cares, refused skin assessment, patient educated on risks, will continue to monitor

## 2020-12-25 NOTE — PLAN OF CARE
A&O, slept most of the shift.  VSS, 2L NC baseline.  Turns in bed by self. Pain managed with po dilaudid and tylenol.  Incontinent of B&B, uses urinal sometimes.  Refused to assess RLE wound.  Coughing up maroon blood with mucus, MD aware.  Discharge pending to TCU.

## 2020-12-25 NOTE — PROVIDER NOTIFICATION
MD Notification    Notified Person: MD    Notified Person Name:Dr. Naranjo    Notification Date/Time:212/25/20 4467    Notification Interaction:web page    Purpose of Notification:spitting out blood with mucus, maroon color.    Orders Received:monitor for bright red blood.     Comments:

## 2020-12-25 NOTE — PROGRESS NOTES
Care Management Follow Up    Length of Stay (days): 23    Expected Discharge Date: 12/24/20  Expected Time of Departure: 6:15 PM [facility] transport  Concerns to be Addressed: discharge planning     Patient plan of care discussed at interdisciplinary rounds: Yes    Anticipated Discharge Disposition: TCU     Anticipated Discharge Services:    Anticipated Discharge DME: Oxygen, Wheelchair(resuming O2/DME and HHC as prior to hosp  stay)    Patient/family educated on Medicare website which has current facility and service quality ratings: no  Education Provided on the Discharge Plan:    Patient/Family in Agreement with the Plan: yes    Referrals Placed by CM/SW: Home Infusion, Transportation  Private pay costs discussed:    Additional Information:  Call placed to [facility] and they are only taking covid positive pts. Call placed to [facility]. In the admissions voicemail it was noted to call the house supervisor at [phone]. BENEDICT called this number and spoke with a nurse. The nursing staff was going to call SW back regarding referral. BENEDICT placed call to [facility] regarding recent referral. BENEDICT left a VM with admissions.     BENEDICT called the nurse  back to follow up on referral. BENEDICT was met with a message stating the phone number was not currently in use. BENEDICT placed call to admissions and left a VM. It was noted that referrals were sent to [facility] liaison. BENEDICT called her as well and left a VM.       DIMITRI Plascencia

## 2020-12-25 NOTE — PROGRESS NOTES
[facility]    Hospitalist Progress Note    Interval History   Some chest pain still on right side, no fever, chills,nausea, vomiting, headache or dizziness.     No other significant event overnight.       Assessment & Plan           Acute on chronic hypoxic and hypercarbic respiratory failure :now improved   Bilateral community-acquired bacterial pneumonia with RLL abscess Severe COPD  Musculoskeletal chest pain       Perfecto Reese is a 70 year old male with complex PMHx including severe COPD on 3L home O2 with ongoing tobacco use, adrenal insufficiency on chronic steroids, psoriasis on Humira, chronic Hep C with cirrhosis, PVD s/p left BKA, chronic wounds, and non-compliance who was admitted on 12/2/2020 for pneumonia, found to have a RLL lung abscess.                EMS activated by his home care RN for fever, cough, and acute respiratory distress (placed on 10L per OM in the field). Fever to 102.5, tachycardia, tachypnea, and leukocytosis (17.2k) present on arrival. CXR on arrival showed RLL infiltrate and CT abd/pelvis showed air fluid cavity at the right lung base. On admission, he was initiated on IV pip-tazo and azithromycin, and Thoracic Surgery was consulted. Chest CT was ordered (12/3) which showed a 6.4 cm RLL pulmonary abscess and extensive left-sided mucus plugging with associated infiltrate. 12/2 blood cultures x2 no growth. Sputum cx 12/4 growing proteus, fluoroquinolone resistant. Also corynebacterium striatum 12/6.                CT surgery saw, no surgical intervention indicated. Patient was treated with IV then PO antibiotics, with the assistance of infectious disease. Initially on zosyn->unasyn-> transitioned 12/8 to Augmentin x 6 weeks (through approximately 1/13/2021).  - Repeat CT scan in 4 weeks (~1/8/2021).  - O2 weaned back to baseline of 3 lpm  - PT- rec TCU, patient refusing.  I had a long discussion with the patient regarding his care after discharge from the  hospital, and recommend that he should consider TCU as recommended by the TCU till he able to take care of himself, consider home health if no TCU on discharge.   Patient has not much insight into his discharge plan initially,   as per my evaluation I don't think he is safe to be discharge home as look least interested in taking care of himself. He refused to work with PT and OT.  But he has decision-making capacity.  Patient was reevaluated by psychiatrist and as per their evaluation he has decision-making capacity.    Now agree to go to the TCU, SW on board and working on disposition.  Repeat chest x-ray shows improvement in his pneumonia, no pleural effusion, EKG no acute ischemic changes serial troponins remain negative.  His chest pain is musculoskeletal and reproducible nature unlikely to be cardiac in origin, [place] him, ask him to use tylenol for pain control and us IS and flutter.   Ribs xray negative for fracture.        Chronic normocytic anemia, likely ACD  Chronic thrombocytopenia  Anemia and thrombocytopenia likely multifactorial from liver cirrhosis, splenomegaly, renal disease, CKD and psoriasis. Iron labs suggestive of anemia of chronic disease. 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 Patient required four units of pRBCs this admission for Hgb < 7.0. No acute GI bleed identified, likely blood loss from decreased erythropoiesis. Appreciate GI consult 12/16/2020, no acute GI bleed, no acute interventions, [facility]  Hb now stable and improve to 7.9.       Acute kidney injury on CKD 3, improved  Asymptomatic pyuria  Baseline creatinine 1.2-1.3 in Jan 2020, on admission 2.89. Suspect pre-renal state in setting of pneumonia/sepsis with possible progression to ATN in setting of hypotension. Improved with IV fluids, creatinine down to 1.5 on 12/16, now recheck shows improvement to  1.49 and calculated GFR is 36  - Continue to hold PTA losartan at this time, most likely can discontinue if creatinine does not improve  - Appreciate Nephrology consult--lasix 40 mg daily at discharge, currently on 80 mg of Lasix, not fluid overloaded anymore,  decrease the Lasix to 40 mg daily his home dose from12/22  - [facility]     Chronic adrenal insufficiency  - Continues on PTA hydrocortisone 20 mg PO daily     Essential hypertension: Amlodipine decreased to 5mg daily due to soft blood pressures     History of psoriasis   - Holding PTA Humira this admission--continue to hold at discharge  - Follow up with outpatient provider     Peripheral vascular disease s/p left BKA  Chronic RLE wound  Chronic left ear wound  Present on admission. Receives wound cares per home care RN.      Chronic pain syndrome: Resume PTA regimen (gabapentin 300 mg TID, duloxetine 30 mg daily, lidocaine patch, oxycodone 5 mg q6h prn, morphine 2.5 mg BID prn)     Non-severe malnutrition     Overall remain stable  Encourage him to work with PT  SW on board and working on disposition.  Awaiting placement at this time.       DVT Prophylaxis: PCDs  Code Status: Full Code  PT/OT: PT ordered  Diet: Regular Diet Adult  Snacks/Supplements Adult: Boost Plus; Between Meals  Diet            Disposition: Expected discharge awaiting placement.     Davonte Naranjo MD  Text Page  (7am to 6pm)  -Data reviewed today: I reviewed all new labs and imaging results over the last 24 hours.    Physical Exam   Temp: 99  F (37.2  C) Temp src: Oral BP: 133/51 Pulse: 74   Resp: 16 SpO2: 96 % O2 Device: Nasal cannula Oxygen Delivery: 3 LPM  Vitals:    12/22/20 0500 12/23/20 0517 12/25/20 0500   Weight: 69.5 kg (153 lb 3.5 oz) 69.5 kg (153 lb 3.5 oz) 66.9 kg (147 lb 7.8 oz)     Vital Signs with Ranges  Temp:  [98.5  F (36.9  C)-99.2  F (37.3  C)] 99  F (37.2  C)  Pulse:  [74-78] 74  Resp:  [14-16] 16  BP: (119-133)/(51-73) 133/51  SpO2:  [94  %-96 %] 96 %  No intake/output data recorded.  O2 requirements: none    Constitutional: sleeping comfortably at time of my visit, woke up easily, in no distress.  Alert and oriented.   HEENT: Eyes nonicteric, oral mucosa moist  Cardiovascular: RRR, normal S1/2, no m/r/g, tenderness to right chest, reproducible.   Respiratory: CTAB, no wheezing or crackles, anterior chest tenderness.   Vascular: LLE BKA, RLE off loading boot, has wound, but not infected..   GI: Normoactive bowel sounds, nontender  Skin/Integumen: No rashes,  Neuro/Psych: No focal deficit    Medications     - MEDICATION INSTRUCTIONS -       - MEDICATION INSTRUCTIONS -         amLODIPine  5 mg Oral Daily     amoxicillin-clavulanate  1 tablet Oral Q12H ISIS     clobetasol   Topical At Bedtime     DULoxetine  60 mg Oral Daily     fluticasone-vilanterol  1 puff Inhalation Daily     furosemide  40 mg Oral Daily     gabapentin  200 mg Oral TID     hydrocortisone  20 mg Oral QAM     ipratropium - albuterol 0.5 mg/2.5 mg/3 mL  3 mL Nebulization 4x daily     melatonin  3 mg Oral At Bedtime     multivitamin w/minerals  1 tablet Oral Daily     omeprazole  20 mg Oral Daily     polyethylene glycol  17 g Oral Daily     primidone  50 mg Oral At Bedtime     sodium chloride (PF)  3 mL Intracatheter Q8H     tamsulosin  0.8 mg Oral Daily       Data   Recent Labs   Lab 12/24/20  0705 12/23/20  0700 12/22/20  1156 12/22/20  0028 12/21/20  1753 12/20/20  0748 12/20/20  0748   WBC  --   --  5.9  --   --   --  5.0   HGB  --   --  7.9*  --   --   --  7.2*   MCV  --   --  95  --   --   --  95   PLT  --   --  116*  --   --   --  88*   NA  --  132*  --   --   --   --  140   POTASSIUM 3.7 3.7  --   --   --   --  3.4   CHLORIDE  --  96  --   --   --   --  101   CO2  --  32  --   --   --   --  37*   BUN  --  26  --   --   --   --  29   CR  --  1.33*  --   --   --   --  1.49*   ANIONGAP  --  4  --   --   --   --  2*   MARTINE  --  7.7*  --   --   --   --  7.9*   GLC  --  73  --   --   --    --  82   ALBUMIN  --   --   --   --   --   --  1.6*   TROPI  --   --  <0.015 <0.015 <0.015   < >  --     < > = values in this interval not displayed.       Imaging:   Recent Results (from the past 24 hour(s))   XR Ribs & Chest Right G/E 3 Views    Narrative    XR RIBS & CHEST RT 3VW   12/24/2020 2:43 PM     HISTORY: right sided chest pain    COMPARISON: 12/21/2020      Impression    IMPRESSION: Dense right lower lobe consolidation containing gas is  unchanged which may represent pulmonary abscess or necrotizing  pneumonia. Decreased other patchy infiltrates in the lower lungs.  Stable small right pleural effusion. Normal heart size and pulmonary  vascularity. No rib fracture.    VARGAS CHEW MD

## 2020-12-26 PROCEDURE — 250N000013 HC RX MED GY IP 250 OP 250 PS 637: Performed by: PSYCHIATRY & NEUROLOGY

## 2020-12-26 PROCEDURE — 250N000013 HC RX MED GY IP 250 OP 250 PS 637: Performed by: INTERNAL MEDICINE

## 2020-12-26 PROCEDURE — 120N000001 HC R&B MED SURG/OB

## 2020-12-26 PROCEDURE — 99233 SBSQ HOSP IP/OBS HIGH 50: CPT | Performed by: HOSPITALIST

## 2020-12-26 PROCEDURE — 250N000013 HC RX MED GY IP 250 OP 250 PS 637: Performed by: HOSPITALIST

## 2020-12-26 PROCEDURE — 99207 PR CDG-CUT & PASTE-POTENTIAL IMPACT ON LEVEL: CPT | Performed by: HOSPITALIST

## 2020-12-26 RX ADMIN — DULOXETINE HYDROCHLORIDE 60 MG: 60 CAPSULE, DELAYED RELEASE ORAL at 14:27

## 2020-12-26 RX ADMIN — FUROSEMIDE 40 MG: 40 TABLET ORAL at 14:27

## 2020-12-26 RX ADMIN — AMOXICILLIN AND CLAVULANATE POTASSIUM 1 TABLET: 500; 125 TABLET, FILM COATED ORAL at 01:00

## 2020-12-26 RX ADMIN — TAMSULOSIN HYDROCHLORIDE 0.8 MG: 0.4 CAPSULE ORAL at 14:28

## 2020-12-26 RX ADMIN — GABAPENTIN 200 MG: 100 CAPSULE ORAL at 21:07

## 2020-12-26 RX ADMIN — AMOXICILLIN AND CLAVULANATE POTASSIUM 1 TABLET: 500; 125 TABLET, FILM COATED ORAL at 14:27

## 2020-12-26 RX ADMIN — HYDROMORPHONE HYDROCHLORIDE 2 MG: 2 TABLET ORAL at 16:06

## 2020-12-26 RX ADMIN — AMLODIPINE BESYLATE 5 MG: 5 TABLET ORAL at 14:28

## 2020-12-26 RX ADMIN — Medication 1 MG: at 01:00

## 2020-12-26 RX ADMIN — HYDROMORPHONE HYDROCHLORIDE 2 MG: 2 TABLET ORAL at 01:00

## 2020-12-26 RX ADMIN — OMEPRAZOLE 20 MG: 20 CAPSULE, DELAYED RELEASE ORAL at 14:27

## 2020-12-26 RX ADMIN — MELATONIN TAB 3 MG 3 MG: 3 TAB at 21:07

## 2020-12-26 RX ADMIN — PRIMIDONE 50 MG: 50 TABLET ORAL at 21:07

## 2020-12-26 RX ADMIN — LORAZEPAM 0.5 MG: 0.5 TABLET ORAL at 18:15

## 2020-12-26 RX ADMIN — GABAPENTIN 200 MG: 100 CAPSULE ORAL at 16:06

## 2020-12-26 RX ADMIN — CLOBETASOL PROPIONATE: 0.5 SOLUTION TOPICAL at 21:07

## 2020-12-26 RX ADMIN — MULTIPLE VITAMINS W/ MINERALS TAB 1 TABLET: TAB at 14:27

## 2020-12-26 RX ADMIN — HYDROCORTISONE 20 MG: 20 TABLET ORAL at 14:27

## 2020-12-26 ASSESSMENT — ACTIVITIES OF DAILY LIVING (ADL)
ADLS_ACUITY_SCORE: 22
ADLS_ACUITY_SCORE: 24
ADLS_ACUITY_SCORE: 22

## 2020-12-26 NOTE — PLAN OF CARE
Pt has declined all cares today.  Refusing all medications and dressing changes.  Voiding in urinal.  BM x1.  Plan for TCU once bed found.

## 2020-12-26 NOTE — PROGRESS NOTES
[facility]  Hospitalist Progress Note    Interval History   Some chest pain still on right side, no fever, chills,nausea, vomiting, headache or dizziness.   No other significant event overnight.     Assessment & Plan      Acute on chronic hypoxic and hypercarbic respiratory failure :improved   Bilateral community-acquired bacterial pneumonia with RLL abscess Severe COPD  Musculoskeletal chest pain     Perfecto Reese is a 70 year old male with complex PMHx including severe COPD on 3L home O2 with ongoing tobacco use, adrenal insufficiency on chronic steroids, psoriasis on Humira, chronic Hep C with cirrhosis, PVD s/p left BKA, chronic wounds, and non-compliance who was admitted on 12/2/2020 for pneumonia, found to have a RLL lung abscess.                EMS activated by his home care RN for fever, cough, and acute respiratory distress (placed on 10L per OM in the field). Fever to 102.5, tachycardia, tachypnea, and leukocytosis (17.2k) present on arrival. CXR on arrival showed RLL infiltrate and CT abd/pelvis showed air fluid cavity at the right lung base. On admission, he was initiated on IV pip-tazo and azithromycin, and Thoracic Surgery was consulted. Chest CT was ordered (12/3) which showed a 6.4 cm RLL pulmonary abscess and extensive left-sided mucus plugging with associated infiltrate. 12/2 blood cultures x2 no growth. Sputum cx 12/4 growing proteus, fluoroquinolone resistant. Also corynebacterium striatum 12/6.                CT surgery saw, no surgical intervention indicated. Patient was treated with IV then PO antibiotics, with the assistance of infectious disease. Initially on zosyn->unasyn-> transitioned 12/8 to Augmentin x 6 weeks (through approximately 1/13/2021).  - Repeat CT scan in 4 weeks (~1/8/2021).  - O2 weaned back to baseline of 3 lpm  - PT- rec TCU, patient refusing.  I had a long discussion with the patient regarding his care after discharge from the hospital, and  recommend that he should consider TCU as recommended by the TCU till he able to take care of himself, consider home health if no TCU on discharge.   Patient has not much insight into his discharge plan initially,   as per my evaluation I don't think he is safe to be discharge home as look least interested in taking care of himself. He refused to work with PT and OT.  But he has decision-making capacity.  Patient was reevaluated by psychiatrist and as per their evaluation he has decision-making capacity.    Now agree to go to the TCU, SW on board and working on disposition.  Repeat chest x-ray shows improvement in his pneumonia, no pleural effusion, EKG no acute ischemic changes serial troponins remain negative.  His chest pain is musculoskeletal and reproducible nature unlikely to be cardiac in origin, [place] him, ask him to use tylenol for pain control and us IS and flutter.   Ribs xray negative for fracture.        Chronic normocytic anemia, likely ACD  Chronic thrombocytopenia  Anemia and thrombocytopenia likely multifactorial from liver cirrhosis, splenomegaly, renal disease, CKD and psoriasis. Iron labs suggestive of anemia of chronic disease. 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 Patient required four units of pRBCs this admission for Hgb < 7.0. No acute GI bleed identified, likely blood loss from decreased erythropoiesis. Appreciate GI consult 12/16/2020, no acute GI bleed, no acute interventions, [facility]  Hb now stable and improve to 7.9.       Acute kidney injury on CKD 3, improved  Asymptomatic pyuria  Baseline creatinine 1.2-1.3 in Jan 2020, on admission 2.89. Suspect pre-renal state in setting of pneumonia/sepsis with possible progression to ATN in setting of hypotension. Improved with IV fluids, creatinine down to 1.5 on 12/16, now recheck shows improvement to 1.49 and  calculated GFR is 36  - Continue to hold PTA losartan at this time, most likely can discontinue if creatinine does not improve  - Appreciate Nephrology consult--lasix 40 mg daily at discharge, currently on 80 mg of Lasix, not fluid overloaded anymore,  decrease the Lasix to 40 mg daily his home dose from12/22  - [facility]     Chronic adrenal insufficiency  - Continues on PTA hydrocortisone 20 mg PO daily     Essential hypertension: Amlodipine decreased to 5mg daily due to soft blood pressures     History of psoriasis   - Holding PTA Humira this admission--continue to hold at discharge  - Follow up with outpatient provider     Peripheral vascular disease s/p left BKA  Chronic RLE wound  Chronic left ear wound  Present on admission. Receives wound cares per home care RN.      Chronic pain syndrome: Resume PTA regimen (gabapentin 300 mg TID, duloxetine 30 mg daily, lidocaine patch, oxycodone 5 mg q6h prn, morphine 2.5 mg BID prn)     Non-severe malnutrition     Overall remain stable  Encourage him to work with PT  SW on board and working on disposition.  Awaiting placement at this time.       DVT Prophylaxis: PCDs  Code Status: Full Code  PT/OT: PT ordered  Diet: Regular Diet Adult  Snacks/Supplements Adult: Boost Plus; Between Meals  Diet            Disposition: Expected discharge awaiting placement.     Jose A Epps MD  [phone]  (7am to 6pm)  -Data reviewed today: I reviewed all new labs and imaging results over the last 24 hours.    Physical Exam   Temp: 99.2  F (37.3  C) Temp src: Oral BP: 123/65 Pulse: 69   Resp: 16 SpO2: 98 % O2 Device: Nasal cannula Oxygen Delivery: 2 LPM  Vitals:    12/22/20 0500 12/23/20 0517 12/25/20 0500   Weight: 69.5 kg (153 lb 3.5 oz) 69.5 kg (153 lb 3.5 oz) 66.9 kg (147 lb 7.8 oz)     Vital Signs with Ranges  Temp:  [97.8  F (36.6  C)-99.2  F (37.3  C)] 99.2  F (37.3  C)  Pulse:  [69-88] 69  Resp:  [14-16] 16  BP: (123-135)/(55-65)  123/65  SpO2:  [95 %-99 %] 98 %  I/O last 3 completed shifts:  In: 810 [P.O.:810]  Out: 950 [Urine:950]  O2 requirements: none    Constitutional: sleeping comfortably at time of my visit, woke up easily, in no distress.  Alert and oriented.   HEENT: Eyes nonicteric, oral mucosa moist  Cardiovascular: RRR, normal S1/2, no m/r/g, tenderness to right chest, reproducible.   Respiratory: CTAB, no wheezing or crackles, anterior chest tenderness.   Vascular: LLE BKA, RLE off loading boot, has wound, but not infected..   GI: Normoactive bowel sounds, nontender  Skin/Integumen: No rashes,  Neuro/Psych: No focal deficit    Medications     - MEDICATION INSTRUCTIONS -       - MEDICATION INSTRUCTIONS -         amLODIPine  5 mg Oral Daily     amoxicillin-clavulanate  1 tablet Oral Q12H ISIS     clobetasol   Topical At Bedtime     DULoxetine  60 mg Oral Daily     fluticasone-vilanterol  1 puff Inhalation Daily     furosemide  40 mg Oral Daily     gabapentin  200 mg Oral TID     hydrocortisone  20 mg Oral QAM     ipratropium - albuterol 0.5 mg/2.5 mg/3 mL  3 mL Nebulization 4x daily     melatonin  3 mg Oral At Bedtime     multivitamin w/minerals  1 tablet Oral Daily     omeprazole  20 mg Oral Daily     polyethylene glycol  17 g Oral Daily     primidone  50 mg Oral At Bedtime     sodium chloride (PF)  3 mL Intracatheter Q8H     tamsulosin  0.8 mg Oral Daily       Data   Recent Labs   Lab 12/24/20  0705 12/23/20  0700 12/22/20  1156 12/22/20  0028 12/21/20  1753 12/20/20  0748 12/20/20  0748   WBC  --   --  5.9  --   --   --  5.0   HGB  --   --  7.9*  --   --   --  7.2*   MCV  --   --  95  --   --   --  95   PLT  --   --  116*  --   --   --  88*   NA  --  132*  --   --   --   --  140   POTASSIUM 3.7 3.7  --   --   --   --  3.4   CHLORIDE  --  96  --   --   --   --  101   CO2  --  32  --   --   --   --  37*   BUN  --  26  --   --   --   --  29   CR  --  1.33*  --   --   --   --  1.49*   ANIONGAP  --  4  --   --   --   --  2*   MARTINE  --   7.7*  --   --   --   --  7.9*   GLC  --  73  --   --   --   --  82   ALBUMIN  --   --   --   --   --   --  1.6*   TROPI  --   --  <0.015 <0.015 <0.015   < >  --     < > = values in this interval not displayed.       Imaging:   No results found for this or any previous visit (from the past 24 hour(s)).

## 2020-12-26 NOTE — PROGRESS NOTES
[facility]    Infectious Disease Progress Note    Date of Service (when I saw the patient): 12/26/2020     Assessment & Plan   Perfecto Reese is a 70 year old male who was admitted on 12/2/2020.       Impression:  1. 70 y.o male with COPD on Home oxygen.   2. Ongoing tobacco abuse.   3. Adrenal insufficiency on chronic steroids   4. Psoriasis on humira.   5. Chronic hep C. Cirrhosis.   6. PVD, S/P BKA.   7. Non compliance.   8. Admitted with pneumonia symptoms and imaging with lung abscesses.   9. On zosyn. And on azithromycin.   10. Sputum culture with proteus.      Recommendations:   Continue on  oral Augmentin, dose reduced given kidney function, discharge on a total of 6 weeks of oral augmentin   Repeat a CT scan in 4 weeks.   Day 24/ 42 of antibiotics today. Following peripherlly awaiting disposition        Neeraj Stoll MD    Interval History   Afebrile stable  Cultures noted       Physical Exam   Temp: 99.2  F (37.3  C) Temp src: Oral BP: 123/65 Pulse: 69   Resp: 16 SpO2: 98 % O2 Device: Nasal cannula Oxygen Delivery: 2 LPM  Vitals:    12/22/20 0500 12/23/20 0517 12/25/20 0500   Weight: 69.5 kg (153 lb 3.5 oz) 69.5 kg (153 lb 3.5 oz) 66.9 kg (147 lb 7.8 oz)     Vital Signs with Ranges  Temp:  [97.8  F (36.6  C)-99.2  F (37.3  C)] 99.2  F (37.3  C)  Pulse:  [69-88] 69  Resp:  [14-16] 16  BP: (123-135)/(55-65) 123/65  SpO2:  [95 %-99 %] 98 %    Constitutional: lethargic today, wakes up though   Lungs: Clear to auscultation bilaterally, no crackles or wheezing  Cardiovascular: Regular rate and rhythm, normal S1 and S2, and no murmur noted  Abdomen: Normal bowel sounds, soft, non-distended, non-tender  Skin: No rashes, no cyanosis, no edema  Other:    Medications     - MEDICATION INSTRUCTIONS -       - MEDICATION INSTRUCTIONS -         amLODIPine  5 mg Oral Daily     amoxicillin-clavulanate  1 tablet Oral Q12H ISIS     clobetasol   Topical At Bedtime     DULoxetine  60 mg Oral Daily      fluticasone-vilanterol  1 puff Inhalation Daily     furosemide  40 mg Oral Daily     gabapentin  200 mg Oral TID     hydrocortisone  20 mg Oral QAM     ipratropium - albuterol 0.5 mg/2.5 mg/3 mL  3 mL Nebulization 4x daily     melatonin  3 mg Oral At Bedtime     multivitamin w/minerals  1 tablet Oral Daily     omeprazole  20 mg Oral Daily     polyethylene glycol  17 g Oral Daily     primidone  50 mg Oral At Bedtime     sodium chloride (PF)  3 mL Intracatheter Q8H     tamsulosin  0.8 mg Oral Daily       Data   All microbiology laboratory data reviewed.  Recent Labs   Lab Test 12/22/20  1156 12/20/20  0748 12/17/20  0655 12/16/20  0702 12/16/20  0702   WBC 5.9 5.0  --   --  6.2   HGB 7.9* 7.2* 8.1*   < > 6.6*   HCT 24.5* 22.6*  --   --  20.7*   MCV 95 95  --   --  93   * 88*  --   --  89*    < > = values in this interval not displayed.     Recent Labs   Lab Test 12/23/20  0700 12/20/20  0748 12/16/20  0702   CR 1.33* 1.49* 1.51*     No lab results found.  Recent Labs   Lab Test 12/03/20  0540 12/03/20  0230 12/02/20  1904 12/02/20  1844   CULT Light growth  Normal adelina    Light growth  Proteus mirabilis  *  Moderate growth  Corynebacterium striatum  Identification obtained by MALDI-TOF mass spectrometry research use only database. Test   characteristics determined and verified by the Infectious Diseases Diagnostic Laboratory   ([facility]) [place], [place].  Susceptibility testing not routinely done  * <10,000 colonies/mL  mixed urogenital adelina  Susceptibility testing not routinely done   No growth No growth       Attestation:  Total time on the floor involved in the patient's care: 35 minutes. Total time spent in counseling/care coordination: >50%

## 2020-12-26 NOTE — PLAN OF CARE
A&O. VSS. LS dim. PRN PO dilaudid for pain. PRN melatonin. Incontinent. Voiding in urinal. Reg diet. Assist 1.

## 2020-12-26 NOTE — PLAN OF CARE
Pt attempted, asleep in bed. Awakens easily but declines PT and is irritable that people keep awakening him. Education on benefits of mobility and encouraged to participate in transfer at least, pt continues to decline. Discussed possible alterative time of day for future appointments and pt states 2pm is a good time of day.

## 2020-12-27 PROCEDURE — 250N000013 HC RX MED GY IP 250 OP 250 PS 637: Performed by: INTERNAL MEDICINE

## 2020-12-27 PROCEDURE — 99232 SBSQ HOSP IP/OBS MODERATE 35: CPT | Performed by: INTERNAL MEDICINE

## 2020-12-27 PROCEDURE — 120N000001 HC R&B MED SURG/OB

## 2020-12-27 PROCEDURE — 250N000013 HC RX MED GY IP 250 OP 250 PS 637: Performed by: PSYCHIATRY & NEUROLOGY

## 2020-12-27 RX ADMIN — GABAPENTIN 200 MG: 100 CAPSULE ORAL at 16:18

## 2020-12-27 RX ADMIN — CLOBETASOL PROPIONATE: 0.5 SOLUTION TOPICAL at 21:32

## 2020-12-27 RX ADMIN — GABAPENTIN 200 MG: 100 CAPSULE ORAL at 21:30

## 2020-12-27 RX ADMIN — OMEPRAZOLE 20 MG: 20 CAPSULE, DELAYED RELEASE ORAL at 11:55

## 2020-12-27 RX ADMIN — HYDROMORPHONE HYDROCHLORIDE 2 MG: 2 TABLET ORAL at 23:02

## 2020-12-27 RX ADMIN — FUROSEMIDE 40 MG: 40 TABLET ORAL at 11:54

## 2020-12-27 RX ADMIN — MULTIPLE VITAMINS W/ MINERALS TAB 1 TABLET: TAB at 11:55

## 2020-12-27 RX ADMIN — TAMSULOSIN HYDROCHLORIDE 0.8 MG: 0.4 CAPSULE ORAL at 11:55

## 2020-12-27 RX ADMIN — AMLODIPINE BESYLATE 5 MG: 5 TABLET ORAL at 11:54

## 2020-12-27 RX ADMIN — AMOXICILLIN AND CLAVULANATE POTASSIUM 1 TABLET: 500; 125 TABLET, FILM COATED ORAL at 13:44

## 2020-12-27 RX ADMIN — FLUTICASONE FUROATE AND VILANTEROL TRIFENATATE 1 PUFF: 200; 25 POWDER RESPIRATORY (INHALATION) at 11:54

## 2020-12-27 RX ADMIN — DULOXETINE HYDROCHLORIDE 60 MG: 60 CAPSULE, DELAYED RELEASE ORAL at 11:55

## 2020-12-27 RX ADMIN — PRIMIDONE 50 MG: 50 TABLET ORAL at 21:30

## 2020-12-27 RX ADMIN — HYDROCORTISONE 20 MG: 20 TABLET ORAL at 11:55

## 2020-12-27 RX ADMIN — AMOXICILLIN AND CLAVULANATE POTASSIUM 1 TABLET: 500; 125 TABLET, FILM COATED ORAL at 03:09

## 2020-12-27 RX ADMIN — MELATONIN TAB 3 MG 3 MG: 3 TAB at 21:30

## 2020-12-27 RX ADMIN — GABAPENTIN 200 MG: 100 CAPSULE ORAL at 11:55

## 2020-12-27 ASSESSMENT — ACTIVITIES OF DAILY LIVING (ADL)
ADLS_ACUITY_SCORE: 22
ADLS_ACUITY_SCORE: 22
ADLS_ACUITY_SCORE: 24
ADLS_ACUITY_SCORE: 22

## 2020-12-27 ASSESSMENT — MIFFLIN-ST. JEOR: SCORE: 1368.88

## 2020-12-27 NOTE — PLAN OF CARE
Pt BR, able to turn when he wants to.  O2 3 L NC baseline.  Incontinent of B/B, uses the urinal at times.

## 2020-12-27 NOTE — PROGRESS NOTES
Attempted x 2 for morning medications, pt continues to decline medications at this time.   May 19, 2020      Tami Almaraz  [address] [facility] [place] [address]        Dear ,    We are writing to inform you of your test results.  Your results are normal.       Resulted Orders   TB INTRADERMAL TEST   Result Value Ref Range    PPD Induration 0 0 - 5 mm    PPD Redness 0 mm       If you have any questions or concerns, please call the clinic at the number listed above.       Sincerely,        Rama Price MD

## 2020-12-27 NOTE — PLAN OF CARE
Patient vital signs are at baseline: yes  Patient able to ambulate as they were prior to admission or with assist devices provided by therapies during their stay:  No,  Reason:  refuses  Patient MUST void prior to discharge:  Yes  Patient able to tolerate oral intake:  Yes  Pain has adequate pain control using Oral analgesics:  Yes    Dressing to right calf wound changed- adaptic and kerlix wrapped. Mepilex to coccyx changed- had become soiled with stool. Incontinent of stool but continent of bladder. Left ear torn from O2 tubing- patient states it has been like that for years. O2 on 3l. IV intact to right wrist. Boosted in bed and did shift weight, but declined to turn. States pain- all over. Will continue to monitor and provide assistance as needed.

## 2020-12-27 NOTE — PROGRESS NOTES
"[facility]    Hospitalist Progress Note    Interval History   - Endorses itching. Refused some meds yesterday but now taking them again. Denies any complaints. States \"I'm not going just to any\" rehab facility.  - Expected discharge to TCU when found, patient is medically stable to discharge    Assessment & Plan   Summary: Perfecto Reese is a 70 year old male with complex PMHx including severe COPD on 3L home O2 with ongoing tobacco use, adrenal insufficiency on chronic steroids, psoriasis on Humira, chronic Hep C with cirrhosis, PVD s/p left BKA, chronic wounds, and non-compliance who was admitted on 12/2/2020 for pneumonia, found to have a RLL lung abscess. Medically stable to discharge since 12/17/2020, initially declined TCU now awaiting TCU discharge.    Acute on chronic hypoxic and hypercarbic respiratory failure  Bilateral community-acquired bacterial pneumonia with RLL abscess Severe COPD  Musculoskeletal chest pain   EMS activated by his home care RN for fever, cough, and acute respiratory distress (placed on 10L per OM in the field). Fever to 102.5, tachycardia, tachypnea, and leukocytosis (17.2k) present on arrival. CXR on arrival showed RLL infiltrate and CT abd/pelvis showed air fluid cavity at the right lung base. On admission, he was initiated on IV pip-tazo and azithromycin, and Thoracic Surgery was consulted. Chest CT was ordered (12/3) which showed a 6.4 cm RLL pulmonary abscess and extensive left-sided mucus plugging with associated infiltrate. 12/2 blood cultures x2 no growth. Sputum cx 12/4 growing proteus, fluoroquinolone resistant. Also corynebacterium striatum 12/6. CT surgery saw, no surgical intervention indicated. Patient was treated with IV then PO antibiotics, with the assistance of infectious disease. Initially on zosyn->unasyn-> transitioned 12/8 to Augmentin x 6 weeks (through approximately 1/13/2021).  - Infectious disease peripherally following  - Repeat " CT scan in 4 weeks (~1/8/2021)  - O2 weaned back to baseline of 3 lpm    Chronic normocytic anemia, likely ACD  Chronic thrombocytopenia  Anemia and thrombocytopenia likely multifactorial from liver cirrhosis, splenomegaly, renal disease, CKD and psoriasis. Iron labs suggestive of anemia of chronic disease. 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 Patient required four units of pRBCs this admission for Hgb < 7.0. No acute GI bleed identified, likely blood loss from decreased erythropoiesis. Appreciate GI consult 12/16/2020, no acute GI bleed, no acute interventions, [facility]  - Check hemoglobin periodically while inpatient     Acute kidney injury on CKD 3, improved  Asymptomatic pyuria  Baseline creatinine 1.2-1.3 in Jan 2020, on admission 2.89. Suspect pre-renal state in setting of pneumonia/sepsis with possible progression to ATN in setting of hypotension. Improved with IV fluids, creatinine down to 1.5 on 12/16, now recheck shows improvement to 1.49 and calculated GFR is 36. Creatinne back to baseline of 1.3 on 12/23/2020.  - Restart PTA losartan at lowest dose at discharge 25mg daily  - Appreciate Nephrology consult--lasix 40 mg daily at discharge  - [facility]     Chronic adrenal insufficiency  - Continues on PTA hydrocortisone 20 mg PO daily     Essential hypertension: Amlodipine decreased to 5mg daily due to soft blood pressures  - Restart PTA losartan at lowest dose at discharge 25mg daily     History of psoriasis   - Holding PTA Humira this admission--continue to hold at discharge  - Follow up with outpatient provider     Peripheral vascular disease s/p left BKA  Chronic RLE wound  Chronic left ear wound  Present on admission. Receives wound cares per home care RN.      Chronic pain syndrome: Resume PTA regimen (gabapentin 300 mg TID, duloxetine 30 mg  daily, lidocaine patch, oxycodone 5 mg q6h prn, morphine 2.5 mg BID prn)     Non-severe malnutrition     DVT Prophylaxis: PCDs  Code Status: Full Code  PT/OT: ordered  Diet: Regular Diet Adult  Snacks/Supplements Adult: Boost Plus; Between Meals  Diet      Disposition: Expected discharge to TCU when found, patient is medically stable to discharge    Neeraj Valdez MD  Text Page  (7am to 6pm)  -Data reviewed today: I reviewed all new labs and imaging results over the last 24 hours.    Physical Exam   Temp: 98.9  F (37.2  C) Temp src: Oral BP: 128/57 Pulse: 72   Resp: 16 SpO2: 97 % O2 Device: Nasal cannula Oxygen Delivery: 3 LPM  Vitals:    12/23/20 0517 12/25/20 0500 12/27/20 0539   Weight: 69.5 kg (153 lb 3.5 oz) 66.9 kg (147 lb 7.8 oz) 67.2 kg (148 lb 2.4 oz)     Vital Signs with Ranges  Temp:  [98.5  F (36.9  C)-99.8  F (37.7  C)] 98.9  F (37.2  C)  Pulse:  [72-75] 72  Resp:  [16-20] 16  BP: (128-133)/(53-57) 128/57  SpO2:  [94 %-97 %] 97 %  I/O last 3 completed shifts:  In: 780 [P.O.:780]  Out: 1600 [Urine:1600]  O2 requirements: yes 3L    Constitutional: Male in NAD  HEENT: Eyes nonicteric, oral mucosa moist  Cardiovascular: RRR, normal S1/2, no m/r/g  Respiratory: CTAB, no wheezing or crackles  Vascular: LLE BKA, RLE in boot  GI: Normoactive bowel sounds, nontender, nondistended  Skin/Integumen: No rashes  Neuro/Psych: Quiet, restricted affect. A&Ox3, moves all extremities    Medications     - MEDICATION INSTRUCTIONS -       - MEDICATION INSTRUCTIONS -         amLODIPine  5 mg Oral Daily     amoxicillin-clavulanate  1 tablet Oral Q12H ISIS     clobetasol   Topical At Bedtime     DULoxetine  60 mg Oral Daily     fluticasone-vilanterol  1 puff Inhalation Daily     furosemide  40 mg Oral Daily     gabapentin  200 mg Oral TID     hydrocortisone  20 mg Oral QAM     ipratropium - albuterol 0.5 mg/2.5 mg/3 mL  3 mL Nebulization 4x daily     melatonin  3 mg Oral At Bedtime     multivitamin w/minerals  1 tablet  Oral Daily     omeprazole  20 mg Oral Daily     polyethylene glycol  17 g Oral Daily     primidone  50 mg Oral At Bedtime     sodium chloride (PF)  3 mL Intracatheter Q8H     tamsulosin  0.8 mg Oral Daily       Data   Recent Labs   Lab 12/24/20  0705 12/23/20  0700 12/22/20  1156 12/22/20  0028 12/21/20  1753   WBC  --   --  5.9  --   --    HGB  --   --  7.9*  --   --    MCV  --   --  95  --   --    PLT  --   --  116*  --   --    NA  --  132*  --   --   --    POTASSIUM 3.7 3.7  --   --   --    CHLORIDE  --  96  --   --   --    CO2  --  32  --   --   --    BUN  --  26  --   --   --    CR  --  1.33*  --   --   --    ANIONGAP  --  4  --   --   --    MARTINE  --  7.7*  --   --   --    GLC  --  73  --   --   --    TROPI  --   --  <0.015 <0.015 <0.015       Imaging:   No results found for this or any previous visit (from the past 24 hour(s)).

## 2020-12-27 NOTE — PLAN OF CARE
PT: attempted to see pt at 2pm per discussion on 12/26. Pt refused. Provided education on benefits and rationale and pt continued to refuse. Pt unable to state when he would be ready to participate with PT. Pt states he is too sleepy to participate. Discussed continued refusal to participate in PT with nsg. Will complete PT orders at this time due to pt's continued refusal of therapy participate. Please reorder when pt is agreeable to participate.    Physical Therapy Discharge Summary    Reason for therapy discharge:    Pt with consistent refusal to participate in therapies.    Progress towards therapy goal(s). See goals on Care Plan in [facility] electronic health record for goal details.  Goals not met.  Barriers to achieving goals:   refusal of participation.    Therapy recommendation(s):    Therapy is recommended when pt is agreeable to participate.

## 2020-12-27 NOTE — PLAN OF CARE
Patient A&Ox3, VSS on 3L oxygen baseline. Iv SL. T&R q2hrs. Dressing CDI. Boot on right foot. Void on urinal.denies pain. Take pills ok. Will continue monitoring. Awaiting placement

## 2020-12-27 NOTE — PLAN OF CARE
OT: Attempted to see pt for treatment, pt asleep in bed, awakens to voice. Pt refuses engagement in OT; OT provided education on purpose of therapy and provided treatment options, pt continued to refuse.     Felipa Joshi, OT

## 2020-12-28 LAB
ANION GAP SERPL CALCULATED.3IONS-SCNC: 3 MMOL/L (ref 3–14)
BUN SERPL-MCNC: 31 MG/DL (ref 7–30)
CALCIUM SERPL-MCNC: 8.3 MG/DL (ref 8.5–10.1)
CHLORIDE SERPL-SCNC: 102 MMOL/L (ref 94–109)
CO2 SERPL-SCNC: 34 MMOL/L (ref 20–32)
CREAT SERPL-MCNC: 1.46 MG/DL (ref 0.66–1.25)
ERYTHROCYTE [DISTWIDTH] IN BLOOD BY AUTOMATED COUNT: 13.5 % (ref 10–15)
GFR SERPL CREATININE-BSD FRML MDRD: 49 ML/MIN/{1.73_M2}
GLUCOSE SERPL-MCNC: 92 MG/DL (ref 70–99)
HCT VFR BLD AUTO: 21.8 % (ref 40–53)
HGB BLD-MCNC: 7 G/DL (ref 13.3–17.7)
MCH RBC QN AUTO: 29.8 PG (ref 26.5–33)
MCHC RBC AUTO-ENTMCNC: 32.1 G/DL (ref 31.5–36.5)
MCV RBC AUTO: 93 FL (ref 78–100)
PLATELET # BLD AUTO: 98 10E9/L (ref 150–450)
POTASSIUM SERPL-SCNC: 4.2 MMOL/L (ref 3.4–5.3)
RBC # BLD AUTO: 2.35 10E12/L (ref 4.4–5.9)
SODIUM SERPL-SCNC: 139 MMOL/L (ref 133–144)
WBC # BLD AUTO: 5.3 10E9/L (ref 4–11)

## 2020-12-28 PROCEDURE — 250N000013 HC RX MED GY IP 250 OP 250 PS 637: Performed by: INTERNAL MEDICINE

## 2020-12-28 PROCEDURE — 80048 BASIC METABOLIC PNL TOTAL CA: CPT | Performed by: INTERNAL MEDICINE

## 2020-12-28 PROCEDURE — 250N000013 HC RX MED GY IP 250 OP 250 PS 637: Performed by: HOSPITALIST

## 2020-12-28 PROCEDURE — 120N000001 HC R&B MED SURG/OB

## 2020-12-28 PROCEDURE — 85027 COMPLETE CBC AUTOMATED: CPT | Performed by: INTERNAL MEDICINE

## 2020-12-28 PROCEDURE — 99232 SBSQ HOSP IP/OBS MODERATE 35: CPT | Performed by: HOSPITALIST

## 2020-12-28 PROCEDURE — 36415 COLL VENOUS BLD VENIPUNCTURE: CPT | Performed by: INTERNAL MEDICINE

## 2020-12-28 PROCEDURE — 250N000013 HC RX MED GY IP 250 OP 250 PS 637: Performed by: PSYCHIATRY & NEUROLOGY

## 2020-12-28 RX ADMIN — DULOXETINE HYDROCHLORIDE 60 MG: 60 CAPSULE, DELAYED RELEASE ORAL at 08:11

## 2020-12-28 RX ADMIN — HYDROMORPHONE HYDROCHLORIDE 2 MG: 2 TABLET ORAL at 13:13

## 2020-12-28 RX ADMIN — LORAZEPAM 0.5 MG: 0.5 TABLET ORAL at 04:19

## 2020-12-28 RX ADMIN — MULTIPLE VITAMINS W/ MINERALS TAB 1 TABLET: TAB at 08:11

## 2020-12-28 RX ADMIN — FLUTICASONE FUROATE AND VILANTEROL TRIFENATATE 1 PUFF: 200; 25 POWDER RESPIRATORY (INHALATION) at 10:21

## 2020-12-28 RX ADMIN — AMOXICILLIN AND CLAVULANATE POTASSIUM 1 TABLET: 500; 125 TABLET, FILM COATED ORAL at 01:29

## 2020-12-28 RX ADMIN — HYDROMORPHONE HYDROCHLORIDE 2 MG: 2 TABLET ORAL at 04:15

## 2020-12-28 RX ADMIN — HYDROCORTISONE 20 MG: 20 TABLET ORAL at 08:11

## 2020-12-28 RX ADMIN — CLOBETASOL PROPIONATE: 0.5 SOLUTION TOPICAL at 22:34

## 2020-12-28 RX ADMIN — PRIMIDONE 50 MG: 50 TABLET ORAL at 22:26

## 2020-12-28 RX ADMIN — FUROSEMIDE 40 MG: 40 TABLET ORAL at 08:11

## 2020-12-28 RX ADMIN — MELATONIN TAB 3 MG 3 MG: 3 TAB at 20:21

## 2020-12-28 RX ADMIN — TAMSULOSIN HYDROCHLORIDE 0.8 MG: 0.4 CAPSULE ORAL at 08:11

## 2020-12-28 RX ADMIN — OMEPRAZOLE 20 MG: 20 CAPSULE, DELAYED RELEASE ORAL at 08:11

## 2020-12-28 RX ADMIN — GABAPENTIN 200 MG: 100 CAPSULE ORAL at 08:11

## 2020-12-28 RX ADMIN — GABAPENTIN 200 MG: 100 CAPSULE ORAL at 22:26

## 2020-12-28 RX ADMIN — HYDROMORPHONE HYDROCHLORIDE 2 MG: 2 TABLET ORAL at 18:49

## 2020-12-28 RX ADMIN — GABAPENTIN 200 MG: 100 CAPSULE ORAL at 16:21

## 2020-12-28 RX ADMIN — AMLODIPINE BESYLATE 5 MG: 5 TABLET ORAL at 08:10

## 2020-12-28 RX ADMIN — AMOXICILLIN AND CLAVULANATE POTASSIUM 1 TABLET: 500; 125 TABLET, FILM COATED ORAL at 13:13

## 2020-12-28 ASSESSMENT — ACTIVITIES OF DAILY LIVING (ADL)
ADLS_ACUITY_SCORE: 24
ADLS_ACUITY_SCORE: 22
ADLS_ACUITY_SCORE: 22
ADLS_ACUITY_SCORE: 26
ADLS_ACUITY_SCORE: 22
ADLS_ACUITY_SCORE: 22

## 2020-12-28 NOTE — PLAN OF CARE
A/Ox4. VSS on 3L NC, pts baseline. Incontinent of bowel, several loose stools overnight. Voiding in urinal. PO dilaudid for pain. PRN ativan. T/R per pts comfort. Takes pills in applesauce. Will continue to monitor.

## 2020-12-28 NOTE — PLAN OF CARE
A/O x 4. Inc. Bowel, urinal used. Adequate I/O. Reg diet. Hgb 7.0. MD notified, continue to monitor and labs in the a.m. RLE dressing changed per orders. Patient refused edema wear. Re-wrapped with krilex. C,D,I. Pain controlled with prn dilaudid. VSS on 3L nasal cannula baseline.

## 2020-12-28 NOTE — PLAN OF CARE
"OT: attempted for OT treatment at set time. Pt refused, states \"I don't want to do that\" therapy has attempted several times since evaluation on Dec 22 and pt declines. Pt requests to be taken off the therapy schedule    Occupational Therapy Discharge Summary    Reason for therapy discharge:    Patient/family request discontinuation of services.    Progress towards therapy goal(s). See goals on Care Plan in [facility] electronic health record for goal details.  Goals not met.  Barriers to achieving goals:   limited tolerance for therapy.    Therapy recommendation(s):    No further therapy is recommended.pt refuses to participate in therapy. Orders completed per pt request      "

## 2020-12-28 NOTE — PLAN OF CARE
A/O. VSS, 3L O2 NC. Refused repo. Able to turn self. Incontinent of bowel, uses urinal to void. Will continue to monitor

## 2020-12-28 NOTE — PROGRESS NOTES
[facility]    Infectious Disease Progress Note    Date of Service (when I saw the patient): 12/28/2020     Assessment & Plan   Perfecto Reese is a 70 year old male who was admitted on 12/2/2020.       Impression:  1. 70 y.o male with COPD on Home oxygen.   2. Ongoing tobacco abuse.   3. Adrenal insufficiency on chronic steroids   4. Psoriasis on humira.   5. Chronic hep C. Cirrhosis.   6. PVD, S/P BKA.   7. Non compliance.   8. Admitted with pneumonia symptoms and imaging with lung abscesses.   9. On zosyn. And on azithromycin.   10. Sputum culture with proteus.      Recommendations:   Continue on  oral Augmentin, dose reduced given kidney function, discharge on a total of 6 weeks of oral augmentin   Repeat a CT scan in 4 weeks.   Day 26/ 42 of antibiotics today. Following peripherlly awaiting disposition        Neeraj Stoll MD    Interval History   Afebrile stable  Cultures noted       Physical Exam   Temp: 99.4  F (37.4  C) Temp src: Oral BP: (!) 142/61 Pulse: 82   Resp: 16 SpO2: 94 % O2 Device: None (Room air) Oxygen Delivery: 3 LPM  Vitals:    12/23/20 0517 12/25/20 0500 12/27/20 0539   Weight: 69.5 kg (153 lb 3.5 oz) 66.9 kg (147 lb 7.8 oz) 67.2 kg (148 lb 2.4 oz)     Vital Signs with Ranges  Temp:  [97.9  F (36.6  C)-99.4  F (37.4  C)] 99.4  F (37.4  C)  Pulse:  [75-83] 82  Resp:  [16] 16  BP: (122-142)/(53-61) 142/61  SpO2:  [94 %-95 %] 94 %    Constitutional: lethargic today, wakes up though   Lungs: Clear to auscultation bilaterally, no crackles or wheezing  Cardiovascular: Regular rate and rhythm, normal S1 and S2, and no murmur noted  Abdomen: Normal bowel sounds, soft, non-distended, non-tender  Skin: No rashes, no cyanosis, no edema  Other:    Medications     - MEDICATION INSTRUCTIONS -       - MEDICATION INSTRUCTIONS -         amLODIPine  5 mg Oral Daily     amoxicillin-clavulanate  1 tablet Oral Q12H ISIS     clobetasol   Topical At Bedtime     DULoxetine  60 mg Oral Daily      fluticasone-vilanterol  1 puff Inhalation Daily     furosemide  40 mg Oral Daily     gabapentin  200 mg Oral TID     hydrocortisone  20 mg Oral QAM     ipratropium - albuterol 0.5 mg/2.5 mg/3 mL  3 mL Nebulization 4x daily     melatonin  3 mg Oral At Bedtime     multivitamin w/minerals  1 tablet Oral Daily     omeprazole  20 mg Oral Daily     polyethylene glycol  17 g Oral Daily     primidone  50 mg Oral At Bedtime     sodium chloride (PF)  3 mL Intracatheter Q8H     tamsulosin  0.8 mg Oral Daily       Data   All microbiology laboratory data reviewed.  Recent Labs   Lab Test 12/28/20  0628 12/22/20  1156 12/20/20  0748   WBC 5.3 5.9 5.0   HGB 7.0* 7.9* 7.2*   HCT 21.8* 24.5* 22.6*   MCV 93 95 95   PLT 98* 116* 88*     Recent Labs   Lab Test 12/28/20  0628 12/23/20  0700 12/20/20  0748   CR 1.46* 1.33* 1.49*     No lab results found.  Recent Labs   Lab Test 12/03/20  0540 12/03/20  0230 12/02/20  1904 12/02/20  1844   CULT Light growth  Normal adelina    Light growth  Proteus mirabilis  *  Moderate growth  Corynebacterium striatum  Identification obtained by MALDI-TOF mass spectrometry research use only database. Test   characteristics determined and verified by the Infectious Diseases Diagnostic Laboratory   ([facility]) [place], [place].  Susceptibility testing not routinely done  * <10,000 colonies/mL  mixed urogenital adelina  Susceptibility testing not routinely done   No growth No growth       Attestation:  Total time on the floor involved in the patient's care: 35 minutes. Total time spent in counseling/care coordination: >50%

## 2020-12-28 NOTE — PROGRESS NOTES
[facility]    Medicine Progress Note - Hospitalist Service       Date of Admission:  12/2/2020  Assessment & Plan       Perfecto Reese is a 70 year old male with complex PMHx including severe COPD on 3L home O2 with ongoing tobacco use, adrenal insufficiency on chronic steroids, psoriasis on Humira, chronic Hep C with cirrhosis, PVD s/p left BKA, chronic wounds, and non-compliance who was admitted on 12/2/2020 for pneumonia, found to have a RLL lung abscess. Medically stable to discharge since 12/17/2020, initially declined TCU now awaiting TCU discharge.     Acute on chronic hypoxic and hypercarbic respiratory failure  Bilateral community-acquired bacterial pneumonia with RLL abscess Severe COPD  Musculoskeletal chest pain   EMS activated by his home care RN for fever, cough, and acute respiratory distress (placed on 10L per OM in the field). Fever to 102.5, tachycardia, tachypnea, and leukocytosis (17.2k) present on arrival. CXR on arrival showed RLL infiltrate and CT abd/pelvis showed air fluid cavity at the right lung base. On admission, he was initiated on IV pip-tazo and azithromycin, and Thoracic Surgery was consulted. Chest CT was ordered (12/3) which showed a 6.4 cm RLL pulmonary abscess and extensive left-sided mucus plugging with associated infiltrate. 12/2 blood cultures x2 no growth. Sputum cx 12/4 growing proteus, fluoroquinolone resistant. Also corynebacterium striatum 12/6. CT surgery saw, no surgical intervention indicated. Patient was treated with IV then PO antibiotics, with the assistance of infectious disease. Initially on zosyn->unasyn-> transitioned 12/8 to Augmentin x 6 weeks (through approximately 1/13/2021).  - Infectious disease peripherally following  - Repeat CT scan in 4 weeks (~1/8/2021)  - O2 weaned back to baseline of 3 lpm     Chronic normocytic anemia, likely ACD  Chronic thrombocytopenia  Anemia and thrombocytopenia likely multifactorial from liver  cirrhosis, splenomegaly, renal disease, CKD and psoriasis. Iron labs suggestive of anemia of chronic disease. 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 Patient required four units of pRBCs this admission for Hgb < 7.0. No acute GI bleed identified, likely blood loss from decreased erythropoiesis. Appreciate GI consult 12/16/2020, no acute GI bleed, no acute interventions, [facility]  - Check hemoglobin periodically while inpatient - 7.0 12/28 - clinically stable and no bleeding noted.  - recheck hgb in next day or two pending course     Acute kidney injury on CKD 3, improved  Asymptomatic pyuria  Baseline creatinine 1.2-1.3 in Jan 2020, on admission 2.89. Suspect pre-renal state in setting of pneumonia/sepsis with possible progression to ATN in setting of hypotension. Improved with IV fluids, creatinine down to 1.5 on 12/16, now recheck shows improvement to 1.49 and calculated GFR is 36. Creatinne back to baseline of 1.3 on 12/23/2020.  - Restarted PTA losartan at lowest dose at discharge 25mg daily  - Appreciate Nephrology consult--lasix 40 mg daily at discharge  - [facility]     Chronic adrenal insufficiency  - Continues on PTA hydrocortisone 20 mg PO daily     Essential hypertension  Amlodipine decreased to 5mg daily due to soft blood pressures  - Restarted PTA losartan at lowest dose at discharge 25mg daily     History of psoriasis   - Holding PTA Humira this admission--continue to hold at discharge  - Follow up with outpatient provider     Peripheral vascular disease s/p left BKA  Chronic RLE wound  Chronic left ear wound  Present on admission. Receives wound cares per home care RN.      Chronic pain syndrome  Resume PTA regimen (gabapentin 300 mg TID, duloxetine 30 mg daily, lidocaine patch, oxycodone 5 mg q6h prn, morphine 2.5 mg BID prn)     Non-severe  malnutrition   Encourage PO intake and supplements. Nutrition support appreciated.    Diet: Regular Diet Adult  Snacks/Supplements Adult: Boost Plus; Between Meals  Diet    DVT Prophylaxis: Pneumatic Compression Devices  Bruce Catheter: not present  Code Status: Full Code           Disposition Plan   Expected discharge: pending TCU acceptance - which is pending him working with therapy, will need to continue to encourage him  Entered: Duy Turk MD 12/28/2020, 2:56 PM       The patient's care was discussed with the Bedside Nurse, Care Coordinator/ and Patient.    Duy Turk MD  Hospitalist Service  [facility]  Contact information available via [facility] Paging/Directory    ______________________________________________________________________    Interval History   Chart reviewed.  Care assumed today.  Patient seen and examined earlier today.  No new complaints-denies shortness of breath, fevers, chills, or new pain.  Continues to essentially refuse therapy-we will need to continue to encourage him in order to be accepted by TCU.    Data reviewed today: I reviewed all medications, new labs and imaging results over the last 24 hours.     Physical Exam   Vital Signs: Temp: 99.4  F (37.4  C) Temp src: Oral BP: (!) 142/61 Pulse: 82   Resp: 16 SpO2: 94 % O2 Device: None (Room air) Oxygen Delivery: 3 LPM  Weight: 148 lbs 2.39 oz    Gen: NAD, pleasant  HEENT: Normocephalic, EOMI, MMM  Resp: no crackles,  no wheezes, no increased work of resp  CV: S1S2 heard, reg rhythm, reg rate  Abdo: soft, nontender, nondistended, bowel sounds present  Ext: calves nontender, well perfused  Neuro: AAOx3, CN grossly intact, no facial asymmetry      Data   Recent Labs   Lab 12/28/20  0628 12/24/20  0705 12/23/20  0700 12/22/20  1156 12/22/20  0028 12/21/20  1753   WBC 5.3  --   --  5.9  --   --    HGB 7.0*  --   --  7.9*  --   --    MCV 93  --   --  95  --   --    PLT 98*  --   --  116*  --    --      --  132*  --   --   --    POTASSIUM 4.2 3.7 3.7  --   --   --    CHLORIDE 102  --  96  --   --   --    CO2 34*  --  32  --   --   --    BUN 31*  --  26  --   --   --    CR 1.46*  --  1.33*  --   --   --    ANIONGAP 3  --  4  --   --   --    MARTINE 8.3*  --  7.7*  --   --   --    GLC 92  --  73  --   --   --    TROPI  --   --   --  <0.015 <0.015 <0.015     No results found for this or any previous visit (from the past 24 hour(s)).

## 2020-12-29 LAB
ALBUMIN SERPL-MCNC: 2 G/DL (ref 3.4–5)
ALP SERPL-CCNC: 90 U/L (ref 40–150)
ALT SERPL W P-5'-P-CCNC: 30 U/L (ref 0–70)
ANION GAP SERPL CALCULATED.3IONS-SCNC: 2 MMOL/L (ref 3–14)
AST SERPL W P-5'-P-CCNC: 26 U/L (ref 0–45)
BILIRUB SERPL-MCNC: 0.3 MG/DL (ref 0.2–1.3)
BUN SERPL-MCNC: 34 MG/DL (ref 7–30)
CALCIUM SERPL-MCNC: 8.4 MG/DL (ref 8.5–10.1)
CHLORIDE SERPL-SCNC: 105 MMOL/L (ref 94–109)
CO2 SERPL-SCNC: 33 MMOL/L (ref 20–32)
CREAT SERPL-MCNC: 1.46 MG/DL (ref 0.66–1.25)
ERYTHROCYTE [DISTWIDTH] IN BLOOD BY AUTOMATED COUNT: 13.7 % (ref 10–15)
GFR SERPL CREATININE-BSD FRML MDRD: 49 ML/MIN/{1.73_M2}
GLUCOSE SERPL-MCNC: 88 MG/DL (ref 70–99)
HCT VFR BLD AUTO: 24 % (ref 40–53)
HGB BLD-MCNC: 7.5 G/DL (ref 13.3–17.7)
MCH RBC QN AUTO: 29.2 PG (ref 26.5–33)
MCHC RBC AUTO-ENTMCNC: 31.3 G/DL (ref 31.5–36.5)
MCV RBC AUTO: 93 FL (ref 78–100)
PLATELET # BLD AUTO: 117 10E9/L (ref 150–450)
POTASSIUM SERPL-SCNC: 4.2 MMOL/L (ref 3.4–5.3)
PROT SERPL-MCNC: 7.1 G/DL (ref 6.8–8.8)
RBC # BLD AUTO: 2.57 10E12/L (ref 4.4–5.9)
SODIUM SERPL-SCNC: 140 MMOL/L (ref 133–144)
WBC # BLD AUTO: 5.6 10E9/L (ref 4–11)

## 2020-12-29 PROCEDURE — 250N000013 HC RX MED GY IP 250 OP 250 PS 637: Performed by: PSYCHIATRY & NEUROLOGY

## 2020-12-29 PROCEDURE — 250N000013 HC RX MED GY IP 250 OP 250 PS 637: Performed by: INTERNAL MEDICINE

## 2020-12-29 PROCEDURE — G0463 HOSPITAL OUTPT CLINIC VISIT: HCPCS

## 2020-12-29 PROCEDURE — 99232 SBSQ HOSP IP/OBS MODERATE 35: CPT | Performed by: HOSPITALIST

## 2020-12-29 PROCEDURE — 36415 COLL VENOUS BLD VENIPUNCTURE: CPT | Performed by: HOSPITALIST

## 2020-12-29 PROCEDURE — 80053 COMPREHEN METABOLIC PANEL: CPT | Performed by: HOSPITALIST

## 2020-12-29 PROCEDURE — 85027 COMPLETE CBC AUTOMATED: CPT | Performed by: HOSPITALIST

## 2020-12-29 PROCEDURE — 120N000001 HC R&B MED SURG/OB

## 2020-12-29 RX ORDER — HYDROXYZINE HYDROCHLORIDE 25 MG/1
25 TABLET, FILM COATED ORAL EVERY 6 HOURS PRN
Status: DISCONTINUED | OUTPATIENT
Start: 2020-12-29 | End: 2020-12-29

## 2020-12-29 RX ORDER — HYDROXYZINE HYDROCHLORIDE 25 MG/1
25 TABLET, FILM COATED ORAL EVERY 6 HOURS PRN
Status: DISCONTINUED | OUTPATIENT
Start: 2020-12-29 | End: 2020-12-31 | Stop reason: HOSPADM

## 2020-12-29 RX ADMIN — TAMSULOSIN HYDROCHLORIDE 0.8 MG: 0.4 CAPSULE ORAL at 09:46

## 2020-12-29 RX ADMIN — HYDROMORPHONE HYDROCHLORIDE 2 MG: 2 TABLET ORAL at 09:47

## 2020-12-29 RX ADMIN — FLUTICASONE FUROATE AND VILANTEROL TRIFENATATE 1 PUFF: 200; 25 POWDER RESPIRATORY (INHALATION) at 10:06

## 2020-12-29 RX ADMIN — GABAPENTIN 200 MG: 100 CAPSULE ORAL at 09:46

## 2020-12-29 RX ADMIN — AMOXICILLIN AND CLAVULANATE POTASSIUM 1 TABLET: 500; 125 TABLET, FILM COATED ORAL at 14:15

## 2020-12-29 RX ADMIN — AMOXICILLIN AND CLAVULANATE POTASSIUM 1 TABLET: 500; 125 TABLET, FILM COATED ORAL at 01:45

## 2020-12-29 RX ADMIN — OMEPRAZOLE 20 MG: 20 CAPSULE, DELAYED RELEASE ORAL at 09:47

## 2020-12-29 RX ADMIN — FUROSEMIDE 40 MG: 40 TABLET ORAL at 09:47

## 2020-12-29 RX ADMIN — PRIMIDONE 50 MG: 50 TABLET ORAL at 21:41

## 2020-12-29 RX ADMIN — MULTIPLE VITAMINS W/ MINERALS TAB 1 TABLET: TAB at 09:46

## 2020-12-29 RX ADMIN — HYDROMORPHONE HYDROCHLORIDE 2 MG: 2 TABLET ORAL at 15:29

## 2020-12-29 RX ADMIN — AMLODIPINE BESYLATE 5 MG: 5 TABLET ORAL at 09:46

## 2020-12-29 RX ADMIN — MELATONIN TAB 3 MG 3 MG: 3 TAB at 21:41

## 2020-12-29 RX ADMIN — HYDROCORTISONE 20 MG: 20 TABLET ORAL at 09:46

## 2020-12-29 RX ADMIN — GABAPENTIN 200 MG: 100 CAPSULE ORAL at 21:41

## 2020-12-29 RX ADMIN — GABAPENTIN 200 MG: 100 CAPSULE ORAL at 15:29

## 2020-12-29 RX ADMIN — DULOXETINE HYDROCHLORIDE 60 MG: 60 CAPSULE, DELAYED RELEASE ORAL at 09:46

## 2020-12-29 RX ADMIN — TRIAMCINOLONE ACETONIDE: 1 CREAM TOPICAL at 21:44

## 2020-12-29 RX ADMIN — CLOBETASOL PROPIONATE: 0.5 SOLUTION TOPICAL at 21:44

## 2020-12-29 ASSESSMENT — ACTIVITIES OF DAILY LIVING (ADL)
ADLS_ACUITY_SCORE: 24
ADLS_ACUITY_SCORE: 25

## 2020-12-29 ASSESSMENT — MIFFLIN-ST. JEOR: SCORE: 1342.88

## 2020-12-29 NOTE — PROGRESS NOTES
WO Nurse Inpatient Wound Assessment   Reason for follow up visit : Evaluate and treat  RLE wounds    Assessment  RLE wounds due to Mixed Etiology: poor circulation, edema, stasis, pressure  Status:  Pt declines compression due to pain and rubbing on his shin 12/29. minimal [place] 12/29 since previous visit,  Overall tissue continues to appear healthier.    Treatment Plan  RLE  Wounds: Daily    1. Cleanse with  Vashe moistened gauze to wound bed and let sit x 5 minutes - do not rinse. #[id number]  2. Dry and protect surrounding skin with no sting barrier film wipe #[id number],  3. Apply sween 24 to all intact skin from base of toes to below knees.  4. Apply Aquacel AG 4x4 #[id number] to wound bed  5. Cover with ABD   6. Secure with kerlix and tape  7. Time/Date/Initial dressing    Orders Reviewed and Updated  Recommended provider order: none   WO Nurse follow-up plan:weekly  Nursing to notify the Provider(s) and re-consult the WOC Nurse if wound(s) deteriorates or new skin concern.    Patient History  According to provider note(s):  Perfecto Reese is a 70 year old male with a history of chronic kidney disease stage III, right lower extremity wound, left ear wound, anemia, severe COPD, chronic hypoxic respiratory failure, adrenal insufficiency, tobacco use, psoriasis, hypertension, BPH, GERD, hepatitis, and cirrhosis who was sent into the ER due to fevers, shortness of breath, cough, and diarrhea.  In the ER, there was concern for community-acquired pneumonia and acute kidney injury.  He was given IV fluids and started on empiric antibiotics.  The hospitalist service was contacted to admit him for further evaluation management.    Objective Data  Containment of urine/stool: Continent of bladder and Continent of bowel    Active Diet Order  Orders Placed This Encounter      Regular Diet Adult      Diet      Output:   I/O last 3 completed shifts:  In: -   Out: 1570 [Urine:1570]    Risk Assessment:   Sensory Perception: 4-->no  impairment  Moisture: 3-->occasionally moist  Activity: 2-->chairfast  Mobility: 3-->slightly limited  Nutrition: 2-->probably inadequate  Friction and Shear: 2-->potential problem  Donn Score: 16                          Labs:   Recent Labs   Lab 12/29/20  0811   ALBUMIN 2.0*   HGB 7.5*   WBC 5.6       Physical Exam  Areas of skin assessed: focused rle     Wound Location:  RLE   Date of last photo 12/16/2020        Wound History: chronic mixed disease, pt declining compression and has not been wearing explained that this compression is important to promote circulation and healing, however he still declines    Wound Base:70% healthy granular 30% pink moist     Palpation of the wound bed: normal      Drainage: small     Description of drainage: serosanguinous, small     Measurements (length x width x depth, in cm) 10  x 6  x  0.1 cm      Tunneling N/A     Undermining N/A  Periwound skin: Intact      Color: normal and consistent with surrounding tissue      Temperature: normal   Odor: none  Pain: denies , none      Interventions  Visual inspection and assessment completed   Wound Care Rationale Promote moist wound healing without tissue dehydration  and Decrease bacterial load  Wound Care: done per plan of care  Supplies: at bedside  Current off-loading measures: Heel off-loading boot(s) and Pillows  Current support surface: Standard  Atmos Air mattress  Education provided to: importance of repositioning and Off-loading pressure  Discussed plan of care with Patient and Nurse    Juan Ramirez RN BSN CWOCN

## 2020-12-29 NOTE — PROGRESS NOTES
Care Management Follow Up    Length of Stay (days): 27    Expected Discharge Date: 12/31/20  Expected Time of Departure: 6:15 PM [facility] transport  Concerns to be Addressed: discharge planning     Patient plan of care discussed at interdisciplinary rounds: Yes    Anticipated Discharge Disposition: Home, Home Care, TCU     Anticipated Discharge Services:    Anticipated Discharge DME: Oxygen, Wheelchair(resuming O2/DME and HHC as prior to hosp  stay)    Patient/family educated on Medicare website which has current facility and service quality ratings: no  Education Provided on the Discharge Plan:  BENEDICT met with pt this am to discuss discharge planning and referrals.  Pt had earphones on and eyes closed. No response from pt when SW came and tried to rouse him. Pt startled and took headphones off when SW touched his hand. SW provided update of referrals that are currently out. BENEDICT discussed with pt that he MUST participate with therapy to discharge to a TCU. SW offered to send referrals out for a LTC on pt's behalf and he states that he does NOT want a LTC placement. Pt reminded that in order to qualify for a rehab placement , he has to participate in rehab assessments from the hospital therapy. Pt states that he will participate. Pt understands that he is no longer appropriate for hospital care and that if he does not participate with therapy, the options is securing a LT bed for discharge.  Bedside RN updated and will seek therapies to see pt again as they have signed off due to pt's refusal to participate.  BENEDICT sent additional referrals out in hopes that pt will have something to [facility] on with his participation moving forward.    Patient/Family in Agreement with the Plan: yes    Referrals Placed by CM/SW: Home Infusion, Transportation. TCU referrals  Private pay costs discussed: Not applicable    Additional Information:  SW continuing to pursue TCU. May need LTC if pt is not agreeable and cooperative with  rehab.    Elizabeth Tay [facility]  [phone]

## 2020-12-29 NOTE — PLAN OF CARE
Pt A&Ox4. VSS on 3L. CMS intact. Dressing c/d/I, boot on RLE. Incontinent of bowel and bladder. Turn side to side overnight. Discharge pending placement.

## 2020-12-29 NOTE — PLAN OF CARE
A&Ox4. T/R in bed. Pressure wound to coccyx/buttocks. Mepilex to coccyx. Multiple small soft stools. Using urinal. Left lower lobe with crackles and exp wheezes. Moderate amount brown and blood tinged mucus coughed up. Pain controlled with PO dilaudid. Plan to recheck hgb in the AM.

## 2020-12-29 NOTE — PROGRESS NOTES
[facility]    Medicine Progress Note - Hospitalist Service       Date of Admission:  12/2/2020  Assessment & Plan       Perfecto Reese is a 70 year old male with complex PMHx including severe COPD on 3L home O2 with ongoing tobacco use, adrenal insufficiency on chronic steroids, psoriasis on Humira, chronic Hep C with cirrhosis, PVD s/p left BKA, chronic wounds, and non-compliance who was admitted on 12/2/2020 for pneumonia, found to have a RLL lung abscess. Medically stable to discharge since 12/17/2020, initially declined TCU now awaiting TCU discharge.     Acute on chronic hypoxic and hypercarbic respiratory failure  Bilateral community-acquired bacterial pneumonia with RLL abscess Severe COPD  Musculoskeletal chest pain   EMS activated by his home care RN for fever, cough, and acute respiratory distress (placed on 10L per OM in the field). Fever to 102.5, tachycardia, tachypnea, and leukocytosis (17.2k) present on arrival. CXR on arrival showed RLL infiltrate and CT abd/pelvis showed air fluid cavity at the right lung base. On admission, he was initiated on IV pip-tazo and azithromycin, and Thoracic Surgery was consulted. Chest CT was ordered (12/3) which showed a 6.4 cm RLL pulmonary abscess and extensive left-sided mucus plugging with associated infiltrate. 12/2 blood cultures x2 no growth. Sputum cx 12/4 growing proteus, fluoroquinolone resistant. Also corynebacterium striatum 12/6. CT surgery saw, no surgical intervention indicated. Patient was treated with IV then PO antibiotics, with the assistance of infectious disease. Initially on zosyn->unasyn-> transitioned 12/8 to Augmentin x 6 weeks (through approximately 1/13/2021).  - Infectious disease peripherally following  - Repeat CT scan in 4 weeks (~1/8/2021)  - O2 weaned back to baseline of 3 lpm     Chronic normocytic anemia, likely ACD  Chronic thrombocytopenia  Anemia and thrombocytopenia likely multifactorial from liver  cirrhosis, splenomegaly, renal disease, CKD and psoriasis. Iron labs suggestive of anemia of chronic disease. 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 Patient required four units of pRBCs this admission for Hgb < 7.0. No acute GI bleed identified, likely blood loss from decreased erythropoiesis. Appreciate GI consult 12/16/2020, no acute GI bleed, no acute interventions, [facility]  - Check hemoglobin periodically while inpatient - 7.0 12/28 - clinically stable and no bleeding noted.  - recheck hgb 7.5 12/29     Acute kidney injury on CKD 3, improved  Asymptomatic pyuria  Baseline creatinine 1.2-1.3 in Jan 2020, on admission 2.89. Suspect pre-renal state in setting of pneumonia/sepsis with possible progression to ATN in setting of hypotension. Improved with IV fluids, creatinine down to 1.5 on 12/16, now recheck shows improvement to 1.49 and calculated GFR is 36. Creatinne back to baseline of 1.3 on 12/23/2020.  - Restarted PTA losartan at lowest dose at discharge 25mg daily  - Appreciate Nephrology consult--lasix 40 mg daily at discharge  - [facility]     Chronic adrenal insufficiency  - Continues on PTA hydrocortisone 20 mg PO daily     Essential hypertension  Amlodipine decreased to 5mg daily due to soft blood pressures  - Restarted PTA losartan at lowest dose at discharge 25mg daily     History of psoriasis   - Holding PTA Humira this admission--continue to hold at discharge  - Follow up with outpatient provider     Peripheral vascular disease s/p left BKA  Chronic RLE wound  Chronic left ear wound  Present on admission. Receives wound cares per home care RN.      Chronic pain syndrome  Resume PTA regimen (gabapentin 300 mg TID, duloxetine 30 mg daily, lidocaine patch, oxycodone 5 mg q6h prn, morphine 2.5 mg BID prn)     Non-severe malnutrition   Encourage PO  intake and supplements. Nutrition support appreciated.      Diet: Regular Diet Adult  Snacks/Supplements Adult: Boost Plus; Between Meals  Diet    DVT Prophylaxis: Pneumatic Compression Devices  Bruce Catheter: not present  Code Status: Full Code           Disposition Plan   Expected discharge: pending TCU availability  Entered: Duy Turk MD 12/29/2020, 8:23 AM       The patient's care was discussed with the Bedside Nurse and Patient.    Duy Turk MD  Hospitalist Service  [facility]  Contact information available via [facility] Paging/Directory    ______________________________________________________________________    Interval History   Chart reviewed.  Patient seen and examined earlier today.  No new complaints.  Is now agreeable to work with physical therapy.  Encouraged patient that if he does not work with therapy we cannot get him out of the hospital safely.  He seems a bit more motivated today and agrees to work with them.  No new fevers chills shortness of breath or pain.    Data reviewed today: I reviewed all medications, new labs and imaging results over the last 24 hours.    Physical Exam   Vital Signs: Temp: 97.9  F (36.6  C) Temp src: Oral BP: 126/54 Pulse: 73   Resp: 16 SpO2: 96 % O2 Device: Nasal cannula Oxygen Delivery: 2.5 LPM  Weight: 142 lbs 6.67 oz    Gen: NAD, pleasant  HEENT: Normocephalic, EOMI, MMM  Resp: no new focal crackles,  no wheezes, no increased work of resp  CV: S1S2 heard,  Reg rhythm, reg rate  Abdo: soft, nontender, nondistended, bowel sounds present  Ext: calves nontender, well perfused  Neuro: AAOx3, CN grossly intact, no facial asymmetry      Data   Recent Labs   Lab 12/29/20  0811 12/28/20  0628 12/24/20  0705 12/23/20  0700   WBC 5.6 5.3  --   --    HGB 7.5* 7.0*  --   --    MCV 93 93  --   --    * 98*  --   --     139  --  132*   POTASSIUM 4.2 4.2 3.7 3.7   CHLORIDE 105 102  --  96   CO2 33* 34*  --  32   BUN 34* 31*  --  26    CR 1.46* 1.46*  --  1.33*   ANIONGAP 2* 3  --  4   MARTINE 8.4* 8.3*  --  7.7*   GLC 88 92  --  73   ALBUMIN 2.0*  --   --   --    PROTTOTAL 7.1  --   --   --    BILITOTAL 0.3  --   --   --    ALKPHOS 90  --   --   --    ALT 30  --   --   --    AST 26  --   --   --      No results found for this or any previous visit (from the past 24 hour(s)).

## 2020-12-29 NOTE — PROGRESS NOTES
Patient A&0x4. [place]. He is on bedrest, Patient agrees to work with PT this evening.  R foot is red and ricky but Pulses are intact. Denies numbness and tingling. Wound care done by WOC nurse this AM. Needs pain medication before dressing changes.  Lungs diminished - On 3 ltrs 02 via NA. This is baseline for him.  Awaiting TCU placement after he works with PT. If patient refuses PT assessment he will be assessed for LTC placement.

## 2020-12-30 PROCEDURE — 120N000001 HC R&B MED SURG/OB

## 2020-12-30 PROCEDURE — 250N000013 HC RX MED GY IP 250 OP 250 PS 637: Performed by: INTERNAL MEDICINE

## 2020-12-30 PROCEDURE — 99232 SBSQ HOSP IP/OBS MODERATE 35: CPT | Performed by: HOSPITALIST

## 2020-12-30 PROCEDURE — 250N000013 HC RX MED GY IP 250 OP 250 PS 637: Performed by: PSYCHIATRY & NEUROLOGY

## 2020-12-30 PROCEDURE — 250N000013 HC RX MED GY IP 250 OP 250 PS 637: Performed by: HOSPITALIST

## 2020-12-30 RX ORDER — IPRATROPIUM BROMIDE AND ALBUTEROL SULFATE 2.5; .5 MG/3ML; MG/3ML
3 SOLUTION RESPIRATORY (INHALATION) EVERY 6 HOURS PRN
Status: DISCONTINUED | OUTPATIENT
Start: 2020-12-30 | End: 2020-12-31 | Stop reason: HOSPADM

## 2020-12-30 RX ADMIN — TAMSULOSIN HYDROCHLORIDE 0.8 MG: 0.4 CAPSULE ORAL at 08:12

## 2020-12-30 RX ADMIN — HYDROCORTISONE 20 MG: 20 TABLET ORAL at 08:13

## 2020-12-30 RX ADMIN — MELATONIN TAB 3 MG 3 MG: 3 TAB at 22:09

## 2020-12-30 RX ADMIN — TRIAMCINOLONE ACETONIDE: 1 CREAM TOPICAL at 16:38

## 2020-12-30 RX ADMIN — OMEPRAZOLE 20 MG: 20 CAPSULE, DELAYED RELEASE ORAL at 08:13

## 2020-12-30 RX ADMIN — PRIMIDONE 50 MG: 50 TABLET ORAL at 22:09

## 2020-12-30 RX ADMIN — LORAZEPAM 0.5 MG: 0.5 TABLET ORAL at 00:31

## 2020-12-30 RX ADMIN — AMOXICILLIN AND CLAVULANATE POTASSIUM 1 TABLET: 500; 125 TABLET, FILM COATED ORAL at 14:05

## 2020-12-30 RX ADMIN — FUROSEMIDE 40 MG: 40 TABLET ORAL at 08:13

## 2020-12-30 RX ADMIN — AMLODIPINE BESYLATE 5 MG: 5 TABLET ORAL at 08:13

## 2020-12-30 RX ADMIN — MULTIPLE VITAMINS W/ MINERALS TAB 1 TABLET: TAB at 08:13

## 2020-12-30 RX ADMIN — DULOXETINE HYDROCHLORIDE 60 MG: 60 CAPSULE, DELAYED RELEASE ORAL at 08:13

## 2020-12-30 RX ADMIN — FLUTICASONE FUROATE AND VILANTEROL TRIFENATATE 1 PUFF: 200; 25 POWDER RESPIRATORY (INHALATION) at 08:16

## 2020-12-30 RX ADMIN — HYDROMORPHONE HYDROCHLORIDE 2 MG: 2 TABLET ORAL at 20:36

## 2020-12-30 RX ADMIN — HYDROMORPHONE HYDROCHLORIDE 2 MG: 2 TABLET ORAL at 00:31

## 2020-12-30 RX ADMIN — GABAPENTIN 200 MG: 100 CAPSULE ORAL at 16:37

## 2020-12-30 RX ADMIN — HYDROMORPHONE HYDROCHLORIDE 2 MG: 2 TABLET ORAL at 13:18

## 2020-12-30 RX ADMIN — GABAPENTIN 200 MG: 100 CAPSULE ORAL at 22:09

## 2020-12-30 RX ADMIN — GABAPENTIN 200 MG: 100 CAPSULE ORAL at 08:13

## 2020-12-30 RX ADMIN — TRIAMCINOLONE ACETONIDE: 1 CREAM TOPICAL at 20:38

## 2020-12-30 RX ADMIN — AMOXICILLIN AND CLAVULANATE POTASSIUM 1 TABLET: 500; 125 TABLET, FILM COATED ORAL at 03:18

## 2020-12-30 RX ADMIN — HYDROMORPHONE HYDROCHLORIDE 2 MG: 2 TABLET ORAL at 08:13

## 2020-12-30 RX ADMIN — CLOBETASOL PROPIONATE: 0.5 SOLUTION TOPICAL at 22:10

## 2020-12-30 ASSESSMENT — ACTIVITIES OF DAILY LIVING (ADL)
ADLS_ACUITY_SCORE: 24
ADLS_ACUITY_SCORE: 24
ADLS_ACUITY_SCORE: 26
ADLS_ACUITY_SCORE: 26
ADLS_ACUITY_SCORE: 24
ADLS_ACUITY_SCORE: 26

## 2020-12-30 ASSESSMENT — MIFFLIN-ST. JEOR: SCORE: 1346.88

## 2020-12-30 NOTE — PLAN OF CARE
PRN PO Dilaudid x2 for pain management this shift, turned and repositioned throughout shift, voiding via urinal - incontinent at times, incontinent of bowels, resource RN came to do RLE wound care, mepilex to coccyx changed x2, and patient will discharge to TCU at 1100 tomorrow.

## 2020-12-30 NOTE — PROGRESS NOTES
[facility]    Medicine Progress Note - Hospitalist Service       Date of Admission:  12/2/2020  Assessment & Plan       Perfecto Reese is a 70 year old male with complex PMHx including severe COPD on 3L home O2 with ongoing tobacco use, adrenal insufficiency on chronic steroids, psoriasis on Humira, chronic Hep C with cirrhosis, PVD s/p left BKA, chronic wounds, and non-compliance who was admitted on 12/2/2020 for pneumonia, found to have a RLL lung abscess. Medically stable to discharge since 12/17/2020, initially declined TCU now awaiting TCU discharge.     12/30 - placement awaited - patient appears to have been medically stable for the better part of two weeks now; in the past few days he has been strongly encouraged and pushed to participate with physical therapy.  He agreed to this but on 12/30 appears to again declined.  Greatly appreciate repeated attempts to work with patient by therapy.  Appreciate CT/SW coordinating placement-patient may have TCU spot 12/31.    Acute on chronic hypoxic and hypercarbic respiratory failure  Bilateral community-acquired bacterial pneumonia with RLL abscess   Severe COPD  Musculoskeletal chest pain   EMS activated by his home care RN for fever, cough, and acute respiratory distress (placed on 10L per OM in the field). Fever to 102.5, tachycardia, tachypnea, and leukocytosis (17.2k) present on arrival. CXR on arrival showed RLL infiltrate and CT abd/pelvis showed air fluid cavity at the right lung base. On admission, he was initiated on IV pip-tazo and azithromycin, and Thoracic Surgery was consulted. Chest CT was ordered (12/3) which showed a 6.4 cm RLL pulmonary abscess and extensive left-sided mucus plugging with associated infiltrate. 12/2 blood cultures x2 no growth. Sputum cx 12/4 growing proteus, fluoroquinolone resistant. Also corynebacterium striatum 12/6. CT surgery saw, no surgical intervention indicated. Patient was treated with IV then  PO antibiotics, with the assistance of infectious disease. Initially on zosyn -> unasyn -> transitioned 12/8 to Augmentin x 6 weeks (through approximately 1/13/2021).  - Infectious disease peripherally following  - Repeat CT scan in 4 weeks (~1/8/2021)  - O2 weaned back to baseline of 3 lpm     Chronic normocytic anemia, likely ACD  Chronic thrombocytopenia  Anemia and thrombocytopenia likely multifactorial from liver cirrhosis, splenomegaly, renal disease, CKD and psoriasis. Iron labs suggestive of anemia of chronic disease. 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 Patient required four units of pRBCs this admission for Hgb < 7.0. No acute GI bleed identified, likely blood loss from decreased erythropoiesis. Appreciate GI consult 12/16/2020, no acute GI bleed, no acute interventions, [facility]  - Check hemoglobin periodically while inpatient - 7.0 12/28 - clinically stable and no bleeding noted.  - recheck hgb 7.5 12/29     Acute kidney injury on CKD 3, improved  Asymptomatic pyuria  Baseline creatinine 1.2-1.3 in Jan 2020, on admission 2.89. Suspect pre-renal state in setting of pneumonia/sepsis with possible progression to ATN in setting of hypotension. Improved with IV fluids, creatinine down to 1.5 on 12/16, now recheck shows improvement to 1.49 and calculated GFR is 36. Creatinne back to baseline of 1.3 on 12/23/2020.  - Restarted PTA losartan at lowest dose at discharge 25mg daily  - Appreciate Nephrology consul t-- lasix 40 mg daily continued  - [facility]     Chronic adrenal insufficiency  - Continues on PTA hydrocortisone 20 mg PO daily     Essential hypertension  Amlodipine decreased to 5mg daily due to soft blood pressures  - Restarted PTA losartan at lowest dose at discharge 25mg daily     History of psoriasis   - Holding PTA Humira this  admission--continue to hold at discharge  - Follow up with outpatient provider     Peripheral vascular disease s/p left BKA  Chronic RLE wound  Chronic left ear wound  Present on admission. Receives wound cares per home care RN.      Chronic pain syndrome  Resume PTA regimen (gabapentin 300 mg TID, duloxetine 30 mg daily, lidocaine patch, oxycodone 5 mg q6h prn, morphine 2.5 mg BID prn)     Non-severe malnutrition   Encourage PO intake and supplements. Nutrition support appreciated.        Diet: Regular Diet Adult  Snacks/Supplements Adult: Boost Plus; Between Meals  Diet    DVT Prophylaxis: Pneumatic Compression Devices  Bruce Catheter: not present  Code Status: Full Code           Disposition Plan   Expected discharge: pending TCU acceptance  Entered: Duy Turk MD 12/30/2020, 2:18 PM       The patient's care was discussed with the Bedside Nurse and Patient.    Duy Turk MD  Hospitalist Service  [facility]  Contact information available via [facility] Paging/Directory    ______________________________________________________________________    Interval History   Seen and examined earlier today. No new issues - no fevers, chills, new sob, or pain. Chronic R shoulder discomfort relayed - no radiating symptoms. Reiterated the importance of working with therapy for his overall prognosis and if he ever wants to leave the hospital.  Encouraged repeatedly and patient agreed he will work with therapy today.  TCU placement pending.  Antibiotics continued.    Data reviewed today: I reviewed all medications, new labs and imaging results over the last 24 hours.    Physical Exam   Vital Signs: Temp: 99.7  F (37.6  C) Temp src: Oral BP: 123/55 Pulse: 71   Resp: 16 SpO2: 98 % O2 Device: None (Room air) Oxygen Delivery: 3 LPM  Weight: 143 lbs 4.78 oz    Gen: NAD, pleasant  HEENT: Normocephalic, EOMI, MMM  Resp: a few RLL crackles/decreased air movement continued,  no wheezes, no increased work  of resp  CV: S1S2 heard, reg rhythm, reg rate  Abdo: soft, nontender, nondistended, bowel sounds present  Ext: calves nontender, well perfused  Neuro: AAOx3, CN grossly intact, no facial asymmetry      Data   Recent Labs   Lab 12/29/20  0811 12/28/20  0628 12/24/20  0705   WBC 5.6 5.3  --    HGB 7.5* 7.0*  --    MCV 93 93  --    * 98*  --     139  --    POTASSIUM 4.2 4.2 3.7   CHLORIDE 105 102  --    CO2 33* 34*  --    BUN 34* 31*  --    CR 1.46* 1.46*  --    ANIONGAP 2* 3  --    MARTINE 8.4* 8.3*  --    GLC 88 92  --    ALBUMIN 2.0*  --   --    PROTTOTAL 7.1  --   --    BILITOTAL 0.3  --   --    ALKPHOS 90  --   --    ALT 30  --   --    AST 26  --   --      No results found for this or any previous visit (from the past 24 hour(s)).

## 2020-12-30 NOTE — PLAN OF CARE
A/O x 4. VSS, on 3L NC at baseline. Pain managed with PO dilaudid. Dressing CDI. Incontinent at times by choice of B&B. Bed bound by choice.  Awaiting placement.

## 2020-12-30 NOTE — PROGRESS NOTES
Care Management Follow Up    Length of Stay (days): 28    Expected Discharge Date: 12/31/20  Concerns to be Addressed: discharge planning     Patient plan of care discussed at interdisciplinary rounds: Yes    Anticipated Discharge Disposition: Home, Home Care     Anticipated Discharge Services:    Anticipated Discharge DME: Oxygen, Wheelchair(resuming O2/DME and HHC as prior to hosp  stay)    Patient/family educated on Medicare website which has current facility and service quality ratings: no  Education Provided on the Discharge Plan:    Patient/Family in Agreement with the Plan: yes    Referrals Placed by CM/SW: Home Infusion, Transportation  Private pay costs discussed: Not applicable    Additional Information:  Writer spoke to admissions with [facility] and sent referral for TCU. Explained patient current situation.     Update: Call received from [facility]. Patient has been accepted and will let writer know if for today or tomorrow. Writer spoke to patient who reported he does not want to go there. Writer explained that this is the only TCU that has accepted at this time.    Update: Wheelchair ride scheduled for 12/31/20 @ 1100 per request of facility. Call placed to facility to update. Writer was informed that tomorrow is their COVID clinic and if patient is interested he will be able to receive the vaccine.    Will continue to follow for discharge planning      DIMITRI Drake

## 2020-12-30 NOTE — PROGRESS NOTES
[id number], Pt. A&O, vss, taking po dilaudid for pain, dressing CDI, had x2 bm this shift,  awaiting to work with PT for placement, will continue to monitor.

## 2020-12-30 NOTE — PLAN OF CARE
PT: Attempted to see pt for re-evaluation. Pt currently refusing despite encouragement due to complaints of right leg pain. RN notified and discussed with care management team and charge RN.

## 2020-12-31 VITALS
RESPIRATION RATE: 18 BRPM | DIASTOLIC BLOOD PRESSURE: 58 MMHG | WEIGHT: 143.3 LBS | HEART RATE: 73 BPM | TEMPERATURE: 99.4 F | SYSTOLIC BLOOD PRESSURE: 136 MMHG | BODY MASS INDEX: 23.88 KG/M2 | OXYGEN SATURATION: 97 % | HEIGHT: 65 IN

## 2020-12-31 DIAGNOSIS — G89.4 CHRONIC PAIN SYNDROME: Primary | ICD-10-CM

## 2020-12-31 DIAGNOSIS — R52 PAIN: ICD-10-CM

## 2020-12-31 DIAGNOSIS — J18.9 PNEUMONIA OF RIGHT LOWER LOBE DUE TO INFECTIOUS ORGANISM: ICD-10-CM

## 2020-12-31 LAB
ANION GAP SERPL CALCULATED.3IONS-SCNC: 3 MMOL/L (ref 3–14)
BUN SERPL-MCNC: 36 MG/DL (ref 7–30)
CALCIUM SERPL-MCNC: 8.5 MG/DL (ref 8.5–10.1)
CHLORIDE SERPL-SCNC: 105 MMOL/L (ref 94–109)
CO2 SERPL-SCNC: 31 MMOL/L (ref 20–32)
CREAT SERPL-MCNC: 1.51 MG/DL (ref 0.66–1.25)
ERYTHROCYTE [DISTWIDTH] IN BLOOD BY AUTOMATED COUNT: 13.9 % (ref 10–15)
GFR SERPL CREATININE-BSD FRML MDRD: 47 ML/MIN/{1.73_M2}
GLUCOSE SERPL-MCNC: 90 MG/DL (ref 70–99)
HCT VFR BLD AUTO: 22.4 % (ref 40–53)
HGB BLD-MCNC: 7.2 G/DL (ref 13.3–17.7)
MCH RBC QN AUTO: 29.6 PG (ref 26.5–33)
MCHC RBC AUTO-ENTMCNC: 32.1 G/DL (ref 31.5–36.5)
MCV RBC AUTO: 92 FL (ref 78–100)
PLATELET # BLD AUTO: 85 10E9/L (ref 150–450)
POTASSIUM SERPL-SCNC: 4 MMOL/L (ref 3.4–5.3)
RBC # BLD AUTO: 2.43 10E12/L (ref 4.4–5.9)
SODIUM SERPL-SCNC: 139 MMOL/L (ref 133–144)
WBC # BLD AUTO: 6.2 10E9/L (ref 4–11)

## 2020-12-31 PROCEDURE — 250N000013 HC RX MED GY IP 250 OP 250 PS 637: Performed by: INTERNAL MEDICINE

## 2020-12-31 PROCEDURE — 36415 COLL VENOUS BLD VENIPUNCTURE: CPT | Performed by: HOSPITALIST

## 2020-12-31 PROCEDURE — 99239 HOSP IP/OBS DSCHRG MGMT >30: CPT | Performed by: INTERNAL MEDICINE

## 2020-12-31 PROCEDURE — 250N000013 HC RX MED GY IP 250 OP 250 PS 637: Performed by: PSYCHIATRY & NEUROLOGY

## 2020-12-31 PROCEDURE — 85027 COMPLETE CBC AUTOMATED: CPT | Performed by: HOSPITALIST

## 2020-12-31 PROCEDURE — 999N000147 HC STATISTIC PT IP EVAL DEFER

## 2020-12-31 PROCEDURE — 80048 BASIC METABOLIC PNL TOTAL CA: CPT | Performed by: HOSPITALIST

## 2020-12-31 RX ORDER — OXYCODONE HYDROCHLORIDE 5 MG/1
5 TABLET ORAL EVERY 6 HOURS PRN
Qty: 10 TABLET | Refills: 0 | Status: SHIPPED | OUTPATIENT
Start: 2020-12-31 | End: 2020-12-31

## 2020-12-31 RX ORDER — LOPERAMIDE HCL 2 MG
2 CAPSULE ORAL 4 TIMES DAILY PRN
Qty: 15 CAPSULE | Refills: 0 | DISCHARGE
Start: 2020-12-31

## 2020-12-31 RX ORDER — MORPHINE SULFATE 10 MG/5ML
2.5 SOLUTION ORAL 2 TIMES DAILY PRN
Qty: 100 ML | Refills: 0 | Status: SHIPPED | OUTPATIENT
Start: 2020-12-31 | End: 2021-01-05

## 2020-12-31 RX ORDER — GABAPENTIN 300 MG/1
300 CAPSULE ORAL 3 TIMES DAILY
Qty: 12 CAPSULE | Refills: 0 | Status: SHIPPED | OUTPATIENT
Start: 2020-12-31 | End: 2021-01-15

## 2020-12-31 RX ORDER — AMOXICILLIN AND CLAVULANATE POTASSIUM 500; 125 MG/1; MG/1
1 TABLET, FILM COATED ORAL EVERY 12 HOURS
Qty: 27 TABLET | Refills: 0 | Status: SHIPPED | OUTPATIENT
Start: 2020-12-31 | End: 2021-01-14

## 2020-12-31 RX ORDER — AMLODIPINE BESYLATE 5 MG/1
5 TABLET ORAL DAILY
Qty: 30 TABLET | Refills: 0 | Status: ON HOLD | DISCHARGE
Start: 2020-12-31 | End: 2021-02-15

## 2020-12-31 RX ORDER — OXYCODONE HYDROCHLORIDE 5 MG/1
5 TABLET ORAL EVERY 6 HOURS PRN
Qty: 60 TABLET | Refills: 0 | Status: SHIPPED | OUTPATIENT
Start: 2020-12-31 | End: 2021-01-05 | Stop reason: ALTCHOICE

## 2020-12-31 RX ORDER — MULTIPLE VITAMINS W/ MINERALS TAB 9MG-400MCG
1 TAB ORAL DAILY
Qty: 30 TABLET | Refills: 0 | DISCHARGE
Start: 2020-12-31

## 2020-12-31 RX ADMIN — AMOXICILLIN AND CLAVULANATE POTASSIUM 1 TABLET: 500; 125 TABLET, FILM COATED ORAL at 02:33

## 2020-12-31 RX ADMIN — GABAPENTIN 200 MG: 100 CAPSULE ORAL at 08:38

## 2020-12-31 RX ADMIN — MULTIPLE VITAMINS W/ MINERALS TAB 1 TABLET: TAB at 08:38

## 2020-12-31 RX ADMIN — OMEPRAZOLE 20 MG: 20 CAPSULE, DELAYED RELEASE ORAL at 08:38

## 2020-12-31 RX ADMIN — FLUTICASONE FUROATE AND VILANTEROL TRIFENATATE 1 PUFF: 200; 25 POWDER RESPIRATORY (INHALATION) at 08:41

## 2020-12-31 RX ADMIN — DULOXETINE HYDROCHLORIDE 60 MG: 60 CAPSULE, DELAYED RELEASE ORAL at 08:38

## 2020-12-31 RX ADMIN — TAMSULOSIN HYDROCHLORIDE 0.8 MG: 0.4 CAPSULE ORAL at 08:38

## 2020-12-31 RX ADMIN — HYDROMORPHONE HYDROCHLORIDE 2 MG: 2 TABLET ORAL at 08:40

## 2020-12-31 RX ADMIN — FUROSEMIDE 40 MG: 40 TABLET ORAL at 08:38

## 2020-12-31 RX ADMIN — AMLODIPINE BESYLATE 5 MG: 5 TABLET ORAL at 08:38

## 2020-12-31 RX ADMIN — HYDROCORTISONE 20 MG: 20 TABLET ORAL at 08:38

## 2020-12-31 ASSESSMENT — ACTIVITIES OF DAILY LIVING (ADL)
ADLS_ACUITY_SCORE: 24

## 2020-12-31 NOTE — PLAN OF CARE
PT: Chart reviewed. Per  note, pt plans to discharge to [facility] today at 1100. Will defer PT eval to the next level of care. Will sign off and complete the order.

## 2020-12-31 NOTE — PLAN OF CARE
A/O x 4. VSS, on 3L NC at baseline. Pain managed with PO dilaudid. Dressing CDI. Incontinent at times by choice of B&B. Discharge to TCU today.

## 2020-12-31 NOTE — DISCHARGE SUMMARY
[facility]    Discharge Summary  Hospitalist    Date of Admission:  12/2/2020  Date of Discharge:  12/31/2020 11:35 AM  Discharging Provider: Davonte Naranjo MD  Date of Service (when I saw the patient): 12/31/20    Discharge Diagnoses   Please refer below     History of Present Illness   Perfecto Reese is an 70 year old male who presented with Pneumonia     Hospital Course           Perfecto Reese is a 70 year old male with complex PMHx including severe COPD on 3L home O2 with ongoing tobacco use, adrenal insufficiency on chronic steroids, psoriasis on Humira, chronic Hep C with cirrhosis, PVD s/p left BKA, chronic wounds, and non-compliance who was admitted on 12/2/2020 for pneumonia, found to have a RLL lung abscess.                  EMS activated by his home care RN for fever, cough, and acute respiratory distress (placed on 10L per OM in the field). Fever to 102.5, tachycardia, tachypnea, and leukocytosis (17.2k) present on arrival. CXR on arrival showed RLL infiltrate and CT abd/pelvis showed air fluid cavity at the right lung base. On admission, he was initiated on IV pip-tazo and azithromycin, and Thoracic Surgery was consulted. Chest CT was ordered (12/3) which showed a 6.4 cm RLL pulmonary abscess and extensive left-sided mucus plugging with associated infiltrate. 12/2 blood cultures x2 no growth. Sputum cx 12/4 growing proteus, fluoroquinolone resistant. Also corynebacterium striatum 12/6.                CT surgery saw, no surgical intervention indicated. Patient was treated with IV then PO antibiotics, with the assistance of infectious disease. Initially on zosyn->unasyn-> transitioned 12/8 to Augmentin x 6 weeks (through approximately 1/13/2021).  - Repeat CT scan in 4 weeks (~1/8/2021).  - O2 weaned back to baseline of 3 lpm  - PT- rec TCU, patient refusing initially.     I had a long discussion with the patient regarding his care after discharge from the hospital, and  recommend that he should consider TCU as recommended by the TCU till he able to take care of himself, consider home health if no TCU on discharge.   Patient has not much insight into his discharge plan initially,   as per my evaluation I don't think he is safe to be discharge home as look least interested in taking care of himself. He refused to work with PT and OT.  But he has decision-making capacity.  Patient was reevaluated by psychiatrist and as per their evaluation he has decision-making capacity.     Now agree to go to the TCU, SW on board and working on disposition.  Repeat chest x-ray shows improvement in his pneumonia, no pleural effusion, EKG no acute ischemic changes serial troponins remain negative.  His chest pain is musculoskeletal and reproducible nature unlikely to be cardiac in origin, [place] him, ask him to use tylenol for pain control and us IS and flutter.   Ribs xray negative for fracture.      - patient appears to have been medically stable for the better part of two weeks now; in the past few days he has been strongly encouraged and pushed to participate with physical therapy.  He agreed to this but on 12/30 appears to again declined.  Greatly appreciate repeated attempts to work with patient by therapy.      He needs to continue taking his Augmentin to complete the course.  Follow up with ID and Thoracic surgery as schedule.   Discharge TCU in stable condition today.     Final Discharge Diagnosis and Hospital Course.      Acute on chronic hypoxic and hypercarbic respiratory failure  Bilateral community-acquired bacterial pneumonia with RLL abscess   Severe COPD  Musculoskeletal chest pain   Admitted with fever, cough, and acute respiratory distress (placed on 10L per OM in the field). Fever to 102.5, tachycardia, tachypnea, and leukocytosis (17.2k) present on arrival. CXR on arrival showed RLL infiltrate and CT abd/pelvis showed air fluid cavity at the right lung base. On admission, he was  initiated on IV pip-tazo and azithromycin, and Thoracic Surgery was consulted. Chest CT was ordered (12/3) which showed a 6.4 cm RLL pulmonary abscess and extensive left-sided mucus plugging with associated infiltrate. 12/2 blood cultures x2 no growth. Sputum cx 12/4 growing proteus, fluoroquinolone resistant. Also corynebacterium striatum 12/6. CT surgery saw, no surgical intervention indicated. Patient was treated with IV then PO antibiotics, with the assistance of infectious disease. Initially on zosyn -> unasyn -> transitioned 12/8 to Augmentin x 6 weeks (through approximately 1/13/2021).  - Infectious disease peripherally following  - Repeat CT scan in 4 weeks (~1/8/2021)  - O2 weaned back to baseline of 3 lpm     Chronic normocytic anemia, likely ACD  Chronic thrombocytopenia  Anemia and thrombocytopenia likely multifactorial from liver cirrhosis, splenomegaly, renal disease, CKD and psoriasis. Iron labs suggestive of anemia of chronic disease. 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 Patient required four units of pRBCs this admission for Hgb < 7.0. No acute GI bleed identified, likely blood loss from decreased erythropoiesis. Appreciate GI consult 12/16/2020, no acute GI bleed, no acute interventions, [facility]  - Check hemoglobin periodically while inpatient - 7.0 12/28 - clinically stable and no bleeding noted.  - recheck hgb 7.5 12/29     Acute kidney injury on CKD 3, improved  Asymptomatic pyuria  Baseline creatinine 1.2-1.3 in Jan 2020, on admission 2.89. Suspect pre-renal state in setting of pneumonia/sepsis with possible progression to ATN in setting of hypotension. Improved with IV fluids, creatinine down to 1.5 on 12/16, now recheck shows improvement to 1.49 and calculated GFR is 36. Creatinne back to baseline of 1.3 on 12/23/2020.  - Restarted PTA losartan at lowest dose at  discharge 25mg daily  - Appreciate Nephrology consul t-- lasix 40 mg daily continued  - [facility]     Chronic adrenal insufficiency  - Continues on PTA hydrocortisone 20 mg PO daily     Essential hypertension  Amlodipine decreased to 5mg daily due to soft blood pressures  - Restarted PTA losartan at lowest dose at discharge 25mg daily     History of psoriasis   - Holding PTA Humira this admission--continue to hold at discharge  - Follow up with outpatient provider     Peripheral vascular disease s/p left BKA  Chronic RLE wound  Chronic left ear wound  Present on admission. Receives wound cares per home care RN.      Chronic pain syndrome  Resume PTA regimen (gabapentin 300 mg TID, duloxetine 30 mg daily, lidocaine patch, oxycodone 5 mg q6h prn, morphine 2.5 mg BID prn)     Non-severe malnutrition   Encourage PO intake and supplements. Nutrition support appreciated.           Diet: Regular Diet Adult  Snacks/Supplements Adult: Boost Plus; Between Meals  Diet    DVT Prophylaxis: Pneumatic Compression Devices  Bruce Catheter: not present  Code Status: Full Code        Davonte Naranjo MD, MD    Significant Results and Procedures       Pending Results   These results will be followed up by PCP  Unresulted Labs Ordered in the Past 30 Days of this Admission     No orders found from 11/2/2020 to 12/3/2020.          Code Status   Full Code       Primary Care Physician   Polo Neil    Physical Exam   Temp: 99.4  F (37.4  C) Temp src: Oral BP: 136/58 Pulse: 73   Resp: 18 SpO2: 97 % O2 Device: Nasal cannula Oxygen Delivery: 3 LPM  Vitals:    12/27/20 0539 12/29/20 0500 12/30/20 0400   Weight: 67.2 kg (148 lb 2.4 oz) 64.6 kg (142 lb 6.7 oz) 65 kg (143 lb 4.8 oz)     Vital Signs with Ranges  Temp:  [97.9  F (36.6  C)-99.4  F (37.4  C)] 99.4  F (37.4  C)  Pulse:  [73-83] 73  Resp:  [16-18] 18  BP: (130-136)/(48-58) 136/58  SpO2:  [95 %-97 %] 97 %  I/O last 3 completed shifts:  In: 450 [P.O.:450]  Out:  2400 [Urine:2400]    Constitutional: awake, alert, cooperative, no apparent distress, and appears stated age  Eyes: Lids and lashes normal, pupils equal, round and reactive to light, extra ocular muscles intact, sclera clear, conjunctiva normal  Respiratory: No increased work of breathing, good air exchange, clear to auscultation bilaterally, no crackles or wheezing  Cardiovascular: Normal apical impulse, regular rate and rhythm, normal S1 and S2, no S3 or S4, and no murmur noted  GI: No scars, normal bowel sounds, soft, non-distended, non-tender, no masses palpated, no hepatosplenomegally  Neurologic: no focal deficit.     Discharge Disposition   Discharged to short-term care facility  Condition at discharge: Stable    Consultations This Hospital Stay   THORACIC SURGERY IP CONSULT  PHYSICAL THERAPY ADULT IP CONSULT  WOUND OSTOMY CONTINENCE NURSE  IP CONSULT  INFECTIOUS DISEASES IP CONSULT  CARE MANAGEMENT / SOCIAL WORK IP CONSULT  HEMATOLOGY & ONCOLOGY IP CONSULT  NEPHROLOGY IP CONSULT  GASTROENTEROLOGY IP CONSULT  PSYCHIATRY IP CONSULT  PHYSICAL THERAPY ADULT IP CONSULT  OCCUPATIONAL THERAPY ADULT IP CONSULT  SOCIAL WORK IP CONSULT  PSYCHIATRY IP CONSULT  PHYSICAL THERAPY ADULT IP CONSULT  PHYSICAL THERAPY ADULT IP CONSULT  OCCUPATIONAL THERAPY ADULT IP CONSULT    Time Spent on this Encounter   I, Davonte Naranjo MD, personally saw the patient today and spent greater than 30 minutes discharging this patient.    Discharge Orders      CT Chest w contrast*     Reason for your hospital stay    You were hospitalized for a severe lung pneumonia which turned into an abscess. You are on antibiotics for several weeks.     Activity    Your activity upon discharge: activity as tolerated in house     Discharge Instructions     Follow-up and recommended labs and tests     Follow up with primary care provider, Polo Neil, within 7 days for hospital follow- up.  Check BMP and CBC at follow up.  - Please obtain a repeat CT chest  around 1/8/2020 for follow up of your pneumonia.  - [facility]  - [facility]     General info for SNF    Length of Stay Estimate: Short Term Care: Estimated # of Days <30  Condition at Discharge: Stable  Level of care:skilled   Rehabilitation Potential: Good  Admission H&P remains valid and up-to-date: Yes  Recent Chemotherapy: N/A  Use Nursing Home Standing Orders: Yes     Mantoux instructions    Give two-step Mantoux (PPD) Per Facility Policy Yes     Reason for your hospital stay    Right lung abscess     Intake and output    Every shift     Daily weights    Call Provider for weight gain of more than 2 pounds per day or 5 pounds per week.     Follow Up and recommended labs and tests    Follow up with care home physician.  The following labs/tests are recommended: cbc, bmp.     Activity - Up with assistive device     Activity - Up with nursing assistance     Full Code     Physical Therapy Adult Consult    Evaluate and treat as clinically indicated.    Reason:  weakness     Occupational Therapy Adult Consult    Evaluate and treat as clinically indicated.    Reason: weakness     Oxygen Adult/Peds    Oxygen Documentation:   I certify that this patient, Perfecto Reese has been under my care (or a nurse practitioner or physican's assistant working with me). This is the face-to-face encounter for oxygen medical necessity.      Perfecto Reese is now in a chronic stable state and continues to require supplemental oxygen. Patient has continued oxygen desaturation due to Chronic Respiratory Failure with Hypoxia J93.11.    Alternative treatment(s) tried or considered and deemed clinically infective for treatment of Chronic Respiratory Failure with Hypoxia J93.11 include nebulizers and inhalers.  If portability is ordered, is the patient mobile within the home? yes    **Patients who qualify for home O2 coverage under the CMS guidelines require ABG tests or O2 sat  readings obtained closest to, but no earlier than 2 days prior to the discharge, as evidence of the need for home oxygen therapy. Testing must be performed while patient is in the chronic stable state. See notes for O2 sats.**     Oxygen Adult/Peds     Diet    Follow this diet upon discharge:      Regular Diet Adult     Advance Diet as Tolerated    Follow this diet upon discharge: Orders Placed This Encounter      Snacks/Supplements Adult: Boost Plus; Between Meals      Regular Diet Adult      Diet     Discharge Medications   Discharge Medication List as of 12/31/2020 10:54 AM      START taking these medications    Details   furosemide (LASIX) 40 MG tablet Take 1 tablet (40 mg) by mouth daily, Disp-30 tablet, R-0, E-Prescribe         CONTINUE these medications which have CHANGED    Details   amLODIPine (NORVASC) 5 MG tablet Take 1 tablet (5 mg) by mouth daily, Disp-30 tablet, R-0, Transitional      amoxicillin-clavulanate (AUGMENTIN) 500-125 MG tablet Take 1 tablet by mouth every 12 hours for 13 days, Disp-27 tablet, R-0, Local Print      gabapentin (NEURONTIN) 300 MG capsule Take 1 capsule (300 mg) by mouth 3 times daily, Disp-12 capsule, R-0, Local Print      loperamide (IMODIUM) 2 MG capsule Take 1 capsule (2 mg) by mouth 4 times daily as needed for diarrhea, Disp-15 capsule, R-0, Transitional      multivitamin w/minerals (THERA-VIT-M) tablet Take 1 tablet by mouth daily, Disp-30 tablet, R-0, Transitional      oxyCODONE (ROXICODONE) 5 MG tablet Take 1 tablet (5 mg) by mouth every 6 hours as needed for severe pain Separate from morphine by 4 hours, Disp-10 tablet, R-0, Local Print         CONTINUE these medications which have NOT CHANGED    Details   albuterol (PROAIR HFA/PROVENTIL HFA/VENTOLIN HFA) 108 (90 Base) MCG/ACT inhaler Inhale 2 puffs into the lungs every 4 hours as needed for shortness of breath / dyspnea or wheezing, HistoricalPharmacy may dispense brand covered by insurance (Proair, or proventil or  ventolin or generic albuterol inhaler)      aspirin 81 MG EC tablet Take 81 mg by mouth daily, Historical      budesonide-formoterol (SYMBICORT) 160-4.5 MCG/ACT Inhaler Inhale 2 puffs into the lungs 2 times daily, Historical      calcium citrate (CITRACAL) 950 MG tablet Take 4 tablets by mouth daily, Historical      clobetasol (TEMOVATE) 0.05 % external solution Apply topically At Bedtime Apply to scalpHistorical      diclofenac (VOLTAREN) 1 % topical gel Apply 4 g topically every 6 hours as needed for moderate pain (R shoulder), Historical      DULoxetine (CYMBALTA) 30 MG capsule Take 30 mg by mouth daily, Historical      Ferrous Gluconate 324 (37.5 Fe) MG TABS Take 324 mg by mouth every other day, Historical      hydrocortisone (CORTEF) 20 MG tablet Take 20 mg by mouth every morning, Historical      lidocaine (LIDODERM) 5 % patch Place 1 patch onto the skin every 24 hours To prevent lidocaine toxicity, patient should be patch free for 12 hrs daily.  Apply to right shoulderHistorical      lidocaine (XYLOCAINE) 5 % external ointment Apply topically 3 times daily as needed for moderate pain (rib pain)Historical      losartan (COZAAR) 25 MG tablet Take 12.5 mg by mouth daily, Historical      MENTHOL-METHYL SALICYLATE EX Externally apply topically 2 times daily Menthol-methyl saliycylate 10-15% cream twice daily to lower back, Historical      morphine 10 MG/5ML solution Take 2.5 mg by mouth 2 times daily as needed for severe pain (with dressing change), Historical      nicotine (NICORETTE) 2 MG gum Place 2 mg inside cheek as needed for smoking cessation, Historical      omeprazole (PRILOSEC) 20 MG DR capsule Take 20 mg by mouth daily, Historical      primidone (MYSOLINE) 50 MG tablet Take 50 mg by mouth At Bedtime, Historical      sodium hypochlorite (HYSEPT) external solution Irrigate with as directed daily 0.25% for wound careHistorical      tamsulosin (FLOMAX) 0.4 MG capsule Take 0.8 mg by mouth daily, Historical       tiotropium (SPIRIVA) 18 MCG inhaled capsule Inhale 18 mcg into the lungs daily, Historical      triamcinolone (KENALOG) 0.1 % external cream Apply topically as needed for irritation (wound care)Historical      vitamin B-12 (CYANOCOBALAMIN) 1000 MCG tablet Take 1,000 mcg by mouth daily, Historical      Vitamin D, Cholecalciferol, 25 MCG (1000 UT) TABS Take 1,000 Units by mouth 2 times daily, Historical         STOP taking these medications       adalimumab (HUMIRA) 40 MG/0.8ML prefilled syringe kit Comments:   Reason for Stopping:             Allergies   Allergies   Allergen Reactions     Darvocet [Propoxyphene N-Apap] Nausea     Vicodin [Hydrocodone-Acetaminophen] Nausea     Can use if he eats with it     Data   Most Recent 3 CBC's:  Recent Labs   Lab Test 12/31/20  0730 12/29/20  0811 12/28/20  0628   WBC 6.2 5.6 5.3   HGB 7.2* 7.5* 7.0*   MCV 92 93 93   PLT 85* 117* 98*      Most Recent 3 BMP's:  Recent Labs   Lab Test 12/31/20  0730 12/29/20  0811 12/28/20  0628    140 139   POTASSIUM 4.0 4.2 4.2   CHLORIDE 105 105 102   CO2 31 33* 34*   BUN 36* 34* 31*   CR 1.51* 1.46* 1.46*   ANIONGAP 3 2* 3   MARTINE 8.5 8.4* 8.3*   GLC 90 88 92     Most Recent 2 LFT's:  Recent Labs   Lab Test 12/29/20  0811 12/02/20  1845   AST 26 6   ALT 30 8   ALKPHOS 90 98   BILITOTAL 0.3 0.7     Most Recent INR's and Anticoagulation Dosing History:  Anticoagulation Dose History     There is no flowsheet data to display.        Most Recent 3 Troponin's:  Recent Labs   Lab Test 12/22/20  1156 12/22/20  0028 12/21/20  1753   TROPI <0.015 <0.015 <0.015     Most Recent Cholesterol Panel:No lab results found.  Most Recent 6 Bacteria Isolates From Any Culture (See EPIC Reports for Culture Details):  Recent Labs   Lab Test 12/03/20  0540 12/03/20  0230 12/02/20  1904 12/02/20  1844   CULT Light growth  Normal adelina    Light growth  Proteus mirabilis  *  Moderate growth  Corynebacterium striatum  Identification obtained by MALDI-TOF mass  spectrometry research use only database. Test   characteristics determined and verified by the [facility]   ([facility]) [place], [place].  Susceptibility testing not routinely done  * <10,000 colonies/mL  mixed urogenital adelina  Susceptibility testing not routinely done   No growth No growth     Most Recent TSH, T4 and A1c Labs:No lab results found.  Results for orders placed or performed during the hospital encounter of 12/02/20   XR Chest Port 1 View    Narrative    XR CHEST PORT 1 VW  12/2/2020 7:25 PM       INDICATION: Cough, fever, SOB  COMPARISON: 10/8/2015       Impression    IMPRESSION: Right lower lobe infiltrate consistent with pneumonia.  Lung volumes are low. No definite left lung infiltrate.    RAEANN MAYORGA MD   CT Abdomen Pelvis w/o Contrast    Narrative    CT ABDOMEN AND PELVIS WITHOUT CONTRAST 12/2/2020 9:08 PM    CLINICAL HISTORY: Left flank pain and fever.    TECHNIQUE: CT scan of the abdomen and pelvis was performed without IV  contrast. Multiplanar reformats were obtained. Dose reduction  techniques were used.    CONTRAST: None.    COMPARISON: None.    FINDINGS:   LOWER CHEST: Extensive airspace infiltrates in the right lower lobe  consistent with pneumonia. There is a 7.5 cm fluid collection with an  air-fluid level at the right lung base, which appears to be loculated  in the major fissure. Minimal infiltrate left lung base.    HEPATOBILIARY: Cirrhotic appearance of the liver. Cholelithiasis.    PANCREAS: Normal.    SPLEEN: Mild splenomegaly.    ADRENAL GLANDS: Normal.    KIDNEYS/BLADDER: Bilateral presumed renal cysts. No hydronephrosis or  urinary tract calculi.    BOWEL: Normal.    LYMPH NODES: Normal.    VASCULATURE: Unremarkable.    PELVIC ORGANS: Normal.    OTHER: None.    MUSCULOSKELETAL: Degenerative changes in the spine. Mild  chronic-appearing compression fracture of L1. Old left ischial  fracture. No discrete bone lesions.      Impression    IMPRESSION:    1.  Extensive right lower lobe pneumonia. Mild infiltrate in the left  lung base as well.  2.  Air-fluid cavity at the right lung base appears to be loculated in  the major fissure. Suggest thoracic surgery consultation.  3.  Cirrhosis with signs of portal hypertension including  splenomegaly. No ascites.    RAEANN MAYORGA MD   CT Chest w/o Contrast    Narrative    CT CHEST WITHOUT CONTRAST 12/3/2020 11:55 AM     HISTORY: Pneumonia, unresolved or complicated. 7.5 cm fluid collection  with air-fluid level seen on CT abdomen/pelvis.    COMPARISON: Abdomen and pelvis CT from December 2, 2020    TECHNIQUE: Volumetric helical acquisition of CT images of the chest  from the clavicles to the kidneys were acquired without IV contrast.  Radiation dose for this scan was reduced using automated exposure  control, adjustment of the mA and/or kV according to patient size, or  iterative reconstruction technique.    FINDINGS: Air-fluid level in a cavitary lesion in the right lower lobe  may represent a pulmonary abscess measuring 6.4 x 5.3 cm. Extensive  infiltrates throughout the right lower lobe. Mild dependent  atelectasis and/or additional infiltrate in the posterior aspect of  the right upper lobe. Trace right pleural fluid. Mild left lower lobe  atelectasis and/or infiltrate. Marked bronchial wall thickening and  bronchiectasis seen in the left lower lobe with extensive areas of  mucous plugging. Emphysema. A few mildly prominent lymph nodes are  seen in the mediastinum which are nonspecific and may be reactive in  this setting. No left pleural or pericardial effusion. Possible  splenomegaly in the upper abdomen. Question of nodular contour to the  liver which could indicate cirrhosis. Cholelithiasis without  cholecystitis.      Impression    IMPRESSION:  1. Air-fluid level in a cavitary lesion in the right lower lobe  measuring 6.4 x 5.3 cm, differential includes a pulmonary abscess.  2. Extensive consolidation in the  right lower lobe.  3. Extensive mucous plugging and bronchial wall thickening in the left  lower lobe with mild associated infiltrate.    LORRI TRINIDAD MD   XR Chest Port 1 View    Narrative    CHEST PORTABLE ONE VIEW   12/9/2020 6:54 PM     HISTORY: Hypoxia.    COMPARISON: Chest CT on 12/3/2020.      Impression    IMPRESSION: Single portable AP view of the chest was obtained. Stable  cardiomediastinal silhouette. Small mildly loculated pleural effusion  with associated basilar atelectasis/consolidation. Round lucency in  the right lung base, corresponds to the previously seen cavitary  lesion on 12/3/2020 CT. Left basilar pulmonary opacities, could  represent atelectasis versus infection. No significant pneumothorax.    PANDA HERRERA MD   XR Chest 2 Views    Narrative    CHEST TWO VIEWS December 21, 2020 1:17 PM     HISTORY: Chest pain, follow up lung abscess.    COMPARISON: Chest x-ray on 12/9/2020.      Impression    IMPRESSION: The AP and lateral views of the chest were obtained.  Stable cardiomediastinal silhouette. Apparent elevation of the right  hemidiaphragm versus right lower lobe consolidative pulmonary  opacities. The previously seen right lower lobe, cavitary lesion  appears less conspicuous on today's exam. No significant pleural  effusion or pneumothorax.    PANDA HERRERA MD   XR Ribs & Chest Right G/E 3 Views    Narrative    XR RIBS & CHEST RT 3VW   12/24/2020 2:43 PM     HISTORY: right sided chest pain    COMPARISON: 12/21/2020      Impression    IMPRESSION: Dense right lower lobe consolidation containing gas is  unchanged which may represent pulmonary abscess or necrotizing  pneumonia. Decreased other patchy infiltrates in the lower lungs.  Stable small right pleural effusion. Normal heart size and pulmonary  vascularity. No rib fracture.    VARGAS CHEW MD     Most Recent 3 CBC's:  Recent Labs   Lab Test 12/31/20  0730 12/29/20  0811 12/28/20  0628   WBC 6.2 5.6 5.3   HGB 7.2* 7.5*  7.0*   MCV 92 93 93   PLT 85* 117* 98*     Most Recent 3 BMP's:  Recent Labs   Lab Test 12/31/20  0730 12/29/20  0811 12/28/20  0628    140 139   POTASSIUM 4.0 4.2 4.2   CHLORIDE 105 105 102   CO2 31 33* 34*   BUN 36* 34* 31*   CR 1.51* 1.46* 1.46*   ANIONGAP 3 2* 3   MARTINE 8.5 8.4* 8.3*   GLC 90 88 92

## 2020-12-31 NOTE — PROGRESS NOTES
Care Management Discharge Note    Discharge Date: 12/31/20  Expected Time of Departure: Stretcher ride [facility]    Discharge Disposition: Transitional Care, Skilled Nursing Facilty, Long Term Care    Discharge Services:      Discharge DME: Oxygen, Wheelchair(resuming O2/DME and HHC as prior to hosp  stay)    Discharge Transportation: health plan transportation    Private pay costs discussed: Not applicable    PAS Confirmation Code: [id number]  Patient/family educated on Medicare website which has current facility and service quality ratings: no    Education Provided on the Discharge Plan:    Persons Notified of Discharge Plans: yes  Patient/Family in Agreement with the Plan: other (Pt prefer to remain in hospital)    Handoff Referral Completed: Yes    Additional Information:  Patient to discharge today. Paged for orders as it is anticipated that patient may appeal discharge.     Orders received for discharge today and faxed to facility. Patient informed of discharge per Dr. Naranjo and it is reported that patient did not refuse. Ride scheduled with [facility] for today at 1100 via stretcher. PAS completed.     PAS-RR    D: Per DHS regulation, SW completed and submitted PAS-RR to [place] [employer] via the [employer].  PAS-RR confirmation # is : [id number]    I: SW spoke with patient and they are aware a PAS-RR has been submitted.  SW reviewed with patient that they may be contacted for a follow up appointment within 10 days of hospital discharge if their SNF stay is < 30 days.  Contact information for [employer] was also provided.    A: patient verbalized understanding.    P: Further questions may be directed to [facility] Line at #[phone], option #4 for PAS-RR staff.             DIMITRI Drake

## 2020-12-31 NOTE — PLAN OF CARE
Pt. A&o forgetful at times, vss, taking po dilaudid for pain, dressing CDI, avs given to the pt., discharge package given to  and pt. Discharge to TCU at 1130 am.

## 2021-01-04 ENCOUNTER — OFFICE VISIT (OUTPATIENT)
Dept: INFUSION THERAPY | Facility: CLINIC | Age: 71
End: 2021-01-04
Attending: INTERNAL MEDICINE
Payer: COMMERCIAL

## 2021-01-04 ENCOUNTER — HOSPITAL ENCOUNTER (OUTPATIENT)
Facility: CLINIC | Age: 71
Setting detail: SPECIMEN
Discharge: HOME | End: 2021-01-04
Attending: INTERNAL MEDICINE | Admitting: INTERNAL MEDICINE
Payer: COMMERCIAL

## 2021-01-04 DIAGNOSIS — D50.9 IRON DEFICIENCY ANEMIA, UNSPECIFIED: ICD-10-CM

## 2021-01-04 DIAGNOSIS — D50.9 IRON DEFICIENCY ANEMIA, UNSPECIFIED: Primary | ICD-10-CM

## 2021-01-04 LAB
BASOPHILS # BLD AUTO: 0 10E9/L (ref 0–0.2)
BASOPHILS NFR BLD AUTO: 0.5 %
DIFFERENTIAL METHOD BLD: ABNORMAL
EOSINOPHIL # BLD AUTO: 0.5 10E9/L (ref 0–0.7)
EOSINOPHIL NFR BLD AUTO: 6.1 %
ERYTHROCYTE [DISTWIDTH] IN BLOOD BY AUTOMATED COUNT: 14.5 % (ref 10–15)
HCT VFR BLD AUTO: 24.7 % (ref 40–53)
HGB BLD-MCNC: 7.9 G/DL (ref 13.3–17.7)
IMM GRANULOCYTES # BLD: 0.1 10E9/L (ref 0–0.4)
IMM GRANULOCYTES NFR BLD: 1 %
LYMPHOCYTES # BLD AUTO: 0.8 10E9/L (ref 0.8–5.3)
LYMPHOCYTES NFR BLD AUTO: 10 %
MCH RBC QN AUTO: 29.5 PG (ref 26.5–33)
MCHC RBC AUTO-ENTMCNC: 32 G/DL (ref 31.5–36.5)
MCV RBC AUTO: 92 FL (ref 78–100)
MONOCYTES # BLD AUTO: 0.5 10E9/L (ref 0–1.3)
MONOCYTES NFR BLD AUTO: 6 %
NEUTROPHILS # BLD AUTO: 6.4 10E9/L (ref 1.6–8.3)
NEUTROPHILS NFR BLD AUTO: 76.4 %
NRBC # BLD AUTO: 0 10*3/UL
NRBC BLD AUTO-RTO: 0 /100
PLATELET # BLD AUTO: 107 10E9/L (ref 150–450)
RBC # BLD AUTO: 2.68 10E12/L (ref 4.4–5.9)
WBC # BLD AUTO: 8.4 10E9/L (ref 4–11)

## 2021-01-04 PROCEDURE — 85025 COMPLETE CBC W/AUTO DIFF WBC: CPT | Performed by: INTERNAL MEDICINE

## 2021-01-04 PROCEDURE — 36415 COLL VENOUS BLD VENIPUNCTURE: CPT

## 2021-01-04 NOTE — PROGRESS NOTES
Medical Assistant Note:  Perfecto Reese presents today for blood draw.    Patient seen by provider today: No.   present during visit today: Not Applicable.    Concerns: No Concerns.    Procedure:  Lab draw site: left hand, Needle type: bf, Gauge: 23.    Post Assessment:  Labs drawn without difficulty: Yes.    Discharge Plan:  Departure Mode: Wheelchair.    Face to Face Time: 5 min  .    Diane German, CMA

## 2021-01-04 NOTE — PROGRESS NOTES
[facility]  PRIMARY CARE PROVIDER AND CLINIC:  Polo Neil MD, [facility] / [facility] *  Chief Complaint   Patient presents with     Establish Care     [facility] Record Number:  [id number]  Place of Service where encounter took place:  [facility] ([facility]) [[id number]]    Perfecto Reese  is a 70 year old  (1950), admitted to the above facility from  [facility]. Hospital stay 12/2/20 through 12/31/20..  Admitted to this facility for  rehab, medical management and nursing care.    HPI:    HPI information obtained from: facility chart records, facility staff, patient report, [facility] chart review and Care Everywhere [facility] chart review.   Brief Summary of Hospital Course: 70 year old male with complex PMHx including severe COPD on 3L home O2 with ongoing tobacco use, adrenal insufficiency on chronic steroids, psoriasis on Humira, chronic Hep C with cirrhosis, PVD s/p left BKA, chronic wounds, and non-compliance who was admitted on 12/2/2020 for pneumonia, found to have a RLL lung abscess. EMS activated by his home care RN for fever, cough, and acute respiratory distress (placed on 10L per OM in the field). Fever to 102.5, tachycardia, tachypnea, and leukocytosis (17.2k) present on arrival. CXR on arrival showed RLL infiltrate and CT abd/pelvis showed air fluid cavity at the right lung base. On admission, he was initiated on IV pip-tazo and azithromycin, and Thoracic Surgery was consulted. Chest CT was ordered (12/3) which showed a 6.4 cm RLL pulmonary abscess and extensive left-sided mucus plugging with associated infiltrate. 12/2 blood cultures x2 no growth. Sputum cx 12/4 growing proteus, fluoroquinolone resistant. Also corynebacterium striatum 12/6. CT surgery saw, no surgical intervention indicated. Patient was treated with IV then PO antibiotics, with the assistance of infectious disease. Initially on zosyn->unasyn-> transitioned  12/8 to Augmentin x 6 weeks (through approximately 1/13/2021).    Updates on Status Since Skilled nursing Admission: Met with patient who continues to be drowsy and will fall asleep off/on during conversation. Per nursing staff, they really have to move him in order to rouse him awake. He denies any chest pain, palpitations, shortness of breath, ISIDRO, lightheadedness, or dizziness. Does have a productive cough. Coughing thick brownish green phlegm often. Denies any abdominal discomfort. Denies N&V. Denies B&B concerns. Denies dysuria or frequency. Denies loose or constipation. Appetite good. He states he is sleeping poorly, despite falling asleep during conversation today. He is seeking ambien orders which provider will NOT order given his chronic co-morbidities along with narcotic. Narcotic seeking behaviors current. Patient became upset when provider notified him of not prescribing ambien. Patient is adament that oxycodone is not helpful for pain and requests to change it to dilaudid which he was receiving in the hospital. Patient also does not believe he is currently on morphine PRN prior to wounds. Nursing staff reports that patient will complain of pain when asked during wound cares, however nursing staff has found patient sleeping during wound cares despite not receiving PRN  Morphine prior to wound cares which therefore provider can assume this is not painful to him as he verbalizes. Patient does have long extensive history of being noncompliant per notes.     BP Readings from Last 3 Encounters:   01/05/21 130/52   12/31/20 136/58   03/22/18 130/50     Wt Readings from Last 5 Encounters:   01/05/21 64.4 kg (141 lb 14.4 oz)   12/30/20 65 kg (143 lb 4.8 oz)   03/22/18 93 kg (205 lb)   01/21/16 70.3 kg (155 lb)   01/20/16 70.3 kg (155 lb)     CODE STATUS/ADVANCE DIRECTIVES DISCUSSION:   CPR/Full code   Patient's living condition: lives alone  ALLERGIES: Darvocet [propoxyphene n-apap] and Vicodin  [hydrocodone-acetaminophen]  PAST MEDICAL HISTORY:  has a past medical history of Adrenal insufficiency (H), Anemia, iron deficiency, Back pain, COPD (chronic obstructive pulmonary disease) (H), Depression, GERD (gastroesophageal reflux disease), Hyperlipidemia, and Migraine.  PAST SURGICAL HISTORY:   has a past surgical history that includes amputation below knee rt/lt (Left); tonsillectomy; as arthroscopy subtalar joint subtalar arthrodesis; and Open reduction internal fixation forearm.  FAMILY HISTORY: family history includes Chronic Obstructive Pulmonary Disease in his father; Colon Cancer in his father; Coronary Artery Disease in his father; Dementia in his mother; Osteoporosis in his mother.  SOCIAL HISTORY:   reports that he quit smoking about 3 years ago. His smoking use included cigarettes. He has a 15.00 pack-year smoking history. He has never used smokeless tobacco. He reports that he does not drink alcohol or use drugs.    Post Discharge Medication Reconciliation Status: discharge medications reconciled and changed, per note/orders    Current Outpatient Medications   Medication Sig Dispense Refill     albuterol (PROAIR HFA/PROVENTIL HFA/VENTOLIN HFA) 108 (90 Base) MCG/ACT inhaler Inhale 2 puffs into the lungs every 4 hours as needed for shortness of breath / dyspnea or wheezing       amLODIPine (NORVASC) 5 MG tablet Take 1 tablet (5 mg) by mouth daily 30 tablet 0     amoxicillin-clavulanate (AUGMENTIN) 500-125 MG tablet Take 1 tablet by mouth every 12 hours for 13 days 27 tablet 0     aspirin 81 MG EC tablet Take 81 mg by mouth daily       calcium citrate (CITRACAL) 950 MG tablet Take 4 tablets by mouth daily       clobetasol (TEMOVATE) 0.05 % external solution Apply topically At Bedtime Apply to scalp       dextromethorphan-guaiFENesin (MUCINEX DM)  MG 12 hr tablet Take 1 tablet by mouth every 12 hours 60 tablet 1     diclofenac (VOLTAREN) 1 % topical gel Apply 4 g topically every 6 hours as  needed for moderate pain (R shoulder)       DULoxetine (CYMBALTA) 30 MG capsule Take 30 mg by mouth daily       Ferrous Gluconate 324 (37.5 Fe) MG TABS Take 324 mg by mouth every other day       fluticasone-vilanterol (BREO ELLIPTA) 200-25 MCG/INH inhaler Inhale 1 puff into the lungs daily 28 each 1     furosemide (LASIX) 40 MG tablet Take 1 tablet (40 mg) by mouth daily 30 tablet 0     gabapentin (NEURONTIN) 300 MG capsule Take 1 capsule (300 mg) by mouth 3 times daily 12 capsule 0     hydrocortisone (CORTEF) 20 MG tablet Take 20 mg by mouth every morning       HYDROmorphone (DILAUDID) 2 MG tablet Take 0.5 tablets (1 mg) by mouth every 6 hours as needed for pain Separate within 4 hours from PRN morphine 30 tablet 0     lidocaine (XYLOCAINE) 5 % external ointment Apply topically 3 times daily as needed for moderate pain (rib pain)       loperamide (IMODIUM) 2 MG capsule Take 1 capsule (2 mg) by mouth 4 times daily as needed for diarrhea 15 capsule 0     losartan (COZAAR) 25 MG tablet Take 12.5 mg by mouth daily       melatonin 3 MG tablet Take 2 tablets (6 mg) by mouth At Bedtime 60 tablet 1     MENTHOL-METHYL SALICYLATE EX Externally apply topically 2 times daily Menthol-methyl saliycylate 10-15% cream twice daily to lower back       morphine 10 MG/5ML solution Take 1.25 mLs (2.5 mg) by mouth daily as needed for severe pain (with dressing change) 100 mL 0     multivitamin w/minerals (THERA-VIT-M) tablet Take 1 tablet by mouth daily 30 tablet 0     nicotine (NICORETTE) 2 MG gum Place 2 mg inside cheek as needed for smoking cessation       omeprazole (PRILOSEC) 20 MG DR capsule Take 20 mg by mouth daily       primidone (MYSOLINE) 50 MG tablet Take 50 mg by mouth At Bedtime       tamsulosin (FLOMAX) 0.4 MG capsule Take 0.8 mg by mouth daily       umeclidinium (INCRUSE ELLIPTA) 62.5 MCG/INH inhaler Inhale 1 puff into the lungs daily 30 each 1     vitamin B-12 (CYANOCOBALAMIN) 1000 MCG tablet Take 1,000 mcg by mouth  "daily       Vitamin D, Cholecalciferol, 25 MCG (1000 UT) TABS Take 1,000 Units by mouth 2 times daily       ROS:  10 point ROS of systems including Constitutional, Eyes, Respiratory, Cardiovascular, Gastroenterology, Genitourinary, Integumentary, Musculoskeletal, Psychiatric were all negative except for pertinent positives noted in my HPI.    Vitals:  /52   Pulse 71   Temp 98  F (36.7  C)   Resp 17   Ht 1.651 m (5' 5\")   Wt 64.4 kg (141 lb 14.4 oz)   SpO2 95%   BMI 23.61 kg/m    Exam:  GENERAL APPEARANCE:  Alert, in no distress, oriented, cooperative  ENT:  Mouth and posterior oropharynx normal, moist mucous membranes, [place] DeHe  EYES:  EOM, conjunctivae, lids, pupils and irises normal  NECK:  No adenopathy,masses or thyromegaly  RESP:  respiratory effort and palpation of chest normal, no respiratory distress, crackles noted to RLL, clear to all other fields  CV:  Palpation and auscultation of heart done , regular rate and rhythm, no murmur, rub, or gallop, no edema, +2 pedal pulses, amputation to LLE  ABDOMEN:  normal bowel sounds, soft, nontender, no hepatosplenomegaly or other masses, no guarding or rebound  M/S:   wheelchair bound  SKIN:  Inspection of skin and subcutaneous tissue baseline, see picture below  NEURO:   Cranial nerves 2-12 are normal tested and grossly at patient's baseline, no purposeful movement in upper and lower extremities  PSYCH:  oriented X 3, memory impaired , affect and mood normal          Lab/Diagnostic data:    Most Recent 3 CBC's:  Recent Labs   Lab Test 01/04/21  0940 12/31/20  0730 12/29/20  0811   WBC 8.4 6.2 5.6   HGB 7.9* 7.2* 7.5*   MCV 92 92 93   * 85* 117*     Most Recent 3 BMP's:  Recent Labs   Lab Test 12/31/20  0730 12/29/20  0811 12/28/20  0628    140 139   POTASSIUM 4.0 4.2 4.2   CHLORIDE 105 105 102   CO2 31 33* 34*   BUN 36* 34* 31*   CR 1.51* 1.46* 1.46*   ANIONGAP 3 2* 3   MARTINE 8.5 8.4* 8.3*   GLC 90 88 92     Most Recent 2 LFT's:  Recent Labs   Lab " Test 12/29/20  0811 12/02/20  1845   AST 26 6   ALT 30 8   ALKPHOS 90 98   BILITOTAL 0.3 0.7     Most Recent 6 Bacteria Isolates From Any Culture (See EPIC Reports for Culture Details):  Recent Labs   Lab Test 12/03/20  0540 12/03/20  0230 12/02/20  1904 12/02/20  1844   CULT Light growth  Normal adelina    Light growth  Proteus mirabilis  *  Moderate growth  Corynebacterium striatum  Identification obtained by MALDI-TOF mass spectrometry research use only database. Test   characteristics determined and verified by the Infect[facility] Diagnostic [facility]   ([facility]) [place], [place].  Susceptibility testing not routinely done  * <10,000 colonies/mL  mixed urogenital adelina  Susceptibility testing not routinely done   No growth No growth     Most Recent Urinalysis:  Recent Labs   Lab Test 12/09/20  0945   COLOR Yellow   APPEARANCE Cloudy   URINEGLC Negative   URINEBILI Negative   URINEKETONE Negative   SG 1.015   UBLD Large*   URINEPH 5.5   PROTEIN 30*   NITRITE Negative   LEUKEST Large*   RBCU >182*   WBCU >182*     Most Recent Anemia Panel:  Recent Labs   Lab Test 01/04/21  0940 12/04/20  1526 12/04/20  1526 12/04/20  1245   WBC 8.4   < > 3.2* 3.5*   HGB 7.9*   < > 6.4* 6.1*   HCT 24.7*   < > 19.1* 18.6*   MCV 92   < > 93 94   *   < > 70* 74*   IRON  --   --   --  30*   IRONSAT  --   --   --  27   RETICABSCT  --   --  17.9*  --    RETP  --   --  0.9  --    FEB  --   --   --  112*   JÚNIOR  --   --   --  515*   B12  --   --   --  1,117*   FOLIC  --   --   --  6.7    < > = values in this interval not displayed.       ASSESSMENT/PLAN:  (R53.81) Physical deconditioning  (primary encounter diagnosis)  (M62.81) Generalized muscle weakness  Comment: Acute on chronic. S/T below diagnosis. Followed by [facility]. HISTORY of noncompliance.   Plan:   -Continue Physical therapy and Occupational therapy as directed  -SW to remain involved for safe discharge planning needs  -Would benefit from  intermediate setting, however patient declines at this time.     (J96.21,  J96.22) Acute on chronic respiratory failure with hypoxia and hypercapnia (H)  (J18.9) Pneumonia of both lungs due to infectious organism, unspecified part of lung  (J85.1) Abscess of lower lobe of right lung with pneumonia (H)  (J44.9) COPD, severe (H)  (R07.89) Musculoskeletal chest pain  Comment: Acute on chronic. Admitted with fever, cough, and acute respiratory distress (placed on 10L per OM in the field). Fever to 102.5, tachycardia, tachypnea, and leukocytosis (17.2k) present on arrival. CXR on arrival showed RLL infiltrate and CT abd/pelvis showed air fluid cavity at the right lung base. On admission, he was initiated on IV pip-tazo and azithromycin, and Thoracic Surgery was consulted. Chest CT was ordered (12/3) which showed a 6.4 cm RLL pulmonary abscess and extensive left-sided mucus plugging with associated infiltrate. 12/2 blood cultures x2 no growth. Sputum cx 12/4 growing proteus, fluoroquinolone resistant. Also corynebacterium striatum 12/6. CT surgery saw, no surgical intervention indicated. Patient was treated with IV then PO antibiotics, with the assistance of infectious disease. Initially on zosyn -> unasyn -> transitioned 12/8 to Augmentin x 6 weeks (through approximately 1/13/2021). - Infectious disease peripherally following. Patient was discharged on pulmicort and spiriva orders however patient came to facility with supply of incruse and breo which patient states this was changed in hospital. --unable to find any documentation to support this, however since patient has supply of breo and incruse will start per request.   Plan:   -Continue oxygen as directed which is chronic at 3L via NC.   -Monitor respiratory status  -Start mucinex 600mg BID.   -Start incentive spirometry and encourage every 4 hours while awake.   -Discontinue spiriva per patient request.   -Discontinue symbicort per patient request.   -Start  fluticasone-vilanterol (breo 200-25mcg inhaler 1 puff daily  -Start incruse ellipta 62.5mcg inhaler 1 puff daily.   -Continue albuterol inhaler PRN  -Follow up with ID as directed. Will assist with staff to schedule follow up need within 2 weeks.   -CT scan due as recommended in 4 weeks, approximately 1/8/21. Pending date of examination at this time.   -Continue Augmentin as directed. Due to complete 1/13/21  -CMP, CBC, TSH, magnesium, vitamin D, and vitamin B12 due 1/7/21    (D63.8) Anemia of chronic disease  (D69.3) Chronic idiopathic thrombocytopenia (H)  Comment: Acute on chronic. Anemia and thrombocytopenia likely multifactorial from liver cirrhosis, splenomegaly, renal disease, CKD and psoriasis. Iron labs suggestive of anemia of chronic disease. 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 Patient required four units of pRBCs this admission for Hgb < 7.0. No acute GI bleed identified, likely blood loss from decreased erythropoiesis. Appreciate GI consult 12/16/2020, no acute GI bleed, no acute interventions, [facility]. Check hemoglobin periodically while inpatient - 7.0 12/28 - clinically stable and no bleeding noted. recheck hgb 7.5 12/29 and again 7.9 on 1/4/21  Plan:   -Follow up with GI at [facility] as directed.   -Oncology with [facility] pending for tomorrow 1/6/21 via telephone.   -Recommend to follow up with hematology at [facility] post TCU as directed  -Monitor for worsening s/sx of concerns  -Monitor for active s/sx of bleeding risks.   -CMP, CBC, TSH, magnesium, vitamin D, and vitamin B12 due 1/7/21    (N18.30) Stage 3 chronic kidney disease, unspecified whether stage 3a or 3b CKD  (R82.81) Pyuria  Comment: Acute on chronic. Baseline creatinine 1.2-1.3 in Jan 2020, on admission 2.89. Suspect pre-renal state in setting of  pneumonia/sepsis with possible progression to ATN in setting of hypotension. Improved with IV fluids, creatinine down to 1.5 on 12/16, now recheck shows improvement to 1.49 and calculated GFR is 36. Creatinne back to baseline of 1.3 on 12/23/2020. Restarted PTA losartan at lowest dose at discharge 25mg daily. Appreciate Nephrology consul t-- lasix 40 mg daily continued  Plan:   -Follow up with nephrology at [facility] post TCU  -Monitor urinary status  -Continue current medications without change  -CMP, CBC, TSH, magnesium, vitamin D, and vitamin B12 due 1/7/21    (E27.40) Chronic adrenal insufficiency (H)  Comment: Chronic. Stable.   Plan:   -Monitor for worsening s/sx of concerns  -Continue PTA hydrocortisone 20mg daily  -Follow up with PCP at [facility] post TCU  -CMP, CBC, TSH, magnesium, vitamin D, and vitamin B12 due 1/7/21    (I10) Hypertension, unspecified type  Comment: Acute on chronic. Amlodipine decreased to 5mg daily due to soft blood pressures. Based on JNC-8 goals,  patients age of 70 year old, presence of diabetes or CKD, and goals of care goal BP is  <140/90 mm Hg. Patient is stable with current plan of care and routine assessment..  Plan:   -Continue amlodipine as directed  -Continue losartan as directed  -Monitor BP and HR  -Follow up with PCP post TCU  -CMP, CBC, TSH, magnesium, vitamin D, and vitamin B12 due 1/7/21    (Z87.2) Hx of psoriasis  Comment: Chronic. Holding PTA Humira this admission--continue to hold at discharge  Plan:   -Will monitor skin for ongoing concern   -Continue Clobetasol Propionate Lotion 0.05 % to scalp at HS as directed  -Discontinue triamcinolone cream due to non use.   -Follow up with PCP at [facility] post TCU  -May recommend dermatology in future if warranted.   -CMP, CBC, TSH, magnesium, vitamin D, and vitamin B12 due 1/7/21    (I73.9) PVD (peripheral vascular disease) (H)  (Z89.512)  S/P below knee amputation, left (H)  (E79.505D) Open wound of right lower extremity, subsequent encounter  (S01.302D) Open wound of left ear, unspecified open wound type, subsequent encounter  Comment: Chronic. Present on admission. Receives wound cares per home care RN. Followed by PCP at [facility]. Limited [facility] documentation. Followed WOCN in hospital. Did not admit with proper wound care orders   Plan:   -Follow up with PCP post TCU  -Would recommend wound clinic appt at [facility] post TCU  -Monitor skin for worsening s/sx of concerns.   -CMP, CBC, TSH, magnesium, vitamin D, and vitamin B12 due 1/7/21  -Continue current wound care orders until supplies are in for following new orders below  -Change wound care orders to the following:   *RLE  Wounds: Daily and PRN   1. Cleanse with Vashe moistened gauze to wound bed and let sit x 5 minutes - do not rinse. #[id number]  2. Dry and protect surrounding skin with no sting barrier film wipe #[id number],  3. Apply sween 24 to all intact skin from base of toes to below knees.  4. Apply Aquacel AG 4x4 #[id number] to wound bed  5. Cover with ABD   6. Secure with kerlix and tape  7. Time/Date/Initial dressing  8. Apply edema wear directly over this dressing from base of toes to below knees  9. Place Covidien pad underneath wound area and apply offloading boot  *Apply Edemawear from toes to knees with a cuff just below the knee-YOU WILL NEED TWO PEOPLE TO APPLY, BUNCH UP STOCKING & PULL AGAINST EACH OTHER   DO NOT THROW AWAY THE OLD PAIR OF EDEMAWEAR, WASH IT.   Wash stocking(s) in really hot water with surgical soap/ and or baby shampoo, may need to do this several times  Rinse then wash in baby shampoo  Hang dry  EdemaWear   size      Stripe color      PS # small       navy     [id number]    (G89.4) Chronic pain syndrome  Comment: Chronic. NOT AT GOAL. Patient wanting increase of pain medications.  Resume PTA upon discharge of regimen (gabapentin 300 mg TID, duloxetine 30 mg daily, lidocaine patch, oxycodone 5 mg q6h prn, morphine 2.5 mg BID prn)  Plan:   -Continue gabapentin 300mg TID  -Continue duloxetine 30mg daily. May benefit from increased dose to 60mg daily in future if warranted.   -Continue lidocaine topical as directed  -Continue menthol topical as directed  -Change PRN morphine to 2.5mg daily PRN only prior to wound cares. GDR prior to discharge if able.   -Discontinue PRN oxycodone.   -Start dilaudid 1mg every 6 hours PRN  -Monitor pain complaints  -Follow up with PCP post TCU  -Would recommend pain clinic referral given history chronic history.   -CMP, CBC, TSH, magnesium, vitamin D, and vitamin B12 due 1/7/21    (E44.1) Mild protein-calorie malnutrition (H)  Comment: Acute on chronic. Patient requests to have Ensure TID PRN  Plan:   -Monitor appetite  -Continue Ensure TID PRN  -Dietician to remain involved  -CMP, CBC, TSH, magnesium, vitamin D, and vitamin B12 due 1/7/21    (G47.00) Insomnia  Comment: Acute on chronic. Patient wanting increase of sleeping agent. HISTORY of being on ambien in the past per [facility] records. This was discontinued.   Plan:  -Increase melatonin to 6mg at HS.   -Monitor sleeping patterns. Patient currently is drowsy and falls asleep mid conversation.   -Would NOT recommend ambien given co-morbidities above.   -CMP, CBC, TSH, magnesium, vitamin D, and vitamin B12 due 1/7/21    Orders written by provider at facility    Total time spent with patient visit at the skilled nursing facility was 45 min including patient visit and review of past records. Greater than 50% of total time spent with counseling and coordinating care with nursing staff due to the complexities of their diagnoses, review of HPI, development of POC, assisting HUC on appointment schedules, and patient education and review of POC with patient which includes 20 min discussion  with patient on: current medications/orders changes, recent blood work results, continued discharge plan/needs, subsequent treatment plan while in TCU and thereafter, current pain control plan and controlled substances ordered. Along with discussion of prognosis, risks and benefits  Of treatments and importance of compliance with recommendations above.     Electronically signed by:  Lana Stevens DNP, APRN

## 2021-01-04 NOTE — LETTER
1/4/2021         RE: Perfecto Reese  [address]  [address]  [place] [id number]        Dear Colleague,    Thank you for referring your patient, Perfecto Reese, to the [facility]. Please see a copy of my visit note below.    Medical Assistant Note:  Perfecto Reese presents today for blood draw.    Patient seen by provider today: No.   present during visit today: Not Applicable.    Concerns: No Concerns.    Procedure:  Lab draw site: left hand, Needle type: bf, Gauge: 23.    Post Assessment:  Labs drawn without difficulty: Yes.    Discharge Plan:  Departure Mode: Wheelchair.    Face to Face Time: 5 min  .    Diane German [facility]              Again, thank you for allowing me to participate in the care of your patient.        Sincerely,         Lab Draw

## 2021-01-05 ENCOUNTER — NURSING HOME VISIT (OUTPATIENT)
Dept: GERIATRICS | Facility: CLINIC | Age: 71
End: 2021-01-05
Payer: MEDICARE

## 2021-01-05 VITALS
HEART RATE: 71 BPM | SYSTOLIC BLOOD PRESSURE: 130 MMHG | OXYGEN SATURATION: 95 % | TEMPERATURE: 98 F | BODY MASS INDEX: 23.64 KG/M2 | WEIGHT: 141.9 LBS | HEIGHT: 65 IN | RESPIRATION RATE: 17 BRPM | DIASTOLIC BLOOD PRESSURE: 52 MMHG

## 2021-01-05 DIAGNOSIS — E27.40 CHRONIC ADRENAL INSUFFICIENCY (H): ICD-10-CM

## 2021-01-05 DIAGNOSIS — I10 HYPERTENSION, UNSPECIFIED TYPE: ICD-10-CM

## 2021-01-05 DIAGNOSIS — Z89.512 S/P BELOW KNEE AMPUTATION, LEFT (H): ICD-10-CM

## 2021-01-05 DIAGNOSIS — J96.21 ACUTE ON CHRONIC RESPIRATORY FAILURE WITH HYPOXIA AND HYPERCAPNIA (H): ICD-10-CM

## 2021-01-05 DIAGNOSIS — D69.3 CHRONIC IDIOPATHIC THROMBOCYTOPENIA (H): ICD-10-CM

## 2021-01-05 DIAGNOSIS — N18.30 STAGE 3 CHRONIC KIDNEY DISEASE, UNSPECIFIED WHETHER STAGE 3A OR 3B CKD (H): ICD-10-CM

## 2021-01-05 DIAGNOSIS — R53.81 PHYSICAL DECONDITIONING: Primary | ICD-10-CM

## 2021-01-05 DIAGNOSIS — S81.801D OPEN WOUND OF RIGHT LOWER EXTREMITY, SUBSEQUENT ENCOUNTER: ICD-10-CM

## 2021-01-05 DIAGNOSIS — G89.4 CHRONIC PAIN SYNDROME: ICD-10-CM

## 2021-01-05 DIAGNOSIS — G47.00 INSOMNIA, UNSPECIFIED TYPE: ICD-10-CM

## 2021-01-05 DIAGNOSIS — J44.9 COPD, SEVERE (H): ICD-10-CM

## 2021-01-05 DIAGNOSIS — R07.89 MUSCULOSKELETAL CHEST PAIN: ICD-10-CM

## 2021-01-05 DIAGNOSIS — M62.81 GENERALIZED MUSCLE WEAKNESS: ICD-10-CM

## 2021-01-05 DIAGNOSIS — S01.302D OPEN WOUND OF LEFT EAR, UNSPECIFIED OPEN WOUND TYPE, SUBSEQUENT ENCOUNTER: ICD-10-CM

## 2021-01-05 DIAGNOSIS — R82.81 PYURIA: ICD-10-CM

## 2021-01-05 DIAGNOSIS — I73.9 PVD (PERIPHERAL VASCULAR DISEASE) (H): ICD-10-CM

## 2021-01-05 DIAGNOSIS — J18.9 PNEUMONIA OF BOTH LUNGS DUE TO INFECTIOUS ORGANISM, UNSPECIFIED PART OF LUNG: ICD-10-CM

## 2021-01-05 DIAGNOSIS — Z87.2 HX OF PSORIASIS: ICD-10-CM

## 2021-01-05 DIAGNOSIS — J85.1 ABSCESS OF LOWER LOBE OF RIGHT LUNG WITH PNEUMONIA (H): ICD-10-CM

## 2021-01-05 DIAGNOSIS — E44.1 MILD PROTEIN-CALORIE MALNUTRITION (H): ICD-10-CM

## 2021-01-05 DIAGNOSIS — J96.22 ACUTE ON CHRONIC RESPIRATORY FAILURE WITH HYPOXIA AND HYPERCAPNIA (H): ICD-10-CM

## 2021-01-05 DIAGNOSIS — D63.8 ANEMIA OF CHRONIC DISEASE: ICD-10-CM

## 2021-01-05 PROBLEM — K74.60 HEPATIC CIRRHOSIS (H): Status: ACTIVE | Noted: 2021-01-05

## 2021-01-05 PROBLEM — E27.49 IATROGENIC ADRENAL INSUFFICIENCY (H): Status: ACTIVE | Noted: 2021-01-05

## 2021-01-05 PROBLEM — M25.519 SHOULDER PAIN: Status: ACTIVE | Noted: 2021-01-05

## 2021-01-05 PROBLEM — F11.20 OPIOID DEPENDENCE (H): Status: ACTIVE | Noted: 2021-01-05

## 2021-01-05 PROBLEM — G54.7: Status: ACTIVE | Noted: 2021-01-05

## 2021-01-05 PROBLEM — F32.A DEPRESSIVE DISORDER: Status: ACTIVE | Noted: 2021-01-05

## 2021-01-05 PROCEDURE — 99310 SBSQ NF CARE HIGH MDM 45: CPT | Performed by: NURSE PRACTITIONER

## 2021-01-05 RX ORDER — LANOLIN ALCOHOL/MO/W.PET/CERES
3 CREAM (GRAM) TOPICAL AT BEDTIME
COMMUNITY
End: 2021-01-05

## 2021-01-05 RX ORDER — MORPHINE SULFATE 10 MG/5ML
2.5 SOLUTION ORAL DAILY PRN
Qty: 100 ML | Refills: 0 | Status: SHIPPED | OUTPATIENT
Start: 2021-01-05 | End: 2021-01-15

## 2021-01-05 RX ORDER — HYDROMORPHONE HYDROCHLORIDE 2 MG/1
1 TABLET ORAL EVERY 6 HOURS PRN
Qty: 30 TABLET | Refills: 0 | Status: SHIPPED | OUTPATIENT
Start: 2021-01-05 | End: 2021-01-15

## 2021-01-05 RX ORDER — LANOLIN ALCOHOL/MO/W.PET/CERES
6 CREAM (GRAM) TOPICAL AT BEDTIME
Qty: 60 TABLET | Refills: 1 | Status: SHIPPED | OUTPATIENT
Start: 2021-01-05 | End: 2021-02-25

## 2021-01-05 RX ORDER — GUAIFENESIN AND DEXTROMETHORPHAN HYDROBROMIDE 600; 30 MG/1; MG/1
1 TABLET, EXTENDED RELEASE ORAL EVERY 12 HOURS
Qty: 60 TABLET | Refills: 1 | Status: SHIPPED | OUTPATIENT
Start: 2021-01-05 | End: 2021-02-25

## 2021-01-05 ASSESSMENT — MIFFLIN-ST. JEOR: SCORE: 1340.53

## 2021-01-05 NOTE — LETTER
1/5/2021        RE: Perfecto Reese  [address]  [address]  [facility] [address]        [place] [facility]  PRIMARY CARE PROVIDER AND CLINIC:  Polo Neil MD, [facility] *  Chief Complaint   Patient presents with     Establish Care     [place] Medical Record Number:  [id number]  [facility] where encounter took [facility] [[id number]]    Perfecto Reese  is a 70 year old  (1950), admitted to the above facility from  [facility]. Hospital stay 12/2/20 through 12/31/20..  Admitted to this facility for  rehab, medical management and nursing care.    HPI:    HPI information obtained from: facility chart records, facility staff, patient report, [facility] chart review and Care Everywhere [facility] chart review.   Brief Summary of Hospital Course: 70 year old male with complex PMHx including severe COPD on 3L home O2 with ongoing tobacco use, adrenal insufficiency on chronic steroids, psoriasis on Humira, chronic Hep C with cirrhosis, PVD s/p left BKA, chronic wounds, and non-compliance who was admitted on 12/2/2020 for pneumonia, found to have a RLL lung abscess. EMS activated by his home care RN for fever, cough, and acute respiratory distress (placed on 10L per OM in the field). Fever to 102.5, tachycardia, tachypnea, and leukocytosis (17.2k) present on arrival. CXR on arrival showed RLL infiltrate and CT abd/pelvis showed air fluid cavity at the right lung base. On admission, he was initiated on IV pip-tazo and azithromycin, and Thoracic Surgery was consulted. Chest CT was ordered (12/3) which showed a 6.4 cm RLL pulmonary abscess and extensive left-sided mucus plugging with associated infiltrate. 12/2 blood cultures x2 no growth. Sputum cx 12/4 growing proteus, fluoroquinolone resistant. Also corynebacterium striatum 12/6. CT surgery saw, no surgical intervention indicated. Patient was treated with IV  then PO antibiotics, with the assistance of infectious disease. Initially on zosyn->unasyn-> transitioned 12/8 to Augmentin x 6 weeks (through approximately 1/13/2021).    Updates on Status Since Skilled nursing Admission: Met with patient who continues to be drowsy and will fall asleep off/on during conversation. Per nursing staff, they really have to move him in order to rouse him awake. He denies any chest pain, palpitations, shortness of breath, ISIDRO, lightheadedness, or dizziness. Does have a productive cough. Coughing thick brownish green phlegm often. Denies any abdominal discomfort. Denies N&V. Denies B&B concerns. Denies dysuria or frequency. Denies loose or constipation. Appetite good. He states he is sleeping poorly, despite falling asleep during conversation today. He is seeking ambien orders which provider will NOT order given his chronic co-morbidities along with narcotic. Narcotic seeking behaviors current. Patient became upset when provider notified him of not prescribing ambien. Patient is adament that oxycodone is not helpful for pain and requests to change it to dilaudid which he was receiving in the hospital. Patient also does not believe he is currently on morphine PRN prior to wounds. Nursing staff reports that patient will complain of pain when asked during wound cares, however nursing staff has found patient sleeping during wound cares despite not receiving PRN  Morphine prior to wound cares which therefore provider can assume this is not painful to him as he verbalizes. Patient does have long extensive history of being noncompliant per notes.     BP Readings from Last 3 Encounters:   01/05/21 130/52   12/31/20 136/58   03/22/18 130/50     Wt Readings from Last 5 Encounters:   01/05/21 64.4 kg (141 lb 14.4 oz)   12/30/20 65 kg (143 lb 4.8 oz)   03/22/18 93 kg (205 lb)   01/21/16 70.3 kg (155 lb)   01/20/16 70.3 kg (155 lb)     CODE STATUS/ADVANCE DIRECTIVES DISCUSSION:   CPR/Full code    Patient's living condition: lives alone  ALLERGIES: Darvocet [propoxyphene n-apap] and Vicodin [hydrocodone-acetaminophen]  PAST MEDICAL HISTORY:  has a past medical history of Adrenal insufficiency (H), Anemia, iron deficiency, Back pain, COPD (chronic obstructive pulmonary disease) (H), Depression, GERD (gastroesophageal reflux disease), Hyperlipidemia, and Migraine.  PAST SURGICAL HISTORY:   has a past surgical history that includes amputation below knee rt/lt (Left); tonsillectomy; as arthroscopy subtalar joint subtalar arthrodesis; and Open reduction internal fixation forearm.  FAMILY HISTORY: family history includes Chronic Obstructive Pulmonary Disease in his father; Colon Cancer in his father; Coronary Artery Disease in his father; Dementia in his mother; Osteoporosis in his mother.  SOCIAL HISTORY:   reports that he quit smoking about 3 years ago. His smoking use included cigarettes. He has a 15.00 pack-year smoking history. He has never used smokeless tobacco. He reports that he does not drink alcohol or use drugs.    Post Discharge Medication Reconciliation Status: discharge medications reconciled and changed, per note/orders    Current Outpatient Medications   Medication Sig Dispense Refill     albuterol (PROAIR HFA/PROVENTIL HFA/VENTOLIN HFA) 108 (90 Base) MCG/ACT inhaler Inhale 2 puffs into the lungs every 4 hours as needed for shortness of breath / dyspnea or wheezing       amLODIPine (NORVASC) 5 MG tablet Take 1 tablet (5 mg) by mouth daily 30 tablet 0     amoxicillin-clavulanate (AUGMENTIN) 500-125 MG tablet Take 1 tablet by mouth every 12 hours for 13 days 27 tablet 0     aspirin 81 MG EC tablet Take 81 mg by mouth daily       calcium citrate (CITRACAL) 950 MG tablet Take 4 tablets by mouth daily       clobetasol (TEMOVATE) 0.05 % external solution Apply topically At Bedtime Apply to scalp       dextromethorphan-guaiFENesin (MUCINEX DM)  MG 12 hr tablet Take 1 tablet by mouth every 12 hours  60 tablet 1     diclofenac (VOLTAREN) 1 % topical gel Apply 4 g topically every 6 hours as needed for moderate pain (R shoulder)       DULoxetine (CYMBALTA) 30 MG capsule Take 30 mg by mouth daily       Ferrous Gluconate 324 (37.5 Fe) MG TABS Take 324 mg by mouth every other day       fluticasone-vilanterol (BREO ELLIPTA) 200-25 MCG/INH inhaler Inhale 1 puff into the lungs daily 28 each 1     furosemide (LASIX) 40 MG tablet Take 1 tablet (40 mg) by mouth daily 30 tablet 0     gabapentin (NEURONTIN) 300 MG capsule Take 1 capsule (300 mg) by mouth 3 times daily 12 capsule 0     hydrocortisone (CORTEF) 20 MG tablet Take 20 mg by mouth every morning       HYDROmorphone (DILAUDID) 2 MG tablet Take 0.5 tablets (1 mg) by mouth every 6 hours as needed for pain Separate within 4 hours from PRN morphine 30 tablet 0     lidocaine (XYLOCAINE) 5 % external ointment Apply topically 3 times daily as needed for moderate pain (rib pain)       loperamide (IMODIUM) 2 MG capsule Take 1 capsule (2 mg) by mouth 4 times daily as needed for diarrhea 15 capsule 0     losartan (COZAAR) 25 MG tablet Take 12.5 mg by mouth daily       melatonin 3 MG tablet Take 2 tablets (6 mg) by mouth At Bedtime 60 tablet 1     MENTHOL-METHYL SALICYLATE EX Externally apply topically 2 times daily Menthol-methyl saliycylate 10-15% cream twice daily to lower back       morphine 10 MG/5ML solution Take 1.25 mLs (2.5 mg) by mouth daily as needed for severe pain (with dressing change) 100 mL 0     multivitamin w/minerals (THERA-VIT-M) tablet Take 1 tablet by mouth daily 30 tablet 0     nicotine (NICORETTE) 2 MG gum Place 2 mg inside cheek as needed for smoking cessation       omeprazole (PRILOSEC) 20 MG DR capsule Take 20 mg by mouth daily       primidone (MYSOLINE) 50 MG tablet Take 50 mg by mouth At Bedtime       tamsulosin (FLOMAX) 0.4 MG capsule Take 0.8 mg by mouth daily       umeclidinium (INCRUSE ELLIPTA) 62.5 MCG/INH inhaler Inhale 1 puff into the lungs  "daily 30 each 1     vitamin B-12 (CYANOCOBALAMIN) 1000 MCG tablet Take 1,000 mcg by mouth daily       Vitamin D, Cholecalciferol, 25 MCG (1000 UT) TABS Take 1,000 Units by mouth 2 times daily       ROS:  10 point ROS of systems including Constitutional, Eyes, Respiratory, Cardiovascular, Gastroenterology, Genitourinary, Integumentary, Musculoskeletal, Psychiatric were all negative except for pertinent positives noted in my HPI.    Vitals:  /52   Pulse 71   Temp 98  F (36.7  C)   Resp 17   Ht 1.651 m (5' 5\")   Wt 64.4 kg (141 lb 14.4 oz)   SpO2 95%   BMI 23.61 kg/m    Exam:  GENERAL APPEARANCE:  Alert, in no distress, oriented, cooperative  ENT:  Mouth and posterior oropharynx normal, moist mucous membranes, [place]  EYES:  EOM, conjunctivae, lids, pupils and irises normal  NECK:  No adenopathy,masses or thyromegaly  RESP:  respiratory effort and palpation of chest normal, no respiratory distress, crackles noted to RLL, clear to all other fields  CV:  Palpation and auscultation of heart done , regular rate and rhythm, no murmur, rub, or gallop, no edema, +2 pedal pulses, amputation to LLE  ABDOMEN:  normal bowel sounds, soft, nontender, no hepatosplenomegaly or other masses, no guarding or rebound  M/S:   wheelchair bound  SKIN:  Inspection of skin and subcutaneous tissue baseline, see picture below  NEURO:   Cranial nerves 2-12 are normal tested and grossly at patient's baseline, no purposeful movement in upper and lower extremities  PSYCH:  oriented X 3, memory impaired , affect and mood normal    Lab/Diagnostic data:    Most Recent 3 CBC's:  Recent Labs   Lab Test 01/04/21  0940 12/31/20  0730 12/29/20  0811   WBC 8.4 6.2 5.6   HGB 7.9* 7.2* 7.5*   MCV 92 92 93   * 85* 117*     Most Recent 3 BMP's:  Recent Labs   Lab Test 12/31/20  0730 12/29/20  0811 12/28/20  0628    140 139   POTASSIUM 4.0 4.2 4.2   CHLORIDE 105 105 102   CO2 31 33* 34*   BUN 36* 34* 31*   CR 1.51* 1.46* 1.46*   ANIONGAP 3 " 2* 3   MARTINE 8.5 8.4* 8.3*   GLC 90 88 92     Most Recent 2 LFT's:  Recent Labs   Lab Test 12/29/20  0811 12/02/20  1845   AST 26 6   ALT 30 8   ALKPHOS 90 98   BILITOTAL 0.3 0.7     Most Recent 6 Bacteria Isolates From Any Culture (See EPIC Reports for Culture Details):  Recent Labs   Lab Test 12/03/20  0540 12/03/20  0230 12/02/20  1904 12/02/20  1844   CULT Light growth  Normal adelina    Light growth  Proteus mirabilis  *  Moderate growth  Corynebacterium striatum  Identification obtained by MALDI-TOF mass spectrometry research use only database. Test   characteristics determined and verified by the Infectious Diseases Diagnostic Laboratory   ([facility]) [place], [place].  Susceptibility testing not routinely done  * <10,000 colonies/mL  mixed urogenital adelina  Susceptibility testing not routinely done   No growth No growth     Most Recent Urinalysis:  Recent Labs   Lab Test 12/09/20  0945   COLOR Yellow   APPEARANCE Cloudy   URINEGLC Negative   URINEBILI Negative   URINEKETONE Negative   SG 1.015   UBLD Large*   URINEPH 5.5   PROTEIN 30*   NITRITE Negative   LEUKEST Large*   RBCU >182*   WBCU >182*     Most Recent Anemia Panel:  Recent Labs   Lab Test 01/04/21  0940 12/04/20  1526 12/04/20  1526 12/04/20  1245   WBC 8.4   < > 3.2* 3.5*   HGB 7.9*   < > 6.4* 6.1*   HCT 24.7*   < > 19.1* 18.6*   MCV 92   < > 93 94   *   < > 70* 74*   IRON  --   --   --  30*   IRONSAT  --   --   --  27   RETICABSCT  --   --  17.9*  --    RETP  --   --  0.9  --    FEB  --   --   --  112*   JÚNIOR  --   --   --  515*   B12  --   --   --  1,117*   FOLIC  --   --   --  6.7    < > = values in this interval not displayed.       ASSESSMENT/PLAN:  (R53.81) Physical deconditioning  (primary encounter diagnosis)  (M62.81) Generalized muscle weakness  Comment: Acute on chronic. S/T below diagnosis. Followed by [facility]. HISTORY of noncompliance.   Plan:   -Continue Physical therapy and Occupational therapy as  directed  -SW to remain involved for safe discharge planning needs  -Would benefit from group home setting, however patient declines at this time.     (J96.21,  J96.22) Acute on chronic respiratory failure with hypoxia and hypercapnia (H)  (J18.9) Pneumonia of both lungs due to infectious organism, unspecified part of lung  (J85.1) Abscess of lower lobe of right lung with pneumonia (H)  (J44.9) COPD, severe (H)  (R07.89) Musculoskeletal chest pain  Comment: Acute on chronic. Admitted with fever, cough, and acute respiratory distress (placed on 10L per OM in the field). Fever to 102.5, tachycardia, tachypnea, and leukocytosis (17.2k) present on arrival. CXR on arrival showed RLL infiltrate and CT abd/pelvis showed air fluid cavity at the right lung base. On admission, he was initiated on IV pip-tazo and azithromycin, and Thoracic Surgery was consulted. Chest CT was ordered (12/3) which showed a 6.4 cm RLL pulmonary abscess and extensive left-sided mucus plugging with associated infiltrate. 12/2 blood cultures x2 no growth. Sputum cx 12/4 growing proteus, fluoroquinolone resistant. Also corynebacterium striatum 12/6. CT surgery saw, no surgical intervention indicated. Patient was treated with IV then PO antibiotics, with the assistance of infectious disease. Initially on zosyn -> unasyn -> transitioned 12/8 to Augmentin x 6 weeks (through approximately 1/13/2021). - Infectious disease peripherally following. Patient was discharged on pulmicort and spiriva orders however patient came to facility with supply of incruse and breo which patient states this was changed in hospital. --unable to find any documentation to support this, however since patient has supply of breo and incruse will start per request.   Plan:   -Continue oxygen as directed which is chronic at 3L via NC.   -Monitor respiratory status  -Start mucinex 600mg BID.   -Start incentive spirometry and encourage every 4 hours while awake.   -Discontinue spiriva per  patient request.   -Discontinue symbicort per patient request.   -Start fluticasone-vilanterol (breo 200-25mcg inhaler 1 puff daily  -Start incruse ellipta 62.5mcg inhaler 1 puff daily.   -Continue albuterol inhaler PRN  -Follow up with ID as directed. Will assist with staff to schedule follow up need within 2 weeks.   -CT scan due as recommended in 4 weeks, approximately 1/8/21. Pending date of examination at this time.   -Continue Augmentin as directed. Due to complete 1/13/21  -CMP, CBC, TSH, magnesium, vitamin D, and vitamin B12 due 1/7/21    (D63.8) Anemia of chronic disease  (D69.3) Chronic idiopathic thrombocytopenia (H)  Comment: Acute on chronic. Anemia and thrombocytopenia likely multifactorial from liver cirrhosis, splenomegaly, renal disease, CKD and psoriasis. Iron labs suggestive of anemia of chronic disease. 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 Patient required four units of pRBCs this admission for Hgb < 7.0. No acute GI bleed identified, likely blood loss from decreased erythropoiesis. Appreciate GI consult 12/16/2020, no acute GI bleed, no acute interventions, [facility]. Check hemoglobin periodically while inpatient - 7.0 12/28 - clinically stable and no bleeding noted. recheck hgb 7.5 12/29 and again 7.9 on 1/4/21  Plan:   -Follow up with GI at [facility] as directed.   -Oncology with [facility] pending for tomorrow 1/6/21 via telephone.   -Recommend to follow up with hematology at [facility] post TCU as directed  -Monitor for worsening s/sx of concerns  -Monitor for active s/sx of bleeding risks.   -CMP, CBC, TSH, magnesium, vitamin D, and vitamin B12 due 1/7/21    (N18.30) Stage 3 chronic kidney disease, unspecified whether stage 3a or 3b CKD  (R82.81) Pyuria  Comment: Acute on chronic. Baseline creatinine 1.2-1.3 in [facility]  2020, on admission 2.89. Suspect pre-renal state in setting of pneumonia/sepsis with possible progression to ATN in setting of hypotension. Improved with IV fluids, creatinine down to 1.5 on 12/16, now recheck shows improvement to 1.49 and calculated GFR is 36. Creatinne back to baseline of 1.3 on 12/23/2020. Restarted PTA losartan at lowest dose at discharge 25mg daily. Appreciate Nephrology consul t-- lasix 40 mg daily continued  Plan:   -Follow up with nephrology at [facility] post TCU  -Monitor urinary status  -Continue current medications without change  -CMP, CBC, TSH, magnesium, vitamin D, and vitamin B12 due 1/7/21    (E27.40) Chronic adrenal insufficiency (H)  Comment: Chronic. Stable.   Plan:   -Monitor for worsening s/sx of concerns  -Continue PTA hydrocortisone 20mg daily  -Follow up with PCP at [facility] post TCU  -CMP, CBC, TSH, magnesium, vitamin D, and vitamin B12 due 1/7/21    (I10) Hypertension, unspecified type  Comment: Acute on chronic. Amlodipine decreased to 5mg daily due to soft blood pressures. Based on JNC-8 goals,  patients age of 70 year old, presence of diabetes or CKD, and goals of care goal BP is  <140/90 mm Hg. Patient is stable with current plan of care and routine assessment..  Plan:   -Continue amlodipine as directed  -Continue losartan as directed  -Monitor BP and HR  -Follow up with PCP post TCU  -CMP, CBC, TSH, magnesium, vitamin D, and vitamin B12 due 1/7/21    (Z87.2) Hx of psoriasis  Comment: Chronic. Holding PTA Humira this admission--continue to hold at discharge  Plan:   -Will monitor skin for ongoing concern   -Continue Clobetasol Propionate Lotion 0.05 % to scalp at HS as directed  -Discontinue triamcinolone cream due to non use.   -Follow up with PCP at [facility] post TCU  -May recommend dermatology in future if warranted.   -CMP, CBC, TSH, magnesium, vitamin D, and vitamin B12 due  1/7/21    (I73.9) PVD (peripheral vascular disease) (H)  (Z89.512) S/P below knee amputation, left (H)  (S81.801D) Open wound of right lower extremity, subsequent encounter  (S01.302D) Open wound of left ear, unspecified open wound type, subsequent encounter  Comment: Chronic. Present on admission. Receives wound cares per home care RN. Followed by PCP at [facility]. Limited [facility] documentation. Followed WOCN in hospital. Did not admit with proper wound care orders   Plan:   -Follow up with PCP post TCU  -Would recommend wound clinic appt at [facility] post TCU  -Monitor skin for worsening s/sx of concerns.   -CMP, CBC, TSH, magnesium, vitamin D, and vitamin B12 due 1/7/21  -Continue current wound care orders until supplies are in for following new orders below  -Change wound care orders to the following:   *RLE  Wounds: Daily and PRN   1. Cleanse with Vashe moistened gauze to wound bed and let sit x 5 minutes - do not rinse. #[id number]  2. Dry and protect surrounding skin with no sting barrier film wipe #[id number],  3. Apply sween 24 to all intact skin from base of toes to below knees.  4. Apply Aquacel AG 4x4 #[id number] to wound bed  5. Cover with ABD   6. Secure with kerlix and tape  7. Time/Date/Initial dressing  8. Apply edema wear directly over this dressing from base of toes to below knees  9. Place Covidien pad underneath wound area and apply offloading boot  *Apply Edemawear from toes to knees with a cuff just below the knee-YOU WILL NEED TWO PEOPLE TO APPLY, BUNCH UP STOCKING & PULL AGAINST EACH OTHER   DO NOT THROW AWAY THE OLD PAIR OF EDEMAWEAR, WASH IT.   Wash stocking(s) in really hot water with surgical soap/ and or baby shampoo, may need to do this several times  Rinse then wash in baby shampoo  Hang dry  EdemaWear   size      Stripe color      PS # small       navy     [id number]    (G89.4) Chronic pain syndrome  Comment:  Chronic. NOT AT GOAL. Patient wanting increase of pain medications. Resume PTA upon discharge of regimen (gabapentin 300 mg TID, duloxetine 30 mg daily, lidocaine patch, oxycodone 5 mg q6h prn, morphine 2.5 mg BID prn)  Plan:   -Continue gabapentin 300mg TID  -Continue duloxetine 30mg daily. May benefit from increased dose to 60mg daily in future if warranted.   -Continue lidocaine topical as directed  -Continue menthol topical as directed  -Change PRN morphine to 2.5mg daily PRN only prior to wound cares. GDR prior to discharge if able.   -Discontinue PRN oxycodone.   -Start dilaudid 1mg every 6 hours PRN  -Monitor pain complaints  -Follow up with PCP post TCU  -Would recommend pain clinic referral given history chronic history.   -CMP, CBC, TSH, magnesium, vitamin D, and vitamin B12 due 1/7/21    (E44.1) Mild protein-calorie malnutrition (H)  Comment: Acute on chronic. Patient requests to have Ensure TID PRN  Plan:   -Monitor appetite  -Continue Ensure TID PRN  -Dietician to remain involved  -CMP, CBC, TSH, magnesium, vitamin D, and vitamin B12 due 1/7/21    (G47.00) Insomnia  Comment: Acute on chronic. Patient wanting increase of sleeping agent. HISTORY of being on ambien in the past per [facility] records. This was discontinued.   Plan:  -Increase melatonin to 6mg at HS.   -Monitor sleeping patterns. Patient currently is drowsy and falls asleep mid conversation.   -Would NOT recommend ambien given co-morbidities above.   -CMP, CBC, TSH, magnesium, vitamin D, and vitamin B12 due 1/7/21    Orders written by provider at facility    Total time spent with patient visit at the [facility] nursing [facility] was 45 min including patient visit and review of past records. Greater than 50% of total time spent with counseling and coordinating care with nursing staff due to the complexities of their diagnoses, review of HPI, development of POC, assisting [facility] on appointment schedules, and patient  education and review of POC with patient which includes 20 min discussion with patient on: current medications/orders changes, recent blood work results, continued discharge plan/needs, subsequent treatment plan while in TCU and thereafter, current pain control plan and controlled substances ordered. Along with discussion of prognosis, risks and benefits  Of treatments and importance of compliance with recommendations above.     Electronically signed by:  Lana Stevens DNP, ANTONIETA                      Sincerely,        ANTONIETA Valdovinos CNP

## 2021-01-06 ENCOUNTER — PATIENT OUTREACH (OUTPATIENT)
Dept: ONCOLOGY | Facility: CLINIC | Age: 71
End: 2021-01-06

## 2021-01-06 NOTE — PROGRESS NOTES
Patient missed appointment today with Dr. Mariscal. Per chart review, was admitted yesterday to TCU ([facility]).     Writer contacted [facility] and spoke with Marielena RN. They were not aware of appointment today.     Writer rescheduled visit to 1/14/21 with Dr. Mariscal. [facility] to coordinate virtual visit with TCU staff.    Phone: [phone] speak with ELIECER Saab to arrange virtual visit    FAVIOLA SalasN, RN, PHN, OCN  Oncology Care Coordinator  [facility]

## 2021-01-07 ENCOUNTER — NURSING HOME VISIT (OUTPATIENT)
Dept: GERIATRICS | Facility: CLINIC | Age: 71
End: 2021-01-07
Payer: MEDICARE

## 2021-01-07 ENCOUNTER — TRANSFERRED RECORDS (OUTPATIENT)
Dept: HEALTH INFORMATION MANAGEMENT | Facility: CLINIC | Age: 71
End: 2021-01-07

## 2021-01-07 VITALS
WEIGHT: 141.9 LBS | BODY MASS INDEX: 23.64 KG/M2 | DIASTOLIC BLOOD PRESSURE: 79 MMHG | HEIGHT: 65 IN | TEMPERATURE: 98 F | RESPIRATION RATE: 17 BRPM | HEART RATE: 96 BPM | SYSTOLIC BLOOD PRESSURE: 124 MMHG | OXYGEN SATURATION: 96 %

## 2021-01-07 DIAGNOSIS — D64.9 ANEMIA, UNSPECIFIED TYPE: ICD-10-CM

## 2021-01-07 DIAGNOSIS — E27.40 ADRENAL INSUFFICIENCY (H): ICD-10-CM

## 2021-01-07 DIAGNOSIS — N40.0 BENIGN PROSTATIC HYPERPLASIA WITHOUT LOWER URINARY TRACT SYMPTOMS: ICD-10-CM

## 2021-01-07 DIAGNOSIS — I12.9 BENIGN HYPERTENSIVE KIDNEY DISEASE WITH CHRONIC KIDNEY DISEASE STAGE I THROUGH STAGE IV, OR UNSPECIFIED: ICD-10-CM

## 2021-01-07 DIAGNOSIS — D63.8 ANEMIA OF CHRONIC DISEASE: Primary | ICD-10-CM

## 2021-01-07 DIAGNOSIS — J44.9 CHRONIC OBSTRUCTIVE PULMONARY DISEASE, UNSPECIFIED COPD TYPE (H): ICD-10-CM

## 2021-01-07 DIAGNOSIS — G89.4 CHRONIC PAIN SYNDROME: ICD-10-CM

## 2021-01-07 DIAGNOSIS — R53.81 PHYSICAL DECONDITIONING: ICD-10-CM

## 2021-01-07 DIAGNOSIS — J96.11 CHRONIC RESPIRATORY FAILURE WITH HYPOXIA (H): ICD-10-CM

## 2021-01-07 DIAGNOSIS — D69.6 THROMBOCYTOPENIA (H): ICD-10-CM

## 2021-01-07 DIAGNOSIS — I73.9 PAD (PERIPHERAL ARTERY DISEASE) (H): ICD-10-CM

## 2021-01-07 DIAGNOSIS — J85.2 ABSCESS OF LOWER LOBE OF RIGHT LUNG WITHOUT PNEUMONIA (H): Primary | ICD-10-CM

## 2021-01-07 DIAGNOSIS — N18.30 STAGE 3 CHRONIC KIDNEY DISEASE, UNSPECIFIED WHETHER STAGE 3A OR 3B CKD (H): ICD-10-CM

## 2021-01-07 LAB
ALBUMIN SERPL-MCNC: 2.1 G/DL (ref 3.5–5)
ALP SERPL-CCNC: 91 U/L (ref 45–120)
ALT SERPL-CCNC: 24 U/L (ref 0–45)
ANION GAP SERPL CALCULATED.3IONS-SCNC: 8 MMOL/L (ref 5–18)
AST SERPL-CCNC: 27 U/L (ref 0–40)
BILIRUB SERPL-MCNC: 0.4 MG/DL (ref 0–1)
BUN SERPL-MCNC: 48 MG/DL (ref 8–22)
CALCIUM SERPL-MCNC: 8.4 MG/DL (ref 8.5–10.5)
CHLORIDE SERPLBLD-SCNC: 104 MMOL/L (ref 98–107)
CO2 SERPL-SCNC: 26 MMOL/L (ref 22–31)
CREAT SERPL-MCNC: 1.71 MG/DL (ref 0.7–1.3)
DIFFERENTIAL: ABNORMAL
ERYTHROCYTE [DISTWIDTH] IN BLOOD BY AUTOMATED COUNT: 14.1 % (ref 11–14.5)
GFR SERPL CREATININE-BSD FRML MDRD: 40 ML/MIN/1.73M2
GLUCOSE SERPL-MCNC: 81 MG/DL (ref 70–125)
HCT VFR BLD AUTO: 21.4 % (ref 40–54)
HEMOGLOBIN: 6.9 G/DL (ref 14–18)
MCH RBC QN AUTO: 29.6 PG (ref 27–34)
MCHC RBC AUTO-ENTMCNC: 32.2 G/DL (ref 32–36)
MCV RBC AUTO: 92 FL (ref 80–100)
PLATELET # BLD AUTO: 89 THOU/UL (ref 140–440)
POTASSIUM SERPL-SCNC: 4.9 MMOL/L (ref 3.5–5)
PROT SERPL-MCNC: 7 G/DL (ref 6–8)
RBC # BLD AUTO: 2.33 MILL/UL (ref 4.4–6.2)
SODIUM SERPL-SCNC: 138 MMOL/L (ref 136–145)
TSH SERPL-ACNC: 0.73 UIU/ML (ref 0.3–5)
WBC # BLD AUTO: 5.5 THOU/UL (ref 4–11)

## 2021-01-07 PROCEDURE — 99304 1ST NF CARE SF/LOW MDM 25: CPT | Performed by: INTERNAL MEDICINE

## 2021-01-07 PROCEDURE — 99207 PR CDG-EXAM COMPONENT: MEETS DETAILED - DOWN CODED: CPT | Performed by: INTERNAL MEDICINE

## 2021-01-07 RX ORDER — FERROUS SULFATE 325(65) MG
325 TABLET ORAL 2 TIMES DAILY WITH MEALS
Qty: 30 TABLET | Refills: 1 | Status: SHIPPED | OUTPATIENT
Start: 2021-01-07 | End: 2021-01-15

## 2021-01-07 ASSESSMENT — MIFFLIN-ST. JEOR: SCORE: 1340.53

## 2021-01-07 NOTE — PROGRESS NOTES
[place] [facility]  INITIAL VISIT NOTE  January 7, 2021    PRIMARY CARE PROVIDER AND CLINIC:  Polo Neil [facility] *    Chief Complaint   Patient presents with     Hospital F/U       HPI:    Perfecto Reese is a 70 year old  (1950) male who was seen at [facility] on January 7, 2021 for an initial visit. Medical history is notable for COPD, chronic hypoxic respiratory failure (3L), adrenal insufficiency, Hep C, cirrhosis and PAD s/p L BKA. He was hospitalized at [facility] from 12/2/20 to 12/31/20 where he presented with cough, respiratory distress and a fever to 102F.  He was found to have a right lower lobe abscess.  This was conservatively managed with IV antibiotics.  Sputum cultures grew Proteus.  Blood cultures with no growth. He was discharged to complete a course of Augmentin with plan for repeat CT in 4 weeks. He was admitted to this facility for medical management and rehab.     Today, Mr. Negron is seen in his room laying in bed. He briefly awoke when I called his name, but then fell back to sleep. I was not able to have a conversation with him. I talked to his bedside RN and no acute concerns today. He is working with therapies.     CODE STATUS:   DNR / DNI    ALLERGIES:     Allergies   Allergen Reactions     Darvocet [Propoxyphene N-Apap] Nausea     Vicodin [Hydrocodone-Acetaminophen] Nausea     Can use if he eats with it       PAST MEDICAL HISTORY:   Past Medical History:   Diagnosis Date     Adrenal insufficiency (H)      Anemia, iron deficiency      Back pain      COPD (chronic obstructive pulmonary disease) (H)      Depression      GERD (gastroesophageal reflux disease)      Hyperlipidemia      Migraine        PAST SURGICAL HISTORY:   Past Surgical History:   Procedure Laterality Date     AMPUTATION BELOW KNEE RT/LT Left      AS ARTHROSCOPY SUBTALAR JOINT SUBTALAR ARTHRODESIS        OPEN REDUCTION INTERNAL FIXATION FOREARM      left forearm/wrist     TONSILLECTOMY         FAMILY HISTORY:   Family History   Problem Relation Age of Onset     Colon Cancer Father      Coronary Artery Disease Father      Chronic Obstructive Pulmonary Disease Father      Osteoporosis Mother      Dementia Mother        SOCIAL HISTORY:   Lives alone    MEDICATIONS:  Post Discharge Medication Reconciliation Status: discharge medications reconciled and changed, per note/orders.   Current Outpatient Medications   Medication Sig Dispense Refill     albuterol (PROAIR HFA/PROVENTIL HFA/VENTOLIN HFA) 108 (90 Base) MCG/ACT inhaler Inhale 2 puffs into the lungs every 4 hours as needed for shortness of breath / dyspnea or wheezing       amLODIPine (NORVASC) 5 MG tablet Take 1 tablet (5 mg) by mouth daily 30 tablet 0     amoxicillin-clavulanate (AUGMENTIN) 500-125 MG tablet Take 1 tablet by mouth every 12 hours for 13 days 27 tablet 0     aspirin 81 MG EC tablet Take 81 mg by mouth daily       calcium citrate (CITRACAL) 950 MG tablet Take 4 tablets by mouth daily       clobetasol (TEMOVATE) 0.05 % external solution Apply topically At Bedtime Apply to scalp       dextromethorphan-guaiFENesin (MUCINEX DM)  MG 12 hr tablet Take 1 tablet by mouth every 12 hours 60 tablet 1     diclofenac (VOLTAREN) 1 % topical gel Apply 4 g topically every 6 hours as needed for moderate pain (R shoulder)       DULoxetine (CYMBALTA) 30 MG capsule Take 30 mg by mouth daily       Ferrous Gluconate 324 (37.5 Fe) MG TABS Take 324 mg by mouth every other day       fluticasone-vilanterol (BREO ELLIPTA) 200-25 MCG/INH inhaler Inhale 1 puff into the lungs daily 28 each 1     furosemide (LASIX) 40 MG tablet Take 1 tablet (40 mg) by mouth daily 30 tablet 0     gabapentin (NEURONTIN) 300 MG capsule Take 1 capsule (300 mg) by mouth 3 times daily 12 capsule 0     hydrocortisone (CORTEF) 20 MG tablet Take 20 mg by mouth every morning       HYDROmorphone  "(DILAUDID) 2 MG tablet Take 0.5 tablets (1 mg) by mouth every 6 hours as needed for pain Separate within 4 hours from PRN morphine 30 tablet 0     lidocaine (XYLOCAINE) 5 % external ointment Apply topically 3 times daily as needed for moderate pain (rib pain)       loperamide (IMODIUM) 2 MG capsule Take 1 capsule (2 mg) by mouth 4 times daily as needed for diarrhea 15 capsule 0     losartan (COZAAR) 25 MG tablet Take 12.5 mg by mouth daily       melatonin 3 MG tablet Take 2 tablets (6 mg) by mouth At Bedtime 60 tablet 1     MENTHOL-METHYL SALICYLATE EX Externally apply topically 2 times daily Menthol-methyl saliycylate 10-15% cream twice daily to lower back       morphine 10 MG/5ML solution Take 1.25 mLs (2.5 mg) by mouth daily as needed for severe pain (with dressing change) 100 mL 0     multivitamin w/minerals (THERA-VIT-M) tablet Take 1 tablet by mouth daily 30 tablet 0     nicotine (NICORETTE) 2 MG gum Place 2 mg inside cheek as needed for smoking cessation       omeprazole (PRILOSEC) 20 MG DR capsule Take 20 mg by mouth daily       primidone (MYSOLINE) 50 MG tablet Take 50 mg by mouth At Bedtime       tamsulosin (FLOMAX) 0.4 MG capsule Take 0.8 mg by mouth daily       umeclidinium (INCRUSE ELLIPTA) 62.5 MCG/INH inhaler Inhale 1 puff into the lungs daily 30 each 1     vitamin B-12 (CYANOCOBALAMIN) 1000 MCG tablet Take 1,000 mcg by mouth daily       Vitamin D, Cholecalciferol, 25 MCG (1000 UT) TABS Take 1,000 Units by mouth 2 times daily         ROS:  Unable to obtain as he was sound asleep    PHYSICAL EXAM:  /79   Pulse 96   Temp 98  F (36.7  C)   Resp 17   Ht 1.651 m (5' 5\")   Wt 64.4 kg (141 lb 14.4 oz)   SpO2 96%   BMI 23.61 kg/m     Gen: laying in bed asleep, would wake to his name and then doze back off, in no acute distress  HEENT: nasal cannula in place on 3L  Resp: breathing non labored, no tachypnea   MSK: decreased muscle tone, s/p L BKA; RLE in padded half leg boot  Neuro: ROM in upper " extremities grossly in tact  Psych: unable to assess as he was asleep  Skin: dressing c/d/i over wounds     LABORATORY/IMAGING DATA:  Reviewed as per Epic    ASSESSMENT/PLAN:    RLL Lung Abscess  Conservatively managed. Sputum grew Proteus  -- completing course of Augmentin 500-125 mg (last day 1/13/21)  -- repeat CT ordered for tomorrow I believe  -- follow for clinical signs of infection     Anemia  Thrombocytopenia  Chronic. Underlying liver disease/cirrhosis. Had peripheral smear during hospitalization and was transfused a total of 4 PRBCs.  CBC today with Hgb 6.9 and Plt 89.   --  FeSO4 325 mg BID  -- has heme appt next week - appreciate the visit and recommendations    PAD s/p L BKA  --  ASA 81 mg daily (currently on hold pending heme appt next week)    COPD  Chronic Hypoxic Respiratory Failure (3L)  On baseline O2 today.   -- fluticasone-vilanterol 200-25 mcg daily, umeclidinium 62.5 mcg daily and albuterol MDI PRN     Adrenal Insufficiency  -- hydrocortisone 20 mg in the morning    HTN  SBPs 120s-140s  -- amlodipine 5 mg daily, furosemide 40 mg daily, losartan 12.5 mg daily  -- follow BPs and adjust medications as needed    Peripheral Neuropathy  Chronic Pain Sydndrome  -- duloxetine 30 mg daily, gabapentin 300 mg, hydromorphone 1 mg q6h PRN, morphine 2.5 mg daily PRN for dressing changes, primidone 50 mg at bedtime      BPH  -- tamsulosin 0.4 mg daily    GERD  -- omeprazole 20 mg daily    CKD, Stage III  Baseline Cr low 1s  -- avoid nephrotoxic meds  -- periodic BMP     Physical Deconditioning  In setting of hospitalization and underlying medical conditions  -- ongoing PT/OT      Electronically signed by:  Donna Rodríguez MD

## 2021-01-07 NOTE — LETTER
1/7/2021        RE: Perfecto Reese  [address]  [address]  [place] [address]        [place] [facility]  INITIAL VISIT NOTE  January 7, 2021    PRIMARY CARE PROVIDER AND CLINIC:  Polo Neil [facility] *    Chief Complaint   Patient presents with     Hospital F/U       HPI:    Perfecto Reese is a 70 year old  (1950) male who was seen at [facility] on January 7, 2021 for an initial visit. Medical history is notable for COPD, chronic hypoxic respiratory failure (3L), adrenal insufficiency, Hep C, cirrhosis and PAD s/p L BKA. He was hospitalized at [facility] from 12/2/20 to 12/31/20 where he presented with cough, respiratory distress and a fever to 102F.  He was found to have a right lower lobe abscess.  This was conservatively managed with IV antibiotics.  Sputum cultures grew Proteus.  Blood cultures with no growth. He was discharged to complete a course of Augmentin with plan for repeat CT in 4 weeks. He was admitted to this facility for medical management and rehab.     Today, Mr. Negron is seen in his room laying in bed. He briefly awoke when I called his name, but then fell back to sleep. I was not able to have a conversation with him. I talked to his bedside RN and no acute concerns today. He is working with therapies.     CODE STATUS:   DNR / DNI    ALLERGIES:     Allergies   Allergen Reactions     Darvocet [Propoxyphene N-Apap] Nausea     Vicodin [Hydrocodone-Acetaminophen] Nausea     Can use if he eats with it       PAST MEDICAL HISTORY:   Past Medical History:   Diagnosis Date     Adrenal insufficiency (H)      Anemia, iron deficiency      Back pain      COPD (chronic obstructive pulmonary disease) (H)      Depression      GERD (gastroesophageal reflux disease)      Hyperlipidemia      Migraine        PAST SURGICAL HISTORY:   Past Surgical History:   Procedure  Laterality Date     AMPUTATION BELOW KNEE RT/LT Left      AS ARTHROSCOPY SUBTALAR JOINT SUBTALAR ARTHRODESIS       OPEN REDUCTION INTERNAL FIXATION FOREARM      left forearm/wrist     TONSILLECTOMY         FAMILY HISTORY:   Family History   Problem Relation Age of Onset     Colon Cancer Father      Coronary Artery Disease Father      Chronic Obstructive Pulmonary Disease Father      Osteoporosis Mother      Dementia Mother        SOCIAL HISTORY:   Lives alone    MEDICATIONS:  Post Discharge Medication Reconciliation Status: discharge medications reconciled and changed, per note/orders.   Current Outpatient Medications   Medication Sig Dispense Refill     albuterol (PROAIR HFA/PROVENTIL HFA/VENTOLIN HFA) 108 (90 Base) MCG/ACT inhaler Inhale 2 puffs into the lungs every 4 hours as needed for shortness of breath / dyspnea or wheezing       amLODIPine (NORVASC) 5 MG tablet Take 1 tablet (5 mg) by mouth daily 30 tablet 0     amoxicillin-clavulanate (AUGMENTIN) 500-125 MG tablet Take 1 tablet by mouth every 12 hours for 13 days 27 tablet 0     aspirin 81 MG EC tablet Take 81 mg by mouth daily       calcium citrate (CITRACAL) 950 MG tablet Take 4 tablets by mouth daily       clobetasol (TEMOVATE) 0.05 % external solution Apply topically At Bedtime Apply to scalp       dextromethorphan-guaiFENesin (MUCINEX DM)  MG 12 hr tablet Take 1 tablet by mouth every 12 hours 60 tablet 1     diclofenac (VOLTAREN) 1 % topical gel Apply 4 g topically every 6 hours as needed for moderate pain (R shoulder)       DULoxetine (CYMBALTA) 30 MG capsule Take 30 mg by mouth daily       Ferrous Gluconate 324 (37.5 Fe) MG TABS Take 324 mg by mouth every other day       fluticasone-vilanterol (BREO ELLIPTA) 200-25 MCG/INH inhaler Inhale 1 puff into the lungs daily 28 each 1     furosemide (LASIX) 40 MG tablet Take 1 tablet (40 mg) by mouth daily 30 tablet 0     gabapentin (NEURONTIN) 300 MG capsule Take 1 capsule (300 mg) by mouth 3 times  "daily 12 capsule 0     hydrocortisone (CORTEF) 20 MG tablet Take 20 mg by mouth every morning       HYDROmorphone (DILAUDID) 2 MG tablet Take 0.5 tablets (1 mg) by mouth every 6 hours as needed for pain Separate within 4 hours from PRN morphine 30 tablet 0     lidocaine (XYLOCAINE) 5 % external ointment Apply topically 3 times daily as needed for moderate pain (rib pain)       loperamide (IMODIUM) 2 MG capsule Take 1 capsule (2 mg) by mouth 4 times daily as needed for diarrhea 15 capsule 0     losartan (COZAAR) 25 MG tablet Take 12.5 mg by mouth daily       melatonin 3 MG tablet Take 2 tablets (6 mg) by mouth At Bedtime 60 tablet 1     MENTHOL-METHYL SALICYLATE EX Externally apply topically 2 times daily Menthol-methyl saliycylate 10-15% cream twice daily to lower back       morphine 10 MG/5ML solution Take 1.25 mLs (2.5 mg) by mouth daily as needed for severe pain (with dressing change) 100 mL 0     multivitamin w/minerals (THERA-VIT-M) tablet Take 1 tablet by mouth daily 30 tablet 0     nicotine (NICORETTE) 2 MG gum Place 2 mg inside cheek as needed for smoking cessation       omeprazole (PRILOSEC) 20 MG DR capsule Take 20 mg by mouth daily       primidone (MYSOLINE) 50 MG tablet Take 50 mg by mouth At Bedtime       tamsulosin (FLOMAX) 0.4 MG capsule Take 0.8 mg by mouth daily       umeclidinium (INCRUSE ELLIPTA) 62.5 MCG/INH inhaler Inhale 1 puff into the lungs daily 30 each 1     vitamin B-12 (CYANOCOBALAMIN) 1000 MCG tablet Take 1,000 mcg by mouth daily       Vitamin D, Cholecalciferol, 25 MCG (1000 UT) TABS Take 1,000 Units by mouth 2 times daily         ROS:  Unable to obtain as he was sound asleep    PHYSICAL EXAM:  /79   Pulse 96   Temp 98  F (36.7  C)   Resp 17   Ht 1.651 m (5' 5\")   Wt 64.4 kg (141 lb 14.4 oz)   SpO2 96%   BMI 23.61 kg/m     Gen: laying in bed asleep, would wake to his name and then doze back off, in no acute distress  HEENT: nasal cannula in place on 3L  Resp: breathing non " labored, no tachypnea   MSK: decreased muscle tone, s/p L BKA; RLE in padded half leg boot  Neuro: ROM in upper extremities grossly in tact  Psych: unable to assess as he was asleep  Skin: dressing c/d/i over wounds     LABORATORY/IMAGING DATA:  Reviewed as per Epic    ASSESSMENT/PLAN:    RLL Lung Abscess  Conservatively managed. Sputum grew Proteus  -- completing course of Augmentin 500-125 mg (last day 1/13/21)  -- repeat CT ordered for tomorrow I believe  -- follow for clinical signs of infection     Anemia  Thrombocytopenia  Chronic. Underlying liver disease/cirrhosis. Had peripheral smear during hospitalization and was transfused a total of 4 PRBCs.  CBC today with Hgb 6.9 and Plt 89.   --  FeSO4 325 mg BID  -- has heme appt next week - appreciate the visit and recommendations    PAD s/p L BKA  --  ASA 81 mg daily (currently on hold pending heme appt next week)    COPD  Chronic Hypoxic Respiratory Failure (3L)  On baseline O2 today.   -- fluticasone-vilanterol 200-25 mcg daily, umeclidinium 62.5 mcg daily and albuterol MDI PRN     Adrenal Insufficiency  -- hydrocortisone 20 mg in the morning    HTN  SBPs 120s-140s  -- amlodipine 5 mg daily, furosemide 40 mg daily, losartan 12.5 mg daily  -- follow BPs and adjust medications as needed    Peripheral Neuropathy  Chronic Pain Sydndrome  -- duloxetine 30 mg daily, gabapentin 300 mg, hydromorphone 1 mg q6h PRN, morphine 2.5 mg daily PRN for dressing changes, primidone 50 mg at bedtime      BPH  -- tamsulosin 0.4 mg daily    GERD  -- omeprazole 20 mg daily    CKD, Stage III  Baseline Cr low 1s  -- avoid nephrotoxic meds  -- periodic BMP     Physical Deconditioning  In setting of hospitalization and underlying medical conditions  -- ongoing PT/OT      Electronically signed by:  Donna Rodríguez MD                          Sincerely,        Donna Rodríguez MD

## 2021-01-07 NOTE — PROGRESS NOTES
Comment: Labs back today and reviewed. Hgb low at 6.9. History of ACD. Currently on ferrous gluconate every other day. Creatinine slightly elevated at 1.71 today. HISTORY of pneumonia with abscess. Currently on furosemide 40mg daily. Vitals stable. Vitamin B12 levels elevated.   Plan:  -HOLD asa for 1 week  -Change ferrous to ferrous sulfate and give BID with meals for now.   -Discontinue vitamin B12  -BMP and CBC 1/12/21  -Follow up with  Oncology as directed.

## 2021-01-11 NOTE — TELEPHONE ENCOUNTER
RECORDS RECEIVED FROM:    DATE RECEIVED: 01.13.2021    NOTES (Gather within 2 years) STATUS DETAILS   OFFICE NOTE from referring provider   N/A    OFFICE NOTE from other specialist Internal 01.07.2021 Donna Rodríguez MD   DISCHARGE SUMMARY from hospital Internal 12.02.2020 Charis Sinclair MD   DISCHARGE REPORT from the ER N/A    LABS (any labs) Internal    MEDICATION LIST Internal    IMAGING  (NEED IMAGES AND REPORTS)     Osteomyelitis: Foot imaging  N/A    Liver Abscess: Abdominal imaging N/A    Other (anything related to diagnoses N/A

## 2021-01-12 ENCOUNTER — HOSPITAL ENCOUNTER (OUTPATIENT)
Dept: CT IMAGING | Facility: CLINIC | Age: 71
Discharge: HOME | End: 2021-01-12
Attending: NURSE PRACTITIONER | Admitting: NURSE PRACTITIONER
Payer: MEDICARE

## 2021-01-12 ENCOUNTER — RECORDS - HEALTHEAST (OUTPATIENT)
Dept: LAB | Facility: CLINIC | Age: 71
End: 2021-01-12

## 2021-01-12 DIAGNOSIS — J96.22 ACUTE ON CHRONIC RESPIRATORY FAILURE WITH HYPOXIA AND HYPERCAPNIA (H): ICD-10-CM

## 2021-01-12 DIAGNOSIS — J18.9 PNEUMONIA OF BOTH LUNGS DUE TO INFECTIOUS ORGANISM, UNSPECIFIED PART OF LUNG: ICD-10-CM

## 2021-01-12 DIAGNOSIS — J85.1 ABSCESS OF LOWER LOBE OF RIGHT LUNG WITH PNEUMONIA (H): ICD-10-CM

## 2021-01-12 DIAGNOSIS — J44.9 COPD, SEVERE (H): ICD-10-CM

## 2021-01-12 DIAGNOSIS — J96.21 ACUTE ON CHRONIC RESPIRATORY FAILURE WITH HYPOXIA AND HYPERCAPNIA (H): ICD-10-CM

## 2021-01-12 PROCEDURE — 71250 CT THORAX DX C-: CPT | Mod: ME

## 2021-01-13 ENCOUNTER — VIRTUAL VISIT (OUTPATIENT)
Dept: INFECTIOUS DISEASES | Facility: CLINIC | Age: 71
End: 2021-01-13
Attending: INTERNAL MEDICINE
Payer: MEDICARE

## 2021-01-13 ENCOUNTER — TRANSFERRED RECORDS (OUTPATIENT)
Dept: HEALTH INFORMATION MANAGEMENT | Facility: CLINIC | Age: 71
End: 2021-01-13

## 2021-01-13 ENCOUNTER — PRE VISIT (OUTPATIENT)
Dept: INFECTIOUS DISEASES | Facility: CLINIC | Age: 71
End: 2021-01-13

## 2021-01-13 DIAGNOSIS — J85.1 ABSCESS OF LOWER LOBE OF RIGHT LUNG WITH PNEUMONIA (H): Primary | ICD-10-CM

## 2021-01-13 LAB
ANION GAP SERPL CALCULATED.3IONS-SCNC: 7 MMOL/L (ref 5–18)
ANION GAP SERPL CALCULATED.3IONS-SCNC: 7 MMOL/L (ref 5–18)
BASOPHILS # BLD AUTO: 0 THOU/UL (ref 0–0.2)
BASOPHILS NFR BLD AUTO: 0 % (ref 0–2)
BUN SERPL-MCNC: 54 MG/DL (ref 8–22)
BUN SERPL-MCNC: 54 MG/DL (ref 8–22)
CALCIUM SERPL-MCNC: 8.5 MG/DL (ref 8.5–10.5)
CALCIUM SERPL-MCNC: 8.5 MG/DL (ref 8.6–10.5)
CHLORIDE BLD-SCNC: 107 MMOL/L (ref 98–107)
CHLORIDE SERPLBLD-SCNC: 107 MMOL/L (ref 98–107)
CO2 SERPL-SCNC: 26 MMOL/L (ref 22–31)
CO2 SERPL-SCNC: 26 MMOL/L (ref 22–31)
CREAT SERPL-MCNC: 1.71 MG/DL (ref 0.7–1.3)
CREAT SERPL-MCNC: 1.71 MG/DL (ref 0.7–1.3)
DIFFERENTIAL: ABNORMAL
EOSINOPHIL # BLD AUTO: 0.7 THOU/UL (ref 0–0.4)
EOSINOPHIL NFR BLD AUTO: 13 % (ref 0–6)
ERYTHROCYTE [DISTWIDTH] IN BLOOD BY AUTOMATED COUNT: 14.3 % (ref 11–14.5)
ERYTHROCYTE [DISTWIDTH] IN BLOOD BY AUTOMATED COUNT: 14.3 % (ref 11–14.5)
GFR SERPL CREATININE-BSD FRML MDRD: 40 ML/MIN/1.73M2
GFR SERPL CREATININE-BSD FRML MDRD: 40 ML/MIN/1.73M2
GLUCOSE BLD-MCNC: 79 MG/DL (ref 70–125)
GLUCOSE SERPL-MCNC: 79 MG/DL (ref 70–125)
HCT VFR BLD AUTO: 20.1 % (ref 40–54)
HCT VFR BLD AUTO: 20.1 % (ref 40–54)
HEMOGLOBIN: 6.6 G/DL (ref 14–18)
HGB BLD-MCNC: 6.6 G/DL (ref 14–18)
IMM GRANULOCYTES # BLD: 0 THOU/UL
IMM GRANULOCYTES NFR BLD: 1 %
LYMPHOCYTES # BLD AUTO: 0.7 THOU/UL (ref 0.8–4.4)
LYMPHOCYTES NFR BLD AUTO: 12 % (ref 20–40)
MCH RBC QN AUTO: 30.1 PG (ref 27–34)
MCH RBC QN AUTO: 30.1 PG (ref 27–34)
MCHC RBC AUTO-ENTMCNC: 32.8 G/DL (ref 32–36)
MCHC RBC AUTO-ENTMCNC: 32.8 G/DL (ref 32–36)
MCV RBC AUTO: 92 FL (ref 80–100)
MCV RBC AUTO: 92 FL (ref 80–100)
MONOCYTES # BLD AUTO: 0.3 THOU/UL (ref 0–0.9)
MONOCYTES NFR BLD AUTO: 6 % (ref 2–10)
NEUTROPHILS # BLD AUTO: 3.9 THOU/UL (ref 2–7.7)
NEUTROPHILS NFR BLD AUTO: 69 % (ref 50–70)
PLATELET # BLD AUTO: 90 THOU/UL (ref 140–440)
PLATELET # BLD AUTO: 90 THOU/UL (ref 140–440)
PMV BLD AUTO: 9.7 FL (ref 8.5–12.5)
POTASSIUM BLD-SCNC: 4.3 MMOL/L (ref 3.5–5)
POTASSIUM SERPL-SCNC: 4.3 MMOL/L (ref 3.6–5)
RBC # BLD AUTO: 2.19 MILL/UL (ref 4.4–6.2)
RBC # BLD AUTO: 2.19 MILL/UL (ref 4.4–6.2)
SODIUM SERPL-SCNC: 140 MMOL/L (ref 136–145)
SODIUM SERPL-SCNC: 140 MMOL/L (ref 136–145)
WBC # BLD AUTO: 5.6 THOU/UL (ref 4–11)
WBC: 5.6 THOU/UL (ref 4–11)

## 2021-01-13 NOTE — LETTER
Date:January 17, 2021      Provider requested that no letter be sent. Do not send.       [facility] Health Information

## 2021-01-13 NOTE — LETTER
"1/13/2021       RE: Perfecto Reese  [address]  [address]  [place] [address]     Dear Colleague,    Thank you for referring your patient, Perfecto Reese, to the [facility] [place] [facility]. Please see a copy of my visit note below.    .    \"Tried calling [facility] at 10:32am to get patient ready for visit and clarify details as far as video - as patient is not on my chart.  A nurse answered and stated she had to go to a meeting so she would call me back - inquired about the email address listed in appt notes and she stated she knew nothing about that but would transfer me to the person that helped with that  - instead of transferring me she hung up.    She has not contacted me back and I have tried calling nursing home and there is no answer.    Tried calling patient's ph# - no answer.\"        Please see NERISSA (Sukhdeep Aguayo) note above in regards to trying to contact the TCU. I also tried to call the [place] [facility] at 1:00 pm (the time of the video appointment) however I could not reach out  to anyone. I also called the patient's phone number, but again was not able to reach him.     I will contact the schedulers to reschedule the appointment.             Again, thank you for allowing me to participate in the care of your patient.      Sincerely,    Dixie Woo MD      "

## 2021-01-14 ENCOUNTER — VIRTUAL VISIT (OUTPATIENT)
Dept: ONCOLOGY | Facility: CLINIC | Age: 71
End: 2021-01-14
Attending: INTERNAL MEDICINE
Payer: MEDICARE

## 2021-01-14 VITALS
SYSTOLIC BLOOD PRESSURE: 130 MMHG | DIASTOLIC BLOOD PRESSURE: 70 MMHG | HEART RATE: 88 BPM | WEIGHT: 141.9 LBS | OXYGEN SATURATION: 90 % | RESPIRATION RATE: 18 BRPM | TEMPERATURE: 98 F | BODY MASS INDEX: 23.64 KG/M2 | HEIGHT: 65 IN

## 2021-01-14 DIAGNOSIS — D69.6 THROMBOCYTOPENIA (H): Primary | ICD-10-CM

## 2021-01-14 PROCEDURE — 99441 PR PHYSICIAN TELEPHONE EVALUATION 5-10 MIN: CPT | Mod: 95 | Performed by: INTERNAL MEDICINE

## 2021-01-14 PROCEDURE — 999N001193 HC VIDEO/TELEPHONE VISIT; NO CHARGE

## 2021-01-14 ASSESSMENT — MIFFLIN-ST. JEOR: SCORE: 1340.53

## 2021-01-14 NOTE — LETTER
1/14/2021         RE: Perfecto Reese  [address]  [address]  [facility] [address]        Dear Colleague,    Thank you for referring your patient, Perfecto Reese, to the [facility]. Please see a copy of my visit note below.    Perfecto is a 70 year old who is being evaluated via a billable telephone visit.      What phone number would you like to be contacted at? [phone] [place] at [facility] ask for Nir  How would you like to obtain your AVS? Mail a copy     Dinora Nolen CMA on 1/14/2021 at 12:30 PM          [facility]    Hematology/Oncology Established Patient Follow-up Note      Today's Date: 01/14/21    Reason for Follow-up: pancytopenia    HISTORY OF PRESENT ILLNESS: Perfecto Reese is a 70 year old male with multiple medical problems including chronic kidney disease, COPD, hypertension, adrenal insufficiency, psoriasis, chronic hepatitis C with cirrhosis, PVD s/p left BKA, chronic wounds.  Hematology was consulted during a prior hospitalization for pancytopenia.  The patient has chronic cytopenias.  On 01/07/2020 (care everywhere), WBC of 3.1 with hemoglobin of 5.4 and platelets of 68.      For workup of cytopenia, multiple labs were done.  Iron was low at 30.  No deficiency of vitamin B12 or folate.  Peripheral blood smear reviewed reveals pancytopenia without dysplasia.  CT scan on 12/02/2020 reveals liver cirrhosis, signs of portal hypertension and splenomegaly.      INTERIM HISTORY: Patient is residing at [facility].  He was discharged from hospital on 12/31/20, after an admission on 12/2/20 for pneumonia, found to have RLL lung abscess.  CT surgery saw the patient.  He was treated with antibiotics and followed by ID.  Repeat CT chest on 1/12/21 showed significant improvement of the lung abscess and pneumonia.          REVIEW OF SYSTEMS:   14 point ROS was reviewed and is negative other than as noted above in HPI.       HOME  MEDICATIONS:  Current Outpatient Medications   Medication Sig Dispense Refill     albuterol (PROAIR HFA/PROVENTIL HFA/VENTOLIN HFA) 108 (90 Base) MCG/ACT inhaler Inhale 2 puffs into the lungs every 4 hours as needed for shortness of breath / dyspnea or wheezing       amLODIPine (NORVASC) 5 MG tablet Take 1 tablet (5 mg) by mouth daily 30 tablet 0     aspirin 81 MG EC tablet Take 81 mg by mouth daily       calcium citrate (CITRACAL) 950 MG tablet Take 4 tablets by mouth daily       clobetasol (TEMOVATE) 0.05 % external solution Apply topically At Bedtime Apply to scalp       dextromethorphan-guaiFENesin (MUCINEX DM)  MG 12 hr tablet Take 1 tablet by mouth every 12 hours 60 tablet 1     diclofenac (VOLTAREN) 1 % topical gel Apply 4 g topically every 6 hours as needed for moderate pain (R shoulder)       DULoxetine (CYMBALTA) 30 MG capsule Take 30 mg by mouth daily       ferrous sulfate (FEROSUL) 325 (65 Fe) MG tablet Take 1 tablet (325 mg) by mouth 2 times daily (with meals) 30 tablet 1     fluticasone-vilanterol (BREO ELLIPTA) 200-25 MCG/INH inhaler Inhale 1 puff into the lungs daily 28 each 1     furosemide (LASIX) 40 MG tablet Take 1 tablet (40 mg) by mouth daily 30 tablet 0     gabapentin (NEURONTIN) 300 MG capsule Take 1 capsule (300 mg) by mouth 3 times daily 12 capsule 0     hydrocortisone (CORTEF) 20 MG tablet Take 20 mg by mouth every morning       HYDROmorphone (DILAUDID) 2 MG tablet Take 0.5 tablets (1 mg) by mouth every 6 hours as needed for pain Separate within 4 hours from PRN morphine 30 tablet 0     lidocaine (XYLOCAINE) 5 % external ointment Apply topically 3 times daily as needed for moderate pain (rib pain)       loperamide (IMODIUM) 2 MG capsule Take 1 capsule (2 mg) by mouth 4 times daily as needed for diarrhea 15 capsule 0     losartan (COZAAR) 25 MG tablet Take 12.5 mg by mouth daily       melatonin 3 MG tablet Take 2 tablets (6 mg) by mouth At Bedtime 60 tablet 1     MENTHOL-METHYL  SALICYLATE EX Externally apply topically 2 times daily Menthol-methyl saliycylate 10-15% cream twice daily to lower back       morphine 10 MG/5ML solution Take 1.25 mLs (2.5 mg) by mouth daily as needed for severe pain (with dressing change) 100 mL 0     multivitamin w/minerals (THERA-VIT-M) tablet Take 1 tablet by mouth daily 30 tablet 0     nicotine (NICORETTE) 2 MG gum Place 2 mg inside cheek as needed for smoking cessation       omeprazole (PRILOSEC) 20 MG DR capsule Take 20 mg by mouth daily       primidone (MYSOLINE) 50 MG tablet Take 50 mg by mouth At Bedtime       tamsulosin (FLOMAX) 0.4 MG capsule Take 0.8 mg by mouth daily       umeclidinium (INCRUSE ELLIPTA) 62.5 MCG/INH inhaler Inhale 1 puff into the lungs daily 30 each 1     Vitamin D, Cholecalciferol, 25 MCG (1000 UT) TABS Take 1,000 Units by mouth 2 times daily           ALLERGIES:  Allergies   Allergen Reactions     Darvocet [Propoxyphene N-Apap] Nausea     Vicodin [Hydrocodone-Acetaminophen] Nausea     Can use if he eats with it         PAST MEDICAL HISTORY:  Past Medical History:   Diagnosis Date     Adrenal insufficiency (H)      Anemia, iron deficiency      Back pain      COPD (chronic obstructive pulmonary disease) (H)      Depression      GERD (gastroesophageal reflux disease)      Hyperlipidemia      Migraine          PAST SURGICAL HISTORY:  Past Surgical History:   Procedure Laterality Date     AMPUTATION BELOW KNEE RT/LT Left      AS ARTHROSCOPY SUBTALAR JOINT SUBTALAR ARTHRODESIS       OPEN REDUCTION INTERNAL FIXATION FOREARM      left forearm/wrist     TONSILLECTOMY           SOCIAL HISTORY:  Social History     Socioeconomic History     Marital status: Single     Spouse name: Not on file     Number of children: Not on file     Years of education: Not on file     Highest education level: Not on file   Occupational History     Not on file   Social Needs     Financial resource strain: Not on file     Food insecurity     Worry: Not on file      Inability: Not on file     Transportation needs     Medical: Not on file     Non-medical: Not on file   Tobacco Use     Smoking status: Former Smoker     Packs/day: 0.50     Years: 30.00     Pack years: 15.00     Types: Cigarettes     Quit date: 4/1/2017     Years since quitting: 3.7     Smokeless tobacco: Never Used     Tobacco comment: states he is down to 3QD   Substance and Sexual Activity     Alcohol use: No     Drug use: No     Sexual activity: Never   Lifestyle     Physical activity     Days per week: Not on file     Minutes per session: Not on file     Stress: Not on file   Relationships     Social connections     Talks on phone: Not on file     Gets together: Not on file     Attends Anglican service: Not on file     Active member of club or organization: Not on file     Attends meetings of clubs or organizations: Not on file     Relationship status: Not on file     Intimate partner violence     Fear of current or ex partner: Not on file     Emotionally abused: Not on file     Physically abused: Not on file     Forced sexual activity: Not on file   Other Topics Concern     Parent/sibling w/ CABG, MI or angioplasty before 65F 55M? Not Asked   Social History Narrative     Not on file         FAMILY HISTORY:  Family History   Problem Relation Age of Onset     Colon Cancer Father      Coronary Artery Disease Father      Chronic Obstructive Pulmonary Disease Father      Osteoporosis Mother      Dementia Mother          PHYSICAL EXAM:  Phone visit.      LABS:  CBC RESULTS:   Recent Labs   Lab Test 01/13/21   WBC 5.6   RBC 2.19*   HGB 6.6*   HCT 20.1*   MCV 92   MCH 30.1   MCHC 32.8   RDW 14.3   PLT 90*         ASSESSMENT/PLAN:  Perfecto Reese is a 70 year old male with:    Anemia and thrombocytopenia: He had leukopenia in the hospital too, but this has normalized.  Cytopenias are likely multifactorial, due to liver cirrhosis and splenomegaly, anemia of chronic disease, chronic kidney disease, psoriasis.  -he is  on oral iron  -transfuse as needed for hemoglobin <7, platelet count <10, or if bleeding  -he will resume follow-up with PCP, and hematology as needed      Ina Mariscal MD  Hematology/Oncology  [facility]        Phone call duration: 5 minutes    Total time spent on day of visit, including review of tests, obtaining/reviewing separately obtained history, ordering medications/tests/procedures, communicating with PCP/consultants, and documenting in electronic medical record: 20 minutes        Again, thank you for allowing me to participate in the care of your patient.        Sincerely,        Ina Mariscal MD

## 2021-01-14 NOTE — LETTER
1/14/2021         RE: Perfecto Reese  [address]  [address]  [facility] [address]        Dear Colleague,    Thank you for referring your patient, Perfecto Reese, to the [facility]. Please see a copy of my visit note below.    Perfecto is a 70 year old who is being evaluated via a billable telephone visit.      What phone number would you like to be contacted at? [phone] [place] at [facility] ask for Nir  How would you like to obtain your AVS? Mail a copy     Dinora Nolen CMA on 1/14/2021 at 12:30 PM          [facility]    Hematology/Oncology Established Patient Follow-up Note      Today's Date: 01/14/21    Reason for Follow-up: pancytopenia    HISTORY OF PRESENT ILLNESS: Perfecto Reese is a 70 year old male with multiple medical problems including chronic kidney disease, COPD, hypertension, adrenal insufficiency, psoriasis, chronic hepatitis C with cirrhosis, PVD s/p left BKA, chronic wounds.  Hematology was consulted during a prior hospitalization for pancytopenia.  The patient has chronic cytopenias.  On 01/07/2020 (care everywhere), WBC of 3.1 with hemoglobin of 5.4 and platelets of 68.      For workup of cytopenia, multiple labs were done.  Iron was low at 30.  No deficiency of vitamin B12 or folate.  Peripheral blood smear reviewed reveals pancytopenia without dysplasia.  CT scan on 12/02/2020 reveals liver cirrhosis, signs of portal hypertension and splenomegaly.      INTERIM HISTORY: Patient is residing at [facility].  He was discharged from hospital on 12/31/20, after an admission on 12/2/20 for pneumonia, found to have RLL lung abscess.  CT surgery saw the patient.  He was treated with antibiotics and followed by ID.  Repeat CT chest on 1/12/21 showed significant improvement of the lung abscess and pneumonia.          REVIEW OF SYSTEMS:   14 point ROS was reviewed and is negative other than as noted above in HPI.       HOME  MEDICATIONS:  Current Outpatient Medications   Medication Sig Dispense Refill     albuterol (PROAIR HFA/PROVENTIL HFA/VENTOLIN HFA) 108 (90 Base) MCG/ACT inhaler Inhale 2 puffs into the lungs every 4 hours as needed for shortness of breath / dyspnea or wheezing       amLODIPine (NORVASC) 5 MG tablet Take 1 tablet (5 mg) by mouth daily 30 tablet 0     aspirin 81 MG EC tablet Take 81 mg by mouth daily       calcium citrate (CITRACAL) 950 MG tablet Take 4 tablets by mouth daily       clobetasol (TEMOVATE) 0.05 % external solution Apply topically At Bedtime Apply to scalp       dextromethorphan-guaiFENesin (MUCINEX DM)  MG 12 hr tablet Take 1 tablet by mouth every 12 hours 60 tablet 1     diclofenac (VOLTAREN) 1 % topical gel Apply 4 g topically every 6 hours as needed for moderate pain (R shoulder)       DULoxetine (CYMBALTA) 30 MG capsule Take 30 mg by mouth daily       ferrous sulfate (FEROSUL) 325 (65 Fe) MG tablet Take 1 tablet (325 mg) by mouth 2 times daily (with meals) 30 tablet 1     fluticasone-vilanterol (BREO ELLIPTA) 200-25 MCG/INH inhaler Inhale 1 puff into the lungs daily 28 each 1     furosemide (LASIX) 40 MG tablet Take 1 tablet (40 mg) by mouth daily 30 tablet 0     gabapentin (NEURONTIN) 300 MG capsule Take 1 capsule (300 mg) by mouth 3 times daily 12 capsule 0     hydrocortisone (CORTEF) 20 MG tablet Take 20 mg by mouth every morning       HYDROmorphone (DILAUDID) 2 MG tablet Take 0.5 tablets (1 mg) by mouth every 6 hours as needed for pain Separate within 4 hours from PRN morphine 30 tablet 0     lidocaine (XYLOCAINE) 5 % external ointment Apply topically 3 times daily as needed for moderate pain (rib pain)       loperamide (IMODIUM) 2 MG capsule Take 1 capsule (2 mg) by mouth 4 times daily as needed for diarrhea 15 capsule 0     losartan (COZAAR) 25 MG tablet Take 12.5 mg by mouth daily       melatonin 3 MG tablet Take 2 tablets (6 mg) by mouth At Bedtime 60 tablet 1     MENTHOL-METHYL  SALICYLATE EX Externally apply topically 2 times daily Menthol-methyl saliycylate 10-15% cream twice daily to lower back       morphine 10 MG/5ML solution Take 1.25 mLs (2.5 mg) by mouth daily as needed for severe pain (with dressing change) 100 mL 0     multivitamin w/minerals (THERA-VIT-M) tablet Take 1 tablet by mouth daily 30 tablet 0     nicotine (NICORETTE) 2 MG gum Place 2 mg inside cheek as needed for smoking cessation       omeprazole (PRILOSEC) 20 MG DR capsule Take 20 mg by mouth daily       primidone (MYSOLINE) 50 MG tablet Take 50 mg by mouth At Bedtime       tamsulosin (FLOMAX) 0.4 MG capsule Take 0.8 mg by mouth daily       umeclidinium (INCRUSE ELLIPTA) 62.5 MCG/INH inhaler Inhale 1 puff into the lungs daily 30 each 1     Vitamin D, Cholecalciferol, 25 MCG (1000 UT) TABS Take 1,000 Units by mouth 2 times daily           ALLERGIES:  Allergies   Allergen Reactions     Darvocet [Propoxyphene N-Apap] Nausea     Vicodin [Hydrocodone-Acetaminophen] Nausea     Can use if he eats with it         PAST MEDICAL HISTORY:  Past Medical History:   Diagnosis Date     Adrenal insufficiency (H)      Anemia, iron deficiency      Back pain      COPD (chronic obstructive pulmonary disease) (H)      Depression      GERD (gastroesophageal reflux disease)      Hyperlipidemia      Migraine          PAST SURGICAL HISTORY:  Past Surgical History:   Procedure Laterality Date     AMPUTATION BELOW KNEE RT/LT Left      AS ARTHROSCOPY SUBTALAR JOINT SUBTALAR ARTHRODESIS       OPEN REDUCTION INTERNAL FIXATION FOREARM      left forearm/wrist     TONSILLECTOMY           SOCIAL HISTORY:  Social History     Socioeconomic History     Marital status: Single     Spouse name: Not on file     Number of children: Not on file     Years of education: Not on file     Highest education level: Not on file   Occupational History     Not on file   Social Needs     Financial resource strain: Not on file     Food insecurity     Worry: Not on file      Inability: Not on file     Transportation needs     Medical: Not on file     Non-medical: Not on file   Tobacco Use     Smoking status: Former Smoker     Packs/day: 0.50     Years: 30.00     Pack years: 15.00     Types: Cigarettes     Quit date: 4/1/2017     Years since quitting: 3.7     Smokeless tobacco: Never Used     Tobacco comment: states he is down to 3QD   Substance and Sexual Activity     Alcohol use: No     Drug use: No     Sexual activity: Never   Lifestyle     Physical activity     Days per week: Not on file     Minutes per session: Not on file     Stress: Not on file   Relationships     Social connections     Talks on phone: Not on file     Gets together: Not on file     Attends Yazidi service: Not on file     Active member of club or organization: Not on file     Attends meetings of clubs or organizations: Not on file     Relationship status: Not on file     Intimate partner violence     Fear of current or ex partner: Not on file     Emotionally abused: Not on file     Physically abused: Not on file     Forced sexual activity: Not on file   Other Topics Concern     Parent/sibling w/ CABG, MI or angioplasty before 65F 55M? Not Asked   Social History Narrative     Not on file         FAMILY HISTORY:  Family History   Problem Relation Age of Onset     Colon Cancer Father      Coronary Artery Disease Father      Chronic Obstructive Pulmonary Disease Father      Osteoporosis Mother      Dementia Mother          PHYSICAL EXAM:  Phone visit.      LABS:  CBC RESULTS:   Recent Labs   Lab Test 01/13/21   WBC 5.6   RBC 2.19*   HGB 6.6*   HCT 20.1*   MCV 92   MCH 30.1   MCHC 32.8   RDW 14.3   PLT 90*         ASSESSMENT/PLAN:  Perfecto Reese is a 70 year old male with:    Anemia and thrombocytopenia: He had leukopenia in the hospital too, but this has normalized.  Cytopenias are likely multifactorial, due to liver cirrhosis and splenomegaly, anemia of chronic disease, chronic kidney disease, psoriasis.  -he is  on oral iron  -transfuse as needed for hemoglobin <7, platelet count <10, or if bleeding  -he will resume follow-up with PCP, and hematology as needed      Ina Mariscal MD  Hematology/Oncology  [facility]        Phone call duration: 5 minutes    Total time spent on day of visit, including review of tests, obtaining/reviewing separately obtained history, ordering medications/tests/procedures, communicating with PCP/consultants, and documenting in electronic medical record: 20 minutes        Again, thank you for allowing me to participate in the care of your patient.        Sincerely,        Ina Mariscal MD

## 2021-01-14 NOTE — PROGRESS NOTES
Perfecto is a 70 year old who is being evaluated via a billable telephone visit.      What phone number would you like to be contacted at? [phone] [place] at [facility] [place] ask for Nir  How would you like to obtain your AVS? Mail a copy     Dinora Nolen CMA on 1/14/2021 at 12:30 PM          [facility]    Hematology/Oncology Established Patient Follow-up Note      Today's Date: 01/14/21    Reason for Follow-up: pancytopenia    HISTORY OF PRESENT ILLNESS: Perfecto Reese is a 70 year old male with multiple medical problems including chronic kidney disease, COPD, hypertension, adrenal insufficiency, psoriasis, chronic hepatitis C with cirrhosis, PVD s/p left BKA, chronic wounds.  Hematology was consulted during a prior hospitalization for pancytopenia.  The patient has chronic cytopenias.  On 01/07/2020 (care everywhere), WBC of 3.1 with hemoglobin of 5.4 and platelets of 68.      For workup of cytopenia, multiple labs were done.  Iron was low at 30.  No deficiency of vitamin B12 or folate.  Peripheral blood smear reviewed reveals pancytopenia without dysplasia.  CT scan on 12/02/2020 reveals liver cirrhosis, signs of portal hypertension and splenomegaly.      INTERIM HISTORY: Patient is residing at [facility], [place].  He was discharged from hospital on 12/31/20, after an admission on 12/2/20 for pneumonia, found to have RLL lung abscess.  CT surgery saw the patient.  He was treated with antibiotics and followed by ID.  Repeat CT chest on 1/12/21 showed significant improvement of the lung abscess and pneumonia.          REVIEW OF SYSTEMS:   14 point ROS was reviewed and is negative other than as noted above in HPI.       HOME MEDICATIONS:  Current Outpatient Medications   Medication Sig Dispense Refill     albuterol (PROAIR HFA/PROVENTIL HFA/VENTOLIN HFA) 108 (90 Base) MCG/ACT inhaler Inhale 2 puffs into the lungs every 4 hours as needed for shortness of breath / dyspnea or wheezing       amLODIPine (NORVASC) 5  MG tablet Take 1 tablet (5 mg) by mouth daily 30 tablet 0     aspirin 81 MG EC tablet Take 81 mg by mouth daily       calcium citrate (CITRACAL) 950 MG tablet Take 4 tablets by mouth daily       clobetasol (TEMOVATE) 0.05 % external solution Apply topically At Bedtime Apply to scalp       dextromethorphan-guaiFENesin (MUCINEX DM)  MG 12 hr tablet Take 1 tablet by mouth every 12 hours 60 tablet 1     diclofenac (VOLTAREN) 1 % topical gel Apply 4 g topically every 6 hours as needed for moderate pain (R shoulder)       DULoxetine (CYMBALTA) 30 MG capsule Take 30 mg by mouth daily       ferrous sulfate (FEROSUL) 325 (65 Fe) MG tablet Take 1 tablet (325 mg) by mouth 2 times daily (with meals) 30 tablet 1     fluticasone-vilanterol (BREO ELLIPTA) 200-25 MCG/INH inhaler Inhale 1 puff into the lungs daily 28 each 1     furosemide (LASIX) 40 MG tablet Take 1 tablet (40 mg) by mouth daily 30 tablet 0     gabapentin (NEURONTIN) 300 MG capsule Take 1 capsule (300 mg) by mouth 3 times daily 12 capsule 0     hydrocortisone (CORTEF) 20 MG tablet Take 20 mg by mouth every morning       HYDROmorphone (DILAUDID) 2 MG tablet Take 0.5 tablets (1 mg) by mouth every 6 hours as needed for pain Separate within 4 hours from PRN morphine 30 tablet 0     lidocaine (XYLOCAINE) 5 % external ointment Apply topically 3 times daily as needed for moderate pain (rib pain)       loperamide (IMODIUM) 2 MG capsule Take 1 capsule (2 mg) by mouth 4 times daily as needed for diarrhea 15 capsule 0     losartan (COZAAR) 25 MG tablet Take 12.5 mg by mouth daily       melatonin 3 MG tablet Take 2 tablets (6 mg) by mouth At Bedtime 60 tablet 1     MENTHOL-METHYL SALICYLATE EX Externally apply topically 2 times daily Menthol-methyl saliycylate 10-15% cream twice daily to lower back       morphine 10 MG/5ML solution Take 1.25 mLs (2.5 mg) by mouth daily as needed for severe pain (with dressing change) 100 mL 0     multivitamin w/minerals (THERA-VIT-M)  tablet Take 1 tablet by mouth daily 30 tablet 0     nicotine (NICORETTE) 2 MG gum Place 2 mg inside cheek as needed for smoking cessation       omeprazole (PRILOSEC) 20 MG DR capsule Take 20 mg by mouth daily       primidone (MYSOLINE) 50 MG tablet Take 50 mg by mouth At Bedtime       tamsulosin (FLOMAX) 0.4 MG capsule Take 0.8 mg by mouth daily       umeclidinium (INCRUSE ELLIPTA) 62.5 MCG/INH inhaler Inhale 1 puff into the lungs daily 30 each 1     Vitamin D, Cholecalciferol, 25 MCG (1000 UT) TABS Take 1,000 Units by mouth 2 times daily           ALLERGIES:  Allergies   Allergen Reactions     Darvocet [Propoxyphene N-Apap] Nausea     Vicodin [Hydrocodone-Acetaminophen] Nausea     Can use if he eats with it         PAST MEDICAL HISTORY:  Past Medical History:   Diagnosis Date     Adrenal insufficiency (H)      Anemia, iron deficiency      Back pain      COPD (chronic obstructive pulmonary disease) (H)      Depression      GERD (gastroesophageal reflux disease)      Hyperlipidemia      Migraine          PAST SURGICAL HISTORY:  Past Surgical History:   Procedure Laterality Date     AMPUTATION BELOW KNEE RT/LT Left      AS ARTHROSCOPY SUBTALAR JOINT SUBTALAR ARTHRODESIS       OPEN REDUCTION INTERNAL FIXATION FOREARM      left forearm/wrist     TONSILLECTOMY           SOCIAL HISTORY:  Social History     Socioeconomic History     Marital status: Single     Spouse name: Not on file     Number of children: Not on file     Years of education: Not on file     Highest education level: Not on file   Occupational History     Not on file   Social Needs     Financial resource strain: Not on file     Food insecurity     Worry: Not on file     Inability: Not on file     Transportation needs     Medical: Not on file     Non-medical: Not on file   Tobacco Use     Smoking status: Former Smoker     Packs/day: 0.50     Years: 30.00     Pack years: 15.00     Types: Cigarettes     Quit date: 4/1/2017     Years since quitting: 3.7      Smokeless tobacco: Never Used     Tobacco comment: states he is down to 3QD   Substance and Sexual Activity     Alcohol use: No     Drug use: No     Sexual activity: Never   Lifestyle     Physical activity     Days per week: Not on file     Minutes per session: Not on file     Stress: Not on file   Relationships     Social connections     Talks on phone: Not on file     Gets together: Not on file     Attends [employer]odoxy service: Not on file     Active member of club or organization: Not on file     Attends meetings of clubs or organizations: Not on file     Relationship status: Not on file     Intimate partner violence     Fear of current or ex partner: Not on file     Emotionally abused: Not on file     Physically abused: Not on file     Forced sexual activity: Not on file   Other Topics Concern     Parent/sibling w/ CABG, MI or angioplasty before 65F 55M? Not Asked   Social History Narrative     Not on file         FAMILY HISTORY:  Family History   Problem Relation Age of Onset     Colon Cancer Father      Coronary Artery Disease Father      Chronic Obstructive Pulmonary Disease Father      Osteoporosis Mother      Dementia Mother          PHYSICAL EXAM:  Phone visit.      LABS:  CBC RESULTS:   Recent Labs   Lab Test 01/13/21   WBC 5.6   RBC 2.19*   HGB 6.6*   HCT 20.1*   MCV 92   MCH 30.1   MCHC 32.8   RDW 14.3   PLT 90*         ASSESSMENT/PLAN:  Perfecto Reese is a 70 year old male with:    Anemia and thrombocytopenia: He had leukopenia in the hospital too, but this has normalized.  Cytopenias are likely multifactorial, due to liver cirrhosis and splenomegaly, anemia of chronic disease, chronic kidney disease, psoriasis.  -he is on oral iron  -transfuse as needed for hemoglobin <7, platelet count <10, or if bleeding  -he will resume follow-up with PCP, and hematology as needed      Ina Mariscal MD  Hematology/Oncology  [facility]        Phone call duration: 5 minutes    Total time  spent on day of visit, including review of tests, obtaining/reviewing separately obtained history, ordering medications/tests/procedures, communicating with PCP/consultants, and documenting in electronic medical record: 20 minutes

## 2021-01-14 NOTE — PROGRESS NOTES
[facility]  [facility] Record Number:  [id number]  Place of Service where encounter took place:  [facility] ([facility]) [[id number]]  Chief Complaint   Patient presents with     RECHECK       HPI:    Perfecto Reese  is a 70 year old (1950), who is being seen today for an episodic care visit.  HPI information obtained from: facility chart records, facility staff, patient report, [facility] chart review and Care Everywhere [facility] chart review. Today's concern is:     Physical deconditioning  Generalized muscle weakness  Acute on chronic respiratory failure with hypoxia and hypercapnia (H)  Pneumonia of both lungs due to infectious organism, unspecified part of lung  Abscess of lower lobe of right lung with pneumonia (H)  COPD, severe (H)  Musculoskeletal chest pain  Anemia of chronic disease  Chronic idiopathic thrombocytopenia (H)  Stage 3 chronic kidney disease, unspecified whether stage 3a or 3b CKD  Pyuria  Chronic adrenal insufficiency (H)  Hypertension, unspecified type  Hx of psoriasis  PVD (peripheral vascular disease) (H)  S/P below knee amputation, left (H)  Open wound of right lower extremity, subsequent encounter  Open wound of left ear, unspecified open wound type, subsequent encounter  Chronic pain syndrome  Mild protein-calorie malnutrition (H)  Insomnia, unspecified type  Pneumonia of right lower lobe due to infectious organism  Pain  Itching     Met with patient who denies any chest pain, palpitations, shortness of breath, ISIDRO, lightheadedness, or dizziness. Does report productive cough which noted thick greenish phelgm present. Denies any abdominal discomfort. Denies N&V. Denies B&B concerns. Denies dysuria or frequency. Denies loose or constipation. Appetite good. Sleeping well. He denies any current pain complaints. Appears to be compliant and pleasant today from previous visits. Patient still is on/off noncompliant with therapies. Nursing reports increased itch  complaints as he tried to scratch his RLE often.      BP Readings from Last 3 Encounters:   01/14/21 130/70   01/07/21 124/79   01/05/21 130/52     Wt Readings from Last 5 Encounters:   01/14/21 64.4 kg (141 lb 14.4 oz)   01/07/21 64.4 kg (141 lb 14.4 oz)   01/05/21 64.4 kg (141 lb 14.4 oz)   12/30/20 65 kg (143 lb 4.8 oz)   03/22/18 93 kg (205 lb)     Past Medical and Surgical History reviewed in Epic today.    MEDICATIONS:    Current Outpatient Medications   Medication Sig Dispense Refill     amoxicillin-clavulanate (AUGMENTIN) 875-125 MG tablet Take 1 tablet by mouth 2 times daily for 10 days 20 tablet 0     azithromycin (ZITHROMAX) 250 MG tablet Take 2 tablets (500 mg) by mouth daily for 1 day, THEN 1 tablet (250 mg) daily for 4 days. 6 tablet 0     cyanocobalamin (VITAMIN B-12) 1000 MCG tablet Take 1 tablet (1,000 mcg) by mouth daily 30 tablet 1     ferrous sulfate (FEROSUL) 325 (65 Fe) MG tablet Take 1 tablet (325 mg) by mouth 3 times daily (with meals) 90 tablet 1     folic acid (FOLVITE) 1 MG tablet Take 1 tablet (1 mg) by mouth daily 30 tablet 1     gabapentin (NEURONTIN) 300 MG capsule Take 1 capsule (300 mg) by mouth 2 times daily 60 capsule 1     hydrOXYzine (ATARAX) 25 MG tablet Take 1 tablet (25 mg) by mouth every 6 hours as needed for itching 30 tablet 1     vitamin C (ASCORBIC ACID) 1000 MG TABS Take 1 tablet (1,000 mg) by mouth daily 30 tablet 1     vitamin D3 (CHOLECALCIFEROL) 250 mcg (02721 units) capsule Take 1 capsule (250 mcg) by mouth daily 30 capsule 1     albuterol (PROAIR HFA/PROVENTIL HFA/VENTOLIN HFA) 108 (90 Base) MCG/ACT inhaler Inhale 2 puffs into the lungs every 4 hours as needed for shortness of breath / dyspnea or wheezing       amLODIPine (NORVASC) 5 MG tablet Take 1 tablet (5 mg) by mouth daily 30 tablet 0     calcium citrate (CITRACAL) 950 MG tablet Take 4 tablets by mouth daily       clobetasol (TEMOVATE) 0.05 % external solution Apply topically At Bedtime Apply to scalp        dextromethorphan-guaiFENesin (MUCINEX DM)  MG 12 hr tablet Take 1 tablet by mouth every 12 hours 60 tablet 1     DULoxetine (CYMBALTA) 30 MG capsule Take 60 mg by mouth daily       fluticasone-vilanterol (BREO ELLIPTA) 200-25 MCG/INH inhaler Inhale 1 puff into the lungs daily 28 each 1     furosemide (LASIX) 40 MG tablet Take 1 tablet (40 mg) by mouth daily 30 tablet 0     hydrocortisone (CORTEF) 20 MG tablet Take 20 mg by mouth every morning       HYDROmorphone (DILAUDID) 2 MG tablet Take 0.5 tablets (1 mg) by mouth every 6 hours as needed for pain Separate within 4 hours from PRN morphine 30 tablet 0     lidocaine (XYLOCAINE) 5 % external ointment Apply topically 3 times daily as needed for moderate pain (rib pain)       loperamide (IMODIUM) 2 MG capsule Take 1 capsule (2 mg) by mouth 4 times daily as needed for diarrhea 15 capsule 0     losartan (COZAAR) 25 MG tablet Take 12.5 mg by mouth daily       melatonin 3 MG tablet Take 2 tablets (6 mg) by mouth At Bedtime 60 tablet 1     MENTHOL-METHYL SALICYLATE EX Externally apply topically 2 times daily Menthol-methyl saliycylate 10-15% cream twice daily to lower back       multivitamin w/minerals (THERA-VIT-M) tablet Take 1 tablet by mouth daily 30 tablet 0     nicotine (NICORETTE) 2 MG gum Place 2 mg inside cheek as needed for smoking cessation       omeprazole (PRILOSEC) 20 MG DR capsule Take 20 mg by mouth daily       primidone (MYSOLINE) 50 MG tablet Take 50 mg by mouth At Bedtime       tamsulosin (FLOMAX) 0.4 MG capsule Take 0.8 mg by mouth daily       umeclidinium (INCRUSE ELLIPTA) 62.5 MCG/INH inhaler Inhale 1 puff into the lungs daily 30 each 1     REVIEW OF SYSTEMS:  10 point ROS of systems including Constitutional, Eyes, Respiratory, Cardiovascular, Gastroenterology, Genitourinary, Integumentary, Musculoskeletal, Psychiatric were all negative except for pertinent positives noted in my HPI.    Objective:  /70   Pulse 88   Temp 98  F (36.7  C)    "Resp 18   Ht 1.651 m (5' 5\")   Wt 64.4 kg (141 lb 14.4 oz)   SpO2 90%   BMI 23.61 kg/m    Exam:  GENERAL APPEARANCE:  Alert, in no distress, oriented, cooperative  ENT:  Mouth and posterior oropharynx normal, moist mucous membranes, normal hearing acuity  EYES:  EOM, conjunctivae, lids, pupils and irises normal  NECK:  No adenopathy,masses or thyromegaly  RESP:  respiratory effort and palpation of chest normal, no respiratory distress, diminished breath sounds bibasilar  CV:  Palpation and auscultation of heart done , regular rate and rhythm, no murmur, rub, or gallop, no edema, hx of Left BKA.   ABDOMEN:  normal bowel sounds, soft, nontender, no hepatosplenomegaly or other masses, no guarding or rebound  M/S:   wheelchair bound. BUE intact. Weakness noted to RLE  SKIN:  Inspection of skin and subcutaneous tissue baseline, see picture  NEURO:   Cranial nerves 2-12 are normal tested and grossly at patient's baseline, no purposeful movement in upper and lower extremities  PSYCH:  oriented X 3, memory impaired , affect and mood normal            Labs:     Most Recent 3 CBC's:  Recent Labs   Lab Test 01/13/21 01/07/21 01/04/21  0940   WBC 5.6 5.5 8.4   HGB 6.6* 6.9* 7.9*   MCV 92 92 92   PLT 90* 89* 107*     Most Recent 3 BMP's:  Recent Labs   Lab Test 01/13/21 01/07/21 12/31/20  0730    138 139   POTASSIUM 4.3 4.9 4.0   CHLORIDE 107 104 105   CO2 26 26 31   BUN 54* 48* 36*   CR 1.71* 1.71* 1.51*   ANIONGAP 7 8 3   MARTINE 8.5 8.4* 8.5   GLC 79 81 90     Most Recent Anemia Panel:  Recent Labs   Lab Test 01/13/21 12/04/20  1526 12/04/20  1526 12/04/20  1245   WBC 5.6   < > 3.2* 3.5*   HGB 6.6*   < > 6.4* 6.1*   HCT 20.1*   < > 19.1* 18.6*   MCV 92   < > 93 94   PLT 90*   < > 70* 74*   IRON  --   --   --  30*   IRONSAT  --   --   --  27   RETICABSCT  --   --  17.9*  --    RETP  --   --  0.9  --    FEB  --   --   --  112*   JÚNIOR  --   --   --  515*   B12  --   --   --  1,117*   FOLIC  --   --   --  6.7    < > = values " in this interval not displayed.       CT CHEST WITHOUT CONTRAST 1/12/2021 2:59 PM      HISTORY: COPD exacerbation, complicated. Pneumonia, unresolved or  complicated. Abscess of lung or mediastinum. Acute on chronic  respiratory failure with hypoxia and hypercapnia (H). Pneumonia of  both lungs due to infectious organism, unspecified part of lung.  Abscess of lower lobe of right lung with pneumonia (H). COPD, severe  (H).     TECHNIQUE: CT scan of the chest was performed without IV contrast.  Multiplanar reformats were obtained. Dose reduction techniques were  used.     CONTRAST: None.     COMPARISON: 12/3/2020     FINDINGS:      LUNGS AND PLEURA: Moderate emphysema. Moderate-sized right pleural  effusion, increased from previous. Consolidation with air bronchograms  in the inferior right middle lobe and right lower lobe. Fluid  collection in the inferior right middle lobe near the level of the  major fissure has decreased in size, measuring 4 x 3 x 1.5 cm compared  to previous 6.4 x 5.3 x 5.5 cm.     MEDIASTINUM/AXILLAE: Small mediastinal lymph nodes are likely  reactive.     UPPER ABDOMEN: Cirrhotic appearance of the liver. Splenomegaly.  Cholelithiasis.                                                                      IMPRESSION:  1.  Improved consolidation in the inferior right and lower lobes  consistent with pneumonia.  2.  Significant interval decrease in size of a presumed intrapulmonary  abscess in the inferior right middle lobe.  3.  The findings have significantly improved, but not resolved.  4.  A moderate-sized pleural effusion is slightly larger. The patient  may benefit from a thoracentesis.     RAEANN MAYORGA MD    ASSESSMENT/PLAN:  (R53.81) Physical deconditioning  (primary encounter diagnosis)  (M62.81) Generalized muscle weakness  Comment: Acute on chronic. S/T below diagnosis. Followed by [facility]. HISTORY of noncompliance.   Plan:   -Continue Physical therapy  and Occupational therapy as directed  -SW to remain involved for safe discharge planning needs  -Would benefit from longterm setting, however patient declines at this time.      (J96.21,  J96.22) Acute on chronic respiratory failure with hypoxia and hypercapnia (H)  (J18.9) Pneumonia of both lungs due to infectious organism, unspecified part of lung  (J85.1) Abscess of lower lobe of right lung with pneumonia (H)  (J44.9) COPD, severe (H)  (R07.89) Musculoskeletal chest pain  Comment: Acute on chronic. Admitted with fever, cough, and acute respiratory distress (placed on 10L per OM in the field). Fever to 102.5, tachycardia, tachypnea, and leukocytosis (17.2k) present on arrival. CXR on arrival showed RLL infiltrate and CT abd/pelvis showed air fluid cavity at the right lung base. On admission, he was initiated on IV pip-tazo and azithromycin, and Thoracic Surgery was consulted. Chest CT was ordered (12/3) which showed a 6.4 cm RLL pulmonary abscess and extensive left-sided mucus plugging with associated infiltrate. 12/2 blood cultures x2 no growth. Sputum cx 12/4 growing proteus, fluoroquinolone resistant. Also corynebacterium striatum 12/6. CT surgery saw, no surgical intervention indicated. Patient was treated with IV then PO antibiotics, with the assistance of infectious disease. Initially on zosyn -> unasyn -> transitioned 12/8 to Augmentin x 6 weeks (through approximately 1/13/2021). - Infectious disease peripherally following. Patient was discharged on pulmicort and spiriva orders however patient came to facility with supply of incruse and breo which patient states this was changed in hospital. --unable to find any documentation to support this, however since patient has supply of breo and incruse will start per request. Completed antibiotics of augmentin on 1/13/21.   Plan:   -Continue oxygen as directed which is chronic at 3L via NC.   -Monitor respiratory status. May benefit from thoracentesis in future if  warranted.   -Continue mucinex 600mg BID for now. May benefit from long term use for now while infection current.   -Continue incentive spirometry and encourage every 4 hours while awake. Encourage use.   -Continue fluticasone-vilanterol (breo 200-25mcg inhaler 1 puff daily  -Continue incruse ellipta 62.5mcg inhaler 1 puff daily.   -Continue albuterol inhaler PRN  -Follow up with ID as directed. Missed appt on 1/13/21, therefore this was rescheduled for 11/26/21  -Will restart augmentin for another 10 days  -will start z pack for 5 days as well for dual therapy above for pneumonia concerns.   -BMP, CBC Tuesday 11/19/21     (D63.8) Anemia of chronic disease  (D69.3) Chronic idiopathic thrombocytopenia (H)  Comment: Acute on chronic. Anemia and thrombocytopenia likely multifactorial from liver cirrhosis, splenomegaly, renal disease, CKD and psoriasis. Iron labs suggestive of anemia of chronic disease. 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 Patient required four units of pRBCs this admission for Hgb < 7.0. No acute GI bleed identified, likely blood loss from decreased erythropoiesis. Appreciate GI consult 12/16/2020, no acute GI bleed, no acute interventions, [facility] Check hemoglobin periodically while inpatient - 7.0 12/28 - clinically stable and no bleeding noted. recheck hgb 7.5 12/29 and again 7.9 on 1/4/21. Hgb has been <7 since TCU admission.   Plan:   -Follow up with GI at [facility] as directed.   -Recommend to follow up with hematology at [facility] post TCU as directed  -Monitor for worsening s/sx of concerns  -Will obtain stool sample to assess for occult stools.   -Discontinue asa due to risks.   -Increase iron to TID with meals.    -Start vitamin C 1000mg daily to assist with iron absorption along with chronic wounds.    -Monitor for active s/sx of bleeding risks.   -BMP, CBC, iron, TIBC, ferritin, folate levels Tuesday 11/19/21     (N18.30) Stage 3 chronic kidney disease, unspecified whether stage 3a or 3b CKD  (R82.81) Pyuria  Comment: Acute on chronic. Baseline creatinine 1.2-1.3 in Jan 2020, on admission 2.89. Suspect pre-renal state in setting of pneumonia/sepsis with possible progression to ATN in setting of hypotension. Improved with IV fluids, creatinine down to 1.5 on 12/16, now recheck shows improvement to 1.49 and calculated GFR is 36. Creatinne back to baseline of 1.3 on 12/23/2020. Restarted PTA losartan at lowest dose at discharge 25mg daily. Appreciate Nephrology consul t-- lasix 40 mg daily continued  Plan:   -Follow up with nephrology at [facility] post TCU  -Monitor urinary status  -Continue current medications without change  -BMP, CBC Tuesday 11/19/21     (E27.40) Chronic adrenal insufficiency (H)  Comment: Chronic. Stable.   Plan:   -Monitor for worsening s/sx of concerns  -Continue PTA hydrocortisone 20mg daily  -Follow up with PCP at [facility] post TCU  -BMP, CBC Tuesday 11/19/21     (I10) Hypertension, unspecified type  Comment: Acute on chronic. Amlodipine decreased to 5mg daily due to soft blood pressures. Based on JNC-8 goals,  patients age of 70 year old, presence of diabetes or CKD, and goals of care goal BP is  <140/90 mm Hg. Patient is stable with current plan of care and routine assessment..  Plan:   -Continue amlodipine as directed  -Continue losartan as directed  -Monitor BP and HR  -Follow up with PCP post TCU  -BMP, CBC Tuesday 11/19/21     (Z87.2) Hx of psoriasis  (L29.9) Itching  Comment: Chronic. Holding PTA Humira this admission--continue to hold at discharge  Plan:   -Will monitor skin for ongoing concern   -Continue Clobetasol Propionate Lotion 0.05 % to scalp at HS as directed  -Follow up with PCP at [facility] post TCU  -May recommend dermatology in future if warranted.   -Will start atarax 25mg every 6 hours PRN for itch complaints  -[facility], [facility] Tuesday 11/19/21     (I73.9) PVD (peripheral vascular disease) (H)  (Z89.512) S/P below knee amputation, left (H)  (S81.801D) Open wound of right lower extremity, subsequent encounter  (S01.302D) Open wound of left ear, unspecified open wound type, subsequent encounter  Comment: Chronic. Present on admission. Receives wound cares per home care RN. Followed by PCP at [facility]. [facility] documentation. Followed WOCN in hospital. Did not admit with proper wound care orders   Plan:   -Follow up with PCP post TCU  -Would recommend wound clinic appt at [facility] post TCU  -Monitor skin for worsening s/sx of concerns.   -Will increase vitamin D to 10,000U daily for wound healing  -Will start folic acid 1000mcg daily for wound healing  -Will start vitamin B12 1000mcg daily for wound healing  -[facility] Tuesday 11/19/21  -Continue current wound care orders as directed:  *RLE  Wounds: Daily and PRN   1. Cleanse with Vashe moistened gauze to wound bed and let sit x 5 minutes - do not rinse. #[id number]  2. Dry and protect surrounding skin with no sting barrier film wipe #[id number],  3. Apply sween 24 to all intact skin from base of toes to below knees.  4. Apply Aquacel AG 4x4 #[id number] to wound bed  5. Cover with ABD   6. Secure with kerlix and tape  7. Time/Date/Initial dressing  8. Apply edema wear directly over this dressing from base of toes to below knees  9. Place Covidien pad underneath wound area and apply offloading boot  *Apply Edemawear from toes to knees with a cuff just below the knee-YOU WILL NEED TWO PEOPLE TO APPLY, BUNCH UP STOCKING & PULL AGAINST EACH OTHER   DO NOT THROW AWAY THE OLD PAIR OF EDEMAWEAR, WASH IT.   Wash stocking(s) in really hot water with surgical soap/ and or baby  shampoo, may need to do this several times  Rinse then wash in baby shampoo  Hang dry  EdemaWear   size      Stripe color      PS # small       navy     [id number]     (G89.4) Chronic pain syndrome  Comment: Chronic. NOT AT GOAL. Patient wanting increase of pain medications. Resume PTA upon discharge of regimen (gabapentin 300 mg TID, duloxetine 30 mg daily, lidocaine patch, oxycodone 5 mg q6h prn, morphine 2.5 mg BID prn)  Plan:   -Reduce gabapentin to 300mg BID. (renally dose due to elevated creatinine)  -Continue duloxetine 60mg daily. (recently increased on 1/12/21 for pain complaints and also to assist with mood)  -Continue lidocaine topical as directed  -Continue menthol topical as directed  -discontinue PRN morphine.    -Continue dilaudid 1mg every 6 hours PRN  -Monitor pain complaints  -Follow up with PCP post TCU  -Would recommend pain clinic referral given history chronic history.   -[facility], [facility] Tuesday 11/19/21     (E44.1) Mild protein-calorie malnutrition (H)  Comment: Acute on chronic. Patient requests to have Ensure TID PRN  Plan:   -Monitor appetite  -Continue Ensure TID PRN  -Dietician to remain involved  -[facility], [facility] Tuesday 11/19/21  -Will increase vitamin D to 10,000 daily  -Will start folic acid daily  -Will start vitamin B12 daily     (G47.00) Insomnia  Comment: Acute on chronic. Patient wanting increase of sleeping agent. HISTORY of being on ambien in the past per [facility] records. This was discontinued.   Plan:  -Continue melatonin to 6mg at HS.   -Monitor sleeping patterns. Patient currently is drowsy and falls asleep mid conversation.   -Would NOT recommend ambien given co-morbidities above.   -[facility], [facility] Tuesday 11/19/21     Orders written by provider at facility    Total time spent with patient visit at the skilled nursing facility was 42 min including patient visit and review of past records. Greater than 50% of total time spent with counseling and coordinating care with  nursing staff due to the complexities of their diagnoses, review of HPI, development of POC, assisting HUC on appointment schedules, and patient education and review of POC with patient which includes 21 min discussion with patient on: current medications/orders changes, recent blood work results, continued discharge plan/needs, subsequent treatment plan while in TCU and thereafter, current pain control plan and controlled substances ordered. Also discussed with patient CT scan report with recommendations, importance of compliant with current treatment plan along with specialty follow up, along with risk factor reduction and prognosis if he continues to be noncompliant with therapies.     Electronically signed by:  Lana Stevens DNP, APRN

## 2021-01-15 ENCOUNTER — NURSING HOME VISIT (OUTPATIENT)
Dept: GERIATRICS | Facility: CLINIC | Age: 71
End: 2021-01-15
Payer: MEDICARE

## 2021-01-15 DIAGNOSIS — D63.8 ANEMIA OF CHRONIC DISEASE: ICD-10-CM

## 2021-01-15 DIAGNOSIS — Z87.2 HX OF PSORIASIS: ICD-10-CM

## 2021-01-15 DIAGNOSIS — M62.81 GENERALIZED MUSCLE WEAKNESS: ICD-10-CM

## 2021-01-15 DIAGNOSIS — S81.801D OPEN WOUND OF RIGHT LOWER EXTREMITY, SUBSEQUENT ENCOUNTER: ICD-10-CM

## 2021-01-15 DIAGNOSIS — R53.81 PHYSICAL DECONDITIONING: Primary | ICD-10-CM

## 2021-01-15 DIAGNOSIS — J85.1 ABSCESS OF LOWER LOBE OF RIGHT LUNG WITH PNEUMONIA (H): ICD-10-CM

## 2021-01-15 DIAGNOSIS — Z89.512 S/P BELOW KNEE AMPUTATION, LEFT (H): ICD-10-CM

## 2021-01-15 DIAGNOSIS — D69.3 CHRONIC IDIOPATHIC THROMBOCYTOPENIA (H): ICD-10-CM

## 2021-01-15 DIAGNOSIS — G89.4 CHRONIC PAIN SYNDROME: ICD-10-CM

## 2021-01-15 DIAGNOSIS — E27.40 CHRONIC ADRENAL INSUFFICIENCY (H): ICD-10-CM

## 2021-01-15 DIAGNOSIS — I10 HYPERTENSION, UNSPECIFIED TYPE: ICD-10-CM

## 2021-01-15 DIAGNOSIS — E44.1 MILD PROTEIN-CALORIE MALNUTRITION (H): ICD-10-CM

## 2021-01-15 DIAGNOSIS — I73.9 PVD (PERIPHERAL VASCULAR DISEASE) (H): ICD-10-CM

## 2021-01-15 DIAGNOSIS — G47.00 INSOMNIA, UNSPECIFIED TYPE: ICD-10-CM

## 2021-01-15 DIAGNOSIS — S01.302D OPEN WOUND OF LEFT EAR, UNSPECIFIED OPEN WOUND TYPE, SUBSEQUENT ENCOUNTER: ICD-10-CM

## 2021-01-15 DIAGNOSIS — R82.81 PYURIA: ICD-10-CM

## 2021-01-15 DIAGNOSIS — J96.22 ACUTE ON CHRONIC RESPIRATORY FAILURE WITH HYPOXIA AND HYPERCAPNIA (H): ICD-10-CM

## 2021-01-15 DIAGNOSIS — J18.9 PNEUMONIA OF BOTH LUNGS DUE TO INFECTIOUS ORGANISM, UNSPECIFIED PART OF LUNG: ICD-10-CM

## 2021-01-15 DIAGNOSIS — J44.9 COPD, SEVERE (H): ICD-10-CM

## 2021-01-15 DIAGNOSIS — R52 PAIN: ICD-10-CM

## 2021-01-15 DIAGNOSIS — N18.30 STAGE 3 CHRONIC KIDNEY DISEASE, UNSPECIFIED WHETHER STAGE 3A OR 3B CKD (H): ICD-10-CM

## 2021-01-15 DIAGNOSIS — J18.9 PNEUMONIA OF RIGHT LOWER LOBE DUE TO INFECTIOUS ORGANISM: ICD-10-CM

## 2021-01-15 DIAGNOSIS — J96.21 ACUTE ON CHRONIC RESPIRATORY FAILURE WITH HYPOXIA AND HYPERCAPNIA (H): ICD-10-CM

## 2021-01-15 DIAGNOSIS — R07.89 MUSCULOSKELETAL CHEST PAIN: ICD-10-CM

## 2021-01-15 DIAGNOSIS — L29.9 ITCHING: ICD-10-CM

## 2021-01-15 PROCEDURE — 99310 SBSQ NF CARE HIGH MDM 45: CPT | Performed by: NURSE PRACTITIONER

## 2021-01-15 RX ORDER — LANOLIN ALCOHOL/MO/W.PET/CERES
1000 CREAM (GRAM) TOPICAL DAILY
Qty: 30 TABLET | Refills: 1 | Status: ON HOLD | OUTPATIENT
Start: 2021-01-15 | End: 2021-03-22

## 2021-01-15 RX ORDER — FERROUS SULFATE 325(65) MG
325 TABLET ORAL
Qty: 90 TABLET | Refills: 1 | Status: ON HOLD | OUTPATIENT
Start: 2021-01-15 | End: 2021-02-15

## 2021-01-15 RX ORDER — HYDROXYZINE HYDROCHLORIDE 25 MG/1
25 TABLET, FILM COATED ORAL EVERY 6 HOURS PRN
Qty: 30 TABLET | Refills: 1 | Status: SHIPPED | OUTPATIENT
Start: 2021-01-15 | End: 2021-02-26

## 2021-01-15 RX ORDER — FOLIC ACID 1 MG/1
1 TABLET ORAL DAILY
Qty: 30 TABLET | Refills: 1 | Status: ON HOLD | OUTPATIENT
Start: 2021-01-15 | End: 2021-03-22

## 2021-01-15 RX ORDER — GABAPENTIN 300 MG/1
300 CAPSULE ORAL 2 TIMES DAILY
Qty: 60 CAPSULE | Refills: 1 | Status: SHIPPED | OUTPATIENT
Start: 2021-01-15 | End: 2021-02-25

## 2021-01-15 RX ORDER — AZITHROMYCIN 250 MG/1
TABLET, FILM COATED ORAL
Qty: 6 TABLET | Refills: 0 | Status: SHIPPED | OUTPATIENT
Start: 2021-01-15 | End: 2021-01-20

## 2021-01-15 RX ORDER — MULTIVIT WITH MINERALS/LUTEIN
1000 TABLET ORAL DAILY
Qty: 30 TABLET | Refills: 1 | Status: SHIPPED | OUTPATIENT
Start: 2021-01-15

## 2021-01-15 NOTE — LETTER
1/15/2021        RE: Perfecto Reese  [address]  [address]  [facility] [address]        [facility]  [facility] Record Number:  [id number]  Place of Service where encounter took place:  [facility] ([facility] [[id number]]  Chief Complaint   Patient presents with     RECHECK       HPI:    Perfecto Reese  is a 70 year old (1950), who is being seen today for an episodic care visit.  HPI information obtained from: facility chart records, facility staff, patient report, [facility] chart review and Care Everywhere [facility] chart review. Today's concern is:     Physical deconditioning  Generalized muscle weakness  Acute on chronic respiratory failure with hypoxia and hypercapnia (H)  Pneumonia of both lungs due to infectious organism, unspecified part of lung  Abscess of lower lobe of right lung with pneumonia (H)  COPD, severe (H)  Musculoskeletal chest pain  Anemia of chronic disease  Chronic idiopathic thrombocytopenia (H)  Stage 3 chronic kidney disease, unspecified whether stage 3a or 3b CKD  Pyuria  Chronic adrenal insufficiency (H)  Hypertension, unspecified type  Hx of psoriasis  PVD (peripheral vascular disease) (H)  S/P below knee amputation, left (H)  Open wound of right lower extremity, subsequent encounter  Open wound of left ear, unspecified open wound type, subsequent encounter  Chronic pain syndrome  Mild protein-calorie malnutrition (H)  Insomnia, unspecified type  Pneumonia of right lower lobe due to infectious organism  Pain  Itching     Met with patient who denies any chest pain, palpitations, shortness of breath, ISIDRO, lightheadedness, or dizziness. Does report productive cough which noted thick greenish phelgm present. Denies any abdominal discomfort. Denies N&V. Denies B&B concerns. Denies dysuria or frequency. Denies loose or constipation. Appetite good. Sleeping well. He denies any current pain complaints. Appears to be compliant and pleasant today from  previous visits. Patient still is on/off noncompliant with therapies. Nursing reports increased itch complaints as he tried to scratch his RLE often.      BP Readings from Last 3 Encounters:   01/14/21 130/70   01/07/21 124/79   01/05/21 130/52     Wt Readings from Last 5 Encounters:   01/14/21 64.4 kg (141 lb 14.4 oz)   01/07/21 64.4 kg (141 lb 14.4 oz)   01/05/21 64.4 kg (141 lb 14.4 oz)   12/30/20 65 kg (143 lb 4.8 oz)   03/22/18 93 kg (205 lb)     Past Medical and Surgical History reviewed in Epic today.    MEDICATIONS:    Current Outpatient Medications   Medication Sig Dispense Refill     amoxicillin-clavulanate (AUGMENTIN) 875-125 MG tablet Take 1 tablet by mouth 2 times daily for 10 days 20 tablet 0     azithromycin (ZITHROMAX) 250 MG tablet Take 2 tablets (500 mg) by mouth daily for 1 day, THEN 1 tablet (250 mg) daily for 4 days. 6 tablet 0     cyanocobalamin (VITAMIN B-12) 1000 MCG tablet Take 1 tablet (1,000 mcg) by mouth daily 30 tablet 1     ferrous sulfate (FEROSUL) 325 (65 Fe) MG tablet Take 1 tablet (325 mg) by mouth 3 times daily (with meals) 90 tablet 1     folic acid (FOLVITE) 1 MG tablet Take 1 tablet (1 mg) by mouth daily 30 tablet 1     gabapentin (NEURONTIN) 300 MG capsule Take 1 capsule (300 mg) by mouth 2 times daily 60 capsule 1     hydrOXYzine (ATARAX) 25 MG tablet Take 1 tablet (25 mg) by mouth every 6 hours as needed for itching 30 tablet 1     vitamin C (ASCORBIC ACID) 1000 MG TABS Take 1 tablet (1,000 mg) by mouth daily 30 tablet 1     vitamin D3 (CHOLECALCIFEROL) 250 mcg (70838 units) capsule Take 1 capsule (250 mcg) by mouth daily 30 capsule 1     albuterol (PROAIR HFA/PROVENTIL HFA/VENTOLIN HFA) 108 (90 Base) MCG/ACT inhaler Inhale 2 puffs into the lungs every 4 hours as needed for shortness of breath / dyspnea or wheezing       amLODIPine (NORVASC) 5 MG tablet Take 1 tablet (5 mg) by mouth daily 30 tablet 0     calcium citrate (CITRACAL) 950 MG tablet Take 4 tablets by mouth daily        clobetasol (TEMOVATE) 0.05 % external solution Apply topically At Bedtime Apply to scalp       dextromethorphan-guaiFENesin (MUCINEX DM)  MG 12 hr tablet Take 1 tablet by mouth every 12 hours 60 tablet 1     DULoxetine (CYMBALTA) 30 MG capsule Take 60 mg by mouth daily       fluticasone-vilanterol (BREO ELLIPTA) 200-25 MCG/INH inhaler Inhale 1 puff into the lungs daily 28 each 1     furosemide (LASIX) 40 MG tablet Take 1 tablet (40 mg) by mouth daily 30 tablet 0     hydrocortisone (CORTEF) 20 MG tablet Take 20 mg by mouth every morning       HYDROmorphone (DILAUDID) 2 MG tablet Take 0.5 tablets (1 mg) by mouth every 6 hours as needed for pain Separate within 4 hours from PRN morphine 30 tablet 0     lidocaine (XYLOCAINE) 5 % external ointment Apply topically 3 times daily as needed for moderate pain (rib pain)       loperamide (IMODIUM) 2 MG capsule Take 1 capsule (2 mg) by mouth 4 times daily as needed for diarrhea 15 capsule 0     losartan (COZAAR) 25 MG tablet Take 12.5 mg by mouth daily       melatonin 3 MG tablet Take 2 tablets (6 mg) by mouth At Bedtime 60 tablet 1     MENTHOL-METHYL SALICYLATE EX Externally apply topically 2 times daily Menthol-methyl saliycylate 10-15% cream twice daily to lower back       multivitamin w/minerals (THERA-VIT-M) tablet Take 1 tablet by mouth daily 30 tablet 0     nicotine (NICORETTE) 2 MG gum Place 2 mg inside cheek as needed for smoking cessation       omeprazole (PRILOSEC) 20 MG DR capsule Take 20 mg by mouth daily       primidone (MYSOLINE) 50 MG tablet Take 50 mg by mouth At Bedtime       tamsulosin (FLOMAX) 0.4 MG capsule Take 0.8 mg by mouth daily       umeclidinium (INCRUSE ELLIPTA) 62.5 MCG/INH inhaler Inhale 1 puff into the lungs daily 30 each 1     REVIEW OF SYSTEMS:  10 point ROS of systems including Constitutional, Eyes, Respiratory, Cardiovascular, Gastroenterology, Genitourinary, Integumentary, Musculoskeletal, Psychiatric were all negative except for  "pertinent positives noted in my HPI.    Objective:  /70   Pulse 88   Temp 98  F (36.7  C)   Resp 18   Ht 1.651 m (5' 5\")   Wt 64.4 kg (141 lb 14.4 oz)   SpO2 90%   BMI 23.61 kg/m    Exam:  GENERAL APPEARANCE:  Alert, in no distress, oriented, cooperative  ENT:  Mouth and posterior oropharynx normal, moist mucous membranes, normal hearing acuity  EYES:  EOM, conjunctivae, lids, pupils and irises normal  NECK:  No adenopathy,masses or thyromegaly  RESP:  respiratory effort and palpation of chest normal, no respiratory distress, diminished breath sounds bibasilar  CV:  Palpation and auscultation of heart done , regular rate and rhythm, no murmur, rub, or gallop, no edema, hx of Left BKA.   ABDOMEN:  normal bowel sounds, soft, nontender, no hepatosplenomegaly or other masses, no guarding or rebound  M/S:   wheelchair bound. BUE intact. Weakness noted to RLE  SKIN:  Inspection of skin and subcutaneous tissue baseline, see picture  NEURO:   Cranial nerves 2-12 are normal tested and grossly at patient's baseline, no purposeful movement in upper and lower extremities  PSYCH:  oriented X 3, memory impaired , affect and mood normal            Labs:     Most Recent 3 CBC's:  Recent Labs   Lab Test 01/13/21 01/07/21 01/04/21  0940   WBC 5.6 5.5 8.4   HGB 6.6* 6.9* 7.9*   MCV 92 92 92   PLT 90* 89* 107*     Most Recent 3 BMP's:  Recent Labs   Lab Test 01/13/21 01/07/21 12/31/20  0730    138 139   POTASSIUM 4.3 4.9 4.0   CHLORIDE 107 104 105   CO2 26 26 31   BUN 54* 48* 36*   CR 1.71* 1.71* 1.51*   ANIONGAP 7 8 3   MARTINE 8.5 8.4* 8.5   GLC 79 81 90     Most Recent Anemia Panel:  Recent Labs   Lab Test 01/13/21 12/04/20  1526 12/04/20  1526 12/04/20  1245   WBC 5.6   < > 3.2* 3.5*   HGB 6.6*   < > 6.4* 6.1*   HCT 20.1*   < > 19.1* 18.6*   MCV 92   < > 93 94   PLT 90*   < > 70* 74*   IRON  --   --   --  30*   IRONSAT  --   --   --  27   RETICABSCT  --   --  17.9*  --    RETP  --   --  0.9  --    FEB  --   --   --  " 112*   JÚNIOR  --   --   --  515*   B12  --   --   --  1,117*   FOLIC  --   --   --  6.7    < > = values in this interval not displayed.       CT CHEST WITHOUT CONTRAST 1/12/2021 2:59 PM      HISTORY: COPD exacerbation, complicated. Pneumonia, unresolved or  complicated. Abscess of lung or mediastinum. Acute on chronic  respiratory failure with hypoxia and hypercapnia (H). Pneumonia of  both lungs due to infectious organism, unspecified part of lung.  Abscess of lower lobe of right lung with pneumonia (H). COPD, severe  (H).     TECHNIQUE: CT scan of the chest was performed without IV contrast.  Multiplanar reformats were obtained. Dose reduction techniques were  used.     CONTRAST: None.     COMPARISON: 12/3/2020     FINDINGS:      LUNGS AND PLEURA: Moderate emphysema. Moderate-sized right pleural  effusion, increased from previous. Consolidation with air bronchograms  in the inferior right middle lobe and right lower lobe. Fluid  collection in the inferior right middle lobe near the level of the  major fissure has decreased in size, measuring 4 x 3 x 1.5 cm compared  to previous 6.4 x 5.3 x 5.5 cm.     MEDIASTINUM/AXILLAE: Small mediastinal lymph nodes are likely  reactive.     UPPER ABDOMEN: Cirrhotic appearance of the liver. Splenomegaly.  Cholelithiasis.                                                                      IMPRESSION:  1.  Improved consolidation in the inferior right and lower lobes  consistent with pneumonia.  2.  Significant interval decrease in size of a presumed intrapulmonary  abscess in the inferior right middle lobe.  3.  The findings have significantly improved, but not resolved.  4.  A moderate-sized pleural effusion is slightly larger. The patient  may benefit from a thoracentesis.     RAEANN MAYORGA MD    ASSESSMENT/PLAN:  (R53.81) Physical deconditioning  (primary encounter diagnosis)  (M62.81) Generalized muscle weakness  Comment: Acute on chronic. S/T below diagnosis. Followed by  [facility]. HISTORY of noncompliance.   Plan:   -Continue Physical therapy and Occupational therapy as directed  -SW to remain involved for safe discharge planning needs  -Would benefit from jail setting, however patient declines at this time.      (J96.21,  J96.22) Acute on chronic respiratory failure with hypoxia and hypercapnia (H)  (J18.9) Pneumonia of both lungs due to infectious organism, unspecified part of lung  (J85.1) Abscess of lower lobe of right lung with pneumonia (H)  (J44.9) COPD, severe (H)  (R07.89) Musculoskeletal chest pain  Comment: Acute on chronic. Admitted with fever, cough, and acute respiratory distress (placed on 10L per OM in the field). Fever to 102.5, tachycardia, tachypnea, and leukocytosis (17.2k) present on arrival. CXR on arrival showed RLL infiltrate and CT abd/pelvis showed air fluid cavity at the right lung base. On admission, he was initiated on IV pip-tazo and azithromycin, and Thoracic Surgery was consulted. Chest CT was ordered (12/3) which showed a 6.4 cm RLL pulmonary abscess and extensive left-sided mucus plugging with associated infiltrate. 12/2 blood cultures x2 no growth. Sputum cx 12/4 growing proteus, fluoroquinolone resistant. Also corynebacterium striatum 12/6. CT surgery saw, no surgical intervention indicated. Patient was treated with IV then PO antibiotics, with the assistance of infectious disease. Initially on zosyn -> unasyn -> transitioned 12/8 to Augmentin x 6 weeks (through approximately 1/13/2021). - Infectious disease peripherally following. Patient was discharged on pulmicort and spiriva orders however patient came to facility with supply of incruse and breo which patient states this was changed in hospital. --unable to find any documentation to support this, however since patient has supply of breo and incruse will start per request. Completed antibiotics of augmentin on 1/13/21.   Plan:   -Continue oxygen as directed which is  chronic at 3L via NC.   -Monitor respiratory status. May benefit from thoracentesis in future if warranted.   -Continue mucinex 600mg BID for now. May benefit from long term use for now while infection current.   -Continue incentive spirometry and encourage every 4 hours while awake. Encourage use.   -Continue fluticasone-vilanterol (breo 200-25mcg inhaler 1 puff daily  -Continue incruse ellipta 62.5mcg inhaler 1 puff daily.   -Continue albuterol inhaler PRN  -Follow up with ID as directed. Missed appt on 1/13/21, therefore this was rescheduled for 11/26/21  -Will restart augmentin for another 10 days  -will start z pack for 5 days as well for dual therapy above for pneumonia concerns.   -BMP, CBC Tuesday 11/19/21     (D63.8) Anemia of chronic disease  (D69.3) Chronic idiopathic thrombocytopenia (H)  Comment: Acute on chronic. Anemia and thrombocytopenia likely multifactorial from liver cirrhosis, splenomegaly, renal disease, CKD and psoriasis. Iron labs suggestive of anemia of chronic disease. 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 Patient required four units of pRBCs this admission for Hgb < 7.0. No acute GI bleed identified, likely blood loss from decreased erythropoiesis. Appreciate GI consult 12/16/2020, no acute GI bleed, no acute interventions, [facility] Check hemoglobin periodically while inpatient - 7.0 12/28 - clinically stable and no bleeding noted. recheck hgb 7.5 12/29 and again 7.9 on 1/4/21. Hgb has been <7 since TCU admission.   Plan:   -Follow up with GI at [facility] as directed.   -Recommend to follow up with hematology at [facility] post TCU as directed  -Monitor for worsening s/sx of concerns  -Will obtain stool sample to assess for occult stools.   -Discontinue asa due to risks.   -Increase iron to TID with meals.     -Start vitamin C 1000mg daily to assist with iron absorption along with chronic wounds.   -Monitor for active s/sx of bleeding risks.   -BMP, CBC, iron, TIBC, ferritin, folate levels Tuesday 11/19/21     (N18.30) Stage 3 chronic kidney disease, unspecified whether stage 3a or 3b CKD  (R82.81) Pyuria  Comment: Acute on chronic. Baseline creatinine 1.2-1.3 in Jan 2020, on admission 2.89. Suspect pre-renal state in setting of pneumonia/sepsis with possible progression to ATN in setting of hypotension. Improved with IV fluids, creatinine down to 1.5 on 12/16, now recheck shows improvement to 1.49 and calculated GFR is 36. Creatinne back to baseline of 1.3 on 12/23/2020. Restarted PTA losartan at lowest dose at discharge 25mg daily. Appreciate Nephrology consul t-- lasix 40 mg daily continued  Plan:   -Follow up with nephrology at [facility] post TCU  -Monitor urinary status  -Continue current medications without change  -BMP, CBC Tuesday 11/19/21     (E27.40) Chronic adrenal insufficiency (H)  Comment: Chronic. Stable.   Plan:   -Monitor for worsening s/sx of concerns  -Continue PTA hydrocortisone 20mg daily  -Follow up with PCP at [facility] post TCU  -BMP, CBC Tuesday 11/19/21     (I10) Hypertension, unspecified type  Comment: Acute on chronic. Amlodipine decreased to 5mg daily due to soft blood pressures. Based on JNC-8 goals,  patients age of 70 year old, presence of diabetes or CKD, and goals of care goal BP is  <140/90 mm Hg. Patient is stable with current plan of care and routine assessment..  Plan:   -Continue amlodipine as directed  -Continue losartan as directed  -Monitor BP and HR  -Follow up with PCP post TCU  -BMP, CBC Tuesday 11/19/21     (Z87.2) Hx of psoriasis  (L29.9) Itching  Comment: Chronic. Holding PTA Humira this admission--continue to hold at discharge  Plan:   -Will monitor skin for ongoing concern   -Continue Clobetasol Propionate Lotion  0.05 % to scalp at HS as directed  -Follow up with PCP at [facility] post TCU  -May recommend dermatology in future if warranted.   -Will start atarax 25mg every 6 hours PRN for itch complaints  -[facility], [facility] Tuesday 11/19/21     (I73.9) PVD (peripheral vascular disease) (H)  (Z89.512) S/P below knee amputation, left (H)  (S81.801D) Open wound of right lower extremity, subsequent encounter  (S01.302D) Open wound of left ear, unspecified open wound type, subsequent encounter  Comment: Chronic. Present on admission. Receives wound cares per home care RN. Followed by PCP at [facility]. Limited [facility] documentation. Followed WOCN in hospital. Did not admit with proper wound care orders   Plan:   -Follow up with PCP post TCU  -Would recommend wound clinic appt at [facility] post TCU  -Monitor skin for worsening s/sx of concerns.   -Will increase vitamin D to 10,000U daily for wound healing  -Will start folic acid 1000mcg daily for wound healing  -Will start vitamin B12 1000mcg daily for wound healing  -[facility], [facility] Tuesday 11/19/21  -Continue current wound care orders as directed:  *RLE  Wounds: Daily and PRN   1. Cleanse with Vashe moistened gauze to wound bed and let sit x 5 minutes - do not rinse. #[id number]  2. Dry and protect surrounding skin with no sting barrier film wipe #[id number],  3. Apply sween 24 to all intact skin from base of toes to below knees.  4. Apply Aquacel AG 4x4 #[id number] to wound bed  5. Cover with ABD   6. Secure with kerlix and tape  7. Time/Date/Initial dressing  8. Apply edema wear directly over this dressing from base of toes to below knees  9. Place Covidien pad underneath wound area and apply offloading boot  *Apply Edemawear from toes to knees with a cuff just below the knee-YOU WILL NEED TWO PEOPLE TO APPLY, BUNCH UP STOCKING & PULL AGAINST EACH OTHER   DO NOT THROW AWAY THE OLD PAIR OF  EDEMAWEAR, WASH IT.   Wash stocking(s) in really hot water with surgical soap/ and or baby shampoo, may need to do this several times  Rinse then wash in baby shampoo  Hang dry  EdemaWear   size      Stripe color      PS # small       navy     [id number]     (G89.4) Chronic pain syndrome  Comment: Chronic. NOT AT GOAL. Patient wanting increase of pain medications. Resume PTA upon discharge of regimen (gabapentin 300 mg TID, duloxetine 30 mg daily, lidocaine patch, oxycodone 5 mg q6h prn, morphine 2.5 mg BID prn)  Plan:   -Reduce gabapentin to 300mg BID. (renally dose due to elevated creatinine)  -Continue duloxetine 60mg daily. (recently increased on 1/12/21 for pain complaints and also to assist with mood)  -Continue lidocaine topical as directed  -Continue menthol topical as directed  -discontinue PRN morphine.    -Continue dilaudid 1mg every 6 hours PRN  -Monitor pain complaints  -Follow up with PCP post TCU  -Would recommend pain clinic referral given history chronic history.   -[facility], [facility] Tuesday 11/19/21     (E44.1) Mild protein-calorie malnutrition (H)  Comment: Acute on chronic. Patient requests to have Ensure TID PRN  Plan:   -Monitor appetite  -Continue Ensure TID PRN  -Dietician to remain involved  -[facility], [facility] Tuesday 11/19/21  -Will increase vitamin D to 10,000 daily  -Will start folic acid daily  -Will start vitamin B12 daily     (G47.00) Insomnia  Comment: Acute on chronic. Patient wanting increase of sleeping agent. HISTORY of being on ambien in the past per [facility] records. This was discontinued.   Plan:  -Continue melatonin to 6mg at HS.   -Monitor sleeping patterns. Patient currently is drowsy and falls asleep mid conversation.   -Would NOT recommend ambien given co-morbidities above.   -[facility], [facility] Tuesday 11/19/21     Orders written by provider at facility    Total time spent with patient visit at the skilled nursing facility was 42 min including patient visit and review of  past records. Greater than 50% of total time spent with counseling and coordinating care with nursing staff due to the complexities of their diagnoses, review of HPI, development of POC, assisting HUC on appointment schedules, and patient education and review of POC with patient which includes 21 min discussion with patient on: current medications/orders changes, recent blood work results, continued discharge plan/needs, subsequent treatment plan while in TCU and thereafter, current pain control plan and controlled substances ordered. Also discussed with patient CT scan report with recommendations, importance of compliant with current treatment plan along with specialty follow up, along with risk factor reduction and prognosis if he continues to be noncompliant with therapies.     Electronically signed by:  ANTONIETA Valdovinos DNP        Sincerely,        ANTONIETA Valdovinos CNP

## 2021-01-16 ENCOUNTER — RECORDS - HEALTHEAST (OUTPATIENT)
Dept: LAB | Facility: CLINIC | Age: 71
End: 2021-01-16

## 2021-01-18 VITALS
HEIGHT: 65 IN | WEIGHT: 141.9 LBS | HEART RATE: 80 BPM | TEMPERATURE: 97.6 F | DIASTOLIC BLOOD PRESSURE: 70 MMHG | RESPIRATION RATE: 18 BRPM | BODY MASS INDEX: 23.64 KG/M2 | OXYGEN SATURATION: 94 % | SYSTOLIC BLOOD PRESSURE: 138 MMHG

## 2021-01-18 ASSESSMENT — MIFFLIN-ST. JEOR: SCORE: 1340.53

## 2021-01-18 NOTE — PROGRESS NOTES
[place] [facility]  [place] Medical Record Number:  [id number]  Place of Service where encounter took place:  [facility] ([facility] [[id number]]  Chief Complaint   Patient presents with     RECHECK       HPI:    Perfecto Reese  is a 70 year old (1950), who is being seen today for an episodic care visit.  HPI information obtained from: facility chart records, facility staff, patient report, [facility] chart review and Care Everywhere [facility] chart review. Today's concern is:     Physical deconditioning  Generalized muscle weakness  Acute on chronic respiratory failure with hypoxia and hypercapnia (H)  Pneumonia of both lungs due to infectious organism, unspecified part of lung  Abscess of lower lobe of right lung with pneumonia (H)  COPD, severe (H)  Musculoskeletal chest pain  Anemia of chronic disease  Chronic idiopathic thrombocytopenia (H)  Stage 3 chronic kidney disease, unspecified whether stage 3a or 3b CKD  Pyuria  Chronic adrenal insufficiency (H)  Hypertension, unspecified type  Hx of psoriasis  Itching  PVD (peripheral vascular disease) (H)  S/P below knee amputation, left (H)  Open wound of right lower extremity, subsequent encounter  Open wound of left ear, unspecified open wound type, subsequent encounter  Chronic pain syndrome  Mild protein-calorie malnutrition (H)  Insomnia, unspecified type     Met with patient who denies any chest pain, palpitations, shortness of breath, ISIDRO, lightheadedness, dizziness, or cough. Patient does report improvement of cough and congestion with current regimen. Denies any abdominal discomfort. Denies N&V. Denies B&B concerns. Denies dysuria or frequency. Denies loose or constipation. Appetite good. Sleeping well.  Continues to fall asleep off/on throughout conversation. Updated patient on current blood values along with occult stool present. History of following GI at [facility]. When suggested colonoscopy patient immediately  declines states I will not do that. Nursing reports self inflicted wound to right lower extremity due to back scratcher. Increased itch complaints. Has used PRN atarax at least daily. He reports itching complaints throughout body and not just to RLE. Patient continues to be poorly compliant with therapies. Limited participation. Requested to staff for pending care conference to discuss goals.     BP Readings from Last 3 Encounters:   01/18/21 138/70   01/14/21 130/70   01/07/21 124/79     Wt Readings from Last 5 Encounters:   01/18/21 64.4 kg (141 lb 14.4 oz)   01/14/21 64.4 kg (141 lb 14.4 oz)   01/07/21 64.4 kg (141 lb 14.4 oz)   01/05/21 64.4 kg (141 lb 14.4 oz)   12/30/20 65 kg (143 lb 4.8 oz)     Past Medical and Surgical History reviewed in Epic today.    MEDICATIONS:    Current Outpatient Medications   Medication Sig Dispense Refill     albuterol (PROAIR HFA/PROVENTIL HFA/VENTOLIN HFA) 108 (90 Base) MCG/ACT inhaler Inhale 2 puffs into the lungs every 4 hours as needed for shortness of breath / dyspnea or wheezing       amLODIPine (NORVASC) 5 MG tablet Take 1 tablet (5 mg) by mouth daily 30 tablet 0     amoxicillin-clavulanate (AUGMENTIN) 875-125 MG tablet Take 1 tablet by mouth 2 times daily for 10 days 20 tablet 0     azithromycin (ZITHROMAX) 250 MG tablet Take 2 tablets (500 mg) by mouth daily for 1 day, THEN 1 tablet (250 mg) daily for 4 days. 6 tablet 0     calcium citrate (CITRACAL) 950 MG tablet Take 4 tablets by mouth daily       clobetasol (TEMOVATE) 0.05 % external solution Apply topically At Bedtime Apply to scalp       cyanocobalamin (VITAMIN B-12) 1000 MCG tablet Take 1 tablet (1,000 mcg) by mouth daily 30 tablet 1     dextromethorphan-guaiFENesin (MUCINEX DM)  MG 12 hr tablet Take 1 tablet by mouth every 12 hours 60 tablet 1     DULoxetine (CYMBALTA) 30 MG capsule Take 60 mg by mouth daily       ferrous sulfate (FEROSUL) 325 (65 Fe) MG tablet Take 1 tablet (325 mg) by mouth 3 times daily  (with meals) 90 tablet 1     fluticasone-vilanterol (BREO ELLIPTA) 200-25 MCG/INH inhaler Inhale 1 puff into the lungs daily 28 each 1     folic acid (FOLVITE) 1 MG tablet Take 1 tablet (1 mg) by mouth daily 30 tablet 1     furosemide (LASIX) 40 MG tablet Take 1 tablet (40 mg) by mouth daily 30 tablet 0     gabapentin (NEURONTIN) 300 MG capsule Take 1 capsule (300 mg) by mouth 2 times daily 60 capsule 1     hydrocortisone (CORTEF) 20 MG tablet Take 20 mg by mouth every morning       HYDROmorphone (DILAUDID) 2 MG tablet TAKE 1/2 TAB (1mg) BY MOUTH EVERY 6 HOURS AS NEEDED FOR PAIN *SEPERATEWITHIN 4hrs FROM AS NEEDED MORPHINE* 30 tablet 0     hydrOXYzine (ATARAX) 25 MG tablet Take 1 tablet (25 mg) by mouth every 6 hours as needed for itching 30 tablet 1     lidocaine (XYLOCAINE) 5 % external ointment Apply topically 3 times daily as needed for moderate pain (rib pain)       loperamide (IMODIUM) 2 MG capsule Take 1 capsule (2 mg) by mouth 4 times daily as needed for diarrhea 15 capsule 0     losartan (COZAAR) 25 MG tablet Take 12.5 mg by mouth daily       melatonin 3 MG tablet Take 2 tablets (6 mg) by mouth At Bedtime 60 tablet 1     MENTHOL-METHYL SALICYLATE EX Externally apply topically 2 times daily Menthol-methyl saliycylate 10-15% cream twice daily to lower back       multivitamin w/minerals (THERA-VIT-M) tablet Take 1 tablet by mouth daily 30 tablet 0     nicotine (NICORETTE) 2 MG gum Place 2 mg inside cheek as needed for smoking cessation       omeprazole (PRILOSEC) 20 MG DR capsule Take 20 mg by mouth daily       primidone (MYSOLINE) 50 MG tablet Take 50 mg by mouth At Bedtime       tamsulosin (FLOMAX) 0.4 MG capsule Take 0.8 mg by mouth daily       umeclidinium (INCRUSE ELLIPTA) 62.5 MCG/INH inhaler Inhale 1 puff into the lungs daily 30 each 1     vitamin C (ASCORBIC ACID) 1000 MG TABS Take 1 tablet (1,000 mg) by mouth daily 30 tablet 1     vitamin D3 (CHOLECALCIFEROL) 250 mcg (13677 units) capsule Take 1  "capsule (250 mcg) by mouth daily 30 capsule 1     REVIEW OF SYSTEMS:  10 point ROS of systems including Constitutional, Eyes, Respiratory, Cardiovascular, Gastroenterology, Genitourinary, Integumentary, Musculoskeletal, Psychiatric were all negative except for pertinent positives noted in my HPI.    Objective:  /70   Pulse 80   Temp 97.6  F (36.4  C)   Resp 18   Ht 1.651 m (5' 5\")   Wt 64.4 kg (141 lb 14.4 oz)   SpO2 94%   BMI 23.61 kg/m    Exam:  GENERAL APPEARANCE:  Alert, in no distress, cooperative  ENT:  Mouth and posterior oropharynx normal, moist mucous membranes, [place]  EYES:  EOM, conjunctivae, lids, pupils and irises normal  NECK:  No adenopathy,masses or thyromegaly  RESP:  respiratory effort and palpation of chest normal, lungs clear to auscultation , no respiratory distress  CV:  Palpation and auscultation of heart done , regular rate and rhythm, no murmur, rub, or gallop, no edema, left BKA  ABDOMEN:  normal bowel sounds, soft, nontender, no hepatosplenomegaly or other masses, no guarding or rebound  M/S:   Pivot transfers, wheelchair bound  SKIN:  Inspection of skin and subcutaneous tissue baseline, see pictures below  NEURO:   Cranial nerves 2-12 are normal tested and grossly at patient's baseline, no purposeful movement in upper and lower extremities  PSYCH:  oriented X 3, memory impaired , affect and mood normal                                  Labs:     Most Recent 3 CBC's:  Recent Labs   Lab Test 01/13/21 01/07/21 01/04/21  0940   WBC 5.6 5.5 8.4   HGB 6.6* 6.9* 7.9*   MCV 92 92 92   PLT 90* 89* 107*     Most Recent 3 BMP's:  Recent Labs   Lab Test 01/13/21 01/07/21 12/31/20  0730    138 139   POTASSIUM 4.3 4.9 4.0   CHLORIDE 107 104 105   CO2 26 26 31   BUN 54* 48* 36*   CR 1.71* 1.71* 1.51*   ANIONGAP 7 8 3   MARTINE 8.5 8.4* 8.5   GLC 79 81 90     Most Recent 2 LFT's:  Recent Labs   Lab Test 01/07/21 12/29/20  0811   AST 27 26   ALT 24 30   ALKPHOS 91 90   BILITOTAL 0.4 0.3 "     Most Recent Anemia Panel:  Recent Labs   Lab Test 01/13/21 12/04/20  1526 12/04/20  1526 12/04/20  1245   WBC 5.6   < > 3.2* 3.5*   HGB 6.6*   < > 6.4* 6.1*   HCT 20.1*   < > 19.1* 18.6*   MCV 92   < > 93 94   PLT 90*   < > 70* 74*   IRON  --   --   --  30*   IRONSAT  --   --   --  27   RETICABSCT  --   --  17.9*  --    RETP  --   --  0.9  --    FEB  --   --   --  112*   JÚNIOR  --   --   --  515*   B12  --   --   --  1,117*   FOLIC  --   --   --  6.7    < > = values in this interval not displayed.                   CT CHEST WITHOUT CONTRAST 1/12/2021 2:59 PM      HISTORY: COPD exacerbation, complicated. Pneumonia, unresolved or  complicated. Abscess of lung or mediastinum. Acute on chronic  respiratory failure with hypoxia and hypercapnia (H). Pneumonia of  both lungs due to infectious organism, unspecified part of lung.  Abscess of lower lobe of right lung with pneumonia (H). COPD, severe  (H).     TECHNIQUE: CT scan of the chest was performed without IV contrast.  Multiplanar reformats were obtained. Dose reduction techniques were  used.     CONTRAST: None.     COMPARISON: 12/3/2020     FINDINGS:      LUNGS AND PLEURA: Moderate emphysema. Moderate-sized right pleural  effusion, increased from previous. Consolidation with air bronchograms  in the inferior right middle lobe and right lower lobe. Fluid  collection in the inferior right middle lobe near the level of the  major fissure has decreased in size, measuring 4 x 3 x 1.5 cm compared  to previous 6.4 x 5.3 x 5.5 cm.     MEDIASTINUM/AXILLAE: Small mediastinal lymph nodes are likely  reactive.     UPPER ABDOMEN: Cirrhotic appearance of the liver. Splenomegaly.  Cholelithiasis.                                                                      IMPRESSION:  1.  Improved consolidation in the inferior right and lower lobes  consistent with pneumonia.  2.  Significant interval decrease in size of a presumed intrapulmonary  abscess in the inferior right middle  lobe.  3.  The findings have significantly improved, but not resolved.  4.  A moderate-sized pleural effusion is slightly larger. The patient  may benefit from a thoracentesis.     RAEANN MAYORGA MD     ASSESSMENT/PLAN:  (R53.81) Physical deconditioning  (primary encounter diagnosis)  (M62.81) Generalized muscle weakness  Comment: Acute on chronic. S/T below diagnosis. Followed by [facility]. HISTORY of noncompliance.   Plan:   -Continue Physical therapy and Occupational therapy as directed  -SW to remain involved for safe discharge planning needs  -Would benefit from [facility] setting, however patient declines at this time.      (J96.21,  J96.22) Acute on chronic respiratory failure with hypoxia and hypercapnia (H)  (J18.9) Pneumonia of both lungs due to infectious organism, unspecified part of lung  (J85.1) Abscess of lower lobe of right lung with pneumonia (H)  (J44.9) COPD, severe (H)  (R07.89) Musculoskeletal chest pain  Comment: Acute on chronic. Admitted with fever, cough, and acute respiratory distress (placed on 10L per OM in the field). Fever to 102.5, tachycardia, tachypnea, and leukocytosis (17.2k) present on arrival. CXR on arrival showed RLL infiltrate and CT abd/pelvis showed air fluid cavity at the right lung base. On admission, he was initiated on IV pip-tazo and azithromycin, and Thoracic Surgery was consulted. Chest CT was ordered (12/3) which showed a 6.4 cm RLL pulmonary abscess and extensive left-sided mucus plugging with associated infiltrate. 12/2 blood cultures x2 no growth. Sputum cx 12/4 growing proteus, fluoroquinolone resistant. Also corynebacterium striatum 12/6. CT surgery saw, no surgical intervention indicated. Patient was treated with IV then PO antibiotics, with the assistance of infectious disease. Initially on zosyn -> unasyn -> transitioned 12/8 to Augmentin x 6 weeks (through approximately 1/13/2021). - Infectious disease peripherally following. Patient  was discharged on pulmicort and spiriva orders however patient came to facility with supply of incruse and breo which patient states this was changed in hospital. unable to find any documentation to support this, however since patient has supply of breo and incruse will start per request. Completed antibiotics of augmentin on 1/13/21.   Plan:   -Continue oxygen as directed which is chronic at 3L via NC.   -Monitor respiratory status. May benefit from thoracentesis in future if warranted.   -Continue mucinex 600mg BID for now. May benefit from long term use for now while infection current.   -Continue incentive spirometry and encourage every 4 hours while awake. Encourage use.   -Continue fluticasone-vilanterol (breo 200-25mcg inhaler 1 puff daily  -Continue incruse ellipta 62.5mcg inhaler 1 puff daily.   -Continue albuterol inhaler PRN  -Follow up with ID as directed. This was rescheduled for 11/26/21  -Continue augmentin for another 10 days until completion  -Continue z pack for 5 days as well for dual therapy above for pneumonia concerns until completion.    -BMP, CBC Tuesday 11/19/21--results pending  -BMP and CBC Tuesday 1/26/21     (D63.8) Anemia of chronic disease  (D69.3) Chronic idiopathic thrombocytopenia (H)  Comment: Acute on chronic. Anemia and thrombocytopenia likely multifactorial from liver cirrhosis, splenomegaly, renal disease, CKD and psoriasis. Iron labs suggestive of anemia of chronic disease. 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 Patient required four units of pRBCs this admission for Hgb < 7.0. No acute GI bleed identified, likely blood loss from decreased erythropoiesis. Appreciate GI consult 12/16/2020, no acute GI bleed, no acute interventions, [facility] Check hemoglobin periodically while inpatient - 7.0 12/28 - clinically stable and no bleeding noted. recheck hgb  7.5 12/29 and again 7.9 on 1/4/21. Hgb has been <7 since TCU admission. Discontinued aspirin due to risks. Occult stool was positive on 1/15/21. Patient is NOT interested in colonoscopy.   Plan:   -Follow up with GI at [facility] as directed.   -Recommend to follow up with hematology at [facility] post TCU as directed  -Monitor for worsening s/sx of concerns  -Continue iron TID with meals.    -Continue vitamin C 1000mg daily to assist with iron absorption along with chronic wounds.   -Monitor for active s/sx of bleeding risks.   -BMP, CBC, iron, TIBC, ferritin, folate levels Tuesday 11/19/21  -BMP and CBC Tuesday 1/26/21     (N18.30) Stage 3 chronic kidney disease, unspecified whether stage 3a or 3b CKD  (R82.81) Pyuria  Comment: Acute on chronic. Baseline creatinine 1.2-1.3 in Jan 2020, on admission 2.89. Suspect pre-renal state in setting of pneumonia/sepsis with possible progression to ATN in setting of hypotension. Improved with IV fluids, creatinine down to 1.5 on 12/16, now recheck shows improvement to 1.49 and calculated GFR is 36. Creatinne back to baseline of 1.3 on 12/23/2020. Restarted PTA losartan at lowest dose at discharge 25mg daily. Appreciate Nephrology consul t-- lasix 40 mg daily continued  Plan:   -Follow up with nephrology at [facility] post TCU  -Monitor urinary status  -Continue current medications without change  -BMP, CBC Tuesday 11/19/21--results pending  -BMP and CBC Tuesday 1/26/21     (E27.40) Chronic adrenal insufficiency (H)  Comment: Chronic. Stable.   Plan:   -Monitor for worsening s/sx of concerns  -Continue PTA hydrocortisone 20mg daily  -Follow up with PCP at [facility] post TCU  -BMP, CBC Tuesday 11/19/21--results pending  -BMP and CBC Tuesday 1/26/21     (I10) Hypertension, unspecified type  Comment: Acute on chronic. Amlodipine decreased to 5mg daily due to soft blood  pressures. Based on JNC-8 goals,  patients age of 70 year old, presence of diabetes or CKD, and goals of care goal BP is  <140/90 mm Hg. Patient is stable with current plan of care and routine assessment..  Plan:   -Continue amlodipine as directed  -Continue losartan as directed  -Monitor BP and HR  -Follow up with PCP post TCU  -BMP, CBC Tuesday 11/19/21--results pending  -BMP and CBC Tuesday 1/26/21     (Z87.2) Hx of psoriasis  (L29.9) Itching  Comment: Chronic. Holding PTA Humira this admission--continue to hold at discharge. Caused self inflicted trauma to wounds on RLE from back scratcher  Plan:   -Will monitor skin for ongoing concern   -Remove back scratcher so patient can not cause self trauma  -Continue Clobetasol Propionate Lotion 0.05 % to scalp at HS as directed  -Follow up with PCP at [facility] post TCU  -May recommend dermatology in future if warranted.   -Continue atarax 25mg every 6 hours PRN for itch complaints  -BMP, CBC Tuesday 11/19/21--results pending  -BMP and CBC Tuesday 1/26/21     (I73.9) PVD (peripheral vascular disease) (H)  (Z89.512) S/P below knee amputation, left (H)  (S81.801D) Open wound of right lower extremity, subsequent encounter  (S01.302D) Open wound of left ear, unspecified open wound type, subsequent encounter  Comment: Chronic. Present on admission. Receives wound cares per home care RN. Followed by PCP at [facility]. [facility] documentation. Followed WOCN in hospital. Did not admit with proper wound care orders. Refuses to wear edema wear to wounds.   Plan:   -Follow up with PCP post TCU  -Would recommend wound clinic appt at [facility] post TCU  -Monitor skin for worsening s/sx of concerns.   -Continue vitamin D to 10,000U daily for wound healing  -Continue folic acid 1000mcg daily for wound healing  -Continue vitamin B12 1000mcg daily for wound  healing  -[facility] Tuesday 11/19/21--results pending  -[facility] Tuesday 1/26/21  -Change current wound care orders as directed:  *RLE  Wounds: Daily and PRN   1. Cleanse with Vashe moistened gauze to wound bed and let sit x 5 minutes - do not rinse. #[id number]  2. Dry and protect surrounding skin with no sting barrier film wipe #[id number],  3. Apply sween 24 to all intact skin from base of toes to below knees.  4. Cover wound with VASHE moistened gauze  5. Cover with ABD   6. Secure with kerlix and tape  7. apply offloading boot    (G89.4) Chronic pain syndrome  Comment: Chronic. NOT AT GOAL. Patient wanting increase of pain medications. Resume PTA upon discharge of regimen (gabapentin 300 mg TID, duloxetine 30 mg daily, lidocaine patch, oxycodone 5 mg q6h prn, morphine 2.5 mg BID prn)  Plan:   -Continue gabapentin to 300mg BID. (renally dose due to elevated creatinine)  -Continue duloxetine 60mg daily. (recently increased on 1/12/21 for pain complaints and also to assist with mood)  -Continue lidocaine topical as directed  -Continue menthol topical as directed  -discontinued PRN morphine.   -Continue dilaudid 1mg every 6 hours PRN  -Monitor pain complaints  -Follow up with PCP post TCU  -Would recommend pain clinic referral given history chronic history.   -[facility] Tuesday 11/19/21--results pending  -[facility] Tuesday 1/26/21     (E44.1) Mild protein-calorie malnutrition (H)  Comment: Acute on chronic. Patient requests to have Ensure TID PRN  Plan:   -Monitor appetite  -Continue Ensure TID PRN  -Dietician to remain involved  -[facility] Tuesday 11/19/21--results pending  -[facility] Tuesday 1/26/21  -Continue vitamin D to 10,000 daily  -Continue folic acid daily  -Continue vitamin B12 daily     (G47.00) Insomnia  Comment: Acute on chronic. Patient wanting increase of sleeping agent. HISTORY of being on ambien in the past per [facility] records. This was discontinued.   Plan:  -Continue  melatonin to 6mg at HS.   -Monitor sleeping patterns. Patient currently is drowsy and falls asleep mid conversation.   -Would NOT recommend ambien given co-morbidities above.   -BMP, CBC Tuesday 11/19/21--results pending  -BMP and CBC Tuesday 1/26/21     Orders written by provider at facility     Total time spent with patient visit at the [facility] nursing [facility] was 39 min including patient visit and review of past records. Greater than 50% of total time spent with counseling and coordinating care with nursing staff due to the complexities of their diagnoses, review of HPI, development of POC, assisting HUC on appointment schedules, and patient education and review of POC with patient which includes 19 min discussion with patient on: current medications/orders changes, recent blood work results, continued discharge plan/needs, subsequent treatment plan while in TCU and thereafter, current pain control plan and controlled substances ordered. Also discussed with importance of compliant with current treatment plan along with specialty follow up, along with risk factor reduction and prognosis if he continues to be noncompliant with therapies.      Electronically signed by:  Lana Stevens DNP, APRN

## 2021-01-19 ENCOUNTER — NURSING HOME VISIT (OUTPATIENT)
Dept: GERIATRICS | Facility: CLINIC | Age: 71
End: 2021-01-19
Payer: MEDICARE

## 2021-01-19 ENCOUNTER — TRANSFERRED RECORDS (OUTPATIENT)
Dept: HEALTH INFORMATION MANAGEMENT | Facility: CLINIC | Age: 71
End: 2021-01-19

## 2021-01-19 DIAGNOSIS — J44.9 COPD, SEVERE (H): ICD-10-CM

## 2021-01-19 DIAGNOSIS — R82.81 PYURIA: ICD-10-CM

## 2021-01-19 DIAGNOSIS — G47.00 INSOMNIA, UNSPECIFIED TYPE: ICD-10-CM

## 2021-01-19 DIAGNOSIS — I73.9 PVD (PERIPHERAL VASCULAR DISEASE) (H): ICD-10-CM

## 2021-01-19 DIAGNOSIS — E27.40 CHRONIC ADRENAL INSUFFICIENCY (H): ICD-10-CM

## 2021-01-19 DIAGNOSIS — Z89.512 S/P BELOW KNEE AMPUTATION, LEFT (H): ICD-10-CM

## 2021-01-19 DIAGNOSIS — N18.30 STAGE 3 CHRONIC KIDNEY DISEASE, UNSPECIFIED WHETHER STAGE 3A OR 3B CKD (H): ICD-10-CM

## 2021-01-19 DIAGNOSIS — D63.8 ANEMIA OF CHRONIC DISEASE: ICD-10-CM

## 2021-01-19 DIAGNOSIS — E44.1 MILD PROTEIN-CALORIE MALNUTRITION (H): ICD-10-CM

## 2021-01-19 DIAGNOSIS — D69.3 CHRONIC IDIOPATHIC THROMBOCYTOPENIA (H): ICD-10-CM

## 2021-01-19 DIAGNOSIS — Z87.2 HX OF PSORIASIS: ICD-10-CM

## 2021-01-19 DIAGNOSIS — R53.81 PHYSICAL DECONDITIONING: Primary | ICD-10-CM

## 2021-01-19 DIAGNOSIS — S81.801D OPEN WOUND OF RIGHT LOWER EXTREMITY, SUBSEQUENT ENCOUNTER: ICD-10-CM

## 2021-01-19 DIAGNOSIS — J96.21 ACUTE ON CHRONIC RESPIRATORY FAILURE WITH HYPOXIA AND HYPERCAPNIA (H): ICD-10-CM

## 2021-01-19 DIAGNOSIS — J96.22 ACUTE ON CHRONIC RESPIRATORY FAILURE WITH HYPOXIA AND HYPERCAPNIA (H): ICD-10-CM

## 2021-01-19 DIAGNOSIS — I10 HYPERTENSION, UNSPECIFIED TYPE: ICD-10-CM

## 2021-01-19 DIAGNOSIS — J18.9 PNEUMONIA OF BOTH LUNGS DUE TO INFECTIOUS ORGANISM, UNSPECIFIED PART OF LUNG: ICD-10-CM

## 2021-01-19 DIAGNOSIS — G89.4 CHRONIC PAIN SYNDROME: ICD-10-CM

## 2021-01-19 DIAGNOSIS — R07.89 MUSCULOSKELETAL CHEST PAIN: ICD-10-CM

## 2021-01-19 DIAGNOSIS — J85.1 ABSCESS OF LOWER LOBE OF RIGHT LUNG WITH PNEUMONIA (H): ICD-10-CM

## 2021-01-19 DIAGNOSIS — S01.302D OPEN WOUND OF LEFT EAR, UNSPECIFIED OPEN WOUND TYPE, SUBSEQUENT ENCOUNTER: ICD-10-CM

## 2021-01-19 DIAGNOSIS — M62.81 GENERALIZED MUSCLE WEAKNESS: ICD-10-CM

## 2021-01-19 DIAGNOSIS — L29.9 ITCHING: ICD-10-CM

## 2021-01-19 LAB
ANION GAP SERPL CALCULATED.3IONS-SCNC: 6 MMOL/L (ref 5–18)
ANION GAP SERPL CALCULATED.3IONS-SCNC: 6 MMOL/L (ref 5–18)
BASOPHILS # BLD AUTO: 0 THOU/UL (ref 0–0.2)
BASOPHILS NFR BLD AUTO: 1 % (ref 0–2)
BUN SERPL-MCNC: 48 MG/DL (ref 8–22)
BUN SERPL-MCNC: 48 MG/DL (ref 8–22)
CALCIUM SERPL-MCNC: 8.5 MG/DL (ref 8.5–10.5)
CALCIUM SERPL-MCNC: 8.5 MG/DL (ref 8.5–10.5)
CHLORIDE BLD-SCNC: 105 MMOL/L (ref 98–107)
CHLORIDE SERPLBLD-SCNC: 105 MMOL/L (ref 98–107)
CO2 SERPL-SCNC: 24 MMOL/L (ref 22–31)
CO2 SERPL-SCNC: 24 MMOL/L (ref 22–31)
CREAT SERPL-MCNC: 1.72 MG/DL (ref 0.7–1.3)
CREAT SERPL-MCNC: 1.72 MG/DL (ref 0.7–1.3)
DIFFERENTIAL: ABNORMAL
EOSINOPHIL # BLD AUTO: 0.7 THOU/UL (ref 0–0.4)
EOSINOPHIL NFR BLD AUTO: 12 % (ref 0–6)
ERYTHROCYTE [DISTWIDTH] IN BLOOD BY AUTOMATED COUNT: 14.8 % (ref 11–14.5)
ERYTHROCYTE [DISTWIDTH] IN BLOOD BY AUTOMATED COUNT: 14.8 % (ref 11–14.5)
FERRITIN SERPL-MCNC: 457 NG/ML (ref 27–300)
FOLATE SERPL-MCNC: 12.1 NG/ML
GFR SERPL CREATININE-BSD FRML MDRD: 40 ML/MIN/1.73M2
GFR SERPL CREATININE-BSD FRML MDRD: 40 ML/MIN/1.73M2
GLUCOSE BLD-MCNC: 64 MG/DL (ref 70–125)
GLUCOSE SERPL-MCNC: 64 MG/DL (ref 70–125)
HCT VFR BLD AUTO: 21.9 % (ref 40–54)
HCT VFR BLD AUTO: 21.9 % (ref 40–54)
HEMOGLOBIN: 7.1 G/DL (ref 14–18)
HGB BLD-MCNC: 7.1 G/DL (ref 14–18)
IMM GRANULOCYTES # BLD: 0 THOU/UL
IMM GRANULOCYTES NFR BLD: 0 %
IRON SATN MFR SERPL: 17 % (ref 20–50)
IRON SERPL-MCNC: 33 UG/DL (ref 42–175)
LYMPHOCYTES # BLD AUTO: 0.5 THOU/UL (ref 0.8–4.4)
LYMPHOCYTES NFR BLD AUTO: 9 % (ref 20–40)
MCH RBC QN AUTO: 30.2 PG (ref 27–34)
MCH RBC QN AUTO: 30.2 PG (ref 27–34)
MCHC RBC AUTO-ENTMCNC: 32.4 G/DL (ref 32–36)
MCHC RBC AUTO-ENTMCNC: 32.4 G/DL (ref 32–36)
MCV RBC AUTO: 93 FL (ref 80–100)
MCV RBC AUTO: 93 FL (ref 80–100)
MONOCYTES # BLD AUTO: 0.2 THOU/UL (ref 0–0.9)
MONOCYTES NFR BLD AUTO: 4 % (ref 2–10)
NEUTROPHILS # BLD AUTO: 4.4 THOU/UL (ref 2–7.7)
NEUTROPHILS NFR BLD AUTO: 74 % (ref 50–70)
PLATELET # BLD AUTO: 81 THOU/UL (ref 140–440)
PLATELET # BLD AUTO: 81 THOU/UL (ref 140–440)
PMV BLD AUTO: 10.1 FL (ref 8.5–12.5)
POTASSIUM BLD-SCNC: 4.6 MMOL/L (ref 3.5–5)
POTASSIUM SERPL-SCNC: 4.6 MMOL/L (ref 3.5–5)
RBC # BLD AUTO: 2.35 MILL/UL (ref 4.4–6.2)
RBC # BLD AUTO: 2.35 MILL/UL (ref 4.4–6.2)
SODIUM SERPL-SCNC: 135 MMOL/L (ref 136–145)
SODIUM SERPL-SCNC: 135 MMOL/L (ref 136–145)
TIBC SERPL-MCNC: 192 UG/DL (ref 313–563)
TRANSFERRIN SERPL-MCNC: 153 MG/DL (ref 212–360)
WBC # BLD AUTO: 6 THOU/UL (ref 4–11)
WBC: 6 THOU/UL (ref 4–11)

## 2021-01-19 PROCEDURE — 99207 PR CDG-CUT & PASTE-POTENTIAL IMPACT ON LEVEL: CPT | Performed by: NURSE PRACTITIONER

## 2021-01-19 PROCEDURE — 99310 SBSQ NF CARE HIGH MDM 45: CPT | Performed by: NURSE PRACTITIONER

## 2021-01-19 RX ORDER — HYDROMORPHONE HYDROCHLORIDE 2 MG/1
1 TABLET ORAL EVERY 6 HOURS PRN
Qty: 60 TABLET | Refills: 0 | Status: ON HOLD | OUTPATIENT
Start: 2021-01-19 | End: 2021-02-15

## 2021-01-19 NOTE — LETTER
1/19/2021        RE: Perfecto Reese  [address]  [address]  [facility] [address]        [facility]  [facility] Record Number:  [id number]  Place of Service where encounter took place:  [facility] ([facility] [[id number]]  Chief Complaint   Patient presents with     RECHECK       HPI:    Perfecto Reese  is a 70 year old (1950), who is being seen today for an episodic care visit.  HPI information obtained from: facility chart records, facility staff, patient report, [facility] chart review and Care Everywhere [facility] chart review. Today's concern is:     Physical deconditioning  Generalized muscle weakness  Acute on chronic respiratory failure with hypoxia and hypercapnia (H)  Pneumonia of both lungs due to infectious organism, unspecified part of lung  Abscess of lower lobe of right lung with pneumonia (H)  COPD, severe (H)  Musculoskeletal chest pain  Anemia of chronic disease  Chronic idiopathic thrombocytopenia (H)  Stage 3 chronic kidney disease, unspecified whether stage 3a or 3b CKD  Pyuria  Chronic adrenal insufficiency (H)  Hypertension, unspecified type  Hx of psoriasis  Itching  PVD (peripheral vascular disease) (H)  S/P below knee amputation, left (H)  Open wound of right lower extremity, subsequent encounter  Open wound of left ear, unspecified open wound type, subsequent encounter  Chronic pain syndrome  Mild protein-calorie malnutrition (H)  Insomnia, unspecified type     Met with patient who denies any chest pain, palpitations, shortness of breath, ISIDRO, lightheadedness, dizziness, or cough. Patient does report improvement of cough and congestion with current regimen. Denies any abdominal discomfort. Denies N&V. Denies B&B concerns. Denies dysuria or frequency. Denies loose or constipation. Appetite good. Sleeping well.  Continues to fall asleep off/on throughout conversation. Updated patient on current blood values along with occult stool present. History of  following GI at [facility]. When suggested colonoscopy patient immediately declines states I will not do that. Nursing reports self inflicted wound to right lower extremity due to back scratcher. Increased itch complaints. Has used PRN atarax at least daily. He reports itching complaints throughout body and not just to RLE. Patient continues to be poorly compliant with therapies. Limited participation. Requested to staff for pending care conference to discuss goals.     BP Readings from Last 3 Encounters:   01/18/21 138/70   01/14/21 130/70   01/07/21 124/79     Wt Readings from Last 5 Encounters:   01/18/21 64.4 kg (141 lb 14.4 oz)   01/14/21 64.4 kg (141 lb 14.4 oz)   01/07/21 64.4 kg (141 lb 14.4 oz)   01/05/21 64.4 kg (141 lb 14.4 oz)   12/30/20 65 kg (143 lb 4.8 oz)     Past Medical and Surgical History reviewed in Epic today.    MEDICATIONS:    Current Outpatient Medications   Medication Sig Dispense Refill     albuterol (PROAIR HFA/PROVENTIL HFA/VENTOLIN HFA) 108 (90 Base) MCG/ACT inhaler Inhale 2 puffs into the lungs every 4 hours as needed for shortness of breath / dyspnea or wheezing       amLODIPine (NORVASC) 5 MG tablet Take 1 tablet (5 mg) by mouth daily 30 tablet 0     amoxicillin-clavulanate (AUGMENTIN) 875-125 MG tablet Take 1 tablet by mouth 2 times daily for 10 days 20 tablet 0     azithromycin (ZITHROMAX) 250 MG tablet Take 2 tablets (500 mg) by mouth daily for 1 day, THEN 1 tablet (250 mg) daily for 4 days. 6 tablet 0     calcium citrate (CITRACAL) 950 MG tablet Take 4 tablets by mouth daily       clobetasol (TEMOVATE) 0.05 % external solution Apply topically At Bedtime Apply to scalp       cyanocobalamin (VITAMIN B-12) 1000 MCG tablet Take 1 tablet (1,000 mcg) by mouth daily 30 tablet 1     dextromethorphan-guaiFENesin (MUCINEX DM)  MG 12 hr tablet Take 1 tablet by mouth every 12 hours 60 tablet 1     DULoxetine (CYMBALTA) 30 MG capsule Take 60 mg by mouth daily        ferrous sulfate (FEROSUL) 325 (65 Fe) MG tablet Take 1 tablet (325 mg) by mouth 3 times daily (with meals) 90 tablet 1     fluticasone-vilanterol (BREO ELLIPTA) 200-25 MCG/INH inhaler Inhale 1 puff into the lungs daily 28 each 1     folic acid (FOLVITE) 1 MG tablet Take 1 tablet (1 mg) by mouth daily 30 tablet 1     furosemide (LASIX) 40 MG tablet Take 1 tablet (40 mg) by mouth daily 30 tablet 0     gabapentin (NEURONTIN) 300 MG capsule Take 1 capsule (300 mg) by mouth 2 times daily 60 capsule 1     hydrocortisone (CORTEF) 20 MG tablet Take 20 mg by mouth every morning       HYDROmorphone (DILAUDID) 2 MG tablet TAKE 1/2 TAB (1mg) BY MOUTH EVERY 6 HOURS AS NEEDED FOR PAIN *SEPERATEWITHIN 4hrs FROM AS NEEDED MORPHINE* 30 tablet 0     hydrOXYzine (ATARAX) 25 MG tablet Take 1 tablet (25 mg) by mouth every 6 hours as needed for itching 30 tablet 1     lidocaine (XYLOCAINE) 5 % external ointment Apply topically 3 times daily as needed for moderate pain (rib pain)       loperamide (IMODIUM) 2 MG capsule Take 1 capsule (2 mg) by mouth 4 times daily as needed for diarrhea 15 capsule 0     losartan (COZAAR) 25 MG tablet Take 12.5 mg by mouth daily       melatonin 3 MG tablet Take 2 tablets (6 mg) by mouth At Bedtime 60 tablet 1     MENTHOL-METHYL SALICYLATE EX Externally apply topically 2 times daily Menthol-methyl saliycylate 10-15% cream twice daily to lower back       multivitamin w/minerals (THERA-VIT-M) tablet Take 1 tablet by mouth daily 30 tablet 0     nicotine (NICORETTE) 2 MG gum Place 2 mg inside cheek as needed for smoking cessation       omeprazole (PRILOSEC) 20 MG DR capsule Take 20 mg by mouth daily       primidone (MYSOLINE) 50 MG tablet Take 50 mg by mouth At Bedtime       tamsulosin (FLOMAX) 0.4 MG capsule Take 0.8 mg by mouth daily       umeclidinium (INCRUSE ELLIPTA) 62.5 MCG/INH inhaler Inhale 1 puff into the lungs daily 30 each 1     vitamin C (ASCORBIC ACID) 1000 MG TABS Take 1 tablet (1,000 mg) by  "mouth daily 30 tablet 1     vitamin D3 (CHOLECALCIFEROL) 250 mcg (89652 units) capsule Take 1 capsule (250 mcg) by mouth daily 30 capsule 1     REVIEW OF SYSTEMS:  10 point ROS of systems including Constitutional, Eyes, Respiratory, Cardiovascular, Gastroenterology, Genitourinary, Integumentary, Musculoskeletal, Psychiatric were all negative except for pertinent positives noted in my HPI.    Objective:  /70   Pulse 80   Temp 97.6  F (36.4  C)   Resp 18   Ht 1.651 m (5' 5\")   Wt 64.4 kg (141 lb 14.4 oz)   SpO2 94%   BMI 23.61 kg/m    Exam:  GENERAL APPEARANCE:  Alert, in no distress, cooperative  ENT:  Mouth and posterior oropharynx normal, moist mucous membranes, [place]  EYES:  EOM, conjunctivae, lids, pupils and irises normal  NECK:  No adenopathy,masses or thyromegaly  RESP:  respiratory effort and palpation of chest normal, lungs clear to auscultation , no respiratory distress  CV:  Palpation and auscultation of heart done , regular rate and rhythm, no murmur, rub, or gallop, no edema, left BKA  ABDOMEN:  normal bowel sounds, soft, nontender, no hepatosplenomegaly or other masses, no guarding or rebound  M/S:   Pivot transfers, wheelchair bound  SKIN:  Inspection of skin and subcutaneous tissue baseline, see pictures below  NEURO:   Cranial nerves 2-12 are normal tested and grossly at patient's baseline, no purposeful movement in upper and lower extremities  PSYCH:  oriented X 3, memory impaired , affect and mood normal                                  Labs:     Most Recent 3 CBC's:  Recent Labs   Lab Test 01/13/21 01/07/21 01/04/21  0940   WBC 5.6 5.5 8.4   HGB 6.6* 6.9* 7.9*   MCV 92 92 92   PLT 90* 89* 107*     Most Recent 3 BMP's:  Recent Labs   Lab Test 01/13/21 01/07/21 12/31/20  0730    138 139   POTASSIUM 4.3 4.9 4.0   CHLORIDE 107 104 105   CO2 26 26 31   BUN 54* 48* 36*   CR 1.71* 1.71* 1.51*   ANIONGAP 7 8 3   MARTINE 8.5 8.4* 8.5   GLC 79 81 90     Most Recent 2 LFT's:  Recent Labs   Lab " Test 01/07/21 12/29/20  0811   AST 27 26   ALT 24 30   ALKPHOS 91 90   BILITOTAL 0.4 0.3     Most Recent Anemia Panel:  Recent Labs   Lab Test 01/13/21 12/04/20  1526 12/04/20  1526 12/04/20  1245   WBC 5.6   < > 3.2* 3.5*   HGB 6.6*   < > 6.4* 6.1*   HCT 20.1*   < > 19.1* 18.6*   MCV 92   < > 93 94   PLT 90*   < > 70* 74*   IRON  --   --   --  30*   IRONSAT  --   --   --  27   RETICABSCT  --   --  17.9*  --    RETP  --   --  0.9  --    FEB  --   --   --  112*   JÚNIOR  --   --   --  515*   B12  --   --   --  1,117*   FOLIC  --   --   --  6.7    < > = values in this interval not displayed.                   CT CHEST WITHOUT CONTRAST 1/12/2021 2:59 PM      HISTORY: COPD exacerbation, complicated. Pneumonia, unresolved or  complicated. Abscess of lung or mediastinum. Acute on chronic  respiratory failure with hypoxia and hypercapnia (H). Pneumonia of  both lungs due to infectious organism, unspecified part of lung.  Abscess of lower lobe of right lung with pneumonia (H). COPD, severe  (H).     TECHNIQUE: CT scan of the chest was performed without IV contrast.  Multiplanar reformats were obtained. Dose reduction techniques were  used.     CONTRAST: None.     COMPARISON: 12/3/2020     FINDINGS:      LUNGS AND PLEURA: Moderate emphysema. Moderate-sized right pleural  effusion, increased from previous. Consolidation with air bronchograms  in the inferior right middle lobe and right lower lobe. Fluid  collection in the inferior right middle lobe near the level of the  major fissure has decreased in size, measuring 4 x 3 x 1.5 cm compared  to previous 6.4 x 5.3 x 5.5 cm.     MEDIASTINUM/AXILLAE: Small mediastinal lymph nodes are likely  reactive.     UPPER ABDOMEN: Cirrhotic appearance of the liver. Splenomegaly.  Cholelithiasis.                                                                      IMPRESSION:  1.  Improved consolidation in the inferior right and lower lobes  consistent with pneumonia.  2.  Significant interval  decrease in size of a presumed intrapulmonary  abscess in the inferior right middle lobe.  3.  The findings have significantly improved, but not resolved.  4.  A moderate-sized pleural effusion is slightly larger. The patient  may benefit from a thoracentesis.     RAEANN MAYORGA MD     ASSESSMENT/PLAN:  (R53.81) Physical deconditioning  (primary encounter diagnosis)  (M62.81) Generalized muscle weakness  Comment: Acute on chronic. S/T below diagnosis. Followed by [facility]. HISTORY of noncompliance.   Plan:   -Continue Physical therapy and Occupational therapy as directed  -SW to remain involved for safe discharge planning needs  -Would benefit from snf setting, however patient declines at this time.      (J96.21,  J96.22) Acute on chronic respiratory failure with hypoxia and hypercapnia (H)  (J18.9) Pneumonia of both lungs due to infectious organism, unspecified part of lung  (J85.1) Abscess of lower lobe of right lung with pneumonia (H)  (J44.9) COPD, severe (H)  (R07.89) Musculoskeletal chest pain  Comment: Acute on chronic. Admitted with fever, cough, and acute respiratory distress (placed on 10L per OM in the field). Fever to 102.5, tachycardia, tachypnea, and leukocytosis (17.2k) present on arrival. CXR on arrival showed RLL infiltrate and CT abd/pelvis showed air fluid cavity at the right lung base. On admission, he was initiated on IV pip-tazo and azithromycin, and Thoracic Surgery was consulted. Chest CT was ordered (12/3) which showed a 6.4 cm RLL pulmonary abscess and extensive left-sided mucus plugging with associated infiltrate. 12/2 blood cultures x2 no growth. Sputum cx 12/4 growing proteus, fluoroquinolone resistant. Also corynebacterium striatum 12/6. CT surgery saw, no surgical intervention indicated. Patient was treated with IV then PO antibiotics, with the assistance of infectious disease. Initially on zosyn -> unasyn -> transitioned 12/8 to Augmentin x 6 weeks  (through approximately 1/13/2021). - Infectious disease peripherally following. Patient was discharged on pulmicort and spiriva orders however patient came to facility with supply of incruse and breo which patient states this was changed in hospital. unable to find any documentation to support this, however since patient has supply of breo and incruse will start per request. Completed antibiotics of augmentin on 1/13/21.   Plan:   -Continue oxygen as directed which is chronic at 3L via NC.   -Monitor respiratory status. May benefit from thoracentesis in future if warranted.   -Continue mucinex 600mg BID for now. May benefit from long term use for now while infection current.   -Continue incentive spirometry and encourage every 4 hours while awake. Encourage use.   -Continue fluticasone-vilanterol (breo 200-25mcg inhaler 1 puff daily  -Continue incruse ellipta 62.5mcg inhaler 1 puff daily.   -Continue albuterol inhaler PRN  -Follow up with ID as directed. This was rescheduled for 11/26/21  -Continue augmentin for another 10 days until completion  -Continue z pack for 5 days as well for dual therapy above for pneumonia concerns until completion.    -BMP, CBC Tuesday 11/19/21--results pending  -BMP and CBC Tuesday 1/26/21     (D63.8) Anemia of chronic disease  (D69.3) Chronic idiopathic thrombocytopenia (H)  Comment: Acute on chronic. Anemia and thrombocytopenia likely multifactorial from liver cirrhosis, splenomegaly, renal disease, CKD and psoriasis. Iron labs suggestive of anemia of chronic disease. 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 Patient required four units of pRBCs this admission for Hgb < 7.0. No acute GI bleed identified, likely blood loss from decreased erythropoiesis. Appreciate GI consult 12/16/2020, no acute GI bleed, no acute interventions, [facility]. Check hemoglobin  periodically while inpatient - 7.0 12/28 - clinically stable and no bleeding noted. recheck hgb 7.5 12/29 and again 7.9 on 1/4/21. Hgb has been <7 since TCU admission. Discontinued aspirin due to risks. Occult stool was positive on 1/15/21. Patient is NOT interested in colonoscopy.   Plan:   -Follow up with GI at [facility] as directed.   -Recommend to follow up with hematology at [facility] post TCU as directed  -Monitor for worsening s/sx of concerns  -Continue iron TID with meals.    -Continue vitamin C 1000mg daily to assist with iron absorption along with chronic wounds.   -Monitor for active s/sx of bleeding risks.   -BMP, CBC, iron, TIBC, ferritin, folate levels Tuesday 11/19/21  -BMP and CBC Tuesday 1/26/21     (N18.30) Stage 3 chronic kidney disease, unspecified whether stage 3a or 3b CKD  (R82.81) Pyuria  Comment: Acute on chronic. Baseline creatinine 1.2-1.3 in Jan 2020, on admission 2.89. Suspect pre-renal state in setting of pneumonia/sepsis with possible progression to ATN in setting of hypotension. Improved with IV fluids, creatinine down to 1.5 on 12/16, now recheck shows improvement to 1.49 and calculated GFR is 36. Creatinne back to baseline of 1.3 on 12/23/2020. Restarted PTA losartan at lowest dose at discharge 25mg daily. Appreciate Nephrology consul t-- lasix 40 mg daily continued  Plan:   -Follow up with nephrology at [facility] post TCU  -Monitor urinary status  -Continue current medications without change  -BMP, CBC Tuesday 11/19/21--results pending  -BMP and CBC Tuesday 1/26/21     (E27.40) Chronic adrenal insufficiency (H)  Comment: Chronic. Stable.   Plan:   -Monitor for worsening s/sx of concerns  -Continue PTA hydrocortisone 20mg daily  -Follow up with PCP at [facility] post TCU  -BMP, CBC Tuesday 11/19/21--results pending  -BMP and CBC Tuesday 1/26/21     (I10) Hypertension,  unspecified type  Comment: Acute on chronic. Amlodipine decreased to 5mg daily due to soft blood pressures. Based on JNC-8 goals,  patients age of 70 year old, presence of diabetes or CKD, and goals of care goal BP is  <140/90 mm Hg. Patient is stable with current plan of care and routine assessment..  Plan:   -Continue amlodipine as directed  -Continue losartan as directed  -Monitor BP and HR  -Follow up with PCP post TCU  -BMP, CBC Tuesday 11/19/21--results pending  -BMP and CBC Tuesday 1/26/21     (Z87.2) Hx of psoriasis  (L29.9) Itching  Comment: Chronic. Holding PTA Humira this admission--continue to hold at discharge. Caused self inflicted trauma to wounds on RLE from back scratcher  Plan:   -Will monitor skin for ongoing concern   -Remove back scratcher so patient can not cause self trauma  -Continue Clobetasol Propionate Lotion 0.05 % to scalp at HS as directed  -Follow up with PCP at [facility] post TCU  -May recommend dermatology in future if warranted.   -Continue atarax 25mg every 6 hours PRN for itch complaints  -BMP, CBC Tuesday 11/19/21--results pending  -BMP and CBC Tuesday 1/26/21     (I73.9) PVD (peripheral vascular disease) (H)  (Z89.512) S/P below knee amputation, left (H)  (S81.801D) Open wound of right lower extremity, subsequent encounter  (S01.302D) Open wound of left ear, unspecified open wound type, subsequent encounter  Comment: Chronic. Present on admission. Receives wound cares per home care RN. Followed by PCP at [facility]. Limited [facility] documentation. Followed WOCN in hospital. Did not admit with proper wound care orders. Refuses to wear edema wear to wounds.   Plan:   -Follow up with PCP post TCU  -Would recommend wound clinic appt at [facility] post TCU  -Monitor skin for worsening s/sx of concerns.   -Continue vitamin D to 10,000U daily for wound healing  -Continue  folic acid 1000mcg daily for wound healing  -Continue vitamin B12 1000mcg daily for wound healing  -[facility] Tuesday 11/19/21--results pending  -[facility] Tuesday 1/26/21  -Change current wound care orders as directed:  *RLE  Wounds: Daily and PRN   1. Cleanse with Vashe moistened gauze to wound bed and let sit x 5 minutes - do not rinse. #[id number]  2. Dry and protect surrounding skin with no sting barrier film wipe #[id number],  3. Apply sween 24 to all intact skin from base of toes to below knees.  4. Cover wound with VASHE moistened gauze  5. Cover with ABD   6. Secure with kerlix and tape  7. apply offloading boot    (G89.4) Chronic pain syndrome  Comment: Chronic. NOT AT GOAL. Patient wanting increase of pain medications. Resume PTA upon discharge of regimen (gabapentin 300 mg TID, duloxetine 30 mg daily, lidocaine patch, oxycodone 5 mg q6h prn, morphine 2.5 mg BID prn)  Plan:   -Continue gabapentin to 300mg BID. (renally dose due to elevated creatinine)  -Continue duloxetine 60mg daily. (recently increased on 1/12/21 for pain complaints and also to assist with mood)  -Continue lidocaine topical as directed  -Continue menthol topical as directed  -discontinued PRN morphine.   -Continue dilaudid 1mg every 6 hours PRN  -Monitor pain complaints  -Follow up with PCP post TCU  -Would recommend pain clinic referral given history chronic history.   -[facility] Tuesday 11/19/21--results pending  -[facility] Tuesday 1/26/21     (E44.1) Mild protein-calorie malnutrition (H)  Comment: Acute on chronic. Patient requests to have Ensure TID PRN  Plan:   -Monitor appetite  -Continue Ensure TID PRN  -Dietician to remain involved  -[facility] Tuesday 11/19/21--results pending  -[facility] Tuesday 1/26/21  -Continue vitamin D to 10,000 daily  -Continue folic acid daily  -Continue vitamin B12 daily     (G47.00) Insomnia  Comment: Acute on chronic. Patient wanting increase of sleeping agent. HISTORY of being on ambien in the past per  [facility] records. This was discontinued.   Plan:  -Continue melatonin to 6mg at HS.   -Monitor sleeping patterns. Patient currently is drowsy and falls asleep mid conversation.   -Would NOT recommend ambien given co-morbidities above.   -BMP, CBC Tuesday 11/19/21--results pending  -BMP and CBC Tuesday 1/26/21     Orders written by provider at facility     Total time spent with patient visit at the skilled nursing [facility] was 39 min including patient visit and review of past records. Greater than 50% of total time spent with counseling and coordinating care with nursing staff due to the complexities of their diagnoses, review of HPI, development of POC, assisting HUC on appointment schedules, and patient education and review of POC with patient which includes 19 min discussion with patient on: current medications/orders changes, recent blood work results, continued discharge plan/needs, subsequent treatment plan while in TCU and thereafter, current pain control plan and controlled substances ordered. Also discussed with importance of compliant with current treatment plan along with specialty follow up, along with risk factor reduction and prognosis if he continues to be noncompliant with therapies.      Electronically signed by:  Lana Stevens DNP, ANTONIETA        Sincerely,        ANTONIETA Valdovinos CNP

## 2021-01-20 NOTE — PATIENT INSTRUCTIONS
Per Dr. Mariscal, no follow-up needed  Terra Fisher, RN, BSN, HUGO  RN Care Coordinator  [facility]  [phone]

## 2021-01-21 ENCOUNTER — DOCUMENTATION ONLY (OUTPATIENT)
Dept: CARE COORDINATION | Facility: CLINIC | Age: 71
End: 2021-01-21

## 2021-01-26 ENCOUNTER — VIRTUAL VISIT (OUTPATIENT)
Dept: INFECTIOUS DISEASES | Facility: CLINIC | Age: 71
End: 2021-01-26
Attending: INTERNAL MEDICINE
Payer: MEDICARE

## 2021-01-26 ENCOUNTER — NURSING HOME VISIT (OUTPATIENT)
Dept: GERIATRICS | Facility: CLINIC | Age: 71
End: 2021-01-26
Payer: MEDICARE

## 2021-01-26 ENCOUNTER — RECORDS - HEALTHEAST (OUTPATIENT)
Dept: LAB | Facility: CLINIC | Age: 71
End: 2021-01-26

## 2021-01-26 VITALS
SYSTOLIC BLOOD PRESSURE: 120 MMHG | RESPIRATION RATE: 18 BRPM | DIASTOLIC BLOOD PRESSURE: 56 MMHG | TEMPERATURE: 98 F | BODY MASS INDEX: 23.64 KG/M2 | OXYGEN SATURATION: 96 % | HEIGHT: 65 IN | WEIGHT: 141.9 LBS | HEART RATE: 63 BPM

## 2021-01-26 DIAGNOSIS — Z89.512 S/P BELOW KNEE AMPUTATION, LEFT (H): ICD-10-CM

## 2021-01-26 DIAGNOSIS — J96.21 ACUTE ON CHRONIC RESPIRATORY FAILURE WITH HYPOXIA AND HYPERCAPNIA (H): ICD-10-CM

## 2021-01-26 DIAGNOSIS — S01.302D OPEN WOUND OF LEFT EAR, UNSPECIFIED OPEN WOUND TYPE, SUBSEQUENT ENCOUNTER: ICD-10-CM

## 2021-01-26 DIAGNOSIS — E27.40 CHRONIC ADRENAL INSUFFICIENCY (H): ICD-10-CM

## 2021-01-26 DIAGNOSIS — J18.9 PNEUMONIA OF BOTH LUNGS DUE TO INFECTIOUS ORGANISM, UNSPECIFIED PART OF LUNG: ICD-10-CM

## 2021-01-26 DIAGNOSIS — E44.1 MILD PROTEIN-CALORIE MALNUTRITION (H): ICD-10-CM

## 2021-01-26 DIAGNOSIS — L29.9 ITCHING: ICD-10-CM

## 2021-01-26 DIAGNOSIS — M62.81 GENERALIZED MUSCLE WEAKNESS: ICD-10-CM

## 2021-01-26 DIAGNOSIS — G89.4 CHRONIC PAIN SYNDROME: ICD-10-CM

## 2021-01-26 DIAGNOSIS — J85.1 ABSCESS OF LOWER LOBE OF RIGHT LUNG WITH PNEUMONIA (H): ICD-10-CM

## 2021-01-26 DIAGNOSIS — S81.801D OPEN WOUND OF RIGHT LOWER EXTREMITY, SUBSEQUENT ENCOUNTER: ICD-10-CM

## 2021-01-26 DIAGNOSIS — Z87.2 HX OF PSORIASIS: ICD-10-CM

## 2021-01-26 DIAGNOSIS — I73.9 PVD (PERIPHERAL VASCULAR DISEASE) (H): ICD-10-CM

## 2021-01-26 DIAGNOSIS — N18.30 STAGE 3 CHRONIC KIDNEY DISEASE, UNSPECIFIED WHETHER STAGE 3A OR 3B CKD (H): ICD-10-CM

## 2021-01-26 DIAGNOSIS — J85.1 ABSCESS OF LOWER LOBE OF RIGHT LUNG WITH PNEUMONIA (H): Primary | ICD-10-CM

## 2021-01-26 DIAGNOSIS — R82.81 PYURIA: ICD-10-CM

## 2021-01-26 DIAGNOSIS — R53.81 PHYSICAL DECONDITIONING: Primary | ICD-10-CM

## 2021-01-26 DIAGNOSIS — I10 HYPERTENSION, UNSPECIFIED TYPE: ICD-10-CM

## 2021-01-26 DIAGNOSIS — J44.9 COPD, SEVERE (H): ICD-10-CM

## 2021-01-26 DIAGNOSIS — R07.89 MUSCULOSKELETAL CHEST PAIN: ICD-10-CM

## 2021-01-26 DIAGNOSIS — D69.3 CHRONIC IDIOPATHIC THROMBOCYTOPENIA (H): ICD-10-CM

## 2021-01-26 DIAGNOSIS — J96.22 ACUTE ON CHRONIC RESPIRATORY FAILURE WITH HYPOXIA AND HYPERCAPNIA (H): ICD-10-CM

## 2021-01-26 DIAGNOSIS — D63.8 ANEMIA OF CHRONIC DISEASE: ICD-10-CM

## 2021-01-26 DIAGNOSIS — G47.00 INSOMNIA, UNSPECIFIED TYPE: ICD-10-CM

## 2021-01-26 PROCEDURE — 99207 PR NO CHARGE LOS: CPT | Mod: 25 | Performed by: INTERNAL MEDICINE

## 2021-01-26 PROCEDURE — 99309 SBSQ NF CARE MODERATE MDM 30: CPT | Performed by: NURSE PRACTITIONER

## 2021-01-26 ASSESSMENT — MIFFLIN-ST. JEOR: SCORE: 1340.53

## 2021-01-26 NOTE — PROGRESS NOTES
[facility]  [facility] [facility] Record Number:  [id number]  Place of Service where encounter took place:  [facility] ([facility] [[id number]]  Chief Complaint   Patient presents with     RECHECK       HPI:    Perfecto Reese  is a 70 year old (1950), who is being seen today for an episodic care visit.  HPI information obtained from: facility chart records, facility staff, patient report, [facility] chart review and Care Everywhere [facility] chart review. Today's concern is:     Physical deconditioning  Generalized muscle weakness  Acute on chronic respiratory failure with hypoxia and hypercapnia (H)  Pneumonia of both lungs due to infectious organism, unspecified part of lung  Abscess of lower lobe of right lung with pneumonia (H)  COPD, severe (H)  Musculoskeletal chest pain  Anemia of chronic disease  Chronic idiopathic thrombocytopenia (H)  Stage 3 chronic kidney disease, unspecified whether stage 3a or 3b CKD  Pyuria  Chronic adrenal insufficiency (H)  Hypertension, unspecified type  Hx of psoriasis  Itching  PVD (peripheral vascular disease) (H)  S/P below knee amputation, left (H)  Open wound of right lower extremity, subsequent encounter  Chronic pain syndrome  Open wound of left ear, unspecified open wound type, subsequent encounter  Mild protein-calorie malnutrition (H)  Insomnia, unspecified type     Met with patient who denies any chest pain, palpitations, shortness of breath, ISIDRO, lightheadedness, dizziness, or cough. Denies any abdominal discomfort. Denies N&V. Denies B&B concerns. Denies dysuria or frequency. Denies loose or constipation. Appetite good. Sleeping well. Continues to complain of itch but no visible itch observation noted. Nursing denies any acute concerns. Participation with therapies are limited due to moods and behaviors. Appears more pleasant than usual post duloxetine dose increased couple weeks ago.     BP Readings from Last 3 Encounters:   01/26/21 120/56   01/18/21  138/70   01/14/21 130/70     Wt Readings from Last 5 Encounters:   01/26/21 64.4 kg (141 lb 14.4 oz)   01/18/21 64.4 kg (141 lb 14.4 oz)   01/14/21 64.4 kg (141 lb 14.4 oz)   01/07/21 64.4 kg (141 lb 14.4 oz)   01/05/21 64.4 kg (141 lb 14.4 oz)     Past Medical and Surgical History reviewed in Epic today.    MEDICATIONS:    Current Outpatient Medications   Medication Sig Dispense Refill     albuterol (PROAIR HFA/PROVENTIL HFA/VENTOLIN HFA) 108 (90 Base) MCG/ACT inhaler Inhale 2 puffs into the lungs every 4 hours as needed for shortness of breath / dyspnea or wheezing       amLODIPine (NORVASC) 5 MG tablet Take 1 tablet (5 mg) by mouth daily 30 tablet 0     calcium citrate (CITRACAL) 950 MG tablet Take 4 tablets by mouth daily       clobetasol (TEMOVATE) 0.05 % external solution Apply topically At Bedtime Apply to scalp       cyanocobalamin (VITAMIN B-12) 1000 MCG tablet Take 1 tablet (1,000 mcg) by mouth daily 30 tablet 1     dextromethorphan-guaiFENesin (MUCINEX DM)  MG 12 hr tablet Take 1 tablet by mouth every 12 hours 60 tablet 1     DULoxetine (CYMBALTA) 30 MG capsule Take 60 mg by mouth daily       ferrous sulfate (FEROSUL) 325 (65 Fe) MG tablet Take 1 tablet (325 mg) by mouth 3 times daily (with meals) 90 tablet 1     fluticasone-vilanterol (BREO ELLIPTA) 200-25 MCG/INH inhaler Inhale 1 puff into the lungs daily 28 each 1     folic acid (FOLVITE) 1 MG tablet Take 1 tablet (1 mg) by mouth daily 30 tablet 1     furosemide (LASIX) 40 MG tablet Take 1 tablet (40 mg) by mouth daily 30 tablet 0     gabapentin (NEURONTIN) 300 MG capsule Take 1 capsule (300 mg) by mouth 2 times daily 60 capsule 1     hydrocortisone (CORTEF) 20 MG tablet Take 20 mg by mouth every morning       HYDROmorphone (DILAUDID) 2 MG tablet Take 0.5 tablets (1 mg) by mouth every 6 hours as needed for severe pain 60 tablet 0     hydrOXYzine (ATARAX) 25 MG tablet Take 1 tablet (25 mg) by mouth every 6 hours as needed for itching 30 tablet 1  "    lidocaine (XYLOCAINE) 5 % external ointment Apply topically 3 times daily as needed for moderate pain (rib pain)       loperamide (IMODIUM) 2 MG capsule Take 1 capsule (2 mg) by mouth 4 times daily as needed for diarrhea 15 capsule 0     losartan (COZAAR) 25 MG tablet Take 12.5 mg by mouth daily       melatonin 3 MG tablet Take 2 tablets (6 mg) by mouth At Bedtime 60 tablet 1     MENTHOL-METHYL SALICYLATE EX Externally apply topically 2 times daily Menthol-methyl saliycylate 10-15% cream twice daily to lower back       multivitamin w/minerals (THERA-VIT-M) tablet Take 1 tablet by mouth daily 30 tablet 0     nicotine (NICORETTE) 2 MG gum Place 2 mg inside cheek as needed for smoking cessation       omeprazole (PRILOSEC) 20 MG DR capsule Take 20 mg by mouth daily       primidone (MYSOLINE) 50 MG tablet Take 50 mg by mouth At Bedtime       tamsulosin (FLOMAX) 0.4 MG capsule Take 0.8 mg by mouth daily       umeclidinium (INCRUSE ELLIPTA) 62.5 MCG/INH inhaler Inhale 1 puff into the lungs daily 30 each 1     vitamin C (ASCORBIC ACID) 1000 MG TABS Take 1 tablet (1,000 mg) by mouth daily 30 tablet 1     vitamin D3 (CHOLECALCIFEROL) 250 mcg (23501 units) capsule Take 1 capsule (250 mcg) by mouth daily 30 capsule 1     REVIEW OF SYSTEMS:  10 point ROS of systems including Constitutional, Eyes, Respiratory, Cardiovascular, Gastroenterology, Genitourinary, Integumentary, Musculoskeletal, Psychiatric were all negative except for pertinent positives noted in my HPI.    Objective:  /56   Pulse 63   Temp 98  F (36.7  C)   Resp 18   Ht 1.651 m (5' 5\")   Wt 64.4 kg (141 lb 14.4 oz)   SpO2 96%   BMI 23.61 kg/m    Exam:  GENERAL APPEARANCE:  Alert, in no distress, cooperative  ENT:  Mouth and posterior oropharynx normal, moist mucous membranes, [place]  EYES:  EOM, conjunctivae, lids, pupils and irises normal  NECK:  No adenopathy,masses or thyromegaly  RESP:  respiratory effort and palpation of chest normal, lungs clear " to auscultation , no respiratory distress  CV:  Palpation and auscultation of heart done , regular rate and rhythm, no murmur, rub, or gallop, no edema, BKA LLE  ABDOMEN:  normal bowel sounds, soft, nontender, no hepatosplenomegaly or other masses, no guarding or rebound  M/S:   wheelchair bound. Uses elctronic scooter when up. Requires A1 for all needs. Limited therapy participation  SKIN:  Inspection of skin and subcutaneous tissue baseline, see picture  NEURO:   Cranial nerves 2-12 are normal tested and grossly at patient's baseline, no purposeful movement in upper and lower extremities  PSYCH:  oriented X 3, memory impaired , affect and mood normal          Labs:     Most Recent 3 CBC's:  Recent Labs   Lab Test 01/19/21 01/13/21 01/07/21   WBC 6.0 5.6 5.5   HGB 7.1* 6.6* 6.9*   MCV 93 92 92   PLT 81* 90* 89*     Most Recent 3 BMP's:  Recent Labs   Lab Test 01/19/21 01/13/21 01/07/21   * 140 138   POTASSIUM 4.6 4.3 4.9   CHLORIDE 105 107 104   CO2 24 26 26   BUN 48* 54* 48*   CR 1.72* 1.71* 1.71*   ANIONGAP 6 7 8   MARTINE 8.5 8.5 8.4*   GLC 64* 79 81     Most Recent 2 LFT's:  Recent Labs   Lab Test 01/07/21 12/29/20  0811   AST 27 26   ALT 24 30   ALKPHOS 91 90   BILITOTAL 0.4 0.3     Most Recent Anemia Panel:  Recent Labs   Lab Test 01/19/21 12/04/20  1526 12/04/20  1526 12/04/20  1245   WBC 6.0   < > 3.2* 3.5*   HGB 7.1*   < > 6.4* 6.1*   HCT 21.9*   < > 19.1* 18.6*   MCV 93   < > 93 94   PLT 81*   < > 70* 74*   IRON  --   --   --  30*   IRONSAT  --   --   --  27   RETICABSCT  --   --  17.9*  --    RETP  --   --  0.9  --    FEB  --   --   --  112*   JÚNIOR  --   --   --  515*   B12  --   --   --  1,117*   FOLIC  --   --   --  6.7    < > = values in this interval not displayed.       ASSESSMENT/PLAN:  (R53.81) Physical deconditioning  (primary encounter diagnosis)  (M62.81) Generalized muscle weakness  Comment: Acute on chronic. S/T below diagnosis. Followed by [facility]. HISTORY of  noncompliance.   Plan:   -Continue Physical therapy and Occupational therapy as directed  -SW to remain involved for safe discharge planning needs  -Would benefit from [facility] setting, however patient declines at this time.      (J96.21,  J96.22) Acute on chronic respiratory failure with hypoxia and hypercapnia (H)  (J18.9) Pneumonia of both lungs due to infectious organism, unspecified part of lung  (J85.1) Abscess of lower lobe of right lung with pneumonia (H)  (J44.9) COPD, severe (H)  (R07.89) Musculoskeletal chest pain  Comment: Acute on chronic. Admitted with fever, cough, and acute respiratory distress (placed on 10L per OM in the field). Fever to 102.5, tachycardia, tachypnea, and leukocytosis (17.2k) present on arrival. CXR on arrival showed RLL infiltrate and CT abd/pelvis showed air fluid cavity at the right lung base. On admission, he was initiated on IV pip-tazo and azithromycin, and Thoracic Surgery was consulted. Chest CT was ordered (12/3) which showed a 6.4 cm RLL pulmonary abscess and extensive left-sided mucus plugging with associated infiltrate. 12/2 blood cultures x2 no growth. Sputum cx 12/4 growing proteus, fluoroquinolone resistant. Also corynebacterium striatum 12/6. CT surgery saw, no surgical intervention indicated. Patient was treated with IV then PO antibiotics, with the assistance of infectious disease. Initially on zosyn -> unasyn -> transitioned 12/8 to Augmentin x 6 weeks (through approximately 1/13/2021). - Infectious disease peripherally following. Patient was discharged on pulmicort and spiriva orders however patient came to facility with supply of incruse and breo which patient states this was changed in hospital. unable to find any documentation to support this, however since patient has supply of breo and incruse will start per request. Completed antibiotics of augmentin on 1/13/21.   Plan:   -Continue oxygen as directed which is chronic at 3L via NC.   -Monitor respiratory status.  May benefit from thoracentesis in future if warranted.   -Continue mucinex 600mg BID for now. May benefit from long term use for now while infection current.   -Continue incentive spirometry and encourage every 4 hours while awake. Encourage use.   -Continue fluticasone-vilanterol (breo 200-25mcg inhaler 1 puff daily  -Continue incruse ellipta 62.5mcg inhaler 1 puff daily.   -Continue albuterol inhaler PRN  -Follow up with ID as directed. This was rescheduled for 11/26/21  -BMP and CBC Tuesday 1/26/21. These were missed today by  therefore this will be done tomorrow 1/27/21     (D63.8) Anemia of chronic disease  (D69.3) Chronic idiopathic thrombocytopenia (H)  Comment: Acute on chronic. Anemia and thrombocytopenia likely multifactorial from liver cirrhosis, splenomegaly, renal disease, CKD and psoriasis. Iron labs suggestive of anemia of chronic disease. 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 Patient required four units of pRBCs this admission for Hgb < 7.0. No acute GI bleed identified, likely blood loss from decreased erythropoiesis. Appreciate GI consult 12/16/2020, no acute GI bleed, no acute interventions, [facility] Check hemoglobin periodically while inpatient - 7.0 12/28 - clinically stable and no bleeding noted. recheck hgb 7.5 12/29 and again 7.9 on 1/4/21. Hgb has been <7 since TCU admission. Discontinued aspirin due to risks. Occult stool was positive on 1/15/21. Patient is NOT interested in colonoscopy.   Plan:   -Follow up with GI at [facility] as directed.   -Recommend to follow up with hematology at [facility] post TCU as directed  -Monitor for worsening s/sx of concerns  -Continue iron TID with meals.    -Continue vitamin C 1000mg daily to assist with iron absorption along with chronic wounds.   -Monitor for  active s/sx of bleeding risks.   -BMP and CBC Tuesday 1/26/21. These were missed today by  therefore this will be done tomorrow 1/27/21     (N18.30) Stage 3 chronic kidney disease, unspecified whether stage 3a or 3b CKD  (R82.81) Pyuria  Comment: Acute on chronic. Baseline creatinine 1.2-1.3 in Jan 2020, on admission 2.89. Suspect pre-renal state in setting of pneumonia/sepsis with possible progression to ATN in setting of hypotension. Improved with IV fluids, creatinine down to 1.5 on 12/16, now recheck shows improvement to 1.49 and calculated GFR is 36. Creatinne back to baseline of 1.3 on 12/23/2020. Restarted PTA losartan at lowest dose at discharge 25mg daily. Appreciate Nephrology consul t-- lasix 40 mg daily continued  Plan:   -Follow up with nephrology at [facility] post TCU  -Monitor urinary status  -Continue current medications without change  -BMP and CBC Tuesday 1/26/21. These were missed today by  therefore this will be done tomorrow 1/27/21     (E27.40) Chronic adrenal insufficiency (H)  Comment: Chronic. Stable.   Plan:   -Monitor for worsening s/sx of concerns  -Continue PTA hydrocortisone 20mg daily  -Follow up with PCP at [facility] post TCU  -BMP and CBC Tuesday 1/26/21. These were missed today by  therefore this will be done tomorrow 1/27/21     (I10) Hypertension, unspecified type  Comment: Acute on chronic. Amlodipine decreased to 5mg daily due to soft blood pressures. Based on JNC-8 goals,  patients age of 70 year old, presence of diabetes or CKD, and goals of care goal BP is  <140/90 mm Hg. Patient is stable with current plan of care and routine assessment..  Plan:   -Continue amlodipine as directed  -Continue losartan as directed  -Monitor BP and HR  -Follow up with PCP post TCU  -BMP and CBC Tuesday 1/26/21. These were missed today by  therefore this will be done tomorrow 1/27/21     (Z87.2) Hx of  psoriasis  (L29.9) Itching  Comment: Chronic. Holding PTA Humira this admission--continue to hold at discharge. Caused self inflicted trauma to wounds on RLE from back scratcher  Plan:   -Will monitor skin for ongoing concern   -Remove back scratcher so patient can not cause self trauma  -Continue Clobetasol Propionate Lotion 0.05 % to scalp at HS as directed  -Follow up with PCP at [facility] post TCU  -May recommend dermatology in future if warranted.   -Continue atarax 25mg every 6 hours PRN for itch complaints  -BMP and CBC Tuesday 1/26/21. These were missed today by  therefore this will be done tomorrow 1/27/21     (I73.9) PVD (peripheral vascular disease) (H)  (Z89.512) S/P below knee amputation, left (H)  (S81.801D) Open wound of right lower extremity, subsequent encounter  (S01.302D) Open wound of left ear, unspecified open wound type, subsequent encounter  Comment: Chronic. Present on admission. Receives wound cares per home care RN. Followed by PCP at [facility]. Limited [facility] documentation. Followed WOCN in hospital. Did not admit with proper wound care orders. Refuses to wear edema wear to wounds.   Plan:   -Follow up with PCP post TCU  -Would recommend wound clinic appt at [facility] post TCU  -Monitor skin for worsening s/sx of concerns.   -Continue vitamin D to 10,000U daily for wound healing  -Continue folic acid 1000mcg daily for wound healing  -Continue vitamin B12 1000mcg daily for wound healing  -BMP and CBC Tuesday 1/26/21. These were missed today by  therefore this will be done tomorrow 1/27/21  -Continue current wound care orders as directed:  *RLE  Wounds: Daily and PRN   1. Cleanse with Vashe moistened gauze to wound bed and let sit x 5 minutes - do not rinse. #[id number]  2. Dry and protect surrounding skin with no sting barrier film wipe #[id number],  3. Apply sween  24 to all intact skin from base of toes to below knees.  4. Cover wound with VASHE moistened gauze  5. Cover with ABD   6. Secure with kerlix and tape  7. apply offloading boot     (G89.4) Chronic pain syndrome  Comment: Chronic. NOT AT GOAL. Patient wanting increase of pain medications. Resume PTA upon discharge of regimen (gabapentin 300 mg TID, duloxetine 30 mg daily, lidocaine patch, oxycodone 5 mg q6h prn, morphine 2.5 mg BID prn)  Plan:   -Continue gabapentin to 300mg BID. (renally dose due to elevated creatinine)  -Continue duloxetine 60mg daily. (recently increased on 1/12/21 for pain complaints and also to assist with mood)  -Continue lidocaine topical as directed  -Discontinue topical menthol due to decline.    -Continue dilaudid 1mg every 6 hours PRN  -Monitor pain complaints  -Follow up with PCP post TCU  -Would recommend pain clinic referral given history chronic history.   -BMP and CBC Tuesday 1/26/21. These were missed today by  therefore this will be done tomorrow 1/27/21     (E44.1) Mild protein-calorie malnutrition (H)  Comment: Acute on chronic. Patient requests to have Ensure TID PRN  Plan:   -Monitor appetite  -Continue Ensure TID PRN  -Dietician to remain involved  -BMP and CBC Tuesday 1/26/21. These were missed today by  therefore this will be done tomorrow 1/27/21  -Continue vitamin D to 10,000 daily  -Continue folic acid daily  -Continue vitamin B12 daily     (G47.00) Insomnia  Comment: Acute on chronic. Patient wanting increase of sleeping agent. HISTORY of being on ambien in the past per [facility] records. This was discontinued.   Plan:  -Continue melatonin to 6mg at HS.   -Monitor sleeping patterns. Patient currently is drowsy and falls asleep mid conversation.   -Would NOT recommend ambien given co-morbidities above.   -BMP and CBC Tuesday 1/26/21. These were missed today by  therefore this will be done tomorrow 1/27/21     Orders  written by provider at facility     Electronically signed by:  Lana Stevens DNP, APRN

## 2021-01-26 NOTE — PROGRESS NOTES
Patient was called no message was left mailbox is full. Caitlin Sky on 1/26/2021 at 11:03 AM    Patient was called no message left mailbox is full. Caitlin Sky on 1/26/2021 at 11:42 AM    Patient was called and message left stating I would call back.   Caitlin Sky on 1/26/2021 at 12:36 PM        Please see NERISSA (Caitlin Sky) note above in regards to trying to contact the TCU and the patient multiple times without response. I also tried to call the [facility] at 1:00 pm (the time of the video appointment) however I could not reach out  to anyone (no option for voice message). I also called the patient's phone number, but again was not able to reach him (mailbox full so voice message could not be sent).      I will contact the schedulers to reschedule the appointment.

## 2021-01-26 NOTE — LETTER
1/26/2021        RE: Perfecto Reese  [address]  [address]  [facility] [address]        [place] [facility]  [facility] Record Number:  [id number]  Place of Service where encounter took place:  [facility] ([facility] [[id number]]  Chief Complaint   Patient presents with     RECHECK       HPI:    Perfecto Reese  is a 70 year old (1950), who is being seen today for an episodic care visit.  HPI information obtained from: facility chart records, facility staff, patient report, [facility] chart review and Care Everywhere [facility] chart review. Today's concern is:     Physical deconditioning  Generalized muscle weakness  Acute on chronic respiratory failure with hypoxia and hypercapnia (H)  Pneumonia of both lungs due to infectious organism, unspecified part of lung  Abscess of lower lobe of right lung with pneumonia (H)  COPD, severe (H)  Musculoskeletal chest pain  Anemia of chronic disease  Chronic idiopathic thrombocytopenia (H)  Stage 3 chronic kidney disease, unspecified whether stage 3a or 3b CKD  Pyuria  Chronic adrenal insufficiency (H)  Hypertension, unspecified type  Hx of psoriasis  Itching  PVD (peripheral vascular disease) (H)  S/P below knee amputation, left (H)  Open wound of right lower extremity, subsequent encounter  Chronic pain syndrome  Open wound of left ear, unspecified open wound type, subsequent encounter  Mild protein-calorie malnutrition (H)  Insomnia, unspecified type     Met with patient who denies any chest pain, palpitations, shortness of breath, ISIDRO, lightheadedness, dizziness, or cough. Denies any abdominal discomfort. Denies N&V. Denies B&B concerns. Denies dysuria or frequency. Denies loose or constipation. Appetite good. Sleeping well. Continues to complain of itch but no visible itch observation noted. Nursing denies any acute concerns. Participation with therapies are limited due to moods and behaviors. Appears more pleasant than usual post  duloxetine dose increased couple weeks ago.     BP Readings from Last 3 Encounters:   01/26/21 120/56   01/18/21 138/70   01/14/21 130/70     Wt Readings from Last 5 Encounters:   01/26/21 64.4 kg (141 lb 14.4 oz)   01/18/21 64.4 kg (141 lb 14.4 oz)   01/14/21 64.4 kg (141 lb 14.4 oz)   01/07/21 64.4 kg (141 lb 14.4 oz)   01/05/21 64.4 kg (141 lb 14.4 oz)     Past Medical and Surgical History reviewed in Epic today.    MEDICATIONS:    Current Outpatient Medications   Medication Sig Dispense Refill     albuterol (PROAIR HFA/PROVENTIL HFA/VENTOLIN HFA) 108 (90 Base) MCG/ACT inhaler Inhale 2 puffs into the lungs every 4 hours as needed for shortness of breath / dyspnea or wheezing       amLODIPine (NORVASC) 5 MG tablet Take 1 tablet (5 mg) by mouth daily 30 tablet 0     calcium citrate (CITRACAL) 950 MG tablet Take 4 tablets by mouth daily       clobetasol (TEMOVATE) 0.05 % external solution Apply topically At Bedtime Apply to scalp       cyanocobalamin (VITAMIN B-12) 1000 MCG tablet Take 1 tablet (1,000 mcg) by mouth daily 30 tablet 1     dextromethorphan-guaiFENesin (MUCINEX DM)  MG 12 hr tablet Take 1 tablet by mouth every 12 hours 60 tablet 1     DULoxetine (CYMBALTA) 30 MG capsule Take 60 mg by mouth daily       ferrous sulfate (FEROSUL) 325 (65 Fe) MG tablet Take 1 tablet (325 mg) by mouth 3 times daily (with meals) 90 tablet 1     fluticasone-vilanterol (BREO ELLIPTA) 200-25 MCG/INH inhaler Inhale 1 puff into the lungs daily 28 each 1     folic acid (FOLVITE) 1 MG tablet Take 1 tablet (1 mg) by mouth daily 30 tablet 1     furosemide (LASIX) 40 MG tablet Take 1 tablet (40 mg) by mouth daily 30 tablet 0     gabapentin (NEURONTIN) 300 MG capsule Take 1 capsule (300 mg) by mouth 2 times daily 60 capsule 1     hydrocortisone (CORTEF) 20 MG tablet Take 20 mg by mouth every morning       HYDROmorphone (DILAUDID) 2 MG tablet Take 0.5 tablets (1 mg) by mouth every 6 hours as needed for severe pain 60 tablet 0      "hydrOXYzine (ATARAX) 25 MG tablet Take 1 tablet (25 mg) by mouth every 6 hours as needed for itching 30 tablet 1     lidocaine (XYLOCAINE) 5 % external ointment Apply topically 3 times daily as needed for moderate pain (rib pain)       loperamide (IMODIUM) 2 MG capsule Take 1 capsule (2 mg) by mouth 4 times daily as needed for diarrhea 15 capsule 0     losartan (COZAAR) 25 MG tablet Take 12.5 mg by mouth daily       melatonin 3 MG tablet Take 2 tablets (6 mg) by mouth At Bedtime 60 tablet 1     MENTHOL-METHYL SALICYLATE EX Externally apply topically 2 times daily Menthol-methyl saliycylate 10-15% cream twice daily to lower back       multivitamin w/minerals (THERA-VIT-M) tablet Take 1 tablet by mouth daily 30 tablet 0     nicotine (NICORETTE) 2 MG gum Place 2 mg inside cheek as needed for smoking cessation       omeprazole (PRILOSEC) 20 MG DR capsule Take 20 mg by mouth daily       primidone (MYSOLINE) 50 MG tablet Take 50 mg by mouth At Bedtime       tamsulosin (FLOMAX) 0.4 MG capsule Take 0.8 mg by mouth daily       umeclidinium (INCRUSE ELLIPTA) 62.5 MCG/INH inhaler Inhale 1 puff into the lungs daily 30 each 1     vitamin C (ASCORBIC ACID) 1000 MG TABS Take 1 tablet (1,000 mg) by mouth daily 30 tablet 1     vitamin D3 (CHOLECALCIFEROL) 250 mcg (00978 units) capsule Take 1 capsule (250 mcg) by mouth daily 30 capsule 1     REVIEW OF SYSTEMS:  10 point ROS of systems including Constitutional, Eyes, Respiratory, Cardiovascular, Gastroenterology, Genitourinary, Integumentary, Musculoskeletal, Psychiatric were all negative except for pertinent positives noted in my HPI.    Objective:  /56   Pulse 63   Temp 98  F (36.7  C)   Resp 18   Ht 1.651 m (5' 5\")   Wt 64.4 kg (141 lb 14.4 oz)   SpO2 96%   BMI 23.61 kg/m    Exam:  GENERAL APPEARANCE:  Alert, in no distress, cooperative  ENT:  Mouth and posterior oropharynx normal, moist mucous membranes, [place]  EYES:  EOM, conjunctivae, lids, pupils and irises " normal  NECK:  No adenopathy,masses or thyromegaly  RESP:  respiratory effort and palpation of chest normal, lungs clear to auscultation , no respiratory distress  CV:  Palpation and auscultation of heart done , regular rate and rhythm, no murmur, rub, or gallop, no edema, BKA LLE  ABDOMEN:  normal bowel sounds, soft, nontender, no hepatosplenomegaly or other masses, no guarding or rebound  M/S:   wheelchair bound. Uses elctronic scooter when up. Requires A1 for all needs. Limited therapy participation  SKIN:  Inspection of skin and subcutaneous tissue baseline, see picture  NEURO:   Cranial nerves 2-12 are normal tested and grossly at patient's baseline, no purposeful movement in upper and lower extremities  PSYCH:  oriented X 3, memory impaired , affect and mood normal          Labs:     Most Recent 3 CBC's:  Recent Labs   Lab Test 01/19/21 01/13/21 01/07/21   WBC 6.0 5.6 5.5   HGB 7.1* 6.6* 6.9*   MCV 93 92 92   PLT 81* 90* 89*     Most Recent 3 BMP's:  Recent Labs   Lab Test 01/19/21 01/13/21 01/07/21   * 140 138   POTASSIUM 4.6 4.3 4.9   CHLORIDE 105 107 104   CO2 24 26 26   BUN 48* 54* 48*   CR 1.72* 1.71* 1.71*   ANIONGAP 6 7 8   MARTINE 8.5 8.5 8.4*   GLC 64* 79 81     Most Recent 2 LFT's:  Recent Labs   Lab Test 01/07/21 12/29/20  0811   AST 27 26   ALT 24 30   ALKPHOS 91 90   BILITOTAL 0.4 0.3     Most Recent Anemia Panel:  Recent Labs   Lab Test 01/19/21 12/04/20  1526 12/04/20  1526 12/04/20  1245   WBC 6.0   < > 3.2* 3.5*   HGB 7.1*   < > 6.4* 6.1*   HCT 21.9*   < > 19.1* 18.6*   MCV 93   < > 93 94   PLT 81*   < > 70* 74*   IRON  --   --   --  30*   IRONSAT  --   --   --  27   RETICABSCT  --   --  17.9*  --    RETP  --   --  0.9  --    FEB  --   --   --  112*   JÚNIOR  --   --   --  515*   B12  --   --   --  1,117*   FOLIC  --   --   --  6.7    < > = values in this interval not displayed.       ASSESSMENT/PLAN:  (R53.81) Physical deconditioning  (primary encounter diagnosis)  (M62.81) Generalized muscle  weakness  Comment: Acute on chronic. S/T below diagnosis. Followed by [facility]. HISTORY of noncompliance.   Plan:   -Continue Physical therapy and Occupational therapy as directed  -SW to remain involved for safe discharge planning needs  -Would benefit from [facility] setting, however patient declines at this time.      (J96.21,  J96.22) Acute on chronic respiratory failure with hypoxia and hypercapnia (H)  (J18.9) Pneumonia of both lungs due to infectious organism, unspecified part of lung  (J85.1) Abscess of lower lobe of right lung with pneumonia (H)  (J44.9) COPD, severe (H)  (R07.89) Musculoskeletal chest pain  Comment: Acute on chronic. Admitted with fever, cough, and acute respiratory distress (placed on 10L per OM in the field). Fever to 102.5, tachycardia, tachypnea, and leukocytosis (17.2k) present on arrival. CXR on arrival showed RLL infiltrate and CT abd/pelvis showed air fluid cavity at the right lung base. On admission, he was initiated on IV pip-tazo and azithromycin, and Thoracic Surgery was consulted. Chest CT was ordered (12/3) which showed a 6.4 cm RLL pulmonary abscess and extensive left-sided mucus plugging with associated infiltrate. 12/2 blood cultures x2 no growth. Sputum cx 12/4 growing proteus, fluoroquinolone resistant. Also corynebacterium striatum 12/6. CT surgery saw, no surgical intervention indicated. Patient was treated with IV then PO antibiotics, with the assistance of infectious disease. Initially on zosyn -> unasyn -> transitioned 12/8 to Augmentin x 6 weeks (through approximately 1/13/2021). - Infectious disease peripherally following. Patient was discharged on pulmicort and spiriva orders however patient came to facility with supply of incruse and breo which patient states this was changed in hospital. unable to find any documentation to support this, however since patient has supply of breo and incruse will start per request. Completed antibiotics of  augmentin on 1/13/21.   Plan:   -Continue oxygen as directed which is chronic at 3L via NC.   -Monitor respiratory status. May benefit from thoracentesis in future if warranted.   -Continue mucinex 600mg BID for now. May benefit from long term use for now while infection current.   -Continue incentive spirometry and encourage every 4 hours while awake. Encourage use.   -Continue fluticasone-vilanterol (breo 200-25mcg inhaler 1 puff daily  -Continue incruse ellipta 62.5mcg inhaler 1 puff daily.   -Continue albuterol inhaler PRN  -Follow up with ID as directed. This was rescheduled for 11/26/21  -BMP and CBC Tuesday 1/26/21. These were missed today by  therefore this will be done tomorrow 1/27/21     (D63.8) Anemia of chronic disease  (D69.3) Chronic idiopathic thrombocytopenia (H)  Comment: Acute on chronic. Anemia and thrombocytopenia likely multifactorial from liver cirrhosis, splenomegaly, renal disease, CKD and psoriasis. Iron labs suggestive of anemia of chronic disease. 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 Patient required four units of pRBCs this admission for Hgb < 7.0. No acute GI bleed identified, likely blood loss from decreased erythropoiesis. Appreciate GI consult 12/16/2020, no acute GI bleed, no acute interventions, [facility]. Check hemoglobin periodically while inpatient - 7.0 12/28 - clinically stable and no bleeding noted. recheck hgb 7.5 12/29 and again 7.9 on 1/4/21. Hgb has been <7 since TCU admission. Discontinued aspirin due to risks. Occult stool was positive on 1/15/21. Patient is NOT interested in colonoscopy.   Plan:   -Follow up with GI at [facility] as directed.   -Recommend to follow up with hematology at [facility] post TCU as directed  -Monitor for worsening s/sx of concerns  -Continue iron TID  with meals.    -Continue vitamin C 1000mg daily to assist with iron absorption along with chronic wounds.   -Monitor for active s/sx of bleeding risks.   -BMP and CBC Tuesday 1/26/21. These were missed today by  therefore this will be done tomorrow 1/27/21     (N18.30) Stage 3 chronic kidney disease, unspecified whether stage 3a or 3b CKD  (R82.81) Pyuria  Comment: Acute on chronic. Baseline creatinine 1.2-1.3 in Jan 2020, on admission 2.89. Suspect pre-renal state in setting of pneumonia/sepsis with possible progression to ATN in setting of hypotension. Improved with IV fluids, creatinine down to 1.5 on 12/16, now recheck shows improvement to 1.49 and calculated GFR is 36. Creatinne back to baseline of 1.3 on 12/23/2020. Restarted PTA losartan at lowest dose at discharge 25mg daily. Appreciate Nephrology consul t-- lasix 40 mg daily continued  Plan:   -Follow up with nephrology at [facility] post TCU  -Monitor urinary status  -Continue current medications without change  -BMP and CBC Tuesday 1/26/21. These were missed today by  therefore this will be done tomorrow 1/27/21     (E27.40) Chronic adrenal insufficiency (H)  Comment: Chronic. Stable.   Plan:   -Monitor for worsening s/sx of concerns  -Continue PTA hydrocortisone 20mg daily  -Follow up with PCP at [facility] post TCU  -BMP and CBC Tuesday 1/26/21. These were missed today by  therefore this will be done tomorrow 1/27/21     (I10) Hypertension, unspecified type  Comment: Acute on chronic. Amlodipine decreased to 5mg daily due to soft blood pressures. Based on JNC-8 goals,  patients age of 70 year old, presence of diabetes or CKD, and goals of care goal BP is  <140/90 mm Hg. Patient is stable with current plan of care and routine assessment..  Plan:   -Continue amlodipine as directed  -Continue losartan as directed  -Monitor BP and HR  -Follow up with PCP post TCU  -BMP and CBC  Tuesday 1/26/21. These were missed today by  therefore this will be done tomorrow 1/27/21     (Z87.2) Hx of psoriasis  (L29.9) Itching  Comment: Chronic. Holding PTA Humira this admission--continue to hold at discharge. Caused self inflicted trauma to wounds on RLE from back scratcher  Plan:   -Will monitor skin for ongoing concern   -Remove back scratcher so patient can not cause self trauma  -Continue Clobetasol Propionate Lotion 0.05 % to scalp at HS as directed  -Follow up with PCP at [facility] post TCU  -May recommend dermatology in future if warranted.   -Continue atarax 25mg every 6 hours PRN for itch complaints  -BMP and CBC Tuesday 1/26/21. These were missed today by  therefore this will be done tomorrow 1/27/21     (I73.9) PVD (peripheral vascular disease) (H)  (Z89.512) S/P below knee amputation, left (H)  (S81.801D) Open wound of right lower extremity, subsequent encounter  (S01.302D) Open wound of left ear, unspecified open wound type, subsequent encounter  Comment: Chronic. Present on admission. Receives wound cares per home care RN. Followed by PCP at [facility]. Limited [facility] documentation. Followed WOCN in hospital. Did not admit with proper wound care orders. Refuses to wear edema wear to wounds.   Plan:   -Follow up with PCP post TCU  -Would recommend wound clinic appt at [facility] post TCU  -Monitor skin for worsening s/sx of concerns.   -Continue vitamin D to 10,000U daily for wound healing  -Continue folic acid 1000mcg daily for wound healing  -Continue vitamin B12 1000mcg daily for wound healing  -BMP and CBC Tuesday 1/26/21. These were missed today by  therefore this will be done tomorrow 1/27/21  -Continue current wound care orders as directed:  *RLE  Wounds: Daily and PRN   1. Cleanse with Vashe moistened gauze to wound bed and let sit x 5 minutes -  do not rinse. #[id number]  2. Dry and protect surrounding skin with no sting barrier film wipe #[id number],  3. Apply sween 24 to all intact skin from base of toes to below knees.  4. Cover wound with VASHE moistened gauze  5. Cover with ABD   6. Secure with kerlix and tape  7. apply offloading boot     (G89.4) Chronic pain syndrome  Comment: Chronic. NOT AT GOAL. Patient wanting increase of pain medications. Resume PTA upon discharge of regimen (gabapentin 300 mg TID, duloxetine 30 mg daily, lidocaine patch, oxycodone 5 mg q6h prn, morphine 2.5 mg BID prn)  Plan:   -Continue gabapentin to 300mg BID. (renally dose due to elevated creatinine)  -Continue duloxetine 60mg daily. (recently increased on 1/12/21 for pain complaints and also to assist with mood)  -Continue lidocaine topical as directed  -Discontinue topical menthol due to decline.    -Continue dilaudid 1mg every 6 hours PRN  -Monitor pain complaints  -Follow up with PCP post TCU  -Would recommend pain clinic referral given history chronic history.   -BMP and CBC Tuesday 1/26/21. These were missed today by  therefore this will be done tomorrow 1/27/21     (E44.1) Mild protein-calorie malnutrition (H)  Comment: Acute on chronic. Patient requests to have Ensure TID PRN  Plan:   -Monitor appetite  -Continue Ensure TID PRN  -Dietician to remain involved  -BMP and CBC Tuesday 1/26/21. These were missed today by  therefore this will be done tomorrow 1/27/21  -Continue vitamin D to 10,000 daily  -Continue folic acid daily  -Continue vitamin B12 daily     (G47.00) Insomnia  Comment: Acute on chronic. Patient wanting increase of sleeping agent. HISTORY of being on ambien in the past per [facility] records. This was discontinued.   Plan:  -Continue melatonin to 6mg at HS.   -Monitor sleeping patterns. Patient currently is drowsy and falls asleep mid conversation.   -Would NOT recommend ambien given co-morbidities above.   -BMP and  CBC Tuesday 1/26/21. These were missed today by  therefore this will be done tomorrow 1/27/21     Orders written by provider at facility     Electronically signed by:  Lana Stevens DNP, ANTONIETA        Sincerely,        ANTONIETA Valdovinos CNP

## 2021-01-26 NOTE — LETTER
Date:January 27, 2021      Provider requested that no letter be sent. Do not send.       [facility] Health Information

## 2021-01-26 NOTE — LETTER
1/26/2021       RE: Perfecto Reese  [address]  [address]  [facility] [address]     Dear Colleague,    Thank you for referring your patient, Perfecto Reese, to the [facility] [place] at [facility]. Please see a copy of my visit note below.      .    Patient was called no message was left mailbox is full. Caitlin Sky on 1/26/2021 at 11:03 AM    Patient was called no message left mailbox is full. Caitlin Sky on 1/26/2021 at 11:42 AM    Patient was called and message left stating I would call back.   Caitlin Sky on 1/26/2021 at 12:36 PM        Please see NERISSA (Caitlin Sky) note above in regards to trying to contact the TCU and the patient multiple times without response. I also tried to call the [facility] at 1:00 pm (the time of the video appointment) however I could not reach out  to anyone (no option for voice message). I also called the patient's phone number, but again was not able to reach him (mailbox full so voice message could not be sent).      I will contact the schedulers to reschedule the appointment.          Again, thank you for allowing me to participate in the care of your patient.      Sincerely,    Dixie Woo MD

## 2021-01-27 ENCOUNTER — TRANSFERRED RECORDS (OUTPATIENT)
Dept: HEALTH INFORMATION MANAGEMENT | Facility: CLINIC | Age: 71
End: 2021-01-27

## 2021-01-27 LAB
ANION GAP SERPL CALCULATED.3IONS-SCNC: 6 MMOL/L (ref 5–18)
ANION GAP SERPL CALCULATED.3IONS-SCNC: 6 MMOL/L (ref 5–18)
BUN SERPL-MCNC: 51 MG/DL (ref 8–22)
BUN SERPL-MCNC: 51 MG/DL (ref 8–22)
CALCIUM SERPL-MCNC: 8.7 MG/DL (ref 8.5–10.5)
CALCIUM SERPL-MCNC: 8.7 MG/DL (ref 8.5–10.5)
CHLORIDE BLD-SCNC: 109 MMOL/L (ref 98–107)
CHLORIDE SERPLBLD-SCNC: 109 MMOL/L (ref 98–107)
CO2 SERPL-SCNC: 25 MMOL/L (ref 22–31)
CO2 SERPL-SCNC: 25 MMOL/L (ref 22–31)
CREAT SERPL-MCNC: 1.53 MG/DL (ref 0.7–1.3)
CREAT SERPL-MCNC: 1.53 MG/DL (ref 0.7–1.3)
ERYTHROCYTE [DISTWIDTH] IN BLOOD BY AUTOMATED COUNT: 15.2 % (ref 11–14.5)
ERYTHROCYTE [DISTWIDTH] IN BLOOD BY AUTOMATED COUNT: 15.2 % (ref 11–14.5)
GFR SERPL CREATININE-BSD FRML MDRD: 45 ML/MIN/1.73M2
GFR SERPL CREATININE-BSD FRML MDRD: 45 ML/MIN/1.73M2
GLUCOSE BLD-MCNC: 84 MG/DL (ref 70–125)
GLUCOSE SERPL-MCNC: 84 MG/DL (ref 70–125)
HCT VFR BLD AUTO: 20.9 % (ref 40–54)
HCT VFR BLD AUTO: 20.9 % (ref 40–54)
HEMOGLOBIN: 6.5 G/DL (ref 14–18)
HGB BLD-MCNC: 6.5 G/DL (ref 14–18)
MCH RBC QN AUTO: 29.4 PG (ref 27–34)
MCH RBC QN AUTO: 29.4 PG (ref 27–34)
MCHC RBC AUTO-ENTMCNC: 31.1 G/DL (ref 32–36)
MCHC RBC AUTO-ENTMCNC: 31.1 G/DL (ref 32–36)
MCV RBC AUTO: 95 FL (ref 80–100)
MCV RBC AUTO: 95 FL (ref 80–100)
PLATELET # BLD AUTO: 74 THOU/UL (ref 140–440)
PLATELET # BLD AUTO: 74 THOU/UL (ref 140–440)
PMV BLD AUTO: 9.8 FL (ref 8.5–12.5)
POTASSIUM BLD-SCNC: 4.3 MMOL/L (ref 3.5–5)
POTASSIUM SERPL-SCNC: 4.3 MMOL/L (ref 3.5–5)
RBC # BLD AUTO: 2.21 MILL/UL (ref 4.4–6.2)
RBC # BLD AUTO: 2.21 MILL/UL (ref 4.4–6.2)
SODIUM SERPL-SCNC: 140 MMOL/L (ref 136–145)
SODIUM SERPL-SCNC: 140 MMOL/L (ref 136–145)
WBC # BLD AUTO: 4.7 THOU/UL (ref 4.01–11)
WBC: 4.7 THOU/UL (ref 4–11)

## 2021-01-28 ENCOUNTER — TELEPHONE (OUTPATIENT)
Dept: INFECTIOUS DISEASES | Facility: CLINIC | Age: 71
End: 2021-01-28

## 2021-01-28 VITALS
BODY MASS INDEX: 23.64 KG/M2 | RESPIRATION RATE: 18 BRPM | HEART RATE: 63 BPM | WEIGHT: 141.9 LBS | HEIGHT: 65 IN | DIASTOLIC BLOOD PRESSURE: 56 MMHG | SYSTOLIC BLOOD PRESSURE: 120 MMHG | TEMPERATURE: 97.9 F | OXYGEN SATURATION: 97 %

## 2021-01-28 ASSESSMENT — MIFFLIN-ST. JEOR: SCORE: 1340.53

## 2021-01-28 NOTE — PROGRESS NOTES
[facility] DISCHARGE SUMMARY  PATIENT'S NAME: Perfecto Reese  YOB: 1950  MEDICAL RECORD NUMBER:  [id number]  Place of Service where encounter took place:  [facility] [[id number]]    PRIMARY CARE PROVIDER AND CLINIC RESPONSIBLE AFTER TRANSFER:   Polo Neil MD, [facility] ADMIN [facility] [address] / [facility]    Non-[facility] Provider     Transferring providers: ANTONIETA Diaz CNP; Donna Rodríguez MD  Recent Hospitalization/ED:  [facility] stay 12/2/20 to 12/31/20.  Date of SNF Admission: December / 31 / 2020  Date of SNF (anticipated) Discharge: February / 02 / 2021  Discharged to: previous independent home  Cognitive Scores: BIMS: 11/15  Physical Function: Wheelchair dependent and Pivot transfers  DME: SNF  coordinating DME needs     CODE STATUS/ADVANCE DIRECTIVES DISCUSSION:  Full Code   ALLERGIES: Darvocet [propoxyphene n-apap] and Vicodin [hydrocodone-acetaminophen]    DISCHARGE DIAGNOSIS/NURSING FACILITY COURSE:     (R53.81) Physical deconditioning  (primary encounter diagnosis)  (M62.81) Generalized muscle weakness  Comment: Acute on chronic. S/T below diagnosis. Followed by [facility]. HISTORY of noncompliance.   Plan:   -Continue Physical therapy and Occupational therapy as directed  -SW to remain involved for safe discharge planning needs  -Would benefit from assisted setting, however patient declines at this time.      (J96.21,  J96.22) Acute on chronic respiratory failure with hypoxia and hypercapnia (H)  (J18.9) Pneumonia of both lungs due to infectious organism, unspecified part of lung  (J85.1) Abscess of lower lobe of right lung with pneumonia (H)  (J44.9) COPD, severe (H)  (R07.89) Musculoskeletal chest pain  Comment: Acute on chronic. Admitted with fever, cough, and acute respiratory distress (placed on 10L per OM in the field). Fever to 102.5, tachycardia, tachypnea, and leukocytosis (17.2k)  present on arrival. CXR on arrival showed RLL infiltrate and CT abd/pelvis showed air fluid cavity at the right lung base. On admission, he was initiated on IV pip-tazo and azithromycin, and Thoracic Surgery was consulted. Chest CT was ordered (12/3) which showed a 6.4 cm RLL pulmonary abscess and extensive left-sided mucus plugging with associated infiltrate. 12/2 blood cultures x2 no growth. Sputum cx 12/4 growing proteus, fluoroquinolone resistant. Also corynebacterium striatum 12/6. CT surgery saw, no surgical intervention indicated. Patient was treated with IV then PO antibiotics, with the assistance of infectious disease. Initially on zosyn -> unasyn -> transitioned 12/8 to Augmentin x 6 weeks (through approximately 1/13/2021). - Infectious disease peripherally following. Patient was discharged on pulmicort and spiriva orders however patient came to facility with supply of incruse and breo which patient states this was changed in hospital. unable to find any documentation to support this, however since patient has supply of breo and incruse will start per request. Completed antibiotics of augmentin on 1/13/21.   Plan:   -Continue oxygen as directed which is chronic at 3L via NC.   -Monitor respiratory status. May benefit from thoracentesis in future if warranted.   -Continue mucinex 600mg BID for now. May benefit from long term use for now while infection current.   -Continue incentive spirometry and encourage every 4 hours while awake. Encourage use.   -Continue fluticasone-vilanterol (breo 200-25mcg inhaler 1 puff daily  -Continue incruse ellipta 62.5mcg inhaler 1 puff daily.   -Continue albuterol inhaler PRN  -Follow up with ID as directed.  -Trend labs with PCP within 2 weeks.      (D63.8) Anemia of chronic disease  (D69.3) Chronic idiopathic thrombocytopenia (H)  Comment: Acute on chronic. Anemia and thrombocytopenia likely multifactorial from liver cirrhosis, splenomegaly, renal disease, CKD and  psoriasis. Iron labs suggestive of anemia of chronic disease. 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 Patient required four units of pRBCs this admission for Hgb < 7.0. No acute GI bleed identified, likely blood loss from decreased erythropoiesis. Appreciate GI consult 12/16/2020, no acute GI bleed, no acute interventions, [facility]. Check hemoglobin periodically while inpatient - 7.0 12/28 - clinically stable and no bleeding noted. recheck hgb 7.5 12/29 and again 7.9 on 1/4/21. Hgb has been <7 since TCU admission. Discontinued aspirin due to risks. Occult stool was positive on 1/15/21. Patient is NOT interested in colonoscopy.   Plan:   -Follow up with GI at [facility] as directed.   -Recommend to follow up with hematology at [facility] post TCU as directed  -Monitor for worsening s/sx of concerns  -Continue iron TID with meals.    -Continue vitamin C 1000mg daily to assist with iron absorption along with chronic wounds.   -Monitor for active s/sx of bleeding risks.   -Trend labs with PCP within 2 weeks.      (N18.30) Stage 3 chronic kidney disease, unspecified whether stage 3a or 3b CKD  (R82.81) Pyuria  Comment: Acute on chronic. Baseline creatinine 1.2-1.3 in Jan 2020, on admission 2.89. Suspect pre-renal state in setting of pneumonia/sepsis with possible progression to ATN in setting of hypotension. Improved with IV fluids, creatinine down to 1.5 on 12/16, now recheck shows improvement to 1.49 and calculated GFR is 36. Creatinne back to baseline of 1.3 on 12/23/2020. Restarted PTA losartan at lowest dose at discharge 25mg daily. Appreciate Nephrology consul t-- lasix 40 mg daily continued  Plan:   -Follow up with nephrology at [facility] post TCU  -Monitor urinary status  -Continue current medications  without change  -Trend labs with PCP within 2 weeks.      (E27.40) Chronic adrenal insufficiency (H)  Comment: Chronic. Stable.   Plan:   -Monitor for worsening s/sx of concerns  -Continue PTA hydrocortisone 20mg daily  -Follow up with PCP at [facility] post TCU  -Trend labs with PCP within 2 weeks.      (I10) Hypertension, unspecified type  Comment: Acute on chronic. Amlodipine decreased to 5mg daily due to soft blood pressures. Based on JNC-8 goals,  patients age of 70 year old, presence of diabetes or CKD, and goals of care goal BP is  <140/90 mm Hg. Patient is stable with current plan of care and routine assessment..  Plan:   -Continue amlodipine as directed  -Continue losartan as directed  -Monitor BP and HR  -Follow up with PCP post TCU  -Trend labs with PCP within 2 weeks.      (Z87.2) Hx of psoriasis  (L29.9) Itching  Comment: Chronic. Holding PTA Humira this admission--continue to hold at discharge. Caused self inflicted trauma to wounds on RLE from back scratcher  Plan:   -Will monitor skin for ongoing concern   -Remove back scratcher so patient can not cause self trauma  -Continue Clobetasol Propionate Lotion 0.05 % to scalp at HS as directed  -Follow up with PCP at [facility] post TCU  -May recommend dermatology in future if warranted.   -Continue atarax 25mg every 6 hours PRN for itch complaints  -Trend labs with PCP within 2 weeks.      (I73.9) PVD (peripheral vascular disease) (H)  (Z89.512) S/P below knee amputation, left (H)  (S81.801D) Open wound of right lower extremity, subsequent encounter  (S01.302D) Open wound of left ear, unspecified open wound type, subsequent encounter  Comment: Chronic. Present on admission. Receives wound cares per home care RN. Followed by PCP at [facility]. Limited [facility] documentation. Followed WOCN in hospital. Did not admit with proper wound care  orders. Refuses to wear edema wear to wounds.   Plan:   -Follow up with PCP post TCU  -Would recommend wound clinic appt at [facility] post TCU  -Monitor skin for worsening s/sx of concerns.   -Continue vitamin D to 10,000U daily for wound healing  -Continue folic acid 1000mcg daily for wound healing  -Continue vitamin B12 1000mcg daily for wound healing  -Trend labs with PCP within 2 weeks.   -Continue current wound care orders as directed:  *RLE  Wounds: Daily and PRN   1. Cleanse with Vashe moistened gauze to wound bed and let sit x 5 minutes - do not rinse. #[id number]  2. Dry and protect surrounding skin with no sting barrier film wipe #[id number],  3. Apply sween 24 to all intact skin from base of toes to below knees.  4. Cover wound with VASHE moistened gauze  5. Cover with ABD   6. Secure with kerlix and tape  7. apply offloading boot     (G89.4) Chronic pain syndrome  Comment: Chronic. NOT AT GOAL. Patient wanting increase of pain medications. Resume PTA upon discharge of regimen (gabapentin 300 mg TID, duloxetine 30 mg daily, lidocaine patch, oxycodone 5 mg q6h prn, morphine 2.5 mg BID prn)  Plan:   -Continue gabapentin to 300mg BID. (renally dose due to elevated creatinine)  -Continue duloxetine 60mg daily. (recently increased on 1/12/21 for pain complaints and also to assist with mood)  -Continue lidocaine topical as directed  -Discontinue topical menthol due to decline.    -Continue dilaudid 1mg every 6 hours PRN  -Monitor pain complaints  -Follow up with PCP post TCU  -Would recommend pain clinic referral given history chronic history.   -Trend labs with PCP within 2 weeks.      (E44.1) Mild protein-calorie malnutrition (H)  Comment: Acute on chronic. Patient requests to have Ensure TID PRN  Plan:   -Monitor appetite  -Continue Ensure TID PRN  -Dietician to remain involved  -Trend labs with PCP within 2 weeks.   -Continue vitamin D to 10,000 daily  -Continue folic acid  daily  -Continue vitamin B12 daily     (G47.00) Insomnia  Comment: Acute on chronic. Patient wanting increase of sleeping agent. HISTORY of being on ambien in the past per [facility] records. This was discontinued.   Plan:  -Continue melatonin to 6mg at HS.   -Monitor sleeping patterns. Patient currently is drowsy and falls asleep mid conversation.   -Would NOT recommend ambien given co-morbidities above.   -Trend labs with PCP within 2 weeks.     Past Medical History:  has a past medical history of Adrenal insufficiency (H), Anemia, iron deficiency, Back pain, COPD (chronic obstructive pulmonary disease) (H), Depression, GERD (gastroesophageal reflux disease), Hyperlipidemia, and Migraine.    Discharge Medications:    Current Outpatient Medications   Medication Sig Dispense Refill     albuterol (PROAIR HFA/PROVENTIL HFA/VENTOLIN HFA) 108 (90 Base) MCG/ACT inhaler Inhale 2 puffs into the lungs every 4 hours as needed for shortness of breath / dyspnea or wheezing       amLODIPine (NORVASC) 5 MG tablet Take 1 tablet (5 mg) by mouth daily 30 tablet 0     calcium citrate (CITRACAL) 950 MG tablet Take 4 tablets by mouth daily       clobetasol (TEMOVATE) 0.05 % external solution Apply topically At Bedtime Apply to scalp       cyanocobalamin (VITAMIN B-12) 1000 MCG tablet Take 1 tablet (1,000 mcg) by mouth daily 30 tablet 1     dextromethorphan-guaiFENesin (MUCINEX DM)  MG 12 hr tablet Take 1 tablet by mouth every 12 hours 60 tablet 1     DULoxetine (CYMBALTA) 30 MG capsule Take 60 mg by mouth daily       ferrous sulfate (FEROSUL) 325 (65 Fe) MG tablet Take 1 tablet (325 mg) by mouth 3 times daily (with meals) 90 tablet 1     fluticasone-vilanterol (BREO ELLIPTA) 200-25 MCG/INH inhaler Inhale 1 puff into the lungs daily 28 each 1     folic acid (FOLVITE) 1 MG tablet Take 1 tablet (1 mg) by mouth daily 30 tablet 1     furosemide (LASIX) 40 MG tablet Take 1 tablet (40 mg) by mouth daily 30  tablet 0     gabapentin (NEURONTIN) 300 MG capsule Take 1 capsule (300 mg) by mouth 2 times daily 60 capsule 1     hydrocortisone (CORTEF) 20 MG tablet Take 20 mg by mouth every morning       HYDROmorphone (DILAUDID) 2 MG tablet Take 0.5 tablets (1 mg) by mouth every 6 hours as needed for severe pain 60 tablet 0     hydrOXYzine (ATARAX) 25 MG tablet Take 1 tablet (25 mg) by mouth every 6 hours as needed for itching 30 tablet 1     lidocaine (XYLOCAINE) 5 % external ointment Apply topically 3 times daily as needed for moderate pain (rib pain)       loperamide (IMODIUM) 2 MG capsule Take 1 capsule (2 mg) by mouth 4 times daily as needed for diarrhea 15 capsule 0     losartan (COZAAR) 25 MG tablet Take 12.5 mg by mouth daily       melatonin 3 MG tablet Take 2 tablets (6 mg) by mouth At Bedtime 60 tablet 1     multivitamin w/minerals (THERA-VIT-M) tablet Take 1 tablet by mouth daily 30 tablet 0     nicotine (NICORETTE) 2 MG gum Place 2 mg inside cheek as needed for smoking cessation       omeprazole (PRILOSEC) 20 MG DR capsule Take 20 mg by mouth daily       primidone (MYSOLINE) 50 MG tablet Take 50 mg by mouth At Bedtime       tamsulosin (FLOMAX) 0.4 MG capsule Take 0.8 mg by mouth daily       umeclidinium (INCRUSE ELLIPTA) 62.5 MCG/INH inhaler Inhale 1 puff into the lungs daily 30 each 1     vitamin C (ASCORBIC ACID) 1000 MG TABS Take 1 tablet (1,000 mg) by mouth daily 30 tablet 1     vitamin D3 (CHOLECALCIFEROL) 250 mcg (76834 units) capsule Take 1 capsule (250 mcg) by mouth daily 30 capsule 1     Medication Changes/Rationale:     See above    Controlled medications sent with patient:   Medication: Dilaudid , 30 tabs given to patient at the time of discharge to take home     ROS:   10 point ROS of systems including Constitutional, Eyes, Respiratory, Cardiovascular, Gastroenterology, Genitourinary, Integumentary, Musculoskeletal, Psychiatric were all negative except for pertinent positives noted in my HPI.    Physical  "Exam:   Vitals: /56   Pulse 63   Temp 97.9  F (36.6  C)   Resp 18   Ht 1.651 m (5' 5\")   Wt 64.4 kg (141 lb 14.4 oz)   SpO2 97%   BMI 23.61 kg/m    BMI= Body mass index is 23.61 kg/m .  GENERAL APPEARANCE:  Alert, in no distress, cooperative  ENT:  Mouth and posterior oropharynx normal, moist mucous membranes, [place]  EYES:  EOM, conjunctivae, lids, pupils and irises normal  NECK:  No adenopathy,masses or thyromegaly  RESP:  respiratory effort and palpation of chest normal, lungs clear to auscultation , no respiratory distress  CV:  Palpation and auscultation of heart done , regular rate and rhythm, no murmur, rub, or gallop, no edema to RLE. HX of BKA to Left  ABDOMEN:  normal bowel sounds, soft, nontender, no hepatosplenomegaly or other masses, no guarding or rebound  M/S:   Pivot transfers. Electronic wheelchair for all main mobility needs  SKIN:  Inspection of skin and subcutaneous tissue baseline, see pictures below  NEURO:   Cranial nerves 2-12 are normal tested and grossly at patient's baseline, no purposeful movement in upper and lower extremities  PSYCH:  oriented X 3, insight and judgement impaired, memory impaired , affect and mood normal                 SNF labs:   Most Recent 3 CBC's:  Recent Labs   Lab Test 01/27/21 01/19/21 01/13/21   WBC 4.7 6.0 5.6   HGB 6.5* 7.1* 6.6*   MCV 95 93 92   PLT 74* 81* 90*     Most Recent 3 BMP's:  Recent Labs   Lab Test 01/27/21 01/19/21 01/13/21    135* 140   POTASSIUM 4.3 4.6 4.3   CHLORIDE 109* 105 107   CO2 25 24 26   BUN 51* 48* 54*   CR 1.53* 1.72* 1.71*   ANIONGAP 6 6 7   MARTINE 8.7 8.5 8.5   GLC 84 64* 79     Most Recent 2 LFT's:  Recent Labs   Lab Test 01/07/21 12/29/20  0811   AST 27 26   ALT 24 30   ALKPHOS 91 90   BILITOTAL 0.4 0.3     Most Recent TSH and T4:  Recent Labs   Lab Test 01/07/21   TSH 0.73     Most Recent Anemia Panel:  Recent Labs   Lab Test 01/27/21 12/04/20  1526 12/04/20  1526 12/04/20  1245   WBC 4.7   < > 3.2* 3.5*   HGB 6.5*  "  < > 6.4* 6.1*   HCT 20.9*   < > 19.1* 18.6*   MCV 95   < > 93 94   PLT 74*   < > 70* 74*   IRON  --   --   --  30*   IRONSAT  --   --   --  27   RETICABSCT  --   --  17.9*  --    RETP  --   --  0.9  --    FEB  --   --   --  112*   JÚNIOR  --   --   --  515*   B12  --   --   --  1,117*   FOLIC  --   --   --  6.7    < > = values in this interval not displayed.     DISCHARGE PLAN:    Follow up labs: Recommend to trend BMP and CBC with PCP within 2 weeks    Medical Follow Up:      Follow up with primary care provider in 1-2 weeks  Follow up with specialist GI, infectious disease, wound clinic/dermatology, and nephrology     MTM referral needed and placed by this provider: No    [facility] scheduled appointments:    Discharge Services: Home Care:  Occupational Therapy, Physical Therapy, Registered Nurse, Home Health Aide,  and From:  Home Health Care [facility]    Discharge Instructions Verbalized to Patient at Discharge:     Weight bearing restrictions:  Weight bearing as tolerated.     Continue to follow your diet:  regular.     Ensure nutritional supplement recommended 3 times a day.     Wound care Start wound care to RLE: 1. Cleanse wounds with Vashe moistened gauze, let sit on wound for five mins. 2. Dry and protect surrounding skin with barrier sin prep. 3. apply sween 24 to all intact skin from toes to below knee. 4. Cover wounds with Vashe moistened gauze 5. Cover with ABD 6. Wrap with kerlix and tape 7. Apply off loading boot perform wound daily and PRN     Driving is not recommended     24-hour supervision is recommended for safety.       TOTAL DISCHARGE TIME:   Greater than 30 minutes  Electronically signed by:  Lana Stevens DNP, APRN    Documentation of Face to Face and Certification for Home Health Services    I certify that patient: Perfecto Reese is under my care and that I, or a nurse practitioner or physician's assistant working with me, had a face-to-face encounter that meets the  physician face-to-face encounter requirements with this patient on: 1/29/2021.    This encounter with the patient was in whole, or in part, for the following medical condition, which is the primary reason for home health care: Deconditioning S/T Above diagnosis.    I certify that, based on my findings, the following services are medically necessary home health services: Nursing, Occupational Therapy, Physical Therapy and Social Work.    My clinical findings support the need for the above services because: Nurse is needed: To assess Medication management, education and wound care needs after changes in medications or other medical regimen.., Occupational Therapy Services are needed to assess and treat cognitive ability and address ADL safety due to impairment in deconditioning. and Physical Therapy Services are needed to assess and treat the following functional impairments: deconditioning and cognitive testing.    Further, I certify that my clinical findings support that this patient is homebound (i.e. absences from home require considerable and taxing effort and are for medical reasons or [employer] services or infrequently or of short duration when for other reasons) because: Requires assistance of another person or specialized equipment to access medical services because patient: Is unable to walk greater than 5 feet without rest. and Requires supervision of another for safe transfer...    Based on the above findings. I certify that this patient is confined to the home and needs intermittent skilled nursing care, physical therapy and/or speech therapy.  The patient is under my care, and I have initiated the establishment of the plan of care.  This patient will be followed by a physician who will periodically review the plan of care.  Physician/Provider to provide follow up care: Polo Neil    Attending [facility] physician (the Medicare certified [facility] provider): Dr.Sara Marcos MD, signing F2F and only signing for  initial order. Please send all follow up questions and concerns or needed follow up signatures to the PCP, who [facility] has on file as:  Polo Neil.    Physician Signature: See electronic signature associated with these discharge orders.  Date: 1/29/2021

## 2021-01-29 ENCOUNTER — DISCHARGE SUMMARY NURSING HOME (OUTPATIENT)
Dept: GERIATRICS | Facility: CLINIC | Age: 71
End: 2021-01-29
Payer: MEDICARE

## 2021-01-29 DIAGNOSIS — R07.89 MUSCULOSKELETAL CHEST PAIN: ICD-10-CM

## 2021-01-29 DIAGNOSIS — J96.21 ACUTE ON CHRONIC RESPIRATORY FAILURE WITH HYPOXIA AND HYPERCAPNIA (H): ICD-10-CM

## 2021-01-29 DIAGNOSIS — I73.9 PVD (PERIPHERAL VASCULAR DISEASE) (H): ICD-10-CM

## 2021-01-29 DIAGNOSIS — S01.302D OPEN WOUND OF LEFT EAR, UNSPECIFIED OPEN WOUND TYPE, SUBSEQUENT ENCOUNTER: ICD-10-CM

## 2021-01-29 DIAGNOSIS — Z89.512 S/P BELOW KNEE AMPUTATION, LEFT (H): ICD-10-CM

## 2021-01-29 DIAGNOSIS — G89.4 CHRONIC PAIN SYNDROME: ICD-10-CM

## 2021-01-29 DIAGNOSIS — L29.9 ITCHING: ICD-10-CM

## 2021-01-29 DIAGNOSIS — J44.9 COPD, SEVERE (H): ICD-10-CM

## 2021-01-29 DIAGNOSIS — D69.3 CHRONIC IDIOPATHIC THROMBOCYTOPENIA (H): ICD-10-CM

## 2021-01-29 DIAGNOSIS — M62.81 GENERALIZED MUSCLE WEAKNESS: ICD-10-CM

## 2021-01-29 DIAGNOSIS — R82.81 PYURIA: ICD-10-CM

## 2021-01-29 DIAGNOSIS — D63.8 ANEMIA OF CHRONIC DISEASE: ICD-10-CM

## 2021-01-29 DIAGNOSIS — N18.30 STAGE 3 CHRONIC KIDNEY DISEASE, UNSPECIFIED WHETHER STAGE 3A OR 3B CKD (H): ICD-10-CM

## 2021-01-29 DIAGNOSIS — J96.22 ACUTE ON CHRONIC RESPIRATORY FAILURE WITH HYPOXIA AND HYPERCAPNIA (H): ICD-10-CM

## 2021-01-29 DIAGNOSIS — Z87.2 HX OF PSORIASIS: ICD-10-CM

## 2021-01-29 DIAGNOSIS — S81.801D OPEN WOUND OF RIGHT LOWER EXTREMITY, SUBSEQUENT ENCOUNTER: ICD-10-CM

## 2021-01-29 DIAGNOSIS — J85.1 ABSCESS OF LOWER LOBE OF RIGHT LUNG WITH PNEUMONIA (H): ICD-10-CM

## 2021-01-29 DIAGNOSIS — R53.81 PHYSICAL DECONDITIONING: Primary | ICD-10-CM

## 2021-01-29 DIAGNOSIS — I10 HYPERTENSION, UNSPECIFIED TYPE: ICD-10-CM

## 2021-01-29 DIAGNOSIS — G47.00 INSOMNIA, UNSPECIFIED TYPE: ICD-10-CM

## 2021-01-29 DIAGNOSIS — E44.1 MILD PROTEIN-CALORIE MALNUTRITION (H): ICD-10-CM

## 2021-01-29 DIAGNOSIS — E27.40 CHRONIC ADRENAL INSUFFICIENCY (H): ICD-10-CM

## 2021-01-29 DIAGNOSIS — J18.9 PNEUMONIA OF BOTH LUNGS DUE TO INFECTIOUS ORGANISM, UNSPECIFIED PART OF LUNG: ICD-10-CM

## 2021-01-29 PROCEDURE — 99316 NF DSCHRG MGMT 30 MIN+: CPT | Performed by: NURSE PRACTITIONER

## 2021-01-29 NOTE — LETTER
1/29/2021        RE: Perfecto Reese  [address]  [address]  [facility] [address]        [facility] DISCHARGE SUMMARY  PATIENT'S NAME: Perfecto Reese  YOB: 1950  MEDICAL RECORD NUMBER:  [id number]  Place of Service where encounter took place:  [facility] ([facility]) [[id number]]    PRIMARY CARE PROVIDER AND CLINIC RESPONSIBLE AFTER TRANSFER:   Polo Neil MD, [facility] ADMIN [facility]    Non-FMG Provider     Transferring providers: ANTONIETA Diaz CNP; Donna Rodríguez MD  Recent Hospitalization/ED:  [facility] stay 12/2/20 to 12/31/20.  Date of SNF Admission: December / 31 / 2020  Date of SNF (anticipated) Discharge: February / 02 / 2021  Discharged to: previous independent home  Cognitive Scores: BIMS: 11/15  Physical Function: Wheelchair dependent and Pivot transfers  DME: SNF  coordinating DME needs     CODE STATUS/ADVANCE DIRECTIVES DISCUSSION:  Full Code   ALLERGIES: Darvocet [propoxyphene n-apap] and Vicodin [hydrocodone-acetaminophen]    DISCHARGE DIAGNOSIS/NURSING FACILITY COURSE:     (R53.81) Physical deconditioning  (primary encounter diagnosis)  (M62.81) Generalized muscle weakness  Comment: Acute on chronic. S/T below diagnosis. Followed by [facility]. HISTORY of noncompliance.   Plan:   -Continue Physical therapy and Occupational therapy as directed  -SW to remain involved for safe discharge planning needs  -Would benefit from FDC setting, however patient declines at this time.      (J96.21,  J96.22) Acute on chronic respiratory failure with hypoxia and hypercapnia (H)  (J18.9) Pneumonia of both lungs due to infectious organism, unspecified part of lung  (J85.1) Abscess of lower lobe of right lung with pneumonia (H)  (J44.9) COPD, severe (H)  (R07.89) Musculoskeletal chest pain  Comment: Acute on chronic. Admitted with fever, cough, and acute respiratory  distress (placed on 10L per OM in the field). Fever to 102.5, tachycardia, tachypnea, and leukocytosis (17.2k) present on arrival. CXR on arrival showed RLL infiltrate and CT abd/pelvis showed air fluid cavity at the right lung base. On admission, he was initiated on IV pip-tazo and azithromycin, and Thoracic Surgery was consulted. Chest CT was ordered (12/3) which showed a 6.4 cm RLL pulmonary abscess and extensive left-sided mucus plugging with associated infiltrate. 12/2 blood cultures x2 no growth. Sputum cx 12/4 growing proteus, fluoroquinolone resistant. Also corynebacterium striatum 12/6. CT surgery saw, no surgical intervention indicated. Patient was treated with IV then PO antibiotics, with the assistance of infectious disease. Initially on zosyn -> unasyn -> transitioned 12/8 to Augmentin x 6 weeks (through approximately 1/13/2021). - Infectious disease peripherally following. Patient was discharged on pulmicort and spiriva orders however patient came to facility with supply of incruse and breo which patient states this was changed in hospital. unable to find any documentation to support this, however since patient has supply of breo and incruse will start per request. Completed antibiotics of augmentin on 1/13/21.   Plan:   -Continue oxygen as directed which is chronic at 3L via NC.   -Monitor respiratory status. May benefit from thoracentesis in future if warranted.   -Continue mucinex 600mg BID for now. May benefit from long term use for now while infection current.   -Continue incentive spirometry and encourage every 4 hours while awake. Encourage use.   -Continue fluticasone-vilanterol (breo 200-25mcg inhaler 1 puff daily  -Continue incruse ellipta 62.5mcg inhaler 1 puff daily.   -Continue albuterol inhaler PRN  -Follow up with ID as directed.  -Trend labs with PCP within 2 weeks.      (D63.8) Anemia of chronic disease  (D69.3) Chronic idiopathic thrombocytopenia (H)  Comment: Acute on chronic. Anemia  and thrombocytopenia likely multifactorial from liver cirrhosis, splenomegaly, renal disease, CKD and psoriasis. Iron labs suggestive of anemia of chronic disease. 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 Patient required four units of pRBCs this admission for Hgb < 7.0. No acute GI bleed identified, likely blood loss from decreased erythropoiesis. Appreciate GI consult 12/16/2020, no acute GI bleed, no acute interventions, [facility]. Check hemoglobin periodically while inpatient - 7.0 12/28 - clinically stable and no bleeding noted. recheck hgb 7.5 12/29 and again 7.9 on 1/4/21. Hgb has been <7 since TCU admission. Discontinued aspirin due to risks. Occult stool was positive on 1/15/21. Patient is NOT interested in colonoscopy.   Plan:   -Follow up with GI at [facility] as directed.   -Recommend to follow up with hematology at [facility] post TCU as directed  -Monitor for worsening s/sx of concerns  -Continue iron TID with meals.    -Continue vitamin C 1000mg daily to assist with iron absorption along with chronic wounds.   -Monitor for active s/sx of bleeding risks.   -Trend labs with PCP within 2 weeks.      (N18.30) Stage 3 chronic kidney disease, unspecified whether stage 3a or 3b CKD  (R82.81) Pyuria  Comment: Acute on chronic. Baseline creatinine 1.2-1.3 in Jan 2020, on admission 2.89. Suspect pre-renal state in setting of pneumonia/sepsis with possible progression to ATN in setting of hypotension. Improved with IV fluids, creatinine down to 1.5 on 12/16, now recheck shows improvement to 1.49 and calculated GFR is 36. Creatinne back to baseline of 1.3 on 12/23/2020. Restarted PTA losartan at lowest dose at discharge 25mg daily. Appreciate Nephrology consul t-- lasix 40 mg daily continued  Plan:   -Follow up with nephrology at  [facility] post TCU  -Monitor urinary status  -Continue current medications without change  -Trend labs with PCP within 2 weeks.      (E27.40) Chronic adrenal insufficiency (H)  Comment: Chronic. Stable.   Plan:   -Monitor for worsening s/sx of concerns  -Continue PTA hydrocortisone 20mg daily  -Follow up with PCP at [facility] post TCU  -Trend labs with PCP within 2 weeks.      (I10) Hypertension, unspecified type  Comment: Acute on chronic. Amlodipine decreased to 5mg daily due to soft blood pressures. Based on JNC-8 goals,  patients age of 70 year old, presence of diabetes or CKD, and goals of care goal BP is  <140/90 mm Hg. Patient is stable with current plan of care and routine assessment..  Plan:   -Continue amlodipine as directed  -Continue losartan as directed  -Monitor BP and HR  -Follow up with PCP post TCU  -Trend labs with PCP within 2 weeks.      (Z87.2) Hx of psoriasis  (L29.9) Itching  Comment: Chronic. Holding PTA Humira this admission--continue to hold at discharge. Caused self inflicted trauma to wounds on RLE from back scratcher  Plan:   -Will monitor skin for ongoing concern   -Remove back scratcher so patient can not cause self trauma  -Continue Clobetasol Propionate Lotion 0.05 % to scalp at HS as directed  -Follow up with PCP at [facility] post TCU  -May recommend dermatology in future if warranted.   -Continue atarax 25mg every 6 hours PRN for itch complaints  -Trend labs with PCP within 2 weeks.      (I73.9) PVD (peripheral vascular disease) (H)  (Z89.512) S/P below knee amputation, left (H)  (S81.801D) Open wound of right lower extremity, subsequent encounter  (S01.302D) Open wound of left ear, unspecified open wound type, subsequent encounter  Comment: Chronic. Present on admission. Receives wound cares per home care RN. Followed by PCP at [facility]. Limited [facility]  [facility] documentation. Followed WOCN in hospital. Did not admit with proper wound care orders. Refuses to wear edema wear to wounds.   Plan:   -Follow up with PCP post TCU  -Would recommend wound clinic appt at [facility] post TCU  -Monitor skin for worsening s/sx of concerns.   -Continue vitamin D to 10,000U daily for wound healing  -Continue folic acid 1000mcg daily for wound healing  -Continue vitamin B12 1000mcg daily for wound healing  -Trend labs with PCP within 2 weeks.   -Continue current wound care orders as directed:  *RLE  Wounds: Daily and PRN   1. Cleanse with Vashe moistened gauze to wound bed and let sit x 5 minutes - do not rinse. #[id number]  2. Dry and protect surrounding skin with no sting barrier film wipe #[id number],  3. Apply sween 24 to all intact skin from base of toes to below knees.  4. Cover wound with VASHE moistened gauze  5. Cover with ABD   6. Secure with kerlix and tape  7. apply offloading boot     (G89.4) Chronic pain syndrome  Comment: Chronic. NOT AT GOAL. Patient wanting increase of pain medications. Resume PTA upon discharge of regimen (gabapentin 300 mg TID, duloxetine 30 mg daily, lidocaine patch, oxycodone 5 mg q6h prn, morphine 2.5 mg BID prn)  Plan:   -Continue gabapentin to 300mg BID. (renally dose due to elevated creatinine)  -Continue duloxetine 60mg daily. (recently increased on 1/12/21 for pain complaints and also to assist with mood)  -Continue lidocaine topical as directed  -Discontinue topical menthol due to decline.    -Continue dilaudid 1mg every 6 hours PRN  -Monitor pain complaints  -Follow up with PCP post TCU  -Would recommend pain clinic referral given history chronic history.   -Trend labs with PCP within 2 weeks.      (E44.1) Mild protein-calorie malnutrition (H)  Comment: Acute on chronic. Patient requests to have Ensure TID PRN  Plan:   -Monitor appetite  -Continue Ensure TID PRN  -Dietician to remain  involved  -Trend labs with PCP within 2 weeks.   -Continue vitamin D to 10,000 daily  -Continue folic acid daily  -Continue vitamin B12 daily     (G47.00) Insomnia  Comment: Acute on chronic. Patient wanting increase of sleeping agent. HISTORY of being on ambien in the past per [facility] records. This was discontinued.   Plan:  -Continue melatonin to 6mg at HS.   -Monitor sleeping patterns. Patient currently is drowsy and falls asleep mid conversation.   -Would NOT recommend ambien given co-morbidities above.   -Trend labs with PCP within 2 weeks.     Past Medical History:  has a past medical history of Adrenal insufficiency (H), Anemia, iron deficiency, Back pain, COPD (chronic obstructive pulmonary disease) (H), Depression, GERD (gastroesophageal reflux disease), Hyperlipidemia, and Migraine.    Discharge Medications:    Current Outpatient Medications   Medication Sig Dispense Refill     albuterol (PROAIR HFA/PROVENTIL HFA/VENTOLIN HFA) 108 (90 Base) MCG/ACT inhaler Inhale 2 puffs into the lungs every 4 hours as needed for shortness of breath / dyspnea or wheezing       amLODIPine (NORVASC) 5 MG tablet Take 1 tablet (5 mg) by mouth daily 30 tablet 0     calcium citrate (CITRACAL) 950 MG tablet Take 4 tablets by mouth daily       clobetasol (TEMOVATE) 0.05 % external solution Apply topically At Bedtime Apply to scalp       cyanocobalamin (VITAMIN B-12) 1000 MCG tablet Take 1 tablet (1,000 mcg) by mouth daily 30 tablet 1     dextromethorphan-guaiFENesin (MUCINEX DM)  MG 12 hr tablet Take 1 tablet by mouth every 12 hours 60 tablet 1     DULoxetine (CYMBALTA) 30 MG capsule Take 60 mg by mouth daily       ferrous sulfate (FEROSUL) 325 (65 Fe) MG tablet Take 1 tablet (325 mg) by mouth 3 times daily (with meals) 90 tablet 1     fluticasone-vilanterol (BREO ELLIPTA) 200-25 MCG/INH inhaler Inhale 1 puff into the lungs daily 28 each 1     folic acid (FOLVITE) 1 MG tablet Take 1 tablet (1  mg) by mouth daily 30 tablet 1     furosemide (LASIX) 40 MG tablet Take 1 tablet (40 mg) by mouth daily 30 tablet 0     gabapentin (NEURONTIN) 300 MG capsule Take 1 capsule (300 mg) by mouth 2 times daily 60 capsule 1     hydrocortisone (CORTEF) 20 MG tablet Take 20 mg by mouth every morning       HYDROmorphone (DILAUDID) 2 MG tablet Take 0.5 tablets (1 mg) by mouth every 6 hours as needed for severe pain 60 tablet 0     hydrOXYzine (ATARAX) 25 MG tablet Take 1 tablet (25 mg) by mouth every 6 hours as needed for itching 30 tablet 1     lidocaine (XYLOCAINE) 5 % external ointment Apply topically 3 times daily as needed for moderate pain (rib pain)       loperamide (IMODIUM) 2 MG capsule Take 1 capsule (2 mg) by mouth 4 times daily as needed for diarrhea 15 capsule 0     losartan (COZAAR) 25 MG tablet Take 12.5 mg by mouth daily       melatonin 3 MG tablet Take 2 tablets (6 mg) by mouth At Bedtime 60 tablet 1     multivitamin w/minerals (THERA-VIT-M) tablet Take 1 tablet by mouth daily 30 tablet 0     nicotine (NICORETTE) 2 MG gum Place 2 mg inside cheek as needed for smoking cessation       omeprazole (PRILOSEC) 20 MG DR capsule Take 20 mg by mouth daily       primidone (MYSOLINE) 50 MG tablet Take 50 mg by mouth At Bedtime       tamsulosin (FLOMAX) 0.4 MG capsule Take 0.8 mg by mouth daily       umeclidinium (INCRUSE ELLIPTA) 62.5 MCG/INH inhaler Inhale 1 puff into the lungs daily 30 each 1     vitamin C (ASCORBIC ACID) 1000 MG TABS Take 1 tablet (1,000 mg) by mouth daily 30 tablet 1     vitamin D3 (CHOLECALCIFEROL) 250 mcg (17466 units) capsule Take 1 capsule (250 mcg) by mouth daily 30 capsule 1     Medication Changes/Rationale:     See above    Controlled medications sent with patient:   Medication: Dilaudid , 30 tabs given to patient at the time of discharge to take home     ROS:   10 point ROS of systems including Constitutional, Eyes, Respiratory, Cardiovascular, Gastroenterology, Genitourinary, Integumentary,  "Musculoskeletal, Psychiatric were all negative except for pertinent positives noted in my HPI.    Physical Exam:   Vitals: /56   Pulse 63   Temp 97.9  F (36.6  C)   Resp 18   Ht 1.651 m (5' 5\")   Wt 64.4 kg (141 lb 14.4 oz)   SpO2 97%   BMI 23.61 kg/m    BMI= Body mass index is 23.61 kg/m .  GENERAL APPEARANCE:  Alert, in no distress, cooperative  ENT:  Mouth and posterior oropharynx normal, moist mucous membranes, [place]  EYES:  EOM, conjunctivae, lids, pupils and irises normal  NECK:  No adenopathy,masses or thyromegaly  RESP:  respiratory effort and palpation of chest normal, lungs clear to auscultation , no respiratory distress  CV:  Palpation and auscultation of heart done , regular rate and rhythm, no murmur, rub, or gallop, no edema to RLE. HX of BKA to Left  ABDOMEN:  normal bowel sounds, soft, nontender, no hepatosplenomegaly or other masses, no guarding or rebound  M/S:   Pivot transfers. Electronic wheelchair for all main mobility needs  SKIN:  Inspection of skin and subcutaneous tissue baseline, see pictures below  NEURO:   Cranial nerves 2-12 are normal tested and grossly at patient's baseline, no purposeful movement in upper and lower extremities  PSYCH:  oriented X 3, insight and judgement impaired, memory impaired , affect and mood normal                 SNF labs:   Most Recent 3 CBC's:  Recent Labs   Lab Test 01/27/21 01/19/21 01/13/21   WBC 4.7 6.0 5.6   HGB 6.5* 7.1* 6.6*   MCV 95 93 92   PLT 74* 81* 90*     Most Recent 3 BMP's:  Recent Labs   Lab Test 01/27/21 01/19/21 01/13/21    135* 140   POTASSIUM 4.3 4.6 4.3   CHLORIDE 109* 105 107   CO2 25 24 26   BUN 51* 48* 54*   CR 1.53* 1.72* 1.71*   ANIONGAP 6 6 7   MARTINE 8.7 8.5 8.5   GLC 84 64* 79     Most Recent 2 LFT's:  Recent Labs   Lab Test 01/07/21 12/29/20  0811   AST 27 26   ALT 24 30   ALKPHOS 91 90   BILITOTAL 0.4 0.3     Most Recent TSH and T4:  Recent Labs   Lab Test 01/07/21   TSH 0.73     Most Recent Anemia Panel:  Recent " Labs   Lab Test 01/27/21 12/04/20  1526 12/04/20  1526 12/04/20  1245   WBC 4.7   < > 3.2* 3.5*   HGB 6.5*   < > 6.4* 6.1*   HCT 20.9*   < > 19.1* 18.6*   MCV 95   < > 93 94   PLT 74*   < > 70* 74*   IRON  --   --   --  30*   IRONSAT  --   --   --  27   RETICABSCT  --   --  17.9*  --    RETP  --   --  0.9  --    FEB  --   --   --  112*   JÚNIOR  --   --   --  515*   B12  --   --   --  1,117*   FOLIC  --   --   --  6.7    < > = values in this interval not displayed.     DISCHARGE PLAN:    Follow up labs: Recommend to trend BMP and CBC with PCP within 2 weeks    Medical Follow Up:      Follow up with primary care provider in 1-2 weeks  Follow up with specialist GI, infectious disease, wound clinic/dermatology, and nephrology     MTM referral needed and placed by this provider: No    Current [place] scheduled appointments:    Discharge Services: Home Care:  Occupational Therapy, Physical Therapy, Registered Nurse, Home Health Aide,  and From:  [facility]    Discharge Instructions Verbalized to Patient at Discharge:     Weight bearing restrictions:  Weight bearing as tolerated.     Continue to follow your diet:  regular.     Ensure nutritional supplement recommended 3 times a day.     Wound care Start wound care to RLE: 1. Cleanse wounds with Vashe moistened gauze, let sit on wound for five mins. 2. Dry and protect surrounding skin with barrier sin prep. 3. apply sween 24 to all intact skin from toes to below knee. 4. Cover wounds with Vashe moistened gauze 5. Cover with ABD 6. Wrap with kerlix and tape 7. Apply off loading boot perform wound daily and PRN     Driving is not recommended     24-hour supervision is recommended for safety.       TOTAL DISCHARGE TIME:   Greater than 30 minutes  Electronically signed by:  Lana Stevens DNP, APRN    Documentation of Face to Face and Certification for Home Health Services    I certify that patient: Perfecto Reese is under my care and that I, or a nurse  practitioner or physician's assistant working with me, had a face-to-face encounter that meets the physician face-to-face encounter requirements with this patient on: 1/29/2021.    This encounter with the patient was in whole, or in part, for the following medical condition, which is the primary reason for home health care: Deconditioning S/T Above diagnosis.    I certify that, based on my findings, the following services are medically necessary home health services: Nursing, Occupational Therapy, Physical Therapy and Social Work.    My clinical findings support the need for the above services because: Nurse is needed: To assess Medication management, education and wound care needs after changes in medications or other medical regimen.., Occupational Therapy Services are needed to assess and treat cognitive ability and address ADL safety due to impairment in deconditioning. and Physical Therapy Services are needed to assess and treat the following functional impairments: deconditioning and cognitive testing.    Further, I certify that my clinical findings support that this patient is homebound (i.e. absences from home require considerable and taxing effort and are for medical reasons or Denominational services or infrequently or of short duration when for other reasons) because: Requires assistance of another person or specialized equipment to access medical services because patient: Is unable to walk greater than 5 feet without rest. and Requires supervision of another for safe transfer...    Based on the above findings. I certify that this patient is confined to the home and needs intermittent skilled nursing care, physical therapy and/or speech therapy.  The patient is under my care, and I have initiated the establishment of the plan of care.  This patient will be followed by a physician who will periodically review the plan of care.  Physician/Provider to provide follow up care: Polo Neil    [facility]  physician (the Medicare certified PECOS provider): Dr.Sara Marcos MD, signing F2F and only signing for initial order. Please send all follow up questions and concerns or needed follow up signatures to the PCP, who [facility] has on file as:  Polo Neil.    Physician Signature: See electronic signature associated with these discharge orders.  Date: 1/29/2021                      Sincerely,        ANTONIETA Valdovinos CNP

## 2021-02-09 ENCOUNTER — HOSPITAL ENCOUNTER (INPATIENT)
Facility: CLINIC | Age: 71
LOS: 6 days | Discharge: NURSING, REHAB OR LONG-TERM CARE | DRG: 602 | End: 2021-02-15
Attending: EMERGENCY MEDICINE | Admitting: INTERNAL MEDICINE
Payer: MEDICARE

## 2021-02-09 ENCOUNTER — APPOINTMENT (OUTPATIENT)
Dept: ULTRASOUND IMAGING | Facility: CLINIC | Age: 71
DRG: 602 | End: 2021-02-09
Attending: PHYSICIAN ASSISTANT
Payer: MEDICARE

## 2021-02-09 ENCOUNTER — APPOINTMENT (OUTPATIENT)
Dept: ULTRASOUND IMAGING | Facility: CLINIC | Age: 71
DRG: 602 | End: 2021-02-09
Attending: SURGERY
Payer: MEDICARE

## 2021-02-09 DIAGNOSIS — L03.115 CELLULITIS OF RIGHT LEG: ICD-10-CM

## 2021-02-09 DIAGNOSIS — I10 ESSENTIAL HYPERTENSION: ICD-10-CM

## 2021-02-09 DIAGNOSIS — D63.8 ANEMIA OF CHRONIC DISEASE: ICD-10-CM

## 2021-02-09 DIAGNOSIS — N17.9 AKI (ACUTE KIDNEY INJURY) (H): ICD-10-CM

## 2021-02-09 DIAGNOSIS — G89.4 CHRONIC PAIN SYNDROME: ICD-10-CM

## 2021-02-09 LAB
ALBUMIN SERPL-MCNC: 3.2 G/DL (ref 3.4–5)
ALP SERPL-CCNC: 79 U/L (ref 40–150)
ALT SERPL W P-5'-P-CCNC: 21 U/L (ref 0–70)
ANION GAP SERPL CALCULATED.3IONS-SCNC: 15 MMOL/L (ref 3–14)
AST SERPL W P-5'-P-CCNC: 23 U/L (ref 0–45)
BASOPHILS # BLD AUTO: 0.1 10E9/L (ref 0–0.2)
BASOPHILS NFR BLD AUTO: 0.8 %
BILIRUB SERPL-MCNC: 0.5 MG/DL (ref 0.2–1.3)
BUN SERPL-MCNC: 58 MG/DL (ref 7–30)
CALCIUM SERPL-MCNC: 8.4 MG/DL (ref 8.5–10.1)
CHLORIDE SERPL-SCNC: 107 MMOL/L (ref 94–109)
CO2 SERPL-SCNC: 15 MMOL/L (ref 20–32)
CREAT SERPL-MCNC: 2.49 MG/DL (ref 0.66–1.25)
CRP SERPL-MCNC: 35.8 MG/L (ref 0–8)
DIFFERENTIAL METHOD BLD: ABNORMAL
EOSINOPHIL # BLD AUTO: 4 10E9/L (ref 0–0.7)
EOSINOPHIL NFR BLD AUTO: 26.4 %
ERYTHROCYTE [DISTWIDTH] IN BLOOD BY AUTOMATED COUNT: 15.2 % (ref 10–15)
ERYTHROCYTE [SEDIMENTATION RATE] IN BLOOD BY WESTERGREN METHOD: 84 MM/H (ref 0–20)
ERYTHROCYTE [SEDIMENTATION RATE] IN BLOOD BY WESTERGREN METHOD: NORMAL MM/H (ref 0–20)
GFR SERPL CREATININE-BSD FRML MDRD: 25 ML/MIN/{1.73_M2}
GLUCOSE SERPL-MCNC: 69 MG/DL (ref 70–99)
HBA1C MFR BLD: NORMAL % (ref 0–5.6)
HCT VFR BLD AUTO: 25.4 % (ref 40–53)
HGB BLD-MCNC: 8.6 G/DL (ref 13.3–17.7)
IMM GRANULOCYTES # BLD: 0.1 10E9/L (ref 0–0.4)
IMM GRANULOCYTES NFR BLD: 0.5 %
INR PPP: 1.25 (ref 0.86–1.14)
LABORATORY COMMENT REPORT: NORMAL
LACTATE BLD-SCNC: 1.1 MMOL/L (ref 0.7–2)
LYMPHOCYTES # BLD AUTO: 1.6 10E9/L (ref 0.8–5.3)
LYMPHOCYTES NFR BLD AUTO: 10.3 %
MCH RBC QN AUTO: 29.7 PG (ref 26.5–33)
MCHC RBC AUTO-ENTMCNC: 33.9 G/DL (ref 31.5–36.5)
MCV RBC AUTO: 88 FL (ref 78–100)
MONOCYTES # BLD AUTO: 0.8 10E9/L (ref 0–1.3)
MONOCYTES NFR BLD AUTO: 5.3 %
NEUTROPHILS # BLD AUTO: 8.6 10E9/L (ref 1.6–8.3)
NEUTROPHILS NFR BLD AUTO: 56.7 %
NRBC # BLD AUTO: 0 10*3/UL
NRBC BLD AUTO-RTO: 0 /100
PLATELET # BLD AUTO: 130 10E9/L (ref 150–450)
POTASSIUM SERPL-SCNC: 4.3 MMOL/L (ref 3.4–5.3)
PROT SERPL-MCNC: 7.9 G/DL (ref 6.8–8.8)
RBC # BLD AUTO: 2.9 10E12/L (ref 4.4–5.9)
SARS-COV-2 RNA RESP QL NAA+PROBE: NEGATIVE
SODIUM SERPL-SCNC: 137 MMOL/L (ref 133–144)
SPECIMEN SOURCE: NORMAL
WBC # BLD AUTO: 15.1 10E9/L (ref 4–11)

## 2021-02-09 PROCEDURE — 99223 1ST HOSP IP/OBS HIGH 75: CPT | Mod: AI | Performed by: PHYSICIAN ASSISTANT

## 2021-02-09 PROCEDURE — 250N000013 HC RX MED GY IP 250 OP 250 PS 637: Performed by: PHYSICIAN ASSISTANT

## 2021-02-09 PROCEDURE — C9803 HOPD COVID-19 SPEC COLLECT: HCPCS

## 2021-02-09 PROCEDURE — 93005 ELECTROCARDIOGRAM TRACING: CPT

## 2021-02-09 PROCEDURE — 96361 HYDRATE IV INFUSION ADD-ON: CPT

## 2021-02-09 PROCEDURE — 96375 TX/PRO/DX INJ NEW DRUG ADDON: CPT

## 2021-02-09 PROCEDURE — 86850 RBC ANTIBODY SCREEN: CPT | Performed by: EMERGENCY MEDICINE

## 2021-02-09 PROCEDURE — 86900 BLOOD TYPING SEROLOGIC ABO: CPT | Performed by: EMERGENCY MEDICINE

## 2021-02-09 PROCEDURE — 93926 LOWER EXTREMITY STUDY: CPT | Mod: RT

## 2021-02-09 PROCEDURE — 250N000011 HC RX IP 250 OP 636: Performed by: EMERGENCY MEDICINE

## 2021-02-09 PROCEDURE — 258N000003 HC RX IP 258 OP 636: Performed by: PHYSICIAN ASSISTANT

## 2021-02-09 PROCEDURE — 99222 1ST HOSP IP/OBS MODERATE 55: CPT | Performed by: SURGERY

## 2021-02-09 PROCEDURE — 96376 TX/PRO/DX INJ SAME DRUG ADON: CPT

## 2021-02-09 PROCEDURE — 87040 BLOOD CULTURE FOR BACTERIA: CPT | Performed by: EMERGENCY MEDICINE

## 2021-02-09 PROCEDURE — 86901 BLOOD TYPING SEROLOGIC RH(D): CPT | Performed by: EMERGENCY MEDICINE

## 2021-02-09 PROCEDURE — 85652 RBC SED RATE AUTOMATED: CPT | Performed by: EMERGENCY MEDICINE

## 2021-02-09 PROCEDURE — 93922 UPR/L XTREMITY ART 2 LEVELS: CPT

## 2021-02-09 PROCEDURE — 258N000003 HC RX IP 258 OP 636: Performed by: EMERGENCY MEDICINE

## 2021-02-09 PROCEDURE — 250N000011 HC RX IP 250 OP 636: Performed by: PHYSICIAN ASSISTANT

## 2021-02-09 PROCEDURE — 99285 EMERGENCY DEPT VISIT HI MDM: CPT | Mod: 25

## 2021-02-09 PROCEDURE — G0463 HOSPITAL OUTPT CLINIC VISIT: HCPCS | Mod: 25

## 2021-02-09 PROCEDURE — 96365 THER/PROPH/DIAG IV INF INIT: CPT

## 2021-02-09 PROCEDURE — 85610 PROTHROMBIN TIME: CPT | Performed by: EMERGENCY MEDICINE

## 2021-02-09 PROCEDURE — 80053 COMPREHEN METABOLIC PANEL: CPT | Performed by: EMERGENCY MEDICINE

## 2021-02-09 PROCEDURE — 97602 WOUND(S) CARE NON-SELECTIVE: CPT

## 2021-02-09 PROCEDURE — 86923 COMPATIBILITY TEST ELECTRIC: CPT | Performed by: EMERGENCY MEDICINE

## 2021-02-09 PROCEDURE — 120N000001 HC R&B MED SURG/OB

## 2021-02-09 PROCEDURE — 85025 COMPLETE CBC W/AUTO DIFF WBC: CPT | Performed by: EMERGENCY MEDICINE

## 2021-02-09 PROCEDURE — 86140 C-REACTIVE PROTEIN: CPT | Performed by: EMERGENCY MEDICINE

## 2021-02-09 PROCEDURE — 83605 ASSAY OF LACTIC ACID: CPT | Performed by: EMERGENCY MEDICINE

## 2021-02-09 PROCEDURE — 87635 SARS-COV-2 COVID-19 AMP PRB: CPT | Performed by: EMERGENCY MEDICINE

## 2021-02-09 RX ORDER — ACETAMINOPHEN 325 MG/1
650 TABLET ORAL EVERY 4 HOURS PRN
Status: DISCONTINUED | OUTPATIENT
Start: 2021-02-09 | End: 2021-02-15 | Stop reason: HOSPADM

## 2021-02-09 RX ORDER — HYDROMORPHONE HYDROCHLORIDE 1 MG/ML
0.5 INJECTION, SOLUTION INTRAMUSCULAR; INTRAVENOUS; SUBCUTANEOUS
Status: COMPLETED | OUTPATIENT
Start: 2021-02-09 | End: 2021-02-09

## 2021-02-09 RX ORDER — FOLIC ACID 1 MG/1
1 TABLET ORAL DAILY
Status: DISCONTINUED | OUTPATIENT
Start: 2021-02-09 | End: 2021-02-15 | Stop reason: HOSPADM

## 2021-02-09 RX ORDER — SODIUM CHLORIDE 9 MG/ML
INJECTION, SOLUTION INTRAVENOUS CONTINUOUS
Status: DISCONTINUED | OUTPATIENT
Start: 2021-02-09 | End: 2021-02-12

## 2021-02-09 RX ORDER — GABAPENTIN 300 MG/1
300 CAPSULE ORAL 2 TIMES DAILY
Status: DISCONTINUED | OUTPATIENT
Start: 2021-02-09 | End: 2021-02-15 | Stop reason: HOSPADM

## 2021-02-09 RX ORDER — LIDOCAINE 40 MG/G
CREAM TOPICAL
Status: DISCONTINUED | OUTPATIENT
Start: 2021-02-09 | End: 2021-02-15 | Stop reason: HOSPADM

## 2021-02-09 RX ORDER — ONDANSETRON 4 MG/1
4 TABLET, ORALLY DISINTEGRATING ORAL EVERY 6 HOURS PRN
Status: DISCONTINUED | OUTPATIENT
Start: 2021-02-09 | End: 2021-02-15 | Stop reason: HOSPADM

## 2021-02-09 RX ORDER — POLYETHYLENE GLYCOL 3350 17 G/17G
17 POWDER, FOR SOLUTION ORAL DAILY PRN
Status: DISCONTINUED | OUTPATIENT
Start: 2021-02-09 | End: 2021-02-15 | Stop reason: HOSPADM

## 2021-02-09 RX ORDER — GUAIFENESIN AND DEXTROMETHORPHAN HYDROBROMIDE 600; 30 MG/1; MG/1
1 TABLET, EXTENDED RELEASE ORAL EVERY 12 HOURS
Status: DISCONTINUED | OUTPATIENT
Start: 2021-02-09 | End: 2021-02-15 | Stop reason: HOSPADM

## 2021-02-09 RX ORDER — AMOXICILLIN 250 MG
1 CAPSULE ORAL 2 TIMES DAILY PRN
Status: DISCONTINUED | OUTPATIENT
Start: 2021-02-09 | End: 2021-02-15 | Stop reason: HOSPADM

## 2021-02-09 RX ORDER — NALOXONE HYDROCHLORIDE 0.4 MG/ML
0.2 INJECTION, SOLUTION INTRAMUSCULAR; INTRAVENOUS; SUBCUTANEOUS
Status: DISCONTINUED | OUTPATIENT
Start: 2021-02-09 | End: 2021-02-15 | Stop reason: HOSPADM

## 2021-02-09 RX ORDER — NALOXONE HYDROCHLORIDE 0.4 MG/ML
0.4 INJECTION, SOLUTION INTRAMUSCULAR; INTRAVENOUS; SUBCUTANEOUS
Status: DISCONTINUED | OUTPATIENT
Start: 2021-02-09 | End: 2021-02-15 | Stop reason: HOSPADM

## 2021-02-09 RX ORDER — NICOTINE POLACRILEX 4 MG
15-30 LOZENGE BUCCAL
Status: DISCONTINUED | OUTPATIENT
Start: 2021-02-09 | End: 2021-02-15 | Stop reason: HOSPADM

## 2021-02-09 RX ORDER — ONDANSETRON 2 MG/ML
4 INJECTION INTRAMUSCULAR; INTRAVENOUS EVERY 6 HOURS PRN
Status: DISCONTINUED | OUTPATIENT
Start: 2021-02-09 | End: 2021-02-15 | Stop reason: HOSPADM

## 2021-02-09 RX ORDER — CEFAZOLIN SODIUM 1 G/3ML
1 INJECTION, POWDER, FOR SOLUTION INTRAMUSCULAR; INTRAVENOUS EVERY 12 HOURS
Status: DISCONTINUED | OUTPATIENT
Start: 2021-02-09 | End: 2021-02-11

## 2021-02-09 RX ORDER — ALBUTEROL SULFATE 90 UG/1
2 AEROSOL, METERED RESPIRATORY (INHALATION) EVERY 4 HOURS PRN
Status: DISCONTINUED | OUTPATIENT
Start: 2021-02-09 | End: 2021-02-15 | Stop reason: HOSPADM

## 2021-02-09 RX ORDER — HYDROMORPHONE HYDROCHLORIDE 1 MG/ML
.3-.5 INJECTION, SOLUTION INTRAMUSCULAR; INTRAVENOUS; SUBCUTANEOUS
Status: DISCONTINUED | OUTPATIENT
Start: 2021-02-09 | End: 2021-02-13

## 2021-02-09 RX ORDER — DEXTROSE MONOHYDRATE 25 G/50ML
25-50 INJECTION, SOLUTION INTRAVENOUS
Status: DISCONTINUED | OUTPATIENT
Start: 2021-02-09 | End: 2021-02-15 | Stop reason: HOSPADM

## 2021-02-09 RX ORDER — PROCHLORPERAZINE MALEATE 5 MG
5 TABLET ORAL EVERY 6 HOURS PRN
Status: DISCONTINUED | OUTPATIENT
Start: 2021-02-09 | End: 2021-02-15 | Stop reason: HOSPADM

## 2021-02-09 RX ORDER — ACETAMINOPHEN 650 MG/1
650 SUPPOSITORY RECTAL EVERY 4 HOURS PRN
Status: DISCONTINUED | OUTPATIENT
Start: 2021-02-09 | End: 2021-02-15 | Stop reason: HOSPADM

## 2021-02-09 RX ORDER — HYDROCORTISONE 10 MG/1
20 TABLET ORAL EVERY MORNING
Status: DISCONTINUED | OUTPATIENT
Start: 2021-02-09 | End: 2021-02-15 | Stop reason: HOSPADM

## 2021-02-09 RX ORDER — AMOXICILLIN 250 MG
2 CAPSULE ORAL 2 TIMES DAILY PRN
Status: DISCONTINUED | OUTPATIENT
Start: 2021-02-09 | End: 2021-02-15 | Stop reason: HOSPADM

## 2021-02-09 RX ORDER — DULOXETIN HYDROCHLORIDE 30 MG/1
60 CAPSULE, DELAYED RELEASE ORAL DAILY
Status: DISCONTINUED | OUTPATIENT
Start: 2021-02-09 | End: 2021-02-15 | Stop reason: HOSPADM

## 2021-02-09 RX ORDER — ASCORBIC ACID 500 MG
1000 TABLET ORAL DAILY
Status: DISCONTINUED | OUTPATIENT
Start: 2021-02-09 | End: 2021-02-15 | Stop reason: HOSPADM

## 2021-02-09 RX ORDER — LANOLIN ALCOHOL/MO/W.PET/CERES
1000 CREAM (GRAM) TOPICAL DAILY
Status: DISCONTINUED | OUTPATIENT
Start: 2021-02-09 | End: 2021-02-15 | Stop reason: HOSPADM

## 2021-02-09 RX ORDER — FERROUS SULFATE 325(65) MG
325 TABLET ORAL
Status: DISCONTINUED | OUTPATIENT
Start: 2021-02-09 | End: 2021-02-15 | Stop reason: HOSPADM

## 2021-02-09 RX ORDER — PROCHLORPERAZINE 25 MG
12.5 SUPPOSITORY, RECTAL RECTAL EVERY 12 HOURS PRN
Status: DISCONTINUED | OUTPATIENT
Start: 2021-02-09 | End: 2021-02-15 | Stop reason: HOSPADM

## 2021-02-09 RX ORDER — HYDROMORPHONE HYDROCHLORIDE 2 MG/1
2-4 TABLET ORAL EVERY 4 HOURS PRN
Status: DISCONTINUED | OUTPATIENT
Start: 2021-02-09 | End: 2021-02-15 | Stop reason: HOSPADM

## 2021-02-09 RX ORDER — OXYCODONE HYDROCHLORIDE 5 MG/1
5-10 TABLET ORAL EVERY 4 HOURS PRN
Status: DISCONTINUED | OUTPATIENT
Start: 2021-02-09 | End: 2021-02-09

## 2021-02-09 RX ORDER — CEFAZOLIN SODIUM 1 G/3ML
1 INJECTION, POWDER, FOR SOLUTION INTRAMUSCULAR; INTRAVENOUS ONCE
Status: COMPLETED | OUTPATIENT
Start: 2021-02-09 | End: 2021-02-09

## 2021-02-09 RX ORDER — PRIMIDONE 50 MG/1
50 TABLET ORAL AT BEDTIME
Status: DISCONTINUED | OUTPATIENT
Start: 2021-02-09 | End: 2021-02-15 | Stop reason: HOSPADM

## 2021-02-09 RX ORDER — TAMSULOSIN HYDROCHLORIDE 0.4 MG/1
0.8 CAPSULE ORAL DAILY
Status: DISCONTINUED | OUTPATIENT
Start: 2021-02-09 | End: 2021-02-15 | Stop reason: HOSPADM

## 2021-02-09 RX ORDER — BISACODYL 10 MG
10 SUPPOSITORY, RECTAL RECTAL DAILY PRN
Status: DISCONTINUED | OUTPATIENT
Start: 2021-02-09 | End: 2021-02-15 | Stop reason: HOSPADM

## 2021-02-09 RX ORDER — AMLODIPINE BESYLATE 5 MG/1
5 TABLET ORAL DAILY
Status: DISCONTINUED | OUTPATIENT
Start: 2021-02-09 | End: 2021-02-12

## 2021-02-09 RX ORDER — HYDROMORPHONE HYDROCHLORIDE 1 MG/ML
0.5 INJECTION, SOLUTION INTRAMUSCULAR; INTRAVENOUS; SUBCUTANEOUS ONCE
Status: COMPLETED | OUTPATIENT
Start: 2021-02-09 | End: 2021-02-09

## 2021-02-09 RX ADMIN — ACETAMINOPHEN 650 MG: 325 TABLET, FILM COATED ORAL at 11:54

## 2021-02-09 RX ADMIN — CEFAZOLIN 1 G: 1 INJECTION, POWDER, FOR SOLUTION INTRAMUSCULAR; INTRAVENOUS at 20:05

## 2021-02-09 RX ADMIN — HYDROMORPHONE HYDROCHLORIDE 1 MG: 2 TABLET ORAL at 11:54

## 2021-02-09 RX ADMIN — DULOXETINE HYDROCHLORIDE 60 MG: 30 CAPSULE, DELAYED RELEASE ORAL at 17:33

## 2021-02-09 RX ADMIN — HYDROMORPHONE HYDROCHLORIDE 0.5 MG: 1 INJECTION, SOLUTION INTRAMUSCULAR; INTRAVENOUS; SUBCUTANEOUS at 17:29

## 2021-02-09 RX ADMIN — CEFAZOLIN 1 G: 1 INJECTION, POWDER, FOR SOLUTION INTRAMUSCULAR; INTRAVENOUS at 08:24

## 2021-02-09 RX ADMIN — GUAIFENESIN AND DEXTROMETHORPHAN HYDROBROMIDE 1 TABLET: 600; 30 TABLET, EXTENDED RELEASE ORAL at 19:08

## 2021-02-09 RX ADMIN — UMECLIDINIUM 1 PUFF: 62.5 AEROSOL, POWDER ORAL at 20:04

## 2021-02-09 RX ADMIN — HYDROMORPHONE HYDROCHLORIDE 0.5 MG: 1 INJECTION, SOLUTION INTRAMUSCULAR; INTRAVENOUS; SUBCUTANEOUS at 07:48

## 2021-02-09 RX ADMIN — GABAPENTIN 300 MG: 300 CAPSULE ORAL at 22:24

## 2021-02-09 RX ADMIN — HYDROMORPHONE HYDROCHLORIDE 0.5 MG: 1 INJECTION, SOLUTION INTRAMUSCULAR; INTRAVENOUS; SUBCUTANEOUS at 06:38

## 2021-02-09 RX ADMIN — HYDROMORPHONE HYDROCHLORIDE 1 MG: 2 TABLET ORAL at 10:47

## 2021-02-09 RX ADMIN — OMEPRAZOLE 20 MG: 20 CAPSULE, DELAYED RELEASE ORAL at 19:03

## 2021-02-09 RX ADMIN — FERROUS SULFATE TAB 325 MG (65 MG ELEMENTAL FE) 325 MG: 325 (65 FE) TAB at 18:58

## 2021-02-09 RX ADMIN — SODIUM CHLORIDE, PRESERVATIVE FREE: 5 INJECTION INTRAVENOUS at 10:47

## 2021-02-09 RX ADMIN — FOLIC ACID 1 MG: 1 TABLET ORAL at 19:06

## 2021-02-09 RX ADMIN — AMLODIPINE BESYLATE 5 MG: 5 TABLET ORAL at 17:32

## 2021-02-09 RX ADMIN — HYDROMORPHONE HYDROCHLORIDE 0.5 MG: 1 INJECTION, SOLUTION INTRAMUSCULAR; INTRAVENOUS; SUBCUTANEOUS at 22:25

## 2021-02-09 RX ADMIN — OXYCODONE HYDROCHLORIDE AND ACETAMINOPHEN 1000 MG: 500 TABLET ORAL at 19:02

## 2021-02-09 RX ADMIN — HYDROMORPHONE HYDROCHLORIDE 0.5 MG: 1 INJECTION, SOLUTION INTRAMUSCULAR; INTRAVENOUS; SUBCUTANEOUS at 13:56

## 2021-02-09 RX ADMIN — HYDROCORTISONE 20 MG: 10 TABLET ORAL at 18:58

## 2021-02-09 RX ADMIN — HYDROMORPHONE HYDROCHLORIDE 0.5 MG: 1 INJECTION, SOLUTION INTRAMUSCULAR; INTRAVENOUS; SUBCUTANEOUS at 20:03

## 2021-02-09 RX ADMIN — CYANOCOBALAMIN TAB 1000 MCG 1000 MCG: 1000 TAB at 17:35

## 2021-02-09 RX ADMIN — PRIMIDONE 50 MG: 50 TABLET ORAL at 22:24

## 2021-02-09 RX ADMIN — CHOLECALCIFEROL TAB 125 MCG (5000 UNIT) 250 MCG: 125 TAB at 19:06

## 2021-02-09 RX ADMIN — SODIUM CHLORIDE 500 ML: 9 INJECTION, SOLUTION INTRAVENOUS at 07:19

## 2021-02-09 RX ADMIN — SODIUM CHLORIDE, PRESERVATIVE FREE: 5 INJECTION INTRAVENOUS at 22:22

## 2021-02-09 RX ADMIN — TAMSULOSIN HYDROCHLORIDE 0.8 MG: 0.4 CAPSULE ORAL at 19:04

## 2021-02-09 RX ADMIN — FLUTICASONE FUROATE AND VILANTEROL TRIFENATATE 1 PUFF: 200; 25 POWDER RESPIRATORY (INHALATION) at 20:04

## 2021-02-09 ASSESSMENT — ACTIVITIES OF DAILY LIVING (ADL)
WEAR_GLASSES_OR_BLIND: YES
DIFFICULTY_EATING/SWALLOWING: NO
ADLS_ACUITY_SCORE: 25
CONCENTRATING,_REMEMBERING_OR_MAKING_DECISIONS_DIFFICULTY: YES
WALKING_OR_CLIMBING_STAIRS_DIFFICULTY: YES
ADLS_ACUITY_SCORE: 25
DOING_ERRANDS_INDEPENDENTLY_DIFFICULTY: YES
TOILETING_ISSUES: YES
VISION_MANAGEMENT: GLASSES
EQUIPMENT_CURRENTLY_USED_AT_HOME: WHEELCHAIR, MANUAL
DIFFICULTY_COMMUNICATING: NO
ADLS_ACUITY_SCORE: 25
DRESSING/BATHING_DIFFICULTY: NO
FALL_HISTORY_WITHIN_LAST_SIX_MONTHS: NO

## 2021-02-09 ASSESSMENT — ENCOUNTER SYMPTOMS
ABDOMINAL PAIN: 0
SHORTNESS OF BREATH: 0
FEVER: 0
COUGH: 0
WOUND: 1

## 2021-02-09 ASSESSMENT — MIFFLIN-ST. JEOR: SCORE: 1261.15

## 2021-02-09 NOTE — PROGRESS NOTES
1:47 PM  Spoke with Mary PINTO regarding pt pain control via telephone. Per provider, OK to give 1 dose of IV 0.3 MG dilaudid now, monitor for lethargy. Provider to adjust oral dose of medication to improve pain control.

## 2021-02-09 NOTE — PROGRESS NOTES
Paged by bedside RN for uncontrolled pain with current regimen. Pt rating pain 9/10. Received oral dilaudid 1 mg at 1045 and additional 1 mg at 1145. Oriented X4, not lethargic appearing per report.   -- IV dilaudid 0.3 mg X1, increased oral dilaudid to 2-4 mg q4 hrs. Monitor for sedation.

## 2021-02-09 NOTE — CONSULTS
Care Management Initial Consult  Patient with recent prolonged hospitalization from 12/2-12/31  for pneumonia and RLL wounds was dcd to TCU at [facility] and discharged form there  2/2 with home care services now returns with worsening LE wounds. Patient stating his Home care agency services fell through. Writer called [facility] who was his home care agency who states the last visit from RN was 2/4. Patient was notified that he would need a family member at home who could be taught to do dressing changes and care for his wound.  Patient stating he does not have anybody and that he had home care doing daily dressing changes in the past. Unsure if this is a possibility as his insurance would not cover the service. Patient is on chronic o2 at 3 lit  Patient stating he is wheelchair bound and able to mange his meals on his own. He has a cleaning person that comes in once /week. Patient mentions that he was not able to have anything substantial to eat as he did not have any one to buy groceries.   Discussed with patient that he is not safe to return to his home and would need TCU till he gets stronger. Patient  refusing  TCU at this time.  Will await further recommendations from Wound team to determine disposition.  RLE cellulitis       General Information  Assessment completed with: Patient,    Type of CM/SW Visit: Initial Assessment    Primary Care Provider verified and updated as needed: Yes   Readmission within the last 30 days: lack of support, previous discharge plan unsuccessful   Return Category: Progression of disease  Reason for Consult: facility placement, medication concerns, nonalliance concerns  Advance Care Planning:            Communication Assessment  Patient's communication style: spoken language (English or Bilingual)    Hearing Difficulty or Deaf: no   Wear Glasses or Blind: yes    Cognitive  Cognitive/Neuro/Behavioral: WDL                      Living Environment:   People in home: alone      Current living Arrangements: apartment      Able to return to prior arrangements: no       Family/Social Support:  Care provided by: self  Provides care for: no one, unable/limited ability to care for self  Marital Status: Single  None          Description of Support System:           Current Resources:   Patient receiving home care services:       Community Resources:    Equipment currently used at home: wheelchair, manual  Supplies currently used at home:      Employment/Financial:  Employment Status: retired        Financial Concerns: No concerns identified                   Socioeconomic History     Marital status: Single     Spouse name: Not on file     Number of children: Not on file     Years of education: Not on file     Highest education level: Not on file     Tobacco Use     Smoking status: Former Smoker     Packs/day: 0.50     Years: 30.00     Pack years: 15.00     Types: Cigarettes     Quit date: 4/1/2017     Years since quitting: 3.8     Smokeless tobacco: Never Used     Tobacco comment: states he is down to 3QD   Substance and Sexual Activity     Alcohol use: No     Drug use: No     Sexual activity: Never       Functional Status:  Prior to admission patient needed assistance: With ADLs   Values/Beliefs:  Spiritual, Cultural Beliefs, [employer] Practices, Values that affect care: caridad Larose RN  Inpatient Care Coordinator  [facility] Yung/Jeremiah  #[phone]

## 2021-02-09 NOTE — CONSULTS
"CLINICAL NUTRITION SERVICES  -  ASSESSMENT NOTE    Recommendations Ordered by Registered Dietitian (RD):   - Boost Between meals @ 10 and 2, also send boost with dinner  - Daily weights    Malnutrition: (2/9)   % Weight Loss:  > 5% in 1 month (severe malnutrition)  % Intake:  </= 50% for >/= 5 days (severe malnutrition)  Subcutaneous Fat Loss:  Orbital region moderate depletion and Upper arm region moderaet depletion  Muscle Loss:  Temporal region moderate depletion, Clavicle bone region moderate depletion, Acromion bone region moderate depletion and Dorsal hand region moderate depletion  Fluid Retention:  None noted    Malnutrition Diagnosis: Severe malnutrition  In Context of:  Acute illness or injury  Chronic illness or disease     REASON FOR ASSESSMENT  Perfecto Reese is a 70 year old male seen by Registered Dietitian for Provider Order - \"Malnutrition\"    NUTRITION HISTORY  - Information obtained from chart review and patient report.   - PMH: COPD on 3L home O2 w/ ongoing tobacco use, adrenal insufficiency on chronic steroids, chronic Hep C with cirrhosis, PVD s/p left BKA, chronic wounds, non-compliance. Hospitalized here from 12/2-12/31. TCU stay afterward, discharged to home on 2/2.       - Patient seen this morning. He had some confusion about recent timeline \"Are you sure I was here in December?\"  - States that he was eating fine at the facility he discharged to, had been receiving Boost/Ensure there also.   - Has been eating poorly the past 1 week since discharging from TCU to home.   - Unable to state what his current wt is. Felt that the weight on file is too low to be accurate.     CURRENT NUTRITION ORDERS  Diet Order:     Regular     Current Intake/Tolerance:  Low appetite. Interested in Boost TID.     NUTRITION FOCUSED PHYSICAL ASSESSMENT FOR DIAGNOSING MALNUTRITION)  Yes - upper body only                Observed:    Muscle wasting (refer to documentation in Malnutrition section) and Subcutaneous " "fat loss (refer to documentation in Malnutrition section)    Obtained from Chart/Interdisciplinary Team:  L BKA  2/9 - WOCN assessed right LE. No PI reported.     Last BM 2/9  Donn - Nutrition 3; Total 17    ANTHROPOMETRICS  Height: 5' 5\"  Weight: 124 lbs 6.4 oz (56.4 kg)   Body mass index is 20.7 kg/m .  Weight Status:  Normal BMI  IBW: 61.8 kg   % IBW: 91%  Weight History: 19# loss indicated in ~1 month (13.3%).   Wt Readings from Last 10 Encounters:   02/09/21 56.4 kg (124 lb 6.4 oz)   01/28/21 64.4 kg (141 lb 14.4 oz)   01/26/21 64.4 kg (141 lb 14.4 oz)   01/18/21 64.4 kg (141 lb 14.4 oz)   01/14/21 64.4 kg (141 lb 14.4 oz)   01/07/21 64.4 kg (141 lb 14.4 oz)   01/05/21 64.4 kg (141 lb 14.4 oz)   12/30/20 65 kg (143 lb 4.8 oz)   03/22/18 93 kg (205 lb)   01/21/16 70.3 kg (155 lb)       LABS  Labs reviewed  BUN 58 (H), Cr 2.49 (H)  CRP 35.8 (H)  Recent Labs   Lab 02/09/21  0601   GLC 69*       MEDICATIONS  Medications reviewed  NaCl IVF @ 100 mL/hr     ASSESSED NUTRITION NEEDS PER APPROVED PRACTICE GUIDELINES:  Dosing Weight 56.4 kg   Estimated Energy Needs: 6042-7012 kcals (30-35 Kcal/Kg)  Justification: repletion  Estimated Protein Needs: 68-85+ grams protein (1.2-1.5 g pro/Kg)  Justification: Repletion  Estimated Fluid Needs: 1 mL/kcal  Justification: maintenance    MALNUTRITION:  % Weight Loss:  > 5% in 1 month (severe malnutrition)  % Intake:  </= 50% for >/= 5 days (severe malnutrition)  Subcutaneous Fat Loss:  Orbital region moderate depletion and Upper arm region moderaet depletion  Muscle Loss:  Temporal region moderate depletion, Clavicle bone region moderate depletion, Acromion bone region moderate depletion and Dorsal hand region moderate depletion  Fluid Retention:  None noted    Malnutrition Diagnosis: Severe malnutrition  In Context of:  Acute illness or injury  Chronic illness or disease    NUTRITION DIAGNOSIS:  Inadequate oral intake related to inability to care for self and increased needs in " the setting of chronic illness as evidenced by weight loss of up to 13% in 1 month, reported poor oral intake for at least 1 week PTA, and evidence of moderate fat and muscle losses.       NUTRITION INTERVENTIONS  Recommendations / Nutrition Prescription  Diet per MD  - Boost Between meals @ 10 and 2, also send boost with dinner  - Daily weights       Implementation  Nutrition education: Per Provider order if indicated   Medical Food Supplement: as above       Nutrition Goals  Intake of >/=75% for meals BID + 3 Protein supplements daily.       MONITORING AND EVALUATION:  Progress towards goals will be monitored and evaluated per protocol and Practice Guidelines    Winifred Polanco RD, LD  [facility] [facility], [facility]   Pager: [phone]  Weekend Pager: [phone]

## 2021-02-09 NOTE — PLAN OF CARE
DATE & TIME: 2/9/2021 8684-6454    Cognitive Concerns/ Orientation : A/Ox4 forgetful   BEHAVIOR & AGGRESSION TOOL COLOR: Green  CIWA SCORE: NA   ABNL VS/O2: VSS on 3L which is baseline  MOBILITY: not OOB, refused to work with PT. Can move self in bed. T/R as pt allows to offload buttocks.  PAIN MANAGMENT: PO and IV dilaudid, pain in Right calf wound  DIET: Reg good appetite  BOWEL/BLADDER: Using urinal  ABNL LAB/BG: Hbg 8.6, WBC 15.1, Cr 2.49, CRP 35.89  DRAIN/DEVICES: PIV IVF NS at 100ml/hr  TELEMETRY RHYTHM: NA  SKIN: bruised, abrasions, scraps, wound to RLE wound care put in orders, RLE elevated on pillow, pt also has boot he wears from PTA.   TESTS/PROCEDURES: US ZHOU this afternoon  D/C DAY/GOALS/PLACE: Pending  OTHER IMPORTANT INFO: CMS cool extremities, weak pulse palpable, discolored/blotchy/ricky, doppler utilized for PT, vascular consulted and ordered US ZHOU.

## 2021-02-09 NOTE — PLAN OF CARE
PT: Attempted to see patient for evaluation; Patient just admitted today and is requesting evaluation be rescheduled for 2/10/21.

## 2021-02-09 NOTE — PROVIDER NOTIFICATION
"MD Notification    Notified Person: MD    Notified Person Name: LORRAINE Putnam PA    Notification Date/Time: 2-9-21 @ 2440    Notification Interaction: Amcom text. Lab called. Unable to do A1C. Result is \"below range\". Stated ran several times on different equipment but either A1C or hgb are low so cannot give a value.    Purpose of Notification: Make PA aware of inability to result the A1C that was ordered.    Orders Received: None at this time.    Comments:    "

## 2021-02-09 NOTE — PROGRESS NOTES
RECEIVING UNIT ED HANDOFF REVIEW    ED Nurse Handoff Report was reviewed by: Denisse Rock RN on February 9, 2021 at 9:10 AM

## 2021-02-09 NOTE — ED NOTES
"[facility]  ED Nurse Handoff Report    ED Chief complaint: Leg Pain      ED Diagnosis:   Final diagnoses:   Cellulitis of right leg   MICHAEL (acute kidney injury) (H)       Code Status: hospitalist to address     Allergies:   Allergies   Allergen Reactions     Darvocet [Propoxyphene N-Apap] Nausea     Vicodin [Hydrocodone-Acetaminophen] Nausea     Can use if he eats with it       Patient Story: Patient comes in from a private home via EMS.  Patient is usually supposed to have home health care daily.  He has not received care in 2 weeks.  Reports not taking medications over those last two weeks.  Right leg wound that was dressed in a bloody dressing.  Dressing removed.  Patient has a wound to his left posterior leg, unknown how long.  Below knee left amputation.    Focused Assessment:  Patient is alert and oriented x4.  Patient is normally wheelchair bound. Patient lives at home alone and normally has home health that checks on him daily for his mediations and dressing changes.  Home health has not seen patient in two weeks.  Patient reports he hasn't eaten or drank much in that time and has not taken his medications.  Patient states he has had \"heart burn\" over the last few days. EKG unremarkable. The right leg dressing was saturated in blood that has dried.  Leg wound is about 12cm in length and is on his posterior calf area.  Patient states he has had this wound \"for a while\" and unsure how it got there.  Wound is pink and moist but not currently bleeding. Patients right foot also appears discolored, patient states it is baseline for him.  Patient on 3L NC normally and has been saturating 98%  Labs Ordered and Resulted from Time of ED Arrival Up to the Time of Departure from the ED   INR - Abnormal; Notable for the following components:       Result Value    INR 1.25 (*)     All other components within normal limits   COMPREHENSIVE METABOLIC PANEL - Abnormal; Notable for the following components:    " Carbon Dioxide 15 (*)     Anion Gap 15 (*)     Glucose 69 (*)     Urea Nitrogen 58 (*)     Creatinine 2.49 (*)     GFR Estimate 25 (*)     GFR Estimate If Black 30 (*)     Calcium 8.4 (*)     Albumin 3.2 (*)     All other components within normal limits   CRP INFLAMMATION - Abnormal; Notable for the following components:    CRP Inflammation 35.8 (*)     All other components within normal limits   ERYTHROCYTE SEDIMENTATION RATE AUTO - Abnormal; Notable for the following components:    Sed Rate 84 (*)     All other components within normal limits   CBC WITH PLATELETS DIFFERENTIAL - Abnormal; Notable for the following components:    WBC 15.1 (*)     RBC Count 2.90 (*)     Hemoglobin 8.6 (*)     Hematocrit 25.4 (*)     RDW 15.2 (*)     Platelet Count 130 (*)     Absolute Neutrophil 8.6 (*)     Absolute Eosinophils 4.0 (*)     All other components within normal limits   LACTIC ACID WHOLE BLOOD   ERYTHROCYTE SEDIMENTATION RATE AUTO   SARS-COV-2 (COVID-19) VIRUS RT-PCR   PERIPHERAL IV CATHETER   ABO/RH TYPE AND SCREEN   BLOOD CULTURE   BLOOD CULTURE         Treatments and/or interventions provided: IV, labs,   Medications   ceFAZolin (ANCEF) 1 g vial to attach to  ml bag for ADULT or 50 ml bag for PEDS (1 g Intravenous New Bag 2/9/21 0824)   HYDROmorphone (PF) (DILAUDID) injection 0.5 mg (0.5 mg Intravenous Given 2/9/21 0638)   0.9% sodium chloride BOLUS (0 mLs Intravenous Stopped 2/9/21 0755)   HYDROmorphone (PF) (DILAUDID) injection 0.5 mg (0.5 mg Intravenous Given 2/9/21 0311)       Patient's response to treatments and/or interventions: tolerated well     To be done/followed up on inpatient unit:  see in patient orders     Does this patient have any cognitive concerns?: none     Activity level - Baseline/Home:  Wheelchair  Activity Level - Current:   Total Care    Patient's Preferred language: English   Needed?: No    Isolation: None  Infection: Not Applicable  Patient tested for COVID 19 prior to  admission: YES  Bariatric?: No    Vital Signs:   Vitals:    02/09/21 0630 02/09/21 0700 02/09/21 0730 02/09/21 0800   BP: (!) 141/64 (!) 141/59 (!) 144/62 123/71   Pulse: 74 75 69 65   Resp: 20 21 21 11   Temp:       TempSrc:       SpO2: 99% 90% 100% 98%       Cardiac Rhythm:     Was the PSS-3 completed:   Yes  What interventions are required if any?               Family Comments: none at bedside.  States he has a brother   OBS brochure/video discussed/provided to patient/family: N/A              Name of person given brochure if not patient:               Relationship to patient:     For the majority of the shift this patient's behavior was Green.   Behavioral interventions performed were .    ED NURSE PHONE NUMBER: [phone]

## 2021-02-09 NOTE — PHARMACY-ADMISSION MEDICATION HISTORY
Pharmacy Medication History  Admission medication history interview status for the 2/9/2021  admission is complete. See EPIC admission navigator for prior to admission medications     Location of Interview: Phone  Medication history sources: Patient and Care Everywhere     In the past week, patient estimated taking medication this percent of the time: less than 50%    Additional medication history information:   -Patients states has not taken medications >2 weeks.      Medication reconciliation completed by provider prior to medication history? No    Time spent in this activity: 10 minutes    Prior to Admission medications    Medication Sig Last Dose Taking? Auth Provider   albuterol (PROAIR HFA/PROVENTIL HFA/VENTOLIN HFA) 108 (90 Base) MCG/ACT inhaler Inhale 2 puffs into the lungs every 4 hours as needed for shortness of breath / dyspnea or wheezing   Unknown, Entered By History   amLODIPine (NORVASC) 5 MG tablet Take 1 tablet (5 mg) by mouth daily   Davonte Naranjo MD   calcium citrate (CITRACAL) 950 MG tablet Take 4 tablets by mouth daily   Unknown, Entered By History   clobetasol (TEMOVATE) 0.05 % external solution Apply topically At Bedtime Apply to scalp   Unknown, Entered By History   cyanocobalamin (VITAMIN B-12) 1000 MCG tablet Take 1 tablet (1,000 mcg) by mouth daily   Lana Stevens APRN CNP   dextromethorphan-guaiFENesin (MUCINEX DM)  MG 12 hr tablet Take 1 tablet by mouth every 12 hours   Lana Stevens APRN CNP   DULoxetine (CYMBALTA) 30 MG capsule Take 60 mg by mouth daily   Unknown, Entered By History   ferrous sulfate (FEROSUL) 325 (65 Fe) MG tablet Take 1 tablet (325 mg) by mouth 3 times daily (with meals)   Lana Stevens APRN CNP   fluticasone-vilanterol (BREO ELLIPTA) 200-25 MCG/INH inhaler Inhale 1 puff into the lungs daily   Lana Stevens APRN CNP   folic acid (FOLVITE) 1 MG tablet Take 1 tablet (1 mg) by mouth daily   Lana Stevens APRN CNP   furosemide (LASIX) 40 MG tablet Take 1  tablet (40 mg) by mouth daily   Neeraj Valdez MD   gabapentin (NEURONTIN) 300 MG capsule Take 1 capsule (300 mg) by mouth 2 times daily   Lana Stevens APRN CNP   hydrocortisone (CORTEF) 20 MG tablet Take 20 mg by mouth every morning   Unknown, Entered By History   HYDROmorphone (DILAUDID) 2 MG tablet Take 0.5 tablets (1 mg) by mouth every 6 hours as needed for severe pain   Lana Stevens APRN CNP   hydrOXYzine (ATARAX) 25 MG tablet Take 1 tablet (25 mg) by mouth every 6 hours as needed for itching   Lana Stevens APRN CNP   lidocaine (XYLOCAINE) 5 % external ointment Apply topically 3 times daily as needed for moderate pain (rib pain)   Unknown, Entered By History   loperamide (IMODIUM) 2 MG capsule Take 1 capsule (2 mg) by mouth 4 times daily as needed for diarrhea   Davonte Naranjo MD   losartan (COZAAR) 25 MG tablet Take 12.5 mg by mouth daily   Unknown, Entered By History   melatonin 3 MG tablet Take 2 tablets (6 mg) by mouth At Bedtime   Lana Stevens APRN CNP   multivitamin w/minerals (THERA-VIT-M) tablet Take 1 tablet by mouth daily   Davonte Naranjo MD   nicotine (NICORETTE) 2 MG gum Place 2 mg inside cheek as needed for smoking cessation   Unknown, Entered By History   omeprazole (PRILOSEC) 20 MG DR capsule Take 20 mg by mouth daily   Unknown, Entered By History   primidone (MYSOLINE) 50 MG tablet Take 50 mg by mouth At Bedtime   Unknown, Entered By History   tamsulosin (FLOMAX) 0.4 MG capsule Take 0.8 mg by mouth daily   Unknown, Entered By History   umeclidinium (INCRUSE ELLIPTA) 62.5 MCG/INH inhaler Inhale 1 puff into the lungs daily   Lana Stevens APRN CNP   vitamin C (ASCORBIC ACID) 1000 MG TABS Take 1 tablet (1,000 mg) by mouth daily   Lana Stevens APRN CNP   vitamin D3 (CHOLECALCIFEROL) 250 mcg (39887 units) capsule Take 1 capsule (250 mcg) by mouth daily   Lana Stevens APRN CNP       The information provided in this note is only as accurate as the sources available at the  time of update(s)

## 2021-02-09 NOTE — ED TRIAGE NOTES
Patient comes in from a private home via EMS.  Patient is usually supposed to have home health care daily.  He has not received care in 2 weeks.  Reports not taking medications over those last two weeks.  Right leg wound that was dressed in a bloody dressing.  Dressing removed.  Patient has a wound to his left posterior leg, unknown how long.  Below knee left amputation.

## 2021-02-09 NOTE — TREATMENT PLAN
Reviewed ZHOU and waveforms, ordered DUS right leg arteries, h/o L BKA  Place Rooke boot R 24/7  Will f/u after DUS  Discussed with US randee Odonnell

## 2021-02-09 NOTE — CONSULTS
"[facility] Nurse Inpatient Assessment   Reason for consultation: Evaluate and treat right LE    Assessment     Right LE tissue stiff and woody, challenging for him to wiggle toes, poor hue to tissue from toes to mid thigh- lightly mottled purplish to some almost greenish look, cool, states he has normal sensation in leg.  Large, partial thickness wound on the lateral to posterior to medial area, serous drainage, painful.  I will order antimicrobial dressings for daily wound care and vascular surgery also consulted for an evaluation       Treatment Plan    Right LE plan of care: Daily    PERFORM \"GOOD FOOT CARE\" AT ALL TIMES:  1. CLEAN feet daily with a gentle soap and water, making sure to clean between the toes! Dry thoroughly.   2. Spray with Gurdeep from toes to knees, massage in, sit x 30 minutes and wipe clean (Gurdeep will \"condition\" ,loosening crusty debris, moisturizing &cleaning)  3. LOTION from toes to knees, not between toes (lotion moisturizes and too much moisture between toes may cause a fungal rash).    4.  Right posterior leg wound-   - clean wound by wetting gauze and soaking with VASHE wound cleasner x 20minutes, pat dry/ clean  - to a Mepilex Lite Dressing apply smear of Iodosorb Gel and press to the wound (today 02-09 the wound is so large I ended up applying gel to the entire dressing)  - secure with Spandage Size 5  5.  FRESH SOCKS every day.  6. Keep nails trimmed and filed to minimize snagging, this could create a wound.    - Follow up with podiatry to trim nails as needed.      Recommended provider order: pt should follow up with podiatry upon discharge to trim/ manage toenails  Orders Written  [facility] Nurse follow-up plan: weekly    Patient History    According to provider note(s):  70 year old male with complex PMHx including severe COPD on 3L home O2 with ongoing tobacco use, adrenal insufficiency on chronic steroids, psoriasis on Humira, chronic Hep C with " "cirrhosis, PVD s/p left BKA, chronic wounds, non-compliance, and recent hospitalization 12/2/20-12/31/20 for pneumonia with RLL abscess, treated non-surgically with prolonged antibiotics who was recently discharged from TCU 2/2/2021, now re-presents to the hospital for evaluation of his chronic RLE wounds, found to have RLE cellulitis as well as MICHAEL. Admitted for further evaluation and treatment.      RLE cellulitis  Chronic right lower extremity wounds   Hx of PVD with L BKA (2010, [facility]): Reports worsening RLE wounds with increased erythema and pain since discharge from TCU. At TCU was receiving daily wound cares, but states since discharge, home cares \"fell through the cracks.\" WBC 15.1; vitals not suggestive of septic picture on admission. Lactic acid 1.1. Received 1 L bolus, IV Ancef, and Dilaudid 0.5 mg X2 in the ED.     Objective Data    Containment of urine/stool: Brief  Active Diet Order:  Orders Placed This Encounter      Combination Diet Regular Diet Adult    Labs:   Recent Labs   Lab 02/09/21  0702 02/09/21  0601   ALBUMIN  --  3.2*   HGB 8.6*  --    INR  --  1.25*   WBC 15.1*  --    CRP  --  35.8*     Output:   No intake/output data recorded.  Risk Assessment:   Sensory Perception: 4-->no impairment  Moisture: 3-->occasionally moist  Activity: 2-->chairfast  Mobility: 3-->slightly limited  Nutrition: 3-->adequate  Friction and Shear: 2-->potential problem  Donn Score: 17    Physical Exam    Right LE assessment   Wound / skin history: chronic perfusion and wound issues. ANGELINA that pt states was due \"to a work accident\" he did not elaborate  Photo date: February 9, 2021            Right LE tissue is very woody and stiff. Initial assessment the tissue was crusted in blood around the lateral ankle area- he has been itching and scratching.    Anterior shin with thick waxy plaques, speckling of thick serous scabs from toes to knee. Large wound on the lateral to posterior to medial, " moist red wound bed, small serous drainage, no odor, complains of pain    Interventions  Current support surface: Standard  Atmos Air mattress,   Current off-loading measures: Pillows under calves and Pillows,   Visual inspection of wound(s) completed with RN  Wound Care: done per plan of care,   Supplies: reviewed, gathered, placed at the bedside, discussed with RN and discussed with patient  Education provided: importance of repositioning, wound progress, Infection prevention , Moisture management, Hygiene, Nail care and Off-loading pressure  Discussed plan of care with Patient and Nurse    Latonya Puckett RN

## 2021-02-09 NOTE — ED NOTES
Bed: ED01  Expected date: 2/9/21  Expected time:   Means of arrival:   Comments:  [id number] 70m leg pain/inf 0550

## 2021-02-09 NOTE — H&P
"[facility]    History and Physical - Hospitalist Service       Date of Admission:  2/9/2021    Assessment & Plan   Perfecto Reese is a 70 year old male with complex PMHx including severe COPD on 3L home O2 with ongoing tobacco use, adrenal insufficiency on chronic steroids, psoriasis on Humira, chronic Hep C with cirrhosis, PVD s/p left BKA, chronic wounds, non-compliance, and recent hospitalization 12/2/20-12/31/20 for pneumonia with RLL abscess, treated non-surgically with prolonged antibiotics who was recently discharged from TCU 2/2/2021, now re-presents to the hospital for evaluation of his chronic RLE wounds, found to have RLE cellulitis as well as MICHAEL. Admitted for further evaluation and treatment.     RLE cellulitis  Chronic right lower extremity wounds   Hx of PVD with L BKA (2010, [facility]): Reports worsening RLE wounds with increased erythema and pain since discharge from TCU. At TCU was receiving daily wound cares, but states since discharge, home cares \"fell through the cracks.\" WBC 15.1; vitals not suggestive of septic picture on admission. Lactic acid 1.1. Received 1 L bolus, IV Ancef, and Dilaudid 0.5 mg X2 in the ED.   - Admit under inpatient status   - Continue IV Ancef   - Vascular Surgery consulted; no recent vascular evaluation/imaging noted in chart.  - Wound nurse consulted, appreciate recommendations (see wound care recommendations from TCU discharge below)   - Demarcate cellulitis area   - Pain control with tylenol PRN, dilaudid 1-2 mg q4 hrs, and IV dilaudid 0.3-0.5 mg q2 hrs; monitor for sedation. Judicious use of narcotics   - Monitor fever curve   - Follow blood cultures   - Continue vitamin D to 10,000U daily, folic acid 1000mcg daily, vitamin B12 1000mcg daily for wound healing  - Lipid profile in the AM   - Note pt taken off ASA at TCU due to AoCD and chronic thrombocytopenia     *RLE  Wounds: Daily and PRN   1. Cleanse with Vashe " "moistened gauze to wound bed and let sit x 5 minutes - do not rinse. #[id number]  2. Dry and protect surrounding skin with no sting barrier film wipe #[id number],  3. Apply sween 24 to all intact skin from base of toes to below knees.  4. Cover wound with VASHE moistened gauze  5. Cover with ABD   6. Secure with kerlix and tape  7. apply offloading boot    MICHAEL on CKD III: Baseline creatinine 1.2-1.3 in Jan 2020, on admission 2.49. Pt reports poor oral intake since discharge from TCU; unable to care for self. Reports baseline diarrhea ongoing \"for a while.\" Received 1 L bolus IVF in the ED. Note creatinine 1.53 on 1/27/21.  - Continue 0.9% NS at 100 ccs/hr  - Follow BMP   - Monitor urine output closely   - Avoid nephrotoxic agents   - Note last hospitalization, Nephrology followed patient (signed off 12/16)   - Pt had been discharged on Losartan 25 mg daily and Lasix 40 mg po every day; not taking since discharge from TCU. Hold off on re-initiation for now given MICHAEL.    Chronic hypoxic and hypercarbic respiratory failure in the setting of COPD utilizing 3 LPM via NC at baseline, not in acute exacerbation:   - Resume PTA inhalers and nebs once verified; discharged with mucinex 600 mg BID, Breo 200-25 mcg 1 puff daily, incruse ellipta 62.5 mcg 1 puff daily, albuterol PRN   - Encourage pulmonary toilet     Recent hospitalization 12/2/2020-12/31/2020 for:  Acute on chronic hypoxic and hypercarbic respiratory failure  Bilateral community-acquired bacterial pneumonia with RLL abscess   Musculoskeletal chest pain   Admitted with fever, cough, and acute respiratory distress (placed on 10L per OM in the field). Fever to 102.5, tachycardia, tachypnea, and leukocytosis (17.2k) present on arrival. CXR on arrival showed RLL infiltrate and CT abd/pelvis showed air fluid cavity at the right lung base. On admission, he was initiated on IV pip-tazo and azithromycin, and Thoracic Surgery was consulted. Chest CT was ordered (12/3) which showed " a 6.4 cm RLL pulmonary abscess and extensive left-sided mucus plugging with associated infiltrate. 12/2 blood cultures x2 no growth. Sputum cx 12/4 growing proteus, fluoroquinolone resistant. Also corynebacterium striatum 12/6. CT surgery saw, no surgical intervention indicated. Patient was treated with IV then PO antibiotics, with the assistance of infectious disease. Initially on zosyn -> unasyn -> transitioned 12/8 to Augmentin x 6 weeks (through approximately 1/13/2021).  - Infectious disease peripherally followed during hospitalization   - Repeat CT scan 1/12/21 with improved consolidation in the inferior right and lower lobes consistent with pneumonia, significant interval decrease in size of a presumed intrapulmonary abscess in the inferior right middle lobe (not completely resolved), moderate sized pleural effusion, slightly larger on the right.   - O2 at baseline of 3 lpm, pt without any worsening respiratory sx, fevers. Reports cough with yellowish sputum     Pleural effusion: R>L. Noted on CT 1/12/2021.   - O2 at baseline of 3 lpm, pt without any worsening respiratory sx, fevers. Reports cough with yellowish sputum   - Would re-image if respiratory status worsened      Chronic normocytic anemia, likely ACD  Chronic thrombocytopenia  Anemia and thrombocytopenia likely multifactorial from liver cirrhosis, splenomegaly, renal disease, CKD and psoriasis. Iron labs suggestive of anemia of chronic disease. 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 Patient required four units of pRBCs this admission for Hgb < 7.0. No acute GI bleed identified, likely blood loss from decreased erythropoiesis. Appreciate GI consult 12/16/2020, no acute GI bleed, no acute interventions, [facility] Recheck hgb 7.5 12/29 and again 7.9 on 1/4/21. Hgb >7 since TCU admission. Discontinued aspirin due to risks at TCU.  Occult stool was positive on 1/15/21. Patient was NOT interested in colonoscopy at that time  - Monitor Hgb, pt denies any active signs of bleeding on admission.     Recent Labs   Lab 02/09/21  0702   HGB 8.6*      Chronic adrenal insufficiency:   - Continues on PTA hydrocortisone 20 mg PO daily  - Consider stress dose steroids if pt were to become hypotensive     Essential hypertension: Amlodipine decreased to 5mg daily due to soft blood pressures during last hospital stay, Losartan decreased to 25 mg po every day.   - Resume Norvasc, hold Losartan given MICHAEL       History of psoriasis   - PTA Humira held at discharge and not restarted upon discharge from TCU. Restart once current cellulitis has been appropriately treated.       Chronic pain syndrome: Note that upon hospital discharge, regimen consisted of gabapentin 300 mg TID, duloxetine 30 mg daily, lidocaine patch, oxycodone 5 mg q6h prn, morphine 2.5 mg BID prn. Upon TCU discharge, pain regimen with gabapentin 300 mg BID (renally dosed), duloxetine 60 mg daily (recently increased on 1/12/21 for pain complaints and also to assist with mood), lidocaine topical, dilaudid 1 mg q6 hrs PRN.   - Analgesic regimen as above      Mild protein-calorie malnutrition: Acute on chronic. Patient receiving Ensure TID PRN at TCU   - Nutrition consult   - Continue Ensure TID PRN  -  Continue vitamin D to 10,000 daily  - Continue folic acid daily  - Continue vitamin B12 daily    Major depressive disorder: Secondary to general medical condition suspected. Seen by Psychiatry during most recent hospitalization.   - Cymbalta increased to 60 mg every day and melatonin 3 mg at bedtime added     Insomnia: PRN Melatonin, was getting up to 6 mg at bedtime at TCU     Physical deconditioning: Was in TCU from 12/31/20-2/2/21. Lives alone in apartment. Friend helps patient get groceries. At baseline is WC bound with [employer] for transfers. Pt was reportedly discharged with home cares which never  "followed up with patient. halfway setting was suggested, but patient declined.   - PT/OT/SW     Hx of non-compliance: Noted in chart. Pt reports that he has been off of ALL medications since discharge from TCU. Indicates needing help setting up meds by homecare RN who has historically helped in the past. He states homecare \"fell through the cracks\" upon discharge from TCU and he tried calling every day to get it set up.   - SW consulted   - Pt seen by Psychiatry during last hospitalization to determine decision making capacity and was deemed decisional. See note by Dr. Parrish from 12/21/20. I agree with this today on my interview; pt is oriented X4 although I do question his insight given the fact that he has gone ~1 week without taking any medications and does not appear to be taking care or himself at home (reliant on friends for groceries, friend just brought groceries yesterday, otherwise patient has had minimal PO intake per report).     Covid status: Negative per admission PCR. Asymptomatic.      Diet: Combination Diet Regular Diet Adult  Snacks/Supplements Pediatric: Other - Please comment; Ensure TID PRN; Between Meals Regular diet   DVT Prophylaxis: Pneumatic Compression Devices  Bruce Catheter: not present  Code Status: Full Code Full Code, confirmed with patient at bedside. Patient does not have formal living will or documentation regarding HCA, but reports that if he were to be unable to make decisions for himself, his brother, Jose Reese, would be his H[facility] of choice.        Disposition Plan   Expected discharge: 2 - 3 days, recommended to transitional care unit once adequate pain management/ tolerating PO medications, antibiotic plan established, renal function improved and safe disposition plan/ TCU bed available.  Entered: Mary Putnam PA-C 02/09/2021, 10:26 AM     The patient's care was discussed with the Attending Physician, Dr. Gastelum, Bedside Nurse and Patient.    Mary" "Marlys Putnam PA-C  [facility]  Contact information available via [facility] Paging/Directory  ______________________________________________________________________    Chief Complaint   Wound check    History is obtained from the patient    History of Present Illness   Perfecto Reese is a 70 year old male with complex PMHx including severe COPD on 3L home O2 with ongoing tobacco use, adrenal insufficiency on chronic steroids, psoriasis on Humira, chronic Hep C with cirrhosis, PVD s/p left BKA, chronic wounds, non-compliance, and recent hospitalization 12/2/20-12/31/20 for pneumonia with RLL abscess, treated non-surgically with prolonged antibiotics who was recently discharged from TCU 2/2/2021, now re-presents to the hospital for evaluation of his chronic RLE wounds, found to have RLE cellulitis as well as MICHAEL. Admitted for further evaluation and treatment.     Reports worsening RLE wounds with increased erythema and pain since discharge from TCU. At TCU was receiving daily wound cares, but states since discharge, home cares \"fell through the cracks.\" WBC 15.1; vitals not suggestive of septic picture on admission. Lactic acid 1.1. Received 1 L bolus, IV Ancef, and Dilaudid 0.5 mg X2 in the ED.     Please refer to above A&P for specific details regarding acute issues. Patient confirms the hx above. Reports that since discharge from TCU he returned to his apartment where he lives alone.   He has not been taking any medications since return home.  Indicates needing help setting up meds by homecare RN who has historically helped in the past. He states homecare \"fell through the cracks\" upon discharge from TCU and he tried calling every day to get it set up.     Denies any recent trauma or falls. No reported fevers at home. No baseline neuropathy. No recent vascular evaluation. Was taken off ASA at TCU due to persistent AoCD and thrombocytopenia. No recent lipid profile or ABIs on " file.     Review of Systems    The 10 point Review of Systems is negative other than noted in the HPI.    Past Medical History    I have reviewed this patient's medical history and updated it with pertinent information if needed.   Past Medical History:   Diagnosis Date     Adrenal insufficiency (H)      Anemia of chronic disease      Anemia, iron deficiency      Back pain      Benign essential hypertension      Chronic respiratory failure with hypoxia, on home O2 therapy (H)     Secondary to COPD     CKD (chronic kidney disease), stage III      COPD (chronic obstructive pulmonary disease) (H)     dependent on 3 LPM via NC chronically     Depression      GERD (gastroesophageal reflux disease)      Hx of BKA, left (H)      Hyperlipidemia      Migraine      Non compliance w medication regimen      Pain syndrome, chronic      Primary insomnia      Psoriasis     on Humira     PVD (peripheral vascular disease) (H)      Past Surgical History   I have reviewed this patient's surgical history and updated it with pertinent information if needed.  Past Surgical History:   Procedure Laterality Date     AMPUTATION BELOW KNEE RT/LT Left      AS ARTHROSCOPY SUBTALAR JOINT SUBTALAR ARTHRODESIS       OPEN REDUCTION INTERNAL FIXATION FOREARM      left forearm/wrist     TONSILLECTOMY       Social History   I have reviewed this patient's social history and updated it with pertinent information if needed.  Social History     Tobacco Use     Smoking status: Former Smoker     Packs/day: 0.50     Years: 30.00     Pack years: 15.00     Types: Cigarettes     Quit date: 4/1/2017     Years since quitting: 3.8     Smokeless tobacco: Never Used     Tobacco comment: states he is down to 3QD   Substance Use Topics     Alcohol use: No     Drug use: No     Family History   I have reviewed this patient's family history and updated it with pertinent information if needed.  Family History   Problem Relation Age of Onset     Colon Cancer Father       Coronary Artery Disease Father      Chronic Obstructive Pulmonary Disease Father      Osteoporosis Mother      Dementia Mother      Prior to Admission Medications   Prior to Admission Medications   Prescriptions Last Dose Informant Patient Reported? Taking?   DULoxetine (CYMBALTA) 30 MG capsule   Yes No   Sig: Take 60 mg by mouth daily   HYDROmorphone (DILAUDID) 2 MG tablet   No No   Sig: Take 0.5 tablets (1 mg) by mouth every 6 hours as needed for severe pain   albuterol (PROAIR HFA/PROVENTIL HFA/VENTOLIN HFA) 108 (90 Base) MCG/ACT inhaler   Yes No   Sig: Inhale 2 puffs into the lungs every 4 hours as needed for shortness of breath / dyspnea or wheezing   amLODIPine (NORVASC) 5 MG tablet   No No   Sig: Take 1 tablet (5 mg) by mouth daily   calcium citrate (CITRACAL) 950 MG tablet   Yes No   Sig: Take 4 tablets by mouth daily   clobetasol (TEMOVATE) 0.05 % external solution   Yes No   Sig: Apply topically At Bedtime Apply to scalp   cyanocobalamin (VITAMIN B-12) 1000 MCG tablet   No No   Sig: Take 1 tablet (1,000 mcg) by mouth daily   dextromethorphan-guaiFENesin (MUCINEX DM)  MG 12 hr tablet   No No   Sig: Take 1 tablet by mouth every 12 hours   ferrous sulfate (FEROSUL) 325 (65 Fe) MG tablet   No No   Sig: Take 1 tablet (325 mg) by mouth 3 times daily (with meals)   fluticasone-vilanterol (BREO ELLIPTA) 200-25 MCG/INH inhaler   No No   Sig: Inhale 1 puff into the lungs daily   folic acid (FOLVITE) 1 MG tablet   No No   Sig: Take 1 tablet (1 mg) by mouth daily   furosemide (LASIX) 40 MG tablet   No No   Sig: Take 1 tablet (40 mg) by mouth daily   gabapentin (NEURONTIN) 300 MG capsule   No No   Sig: Take 1 capsule (300 mg) by mouth 2 times daily   hydrOXYzine (ATARAX) 25 MG tablet   No No   Sig: Take 1 tablet (25 mg) by mouth every 6 hours as needed for itching   hydrocortisone (CORTEF) 20 MG tablet   Yes No   Sig: Take 20 mg by mouth every morning   lidocaine (XYLOCAINE) 5 % external ointment   Yes No   Sig:  Apply topically 3 times daily as needed for moderate pain (rib pain)   loperamide (IMODIUM) 2 MG capsule   No No   Sig: Take 1 capsule (2 mg) by mouth 4 times daily as needed for diarrhea   losartan (COZAAR) 25 MG tablet   Yes No   Sig: Take 12.5 mg by mouth daily   melatonin 3 MG tablet   No No   Sig: Take 2 tablets (6 mg) by mouth At Bedtime   multivitamin w/minerals (THERA-VIT-M) tablet   No No   Sig: Take 1 tablet by mouth daily   nicotine (NICORETTE) 2 MG gum   Yes No   Sig: Place 2 mg inside cheek as needed for smoking cessation   omeprazole (PRILOSEC) 20 MG DR capsule   Yes No   Sig: Take 20 mg by mouth daily   primidone (MYSOLINE) 50 MG tablet   Yes No   Sig: Take 50 mg by mouth At Bedtime   tamsulosin (FLOMAX) 0.4 MG capsule   Yes No   Sig: Take 0.8 mg by mouth daily   umeclidinium (INCRUSE ELLIPTA) 62.5 MCG/INH inhaler   No No   Sig: Inhale 1 puff into the lungs daily   vitamin C (ASCORBIC ACID) 1000 MG TABS   No No   Sig: Take 1 tablet (1,000 mg) by mouth daily   vitamin D3 (CHOLECALCIFEROL) 250 mcg (17577 units) capsule   No No   Sig: Take 1 capsule (250 mcg) by mouth daily      Facility-Administered Medications: None     Allergies   Allergies   Allergen Reactions     Darvocet [Propoxyphene N-Apap] Nausea     Vicodin [Hydrocodone-Acetaminophen] Nausea     Can use if he eats with it     Physical Exam   Vital Signs: Temp: 98.4  F (36.9  C) Temp src: Oral BP: (!) 148/54 Pulse: 63   Resp: 20 SpO2: 99 % O2 Device: Nasal cannula Oxygen Delivery: 3 LPM  Weight: 124 lbs 6.4 oz    CONSTITUTIONAL: Pt laying in bed, dressed in hospital garb. Appears comfortable. Cooperative with interview.   HEENT: Normocephalic, atraumatic.   CARDIOVASCULAR: RRR, no murmurs, rubs, or extra heart sounds appreciated. DP pulse palpable, but weaker on RLE, popliteal pulse 2/4.   RESPIRATORY: No increased work of breathing.  Supplemental oxygenation via NC at 3 LPM.  CTA, bilat; no wheezes, rales, or rhonchi  appreciated.  GASTROINTESTINAL:  Abdomen soft, non-distended. BS auscultated in all four quadrants. Negative for tenderness to palpation.  No masses or organomegaly noted.  MUSCULOSKELETAL: Left BKA noted. No gross deformities noted. Normal muscle tone.   HEMATOLOGIC/LYMPHATIC/IMMUNOLOGIC: Negative for lower extremity edema in RLE.   NEUROLOGIC: Alert and oriented to person, place, and time.  strength intact. No focal neuro deficits.    SKIN: RLE with chronic wounds and surrounding cellulitis of calf spanning to shin. Note pt is without hair on RLE.     Data   Data reviewed today: I reviewed all medications, new labs and imaging results over the last 24 hours. I personally reviewed all labs and imaging to date.     Recent Labs   Lab 02/09/21  0702 02/09/21  0601   WBC 15.1*  --    HGB 8.6*  --    MCV 88  --    *  --    INR  --  1.25*   NA  --  137   POTASSIUM  --  4.3   CHLORIDE  --  107   CO2  --  15*   BUN  --  58*   CR  --  2.49*   ANIONGAP  --  15*   MARTINE  --  8.4*   GLC  --  69*   ALBUMIN  --  3.2*   PROTTOTAL  --  7.9   BILITOTAL  --  0.5   ALKPHOS  --  79   ALT  --  21   AST  --  23     No results found for this or any previous visit (from the past 24 hour(s)).

## 2021-02-09 NOTE — CONSULTS
[facility] PATIENT CONSULTATION NOTE  Consulted by: Mary Putnam PA-C of the hospitalist service  Reason for consultation: Hx of PVD, chronic RLE wounds with surrounding cellulities; no recent vascular eval. Hx of L BKA ([place] 2010)    HPI:  Perfecto Reese is a 70 year old year old male who has a PMH significant for severe COPD on 3L home O2 with ongoing tobacco use, adrenal insufficiency on chronic steroids, psoriasis on Humira, chronic Hep C with cirrhosis, PVD s/p left BKA, chronic wounds, non-compliance, and recent hospitalization 12/2/20-12/31/20 for pneumonia with RLL abscess, treated non-surgically with prolonged antibiotics who was recently discharged from TCU 2/2/2021, now re-presents to the hospital for evaluation of his chronic RLE wounds, found to have RLE cellulitis as well as MICHAEL. Pt was admitted to the hospitalist service for further evaluation and treatment. Vascular surgery was consulted to assess for PVD.    Today, pt is found resting in bed. Pt confirms Dr. Braswell (orthopedic surgeon) at [facility] did his left BKA in 2010. He has not followed with him recently. He has not been seen by a vascular surgeon recently. He says his left stump is fine. His right foot is extremely painful. The pain is mildly worse than the past few weeks. He can move his right toes on command.       Review Of Systems:   General: Denies F/C  Respiratory: Denies increased SOB  Cardio: Denies CP  Gastrointestinal: Denies N/V  Genitourinary: Denies recent change in urination  Musculoskeletal: See HPI  Neurologic: Denies HA  Psychiatric: Denies confusion  Hematology/immunology: no unexpected bruising  Eyes: no acute changes in vision  ENT: Denies trauma    PAST MEDICAL HISTORY:  Past Medical History:   Diagnosis Date     Adrenal insufficiency (H)      Anemia of chronic disease      Anemia, iron deficiency      Back pain      Benign essential hypertension      Chronic respiratory failure with  hypoxia, on home O2 therapy (H)     Secondary to COPD     CKD (chronic kidney disease), stage III      COPD (chronic obstructive pulmonary disease) (H)     dependent on 3 LPM via NC chronically     Depression      GERD (gastroesophageal reflux disease)      Hx of BKA, left (H)      Hyperlipidemia      Migraine      Non compliance w medication regimen      Pain syndrome, chronic      Primary insomnia      Psoriasis     on Humira     PVD (peripheral vascular disease) (H)      PAST SURGICAL HISTORY:  Past Surgical History:   Procedure Laterality Date     AMPUTATION BELOW KNEE RT/LT Left      AS ARTHROSCOPY SUBTALAR JOINT SUBTALAR ARTHRODESIS       EXTERNAL EAR SURGERY Left      OPEN REDUCTION INTERNAL FIXATION FOREARM      left forearm/wrist     TONSILLECTOMY       FAMILY HISTORY:  Family History   Problem Relation Age of Onset     Colon Cancer Father      Coronary Artery Disease Father      Chronic Obstructive Pulmonary Disease Father      Osteoporosis Mother      Dementia Mother      SOCIAL HISTORY:   Social History     Tobacco Use     Smoking status: Former Smoker     Packs/day: 0.50     Years: 30.00     Pack years: 15.00     Types: Cigarettes     Quit date: 4/1/2017     Years since quitting: 3.8     Smokeless tobacco: Never Used     Tobacco comment: states he is down to 3QD   Substance Use Topics     Alcohol use: No     HOME MEDICATIONS:  Prior to Admission medications    Medication Sig Start Date End Date Taking? Authorizing Provider   albuterol (PROAIR HFA/PROVENTIL HFA/VENTOLIN HFA) 108 (90 Base) MCG/ACT inhaler Inhale 2 puffs into the lungs every 4 hours as needed for shortness of breath / dyspnea or wheezing    Unknown, Entered By History   amLODIPine (NORVASC) 5 MG tablet Take 1 tablet (5 mg) by mouth daily 12/31/20   Davonte Naranjo MD   calcium citrate (CITRACAL) 950 MG tablet Take 4 tablets by mouth daily    Unknown, Entered By History   clobetasol (TEMOVATE) 0.05 % external solution Apply topically At  Bedtime Apply to scalp    Unknown, Entered By History   cyanocobalamin (VITAMIN B-12) 1000 MCG tablet Take 1 tablet (1,000 mcg) by mouth daily 1/15/21   Lana Stevens APRN CNP   dextromethorphan-guaiFENesin (MUCINEX DM)  MG 12 hr tablet Take 1 tablet by mouth every 12 hours 1/5/21   Lana Stevens APRN CNP   DULoxetine (CYMBALTA) 30 MG capsule Take 60 mg by mouth daily    Unknown, Entered By History   ferrous sulfate (FEROSUL) 325 (65 Fe) MG tablet Take 1 tablet (325 mg) by mouth 3 times daily (with meals) 1/15/21   Lana Stevens APRN CNP   fluticasone-vilanterol (BREO ELLIPTA) 200-25 MCG/INH inhaler Inhale 1 puff into the lungs daily 1/5/21   Lana Stevens APRN CNP   folic acid (FOLVITE) 1 MG tablet Take 1 tablet (1 mg) by mouth daily 1/15/21   Lana Stevens APRN CNP   furosemide (LASIX) 40 MG tablet Take 1 tablet (40 mg) by mouth daily 12/17/20   Neeraj Valdez MD   gabapentin (NEURONTIN) 300 MG capsule Take 1 capsule (300 mg) by mouth 2 times daily 1/15/21   Lana Stevens APRN CNP   hydrocortisone (CORTEF) 20 MG tablet Take 20 mg by mouth every morning    Unknown, Entered By History   HYDROmorphone (DILAUDID) 2 MG tablet Take 0.5 tablets (1 mg) by mouth every 6 hours as needed for severe pain 1/19/21   Lana Stevens APRN CNP   hydrOXYzine (ATARAX) 25 MG tablet Take 1 tablet (25 mg) by mouth every 6 hours as needed for itching 1/15/21   Lana Stevens APRN CNP   lidocaine (XYLOCAINE) 5 % external ointment Apply topically 3 times daily as needed for moderate pain (rib pain)    Unknown, Entered By History   loperamide (IMODIUM) 2 MG capsule Take 1 capsule (2 mg) by mouth 4 times daily as needed for diarrhea 12/31/20   Davonte Naranjo MD   losartan (COZAAR) 25 MG tablet Take 12.5 mg by mouth daily    Unknown, Entered By History   melatonin 3 MG tablet Take 2 tablets (6 mg) by mouth At Bedtime 1/5/21   Lana Stevens APRN CNP   multivitamin w/minerals (THERA-VIT-M) tablet Take 1 tablet by mouth  "daily 12/31/20   Davonte Naranjo MD   nicotine (NICORETTE) 2 MG gum Place 2 mg inside cheek as needed for smoking cessation    Unknown, Entered By History   omeprazole (PRILOSEC) 20 MG DR capsule Take 20 mg by mouth daily    Unknown, Entered By History   primidone (MYSOLINE) 50 MG tablet Take 50 mg by mouth At Bedtime    Unknown, Entered By History   tamsulosin (FLOMAX) 0.4 MG capsule Take 0.8 mg by mouth daily    Unknown, Entered By History   umeclidinium (INCRUSE ELLIPTA) 62.5 MCG/INH inhaler Inhale 1 puff into the lungs daily 1/5/21   Lana Stevens APRN CNP   vitamin C (ASCORBIC ACID) 1000 MG TABS Take 1 tablet (1,000 mg) by mouth daily 1/15/21   Lana Stevens APRN CNP   vitamin D3 (CHOLECALCIFEROL) 250 mcg (83415 units) capsule Take 1 capsule (250 mcg) by mouth daily 1/15/21   Lana Stevens APRN CNP     VITAL SIGNS:  BP (!) 148/54 (BP Location: Left arm)   Pulse 63   Temp 98.4  F (36.9  C) (Oral)   Resp 20   Ht 1.651 m (5' 5\")   Wt 56.4 kg (124 lb 6.4 oz)   SpO2 99%   BMI 20.70 kg/m      Intake/Output Summary (Last 24 hours) at 2/9/2021 1357  Last data filed at 2/9/2021 1156  Gross per 24 hour   Intake 500 ml   Output 100 ml   Net 400 ml     Labs:  ROUTINE IP LABS (Last four results)  BMP  Recent Labs   Lab 02/09/21  0601      POTASSIUM 4.3   CHLORIDE 107   MARTINE 8.4*   CO2 15*   BUN 58*   CR 2.49*   GLC 69*     CBC  Recent Labs   Lab 02/09/21  0702   WBC 15.1*   RBC 2.90*   HGB 8.6*   HCT 25.4*   MCV 88   MCH 29.7   MCHC 33.9   RDW 15.2*   *     INR  Recent Labs   Lab 02/09/21  0601   INR 1.25*     PHYSICAL EXAM:  Constitutional: alert, no acute distress and cooperative   Cardiovascular: RRR  Respiratory:  breathing unlabored without secondary muscle use, on O2 via nasal cannula  Psychiatric: mentation appears normal and affect normal/bright  Neck: no asymmetry  GI/Abdomen: abdomen soft, non-tender  MSK: able to move all extremities on command without new weakness. Left BKA stump " C/D/I  Hematology: no bruising on visible skin  Lymph: no LE edema noted, no venous stasis dermatitis noted  Rheum: no redness, warmth, or swelling of the visible joints. Full joint range of motion noted  Vascular: Right DP and popliteal pulse palpable, Multiphasic signal in right PT and DP    Right foot:      Patient Active Problem List   Diagnosis     Acute renal injury (H)     Pneumonia of right lower lobe due to infectious organism     Diarrhea, unspecified type     Iron deficiency anemia, unspecified     Cervical spondylosis with myelopathy     Chronic obstructive pulmonary disease (H)     Chronic pain     Depressive disorder     Esophageal reflux     Hepatic cirrhosis (H)     Hepatitis C, chronic (H)     History of lower limb amputation (H)     Hyperlipidemia     Iatrogenic adrenal insufficiency (H)     Insomnia, unspecified     Low back pain     Malaise and fatigue     Migraine headache     Opioid dependence (H)     Other psoriasis     Pain in joint, ankle and foot     Personal history of tobacco use, presenting hazards to health     Phantom limb (H)     Shoulder pain     Thrombocytopenia (H)     MICHAEL (acute kidney injury) (H)     Cellulitis of right leg       ASSESSMENT:  70 year old year old male who has a PMH significant for severe COPD on 3L home O2 with ongoing tobacco use, adrenal insufficiency on chronic steroids, psoriasis on Humira, chronic Hep C with cirrhosis, PVD s/p left BKA, chronic wounds, non-compliance, and recent hospitalization 12/2/20-12/31/20 for pneumonia with RLL abscess, treated non-surgically with prolonged antibiotics who was recently discharged from TCU 2/2/2021, now re-presents to the hospital for evaluation of his chronic RLE wounds, found to have RLE cellulitis as well as MICHAEL.     PLAN:  -Right DP and popliteal pulses palpable  -Unable to do ABIs due to wounds, unable to do angio/CTA due to MICHAEL on CKD  -Given palpable pulses and inability to do other non-invasive studies, will do  toe pressures on right foot for objective surrogate for RLE peripheral blood flow  --Further recs once this is back  -Would recommend Dr. Odonnell to see pt for RLE wounds. Please call his office at [phone] to inform him    Discussed pt history, exam, assessment and plan with Dr. Reyes of the vascular surgery service, who is in agreement with the above.    Yesica Stover PA-C   Division of Vascular Surgery   Pager: [phone]    Vascular surgery attending staff note: I have seen and examined the patient myself.  I agree with the documentation by our advanced provider, Ms. Stover.  This is a 70-year-old male with previous history of left below the knee amputation due to trauma.  He was admitted with right lower extremity wounds.  On my examination he has palpable right femoral right popliteal and dorsalis pedis pulses.  He has strong triphasic signals in the dorsalis pedis and posterior tibial arteries.  His ankle-brachial indices are normal.  Toe pressure is adequate for wound healing.    I do not believe his wounds are due to arterial insufficiency.  Would advise patient be evaluated by the wound service.  Nothing further to offer from vascular surgery.  We appreciate the consult.  We will sign off.    LAURA REYES M.D.

## 2021-02-10 ENCOUNTER — APPOINTMENT (OUTPATIENT)
Dept: PHYSICAL THERAPY | Facility: CLINIC | Age: 71
DRG: 602 | End: 2021-02-10
Attending: PHYSICIAN ASSISTANT
Payer: MEDICARE

## 2021-02-10 ENCOUNTER — APPOINTMENT (OUTPATIENT)
Dept: OCCUPATIONAL THERAPY | Facility: CLINIC | Age: 71
DRG: 602 | End: 2021-02-10
Attending: PHYSICIAN ASSISTANT
Payer: MEDICARE

## 2021-02-10 LAB
ANION GAP SERPL CALCULATED.3IONS-SCNC: 6 MMOL/L (ref 3–14)
BASOPHILS # BLD AUTO: 0 10E9/L (ref 0–0.2)
BASOPHILS NFR BLD AUTO: 0.4 %
BUN SERPL-MCNC: 45 MG/DL (ref 7–30)
CALCIUM SERPL-MCNC: 7.9 MG/DL (ref 8.5–10.1)
CHLORIDE SERPL-SCNC: 117 MMOL/L (ref 94–109)
CHOLEST SERPL-MCNC: 106 MG/DL
CO2 SERPL-SCNC: 20 MMOL/L (ref 20–32)
CREAT SERPL-MCNC: 1.72 MG/DL (ref 0.66–1.25)
DIFFERENTIAL METHOD BLD: ABNORMAL
EOSINOPHIL # BLD AUTO: 1.6 10E9/L (ref 0–0.7)
EOSINOPHIL NFR BLD AUTO: 21.5 %
ERYTHROCYTE [DISTWIDTH] IN BLOOD BY AUTOMATED COUNT: 15.7 % (ref 10–15)
ERYTHROCYTE [DISTWIDTH] IN BLOOD BY AUTOMATED COUNT: 15.8 % (ref 10–15)
GFR SERPL CREATININE-BSD FRML MDRD: 40 ML/MIN/{1.73_M2}
GLUCOSE SERPL-MCNC: 149 MG/DL (ref 70–99)
HCT VFR BLD AUTO: 21.6 % (ref 40–53)
HCT VFR BLD AUTO: 21.8 % (ref 40–53)
HDLC SERPL-MCNC: 37 MG/DL
HGB BLD-MCNC: 7.1 G/DL (ref 13.3–17.7)
HGB BLD-MCNC: 7.2 G/DL (ref 13.3–17.7)
IMM GRANULOCYTES # BLD: 0 10E9/L (ref 0–0.4)
IMM GRANULOCYTES NFR BLD: 0.4 %
LDLC SERPL CALC-MCNC: 33 MG/DL
LYMPHOCYTES # BLD AUTO: 0.6 10E9/L (ref 0.8–5.3)
LYMPHOCYTES NFR BLD AUTO: 8.9 %
MCH RBC QN AUTO: 29.8 PG (ref 26.5–33)
MCH RBC QN AUTO: 30 PG (ref 26.5–33)
MCHC RBC AUTO-ENTMCNC: 32.9 G/DL (ref 31.5–36.5)
MCHC RBC AUTO-ENTMCNC: 33 G/DL (ref 31.5–36.5)
MCV RBC AUTO: 91 FL (ref 78–100)
MCV RBC AUTO: 91 FL (ref 78–100)
MONOCYTES # BLD AUTO: 0.4 10E9/L (ref 0–1.3)
MONOCYTES NFR BLD AUTO: 5 %
NEUTROPHILS # BLD AUTO: 4.6 10E9/L (ref 1.6–8.3)
NEUTROPHILS NFR BLD AUTO: 63.8 %
NONHDLC SERPL-MCNC: 69 MG/DL
NRBC # BLD AUTO: 0 10*3/UL
NRBC BLD AUTO-RTO: 0 /100
PLATELET # BLD AUTO: 69 10E9/L (ref 150–450)
PLATELET # BLD AUTO: 72 10E9/L (ref 150–450)
POTASSIUM SERPL-SCNC: 4.1 MMOL/L (ref 3.4–5.3)
RBC # BLD AUTO: 2.38 10E12/L (ref 4.4–5.9)
RBC # BLD AUTO: 2.4 10E12/L (ref 4.4–5.9)
SODIUM SERPL-SCNC: 143 MMOL/L (ref 133–144)
TRIGL SERPL-MCNC: 182 MG/DL
WBC # BLD AUTO: 6.8 10E9/L (ref 4–11)
WBC # BLD AUTO: 7.2 10E9/L (ref 4–11)

## 2021-02-10 PROCEDURE — 258N000003 HC RX IP 258 OP 636: Performed by: PHYSICIAN ASSISTANT

## 2021-02-10 PROCEDURE — 36415 COLL VENOUS BLD VENIPUNCTURE: CPT | Performed by: PHYSICIAN ASSISTANT

## 2021-02-10 PROCEDURE — 250N000013 HC RX MED GY IP 250 OP 250 PS 637: Performed by: PHYSICIAN ASSISTANT

## 2021-02-10 PROCEDURE — 99233 SBSQ HOSP IP/OBS HIGH 50: CPT | Performed by: INTERNAL MEDICINE

## 2021-02-10 PROCEDURE — 80061 LIPID PANEL: CPT | Performed by: PHYSICIAN ASSISTANT

## 2021-02-10 PROCEDURE — 250N000011 HC RX IP 250 OP 636: Performed by: PHYSICIAN ASSISTANT

## 2021-02-10 PROCEDURE — 85025 COMPLETE CBC W/AUTO DIFF WBC: CPT | Performed by: PHYSICIAN ASSISTANT

## 2021-02-10 PROCEDURE — 120N000001 HC R&B MED SURG/OB

## 2021-02-10 PROCEDURE — 80048 BASIC METABOLIC PNL TOTAL CA: CPT | Performed by: PHYSICIAN ASSISTANT

## 2021-02-10 PROCEDURE — 97165 OT EVAL LOW COMPLEX 30 MIN: CPT | Mod: GO

## 2021-02-10 PROCEDURE — 36415 COLL VENOUS BLD VENIPUNCTURE: CPT | Performed by: INTERNAL MEDICINE

## 2021-02-10 PROCEDURE — 97535 SELF CARE MNGMENT TRAINING: CPT | Mod: GO

## 2021-02-10 PROCEDURE — 97162 PT EVAL MOD COMPLEX 30 MIN: CPT | Mod: GP

## 2021-02-10 PROCEDURE — 97110 THERAPEUTIC EXERCISES: CPT | Mod: GP

## 2021-02-10 PROCEDURE — 85027 COMPLETE CBC AUTOMATED: CPT | Performed by: INTERNAL MEDICINE

## 2021-02-10 RX ADMIN — CYANOCOBALAMIN TAB 1000 MCG 1000 MCG: 1000 TAB at 08:18

## 2021-02-10 RX ADMIN — FERROUS SULFATE TAB 325 MG (65 MG ELEMENTAL FE) 325 MG: 325 (65 FE) TAB at 12:34

## 2021-02-10 RX ADMIN — OMEPRAZOLE 20 MG: 20 CAPSULE, DELAYED RELEASE ORAL at 08:18

## 2021-02-10 RX ADMIN — HYDROMORPHONE HYDROCHLORIDE 0.5 MG: 1 INJECTION, SOLUTION INTRAMUSCULAR; INTRAVENOUS; SUBCUTANEOUS at 03:00

## 2021-02-10 RX ADMIN — OXYCODONE HYDROCHLORIDE AND ACETAMINOPHEN 1000 MG: 500 TABLET ORAL at 08:18

## 2021-02-10 RX ADMIN — AMLODIPINE BESYLATE 5 MG: 5 TABLET ORAL at 08:18

## 2021-02-10 RX ADMIN — PRIMIDONE 50 MG: 50 TABLET ORAL at 21:31

## 2021-02-10 RX ADMIN — FERROUS SULFATE TAB 325 MG (65 MG ELEMENTAL FE) 325 MG: 325 (65 FE) TAB at 08:18

## 2021-02-10 RX ADMIN — GABAPENTIN 300 MG: 300 CAPSULE ORAL at 08:18

## 2021-02-10 RX ADMIN — FERROUS SULFATE TAB 325 MG (65 MG ELEMENTAL FE) 325 MG: 325 (65 FE) TAB at 18:42

## 2021-02-10 RX ADMIN — UMECLIDINIUM 1 PUFF: 62.5 AEROSOL, POWDER ORAL at 08:19

## 2021-02-10 RX ADMIN — GUAIFENESIN AND DEXTROMETHORPHAN HYDROBROMIDE 1 TABLET: 600; 30 TABLET, EXTENDED RELEASE ORAL at 02:59

## 2021-02-10 RX ADMIN — HYDROMORPHONE HYDROCHLORIDE 0.5 MG: 1 INJECTION, SOLUTION INTRAMUSCULAR; INTRAVENOUS; SUBCUTANEOUS at 16:41

## 2021-02-10 RX ADMIN — HYDROCORTISONE 20 MG: 10 TABLET ORAL at 08:18

## 2021-02-10 RX ADMIN — HYDROMORPHONE HYDROCHLORIDE 0.5 MG: 1 INJECTION, SOLUTION INTRAMUSCULAR; INTRAVENOUS; SUBCUTANEOUS at 23:47

## 2021-02-10 RX ADMIN — HYDROMORPHONE HYDROCHLORIDE 0.5 MG: 1 INJECTION, SOLUTION INTRAMUSCULAR; INTRAVENOUS; SUBCUTANEOUS at 18:46

## 2021-02-10 RX ADMIN — DULOXETINE HYDROCHLORIDE 60 MG: 30 CAPSULE, DELAYED RELEASE ORAL at 08:18

## 2021-02-10 RX ADMIN — GUAIFENESIN AND DEXTROMETHORPHAN HYDROBROMIDE 1 TABLET: 600; 30 TABLET, EXTENDED RELEASE ORAL at 14:36

## 2021-02-10 RX ADMIN — SODIUM CHLORIDE, PRESERVATIVE FREE: 5 INJECTION INTRAVENOUS at 18:43

## 2021-02-10 RX ADMIN — CEFAZOLIN 1 G: 1 INJECTION, POWDER, FOR SOLUTION INTRAMUSCULAR; INTRAVENOUS at 20:05

## 2021-02-10 RX ADMIN — CHOLECALCIFEROL TAB 125 MCG (5000 UNIT) 250 MCG: 125 TAB at 08:18

## 2021-02-10 RX ADMIN — GABAPENTIN 300 MG: 300 CAPSULE ORAL at 20:07

## 2021-02-10 RX ADMIN — TAMSULOSIN HYDROCHLORIDE 0.8 MG: 0.4 CAPSULE ORAL at 08:18

## 2021-02-10 RX ADMIN — Medication 1 MG: at 21:31

## 2021-02-10 RX ADMIN — SODIUM CHLORIDE, PRESERVATIVE FREE: 5 INJECTION INTRAVENOUS at 08:07

## 2021-02-10 RX ADMIN — HYDROMORPHONE HYDROCHLORIDE 0.5 MG: 1 INJECTION, SOLUTION INTRAMUSCULAR; INTRAVENOUS; SUBCUTANEOUS at 05:24

## 2021-02-10 RX ADMIN — HYDROMORPHONE HYDROCHLORIDE 0.5 MG: 1 INJECTION, SOLUTION INTRAMUSCULAR; INTRAVENOUS; SUBCUTANEOUS at 21:29

## 2021-02-10 RX ADMIN — FLUTICASONE FUROATE AND VILANTEROL TRIFENATATE 1 PUFF: 200; 25 POWDER RESPIRATORY (INHALATION) at 08:19

## 2021-02-10 RX ADMIN — HYDROMORPHONE HYDROCHLORIDE 0.5 MG: 1 INJECTION, SOLUTION INTRAMUSCULAR; INTRAVENOUS; SUBCUTANEOUS at 12:34

## 2021-02-10 RX ADMIN — CEFAZOLIN 1 G: 1 INJECTION, POWDER, FOR SOLUTION INTRAMUSCULAR; INTRAVENOUS at 08:23

## 2021-02-10 RX ADMIN — HYDROMORPHONE HYDROCHLORIDE 0.5 MG: 1 INJECTION, SOLUTION INTRAMUSCULAR; INTRAVENOUS; SUBCUTANEOUS at 10:19

## 2021-02-10 RX ADMIN — HYDROMORPHONE HYDROCHLORIDE 0.5 MG: 1 INJECTION, SOLUTION INTRAMUSCULAR; INTRAVENOUS; SUBCUTANEOUS at 07:37

## 2021-02-10 RX ADMIN — HYDROMORPHONE HYDROCHLORIDE 0.5 MG: 1 INJECTION, SOLUTION INTRAMUSCULAR; INTRAVENOUS; SUBCUTANEOUS at 14:35

## 2021-02-10 RX ADMIN — HYDROMORPHONE HYDROCHLORIDE 0.5 MG: 1 INJECTION, SOLUTION INTRAMUSCULAR; INTRAVENOUS; SUBCUTANEOUS at 00:36

## 2021-02-10 RX ADMIN — FOLIC ACID 1 MG: 1 TABLET ORAL at 08:18

## 2021-02-10 ASSESSMENT — ACTIVITIES OF DAILY LIVING (ADL)
ADLS_ACUITY_SCORE: 23
PREVIOUS_RESPONSIBILITIES: MEAL PREP;HOUSEKEEPING;LAUNDRY;MEDICATION MANAGEMENT
ADLS_ACUITY_SCORE: 23

## 2021-02-10 NOTE — PROGRESS NOTES
02/10/21 1040   Quick Adds   Type of Visit Initial PT Evaluation   Living Environment   People in home alone   Current Living Arrangements apartment   Home Accessibility no concerns   Transportation Anticipated health plan transportation   Living Environment Comments step in shower   Self-Care   Usual Activity Tolerance fair   Current Activity Tolerance poor   Equipment Currently Used at Home wheelchair, manual;grab bar, tub/shower;grab bar, toilet;raised toilet seat;shower chair  (and power scooter)   Activity/Exercise/Self-Care Comment At baseline is independent in self-cares from manual wheelchair level.. Uses electric scooter for long distances. Baseline 3L supplemental O2. Pivots to wheechair. Reports he wears Rooke boot on RLE during transfers (for years)   Disability/Function   Hearing Difficulty or Deaf no   Wear Glasses or Blind yes   Vision Management glasses   Fall history within last six months no   Change in Functional Status Since Onset of Current Illness/Injury yes   General Information   Onset of Illness/Injury or Date of Surgery 02/09/21   Referring Physician Mary Putnam PA-C   Patient/Family Therapy Goals Statement (PT) to return home, refusing TCU, however interested in home care services including PT   Pertinent History of Current Problem (include personal factors and/or comorbidities that impact the POC) 70 year old year old male who has a PMH significant for severe COPD on 3L home O2 with ongoing tobacco use, adrenal insufficiency on chronic steroids, psoriasis on Humira, chronic Hep C with cirrhosis, PVD s/p left BKA, chronic wounds, non-compliance, and recent hospitalization 12/2/20-12/31/20 for pneumonia with RLL abscess, treated non-surgically with prolonged antibiotics who was recently discharged from TCU 2/2/2021, now re-presents to the hospital for evaluation of his chronic RLE wounds, found to have RLE cellulitis as well as MICHAEL. Pt was admitted to the hospitalist  service for further evaluation and treatment. Vascular surgery was consulted to assess for PVD.   Existing Precautions/Restrictions oxygen therapy device and L/min   General Observations sitting up in bedside chair, Rooke boot on right LE, left BKA, O2 NC.    Cognition   Orientation Status (Cognition) oriented x 3   Pain Assessment   Patient Currently in Pain No  (reports just took a pain pill)   Integumentary/Edema   Integumentary/Edema Comments pt is wearing a right rooke boot   Range of Motion (ROM)   ROM Comment WFL right hip, appears -5 deg lacking to full TKE, decreased right foot plantar flexion, DF WFL bilateral UE WFL below shoulder height (pt reports chronic pain issues in his right shoulder)   Strength   Strength Comments bilateral UE: 5/5, right LE: hip flex: 4/5, hip abd: 3+/5, at least 3/5 right quad and ankle (not formally tested due to wounds on his right lower leg). left hip flexion and abd (4-/5)   Bed Mobility   Comment (Bed Mobility) sit to supine mod IND   Transfers   Transfer Safety Comments sit to stand with mod A x 1 from low chair, required attempt x 2 due to right foot slipping on the ground, pt reports he does not have sit on anything that is as low as the chair   Gait/Stairs (Locomotion)   Comment (Gait/Stairs) NT-pt WC dependent at baseline due to left BKA-pt reports he does not use a prosthesis.    Balance   Balance Comments IND sitting balance, min A with standing balance with bilateral UE support   Sensory Examination   Sensory Perception Comments intact to light touch right toes, pt denies any numbness/tingling   Clinical Impression   Criteria for Skilled Therapeutic Intervention yes, treatment indicated   PT Diagnosis (PT) impaired transfers   Influenced by the following impairments impaired strength, impaired balance, impaired activity tolerance   Functional limitations due to impairments impaired IND with transfers   Clinical Presentation Evolving/Changing   Clinical Presentation  Rationale based on clinical judgement, social history   Clinical Decision Making (Complexity) moderate complexity   Therapy Frequency (PT) 5x/week   Predicted Duration of Therapy Intervention (days/wks) 1 week   Planned Therapy Interventions (PT) balance training;bed mobility training;home exercise program;patient/family education;strengthening;transfer training   Risk & Benefits of therapy have been explained evaluation/treatment results reviewed;care plan/treatment goals reviewed;current/potential barriers reviewed;risks/benefits reviewed;participants voiced agreement with care plan;participants included;patient   PT Discharge Planning    PT Discharge Recommendation (DC Rec) Transitional Care Facility   PT Rationale for DC Rec Pt appears below baseline with his mobility, however this may be influenced by environmental barriers as pt has his house set up to his needs with all of his transfers. Pt may benefit from TCU at d/c to maximize his safety with mobility prior to return home, however pt firmly refusing TCU and prefers home care services    Total Evaluation Time   Total Evaluation Time (Minutes) 9

## 2021-02-10 NOTE — PLAN OF CARE
Cognitive Concerns/ Orientation : A/Ox4 forgetful   BEHAVIOR & AGGRESSION TOOL COLOR: Green  CIWA SCORE: NA    ABNL VS/O2: VSS on 3L ,baseline  MOBILITY: not OOB this shift Can move self in bed. T/R as pt allows to offload buttocks.  PAIN MANAGMENT: Complains of right lower leg pain, PO and IV dilaudid  DIET: Reg good appetite  BOWEL/BLADDER: Using urinal, incontinence  ABNL LAB/BG: Hbg 8.6, WBC 15.1, Cr 2.49, CRP 35.89  DRAIN/DEVICES: PIV IVF NS at 100ml/hr  TELEMETRY RHYTHM: NA  SKIN: bruised, abrasions, scabs, wound to RLE wound care put in orders, RLE elevated on pillow, pt also has  Rooke boot he wears from PTA.   TESTS/PROCEDURES: US   D/C DAY/GOALS/PLACE: Pending  OTHER IMPORTANT INFO: CMS cool extremities, weak pulse palpable, discolored/ricky/blotchy, doppler utilized for PT, vascular consulted and ordered US ZHOU.

## 2021-02-10 NOTE — PROGRESS NOTES
"[facility]    Hospitalist Progress Note    Assessment & Plan   Perfecto Reese is a 70 year old male with complex PMHx including severe COPD on 3L home O2 with ongoing tobacco use, adrenal insufficiency on chronic steroids, psoriasis on Humira, chronic Hep C with cirrhosis, PVD s/p left BKA, chronic wounds, non-compliance, and recent hospitalization 12/2/20-12/31/20 for pneumonia with RLL abscess, treated non-surgically with prolonged antibiotics who was recently discharged from TCU 2/2/2021, now re-presents to the hospital for evaluation of his chronic RLE wounds, found to have RLE cellulitis as well as MICHAEL. Admitted for further evaluation and treatment.      RLE cellulitis  Chronic right lower extremity wounds   Hx of PVD with L BKA (2010, [facility]): Reports worsening RLE wounds with increased erythema and pain since discharge from TCU. At TCU was receiving daily wound cares, but states since discharge, home cares \"fell through the cracks.\" WBC 15.1; vitals not suggestive of septic picture on admission. Lactic acid 1.1. Received 1 L bolus, IV Ancef, and Dilaudid 0.5 mg X2 in the ED.   - Continue IV Ancef, appreciate vascular surgery input, arterial insufficiency is not suspected, no surgical plans noted.  -Continue wound care evaluation, marked the area elevated lower extremity follow-up blood cultures  - Pain control with tylenol PRN, dilaudid 1-2 mg q4 hrs, and IV dilaudid 0.3-0.5 mg q2 hrs; monitor for sedation. Judicious use of narcotics   - Note pt taken off ASA at TCU due to AoCD and chronic thrombocytopenia        MICHAEL on CKD III: Baseline creatinine 1.2-1.3 in Jan 2020, on admission 2.49. Pt reports poor oral intake since discharge from TCU; unable to care for self. Reports baseline diarrhea ongoing \"for a while.\" Received 1 L bolus IVF in the ED. Note creatinine 1.53 on 1/27/21.  - Continue 0.9% NS at 100 ccs/hr, creatinine slowly improving, repeat labs in " a.m.  -Hold Lasix and losartan for now.       Chronic hypoxic and hypercarbic respiratory failure in the setting of COPD utilizing 3 LPM via NC at baseline, not in acute exacerbation:   -Continue PTA inhalers and nebs .; discharged with mucinex 600 mg BID, Breo 200-25 mcg 1 puff daily, incruse ellipta 62.5 mcg 1 puff daily, albuterol PRN   - Encourage pulmonary toilet      Recent hospitalization 12/2/2020-12/31/2020 for:  Acute on chronic hypoxic and hypercarbic respiratory failure  Bilateral community-acquired bacterial pneumonia with RLL abscess   Musculoskeletal chest pain   Admitted with fever, cough, and acute respiratory distress (placed on 10L per OM in the field). Fever to 102.5, tachycardia, tachypnea, and leukocytosis (17.2k) present on arrival. CXR on arrival showed RLL infiltrate and CT abd/pelvis showed air fluid cavity at the right lung base. On admission, he was initiated on IV pip-tazo and azithromycin, and Thoracic Surgery was consulted. Chest CT was ordered (12/3) which showed a 6.4 cm RLL pulmonary abscess and extensive left-sided mucus plugging with associated infiltrate. 12/2 blood cultures x2 no growth. Sputum cx 12/4 growing proteus, fluoroquinolone resistant. Also corynebacterium striatum 12/6. CT surgery saw, no surgical intervention indicated. Patient was treated with IV then PO antibiotics, with the assistance of infectious disease. Initially on zosyn -> unasyn -> transitioned 12/8 to Augmentin x 6 weeks (through approximately 1/13/2021).  - Infectious disease peripherally followed during hospitalization   - Repeat CT scan 1/12/21 with improved consolidation in the inferior right and lower lobes consistent with pneumonia, significant interval decrease in size of a presumed intrapulmonary abscess in the inferior right middle lobe (not completely resolved), moderate sized pleural effusion, slightly larger on the right.   - O2 at baseline of 3 lpm, pt without any worsening respiratory sx,  fevers. Reports cough with yellowish sputum      Pleural effusion: R>L. Noted on CT 1/12/2021.   - O2 at baseline of 3 lpm, pt without any worsening respiratory sx, fevers. Reports cough with yellowish sputum   - Would re-image if respiratory status worsened      Chronic normocytic anemia, likely ACD  Chronic thrombocytopenia  Anemia and thrombocytopenia likely multifactorial from liver cirrhosis, splenomegaly, renal disease, CKD and psoriasis. Iron labs suggestive of anemia of chronic disease. 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 Patient required four units of pRBCs this admission for Hgb < 7.0. No acute GI bleed identified, likely blood loss from decreased erythropoiesis. Appreciate GI consult 12/16/2020, no acute GI bleed, no acute interventions, [facility] Recheck hgb 7.5 12/29 and again 7.9 on 1/4/21. Hgb >7 since TCU admission. Discontinued aspirin due to risks at TCU. Occult stool was positive on 1/15/21. Patient was NOT interested in colonoscopy at that time  - Monitor Hgb, hemoglobin dropped from 8.6-7.1, considering improvement in his creatinine possibly dilutional.  Repeat labs in a.m.     Chronic adrenal insufficiency:   - Continues on PTA hydrocortisone 20 mg PO daily  - Consider stress dose steroids if pt were to become hypotensive     Essential hypertension: Amlodipine decreased to 5mg daily due to soft blood pressures during last hospital stay, Losartan decreased to 25 mg po every day.   - Resume Norvasc, hold Losartan given MICHAEL       History of psoriasis   - PTA Humira held at discharge and not restarted upon discharge from TCU. Restart once current cellulitis has been appropriately treated.       Chronic pain syndrome: Note that upon hospital discharge, regimen consisted of gabapentin 300 mg TID, duloxetine 30 mg daily, lidocaine patch, oxycodone 5 mg q6h prn, morphine 2.5  "mg BID prn. Upon TCU discharge, pain regimen with gabapentin 300 mg BID (renally dosed), duloxetine 60 mg daily (recently increased on 1/12/21 for pain complaints and also to assist with mood), lidocaine topical, dilaudid 1 mg q6 hrs PRN.   - Analgesic regimen as above      Mild protein-calorie malnutrition: Acute on chronic. Patient receiving Ensure TID PRN at TCU   - Nutrition consult   - Continue Ensure TID PRN  -  Continue vitamin D to 10,000 daily  - Continue folic acid daily  - Continue vitamin B12 daily     Major depressive disorder: Secondary to general medical condition suspected. Seen by Psychiatry during most recent hospitalization.   - Cymbalta increased to 60 mg every day and melatonin 3 mg at bedtime added, continue above     Insomnia: PRN Melatonin, was getting up to 6 mg at bedtime at TCU      Physical deconditioning: Was in TCU from 12/31/20-2/2/21. Lives alone in apartment. Friend helps patient get groceries. At baseline is WC bound with pivots for transfers. Pt was reportedly discharged with home cares which never followed up with patient. [facility] setting was suggested, but patient declined.   - PT/OT/SW  to follow     Hx of non-compliance: Noted in chart. Pt reports that he has been off of ALL medications since discharge from TCU. Indicates needing help setting up meds by homecare RN who has historically helped in the past. He states homecare \"fell through the cracks\" upon discharge from TCU and he tried calling every day to get it set up.   - SW consulted   - Pt seen by Psychiatry during last hospitalization to determine decision making capacity and was deemed decisional. See note by Dr. Parrish from 12/21/20.     Covid status: Negative per admission PCR. Asymptomatic.       DVT Prophylaxis: SCDs  Code Status: Full Code     Disposition: Expected discharge in 1 to 2 days    Carmen Flores MD  Text Page   (7am to 6pm)    Interval History   Patient is resting, his right lower extremity is elevated, " he denies any pain, he is hard of hearing, we briefly discussed about plan of care, hemoglobin dropped from 8.6-7.1, denies any active bleeding.  Tolerating diet well.    -Data reviewed today: I reviewed all new labs and imaging results over the last 24 hours  .Physical Exam   Temp: 98.3  F (36.8  C) Temp src: Oral BP: 125/41 Pulse: 64   Resp: 18 SpO2: 99 % O2 Device: None (Room air) Oxygen Delivery: 3 LPM  Vitals:    02/09/21 0951   Weight: 56.4 kg (124 lb 6.4 oz)     Vital Signs with Ranges  Temp:  [97.7  F (36.5  C)-98.5  F (36.9  C)] 98.3  F (36.8  C)  Pulse:  [63-69] 64  Resp:  [18-20] 18  BP: (125-148)/(40-49) 125/41  SpO2:  [98 %-100 %] 99 %  I/O last 3 completed shifts:  In: 3072 [P.O.:800; I.V.:1772; IV Piggyback:500]  Out: 975 [Urine:975]    Constitutional: Awake, alert, cooperative, no apparent distress  Respiratory: Clear to auscultation bilaterally, no crackles or wheezing  Cardiovascular: Regular rate and rhythm, normal S1 and S2, and no murmur noted  GI: Normal bowel sounds, soft, non-distended, non-tender  Skin/Integumen: His right lower extremity is elevated, it has erythema and local rise of temperature noted from ankle to mid cough, multiple areas of skin breakdown noted, left BKA  Neuro : moving all 4 extremities, no focal deficit noted     Medications     sodium chloride 100 mL/hr at 02/10/21 0807       amLODIPine  5 mg Oral Daily     ceFAZolin  1 g Intravenous Q12H     cyanocobalamin  1,000 mcg Oral Daily     dextromethorphan-guaiFENesin  1 tablet Oral Q12H     DULoxetine  60 mg Oral Daily     ferrous sulfate  325 mg Oral TID w/meals     fluticasone-vilanterol  1 puff Inhalation Daily     folic acid  1 mg Oral Daily     gabapentin  300 mg Oral BID     hydrocortisone  20 mg Oral QAM     omeprazole  20 mg Oral Daily     primidone  50 mg Oral At Bedtime     tamsulosin  0.8 mg Oral Daily     umeclidinium  1 puff Inhalation Daily     vitamin C  1,000 mg Oral Daily     Vitamin D3  250 mcg Oral Daily        Data   Recent Labs   Lab 02/10/21  1034 02/10/21  0838 02/09/21  0702 02/09/21  0601   WBC 6.8 7.2 15.1*  --    HGB 7.1* 7.2* 8.6*  --    MCV 91 91 88  --    PLT 69* 72* 130*  --    INR  --   --   --  1.25*   NA  --  143  --  137   POTASSIUM  --  4.1  --  4.3   CHLORIDE  --  117*  --  107   CO2  --  20  --  15*   BUN  --  45*  --  58*   CR  --  1.72*  --  2.49*   ANIONGAP  --  6  --  15*   MARTINE  --  7.9*  --  8.4*   GLC  --  149*  --  69*   ALBUMIN  --   --   --  3.2*   PROTTOTAL  --   --   --  7.9   BILITOTAL  --   --   --  0.5   ALKPHOS  --   --   --  79   ALT  --   --   --  21   AST  --   --   --  23     Recent Labs   Lab 02/10/21  0838 02/09/21  0601   * 69*       Imaging:   Recent Results (from the past 24 hour(s))   US ZHOU Doppler No Exercise    Narrative    US ZHOU DOPPLER NO EXERCISE, 1-2 LEVELS, ? BILATERAL   2/9/2021 2:50 PM      HISTORY: Right toe pressures (L BKA, right ankle wound so no ABIs and  no L toe pressures).    COMPARISON: None.    FINDINGS:  Right ZHOU:   PT: 1.14.  DP: 1.09    Left ZHOU: Below-knee amputation.    Right Digital pressure: 100.    Waveforms: Triphasic      Impression    IMPRESSION: Normal right ZHOU without evidence of arterial  insufficiency. Normal right digital pressure.    ZHOU CRITERIA:  >1.4 NC  0.95-1.4 Normal  0.90 - 0.94 Mild  0.5 - 0.89 Moderate  0.2 - 0.49 Severe  <0.2 Critical    PACO DAVID DO   US Lower Extremity Arterial Duplex Right    Narrative    US LOWER EXTREMITY ARTERIAL DUPLEX RIGHT  2/9/2021 4:17 PM     HISTORY:  59-year-old male with peripheral vascular disease and  nonhealing wounds in the right lower extremity.    COMPARISON: Ankle-brachial indices dated 12/28/2009.    FINDINGS: Color Doppler and spectral waveform analysis performed.    The sampled infrainguinal arteries of the right lower extremity are  patent. No focal elevations in peak systolic velocity are noted to  indicate a significant stenosis. Waveforms throughout the right  lower  extremity are multiphasic.      Impression    IMPRESSION: No significant stenoses identified in the sampled  infrainguinal arteries of the right lower extremity.    JOHANNA VÁSQUEZ MD

## 2021-02-10 NOTE — PLAN OF CARE
DATE & TIME: 2/9/2021 6278-9331                        Cognitive Concerns/ Orientation : A/Ox4 forgetful   BEHAVIOR & AGGRESSION TOOL COLOR: Green  CIWA SCORE: NA    ABNL VS/O2: VSS on 3L ,baseline  MOBILITY: not OOB this shift Can reposition self in bed.  T/R as pt allows to offload buttocks.  PAIN MANAGMENT: IV Dilaudid  x1 for  pain  rating of 9 in Right calf wound  DIET: Reg good appetite  BOWEL/BLADDER: Using urinal, incontinence  ABNL LAB/BG: Hbg 8.6, WBC 15.1, Cr 2.49, CRP 35.89  DRAIN/DEVICES: PIV IVF NS at 100ml/hr  TELEMETRY RHYTHM: NA  SKIN: bruised, abrasions, scraps, wound to RLE wound care put in orders, RLE elevated on pillow, pt also has  Rooke boot he wears from PTA.   TESTS/PROCEDURES: US ZHOU this afternoon  D/C DAY/GOALS/PLACE: Pending  OTHER IMPORTANT INFO: CMS cool extremities, pulse detected with Doppler. discolored/ricky/blotchy, vascular consulted and ordered US ZHOU.

## 2021-02-10 NOTE — PLAN OF CARE
Date & Time 2/1/21 3490-7888  Cognitive Concerns/ Orientation : A/Ox4 forgetful   BEHAVIOR & AGGRESSION TOOL COLOR: Green  CIWA SCORE: NA    ABNL VS/O2: VSS, 97% on 3L, baseline  MOBILITY: Assist of 2 with pivots.  Up in chair this AM.  Can move self in bed. T/R as pt allows to offload buttocks.  PAIN MANAGMENT: Complains of right lower leg pain, Received IV dilaudid.  Pt declined PO Dilaudid when offered.  DIET: Reg good appetite  BOWEL/BLADDER: Voiding via urinal  ABNL LAB/BG: Hgb-7.1, PLT-69, Cr-1.72  DRAIN/DEVICES: Left arm PIV-NS at 100ml/hr   TELEMETRY RHYTHM: NA  SKIN: bruised, abrasions, scabs, wound to RLE.  Wound care performed to RLE.  RLE elevated on pillow, pt also has Rooke boot he wears from PTA.   TESTS/PROCEDURES: US   D/C DAY/GOALS/PLACE: Expected discharge in 1 to 2 days per MD note  OTHER IMPORTANT INFO: CMS cool extremities, weak pulse to RLE-dopplerable. RLE- discolored/ricky/blotchy. Seen by Dr. Odonnell-Vascular surgery.

## 2021-02-10 NOTE — PROGRESS NOTES
02/10/21 0908   Quick Adds   Type of Visit Initial Occupational Therapy Evaluation   Living Environment   People in home alone   Current Living Arrangements apartment   Home Accessibility no concerns   Transportation Anticipated health plan transportation   Living Environment Comments step in shower   Self-Care   Usual Activity Tolerance fair   Current Activity Tolerance poor   Equipment Currently Used at Home wheelchair, manual;grab bar, tub/shower;grab bar, toilet;raised toilet seat;shower chair   Activity/Exercise/Self-Care Comment At baseline is independent in self-cares from manual wheelchair level.. Uses electric scooter for long distances. Baseline 3L supplemental O2. Pivots to wheechair. Reports he wears Rooke boot on RLE during transfers   Disability/Function   Fall history within last six months no   Change in Functional Status Since Onset of Current Illness/Injury yes   General Information   Onset of Illness/Injury or Date of Surgery 02/09/21   Referring Physician Mary Putnam PA-C   Patient/Family Therapy Goal Statement (OT) Return home   Additional Occupational Profile Info/Pertinent History of Current Problem 70 year old year old male who has a PMH significant for severe COPD on 3L home O2 with ongoing tobacco use, adrenal insufficiency on chronic steroids, psoriasis on Humira, chronic Hep C with cirrhosis, PVD s/p left BKA, chronic wounds, non-compliance, and recent hospitalization 12/2/20-12/31/20 for pneumonia with RLL abscess, treated non-surgically with prolonged antibiotics who was recently discharged from TCU 2/2/2021, now re-presents to the hospital for evaluation of his chronic RLE wounds, found to have RLE cellulitis as well as MICHAEL. Pt was admitted to the hospitalist service for further evaluation and treatment. Vascular surgery was consulted to assess for PVD.   Existing Precautions/Restrictions fall   Cognitive Status Examination   Orientation Status orientation to person,  place and time   Affect/Mental Status (Cognitive) flat/blunted affect;sad/depressed affect   Safety Deficit awareness of need for assistance;insight into deficits/self-awareness;judgment;problem-solving   Executive Function Deficit moderate deficit;judgment;problem-solving/reasoning;planning/decision-making;organization/sequencing;information processing   Visual Perception   Visual Impairment/Limitations corrective lenses for reading   Impact of Vision Impairment on Function (Vision) pt denies vision impairment   Sensory   Sensory Comments Pt denies BUE/BLE numbness or tingling   Pain Assessment   Patient Currently in Pain Yes, see Vital Sign flowsheet   Strength Comprehensive (MMT)   Comment, General Manual Muscle Testing (MMT) Assessment Generalized weakness   Bed Mobility   Bed Mobility supine-sit   Supine-Sit Gillespie (Bed Mobility) minimum assist (75% patient effort)   Transfers   Transfers bed-chair transfer   Transfer Skill: Bed to Chair/Chair to Bed   Bed-Chair Gillespie (Transfers) minimum assist (75% patient effort)   Activities of Daily Living   BADL Assessment/Intervention lower body dressing;toileting   Lower Body Dressing Assessment/Training   Gillespie Level (Lower Body Dressing) minimum assist (75% patient effort)   Toileting   Gillespie Level (Toileting) moderate assist (50% patient effort)   Instrumental Activities of Daily Living (IADL)   Previous Responsibilities meal prep;housekeeping;laundry;medication management   IADL Comments Pt reports significant difficulty completing IADLs. Reports he has no help with them,. Reports home health nurse had setup meds in past but no longer does  (Has a cat)   Clinical Impression   Criteria for Skilled Therapeutic Interventions Met (OT) yes   OT Diagnosis decline function   OT Problem List-Impairments impacting ADL activity tolerance impaired;balance;cognition;mobility;strength   Assessment of Occupational Performance 5 or more Performance  Deficits   Planned Therapy Interventions (OT) ADL retraining;transfer training;home program guidelines;progressive activity/exercise;cognition   Clinical Decision Making Complexity (OT) low complexity   Therapy Frequency (OT) 5x/week   Predicted Duration of Therapy 5 days   Anticipated Equipment Needs Upon Discharge (OT) reacher   Risk & Benefits of therapy have been explained evaluation/treatment results reviewed;care plan/treatment goals reviewed;risks/benefits reviewed;current/potential barriers reviewed;participants voiced agreement with care plan;participants included;patient   Comment-Clinical Impression Pt functioning below baseline and will benefit from continued skilled OT to maximize safety and independence   OT Discharge Planning    OT Discharge Recommendation (DC Rec) Transitional Care Facility   OT Rationale for DC Rec Pt lives alone. Currently requiring assist of 1 for self-cares and mobility. Recommend TCU to progress pt's safety and independence. Pt would benefit from eventual transition to more long-term supportive living environment such as [facility].   Total Evaluation Time (Minutes)   Total Evaluation Time (Minutes) 8

## 2021-02-11 ENCOUNTER — APPOINTMENT (OUTPATIENT)
Dept: PHYSICAL THERAPY | Facility: CLINIC | Age: 71
DRG: 602 | End: 2021-02-11
Payer: MEDICARE

## 2021-02-11 ENCOUNTER — APPOINTMENT (OUTPATIENT)
Dept: OCCUPATIONAL THERAPY | Facility: CLINIC | Age: 71
DRG: 602 | End: 2021-02-11
Payer: MEDICARE

## 2021-02-11 LAB
ABO + RH BLD: NORMAL
ABO + RH BLD: NORMAL
ANION GAP SERPL CALCULATED.3IONS-SCNC: 7 MMOL/L (ref 3–14)
BLD GP AB SCN SERPL QL: NORMAL
BLD PROD TYP BPU: NORMAL
BLD UNIT ID BPU: 0
BLD UNIT ID BPU: 0
BLOOD BANK CMNT PATIENT-IMP: NORMAL
BLOOD PRODUCT CODE: NORMAL
BLOOD PRODUCT CODE: NORMAL
BPU ID: NORMAL
BPU ID: NORMAL
BUN SERPL-MCNC: 32 MG/DL (ref 7–30)
CALCIUM SERPL-MCNC: 8 MG/DL (ref 8.5–10.1)
CHLORIDE SERPL-SCNC: 116 MMOL/L (ref 94–109)
CO2 SERPL-SCNC: 20 MMOL/L (ref 20–32)
CREAT SERPL-MCNC: 1.35 MG/DL (ref 0.66–1.25)
CRP SERPL-MCNC: 25.1 MG/L (ref 0–8)
ERYTHROCYTE [DISTWIDTH] IN BLOOD BY AUTOMATED COUNT: 15.7 % (ref 10–15)
GFR SERPL CREATININE-BSD FRML MDRD: 53 ML/MIN/{1.73_M2}
GLUCOSE SERPL-MCNC: 107 MG/DL (ref 70–99)
HCT VFR BLD AUTO: 18.5 % (ref 40–53)
HGB BLD-MCNC: 5.8 G/DL (ref 13.3–17.7)
HGB BLD-MCNC: 6.7 G/DL (ref 13.3–17.7)
MCH RBC QN AUTO: 29.3 PG (ref 26.5–33)
MCHC RBC AUTO-ENTMCNC: 31.4 G/DL (ref 31.5–36.5)
MCV RBC AUTO: 93 FL (ref 78–100)
NUM BPU REQUESTED: 2
PLATELET # BLD AUTO: 42 10E9/L (ref 150–450)
POTASSIUM SERPL-SCNC: 3.7 MMOL/L (ref 3.4–5.3)
PROCALCITONIN SERPL-MCNC: 0.12 NG/ML
RBC # BLD AUTO: 1.98 10E12/L (ref 4.4–5.9)
SODIUM SERPL-SCNC: 143 MMOL/L (ref 133–144)
SPECIMEN EXP DATE BLD: NORMAL
TRANSFUSION STATUS PATIENT QL: NORMAL
WBC # BLD AUTO: 3.5 10E9/L (ref 4–11)

## 2021-02-11 PROCEDURE — 97530 THERAPEUTIC ACTIVITIES: CPT | Mod: GP

## 2021-02-11 PROCEDURE — 85027 COMPLETE CBC AUTOMATED: CPT | Performed by: INTERNAL MEDICINE

## 2021-02-11 PROCEDURE — P9016 RBC LEUKOCYTES REDUCED: HCPCS | Performed by: EMERGENCY MEDICINE

## 2021-02-11 PROCEDURE — 258N000003 HC RX IP 258 OP 636: Performed by: INTERNAL MEDICINE

## 2021-02-11 PROCEDURE — 250N000011 HC RX IP 250 OP 636: Performed by: PHYSICIAN ASSISTANT

## 2021-02-11 PROCEDURE — 84145 PROCALCITONIN (PCT): CPT | Performed by: INTERNAL MEDICINE

## 2021-02-11 PROCEDURE — 36415 COLL VENOUS BLD VENIPUNCTURE: CPT | Performed by: INTERNAL MEDICINE

## 2021-02-11 PROCEDURE — 97110 THERAPEUTIC EXERCISES: CPT | Mod: GP

## 2021-02-11 PROCEDURE — 250N000013 HC RX MED GY IP 250 OP 250 PS 637: Performed by: PHYSICIAN ASSISTANT

## 2021-02-11 PROCEDURE — 97530 THERAPEUTIC ACTIVITIES: CPT | Mod: GO

## 2021-02-11 PROCEDURE — 258N000003 HC RX IP 258 OP 636: Performed by: PHYSICIAN ASSISTANT

## 2021-02-11 PROCEDURE — 86140 C-REACTIVE PROTEIN: CPT | Performed by: INTERNAL MEDICINE

## 2021-02-11 PROCEDURE — 80048 BASIC METABOLIC PNL TOTAL CA: CPT | Performed by: INTERNAL MEDICINE

## 2021-02-11 PROCEDURE — 85018 HEMOGLOBIN: CPT | Performed by: INTERNAL MEDICINE

## 2021-02-11 PROCEDURE — 99233 SBSQ HOSP IP/OBS HIGH 50: CPT | Performed by: INTERNAL MEDICINE

## 2021-02-11 PROCEDURE — 250N000011 HC RX IP 250 OP 636: Performed by: INTERNAL MEDICINE

## 2021-02-11 PROCEDURE — 120N000001 HC R&B MED SURG/OB

## 2021-02-11 RX ORDER — CEFAZOLIN SODIUM 1 G/3ML
1 INJECTION, POWDER, FOR SOLUTION INTRAMUSCULAR; INTRAVENOUS EVERY 8 HOURS
Status: DISCONTINUED | OUTPATIENT
Start: 2021-02-11 | End: 2021-02-13

## 2021-02-11 RX ADMIN — GABAPENTIN 300 MG: 300 CAPSULE ORAL at 22:16

## 2021-02-11 RX ADMIN — GUAIFENESIN AND DEXTROMETHORPHAN HYDROBROMIDE 1 TABLET: 600; 30 TABLET, EXTENDED RELEASE ORAL at 14:31

## 2021-02-11 RX ADMIN — HYDROMORPHONE HYDROCHLORIDE 0.5 MG: 1 INJECTION, SOLUTION INTRAMUSCULAR; INTRAVENOUS; SUBCUTANEOUS at 12:21

## 2021-02-11 RX ADMIN — FERROUS SULFATE TAB 325 MG (65 MG ELEMENTAL FE) 325 MG: 325 (65 FE) TAB at 17:29

## 2021-02-11 RX ADMIN — PRIMIDONE 50 MG: 50 TABLET ORAL at 22:16

## 2021-02-11 RX ADMIN — AMLODIPINE BESYLATE 5 MG: 5 TABLET ORAL at 08:12

## 2021-02-11 RX ADMIN — FOLIC ACID 1 MG: 1 TABLET ORAL at 08:12

## 2021-02-11 RX ADMIN — FLUTICASONE FUROATE AND VILANTEROL TRIFENATATE 1 PUFF: 200; 25 POWDER RESPIRATORY (INHALATION) at 08:13

## 2021-02-11 RX ADMIN — HYDROCORTISONE 20 MG: 10 TABLET ORAL at 08:13

## 2021-02-11 RX ADMIN — CEFAZOLIN 1 G: 1 INJECTION, POWDER, FOR SOLUTION INTRAMUSCULAR; INTRAVENOUS at 08:11

## 2021-02-11 RX ADMIN — FERROUS SULFATE TAB 325 MG (65 MG ELEMENTAL FE) 325 MG: 325 (65 FE) TAB at 08:12

## 2021-02-11 RX ADMIN — UMECLIDINIUM 1 PUFF: 62.5 AEROSOL, POWDER ORAL at 08:13

## 2021-02-11 RX ADMIN — HYDROMORPHONE HYDROCHLORIDE 0.5 MG: 1 INJECTION, SOLUTION INTRAMUSCULAR; INTRAVENOUS; SUBCUTANEOUS at 22:19

## 2021-02-11 RX ADMIN — SODIUM CHLORIDE, PRESERVATIVE FREE: 5 INJECTION INTRAVENOUS at 04:09

## 2021-02-11 RX ADMIN — CHOLECALCIFEROL TAB 125 MCG (5000 UNIT) 250 MCG: 125 TAB at 08:13

## 2021-02-11 RX ADMIN — GABAPENTIN 300 MG: 300 CAPSULE ORAL at 08:12

## 2021-02-11 RX ADMIN — CEFAZOLIN 1 G: 1 INJECTION, POWDER, FOR SOLUTION INTRAMUSCULAR; INTRAVENOUS at 16:06

## 2021-02-11 RX ADMIN — OMEPRAZOLE 20 MG: 20 CAPSULE, DELAYED RELEASE ORAL at 08:12

## 2021-02-11 RX ADMIN — CYANOCOBALAMIN TAB 1000 MCG 1000 MCG: 1000 TAB at 08:12

## 2021-02-11 RX ADMIN — GUAIFENESIN AND DEXTROMETHORPHAN HYDROBROMIDE 1 TABLET: 600; 30 TABLET, EXTENDED RELEASE ORAL at 01:50

## 2021-02-11 RX ADMIN — HYDROMORPHONE HYDROCHLORIDE 0.5 MG: 1 INJECTION, SOLUTION INTRAMUSCULAR; INTRAVENOUS; SUBCUTANEOUS at 01:50

## 2021-02-11 RX ADMIN — OXYCODONE HYDROCHLORIDE AND ACETAMINOPHEN 1000 MG: 500 TABLET ORAL at 08:11

## 2021-02-11 RX ADMIN — HYDROMORPHONE HYDROCHLORIDE 0.5 MG: 1 INJECTION, SOLUTION INTRAMUSCULAR; INTRAVENOUS; SUBCUTANEOUS at 05:59

## 2021-02-11 RX ADMIN — SODIUM CHLORIDE, PRESERVATIVE FREE: 5 INJECTION INTRAVENOUS at 16:05

## 2021-02-11 RX ADMIN — HYDROMORPHONE HYDROCHLORIDE 0.5 MG: 1 INJECTION, SOLUTION INTRAMUSCULAR; INTRAVENOUS; SUBCUTANEOUS at 14:31

## 2021-02-11 RX ADMIN — HYDROMORPHONE HYDROCHLORIDE 0.5 MG: 1 INJECTION, SOLUTION INTRAMUSCULAR; INTRAVENOUS; SUBCUTANEOUS at 08:13

## 2021-02-11 RX ADMIN — TAMSULOSIN HYDROCHLORIDE 0.8 MG: 0.4 CAPSULE ORAL at 08:12

## 2021-02-11 RX ADMIN — FERROUS SULFATE TAB 325 MG (65 MG ELEMENTAL FE) 325 MG: 325 (65 FE) TAB at 12:21

## 2021-02-11 RX ADMIN — HYDROMORPHONE HYDROCHLORIDE 0.5 MG: 1 INJECTION, SOLUTION INTRAMUSCULAR; INTRAVENOUS; SUBCUTANEOUS at 04:07

## 2021-02-11 RX ADMIN — HYDROMORPHONE HYDROCHLORIDE 0.5 MG: 1 INJECTION, SOLUTION INTRAMUSCULAR; INTRAVENOUS; SUBCUTANEOUS at 17:29

## 2021-02-11 RX ADMIN — DULOXETINE HYDROCHLORIDE 60 MG: 30 CAPSULE, DELAYED RELEASE ORAL at 08:11

## 2021-02-11 RX ADMIN — HYDROMORPHONE HYDROCHLORIDE 0.5 MG: 1 INJECTION, SOLUTION INTRAMUSCULAR; INTRAVENOUS; SUBCUTANEOUS at 10:11

## 2021-02-11 ASSESSMENT — ACTIVITIES OF DAILY LIVING (ADL)
ADLS_ACUITY_SCORE: 23
ADLS_ACUITY_SCORE: 23
ADLS_ACUITY_SCORE: 24
ADLS_ACUITY_SCORE: 23

## 2021-02-11 ASSESSMENT — MIFFLIN-ST. JEOR: SCORE: 1327.38

## 2021-02-11 NOTE — PROGRESS NOTES
Follow-up upon completion of arterial studies, no evidence of peripheral arterial disease based upon performance of duplex ultrasound right lower extremity.  Amputation of the left lower extremity was performed multiple years ago due to trauma, falling from a ladder.  He has not utilized a prosthetic in 12 months due to ill fitting, will arrange for evaluation at [facility] orthotics in [place] for assessment and proper fitting of a left lower extremity prosthetic to allow him to resume ambulatory status.  He does live independently.  His brother lives in the local community.  Patient was admitted after he called an ambulance for evaluation of the wounds of the right lower extremity.  Reviewed current wound orders.  He is in a Rooke boot.  Would advocate that he be transferred to [place] for ongoing wound cares and I will have him follow-up in our wound clinic within the next 7 days.  Discussed and reviewed with the patient.

## 2021-02-11 NOTE — PROGRESS NOTES
Critical values received. Writer mala TEJADA.    Results for DANILO CHESTER (MRN [id number]) as of 2/11/2021 08:56   Ref. Range 2/11/2021 08:00   Hemoglobin Latest Ref Range: 13.3 - 17.7 g/dL 5.8 (LL)   Results for DANILO CHESTER (MRN [id number]) as of 2/11/2021 08:56   Ref. Range 2/11/2021 08:00   Platelet Count Latest Ref Range: 150 - 450 10e9/L 42 (LL)     Patient will need consent. Consent has been printed and left on front of chart.

## 2021-02-11 NOTE — PLAN OF CARE
"Cognitive Concerns/ Orientation : A/Ox4 forgetful   BEHAVIOR & AGGRESSION TOOL COLOR: Green  CIWA SCORE: NA    ABNL VS/O2: VSS on 3L ,baseline at home as well, per patient.  MOBILITY: up with 1, stand/pivot to chair.  PAIN MANAGMENT: Complains of right lower leg pain, IV dilaudid given Q2, refuses PO dilaudid.  Sleeping between cares. States that pain is never reduced below \"6 or 7 out of 10.\"  DIET: Regular   BOWEL/BLADDER:   Using urinal  ABNL LAB/BG:   --Hgb 5.8 this morning, transfused 1 unit.  --follow up hemoglobin labs: 6.7. 2nd unit PRBCs infusing.   --Recheck Hgb in AM.   Cr 1.35, platelets critical today at 42.  DRAIN/DEVICES: PIV IVF infusing (rate reduction today, good PO intake).  TELEMETRY RHYTHM: NA  SKIN: bruised, abrasions, scabs, wound to RLE wound care put in orders, RLE elevated on pillow, pt also has Rooke boot he wears from PTA.   TESTS/PROCEDURES: N/A   D/C DAY/GOALS/PLACE: Pending  "

## 2021-02-11 NOTE — PROGRESS NOTES
"[facility]    Hospitalist Progress Note    Assessment & Plan   Perfecto Reese is a 70 year old male with complex PMHx including severe COPD on 3L home O2 with ongoing tobacco use, adrenal insufficiency on chronic steroids, psoriasis on Humira, chronic Hep C with cirrhosis, PVD s/p left BKA, chronic wounds, non-compliance, and recent hospitalization 12/2/20-12/31/20 for pneumonia with RLL abscess, treated non-surgically with prolonged antibiotics who was recently discharged from TCU 2/2/2021, now re-presents to the hospital for evaluation of his chronic RLE wounds, found to have RLE cellulitis as well as MICHAEL. Admitted for further evaluation and treatment.      RLE cellulitis  Chronic right lower extremity wounds   Hx of PVD with L BKA (2010, [facility]): Reports worsening RLE wounds with increased erythema and pain since discharge from TCU. At TCU was receiving daily wound cares, but states since discharge, home cares \"fell through the cracks.\" WBC 15.1; vitals not suggestive of septic picture on admission. Lactic acid 1.1. Received 1 L bolus, IV Ancef, and Dilaudid 0.5 mg X2 in the ED.   - Continue IV Ancef, appreciate vascular surgery input, arterial insufficiency is not suspected, no surgical plans noted.  -Continue wound care evaluation, marked the area elevated lower extremity, follow-up blood cultures.  - Pain control with tylenol PRN, dilaudid 1-2 mg q4 hrs, and IV dilaudid 0.3-0.5 mg q2 hrs; monitor for sedation. Judicious use of narcotics   - Note pt taken off ASA at TCU due to AoCD and chronic thrombocytopenia   -Obtained pro calcitonin which does not indicate systemic infection, CRP trending down, possibly can transition him to p.o. antibiotics by tomorrow.       MICHAEL on CKD III: Baseline creatinine 1.2-1.3 in Jan 2020, on admission 2.49. Pt reports poor oral intake since discharge from TCU; unable to care for self. Reports baseline diarrhea ongoing \"for a " "while.\" Received 1 L bolus IVF in the ED. Note creatinine 1.53 on 1/27/21.  -  creatinine slowly improving, creatinine is a significantly improved to 1.  3 5 today, will repeat labs again in a.m.  Will reduce IV fluids to 50 cc/h.  -Hold Lasix and losartan for now.       Chronic hypoxic and hypercarbic respiratory failure in the setting of COPD utilizing 3 LPM via NC at baseline, not in acute exacerbation:   -Continue PTA inhalers and nebs .; discharged with mucinex 600 mg BID, Breo 200-25 mcg 1 puff daily, incruse ellipta 62.5 mcg 1 puff daily, albuterol PRN   - Encourage pulmonary toilet      Recent hospitalization 12/2/2020-12/31/2020 for:  Acute on chronic hypoxic and hypercarbic respiratory failure  Bilateral community-acquired bacterial pneumonia with RLL abscess   Musculoskeletal chest pain   Admitted with fever, cough, and acute respiratory distress (placed on 10L per OM in the field). Fever to 102.5, tachycardia, tachypnea, and leukocytosis (17.2k) present on arrival. CXR on arrival showed RLL infiltrate and CT abd/pelvis showed air fluid cavity at the right lung base. On admission, he was initiated on IV pip-tazo and azithromycin, and Thoracic Surgery was consulted. Chest CT was ordered (12/3) which showed a 6.4 cm RLL pulmonary abscess and extensive left-sided mucus plugging with associated infiltrate. 12/2 blood cultures x2 no growth. Sputum cx 12/4 growing proteus, fluoroquinolone resistant. Also corynebacterium striatum 12/6. CT surgery saw, no surgical intervention indicated. Patient was treated with IV then PO antibiotics, with the assistance of infectious disease. Initially on zosyn -> unasyn -> transitioned 12/8 to Augmentin x 6 weeks (through approximately 1/13/2021).  - Infectious disease peripherally followed during hospitalization   - Repeat CT scan 1/12/21 with improved consolidation in the inferior right and lower lobes consistent with pneumonia, significant interval decrease in size of a " presumed intrapulmonary abscess in the inferior right middle lobe (not completely resolved), moderate sized pleural effusion, slightly larger on the right.   - O2 at baseline of 3 lpm, pt without any worsening respiratory sx, fevers. Reports cough with yellowish sputum      Pleural effusion: R>L. Noted on CT 1/12/2021.   - O2 at baseline of 3 lpm, pt without any worsening respiratory sx, fevers. Reports cough with yellowish sputum   - Would re-image if respiratory status worsened      Chronic normocytic anemia, likely ACD  Chronic thrombocytopenia  Anemia and thrombocytopenia likely multifactorial from liver cirrhosis, splenomegaly, renal disease, CKD and psoriasis. Iron labs suggestive of anemia of chronic disease. 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 Patient required four units of pRBCs this admission for Hgb < 7.0. No acute GI bleed identified, likely blood loss from decreased erythropoiesis. Appreciate GI consult 12/16/2020, no acute GI bleed, no acute interventions, [facility] Recheck hgb 7.5 12/29 and again 7.9 on 1/4/21. Hgb >7 since TCU admission. Discontinued aspirin due to risks at TCU. Occult stool was positive on 1/15/21. Patient was NOT interested in colonoscopy at that time  - Monitor Hgb, hemoglobin dropped from 8.6-5.8 on 2/11, after obtaining consent blood transfusion ordered, will repeat labs again later today.  Patient was symptomatic with generalized weakness and dizziness during this time.     Chronic adrenal insufficiency:   - Continues on PTA hydrocortisone 20 mg PO daily  - Consider stress dose steroids if pt were to become hypotensive     Essential hypertension: Amlodipine decreased to 5mg daily due to soft blood pressures during last hospital stay, Losartan decreased to 25 mg po every day.   - Resume Norvasc, hold Losartan given MICHAEL       History of psoriasis   -  "PTA Humira held at discharge and not restarted upon discharge from TCU. Restart once current cellulitis has been appropriately treated.       Chronic pain syndrome: Note that upon hospital discharge, regimen consisted of gabapentin 300 mg TID, duloxetine 30 mg daily, lidocaine patch, oxycodone 5 mg q6h prn, morphine 2.5 mg BID prn. Upon TCU discharge, pain regimen with gabapentin 300 mg BID (renally dosed), duloxetine 60 mg daily (recently increased on 1/12/21 for pain complaints and also to assist with mood), lidocaine topical, dilaudid 1 mg q6 hrs PRN.   - Analgesic regimen as above , patient requesting more IV narcotics instead of p.o.     Mild protein-calorie malnutrition: Acute on chronic. Patient receiving Ensure TID PRN at TCU   - Nutrition consult   - Continue Ensure TID PRN  -  Continue vitamin D to 10,000 daily  - Continue folic acid daily  - Continue vitamin B12 daily     Major depressive disorder: Secondary to general medical condition suspected. Seen by Psychiatry during most recent hospitalization.   - Cymbalta increased to 60 mg every day and melatonin 3 mg at bedtime added, continue above     Insomnia: PRN Melatonin, was getting up to 6 mg at bedtime at TCU      Physical deconditioning: Was in TCU from 12/31/20-2/2/21. Lives alone in apartment. Friend helps patient get groceries. At baseline is WC bound with [employer] for transfers. Pt was reportedly discharged with home cares which never followed up with patient. [facility] setting was suggested, but patient declined.   - PT/OT/SW  to follow     Hx of non-compliance: Noted in chart. Pt reports that he has been off of ALL medications since discharge from TCU. Indicates needing help setting up meds by homecare RN who has historically helped in the past. He states homecare \"fell through the cracks\" upon discharge from TCU and he tried calling every day to get it set up.   - SW consulted, patient was seen by Dr. Odonnell -- he recommended patient goes to [place] so " that he could follow him up there.  - Pt seen by Psychiatry during last hospitalization to determine decision making capacity and was deemed decisional. See note by Dr. Parrish from 12/21/20.     Covid status: Negative per admission PCR. Asymptomatic.       DVT Prophylaxis: SCDs  Code Status: Full Code     Disposition: Expected discharge to TCU 2/12/2021 if her hemoglobin stable and white count within normal limits.    Carmen Flores MD  Text Page   (7am to 6pm)    Interval History   Patient feeling quite tired, appears pain, his hemoglobin significantly dropped to 5.8, he did not have any active bleeding, creatinine much improved with hydration.  He is tolerating liquids, I consented the patient for blood transfusion.  Answered all questions, his lower extremity pain on the right has improved, afebrile.  -Data reviewed today: I reviewed all new labs and imaging results over the last 24 hours  .Physical Exam   Temp: 97.5  F (36.4  C) Temp src: Oral BP: (!) 103/38 Pulse: 83   Resp: 16 SpO2: 97 % O2 Device: Nasal cannula Oxygen Delivery: 3 LPM  Vitals:    02/09/21 0951 02/11/21 0627   Weight: 56.4 kg (124 lb 6.4 oz) 63 kg (139 lb)     Vital Signs with Ranges  Temp:  [97.4  F (36.3  C)-98.3  F (36.8  C)] 97.5  F (36.4  C)  Pulse:  [69-89] 83  Resp:  [16-18] 16  BP: (103-125)/(38-48) 103/38  SpO2:  [97 %-100 %] 97 %  I/O last 3 completed shifts:  In: 2397 [P.O.:900; I.V.:1497]  Out: 1230 [Urine:1230]    Constitutional: Awake, alert, cooperative, no apparent distress  Respiratory: Clear to auscultation bilaterally, no crackles or wheezing  Cardiovascular: Regular rate and rhythm, normal S1 and S2, and no murmur noted  GI: Normal bowel sounds, soft, non-distended, non-tender  Skin/Integumen: His right lower extremity is elevated, it has erythema and local rise of temperature noted from ankle to mid cough, multiple areas of skin breakdown noted, left BKA  Neuro : moving all 4 extremities, no focal deficit noted      Medications     sodium chloride 100 mL/hr at 02/11/21 0409       amLODIPine  5 mg Oral Daily     ceFAZolin  1 g Intravenous Q8H     cyanocobalamin  1,000 mcg Oral Daily     dextromethorphan-guaiFENesin  1 tablet Oral Q12H     DULoxetine  60 mg Oral Daily     ferrous sulfate  325 mg Oral TID w/meals     fluticasone-vilanterol  1 puff Inhalation Daily     folic acid  1 mg Oral Daily     gabapentin  300 mg Oral BID     hydrocortisone  20 mg Oral QAM     omeprazole  20 mg Oral Daily     primidone  50 mg Oral At Bedtime     tamsulosin  0.8 mg Oral Daily     umeclidinium  1 puff Inhalation Daily     vitamin C  1,000 mg Oral Daily     Vitamin D3  250 mcg Oral Daily       Data   Recent Labs   Lab 02/11/21  0800 02/10/21  1034 02/10/21  0838 02/09/21  0601 02/09/21  0601   WBC 3.5* 6.8 7.2   < >  --    HGB 5.8* 7.1* 7.2*   < >  --    MCV 93 91 91   < >  --    PLT 42* 69* 72*   < >  --    INR  --   --   --   --  1.25*     --  143  --  137   POTASSIUM 3.7  --  4.1  --  4.3   CHLORIDE 116*  --  117*  --  107   CO2 20  --  20  --  15*   BUN 32*  --  45*  --  58*   CR 1.35*  --  1.72*  --  2.49*   ANIONGAP 7  --  6  --  15*   MARTINE 8.0*  --  7.9*  --  8.4*   *  --  149*  --  69*   ALBUMIN  --   --   --   --  3.2*   PROTTOTAL  --   --   --   --  7.9   BILITOTAL  --   --   --   --  0.5   ALKPHOS  --   --   --   --  79   ALT  --   --   --   --  21   AST  --   --   --   --  23    < > = values in this interval not displayed.     Recent Labs   Lab 02/11/21  0800 02/10/21  0838 02/09/21  0601   * 149* 69*       Imaging:   No results found for this or any previous visit (from the past 24 hour(s)).

## 2021-02-11 NOTE — PLAN OF CARE
Cognitive Concerns/ Orientation : A/Ox4 forgetful   BEHAVIOR & AGGRESSION TOOL COLOR: Green  CIWA SCORE: NA    ABNL VS/O2: VSS on 3L ,baseline  MOBILITY: Assist of two, moves self in bed Independently   PAIN MANAGMENT: Complains of right lower leg pain, IV dilaudid, refuses PO dilaudid.    DIET: Regular   BOWEL/BLADDER: Using urinal, incontinence  ABNL LAB/BG: Hbg 7.1  DRAIN/DEVICES: PIV IVF NS at 100ml/hr  TELEMETRY RHYTHM: NA  SKIN: bruised, abrasions, scabs, wound to RLE wound care put in orders, RLE elevated on pillow, pt also has Rooke boot he wears from PTA.   TESTS/PROCEDURES: N/A   D/C DAY/GOALS/PLACE: Pending  OTHER IMPORTANT INFO: CMS cool extremities, weak pulse palpable, discolored/ricky/blotchy

## 2021-02-12 LAB
ANION GAP SERPL CALCULATED.3IONS-SCNC: 4 MMOL/L (ref 3–14)
BUN SERPL-MCNC: 27 MG/DL (ref 7–30)
CALCIUM SERPL-MCNC: 8 MG/DL (ref 8.5–10.1)
CHLORIDE SERPL-SCNC: 123 MMOL/L (ref 94–109)
CO2 SERPL-SCNC: 19 MMOL/L (ref 20–32)
CREAT SERPL-MCNC: 1.23 MG/DL (ref 0.66–1.25)
ERYTHROCYTE [DISTWIDTH] IN BLOOD BY AUTOMATED COUNT: 15.6 % (ref 10–15)
GFR SERPL CREATININE-BSD FRML MDRD: 60 ML/MIN/{1.73_M2}
GLUCOSE SERPL-MCNC: 98 MG/DL (ref 70–99)
HCT VFR BLD AUTO: 23.1 % (ref 40–53)
HGB BLD-MCNC: 7.4 G/DL (ref 13.3–17.7)
MCH RBC QN AUTO: 29.5 PG (ref 26.5–33)
MCHC RBC AUTO-ENTMCNC: 32 G/DL (ref 31.5–36.5)
MCV RBC AUTO: 92 FL (ref 78–100)
PLATELET # BLD AUTO: 42 10E9/L (ref 150–450)
POTASSIUM SERPL-SCNC: 4.3 MMOL/L (ref 3.4–5.3)
RBC # BLD AUTO: 2.51 10E12/L (ref 4.4–5.9)
SODIUM SERPL-SCNC: 146 MMOL/L (ref 133–144)
WBC # BLD AUTO: 2.7 10E9/L (ref 4–11)

## 2021-02-12 PROCEDURE — 258N000003 HC RX IP 258 OP 636: Performed by: INTERNAL MEDICINE

## 2021-02-12 PROCEDURE — 120N000001 HC R&B MED SURG/OB

## 2021-02-12 PROCEDURE — 36415 COLL VENOUS BLD VENIPUNCTURE: CPT | Performed by: INTERNAL MEDICINE

## 2021-02-12 PROCEDURE — 85027 COMPLETE CBC AUTOMATED: CPT | Performed by: INTERNAL MEDICINE

## 2021-02-12 PROCEDURE — 80048 BASIC METABOLIC PNL TOTAL CA: CPT | Performed by: INTERNAL MEDICINE

## 2021-02-12 PROCEDURE — 99233 SBSQ HOSP IP/OBS HIGH 50: CPT | Performed by: INTERNAL MEDICINE

## 2021-02-12 PROCEDURE — 250N000011 HC RX IP 250 OP 636: Performed by: INTERNAL MEDICINE

## 2021-02-12 PROCEDURE — 250N000011 HC RX IP 250 OP 636: Performed by: PHYSICIAN ASSISTANT

## 2021-02-12 PROCEDURE — 250N000013 HC RX MED GY IP 250 OP 250 PS 637: Performed by: PHYSICIAN ASSISTANT

## 2021-02-12 RX ORDER — FUROSEMIDE 10 MG/ML
20 INJECTION INTRAMUSCULAR; INTRAVENOUS ONCE
Status: COMPLETED | OUTPATIENT
Start: 2021-02-12 | End: 2021-02-12

## 2021-02-12 RX ORDER — DEXTROSE MONOHYDRATE, SODIUM CHLORIDE, AND POTASSIUM CHLORIDE 50; 1.49; 4.5 G/1000ML; G/1000ML; G/1000ML
INJECTION, SOLUTION INTRAVENOUS CONTINUOUS
Status: DISCONTINUED | OUTPATIENT
Start: 2021-02-12 | End: 2021-02-12

## 2021-02-12 RX ADMIN — CEFAZOLIN 1 G: 1 INJECTION, POWDER, FOR SOLUTION INTRAMUSCULAR; INTRAVENOUS at 17:42

## 2021-02-12 RX ADMIN — CEFAZOLIN 1 G: 1 INJECTION, POWDER, FOR SOLUTION INTRAMUSCULAR; INTRAVENOUS at 00:59

## 2021-02-12 RX ADMIN — GABAPENTIN 300 MG: 300 CAPSULE ORAL at 20:53

## 2021-02-12 RX ADMIN — FERROUS SULFATE TAB 325 MG (65 MG ELEMENTAL FE) 325 MG: 325 (65 FE) TAB at 14:13

## 2021-02-12 RX ADMIN — TAMSULOSIN HYDROCHLORIDE 0.8 MG: 0.4 CAPSULE ORAL at 09:53

## 2021-02-12 RX ADMIN — HYDROMORPHONE HYDROCHLORIDE 0.5 MG: 1 INJECTION, SOLUTION INTRAMUSCULAR; INTRAVENOUS; SUBCUTANEOUS at 00:58

## 2021-02-12 RX ADMIN — GUAIFENESIN AND DEXTROMETHORPHAN HYDROBROMIDE 1 TABLET: 600; 30 TABLET, EXTENDED RELEASE ORAL at 17:34

## 2021-02-12 RX ADMIN — HYDROCORTISONE 20 MG: 10 TABLET ORAL at 09:53

## 2021-02-12 RX ADMIN — FLUTICASONE FUROATE AND VILANTEROL TRIFENATATE 1 PUFF: 200; 25 POWDER RESPIRATORY (INHALATION) at 09:59

## 2021-02-12 RX ADMIN — FERROUS SULFATE TAB 325 MG (65 MG ELEMENTAL FE) 325 MG: 325 (65 FE) TAB at 09:53

## 2021-02-12 RX ADMIN — GUAIFENESIN AND DEXTROMETHORPHAN HYDROBROMIDE 1 TABLET: 600; 30 TABLET, EXTENDED RELEASE ORAL at 05:24

## 2021-02-12 RX ADMIN — HYDROMORPHONE HYDROCHLORIDE 0.5 MG: 1 INJECTION, SOLUTION INTRAMUSCULAR; INTRAVENOUS; SUBCUTANEOUS at 05:25

## 2021-02-12 RX ADMIN — POTASSIUM CHLORIDE, DEXTROSE MONOHYDRATE AND SODIUM CHLORIDE: 150; 5; 450 INJECTION, SOLUTION INTRAVENOUS at 09:50

## 2021-02-12 RX ADMIN — CEFAZOLIN 1 G: 1 INJECTION, POWDER, FOR SOLUTION INTRAMUSCULAR; INTRAVENOUS at 09:50

## 2021-02-12 RX ADMIN — HYDROMORPHONE HYDROCHLORIDE 2 MG: 2 TABLET ORAL at 14:39

## 2021-02-12 RX ADMIN — OXYCODONE HYDROCHLORIDE AND ACETAMINOPHEN 1000 MG: 500 TABLET ORAL at 09:53

## 2021-02-12 RX ADMIN — AMLODIPINE BESYLATE 5 MG: 5 TABLET ORAL at 09:53

## 2021-02-12 RX ADMIN — CYANOCOBALAMIN TAB 1000 MCG 1000 MCG: 1000 TAB at 09:53

## 2021-02-12 RX ADMIN — GABAPENTIN 300 MG: 300 CAPSULE ORAL at 09:53

## 2021-02-12 RX ADMIN — HYDROMORPHONE HYDROCHLORIDE 0.5 MG: 1 INJECTION, SOLUTION INTRAMUSCULAR; INTRAVENOUS; SUBCUTANEOUS at 17:36

## 2021-02-12 RX ADMIN — PRIMIDONE 50 MG: 50 TABLET ORAL at 20:53

## 2021-02-12 RX ADMIN — CHOLECALCIFEROL TAB 125 MCG (5000 UNIT) 250 MCG: 125 TAB at 09:53

## 2021-02-12 RX ADMIN — FOLIC ACID 1 MG: 1 TABLET ORAL at 09:53

## 2021-02-12 RX ADMIN — UMECLIDINIUM 1 PUFF: 62.5 AEROSOL, POWDER ORAL at 09:59

## 2021-02-12 RX ADMIN — FERROUS SULFATE TAB 325 MG (65 MG ELEMENTAL FE) 325 MG: 325 (65 FE) TAB at 17:34

## 2021-02-12 RX ADMIN — DULOXETINE HYDROCHLORIDE 60 MG: 30 CAPSULE, DELAYED RELEASE ORAL at 09:53

## 2021-02-12 RX ADMIN — HYDROMORPHONE HYDROCHLORIDE 0.5 MG: 1 INJECTION, SOLUTION INTRAMUSCULAR; INTRAVENOUS; SUBCUTANEOUS at 08:23

## 2021-02-12 RX ADMIN — SODIUM CHLORIDE, PRESERVATIVE FREE: 5 INJECTION INTRAVENOUS at 05:23

## 2021-02-12 RX ADMIN — FUROSEMIDE 20 MG: 10 INJECTION, SOLUTION INTRAVENOUS at 14:40

## 2021-02-12 RX ADMIN — OMEPRAZOLE 20 MG: 20 CAPSULE, DELAYED RELEASE ORAL at 09:53

## 2021-02-12 RX ADMIN — HYDROMORPHONE HYDROCHLORIDE 0.5 MG: 1 INJECTION, SOLUTION INTRAMUSCULAR; INTRAVENOUS; SUBCUTANEOUS at 22:26

## 2021-02-12 ASSESSMENT — ACTIVITIES OF DAILY LIVING (ADL)
ADLS_ACUITY_SCORE: 24
ADLS_ACUITY_SCORE: 22
ADLS_ACUITY_SCORE: 24
ADLS_ACUITY_SCORE: 22

## 2021-02-12 ASSESSMENT — MIFFLIN-ST. JEOR: SCORE: 1345.52

## 2021-02-12 NOTE — PLAN OF CARE
Cognitive Concerns/ Orientation : A&Ox4 forgetful   BEHAVIOR & AGGRESSION TOOL COLOR: Green  CIWA SCORE: NA    ABNL VS/O2: VSS on 3L O2 NC, home baseline per pt  MOBILITY: Assist of 1, stand/pivot to chair (L BKA).   PAIN MANAGMENT: C/O R calf/leg pain, managed with IV Dilaudid x1, oral Dilaudid x1, encouraged to use oral pain medications.   DIET: Regular diet, poor appetite; no meals ordered today but drinking supplement shakes.   BOWEL/BLADDER: Voiding in urinal, can be incontinent at times. No BM this shift.   ABNL LAB/BG: Hgb 7.4, platelets 42. Na 146 (IVF stopped). Creat improved to 1.23.   DRAIN/DEVICES: PIV, now saline locked.   TELEMETRY RHYTHM: n/a  SKIN: L BKA. Bruised, abrasions, scabs. Wound to posterior R calf, old dressing with moderate amount of serosangunous drainage, bleeding noted during wound cares; wound cares completed as ordered, dressing changed, edema wear on, Rooke boot in place.   TESTS/PROCEDURES: n/a  D/C DAY/GOALS/PLACE: Pending, possibly tomorrow to TCU, pending placement.   OTHER IMPORTANT INFO: Cool extremities, weak pulse palpable on RLE, otherwise CMS intact. LS diminished, infrequent congested cough. PT/OT/WOC.

## 2021-02-12 NOTE — PLAN OF CARE
Cognitive Concerns/ Orientation : A/Ox4 forgetful   BEHAVIOR & AGGRESSION TOOL COLOR: Green  CIWA SCORE: NA    ABNL VS/O2: VSS on 3L ,baseline at home as well, per patient.  MOBILITY: up with 1, stand/pivot to chair.  PAIN MANAGMENT: Complains of right lower leg pain, IV dilaudid given Q2H PRN, refuses PO dilaudid.    DIET: Regular   BOWEL/BLADDER: Using urinal, incontinent at times  BM x 2 on PM shift  ABNL LAB/BG: Hbg 5.8/6.7 received a total of two units PRBC  DRAIN/DEVICES: PIV IVF infusing.  TELEMETRY RHYTHM: NA  SKIN: bruised, abrasions, scabs, wound to RLE wound care put in orders, RLE elevated on pillow, pt also has Rooke boot he wears from PTA.   TESTS/PROCEDURES: N/A   D/C DAY/GOALS/PLACE: Pending  OTHER IMPORTANT INFO: CMS cool extremities, weak pulse palpable, discolored/ricky/blotchy

## 2021-02-12 NOTE — PROGRESS NOTES
"[facility]    Hospitalist Progress Note    Assessment & Plan   Perfecto Reese is a 70 year old male with complex PMHx including severe COPD on 3L home O2 with ongoing tobacco use, adrenal insufficiency on chronic steroids, psoriasis on Humira, chronic Hep C with cirrhosis, PVD s/p left BKA, chronic wounds, non-compliance, and recent hospitalization 12/2/20-12/31/20 for pneumonia with RLL abscess, treated non-surgically with prolonged antibiotics who was recently discharged from TCU 2/2/2021, now re-presents to the hospital for evaluation of his chronic RLE wounds, found to have RLE cellulitis as well as MICHAEL. Admitted for further evaluation and treatment.      RLE cellulitis  Chronic right lower extremity wounds   Hx of PVD with L BKA (2010, [facility]): Reports worsening RLE wounds with increased erythema and pain since discharge from TCU. At TCU was receiving daily wound cares, but states since discharge, home cares \"fell through the cracks.\" WBC 15.1; vitals not suggestive of septic picture on admission. Lactic acid 1.1. Received 1 L bolus, IV Ancef, and Dilaudid 0.5 mg X2 in the ED.   - Continue IV Ancef, appreciate vascular surgery input, arterial insufficiency is not suspected, no surgical plans noted.  -Continue wound care evaluation, marked the area elevated lower extremity, follow-up blood cultures.  - Pain control with tylenol PRN, dilaudid 1-2 mg q4 hrs, and IV dilaudid 0.3-0.5 mg q2 hrs; monitor for sedation. Judicious use of narcotics   - Note pt taken off ASA at TCU due to AoCD and chronic thrombocytopenia   -Obtained pro calcitonin which does not indicate systemic infection, CRP trending down, possibly can transition him to p.o. antibiotics by tomorrow.       MICHAEL on CKD III: Baseline creatinine 1.2-1.3 in Jan 2020, on admission 2.49. Pt reports poor oral intake since discharge from TCU; unable to care for self. Reports baseline diarrhea ongoing \"for a " "while.\" Received 1 L bolus IVF in the ED. Note creatinine 1.53 on 1/27/21.  -Creatinine back to baseline of 1.2   ON 2/12, Lasix and losartan is on hold, blood pressures are still on the lower side, will start back his p.o. Lasix.  Hypernatremia  --Possibly secondary to IV fluids,-fluids changed, will stop it today and monitor electrolytes.     Chronic hypoxic and hypercarbic respiratory failure in the setting of COPD utilizing 3 LPM via NC at baseline, not in acute exacerbation:   -Continue PTA inhalers and nebs .; discharged with mucinex 600 mg BID, Breo 200-25 mcg 1 puff daily, incruse ellipta 62.5 mcg 1 puff daily, albuterol PRN   - Encourage pulmonary toilet      Recent hospitalization 12/2/2020-12/31/2020 for:  Acute on chronic hypoxic and hypercarbic respiratory failure  Bilateral community-acquired bacterial pneumonia with RLL abscess   Musculoskeletal chest pain   Admitted with fever, cough, and acute respiratory distress (placed on 10L per OM in the field). Fever to 102.5, tachycardia, tachypnea, and leukocytosis (17.2k) present on arrival. CXR on arrival showed RLL infiltrate and CT abd/pelvis showed air fluid cavity at the right lung base. On admission, he was initiated on IV pip-tazo and azithromycin, and Thoracic Surgery was consulted. Chest CT was ordered (12/3) which showed a 6.4 cm RLL pulmonary abscess and extensive left-sided mucus plugging with associated infiltrate. 12/2 blood cultures x2 no growth. Sputum cx 12/4 growing proteus, fluoroquinolone resistant. Also corynebacterium striatum 12/6. CT surgery saw, no surgical intervention indicated. Patient was treated with IV then PO antibiotics, with the assistance of infectious disease. Initially on zosyn -> unasyn -> transitioned 12/8 to Augmentin x 6 weeks (through approximately 1/13/2021).  - Infectious disease peripherally followed during hospitalization   - Repeat CT scan 1/12/21 with improved consolidation in the inferior right and lower " lobes consistent with pneumonia, significant interval decrease in size of a presumed intrapulmonary abscess in the inferior right middle lobe (not completely resolved), moderate sized pleural effusion, slightly larger on the right.   - O2 at baseline of 3 lpm, pt without any worsening respiratory sx, fevers. Reports cough with yellowish sputum      Pleural effusion: R>L. Noted on CT 1/12/2021.   - O2 at baseline of 3 lpm, pt without any worsening respiratory sx, fevers. Reports cough with yellowish sputum   - Would re-image if respiratory status worsened      Chronic normocytic anemia, likely ACD  Chronic thrombocytopenia  Anemia and thrombocytopenia likely multifactorial from liver cirrhosis, splenomegaly, renal disease, CKD and psoriasis. Iron labs suggestive of anemia of chronic disease. 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 Patient required four units of pRBCs this admission for Hgb < 7.0. No acute GI bleed identified, likely blood loss from decreased erythropoiesis. Appreciate GI consult 12/16/2020, no acute GI bleed, no acute interventions, [facility]. Recheck hgb 7.5 12/29 and again 7.9 on 1/4/21. Hgb >7 since TCU admission. Discontinued aspirin due to risks at TCU. Occult stool was positive on 1/15/21. Patient was NOT interested in colonoscopy at that time  - Monitor Hgb, hemoglobin dropped from 8.6-5.8 on 2/11, he received 2 units PRBC on 2/11, repeat hemoglobin today is 7.4, will stop fluids and start his PTA Lasix dose.  Repeat CBC in a.m.  Chronic adrenal insufficiency:   - Continues on PTA hydrocortisone 20 mg PO daily  - Consider stress dose steroids if pt were to become hypotensive     Essential hypertension: Amlodipine decreased to 5mg daily due to soft blood pressures during last hospital stay, Losartan decreased to 25 mg po every day.   -Blood pressures are on the lower side  "we will stop the Norvasc, continue to hold the losartan, PTA Lasix restarted.     History of psoriasis   - PTA Humira held at discharge and not restarted upon discharge from TCU. Restart once current cellulitis has been appropriately treated.       Chronic pain syndrome: Note that upon hospital discharge, regimen consisted of gabapentin 300 mg TID, duloxetine 30 mg daily, lidocaine patch, oxycodone 5 mg q6h prn, morphine 2.5 mg BID prn. Upon TCU discharge, pain regimen with gabapentin 300 mg BID (renally dosed), duloxetine 60 mg daily (recently increased on 1/12/21 for pain complaints and also to assist with mood), lidocaine topical, dilaudid 1 mg q6 hrs PRN.   - Analgesic regimen as above , patient requesting more IV narcotics instead of p.o.     Mild protein-calorie malnutrition: Acute on chronic. Patient receiving Ensure TID PRN at TCU   - Nutrition consult   - Continue Ensure TID PRN  -  Continue vitamin D to 10,000 daily  - Continue folic acid daily  - Continue vitamin B12 daily     Major depressive disorder: Secondary to general medical condition suspected. Seen by Psychiatry during most recent hospitalization.   - Cymbalta increased to 60 mg every day and melatonin 3 mg at bedtime added, continue above     Insomnia: PRN Melatonin, was getting up to 6 mg at bedtime at TCU      Physical deconditioning: Was in TCU from 12/31/20-2/2/21. Lives alone in apartment. Friend helps patient get groceries. At baseline is WC bound with [employer] for transfers. Pt was reportedly discharged with home cares which never followed up with patient. shelter setting was suggested, but patient declined.   - PT/OT/SW  to follow     Hx of non-compliance: Noted in chart. Pt reports that he has been off of ALL medications since discharge from TCU. Indicates needing help setting up meds by homecare RN who has historically helped in the past. He states homecare \"fell through the cracks\" upon discharge from TCU and he tried calling every day to " get it set up.   - SW consulted, patient was seen by Dr. Odonnell -- he recommended patient goes to [place] so that he could follow him up there.  - Pt seen by Psychiatry during last hospitalization to determine decision making capacity and was deemed decisional. See note by Dr. Parrish from 12/21/20.     Covid status: Negative per admission PCR. Asymptomatic.       DVT Prophylaxis: SCDs  Code Status: Full Code     Disposition: Expected discharge to TCU 2/13/2021  Carmen Flores MD  Text Page   (7am to 6pm)    Interval History   Patient is PTA oxygen dependent, he is resting comfortably, his right lower extremity wound has wheezing of serosanguineous discharge, erythema has improved on lower extremity, afebrile, hemoglobin is 7.4 today.  He received 2 units PRBC transfusion yesterday.  -Data reviewed today: I reviewed all new labs and imaging results over the last 24 hours  .Physical Exam   Temp: 98.4  F (36.9  C) Temp src: Oral BP: 114/46 Pulse: 81   Resp: 18 SpO2: 98 % O2 Device: Nasal cannula Oxygen Delivery: 3 LPM  Vitals:    02/09/21 0951 02/11/21 0627 02/12/21 0641   Weight: 56.4 kg (124 lb 6.4 oz) 63 kg (139 lb) 64.9 kg (143 lb)     Vital Signs with Ranges  Temp:  [97.5  F (36.4  C)-98.6  F (37  C)] 98.4  F (36.9  C)  Pulse:  [75-85] 81  Resp:  [16-18] 18  BP: (110-128)/(42-48) 114/46  SpO2:  [91 %-99 %] 98 %  I/O last 3 completed shifts:  In: 836 [P.O.:90; I.V.:450]  Out: 675 [Urine:675]    Constitutional: Awake, alert, cooperative, no apparent distress  Respiratory: Clear to auscultation bilaterally, no crackles or wheezing  Cardiovascular: Regular rate and rhythm, normal S1 and S2, and no murmur noted  GI: Normal bowel sounds, soft, non-distended, non-tender  Skin/Integumen: His right lower extremity is elevated, it has erythema and local rise of temperature noted from ankle to mid cough, multiple areas of skin breakdown noted, left BKA  Neuro : moving all 4 extremities, no focal deficit noted      Medications     dextrose 5% and 0.45% NaCl + KCl 20 mEq/L 50 mL/hr at 02/12/21 0950       amLODIPine  5 mg Oral Daily     ceFAZolin  1 g Intravenous Q8H     cyanocobalamin  1,000 mcg Oral Daily     dextromethorphan-guaiFENesin  1 tablet Oral Q12H     DULoxetine  60 mg Oral Daily     ferrous sulfate  325 mg Oral TID w/meals     fluticasone-vilanterol  1 puff Inhalation Daily     folic acid  1 mg Oral Daily     gabapentin  300 mg Oral BID     hydrocortisone  20 mg Oral QAM     omeprazole  20 mg Oral Daily     primidone  50 mg Oral At Bedtime     tamsulosin  0.8 mg Oral Daily     umeclidinium  1 puff Inhalation Daily     vitamin C  1,000 mg Oral Daily     Vitamin D3  250 mcg Oral Daily       Data   Recent Labs   Lab 02/12/21  0800 02/11/21  1636 02/11/21  0800 02/10/21  1034 02/10/21  0838 02/09/21  0601 02/09/21  0601   WBC 2.7*  --  3.5* 6.8 7.2   < >  --    HGB 7.4* 6.7* 5.8* 7.1* 7.2*   < >  --    MCV 92  --  93 91 91   < >  --    PLT 42*  --  42* 69* 72*   < >  --    INR  --   --   --   --   --   --  1.25*   *  --  143  --  143  --  137   POTASSIUM 4.3  --  3.7  --  4.1  --  4.3   CHLORIDE 123*  --  116*  --  117*  --  107   CO2 19*  --  20  --  20  --  15*   BUN 27  --  32*  --  45*  --  58*   CR 1.23  --  1.35*  --  1.72*  --  2.49*   ANIONGAP 4  --  7  --  6  --  15*   MARTINE 8.0*  --  8.0*  --  7.9*  --  8.4*   GLC 98  --  107*  --  149*  --  69*   ALBUMIN  --   --   --   --   --   --  3.2*   PROTTOTAL  --   --   --   --   --   --  7.9   BILITOTAL  --   --   --   --   --   --  0.5   ALKPHOS  --   --   --   --   --   --  79   ALT  --   --   --   --   --   --  21   AST  --   --   --   --   --   --  23    < > = values in this interval not displayed.     Recent Labs   Lab 02/12/21  0800 02/11/21  0800 02/10/21  0838 02/09/21  0601   GLC 98 107* 149* 69*       Imaging:   No results found for this or any previous visit (from the past 24 hour(s)).

## 2021-02-12 NOTE — PROGRESS NOTES
CLINICAL NUTRITION SERVICES - REASSESSMENT NOTE    Recommendations by Registered Dietitian (RD):   - Boost Plus between meals @ 10 and 2, also send Boost Plus with dinner  - Daily weights    Malnutrition: (2/9)   % Weight Loss:  > 5% in 1 month (severe malnutrition)  % Intake:  </= 50% for >/= 5 days (severe malnutrition)  Subcutaneous Fat Loss:  Orbital region moderate depletion and Upper arm region moderaet depletion  Muscle Loss:  Temporal region moderate depletion, Clavicle bone region moderate depletion, Acromion bone region moderate depletion and Dorsal hand region moderate depletion  Fluid Retention:  None noted     Malnutrition Diagnosis: Severe malnutrition  In Context of:  Acute illness or injury  Chronic illness or disease     EVALUATION OF PROGRESS TOWARD GOALS   Diet: Regular Diet + Boost Plus @ 10&2 + Boost plus With dinner    Intake/Tolerance:   - No meals ordered since 2/11 AM (omelet, hash browns, muffin). Has been receiving 3 boost plus/day. Did receive three meals on 2/10 with good appetite and % meals.   - Patient states that he is drinking the three boost everyday. He notes that he has not been hungry for food today, that is why he has not ordered a meal. He denied having any nausea or pain impacting appetite.   - Agreeable to continue the boost TID.     - Last BM 2/11 x2   - Weight trending: difficult to assess accuracy  Date/Time Weight   02/12/21 0641 64.9 kg (143 lb)   02/11/21 0627 63 kg (139 lb)   02/09/21 0951 56.4 kg (124 lb 6.4 oz)       ASSESSED NUTRITION NEEDS:  Dosing Weigh: 56.4 kg   Estimated Energy Needs: 9621-9433 kcals (30-35 Kcal/Kg)  Justification: repletion  Estimated Protein Needs: 68-85+ grams protein (1.2-1.5 g pro/Kg)  Justification: Repletion  Estimated Fluid Needs: 1 mL/kcal  Justification: maintenance    NEW FINDINGS:   - Plan discharge to TCU    Previous Goals:   Intake of >/=75% for meals BID + 3 Protein supplements daily  Evaluation: Not met  consistently    Previous Nutrition Diagnosis:   Inadequate oral intake related to inability to care for self and increased needs in the setting of chronic illness as evidenced by weight loss of up to 13% in 1 month, reported poor oral intake for at least 1 week PTA, and evidence of moderate fat and muscle losses.   Evaluation: No change    CURRENT NUTRITION DIAGNOSIS  Inadequate oral intake related to inability to care for self and increased needs in the setting of chronic illness as evidenced by weight loss of up to 13% in 1 month, reported poor oral intake for at least 1 week PTA, and evidence of moderate fat and muscle losses.     INTERVENTIONS  Recommendations / Nutrition Prescription  Regular diet  Boost Plus BID between meals  Boost Plus w/ dinner    Implementation  None new today. Pt w/o questions/concened, content with current nutrition POC.     Goals  Intake of at least 2 meals/day + 2-3 supplements.       MONITORING AND EVALUATION:  Progress towards goals will be monitored and evaluated per protocol and Practice Guidelines    Winifred Polanco RD, LD  [place], [address], [place]   Pager: [phone]  Weekend Pager: [phone]

## 2021-02-12 NOTE — PROGRESS NOTES
Care Management Follow Up    Length of Stay (days): 3    Expected Discharge Date: 02/13/21(tcu)     Concerns to be Addressed: adjustment to diagnosis/illness, compliance issue, care coordination/care conferences     Patient plan of care discussed at interdisciplinary rounds: Yes    Anticipated Discharge Disposition:       Anticipated Discharge Services:    Anticipated Discharge DME:      Patient/family educated on Medicare website which has current facility and service quality ratings:    Education Provided on the Discharge Plan:    Patient/Family in Agreement with the Plan:      Referrals Placed by CM/SW:    Private pay costs discussed:     Additional Information:  Peer CT RN request, writer made a TCU referral to [facility] requesting admission on 2/13/21. [facility] has a potential vacancy and will assess patient.       DEVIN Hernandez

## 2021-02-13 LAB
ANION GAP SERPL CALCULATED.3IONS-SCNC: 9 MMOL/L (ref 3–14)
BUN SERPL-MCNC: 29 MG/DL (ref 7–30)
CALCIUM SERPL-MCNC: 8.4 MG/DL (ref 8.5–10.1)
CHLORIDE SERPL-SCNC: 115 MMOL/L (ref 94–109)
CO2 SERPL-SCNC: 18 MMOL/L (ref 20–32)
CREAT SERPL-MCNC: 1.23 MG/DL (ref 0.66–1.25)
ERYTHROCYTE [DISTWIDTH] IN BLOOD BY AUTOMATED COUNT: 15.4 % (ref 10–15)
GFR SERPL CREATININE-BSD FRML MDRD: 60 ML/MIN/{1.73_M2}
GLUCOSE SERPL-MCNC: 93 MG/DL (ref 70–99)
HCT VFR BLD AUTO: 21.5 % (ref 40–53)
HGB BLD-MCNC: 7 G/DL (ref 13.3–17.7)
MCH RBC QN AUTO: 30 PG (ref 26.5–33)
MCHC RBC AUTO-ENTMCNC: 32.6 G/DL (ref 31.5–36.5)
MCV RBC AUTO: 92 FL (ref 78–100)
PLATELET # BLD AUTO: 53 10E9/L (ref 150–450)
POTASSIUM SERPL-SCNC: 4 MMOL/L (ref 3.4–5.3)
RBC # BLD AUTO: 2.33 10E12/L (ref 4.4–5.9)
SODIUM SERPL-SCNC: 142 MMOL/L (ref 133–144)
WBC # BLD AUTO: 3.1 10E9/L (ref 4–11)

## 2021-02-13 PROCEDURE — 250N000011 HC RX IP 250 OP 636: Performed by: PHYSICIAN ASSISTANT

## 2021-02-13 PROCEDURE — 250N000013 HC RX MED GY IP 250 OP 250 PS 637: Performed by: INTERNAL MEDICINE

## 2021-02-13 PROCEDURE — 99233 SBSQ HOSP IP/OBS HIGH 50: CPT | Performed by: INTERNAL MEDICINE

## 2021-02-13 PROCEDURE — 99207 PR CDG-MDM COMPONENT: MEETS HIGH - UP CODED: CPT | Performed by: INTERNAL MEDICINE

## 2021-02-13 PROCEDURE — 80048 BASIC METABOLIC PNL TOTAL CA: CPT | Performed by: INTERNAL MEDICINE

## 2021-02-13 PROCEDURE — 36415 COLL VENOUS BLD VENIPUNCTURE: CPT | Performed by: INTERNAL MEDICINE

## 2021-02-13 PROCEDURE — 120N000001 HC R&B MED SURG/OB

## 2021-02-13 PROCEDURE — 250N000011 HC RX IP 250 OP 636: Performed by: INTERNAL MEDICINE

## 2021-02-13 PROCEDURE — 85027 COMPLETE CBC AUTOMATED: CPT | Performed by: INTERNAL MEDICINE

## 2021-02-13 PROCEDURE — 250N000013 HC RX MED GY IP 250 OP 250 PS 637: Performed by: PHYSICIAN ASSISTANT

## 2021-02-13 RX ORDER — FUROSEMIDE 10 MG/ML
20 INJECTION INTRAMUSCULAR; INTRAVENOUS ONCE
Status: DISCONTINUED | OUTPATIENT
Start: 2021-02-13 | End: 2021-02-13 | Stop reason: CLARIF

## 2021-02-13 RX ORDER — FUROSEMIDE 20 MG
20 TABLET ORAL ONCE
Status: COMPLETED | OUTPATIENT
Start: 2021-02-13 | End: 2021-02-13

## 2021-02-13 RX ADMIN — FERROUS SULFATE TAB 325 MG (65 MG ELEMENTAL FE) 325 MG: 325 (65 FE) TAB at 13:07

## 2021-02-13 RX ADMIN — CHOLECALCIFEROL TAB 125 MCG (5000 UNIT) 250 MCG: 125 TAB at 09:43

## 2021-02-13 RX ADMIN — TAMSULOSIN HYDROCHLORIDE 0.8 MG: 0.4 CAPSULE ORAL at 09:43

## 2021-02-13 RX ADMIN — HYDROMORPHONE HYDROCHLORIDE 0.5 MG: 1 INJECTION, SOLUTION INTRAMUSCULAR; INTRAVENOUS; SUBCUTANEOUS at 00:27

## 2021-02-13 RX ADMIN — HYDROMORPHONE HYDROCHLORIDE 4 MG: 2 TABLET ORAL at 09:43

## 2021-02-13 RX ADMIN — UMECLIDINIUM 1 PUFF: 62.5 AEROSOL, POWDER ORAL at 09:49

## 2021-02-13 RX ADMIN — OXYCODONE HYDROCHLORIDE AND ACETAMINOPHEN 1000 MG: 500 TABLET ORAL at 09:43

## 2021-02-13 RX ADMIN — CYANOCOBALAMIN TAB 1000 MCG 1000 MCG: 1000 TAB at 09:43

## 2021-02-13 RX ADMIN — HYDROMORPHONE HYDROCHLORIDE 0.5 MG: 1 INJECTION, SOLUTION INTRAMUSCULAR; INTRAVENOUS; SUBCUTANEOUS at 05:56

## 2021-02-13 RX ADMIN — GUAIFENESIN AND DEXTROMETHORPHAN HYDROBROMIDE 1 TABLET: 600; 30 TABLET, EXTENDED RELEASE ORAL at 05:56

## 2021-02-13 RX ADMIN — FUROSEMIDE 20 MG: 20 TABLET ORAL at 15:43

## 2021-02-13 RX ADMIN — AMOXICILLIN AND CLAVULANATE POTASSIUM 1 TABLET: 875; 125 TABLET, FILM COATED ORAL at 19:00

## 2021-02-13 RX ADMIN — FERROUS SULFATE TAB 325 MG (65 MG ELEMENTAL FE) 325 MG: 325 (65 FE) TAB at 09:43

## 2021-02-13 RX ADMIN — FOLIC ACID 1 MG: 1 TABLET ORAL at 09:43

## 2021-02-13 RX ADMIN — HYDROMORPHONE HYDROCHLORIDE 4 MG: 2 TABLET ORAL at 18:58

## 2021-02-13 RX ADMIN — DULOXETINE HYDROCHLORIDE 60 MG: 30 CAPSULE, DELAYED RELEASE ORAL at 09:43

## 2021-02-13 RX ADMIN — HYDROMORPHONE HYDROCHLORIDE 4 MG: 2 TABLET ORAL at 14:01

## 2021-02-13 RX ADMIN — FERROUS SULFATE TAB 325 MG (65 MG ELEMENTAL FE) 325 MG: 325 (65 FE) TAB at 17:33

## 2021-02-13 RX ADMIN — CEFAZOLIN 1 G: 1 INJECTION, POWDER, FOR SOLUTION INTRAMUSCULAR; INTRAVENOUS at 09:29

## 2021-02-13 RX ADMIN — PRIMIDONE 50 MG: 50 TABLET ORAL at 21:08

## 2021-02-13 RX ADMIN — CEFAZOLIN 1 G: 1 INJECTION, POWDER, FOR SOLUTION INTRAMUSCULAR; INTRAVENOUS at 00:06

## 2021-02-13 RX ADMIN — HYDROCORTISONE 20 MG: 10 TABLET ORAL at 09:43

## 2021-02-13 RX ADMIN — HYDROMORPHONE HYDROCHLORIDE 4 MG: 2 TABLET ORAL at 23:18

## 2021-02-13 RX ADMIN — GUAIFENESIN AND DEXTROMETHORPHAN HYDROBROMIDE 1 TABLET: 600; 30 TABLET, EXTENDED RELEASE ORAL at 17:34

## 2021-02-13 RX ADMIN — HYDROMORPHONE HYDROCHLORIDE 0.5 MG: 1 INJECTION, SOLUTION INTRAMUSCULAR; INTRAVENOUS; SUBCUTANEOUS at 03:00

## 2021-02-13 RX ADMIN — GABAPENTIN 300 MG: 300 CAPSULE ORAL at 09:43

## 2021-02-13 RX ADMIN — GABAPENTIN 300 MG: 300 CAPSULE ORAL at 21:08

## 2021-02-13 RX ADMIN — OMEPRAZOLE 20 MG: 20 CAPSULE, DELAYED RELEASE ORAL at 09:43

## 2021-02-13 RX ADMIN — FLUTICASONE FUROATE AND VILANTEROL TRIFENATATE 1 PUFF: 200; 25 POWDER RESPIRATORY (INHALATION) at 09:49

## 2021-02-13 ASSESSMENT — ACTIVITIES OF DAILY LIVING (ADL)
ADLS_ACUITY_SCORE: 22

## 2021-02-13 ASSESSMENT — MIFFLIN-ST. JEOR: SCORE: 1355.96

## 2021-02-13 NOTE — PROGRESS NOTES
DATE & TIME: 02/12/21 Night    Cognitive Concerns/ Orientation : Alert/oriented x 4, forgetful. Some hearing deficit   BEHAVIOR & AGGRESSION TOOL COLOR: Green   ABNL VS/O2: VSS, 3 LPM oxygen via nasal cannula  MOBILITY: Assist-1 Pivot to chair. L BKA  PAIN MANAGMENT: IV dilaudid for calf pain,   DIET: Regular  BOWEL/BLADDER: Using urinal at bedside  ABNL LAB/BG: Sodium 146; WBC 2.7; Hgb 7.4; hematocrit 23.1; platelet 42  DRAIN/DEVICES: R PIV saline locked, IV dilaudid, antibiotics  SKIN: Bruised, abrasions, scabs. Wound to R posterior calf. Rooke boot in place  TESTS/PROCEDURES: n/a  D/C DAY/GOALS/PLACE: Pending to TCU  OTHER IMPORTANT INFO: Congested cough. PT/OT/WOC following. Takes pills whole with applesauce

## 2021-02-13 NOTE — PROGRESS NOTES
"[facility]    Hospitalist Progress Note    Assessment & Plan   Perfecto Reese is a 70 year old male with complex PMHx including severe COPD on 3L home O2 with ongoing tobacco use, adrenal insufficiency on chronic steroids, psoriasis on Humira, chronic Hep C with cirrhosis, PVD s/p left BKA, chronic wounds, non-compliance, and recent hospitalization 12/2/20-12/31/20 for pneumonia with RLL abscess, treated non-surgically with prolonged antibiotics who was recently discharged from TCU 2/2/2021, now re-presents to the hospital for evaluation of his chronic RLE wounds, found to have RLE cellulitis as well as MICHAEL. Admitted for further evaluation and treatment.      RLE cellulitis  Chronic right lower extremity wounds   Hx of PVD with L BKA (2010, [facility]): Reports worsening RLE wounds with increased erythema and pain since discharge from TCU. At TCU was receiving daily wound cares, but states since discharge, home cares \"fell through the cracks.\" WBC 15.1; vitals not suggestive of septic picture on admission. Lactic acid 1.1. Received 1 L bolus, IV Ancef, and Dilaudid 0.5 mg X2 in the ED.   - Continue IV Ancef, appreciate vascular surgery input, arterial insufficiency is not suspected, no surgical plans noted.  -Continue wound care evaluation, marked the area elevated lower extremity, follow-up blood cultures.  - Pain control with tylenol PRN, dilaudid 1-2 mg q4 hrs, and IV dilaudid 0.3-0.5 mg q2 hrs; monitor for sedation. Judicious use of narcotics   - Note pt taken off ASA at TCU due to AoCD and chronic thrombocytopenia   -Obtained pro calcitonin which does not indicate systemic infection, CRP trending down, transition to p.o. Augmentin on 2/13.  -Pending placement to TCU.       MICHAEL on CKD III: Baseline creatinine 1.2-1.3 in Jan 2020, on admission 2.49. Pt reports poor oral intake since discharge from TCU; unable to care for self. Reports baseline diarrhea ongoing \"for a " "while.\" Received 1 L bolus IVF in the ED. Note creatinine 1.53 on 1/27/21.  -Creatinine back to baseline of 1.2   ON 2/12, losartan on hold, currently continuing Lasix IV  Hypernatremia  --Possibly secondary to IV fluids,-fluids changed, will stop it today and monitor electrolytes.     Chronic hypoxic and hypercarbic respiratory failure in the setting of COPD utilizing 3 LPM via NC at baseline, not in acute exacerbation:   -Continue PTA inhalers and nebs .; discharged with mucinex 600 mg BID, Breo 200-25 mcg 1 puff daily, incruse ellipta 62.5 mcg 1 puff daily, albuterol PRN   - Encourage pulmonary toilet      Recent hospitalization 12/2/2020-12/31/2020 for:  Acute on chronic hypoxic and hypercarbic respiratory failure  Bilateral community-acquired bacterial pneumonia with RLL abscess   Musculoskeletal chest pain   Admitted with fever, cough, and acute respiratory distress (placed on 10L per OM in the field). Fever to 102.5, tachycardia, tachypnea, and leukocytosis (17.2k) present on arrival. CXR on arrival showed RLL infiltrate and CT abd/pelvis showed air fluid cavity at the right lung base. On admission, he was initiated on IV pip-tazo and azithromycin, and Thoracic Surgery was consulted. Chest CT was ordered (12/3) which showed a 6.4 cm RLL pulmonary abscess and extensive left-sided mucus plugging with associated infiltrate. 12/2 blood cultures x2 no growth. Sputum cx 12/4 growing proteus, fluoroquinolone resistant. Also corynebacterium striatum 12/6. CT surgery saw, no surgical intervention indicated. Patient was treated with IV then PO antibiotics, with the assistance of infectious disease. Initially on zosyn -> unasyn -> transitioned 12/8 to Augmentin x 6 weeks (through approximately 1/13/2021).  - Infectious disease peripherally followed during hospitalization   - Repeat CT scan 1/12/21 with improved consolidation in the inferior right and lower lobes consistent with pneumonia, significant interval decrease " in size of a presumed intrapulmonary abscess in the inferior right middle lobe (not completely resolved), moderate sized pleural effusion, slightly larger on the right.   - O2 at baseline of 3 lpm, pt without any worsening respiratory sx, fevers. Reports cough with yellowish sputum      Pleural effusion: R>L. Noted on CT 1/12/2021.   - O2 at baseline of 3 lpm, pt without any worsening respiratory sx, fevers. Reports cough with yellowish sputum   - Would re-image if respiratory status worsened      Chronic normocytic anemia, likely ACD  Chronic thrombocytopenia  Anemia and thrombocytopenia likely multifactorial from liver cirrhosis, splenomegaly, renal disease, CKD and psoriasis. Iron labs suggestive of anemia of chronic disease. 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 Patient required four units of pRBCs this admission for Hgb < 7.0. No acute GI bleed identified, likely blood loss from decreased erythropoiesis. Appreciate GI consult 12/16/2020, no acute GI bleed, no acute interventions, [facility] Recheck hgb 7.5 12/29 and again 7.9 on 1/4/21. Hgb >7 since TCU admission. Discontinued aspirin due to risks at TCU. Occult stool was positive on 1/15/21. Patient was NOT interested in colonoscopy at that time  - Monitor Hgb, hemoglobin dropped from 8.6-5.8 on 2/11, he received 2 units PRBC on 2/11, repeat hemoglobin today is 7, patient is receiving IV Lasix 20 mg daily, hemoglobin remains stable, no obvious bleeding noted.  Mild oozing noted from the wound site.  Wound transfuse for a hemoglobin of 7.  Chronic adrenal insufficiency:   - Continues on PTA hydrocortisone 20 mg PO daily  - Consider stress dose steroids if pt were to become hypotensive     Essential hypertension: Amlodipine decreased to 5mg daily due to soft blood pressures during last hospital stay, Losartan decreased to 25 mg po every  "day.   -Blood pressures are on the lower side we will stop the Norvasc, continue to hold the losartan, PTA Lasix restarted.  -Blood pressure stable today.     History of psoriasis   - PTA Humira held at discharge and not restarted upon discharge from TCU. Restart once current cellulitis has been appropriately treated.       Chronic pain syndrome: Note that upon hospital discharge, regimen consisted of gabapentin 300 mg TID, duloxetine 30 mg daily, lidocaine patch, oxycodone 5 mg q6h prn, morphine 2.5 mg BID prn. Upon TCU discharge, pain regimen with gabapentin 300 mg BID (renally dosed), duloxetine 60 mg daily (recently increased on 1/12/21 for pain complaints and also to assist with mood), lidocaine topical, dilaudid 1 mg q6 hrs PRN.   - Analgesic regimen as above , patient requesting more IV narcotics instead of p.o.     Mild protein-calorie malnutrition: Acute on chronic. Patient receiving Ensure TID PRN at TCU   - Nutrition consult   - Continue Ensure TID PRN  -  Continue vitamin D to 10,000 daily  - Continue folic acid daily  - Continue vitamin B12 daily     Major depressive disorder: Secondary to general medical condition suspected. Seen by Psychiatry during most recent hospitalization.   - Cymbalta increased to 60 mg every day and melatonin 3 mg at bedtime added, continue above     Insomnia: PRN Melatonin, was getting up to 6 mg at bedtime at TCU      Physical deconditioning: Was in TCU from 12/31/20-2/2/21. Lives alone in apartment. Friend helps patient get groceries. At baseline is WC bound with [employer] for transfers. Pt was reportedly discharged with home cares which never followed up with patient. penitentiary setting was suggested, but patient declined.   - PT/OT/SW  to follow     Hx of non-compliance: Noted in chart. Pt reports that he has been off of ALL medications since discharge from TCU. Indicates needing help setting up meds by homecare RN who has historically helped in the past. He states homecare \"fell " "through the cracks\" upon discharge from TCU and he tried calling every day to get it set up.   - SW consulted, patient was seen by Dr. Odonnell -- he recommended patient goes to [place] so that he could follow him up there.  - Pt seen by Psychiatry during last hospitalization to determine decision making capacity and was deemed decisional. See note by Dr. Parrish from 12/21/20.     Covid status: Negative per admission PCR. Asymptomatic.       DVT Prophylaxis: SCDs  Code Status: Full Code     Disposition: Expected discharge to TCU as early as today, 2/13/2021  Carmen Flores MD  Text Page   (7am to 6pm)    Interval History   Patient is resting comfortably, he is quite sleepy, his hemoglobin is 7 today, he wakes up on calling his name, hard of hearing, plan was possible discharge to TCU today, I have not heard any input from social work regarding this today.  Discussed with nursing, patient had lost his IV, transition antibiotics to p.o. along with the pain medications.  -Data reviewed today: I reviewed all new labs and imaging results over the last 24 hours  .Physical Exam   Temp: 98.4  F (36.9  C) Temp src: Axillary BP: 123/50 Pulse: 77   Resp: 18 SpO2: 94 % O2 Device: None (Room air) Oxygen Delivery: 3 LPM  Vitals:    02/11/21 0627 02/12/21 0641 02/13/21 0421   Weight: 63 kg (139 lb) 64.9 kg (143 lb) 65.9 kg (145 lb 4.8 oz)     Vital Signs with Ranges  Temp:  [98.4  F (36.9  C)-99.4  F (37.4  C)] 98.4  F (36.9  C)  Pulse:  [77-84] 77  Resp:  [18] 18  BP: (123-136)/(50-53) 123/50  SpO2:  [94 %-99 %] 94 %  I/O last 3 completed shifts:  In: -   Out: 1075 [Urine:1075]    Constitutional: Awake, alert, cooperative, no apparent distress  Respiratory: Clear to auscultation bilaterally, no crackles or wheezing  Cardiovascular: Regular rate and rhythm, normal S1 and S2, and no murmur noted  GI: Normal bowel sounds, soft, non-distended, non-tender  Skin/Integumen: His right lower extremity is elevated, it has erythema and " local rise of temperature noted from ankle to mid cough, multiple areas of skin breakdown noted, left BKA  Neuro : moving all 4 extremities, no focal deficit noted     Medications       amoxicillin-clavulanate  1 tablet Oral Q12H ISIS     cyanocobalamin  1,000 mcg Oral Daily     dextromethorphan-guaiFENesin  1 tablet Oral Q12H     DULoxetine  60 mg Oral Daily     ferrous sulfate  325 mg Oral TID w/meals     fluticasone-vilanterol  1 puff Inhalation Daily     folic acid  1 mg Oral Daily     gabapentin  300 mg Oral BID     hydrocortisone  20 mg Oral QAM     omeprazole  20 mg Oral Daily     primidone  50 mg Oral At Bedtime     tamsulosin  0.8 mg Oral Daily     umeclidinium  1 puff Inhalation Daily     vitamin C  1,000 mg Oral Daily     Vitamin D3  250 mcg Oral Daily       Data   Recent Labs   Lab 02/13/21  1212 02/12/21  0800 02/11/21  1636 02/11/21  0800 02/09/21  0601 02/09/21  0601   WBC 3.1* 2.7*  --  3.5*   < >  --    HGB 7.0* 7.4* 6.7* 5.8*   < >  --    MCV 92 92  --  93   < >  --    PLT 53* 42*  --  42*   < >  --    INR  --   --   --   --   --  1.25*    146*  --  143   < > 137   POTASSIUM 4.0 4.3  --  3.7   < > 4.3   CHLORIDE 115* 123*  --  116*   < > 107   CO2 18* 19*  --  20   < > 15*   BUN 29 27  --  32*   < > 58*   CR 1.23 1.23  --  1.35*   < > 2.49*   ANIONGAP 9 4  --  7   < > 15*   MARTINE 8.4* 8.0*  --  8.0*   < > 8.4*   GLC 93 98  --  107*   < > 69*   ALBUMIN  --   --   --   --   --  3.2*   PROTTOTAL  --   --   --   --   --  7.9   BILITOTAL  --   --   --   --   --  0.5   ALKPHOS  --   --   --   --   --  79   ALT  --   --   --   --   --  21   AST  --   --   --   --   --  23    < > = values in this interval not displayed.     Recent Labs   Lab 02/13/21  1212 02/12/21  0800 02/11/21  0800 02/10/21  0838 02/09/21  0601   GLC 93 98 107* 149* 69*       Imaging:   No results found for this or any previous visit (from the past 24 hour(s)).

## 2021-02-13 NOTE — PLAN OF CARE
Cognitive Concerns/ Orientation : A&O x4, sleeping through much of the day, easily arousable, [place]   BEHAVIOR & AGGRESSION TOOL COLOR: Green   ABNL VS/O2: VSS on 3L O2 NC (home baseline per pt).   MOBILITY: Assist-1 Pivot to chair. L BKA  PAIN MANAGMENT: C/O left calf/wound pain, managed with PO dilaudid (IV now discontinued).   DIET: Regular diet, tolerating, poor appetite.   BOWEL/BLADDER: Voiding in urinal. BM today.   ABNL LAB/BG: Na and creat normalized. WBC 3.1.   -Hgb 7.0, MD notified, no need to transfuse, will recheck in am. Platelets 53.   DRAIN/DEVICES: PIV in R wrist infiltrated, pink/tender/+1 edema. Now no IV access, MD aware.   SKIN: Bruised, abrasions, scabs. Wound to posterior R calf, old dressing with moderate amount of serosangunous/tan drainage, nearly no bleeding during wound cares today; wound cares completed as ordered, dressing changed, edema wear on, Rooke boot in place.   TESTS/PROCEDURES: n/a  D/C DAY/GOALS/PLACE: To TCU pending placement.   OTHER IMPORTANT INFO: Weak pulse palpable on RLE, L BKA, otherwise CMS intact. LS diminished, infrequent congested cough. PT/OT/WOC.

## 2021-02-13 NOTE — PROGRESS NOTES
Care Management Follow Up    Length of Stay (days): 4    Expected Discharge Date: 02/13/21(tcu)     Concerns to be Addressed: adjustment to diagnosis/illness, compliance issue, care coordination/care conferences     Patient plan of care discussed at interdisciplinary rounds: Yes    Anticipated Discharge Disposition:       Anticipated Discharge Services:    Anticipated Discharge DME:      Patient/family educated on Medicare website which has current facility and service quality ratings:    Education Provided on the Discharge Plan:    Patient/Family in Agreement with the Plan:      Referrals Placed by CM/SW:    Private pay costs discussed:     Additional Information:  Based on Dr Flores's note, patient appears stable for discharge today.    [facility] declined referral yesterday but their admissio staff today doesn't have record if it was due to bed availability or for clinical reasons.  They will is re-assess patient.  This TCU is preference of Dr Odonnell.  If they decline will speak with patient about another TCU.      DEVIN Hernandez

## 2021-02-13 NOTE — PLAN OF CARE
Cognitive Concerns/ Orientation : A&Ox4  BEHAVIOR & AGGRESSION TOOL COLOR: Green  CIWA SCORE: N/A    ABNL VS/O2: VSS on 3L O2 NC, home baseline per pt  MOBILITY: Assist +1, stand/pivot to chair (L BKA)  PAIN MANAGMENT: C/O R calf/leg pain, managed with IV Dilaudid x2, encouraged to use oral pain medications.   DIET: Regular diet, no appetite - patient prefers boost shakes  BOWEL/BLADDER: Uses urinal to void, BMx1, incontinent at times  ABNL LAB/BG: Hgb 7.4, platelets 42  DRAIN/DEVICES: PIV SL  TELEMETRY RHYTHM: N/A  SKIN: L BKA. Bruised, abrasions, scabs. Wound to posterior R calf, dressing changed on day shift, rooke boot in place.   TESTS/PROCEDURES: N/A  D/C DAY/GOALS/PLACE: Possible discharge to [place] [facility] tomorrow  OTHER IMPORTANT INFO: Cool extremities, infrequent congested cough. PT/OT/WOC. Prefers to take medications with applesauce.

## 2021-02-13 NOTE — PROVIDER NOTIFICATION
MD Notification    Notified Person: MD    Notified Person Name: Dr. Flores     Notification Date/Time: 2/13/2021 1149    Notification Interaction: paged provider     Purpose of Notification: Hgb is 7.0. Some bleeding from wound, but appears less than yesterday. Not bleeding through dressing. No other bleeding noted. Do you want to transfuse? Will need new transfuse orders.    Orders Received: MD aware, no need to transfuse. Will recheck Hgb in am.     Comments:

## 2021-02-14 LAB
ANION GAP SERPL CALCULATED.3IONS-SCNC: 4 MMOL/L (ref 3–14)
BUN SERPL-MCNC: 29 MG/DL (ref 7–30)
CALCIUM SERPL-MCNC: 8.3 MG/DL (ref 8.5–10.1)
CHLORIDE SERPL-SCNC: 116 MMOL/L (ref 94–109)
CO2 SERPL-SCNC: 21 MMOL/L (ref 20–32)
CREAT SERPL-MCNC: 1.11 MG/DL (ref 0.66–1.25)
ERYTHROCYTE [DISTWIDTH] IN BLOOD BY AUTOMATED COUNT: 15.6 % (ref 10–15)
GFR SERPL CREATININE-BSD FRML MDRD: 68 ML/MIN/{1.73_M2}
GLUCOSE SERPL-MCNC: 111 MG/DL (ref 70–99)
HCT VFR BLD AUTO: 23.7 % (ref 40–53)
HGB BLD-MCNC: 7.7 G/DL (ref 13.3–17.7)
MCH RBC QN AUTO: 30.1 PG (ref 26.5–33)
MCHC RBC AUTO-ENTMCNC: 32.5 G/DL (ref 31.5–36.5)
MCV RBC AUTO: 93 FL (ref 78–100)
PLATELET # BLD AUTO: 60 10E9/L (ref 150–450)
POTASSIUM SERPL-SCNC: 4 MMOL/L (ref 3.4–5.3)
RBC # BLD AUTO: 2.56 10E12/L (ref 4.4–5.9)
SODIUM SERPL-SCNC: 141 MMOL/L (ref 133–144)
WBC # BLD AUTO: 3.5 10E9/L (ref 4–11)

## 2021-02-14 PROCEDURE — 99232 SBSQ HOSP IP/OBS MODERATE 35: CPT | Performed by: INTERNAL MEDICINE

## 2021-02-14 PROCEDURE — 85027 COMPLETE CBC AUTOMATED: CPT | Performed by: INTERNAL MEDICINE

## 2021-02-14 PROCEDURE — 120N000001 HC R&B MED SURG/OB

## 2021-02-14 PROCEDURE — 80048 BASIC METABOLIC PNL TOTAL CA: CPT | Performed by: INTERNAL MEDICINE

## 2021-02-14 PROCEDURE — 250N000013 HC RX MED GY IP 250 OP 250 PS 637: Performed by: INTERNAL MEDICINE

## 2021-02-14 PROCEDURE — 250N000013 HC RX MED GY IP 250 OP 250 PS 637: Performed by: PHYSICIAN ASSISTANT

## 2021-02-14 PROCEDURE — 36415 COLL VENOUS BLD VENIPUNCTURE: CPT | Performed by: INTERNAL MEDICINE

## 2021-02-14 RX ORDER — LOSARTAN POTASSIUM 25 MG/1
25 TABLET ORAL DAILY
Status: DISCONTINUED | OUTPATIENT
Start: 2021-02-15 | End: 2021-02-15 | Stop reason: HOSPADM

## 2021-02-14 RX ORDER — FUROSEMIDE 20 MG
20 TABLET ORAL DAILY
Status: DISCONTINUED | OUTPATIENT
Start: 2021-02-14 | End: 2021-02-15 | Stop reason: HOSPADM

## 2021-02-14 RX ADMIN — FERROUS SULFATE TAB 325 MG (65 MG ELEMENTAL FE) 325 MG: 325 (65 FE) TAB at 09:17

## 2021-02-14 RX ADMIN — OMEPRAZOLE 20 MG: 20 CAPSULE, DELAYED RELEASE ORAL at 09:16

## 2021-02-14 RX ADMIN — GABAPENTIN 300 MG: 300 CAPSULE ORAL at 09:17

## 2021-02-14 RX ADMIN — HYDROMORPHONE HYDROCHLORIDE 4 MG: 2 TABLET ORAL at 13:41

## 2021-02-14 RX ADMIN — GUAIFENESIN AND DEXTROMETHORPHAN HYDROBROMIDE 1 TABLET: 600; 30 TABLET, EXTENDED RELEASE ORAL at 18:02

## 2021-02-14 RX ADMIN — GABAPENTIN 300 MG: 300 CAPSULE ORAL at 20:58

## 2021-02-14 RX ADMIN — OXYCODONE HYDROCHLORIDE AND ACETAMINOPHEN 1000 MG: 500 TABLET ORAL at 09:16

## 2021-02-14 RX ADMIN — HYDROMORPHONE HYDROCHLORIDE 2 MG: 2 TABLET ORAL at 18:02

## 2021-02-14 RX ADMIN — AMOXICILLIN AND CLAVULANATE POTASSIUM 1 TABLET: 875; 125 TABLET, FILM COATED ORAL at 09:17

## 2021-02-14 RX ADMIN — HYDROCORTISONE 20 MG: 10 TABLET ORAL at 09:16

## 2021-02-14 RX ADMIN — FERROUS SULFATE TAB 325 MG (65 MG ELEMENTAL FE) 325 MG: 325 (65 FE) TAB at 13:41

## 2021-02-14 RX ADMIN — DULOXETINE HYDROCHLORIDE 60 MG: 30 CAPSULE, DELAYED RELEASE ORAL at 09:16

## 2021-02-14 RX ADMIN — HYDROMORPHONE HYDROCHLORIDE 4 MG: 2 TABLET ORAL at 09:16

## 2021-02-14 RX ADMIN — UMECLIDINIUM 1 PUFF: 62.5 AEROSOL, POWDER ORAL at 09:23

## 2021-02-14 RX ADMIN — FLUTICASONE FUROATE AND VILANTEROL TRIFENATATE 1 PUFF: 200; 25 POWDER RESPIRATORY (INHALATION) at 09:23

## 2021-02-14 RX ADMIN — CYANOCOBALAMIN TAB 1000 MCG 1000 MCG: 1000 TAB at 09:17

## 2021-02-14 RX ADMIN — HYDROMORPHONE HYDROCHLORIDE 4 MG: 2 TABLET ORAL at 22:52

## 2021-02-14 RX ADMIN — FOLIC ACID 1 MG: 1 TABLET ORAL at 09:17

## 2021-02-14 RX ADMIN — PRIMIDONE 50 MG: 50 TABLET ORAL at 20:58

## 2021-02-14 RX ADMIN — HYDROMORPHONE HYDROCHLORIDE 4 MG: 2 TABLET ORAL at 04:21

## 2021-02-14 RX ADMIN — CHOLECALCIFEROL TAB 125 MCG (5000 UNIT) 250 MCG: 125 TAB at 09:16

## 2021-02-14 RX ADMIN — GUAIFENESIN AND DEXTROMETHORPHAN HYDROBROMIDE 1 TABLET: 600; 30 TABLET, EXTENDED RELEASE ORAL at 04:21

## 2021-02-14 RX ADMIN — FUROSEMIDE 20 MG: 20 TABLET ORAL at 14:55

## 2021-02-14 RX ADMIN — AMOXICILLIN AND CLAVULANATE POTASSIUM 1 TABLET: 875; 125 TABLET, FILM COATED ORAL at 20:58

## 2021-02-14 RX ADMIN — TAMSULOSIN HYDROCHLORIDE 0.8 MG: 0.4 CAPSULE ORAL at 09:16

## 2021-02-14 RX ADMIN — FERROUS SULFATE TAB 325 MG (65 MG ELEMENTAL FE) 325 MG: 325 (65 FE) TAB at 18:02

## 2021-02-14 ASSESSMENT — ACTIVITIES OF DAILY LIVING (ADL)
ADLS_ACUITY_SCORE: 23

## 2021-02-14 ASSESSMENT — MIFFLIN-ST. JEOR
SCORE: 1372.88
SCORE: 1361.4

## 2021-02-14 NOTE — PLAN OF CARE
Cognitive Concerns/ Orientation : A&O x4.   BEHAVIOR & AGGRESSION TOOL COLOR: Green   ABNL VS/O2: VSS on 3L O2 NC (home baseline)   MOBILITY: Assist-1 to pivot. L BKA.   PAIN MANAGMENT: C/O R calf/wound pain, managed with PO Dilaudid.   DIET: Regular diet, fair appetite today, tolerating.   BOWEL/BLADDER: Voiding in urinal. Incontinent of small BM today.   ABNL LAB/BG: WBC 3.5. Hgb 7.7. Platelets 60.   DRAIN/DEVICES: No IV access  SKIN: Bruised, abrasions, scabs. Wound to posterior R calf, old dressing with moderate amount of serosangunous/tan drainage, small amount of bleeding noted during wound cares; wound cares completed as ordered, dressing changed, edema wear on, Rooke boot in place.   TESTS/PROCEDURES: n/a  D/C DAY/GOALS/PLACE: Pending TCU placement   OTHER IMPORTANT INFO: Weak pulse on R foot otherwise CMS intact. LS diminished, infrequent congested cough. PT/OT/WOC following.

## 2021-02-14 NOTE — PROGRESS NOTES
DATE & TIME: 2/13/21 Night    Cognitive Concerns/ Orientation : Alert/oriented  4, flat affect   BEHAVIOR & AGGRESSION TOOL COLOR: Green   ABNL VS/O2: VSS, 3 LPM oxygen via nasal cannula  MOBILITY: Assist-1 to pivot to commode. ERICA RICO in Rooke boot  PAIN MANAGMENT: Oral dilaudid x 1 for calf/shoulder pain  DIET: Regular  BOWEL/BLADDER: Urinal at bedside  ABNL LAB/BG: WBC 3.1; Hgb 7.0 (MD aware); hematocrit 21.5; platelet 53  DRAIN/DEVICES: No IV access  SKIN: Bruised, abrasions, scabs. Wound to R posterior calf. Rooke boot in place  TESTS/PROCEDURES: n/a  D/C DAY/GOALS/PLACE: Ready for discharge pending an evaluation by [place] [facility] (for MD follow up for prosthesis)  OTHER IMPORTANT INFO: Weak pulse on R foot otherwise CMS intact. LS diminished, infrequent congested cough. PT/OT/WOC following.

## 2021-02-14 NOTE — PLAN OF CARE
Cognitive Concerns/ Orientation : A&O x4, flat affect   BEHAVIOR & AGGRESSION TOOL COLOR: Green   ABNL VS/O2: VSS on 3L O2 NC (home baseline per pt).   MOBILITY: Assist-1 Pivot to chair. L BKA. Pt did not get out of bed this shift.   PAIN MANAGMENT: C/O left calf/wound pain 7-8/10, managed with PO dilaudid x1  DIET: Regular diet, tolerating, improved appetite.   BOWEL/BLADDER: Voiding in urinal. No BM this shift.   ABNL LAB/BG: Platlet: 53, Hematocrit: 21.5, H.0 (MD aware, recheck in am)   DRAIN/DEVICES: N/A  SKIN: Bruised, abrasions, scabs. Wound to posterior R calf, wound cares and dressing completed with minimal bleeding noted. Rooke boot in place.  TESTS/PROCEDURES: N/A  D/C DAY/GOALS/PLACE: Ready to discharge, pending 2nd evaluation for acceptance to [facility] (TCU of choice per doctor for prothesis follow up).  OTHER IMPORTANT INFO: Weak pulse palpable on RLE, L BKA, otherwise CMS intact. LS diminished, infrequent congested cough. PT/OT/WOC.

## 2021-02-14 NOTE — PROGRESS NOTES
"[facility]    Hospitalist Progress Note    Assessment & Plan   Perfecto Reese is a 70 year old male with complex PMHx including severe COPD on 3L home O2 with ongoing tobacco use, adrenal insufficiency on chronic steroids, psoriasis on Humira, chronic Hep C with cirrhosis, PVD s/p left BKA, chronic wounds, non-compliance, and recent hospitalization 12/2/20-12/31/20 for pneumonia with RLL abscess, treated non-surgically with prolonged antibiotics who was recently discharged from TCU 2/2/2021, now re-presents to the hospital for evaluation of his chronic RLE wounds, found to have RLE cellulitis as well as MICHAEL. Admitted for further evaluation and treatment.      RLE cellulitis  Chronic right lower extremity wounds   Hx of PVD with L BKA (2010, [facility]): Reports worsening RLE wounds with increased erythema and pain since discharge from TCU. At TCU was receiving daily wound cares, but states since discharge, home cares \"fell through the cracks.\" WBC 15.1; vitals not suggestive of septic picture on admission. Lactic acid 1.1. Received 1 L bolus, IV Ancef, and Dilaudid 0.5 mg X2 in the ED.   -Patient was on IV Ancef until 2/13, stopped it as he lost his IV  and was difficult to place another.  Currently on oral antibiotics.  -Appreciate vascular surgery input, arterial insufficiency is not suspected, no surgical plans noted.  -Continue wound care, patient was seen by wound care RN and Dr. Odonnell, he had recommended that the patient should go to [place], currently [place] is declining to take the patient, social work going to discuss about other TCU options with patient.  - Pain control with tylenol PRN, dilaudid 1-2 mg q4 hrs, patient was also on IV Dilaudid which was discontinued once he lost his IV.  - Note pt taken off ASA at TCU due to AoCD and chronic thrombocytopenia   -Obtained pro calcitonin which does not indicate systemic infection, CRP trending down, transition to " p.o. Augmentin on 2/13.  -Pending placement to TCU.       MICHAEL on CKD III: Baseline creatinine 1.2-1.3 in Jan 2020, on admission 2.49.   -Following hydration creatinine improved to 1.1, at its baseline, will restart his losartan and Lasix.  Hypernatremia  --Possibly secondary to IV fluids, resolved-2/14     Chronic hypoxic and hypercarbic respiratory failure in the setting of COPD utilizing 3 LPM via NC at baseline, not in acute exacerbation:   -Continue PTA inhalers and nebs .; discharged with mucinex 600 mg BID, Breo 200-25 mcg 1 puff daily, incruse ellipta 62.5 mcg 1 puff daily, albuterol PRN   - Encourage pulmonary toilet      Recent hospitalization 12/2/2020-12/31/2020 for:  Acute on chronic hypoxic and hypercarbic respiratory failure  Bilateral community-acquired bacterial pneumonia with RLL abscess   Musculoskeletal chest pain   Admitted with fever, cough, and acute respiratory distress (placed on 10L per OM in the field). Fever to 102.5, tachycardia, tachypnea, and leukocytosis (17.2k) present on arrival. CXR on arrival showed RLL infiltrate and CT abd/pelvis showed air fluid cavity at the right lung base. On admission, he was initiated on IV pip-tazo and azithromycin, and Thoracic Surgery was consulted. Chest CT was ordered (12/3) which showed a 6.4 cm RLL pulmonary abscess and extensive left-sided mucus plugging with associated infiltrate. 12/2 blood cultures x2 no growth. Sputum cx 12/4 growing proteus, fluoroquinolone resistant. Also corynebacterium striatum 12/6. CT surgery saw, no surgical intervention indicated. Patient was treated with IV then PO antibiotics, with the assistance of infectious disease. Initially on zosyn -> unasyn -> transitioned 12/8 to Augmentin x 6 weeks (through approximately 1/13/2021).  - Infectious disease peripherally followed during hospitalization   - Repeat CT scan 1/12/21 with improved consolidation in the inferior right and lower lobes consistent with pneumonia,  significant interval decrease in size of a presumed intrapulmonary abscess in the inferior right middle lobe (not completely resolved), moderate sized pleural effusion, slightly larger on the right.   - O2 at baseline of 3 lpm, pt without any worsening respiratory sx, fevers. Reports cough with yellowish sputum      Pleural effusion: R>L. Noted on CT 1/12/2021.   - O2 at baseline of 3 lpm, pt without any worsening respiratory sx, fevers. Reports cough with yellowish sputum   - Would re-image if respiratory status worsened      Chronic normocytic anemia, likely ACD  Chronic thrombocytopenia  Anemia and thrombocytopenia likely multifactorial from liver cirrhosis, splenomegaly, renal disease, CKD and psoriasis. Iron labs suggestive of anemia of chronic disease. 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 Patient required four units of pRBCs this admission for Hgb < 7.0. No acute GI bleed identified, likely blood loss from decreased erythropoiesis. Appreciate GI consult 12/16/2020, no acute GI bleed, no acute interventions, [facility] Recheck hgb 7.5 12/29 and again 7.9 on 1/4/21. Hgb >7 since TCU admission. Discontinued aspirin due to risks at TCU. Occult stool was positive on 1/15/21. Patient was NOT interested in colonoscopy at that time  - Monitor Hgb, hemoglobin dropped from 8.6-5.8 on 2/11, he received 2 units PRBC on 2/11,no obvious bleeding noted.  Mild oozing noted from the wound site.  Wound transfuse for a hemoglobin of 7.  -Hemoglobin stable at 7.7 today.  Chronic adrenal insufficiency:   - Continues on PTA hydrocortisone 20 mg PO daily  - Consider stress dose steroids if pt were to become hypotensive     Essential hypertension: Amlodipine decreased to 5mg daily due to soft blood pressures during last hospital stay, Losartan decreased to 25 mg po every day.   -Blood pressures are on the  lower side we will stop the Norvasc, continue to hold the losartan, PTA Lasix restarted.  -Blood pressure stable today.  We will restart PTA losartan and PTA Lasix.  2/14.     History of psoriasis   - PTA Humira held at discharge and not restarted upon discharge from TCU. Restart once current cellulitis has been appropriately treated.       Chronic pain syndrome: Note that upon hospital discharge, regimen consisted of gabapentin 300 mg TID, duloxetine 30 mg daily, lidocaine patch, oxycodone 5 mg q6h prn, morphine 2.5 mg BID prn. Upon TCU discharge, pain regimen with gabapentin 300 mg BID (renally dosed), duloxetine 60 mg daily (recently increased on 1/12/21 for pain complaints and also to assist with mood), lidocaine topical, dilaudid 1 mg q6 hrs PRN.   - Analgesic regimen as above , as needed IV narcotics stopped due to lack of IV.     Mild protein-calorie malnutrition: Acute on chronic. Patient receiving Ensure TID PRN at TCU   - Nutrition consult   - Continue Ensure TID PRN  -  Continue vitamin D to 10,000 daily  - Continue folic acid daily  - Continue vitamin B12 daily     Major depressive disorder: Secondary to general medical condition suspected. Seen by Psychiatry during most recent hospitalization.   - Cymbalta increased to 60 mg every day and melatonin 3 mg at bedtime added, continue above     Insomnia: PRN Melatonin, was getting up to 6 mg at bedtime at TCU      Physical deconditioning: Was in TCU from 12/31/20-2/2/21. Lives alone in apartment. Friend helps patient get groceries. At baseline is WC bound with [facility] for transfers. Pt was reportedly discharged with home cares which never followed up with patient. [facility] setting was suggested, but patient declined.   - PT/OT/SW  to follow     Hx of non-compliance: Noted in chart. Pt reports that he has been off of ALL medications since discharge from TCU. Indicates needing help setting up meds by homecare RN who has historically helped in the past. He states  "homecare \"fell through the cracks\" upon discharge from TCU and he tried calling every day to get it set up.   - SW consulted, patient was seen by Dr. Odonnell -- he recommended patient goes to [place] so that he could follow him up there.  - Pt seen by Psychiatry during last hospitalization to determine decision making capacity and was deemed decisional. See note by Dr. Parrish from 12/21/20.     Covid status: Negative per admission PCR. Asymptomatic.       DVT Prophylaxis: SCDs  Code Status: Full Code     Disposition: Expected discharge to TCU as early as today, 2/13/2021  Carmen Flores MD  Text Page   (7am to 6pm)    Interval History   Patient is resting, he slept well, he currently does not have an IV line, on oral antibiotics, and the [facility], it seems that [facility] have not accepted the patient, need to discuss other TCU options, patient appeared quite upset about this news, he was mentioning that he be accepted to go to [facility] only because Dr. Odonnell would help him there with his prosthesis and his wound, at this point discharge plan is undecided.  His hemoglobin is stable today without any transfusion at 7.7.  From 7 yesterday.  -Data reviewed today: I reviewed all new labs and imaging results over the last 24 hours  .Physical Exam   Temp: 97.4  F (36.3  C) Temp src: Oral BP: 128/56 Pulse: 76   Resp: 18 SpO2: 99 % O2 Device: Nasal cannula Oxygen Delivery: 3 LPM  Vitals:    02/13/21 0421 02/14/21 0134 02/14/21 0527   Weight: 65.9 kg (145 lb 4.8 oz) 67.6 kg (149 lb 0.5 oz) 66.5 kg (146 lb 8 oz)     Vital Signs with Ranges  Temp:  [97.4  F (36.3  C)-98.7  F (37.1  C)] 97.4  F (36.3  C)  Pulse:  [75-77] 76  Resp:  [18] 18  BP: (126-128)/(50-56) 128/56  SpO2:  [97 %-99 %] 99 %  I/O last 3 completed shifts:  In: 360 [P.O.:360]  Out: 1675 [Urine:1675]    Constitutional: Awake, alert, cooperative, no apparent distress  Respiratory: Clear to auscultation bilaterally, no crackles or " wheezing  Cardiovascular: Regular rate and rhythm, normal S1 and S2, and no murmur noted  GI: Normal bowel sounds, soft, non-distended, non-tender  Skin/Integumen: His right lower extremity is elevated, it has erythema and local rise of temperature noted from ankle to mid cough, multiple areas of skin breakdown noted, left BKA  Neuro : moving all 4 extremities, no focal deficit noted     Medications       amoxicillin-clavulanate  1 tablet Oral Q12H ISIS     cyanocobalamin  1,000 mcg Oral Daily     dextromethorphan-guaiFENesin  1 tablet Oral Q12H     DULoxetine  60 mg Oral Daily     ferrous sulfate  325 mg Oral TID w/meals     fluticasone-vilanterol  1 puff Inhalation Daily     folic acid  1 mg Oral Daily     gabapentin  300 mg Oral BID     hydrocortisone  20 mg Oral QAM     omeprazole  20 mg Oral Daily     primidone  50 mg Oral At Bedtime     tamsulosin  0.8 mg Oral Daily     umeclidinium  1 puff Inhalation Daily     vitamin C  1,000 mg Oral Daily     Vitamin D3  250 mcg Oral Daily       Data   Recent Labs   Lab 02/14/21  1007 02/13/21  1212 02/12/21  0800 02/09/21  0601 02/09/21  0601   WBC 3.5* 3.1* 2.7*   < >  --    HGB 7.7* 7.0* 7.4*   < >  --    MCV 93 92 92   < >  --    PLT 60* 53* 42*   < >  --    INR  --   --   --   --  1.25*    142 146*   < > 137   POTASSIUM 4.0 4.0 4.3   < > 4.3   CHLORIDE 116* 115* 123*   < > 107   CO2 21 18* 19*   < > 15*   BUN 29 29 27   < > 58*   CR 1.11 1.23 1.23   < > 2.49*   ANIONGAP 4 9 4   < > 15*   MARTINE 8.3* 8.4* 8.0*   < > 8.4*   * 93 98   < > 69*   ALBUMIN  --   --   --   --  3.2*   PROTTOTAL  --   --   --   --  7.9   BILITOTAL  --   --   --   --  0.5   ALKPHOS  --   --   --   --  79   ALT  --   --   --   --  21   AST  --   --   --   --  23    < > = values in this interval not displayed.     Recent Labs   Lab 02/14/21  1007 02/13/21  1212 02/12/21  0800 02/11/21  0800 02/10/21  0838   * 93 98 107* 149*       Imaging:   No results found for this or any previous  visit (from the past 24 hour(s)).

## 2021-02-14 NOTE — PROGRESS NOTES
Care Management Follow Up    Length of Stay (days): 5    Expected Discharge Date: 02/13/21(tcu)     Concerns to be Addressed: adjustment to diagnosis/illness, compliance issue, care coordination/care conferences     Patient plan of care discussed at interdisciplinary rounds: Yes    Anticipated Discharge Disposition:       Anticipated Discharge Services:    Anticipated Discharge DME:      Patient/family educated on Medicare website which has current facility and service quality ratings:    Education Provided on the Discharge Plan:    Patient/Family in Agreement with the Plan:      Referrals Placed by CM/SW:    Private pay costs discussed: Not applicable    Additional Information:  [facility] is declining patient for admission, siting concern that he was recently in TCU, went home and failed, also in record indicates he dismissed home care staff.  Writer will be speaking with patient to discuss other TCU options.      QUENTIN HernandezSW

## 2021-02-14 NOTE — PLAN OF CARE
OT:Attempted to see pt. for scheduled session. Pt. refused OT this afternoon, stating he was in pain and wanted to rest.

## 2021-02-14 NOTE — CONSULTS
Care Management Initial Consult    General Information  Assessment completed with: Patient, VM-chart review,    Type of CM/SW Visit: Initial Assessment    Primary Care Provider verified and updated as needed: Yes   Readmission within the last 30 days: other (see comments)   Return Category: Progression of disease  Reason for Consult: discharge planning, facility placement  Advance Care Planning:            Communication Assessment  Patient's communication style: spoken language (English or Bilingual)    Hearing Difficulty or Deaf: no   Wear Glasses or Blind: yes    Cognitive  Cognitive/Neuro/Behavioral: WDL                      Living Environment:   People in home: alone     Current living Arrangements: apartment(handicpp building)      Able to return to prior arrangements: (TCU for wound care and therapy prior to home)       Family/Social Support:  Care provided by: self(was to have Home RN,PT,OT, HHA and SW)  Provides care for: no one, unable/limited ability to care for self  Marital Status: Single  Other (specify)          Description of Support System: (friend)         Current Resources:   Patient receiving home care services: (Upon returning home from [facility] TCU,)     Community Resources: None  Equipment currently used at home: wheelchair, manual, grab bar, tub/shower, grab bar, toilet, raised toilet seat, shower chair(and power scooter)  Supplies currently used at home:      Employment/Financial:  Employment Status: retired        Financial Concerns: No concerns identified           Lifestyle & Psychosocial Needs:        Socioeconomic History     Marital status: Single     Spouse name: Not on file     Number of children: Not on file     Years of education: Not on file     Highest education level: Not on file     Tobacco Use     Smoking status: Former Smoker     Packs/day: 0.50     Years: 30.00     Pack years: 15.00     Types: Cigarettes     Quit date: 4/1/2017     Years since quitting: 3.8      "Smokeless tobacco: Never Used     Tobacco comment: states he is down to 3QD   Substance and Sexual Activity     Alcohol use: No     Drug use: No     Sexual activity: Never       Functional Status:  Prior to admission patient needed assistance:              Mental Health Status:  Mental Health Status: Other (see comment)(depression)  Mental Health Management: Medication    Chemical Dependency Status:                Values/Beliefs:  Spiritual, Cultural Beliefs, Scientologist Practices, Values that affect care: no               Additional Information:  Social Work following for discharge planning to TCU.  Dr Odonnell has [facility].  He discussed this plan with patient and because of this patient assumed he would go to [place].  [facility] has declined patient because in reviewing Epic information, [facility] is [facility] and her impression is that patient failed at home and discontinued home care.  Her opinion is that patient will need LTC and for these reasons she has declined accepting patient.    Writer met with patient and explained [facility] is not able to admit him.  He immediately stated he would then go home and expressed frustration that \" I now will never get a new prothesis\".  Writer explained that if he admits to a different TCU he can be transported to Dr Odonnell's office.  Explained the transportation is a benefit of his insurance.   By the end of the conversation, he agreed to consider returning to [facility] and also expressed interest in [facility].  \"I have tried getting in there but they won't take me.\"    In relation to his home care setting.  He shares he lives in a handicapp apartment and does his own personal cares and homemaking.  He has a friend who does his grocery shopping.  Writer asked if [facility] had set him up for [facility] but he said no.   He reports, he has used [facility] in the " past for skilled services and that upon leaving [facility] he was to have the agency again but they didn't come.    Writer spoke with Astrid today thru [facility] at [phone].  She reports patient was seen once on 2/4/21 per the records she can access.  She stated the patient was set to receive home RN,PT,OT, HHA and SW.    Referral sent today to [facility] and [facility].      lAma Rosa Martini, [facility] Regional Medical CenterSW

## 2021-02-15 VITALS
RESPIRATION RATE: 18 BRPM | HEIGHT: 65 IN | HEART RATE: 73 BPM | OXYGEN SATURATION: 98 % | BODY MASS INDEX: 23.43 KG/M2 | TEMPERATURE: 98.3 F | SYSTOLIC BLOOD PRESSURE: 130 MMHG | WEIGHT: 140.65 LBS | DIASTOLIC BLOOD PRESSURE: 48 MMHG

## 2021-02-15 LAB
BACTERIA SPEC CULT: NO GROWTH
BACTERIA SPEC CULT: NO GROWTH
HGB BLD-MCNC: 7.1 G/DL (ref 13.3–17.7)
SPECIMEN SOURCE: NORMAL
SPECIMEN SOURCE: NORMAL

## 2021-02-15 PROCEDURE — 250N000013 HC RX MED GY IP 250 OP 250 PS 637: Performed by: PHYSICIAN ASSISTANT

## 2021-02-15 PROCEDURE — 36415 COLL VENOUS BLD VENIPUNCTURE: CPT | Performed by: INTERNAL MEDICINE

## 2021-02-15 PROCEDURE — 99239 HOSP IP/OBS DSCHRG MGMT >30: CPT | Performed by: INTERNAL MEDICINE

## 2021-02-15 PROCEDURE — 85018 HEMOGLOBIN: CPT | Performed by: INTERNAL MEDICINE

## 2021-02-15 PROCEDURE — 250N000013 HC RX MED GY IP 250 OP 250 PS 637: Performed by: INTERNAL MEDICINE

## 2021-02-15 RX ORDER — FUROSEMIDE 20 MG
20 TABLET ORAL DAILY
Status: ON HOLD | DISCHARGE
Start: 2021-02-16 | End: 2021-03-22

## 2021-02-15 RX ORDER — HYDROMORPHONE HYDROCHLORIDE 2 MG/1
1 TABLET ORAL EVERY 6 HOURS PRN
Qty: 10 TABLET | Refills: 0 | Status: SHIPPED | DISCHARGE
Start: 2021-02-15 | End: 2021-02-16

## 2021-02-15 RX ORDER — DULOXETIN HYDROCHLORIDE 30 MG/1
60 CAPSULE, DELAYED RELEASE ORAL DAILY
Qty: 10 CAPSULE | Refills: 0 | Status: ON HOLD | OUTPATIENT
Start: 2021-02-15 | End: 2021-03-22

## 2021-02-15 RX ORDER — FERROUS SULFATE 325(65) MG
325 TABLET ORAL EVERY OTHER DAY
Qty: 90 TABLET | Refills: 1 | Status: ON HOLD | DISCHARGE
Start: 2021-02-15 | End: 2021-03-22

## 2021-02-15 RX ORDER — LOSARTAN POTASSIUM 25 MG/1
25 TABLET ORAL DAILY
Status: ON HOLD | DISCHARGE
Start: 2021-02-16 | End: 2021-03-22

## 2021-02-15 RX ADMIN — HYDROMORPHONE HYDROCHLORIDE 4 MG: 2 TABLET ORAL at 12:53

## 2021-02-15 RX ADMIN — DULOXETINE HYDROCHLORIDE 60 MG: 30 CAPSULE, DELAYED RELEASE ORAL at 08:17

## 2021-02-15 RX ADMIN — OMEPRAZOLE 20 MG: 20 CAPSULE, DELAYED RELEASE ORAL at 08:17

## 2021-02-15 RX ADMIN — HYDROCORTISONE 20 MG: 10 TABLET ORAL at 08:17

## 2021-02-15 RX ADMIN — LOSARTAN POTASSIUM 25 MG: 25 TABLET, FILM COATED ORAL at 08:17

## 2021-02-15 RX ADMIN — GUAIFENESIN AND DEXTROMETHORPHAN HYDROBROMIDE 1 TABLET: 600; 30 TABLET, EXTENDED RELEASE ORAL at 05:12

## 2021-02-15 RX ADMIN — HYDROMORPHONE HYDROCHLORIDE 4 MG: 2 TABLET ORAL at 08:10

## 2021-02-15 RX ADMIN — FERROUS SULFATE TAB 325 MG (65 MG ELEMENTAL FE) 325 MG: 325 (65 FE) TAB at 08:17

## 2021-02-15 RX ADMIN — TAMSULOSIN HYDROCHLORIDE 0.8 MG: 0.4 CAPSULE ORAL at 08:17

## 2021-02-15 RX ADMIN — FERROUS SULFATE TAB 325 MG (65 MG ELEMENTAL FE) 325 MG: 325 (65 FE) TAB at 12:53

## 2021-02-15 RX ADMIN — FUROSEMIDE 20 MG: 20 TABLET ORAL at 08:17

## 2021-02-15 RX ADMIN — HYDROMORPHONE HYDROCHLORIDE 4 MG: 2 TABLET ORAL at 03:10

## 2021-02-15 RX ADMIN — AMOXICILLIN AND CLAVULANATE POTASSIUM 1 TABLET: 875; 125 TABLET, FILM COATED ORAL at 08:17

## 2021-02-15 RX ADMIN — UMECLIDINIUM 1 PUFF: 62.5 AEROSOL, POWDER ORAL at 08:23

## 2021-02-15 RX ADMIN — GABAPENTIN 300 MG: 300 CAPSULE ORAL at 08:17

## 2021-02-15 RX ADMIN — FOLIC ACID 1 MG: 1 TABLET ORAL at 08:17

## 2021-02-15 RX ADMIN — CYANOCOBALAMIN TAB 1000 MCG 1000 MCG: 1000 TAB at 08:17

## 2021-02-15 RX ADMIN — OXYCODONE HYDROCHLORIDE AND ACETAMINOPHEN 1000 MG: 500 TABLET ORAL at 08:17

## 2021-02-15 RX ADMIN — FLUTICASONE FUROATE AND VILANTEROL TRIFENATATE 1 PUFF: 200; 25 POWDER RESPIRATORY (INHALATION) at 08:23

## 2021-02-15 RX ADMIN — CHOLECALCIFEROL TAB 125 MCG (5000 UNIT) 250 MCG: 125 TAB at 08:17

## 2021-02-15 ASSESSMENT — ACTIVITIES OF DAILY LIVING (ADL)
ADLS_ACUITY_SCORE: 23

## 2021-02-15 ASSESSMENT — MIFFLIN-ST. JEOR: SCORE: 1334.88

## 2021-02-15 NOTE — PLAN OF CARE
Cognitive Concerns/ Orientation : A&O x4.   BEHAVIOR & AGGRESSION TOOL COLOR: Green             ABNL VS/O2: VSS on 3L O2 NC (home baseline)   MOBILITY: Assist-1 to pivot. L BKA.   PAIN MANAGMENT: C/O R calf/wound pain, managed with PO Dilaudid.   DIET: Regular diet, fair appetite, ate breakfast and drinking supplements through the day, tolerating.   BOWEL/BLADDER: Voiding in urinal. Incontinent of large BM today.   ABNL LAB/BG: Hgb 7.1  DRAIN/DEVICES: No IV access  SKIN: Bruised, abrasions, scabs. Wound to posterior R calf, old dressing with large amount of serosangunous/tan drainage, small amount of bleeding noted during wound cares; wound cares completed as ordered, dressing changed, edema wear on, Rooke boot in place.   TESTS/PROCEDURES: n/a  D/C DAY/GOALS/PLACE: To TCU at 1600 today   OTHER IMPORTANT INFO: Weak pulse on R foot otherwise CMS intact. LS diminished, infrequent congested cough. PT/OT/WOC following.

## 2021-02-15 NOTE — PLAN OF CARE
Summary: RLE Wound  DATE & TIME: 2/14/2021 [id number]  Cognitive Concerns/ Orientation : A&O x4.   BEHAVIOR & AGGRESSION TOOL COLOR: Green   ABNL VS/O2: VSS and O2 98% on 3L O2 NC (home baseline)   MOBILITY: Assist+1 to pivot. L BKA.   PAIN MANAGMENT: C/O R calf/wound pain, managed with PRN PO Dilaudid x1.   DIET: Regular diet, tolerating   BOWEL/BLADDER: Voiding in urinal. Incontinent of 1 small BM today.   ABNL LAB/BG: WBC 3.5. Hgb 7.7. Platelets 60  DRAIN/DEVICES: N/A  SKIN: Bruised, abrasions, scabs. Wound to posterior R calf, dressing and wound cares completed today on day shift with small amount of bleeding. Edema wear on, Rooke boot in place.   TESTS/PROCEDURES: N/A  D/C DAY/GOALS/PLACE: Pending TCU placement   OTHER IMPORTANT INFO: Weak pulse on R foot otherwise CMS intact. Infrequent congested, productive cough. Takes pills with applesauce. Patient refuses repositioning and use of bedpan. PT/OT/WOC following.

## 2021-02-15 NOTE — PLAN OF CARE
PT: Attempted to see patient for PT session. Pt sleeping and once awake states he already ate breakfast and is tired and would like PT to return later to work with him.    Addendum: Pt is discharging at 4pm to TCU today.    Physical Therapy Discharge Summary    Reason for therapy discharge:    Discharged to transitional care facility.    Progress towards therapy goal(s). See goals on Care Plan in [facility] electronic health record for goal details.  Goals not met.  Barriers to achieving goals:   discharge from facility.    Therapy recommendation(s):    Continued therapy is recommended.  Rationale/Recommendations:  eval and treat at TCU.

## 2021-02-15 NOTE — DISCHARGE SUMMARY
[facility]  Hospitalist Discharge Summary      Date of Admission:  2/9/2021  Date of Discharge:  2/15/2021  Discharging Provider: Ze Medellin MD      Discharge Diagnoses   RLE cellulitis  Chronic right lower extremity wounds   Hx of left BKA (2010, [facility])  MICHAEL on CKD III-improved  Chronic hypoxic and hypercarbic respiratory failure in the setting of COPD utilizing 3 LPM via NC at baseline, not in acute exacerbation    Chronic normocytic anemia, likely ACD  Chronic thrombocytopenia  Chronic adrenal insufficiency  Hx of psoriasis  Essential HTN  Chronic pain syndrome  Severe malnutrition  Insomnia    Follow-ups Needed After Discharge   Follow-up Appointments     Follow Up and recommended labs and tests      Follow up with snf physician.  The following labs/tests are   recommended: complete blood count in 1week.             Unresulted Labs Ordered in the Past 30 Days of this Admission     No orders found from 1/10/2021 to 2/10/2021.        Discharge Disposition   Discharged to short-term care facility  Condition at discharge: Stable      Hospital Course   Perfecto Reese is a 70 year old male with complex PMHx including severe COPD on 3L home O2 with ongoing tobacco use, adrenal insufficiency on chronic steroids, psoriasis on Humira, chronic Hep C with cirrhosis, PVD s/p left BKA, chronic wounds, non-compliance, and recent hospitalization 12/2/20-12/31/20 for pneumonia with RLL abscess, treated non-surgically with prolonged antibiotics who was recently discharged from TCU 2/2/2021, now re-presents to the hospital for evaluation of his chronic RLE wounds, found to have RLE cellulitis as well as MICHAEL. Admitted for further evaluation and treatment.      RLE cellulitis  Chronic right lower extremity wounds   Hx of L BKA (2010, [facility]): Reports worsening RLE wounds with increased erythema and pain since discharge from TCU. At TCU was  "receiving daily wound cares, but states since discharge, home cares \"fell through the cracks.\" WBC 15.1; vitals not suggestive of septic picture on admission. Lactic acid 1.1.   - vascular surgery evaluated, ZHOU completed, arterial insufficiency is not suspected, no surgical plans noted.  - seen by Dr. Odonnell for wound management. Recommend discharge to TCU at [facility] and will have him follow up in clinic. [facility] declined patient, he will [facility] to [facility].    - treated with IV ancef initially and transition to Augment BID on 2/13. He will continue Augmentin BID x 4 more days to complete total 10 days course  - patient will continue prior to admission pain regimen without changes after discharge which include dilaudid 1mg q6hrs prn, duloxetine and gabapentin  - continue wound cares      MICHAEL on CKD III: Baseline creatinine 1.2-1.3 in Jan 2020, on admission 2.49.  this has improved to baseline with hydration.   - remains stable after resumption of  Losartan and Lasix    Hypernatremia: Possibly secondary to IV fluids, resolved-2/14     Chronic hypoxic and hypercarbic respiratory failure in the setting of COPD utilizing 3 LPM via NC at baseline, not in acute exacerbation:   - Continue PTA inhalers and nebs .; discharged with mucinex 600 mg BID, Breo 200-25 mcg 1 puff daily, incruse ellipta 62.5 mcg 1 puff daily, albuterol PRN        Recent hospitalization 12/2/2020-12/31/2020 for:  Acute on chronic hypoxic and hypercarbic respiratory failure  Bilateral community-acquired bacterial pneumonia with RLL abscess   Musculoskeletal chest pain   Admitted with fever, cough, and acute respiratory distress (placed on 10L per OM in the field). Fever to 102.5, tachycardia, tachypnea, and leukocytosis (17.2k) present on arrival. CXR on arrival showed RLL infiltrate and CT abd/pelvis showed air fluid cavity at the right lung base. On admission, he was initiated on IV pip-tazo and azithromycin, and Thoracic Surgery was " consulted. Chest CT was ordered (12/3) which showed a 6.4 cm RLL pulmonary abscess and extensive left-sided mucus plugging with associated infiltrate. 12/2 blood cultures x2 no growth. Sputum cx 12/4 growing proteus, fluoroquinolone resistant. Also corynebacterium striatum 12/6. CT surgery saw, no surgical intervention indicated. Patient was treated with IV then PO antibiotics, with the assistance of infectious disease. Initially on zosyn -> unasyn -> transitioned 12/8 to Augmentin x 6 weeks (through approximately 1/13/2021).  - Repeat CT scan 1/12/21 with improved consolidation in the inferior right and lower lobes consistent with pneumonia, significant interval decrease in size of a presumed intrapulmonary abscess in the inferior right middle lobe (not completely resolved), moderate sized pleural effusion, slightly larger on the right.   - O2 at baseline of 3 lpm, pt without any worsening respiratory sx, fevers. Reports cough with yellowish sputum      Pleural effusion: R>L. Noted on CT 1/12/2021.   - O2 at baseline of 3 lpm, pt without any worsening respiratory sx, fevers. Reports cough with yellowish sputum      Chronic normocytic anemia  Chronic thrombocytopenia  Anemia and thrombocytopenia likely multifactorial from liver cirrhosis, splenomegaly, CKD and psoriasis. Iron labs suggestive of anemia of chronic disease. 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 Patient required four units of pRBCs this admission for Hgb < 7.0. No acute GI bleed identified, likely blood loss from decreased erythropoiesis. Appreciate GI consult 12/16/2020, no acute GI bleed, no acute interventions, [facility] Recheck hgb 7.5 12/29 and again 7.9 on 1/4/21. Hgb >7 since TCU admission. Discontinued aspirin due to risks at TCU. Occult stool was positive on 1/15/21. Patient was NOT interested in colonoscopy at that  time  - has received 2 units of pRBC this hospital stay, no obvious bleeding noted other than mild oozing at the wound site. Hemoglobin is 7.1  - continue with iron supplement  - cbc in a week  - has also seen hematology as an outpatient    Leukopenia:  Noted wbc down to 2.7. ?possible medication related with antibiotics. Currently improving at 3.5. F/u CBC in a week at TCU    Chronic adrenal insufficiency:   - Continues on PTA hydrocortisone 20 mg PO daily     Essential hypertension:   Amlodipine decreased to 5mg daily due to soft blood pressures during last hospital stay, Losartan decreased to 25 mg po every day. PTA meds included on admission Losartan 12.5mg daily, Lasix 40mg daily and amlodipine 5mg daily.   -Blood pressures were in the lower side, Norvasc stopped  - losartan resumed at 25mg daily (12.5mg PTA)  and Lasix 20 daily (40mg daily PTA) which he is currently tolerating       History of psoriasis   - PTA Humira held at discharge and not restarted upon discharge from TCU. Restart once current cellulitis has been appropriately treated.       Chronic pain syndrome: Note that upon hospital discharge, regimen consisted of gabapentin 300 mg TID, duloxetine 30 mg daily, lidocaine patch, oxycodone 5 mg q6h prn, morphine 2.5 mg BID prn. Upon TCU discharge, pain regimen with gabapentin 300 mg BID (renally dosed), duloxetine 60 mg daily (recently increased on 1/12/21 for pain complaints and also to assist with mood), lidocaine topical, dilaudid 1 mg q6 hrs PRN.   - will resume PTA medications at discharge without change.     Severe malnutrition:   - Nutrition consult appreciated. Continue supplements at discharge  -  Continue vitamin D to 10,000 daily  - Continue folic acid daily  - Continue vitamin B12 daily     Major depressive disorder: Secondary to general medical condition suspected. Seen by Psychiatry during most recent hospitalization.   - Cymbalta increased to 60 mg every day and melatonin 3 mg at bedtime  "added     Insomnia: PRN Melatonin, was getting up to 6 mg at bedtime at TCU      Physical deconditioning: Was in TCU from 12/31/20-2/2/21. Lives alone in apartment. Friend helps patient get groceries. At baseline is WC bound with [employer] for transfers. Pt was reportedly discharged with home cares which never followed up with patient. [facility] setting was suggested, but patient declined.   - will discharge to TCU     Hx of non-compliance: Noted in chart. Pt reports that he has been off of ALL medications since discharge from TCU. Indicates needing help setting up meds by homecare RN who has historically helped in the past. He states homecare \"fell through the cracks\" upon discharge from TCU and he tried calling every day to get it set up.   - Pt seen by Psychiatry during last hospitalization to determine decision making capacity and was deemed decisional. See note by Dr. Parrish from 12/21/20.    Consultations This Hospital Stay   PHYSICAL THERAPY ADULT IP CONSULT  OCCUPATIONAL THERAPY ADULT IP CONSULT  CARE MANAGEMENT / SOCIAL WORK IP CONSULT  VASCULAR SURGERY IP CONSULT  WOUND OSTOMY CONTINENCE NURSE  IP CONSULT  NUTRITION SERVICES ADULT IP CONSULT  SOCIAL WORK IP CONSULT  SOCIAL WORK IP CONSULT  SOCIAL WORK IP CONSULT  CARE MANAGEMENT / SOCIAL WORK IP CONSULT  CARE MANAGEMENT / SOCIAL WORK IP CONSULT  PHYSICAL THERAPY ADULT IP CONSULT  OCCUPATIONAL THERAPY ADULT IP CONSULT    Code Status   Full Code    Time Spent on this Encounter   I, Ze Medellin MD, personally saw the patient today and spent 45 minutes discharging this patient.       Ze Medellin MD  [place] [address] [address]  [facility] [place] [address]  Phone: [phone]  ______________________________________________________________________    Physical Exam   Vital Signs: Temp: 98.3  F (36.8  C) Temp src: Oral BP: 130/48 Pulse: 73   Resp: 18 SpO2: 98 % O2 Device: Nasal cannula Oxygen Delivery: 3 " LPM  Weight: 140 lbs 10.46 oz  General Appearance: Alert, awake and no apparent distress  Respiratory: clear to ausculation bilaterally, no wheezing  Cardiovascular: regular rate and rhythm  GI: soft and non-tender  Skin: right LE wound dressing in place  Other-left BKA         Primary Care Physician   Polo Neil    Discharge Orders      General info for SNF    Length of Stay Estimate: Short Term Care: Estimated # of Days <30  Condition at Discharge: Stable  Level of care:skilled   Rehabilitation Potential: Fair  Admission H&P remains valid and up-to-date: Yes  Recent Chemotherapy: N/A  Use Nursing Home Standing Orders: Yes     Mantoux instructions    Give two-step Mantoux (PPD) Per Facility Policy Yes     Reason for your hospital stay    Admitted to the hospital due to right leg wound.     Additional Discharge Instructions    Continue to use prior to admission supplemental oxygen.     Follow Up and recommended labs and tests    Follow up with residential physician.  The following labs/tests are recommended: complete blood count in 1week.     Activity - Up with assistive device     Activity - Up with nursing assistance     Full Code     Physical Therapy Adult Consult    Evaluate and treat as clinically indicated.    Reason: deconditioning     Occupational Therapy Adult Consult    Evaluate and treat as clinically indicated.    Reason: deconditioning     Fall precautions     Advance Diet as Tolerated    Follow this diet upon discharge: Orders Placed This Encounter      Snacks/Supplements Pediatric: Boost Plus; Between Meals      Snacks/Supplements Adult: Boost Plus; With Meals      Combination Diet Regular Diet Adult       Significant Results and Procedures   Most Recent 3 CBC's:  Recent Labs   Lab Test 02/15/21  0852 02/14/21  1007 02/13/21  1212 02/12/21  0800   WBC  --  3.5* 3.1* 2.7*   HGB 7.1* 7.7* 7.0* 7.4*   MCV  --  93 92 92   PLT  --  60* 53* 42*     Most Recent 3 BMP's:  Recent Labs   Lab Test 02/14/21  1007  02/13/21  1212 02/12/21  0800    142 146*   POTASSIUM 4.0 4.0 4.3   CHLORIDE 116* 115* 123*   CO2 21 18* 19*   BUN 29 29 27   CR 1.11 1.23 1.23   ANIONGAP 4 9 4   MARTINE 8.3* 8.4* 8.0*   * 93 98   ,   Results for orders placed or performed during the hospital encounter of 02/09/21   US ZHOU Doppler No Exercise    Narrative    US ZHOU DOPPLER NO EXERCISE, 1-2 LEVELS, ? BILATERAL   2/9/2021 2:50 PM      HISTORY: Right toe pressures (L BKA, right ankle wound so no ABIs and  no L toe pressures).    COMPARISON: None.    FINDINGS:  Right ZHOU:   PT: 1.14.  DP: 1.09    Left ZHOU: Below-knee amputation.    Right Digital pressure: 100.    Waveforms: Triphasic      Impression    IMPRESSION: Normal right ZHOU without evidence of arterial  insufficiency. Normal right digital pressure.    ZHOU CRITERIA:  >1.4 NC  0.95-1.4 Normal  0.90 - 0.94 Mild  0.5 - 0.89 Moderate  0.2 - 0.49 Severe  <0.2 Critical    PACO DAVID DO   US Lower Extremity Arterial Duplex Right    Narrative    US LOWER EXTREMITY ARTERIAL DUPLEX RIGHT  2/9/2021 4:17 PM     HISTORY:  59-year-old male with peripheral vascular disease and  nonhealing wounds in the right lower extremity.    COMPARISON: Ankle-brachial indices dated 12/28/2009.    FINDINGS: Color Doppler and spectral waveform analysis performed.    The sampled infrainguinal arteries of the right lower extremity are  patent. No focal elevations in peak systolic velocity are noted to  indicate a significant stenosis. Waveforms throughout the right lower  extremity are multiphasic.      Impression    IMPRESSION: No significant stenoses identified in the sampled  infrainguinal arteries of the right lower extremity.    JOHANNA VÁSQUEZ MD       Discharge Medications   Current Discharge Medication List      START taking these medications    Details   amoxicillin-clavulanate (AUGMENTIN) 875-125 MG tablet Take 1 tablet by mouth every 12 hours for 4 days    Associated Diagnoses: Cellulitis of right  leg         CONTINUE these medications which have CHANGED    Details   DULoxetine (CYMBALTA) 30 MG capsule Take 2 capsules (60 mg) by mouth daily  Qty: 10 capsule, Refills: 0    Associated Diagnoses: Chronic pain syndrome      ferrous sulfate (FEROSUL) 325 (65 Fe) MG tablet Take 1 tablet (325 mg) by mouth every other day  Qty: 90 tablet, Refills: 1    Associated Diagnoses: Anemia of chronic disease      furosemide (LASIX) 20 MG tablet Take 1 tablet (20 mg) by mouth daily  Qty:      Associated Diagnoses: Essential hypertension      HYDROmorphone (DILAUDID) 2 MG tablet Take 0.5 tablets (1 mg) by mouth every 6 hours as needed for severe pain  Qty: 10 tablet, Refills: 0    Associated Diagnoses: Chronic pain syndrome      losartan (COZAAR) 25 MG tablet Take 1 tablet (25 mg) by mouth daily  Qty:      Associated Diagnoses: Essential hypertension         CONTINUE these medications which have NOT CHANGED    Details   albuterol (PROAIR HFA/PROVENTIL HFA/VENTOLIN HFA) 108 (90 Base) MCG/ACT inhaler Inhale 2 puffs into the lungs every 4 hours as needed for shortness of breath / dyspnea or wheezing    Comments: Pharmacy may dispense brand covered by insurance (Proair, or proventil or ventolin or generic albuterol inhaler)      calcium citrate (CITRACAL) 950 MG tablet Take 4 tablets by mouth daily      clobetasol (TEMOVATE) 0.05 % external solution Apply topically At Bedtime Apply to scalp      cyanocobalamin (VITAMIN B-12) 1000 MCG tablet Take 1 tablet (1,000 mcg) by mouth daily  Qty: 30 tablet, Refills: 1    Associated Diagnoses: Anemia of chronic disease; Open wound of right lower extremity, subsequent encounter      dextromethorphan-guaiFENesin (MUCINEX DM)  MG 12 hr tablet Take 1 tablet by mouth every 12 hours  Qty: 60 tablet, Refills: 1    Associated Diagnoses: Acute on chronic respiratory failure with hypoxia and hypercapnia (H); Pneumonia of both lungs due to infectious organism, unspecified part of lung; Abscess of  lower lobe of right lung with pneumonia (H); COPD, severe (H)      fluticasone-vilanterol (BREO ELLIPTA) 200-25 MCG/INH inhaler Inhale 1 puff into the lungs daily  Qty: 28 each, Refills: 1    Associated Diagnoses: Acute on chronic respiratory failure with hypoxia and hypercapnia (H); Pneumonia of both lungs due to infectious organism, unspecified part of lung; COPD, severe (H)      folic acid (FOLVITE) 1 MG tablet Take 1 tablet (1 mg) by mouth daily  Qty: 30 tablet, Refills: 1    Associated Diagnoses: Anemia of chronic disease; Open wound of right lower extremity, subsequent encounter      gabapentin (NEURONTIN) 300 MG capsule Take 1 capsule (300 mg) by mouth 2 times daily  Qty: 60 capsule, Refills: 1    Associated Diagnoses: Pneumonia of right lower lobe due to infectious organism; Pain      hydrocortisone (CORTEF) 20 MG tablet Take 20 mg by mouth every morning      hydrOXYzine (ATARAX) 25 MG tablet Take 1 tablet (25 mg) by mouth every 6 hours as needed for itching  Qty: 30 tablet, Refills: 1    Associated Diagnoses: Hx of psoriasis; Itching      lidocaine (XYLOCAINE) 5 % external ointment Apply topically 3 times daily as needed for moderate pain (rib pain)      loperamide (IMODIUM) 2 MG capsule Take 1 capsule (2 mg) by mouth 4 times daily as needed for diarrhea  Qty: 15 capsule, Refills: 0    Associated Diagnoses: Pneumonia of right lower lobe due to infectious organism      melatonin 3 MG tablet Take 2 tablets (6 mg) by mouth At Bedtime  Qty: 60 tablet, Refills: 1    Associated Diagnoses: Insomnia, unspecified type      multivitamin w/minerals (THERA-VIT-M) tablet Take 1 tablet by mouth daily  Qty: 30 tablet, Refills: 0    Associated Diagnoses: Pneumonia of right lower lobe due to infectious organism      nicotine (NICORETTE) 2 MG gum Place 2 mg inside cheek as needed for smoking cessation      omeprazole (PRILOSEC) 20 MG DR capsule Take 20 mg by mouth daily      primidone (MYSOLINE) 50 MG tablet Take 50 mg by  mouth At Bedtime      tamsulosin (FLOMAX) 0.4 MG capsule Take 0.8 mg by mouth daily      umeclidinium (INCRUSE ELLIPTA) 62.5 MCG/INH inhaler Inhale 1 puff into the lungs daily  Qty: 30 each, Refills: 1    Associated Diagnoses: Acute on chronic respiratory failure with hypoxia and hypercapnia (H); Pneumonia of both lungs due to infectious organism, unspecified part of lung; COPD, severe (H)      vitamin C (ASCORBIC ACID) 1000 MG TABS Take 1 tablet (1,000 mg) by mouth daily  Qty: 30 tablet, Refills: 1    Associated Diagnoses: Anemia of chronic disease; Open wound of right lower extremity, subsequent encounter      vitamin D3 (CHOLECALCIFEROL) 250 mcg (54012 units) capsule Take 1 capsule (250 mcg) by mouth daily  Qty: 30 capsule, Refills: 1    Associated Diagnoses: Generalized muscle weakness; Open wound of right lower extremity, subsequent encounter         STOP taking these medications       amLODIPine (NORVASC) 5 MG tablet Comments:   Reason for Stopping:             Allergies   Allergies   Allergen Reactions     Darvocet [Propoxyphene N-Apap] Nausea     Vicodin [Hydrocodone-Acetaminophen] Nausea     Can use if he eats with it

## 2021-02-15 NOTE — DISCHARGE INSTRUCTIONS
"EDEMA WEAR Stockings- apply fresh pair daily   DO NOT CUT OR TRIM STOCKINGS   Remove and set aside stockings, do not throw away   SKIN/WOUND CARE   Clean feet and legs with gentle wash   Complete any wound or skin treatments   Spray Gurdeep Cleanse and Protect on intact skin, especially dry, scaly plaques, massage in, wipe or rinse   Apply lotions   Complete wound care   Apply Edema Wear from toes to knees with a cuff at the top of the stockings   CARE OF EDEMAWEAR      DO NOT THROW AWAY THE OLD PAIR OF EDEMA WEAR, WASH IT.   Wash stocking(s) with soap and HOT water. If really soiled use a surgical soap then regular soap and/or you may need to wash several times   Hang dry     Right LE plan of care: Daily     PERFORM \"GOOD FOOT CARE\" AT ALL TIMES:   1. CLEAN feet daily with a gentle soap and water, making sure to clean between the toes! Dry thoroughly.   2. Spray with Gurdeep from toes to knees, massage in, sit x 30 minutes and wipe clean (Gurdeep will \"condition\" ,loosening crusty debris, moisturizing &cleaning)   3. LOTION from toes to knees, not between toes (lotion moisturizes and too much moisture between toes may cause a fungal rash).     4.  Right posterior leg wound-   - clean wound by wetting gauze and soaking with VASHE wound cleasner x 20minutes, pat dry/ clean   - to a Mepilex Lite Dressing apply smear of Iodosorb Gel and press to the wound  - secure with Spandage Size 5   5.  FRESH SOCKS every day.   6. Keep nails trimmed and filed to minimize snagging, this could create a wound.     - Follow up with podiatry to trim nails as needed.  "

## 2021-02-15 NOTE — PROGRESS NOTES
Care Management Follow Up    Length of Stay (days): 6    Expected Discharge Date: 02/13/21(tcu)     Concerns to be Addressed: adjustment to diagnosis/illness, discharge planning     Patient plan of care discussed at interdisciplinary rounds: Yes    Anticipated Discharge Disposition:       Anticipated Discharge Services:    Anticipated Discharge DME:      Patient/family educated on Medicare website which has current facility and service quality ratings:    Education Provided on the Discharge Plan:    Patient/Family in Agreement with the Plan:      Referrals Placed by CM/SW:    Private pay costs discussed: Not applicable    Additional Information:  Per MD notes, patient   [facility], where patient was previously, can offer him a room today.  Writer has placed a call to [facility] asking if they can offer patient a room.  Will wait till 1000 to hear from [facility], if no response by then, will update patient and proceed with discharge arrangements.     [facility] and [facility] RN staff is reviewing patient for admission. Will hear back from 1100.  Then will meet with patient and give him his option/s.    DEVIN Hernandez

## 2021-02-15 NOTE — PROGRESS NOTES
DATE & TIME: 02/14/21 Night    Cognitive Concerns/ Orientation : Alert/Oriented x 4   BEHAVIOR & AGGRESSION TOOL COLOR: Green   ABNL VS/O2: VSS, 3 LPM oxygen via nasal cannula (baseline)  MOBILITY: L BKA; Assist-1 Pivot to wheelchair, gait belt. Pt not out of bed  PAIN MANAGMENT: Oral dilaudid given x 1 for calf pain  DIET: Regular  BOWEL/BLADDER: Using urinal, incontinent of bowel-no BM this shift  ABNL LAB/BG: WBC 3.5; Hgb 7.7; hematocrit 23.7; Platelet 60 (trending up)  DRAIN/DEVICES: No IV access  TELEMETRY RHYTHM: n/a  SKIN: Bruised, abrasions, scabs. Wound to R posterior calf--dressing and edema wear. Rooke boot in place.  TESTS/PROCEDURES: n/a  D/C DAY/GOALS/PLACE: Pending placement. Referrals sent to [facility] and [facility]. Pt experiencing itchiness to BUE, scratching and picking at scab   OTHER IMPORTANT INFO: Takes larger pills with applesauce; Weak pulse in R foot, otherwise CMS intact. PT/OT/SW/WOC following

## 2021-02-15 NOTE — PROGRESS NOTES
Care Management Discharge Note    Discharge Date: 02/15/21       Discharge Disposition:      Discharge Services:      Discharge DME:      Discharge Transportation: health plan transportation    Private pay costs discussed: Not applicable    PAS Confirmation Code:  [id number]  Patient/family educated on Medicare website which has current facility and service quality ratings:      Education Provided on the Discharge Plan:  yes  Persons Notified of Discharge Plans: patient  Patient/Family in Agreement with the Plan:      Handoff Referral Completed: Yes    Additional Information:  Patient notified [facility] is unable to offer him admission however [facility] is able to admit patient.  Patient is accepting of this option.  [employer] with oxygen is able to transport patient today at 1600.  Patient, MD, bedside RN and TCU aware of transport time.        Alma Rosa Martini, QUENTINSW

## 2021-02-16 ENCOUNTER — RECORDS - HEALTHEAST (OUTPATIENT)
Dept: LAB | Facility: CLINIC | Age: 71
End: 2021-02-16

## 2021-02-16 ENCOUNTER — NURSING HOME VISIT (OUTPATIENT)
Dept: GERIATRICS | Facility: CLINIC | Age: 71
End: 2021-02-16
Payer: MEDICARE

## 2021-02-16 VITALS
SYSTOLIC BLOOD PRESSURE: 150 MMHG | RESPIRATION RATE: 18 BRPM | HEIGHT: 65 IN | TEMPERATURE: 98.1 F | OXYGEN SATURATION: 95 % | BODY MASS INDEX: 24.22 KG/M2 | DIASTOLIC BLOOD PRESSURE: 64 MMHG | WEIGHT: 145.4 LBS | HEART RATE: 88 BPM

## 2021-02-16 DIAGNOSIS — M62.81 GENERALIZED MUSCLE WEAKNESS: ICD-10-CM

## 2021-02-16 DIAGNOSIS — E27.40 CHRONIC ADRENAL INSUFFICIENCY (H): ICD-10-CM

## 2021-02-16 DIAGNOSIS — L03.115 CELLULITIS OF RIGHT LOWER EXTREMITY: ICD-10-CM

## 2021-02-16 DIAGNOSIS — S81.801D OPEN WOUND OF RIGHT LOWER EXTREMITY, SUBSEQUENT ENCOUNTER: Primary | ICD-10-CM

## 2021-02-16 DIAGNOSIS — Z87.09: ICD-10-CM

## 2021-02-16 DIAGNOSIS — G89.4 CHRONIC PAIN SYNDROME: ICD-10-CM

## 2021-02-16 DIAGNOSIS — D69.3 CHRONIC IDIOPATHIC THROMBOCYTOPENIA (H): ICD-10-CM

## 2021-02-16 DIAGNOSIS — R53.81 PHYSICAL DECONDITIONING: ICD-10-CM

## 2021-02-16 DIAGNOSIS — E43 SEVERE MALNUTRITION (H): ICD-10-CM

## 2021-02-16 DIAGNOSIS — G47.00 INSOMNIA, UNSPECIFIED TYPE: ICD-10-CM

## 2021-02-16 DIAGNOSIS — Z87.01 HISTORY OF PNEUMONIA: ICD-10-CM

## 2021-02-16 DIAGNOSIS — L29.9 ITCHING: ICD-10-CM

## 2021-02-16 DIAGNOSIS — R07.89 MUSCULOSKELETAL CHEST PAIN: ICD-10-CM

## 2021-02-16 DIAGNOSIS — N18.30 STAGE 3 CHRONIC KIDNEY DISEASE, UNSPECIFIED WHETHER STAGE 3A OR 3B CKD (H): ICD-10-CM

## 2021-02-16 DIAGNOSIS — J96.11 CHRONIC RESPIRATORY FAILURE WITH HYPOXIA (H): ICD-10-CM

## 2021-02-16 DIAGNOSIS — D63.8 ANEMIA OF CHRONIC DISEASE: ICD-10-CM

## 2021-02-16 DIAGNOSIS — N17.9 AKI (ACUTE KIDNEY INJURY) (H): ICD-10-CM

## 2021-02-16 DIAGNOSIS — F11.20 UNCOMPLICATED OPIOID DEPENDENCE (H): ICD-10-CM

## 2021-02-16 DIAGNOSIS — I73.9 PVD (PERIPHERAL VASCULAR DISEASE) (H): ICD-10-CM

## 2021-02-16 DIAGNOSIS — Z87.2 HX OF PSORIASIS: ICD-10-CM

## 2021-02-16 DIAGNOSIS — L03.115 CELLULITIS OF RIGHT LEG: ICD-10-CM

## 2021-02-16 DIAGNOSIS — Z89.512 HISTORY OF BELOW KNEE AMPUTATION, LEFT (H): ICD-10-CM

## 2021-02-16 DIAGNOSIS — D69.6 THROMBOCYTOPENIA (H): ICD-10-CM

## 2021-02-16 DIAGNOSIS — J44.9 CHRONIC OBSTRUCTIVE PULMONARY DISEASE, UNSPECIFIED COPD TYPE (H): ICD-10-CM

## 2021-02-16 DIAGNOSIS — I10 HYPERTENSION, UNSPECIFIED TYPE: ICD-10-CM

## 2021-02-16 PROBLEM — L03.119 CELLULITIS OF EXTREMITY: Status: ACTIVE | Noted: 2021-02-16

## 2021-02-16 LAB
SARS-COV-2 PCR COMMENT: NORMAL
SARS-COV-2 RNA SPEC QL NAA+PROBE: NEGATIVE
SARS-COV-2 VIRUS SPECIMEN SOURCE: NORMAL

## 2021-02-16 PROCEDURE — 99310 SBSQ NF CARE HIGH MDM 45: CPT | Performed by: NURSE PRACTITIONER

## 2021-02-16 RX ORDER — HYDROMORPHONE HYDROCHLORIDE 2 MG/1
1 TABLET ORAL EVERY 6 HOURS PRN
Qty: 60 TABLET | Refills: 0 | Status: SHIPPED | OUTPATIENT
Start: 2021-02-16 | End: 2021-02-21

## 2021-02-16 ASSESSMENT — MIFFLIN-ST. JEOR
SCORE: 1351.42
SCORE: 1356.41
SCORE: 1351.42
SCORE: 1351.42

## 2021-02-16 NOTE — LETTER
2/16/2021        RE: Perfecto Reese  [address]  [address]  [facility] [address]        [place] [facility]  PRIMARY CARE PROVIDER AND CLINIC:  Polo Neil MD, [facility] / [facility] *  Chief Complaint   Patient presents with     Establish Care     [facility] [facility] Record Number:  [id number]  Place of Service where encounter took place:  [facility] ([facility]) [[id number]]    Perfecto Reese  is a 70 year old  (1950), admitted to the above facility from  [facility]. Hospital stay 2/9/21 through 2/15/21..  Admitted to this facility for  rehab, medical management and nursing care.    HPI:    HPI information obtained from: facility chart records, facility staff, patient report, [facility] chart review and Care Everywhere [facility] chart review.   Brief Summary of Hospital Course: 70 year old male with complex PMHx including severe COPD on 3L home O2 with ongoing tobacco use, adrenal insufficiency on chronic steroids, psoriasis on Humira, chronic Hep C with cirrhosis, PVD s/p left BKA, chronic wounds, non-compliance, and recent hospitalization 12/2/20-12/31/20 for pneumonia with RLL abscess, treated non-surgically with prolonged antibiotics who was recently discharged from TCU 2/2/2021, now re-presents to the hospital for evaluation of his chronic RLE wounds, found to have RLE cellulitis as well as MICHAEL. Admitted for further evaluation and treatment.      Per SW- patient allowed the home care agency to come into home once after previous TCU discharge, however she stopped answering the phone for ongoing follow up cares and needs which prompted this new admission concern.     Updates on Status Since Skilled nursing Admission: Met with patient who denies any chest pain, palpitations, shortness of breath, ISIDRO, lightheadedness, dizziness, or cough. Denies any abdominal discomfort. Denies N&V. Denies B&B concerns. Denies dysuria or frequency. Denies  loose or constipation. Appetite good. Sleeping well.      BP Readings from Last 3 Encounters:   02/16/21 (!) 150/64   02/15/21 130/48   01/28/21 120/56     Wt Readings from Last 5 Encounters:   02/16/21 66 kg (145 lb 6.4 oz)   02/15/21 63.8 kg (140 lb 10.5 oz)   01/28/21 64.4 kg (141 lb 14.4 oz)   01/26/21 64.4 kg (141 lb 14.4 oz)   01/18/21 64.4 kg (141 lb 14.4 oz)     CODE STATUS/ADVANCE DIRECTIVES DISCUSSION:   CPR/Full code   Patient's living condition: lives alone  ALLERGIES: Darvocet [propoxyphene n-apap] and Vicodin [hydrocodone-acetaminophen]  PAST MEDICAL HISTORY:  has a past medical history of Adrenal insufficiency (H), Anemia of chronic disease, Anemia, iron deficiency, Back pain, Benign essential hypertension, Chronic respiratory failure with hypoxia, on home O2 therapy (H), CKD (chronic kidney disease), stage III, COPD (chronic obstructive pulmonary disease) (H), Depression, GERD (gastroesophageal reflux disease), BKA, left (H), Hyperlipidemia, Migraine, Non compliance w medication regimen, Pain syndrome, chronic, Primary insomnia, Psoriasis, and PVD (peripheral vascular disease) (H).  PAST SURGICAL HISTORY:   has a past surgical history that includes amputation below knee rt/lt (Left); tonsillectomy; as arthroscopy subtalar joint subtalar arthrodesis; Open reduction internal fixation forearm; and External ear surgery (Left).  FAMILY HISTORY: family history includes Chronic Obstructive Pulmonary Disease in his father; Colon Cancer in his father; Coronary Artery Disease in his father; Dementia in his mother; Osteoporosis in his mother.  SOCIAL HISTORY:   reports that he quit smoking about 3 years ago. His smoking use included cigarettes. He has a 15.00 pack-year smoking history. He has never used smokeless tobacco. He reports that he does not drink alcohol or use drugs.    Post Discharge Medication Reconciliation Status: discharge medications reconciled and changed, per note/orders    Current Outpatient  Medications   Medication Sig Dispense Refill     albuterol (PROAIR HFA/PROVENTIL HFA/VENTOLIN HFA) 108 (90 Base) MCG/ACT inhaler Inhale 2 puffs into the lungs every 4 hours as needed for shortness of breath / dyspnea or wheezing       amoxicillin-clavulanate (AUGMENTIN) 875-125 MG tablet Take 1 tablet by mouth every 12 hours for 3 days 6 tablet 0     calcium citrate (CITRACAL) 950 MG tablet Take 4 tablets by mouth daily       clobetasol (TEMOVATE) 0.05 % external solution Apply topically At Bedtime Apply to scalp       cyanocobalamin (VITAMIN B-12) 1000 MCG tablet Take 1 tablet (1,000 mcg) by mouth daily 30 tablet 1     dextromethorphan-guaiFENesin (MUCINEX DM)  MG 12 hr tablet Take 1 tablet by mouth every 12 hours 60 tablet 1     DULoxetine (CYMBALTA) 30 MG capsule Take 2 capsules (60 mg) by mouth daily 10 capsule 0     ferrous sulfate (FEROSUL) 325 (65 Fe) MG tablet Take 1 tablet (325 mg) by mouth every other day 90 tablet 1     fluticasone-vilanterol (BREO ELLIPTA) 200-25 MCG/INH inhaler Inhale 1 puff into the lungs daily 28 each 1     folic acid (FOLVITE) 1 MG tablet Take 1 tablet (1 mg) by mouth daily 30 tablet 1     furosemide (LASIX) 20 MG tablet Take 1 tablet (20 mg) by mouth daily       gabapentin (NEURONTIN) 300 MG capsule Take 1 capsule (300 mg) by mouth 2 times daily 60 capsule 1     hydrocortisone (CORTEF) 20 MG tablet Take 20 mg by mouth every morning       HYDROmorphone (DILAUDID) 2 MG tablet Take 0.5 tablets (1 mg) by mouth every 6 hours as needed for severe pain 60 tablet 0     hydrOXYzine (ATARAX) 25 MG tablet Take 1 tablet (25 mg) by mouth every 6 hours as needed for itching 30 tablet 1     lidocaine (XYLOCAINE) 5 % external ointment Apply topically 3 times daily as needed for moderate pain (rib pain)       loperamide (IMODIUM) 2 MG capsule Take 1 capsule (2 mg) by mouth 4 times daily as needed for diarrhea 15 capsule 0     losartan (COZAAR) 25 MG tablet Take 1 tablet (25 mg) by mouth daily   "     melatonin 3 MG tablet Take 2 tablets (6 mg) by mouth At Bedtime 60 tablet 1     multivitamin w/minerals (THERA-VIT-M) tablet Take 1 tablet by mouth daily 30 tablet 0     nicotine (NICORETTE) 2 MG gum Place 2 mg inside cheek as needed for smoking cessation       omeprazole (PRILOSEC) 20 MG DR capsule Take 20 mg by mouth daily       primidone (MYSOLINE) 50 MG tablet Take 50 mg by mouth At Bedtime       tamsulosin (FLOMAX) 0.4 MG capsule Take 0.8 mg by mouth daily       umeclidinium (INCRUSE ELLIPTA) 62.5 MCG/INH inhaler Inhale 1 puff into the lungs daily 30 each 1     vitamin C (ASCORBIC ACID) 1000 MG TABS Take 1 tablet (1,000 mg) by mouth daily 30 tablet 1     vitamin D3 (CHOLECALCIFEROL) 250 mcg (91066 units) capsule Take 1 capsule (250 mcg) by mouth daily 30 capsule 1     ROS:  10 point ROS of systems including Constitutional, Eyes, Respiratory, Cardiovascular, Gastroenterology, Genitourinary, Integumentary, Musculoskeletal, Psychiatric were all negative except for pertinent positives noted in my HPI.    Vitals:  BP (!) 150/64   Pulse 88   Temp 98.1  F (36.7  C)   Resp 18   Ht 1.651 m (5' 5\")   Wt 66 kg (145 lb 6.4 oz)   SpO2 95%   BMI 24.20 kg/m    Exam:  GENERAL APPEARANCE:  Alert, in no distress, oriented, cooperative  ENT:  Mouth and posterior oropharynx normal, moist mucous membranes, [place]  EYES:  EOM, conjunctivae, lids, pupils and irises normal  NECK:  No adenopathy,masses or thyromegaly  RESP:  respiratory effort and palpation of chest normal, lungs clear to auscultation , no respiratory distress  CV:  Palpation and auscultation of heart done , regular rate and rhythm, no murmur, rub, or gallop, no edema  ABDOMEN:  normal bowel sounds, soft, nontender, no hepatosplenomegaly or other masses, no guarding or rebound  M/S:   Pivot transfers. Uses electronic scooter for all main mobility. Does not ambulate  SKIN:  Inspection of skin and subcutaneous tissue baseline, wound healing well, no signs of " infection minimal drainage present  NEURO:   Cranial nerves 2-12 are normal tested and grossly at patient's baseline, no purposeful movement in upper and lower extremities  PSYCH:  oriented X 3, memory impaired , affect and mood normal    Lab/Diagnostic data:    Most Recent 3 CBC's:  Recent Labs   Lab Test 02/15/21  0852 02/14/21  1007 02/13/21  1212 02/12/21  0800   WBC  --  3.5* 3.1* 2.7*   HGB 7.1* 7.7* 7.0* 7.4*   MCV  --  93 92 92   PLT  --  60* 53* 42*     Most Recent 3 BMP's:  Recent Labs   Lab Test 02/14/21  1007 02/13/21  1212 02/12/21  0800    142 146*   POTASSIUM 4.0 4.0 4.3   CHLORIDE 116* 115* 123*   CO2 21 18* 19*   BUN 29 29 27   CR 1.11 1.23 1.23   ANIONGAP 4 9 4   MARTINE 8.3* 8.4* 8.0*   * 93 98     Most Recent 2 LFT's:  Recent Labs   Lab Test 02/09/21  0601 01/07/21   AST 23 27   ALT 21 24   ALKPHOS 79 91   BILITOTAL 0.5 0.4     Most Recent Cholesterol Panel:  Recent Labs   Lab Test 02/10/21  0838   CHOL 106   LDL 33   HDL 37*   TRIG 182*     Most Recent 6 Bacteria Isolates From Any Culture (See EPIC Reports for Culture Details):  Recent Labs   Lab Test 02/09/21  0602 12/03/20  0540 12/03/20  0230 12/02/20  1904 12/02/20  1844   CULT No growth  No growth Light growth  Normal adelina    Light growth  Proteus mirabilis  *  Moderate growth  Corynebacterium striatum  Identification obtained by MALDI-TOF mass spectrometry research use only database. Test   characteristics determined and verified by the Infectious [facility] Diagnostic Laboratory   ([facility]) [place], [place].  Susceptibility testing not routinely done  * <10,000 colonies/mL  mixed urogenital adelina  Susceptibility testing not routinely done   No growth No growth     Most Recent TSH and T4:  Recent Labs   Lab Test 01/07/21   TSH 0.73     Most Recent Hemoglobin A1c:  Recent Labs   Lab Test 02/09/21  0702   A1C Canceled, Test credited     Most Recent Urinalysis:  Recent Labs   Lab Test 12/09/20  0945   COLOR Yellow   APPEARANCE Cloudy    URINEGLC Negative   URINEBILI Negative   URINEKETONE Negative   SG 1.015   UBLD Large*   URINEPH 5.5   PROTEIN 30*   NITRITE Negative   LEUKEST Large*   RBCU >182*   WBCU >182*     Most Recent Anemia Panel:  Recent Labs   Lab Test 02/15/21  0852 02/14/21  1007 12/04/20  1526 12/04/20  1526 12/04/20  1245   WBC  --  3.5*   < > 3.2* 3.5*   HGB 7.1* 7.7*   < > 6.4* 6.1*   HCT  --  23.7*   < > 19.1* 18.6*   MCV  --  93   < > 93 94   PLT  --  60*   < > 70* 74*   IRON  --   --   --   --  30*   IRONSAT  --   --   --   --  27   RETICABSCT  --   --   --  17.9*  --    RETP  --   --   --  0.9  --    FEB  --   --   --   --  112*   JÚNIOR  --   --   --   --  515*   B12  --   --   --   --  1,117*   FOLIC  --   --   --   --  6.7    < > = values in this interval not displayed.       ASSESSMENT/PLAN:  (R53.81) Physical deconditioning  (primary encounter diagnosis)  (M62.81) Generalized muscle weakness  Comment: Acute on chronic. S/T below diagnosis. Followed by [facility]. HISTORY of noncompliance.   Plan:   -Continue Physical therapy and Occupational therapy as directed  -SW to remain involved for safe discharge planning needs  -Would benefit from FDC setting, however patient declines at this time.      (J96.11) chronic respiratory failure with hypoxia and hypercapnia (H)  (Z87.01) History of pneumonia  (Z87.09) History of abscess of lung  (J44.9) COPD, severe (H)  (R07.89) Musculoskeletal chest pain  Comment: Acute on chronic. Previous hospital admission Patient was treated with IV then PO antibiotics, with the assistance of infectious disease. Initially on zosyn -> unasyn -> transitioned 12/8 to Augmentin x 6 weeks (through approximately 1/13/2021). - Infectious disease peripherally following. Completed antibiotics of augmentin on 1/13/21.   Plan:   -Continue oxygen as directed which is chronic at 3L via NC.   -Monitor respiratory status. May benefit from thoracentesis in future if  warranted.   -Continue mucinex 600mg BID for now. May benefit from long term use for now while infection current.   -Continue incentive spirometry and encourage every 4 hours while awake. Encourage use.   -Continue fluticasone-vilanterol (breo 200-25mcg inhaler 1 puff daily  -Continue incruse ellipta 62.5mcg inhaler 1 puff daily.   -Continue albuterol inhaler PRN  -BMP and CBC Friday 2/19/21     (D63.8) Anemia of chronic disease  (D69.3) Chronic idiopathic thrombocytopenia (H)  Comment: Acute on chronic. Anemia and thrombocytopenia likely multifactorial from liver cirrhosis, splenomegaly, renal disease, CKD and psoriasis. Iron labs suggestive of anemia of chronic disease. 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 Patient required four units of pRBCs this admission for Hgb < 7.0. No acute GI bleed identified, likely blood loss from decreased erythropoiesis. Appreciate GI consult 12/16/2020, no acute GI bleed, no acute interventions, [facility]. Check hemoglobin periodically while inpatient - 7.0 12/28 - clinically stable and no bleeding noted. recheck hgb 7.5 12/29 and again 7.9 on 1/4/21. Hgb has been <7 since TCU admission. Discontinued aspirin due to risks. Occult stool was positive on 1/15/21. Patient is NOT interested in colonoscopy.   Plan:   -Follow up with GI at [facility] as directed.   -Recommend to follow up with hematology at [facility] post TCU as directed  -Monitor for worsening s/sx of concerns  -Continue iron 325mg every other day as directed  -Continue vitamin C 1000mg daily to assist with iron absorption along with chronic wounds.   -Monitor for active s/sx of bleeding risks.   -BMP and CBC Friday 2/19/21     (N18.30) Stage 3 chronic kidney disease, unspecified whether stage 3a or 3b CKD  (N17.9) MICHAEL  Comment: Acute on  chronic. Baseline creatinine 1.2-1.3 in Jan 2020, on admission 2.49.  this has improved to baseline with hydration.   Plan:   -Follow up with nephrology at [facility] post TCU  -Monitor urinary status  -Continue current medications without change  -BMP and CBC Friday 2/19/21     (E27.40) Chronic adrenal insufficiency (H)  Comment: Chronic. Stable.   Plan:   -Monitor for worsening s/sx of concerns  -Continue PTA hydrocortisone 20mg daily  -Follow up with PCP at [facility] post TCU  -BMP and CBC Friday 2/19/21     (I10) Hypertension, unspecified type  Comment: Acute on chronic. Amlodipine decreased to 5mg daily due to soft blood pressures. Based on JNC-8 goals,  patients age of 70 year old, presence of diabetes or CKD, and goals of care goal BP is  <140/90 mm Hg. Patient is stable with current plan of care and routine assessment..  Plan:   -Continue losartan as directed  -Monitor BP and HR  -Follow up with PCP post TCU  -BMP and CBC Friday 2/19/21     (Z87.2) Hx of psoriasis  (L29.9) Itching  Comment: Chronic. Holding PTA Humira this admission--continue to hold at discharge. Caused self inflicted trauma to wounds on RLE from back scratcher  Plan:   -Will monitor skin for ongoing concern   -PTA Humira held at discharge and not restarted upon discharge from TCU. Restart once current cellulitis has been appropriately treated  -Remove back scratcher so patient can not cause self trauma  -Continue Clobetasol Propionate Lotion 0.05 % to scalp at HS as directed  -Follow up with PCP at [facility] post TCU  -May recommend dermatology in future if warranted.   -Continue atarax 25mg every 6 hours PRN for itch complaints  -BMP and CBC Friday 2/19/21     (I73.9) PVD (peripheral vascular disease) (H)  (Z89.512) S/P below knee amputation, left (H)  (S81.801D) Open wound of right lower extremity, subsequent encounter  (L03.115) Cellulitis of  "RLE  Comment: Chronic. Reports worsening RLE wounds with increased erythema and pain since discharge from TCU. At TCU was receiving daily wound cares, but states since discharge, home cares \"fell through the cracks.\" WBC 15.1; vitals not suggestive of septic picture on admission. Lactic acid 1.1. vascular surgery evaluated, ZHOU completed, arterial insufficiency is not suspected, no surgical plans noted. treated with IV ancef initially and transition to Augment BID on 2/13. He will continue Augmentin BID x 4 more days to complete total 10 days course. patient will continue prior to admission pain regimen without changes after discharge which include dilaudid 1mg q6hrs prn, duloxetine and gabapentin.  Plan:   -Follow up with PCP post TCU  -Continue augmentin BID for 4 days until completion as directed  -Recommend to follow up with Wound clinic with Dr Odonnell on 2/24/21 as directed.   -Monitor skin for worsening s/sx of concerns.   -Continue vitamin D to 10,000U daily for wound healing  -Continue folic acid 1000mcg daily for wound healing  -Continue vitamin B12 1000mcg daily for wound healing  -BMP and CBC Friday 2/19/21  -Continue current wound care orders as directed:  *RLE  Wounds: Daily and PRN   *Wound Care to Posterior right leg: Clean wound by wetting gauze and soaking with VASHE wounbd cleanser x 20 minutes, pat dry/clean. To a mepilex lite dressing-apply smear of Iodosorb Gel and press to the wound. Secure with Spandage Size 5.   *Right LE plan of care: PERFORM \"GOOD FOOT CARE AT ALL TIMES: clean feet daily with gentle soap and water, making sure to clean between the toes. Dry thoroughly. Spray with baz from toes to knees, not in between toes. Apply fresh socks every day. Keep toenails trimmed to minimize snagging.   *Edema Wear stockings: Apply fresh pair daily- do not cut or trim stockings. Remove and set aside stockings-Do Not Throw Away. Wash stockiings with soap and HOT water. If really soiled use surgical " soap then regular soap. Hang dry. PATIENT IS NONCOMPLIANT WITH EDEMA-WEAR USE.      (G89.4) Chronic pain syndrome  (F11.20) Opioid dependence  Comment: Chronic. Stable.   Plan:   -Continue gabapentin to 300mg BID. (renally dose due to elevated creatinine)  -Continue duloxetine 60mg daily. (recently increased on 1/12/21 for pain complaints and also to assist with mood)  -Continue lidocaine topical as directed  -Continue dilaudid 1mg every 6 hours PRN  -Monitor pain complaints  -Follow up with PCP post TCU  -Would recommend pain clinic referral given history chronic history.   -BMP and CBC Friday 2/19/21     (E43) Severe protein-calorie malnutrition (H)  Comment: Acute on chronic.   Plan:   -Monitor appetite  -Continue Ensure TID  -Dietician to remain involved  -BMP and CBC Friday 2/19/21  -Continue vitamin D to 10,000 daily  -Continue folic acid daily  -Continue vitamin B12 daily     (G47.00) Insomnia  Comment: Acute on chronic. Patient wanting increase of sleeping agent. HISTORY of being on ambien in the past per [facility] records. This was discontinued.   Plan:  -Continue melatonin to 6mg at HS.   -Monitor sleeping patterns. Patient currently is drowsy and falls asleep mid conversation.   -Would NOT recommend ambien given co-morbidities above.   -BMP and CBC Friday 2/19/21     Orders written by provider at facility    Total time spent with patient visit at the [facility] was 60 min including patient visit and review of past records. Greater than 50% of total time spent with counseling and coordinating care with nursing staff due to the complexities of their diagnoses, review of HPI, development of POC, assisting HUC on appointment schedules, and patient education and review of POC with patient which includes 20 min discussion with patient on: current medications/orders changes, recent blood work results, continued discharge plan/needs, subsequent treatment plan while in  TCU and thereafter, current pain control plan and controlled substances ordered.     Electronically signed by:  Lana Stevens DNP, ANTONIETA        Sincerely,        ANTONIETA Valdovinos CNP

## 2021-02-16 NOTE — PROGRESS NOTES
[facility]  PRIMARY CARE PROVIDER AND CLINIC:  Polo Neil MD, [facility] *  Chief Complaint   Patient presents with     Establish Care     [facility] Record Number:  [id number]  Place of Service where encounter took place:  [facility] ([facility]) [[id number]]    Perfecto Reese  is a 70 year old  (1950), admitted to the above facility from  [facility]. Hospital stay 2/9/21 through 2/15/21..  Admitted to this facility for  rehab, medical management and nursing care.    HPI:    HPI information obtained from: facility chart records, facility staff, patient report, [facility] chart review and Care Everywhere [facility] chart review.   Brief Summary of Hospital Course: 70 year old male with complex PMHx including severe COPD on 3L home O2 with ongoing tobacco use, adrenal insufficiency on chronic steroids, psoriasis on Humira, chronic Hep C with cirrhosis, PVD s/p left BKA, chronic wounds, non-compliance, and recent hospitalization 12/2/20-12/31/20 for pneumonia with RLL abscess, treated non-surgically with prolonged antibiotics who was recently discharged from TCU 2/2/2021, now re-presents to the hospital for evaluation of his chronic RLE wounds, found to have RLE cellulitis as well as MICHAEL. Admitted for further evaluation and treatment.      Per SW- patient allowed the home care agency to come into home once after previous TCU discharge, however she stopped answering the phone for ongoing follow up cares and needs which prompted this new admission concern.     Updates on Status Since Skilled nursing Admission: Met with patient who denies any chest pain, palpitations, shortness of breath, ISIDRO, lightheadedness, dizziness, or cough. Denies any abdominal discomfort. Denies N&V. Denies B&B concerns. Denies dysuria or frequency. Denies loose or constipation. Appetite good. Sleeping well.      BP Readings from Last 3 Encounters:   02/16/21  (!) 150/64   02/15/21 130/48   01/28/21 120/56     Wt Readings from Last 5 Encounters:   02/16/21 66 kg (145 lb 6.4 oz)   02/15/21 63.8 kg (140 lb 10.5 oz)   01/28/21 64.4 kg (141 lb 14.4 oz)   01/26/21 64.4 kg (141 lb 14.4 oz)   01/18/21 64.4 kg (141 lb 14.4 oz)     CODE STATUS/ADVANCE DIRECTIVES DISCUSSION:   CPR/Full code   Patient's living condition: lives alone  ALLERGIES: Darvocet [propoxyphene n-apap] and Vicodin [hydrocodone-acetaminophen]  PAST MEDICAL HISTORY:  has a past medical history of Adrenal insufficiency (H), Anemia of chronic disease, Anemia, iron deficiency, Back pain, Benign essential hypertension, Chronic respiratory failure with hypoxia, on home O2 therapy (H), CKD (chronic kidney disease), stage III, COPD (chronic obstructive pulmonary disease) (H), Depression, GERD (gastroesophageal reflux disease), BKA, left (H), Hyperlipidemia, Migraine, Non compliance w medication regimen, Pain syndrome, chronic, Primary insomnia, Psoriasis, and PVD (peripheral vascular disease) (H).  PAST SURGICAL HISTORY:   has a past surgical history that includes amputation below knee rt/lt (Left); tonsillectomy; as arthroscopy subtalar joint subtalar arthrodesis; Open reduction internal fixation forearm; and External ear surgery (Left).  FAMILY HISTORY: family history includes Chronic Obstructive Pulmonary Disease in his father; Colon Cancer in his father; Coronary Artery Disease in his father; Dementia in his mother; Osteoporosis in his mother.  SOCIAL HISTORY:   reports that he quit smoking about 3 years ago. His smoking use included cigarettes. He has a 15.00 pack-year smoking history. He has never used smokeless tobacco. He reports that he does not drink alcohol or use drugs.    Post Discharge Medication Reconciliation Status: discharge medications reconciled and changed, per note/orders    Current Outpatient Medications   Medication Sig Dispense Refill     albuterol (PROAIR HFA/PROVENTIL HFA/VENTOLIN HFA) 108  (90 Base) MCG/ACT inhaler Inhale 2 puffs into the lungs every 4 hours as needed for shortness of breath / dyspnea or wheezing       amoxicillin-clavulanate (AUGMENTIN) 875-125 MG tablet Take 1 tablet by mouth every 12 hours for 3 days 6 tablet 0     calcium citrate (CITRACAL) 950 MG tablet Take 4 tablets by mouth daily       clobetasol (TEMOVATE) 0.05 % external solution Apply topically At Bedtime Apply to scalp       cyanocobalamin (VITAMIN B-12) 1000 MCG tablet Take 1 tablet (1,000 mcg) by mouth daily 30 tablet 1     dextromethorphan-guaiFENesin (MUCINEX DM)  MG 12 hr tablet Take 1 tablet by mouth every 12 hours 60 tablet 1     DULoxetine (CYMBALTA) 30 MG capsule Take 2 capsules (60 mg) by mouth daily 10 capsule 0     ferrous sulfate (FEROSUL) 325 (65 Fe) MG tablet Take 1 tablet (325 mg) by mouth every other day 90 tablet 1     fluticasone-vilanterol (BREO ELLIPTA) 200-25 MCG/INH inhaler Inhale 1 puff into the lungs daily 28 each 1     folic acid (FOLVITE) 1 MG tablet Take 1 tablet (1 mg) by mouth daily 30 tablet 1     furosemide (LASIX) 20 MG tablet Take 1 tablet (20 mg) by mouth daily       gabapentin (NEURONTIN) 300 MG capsule Take 1 capsule (300 mg) by mouth 2 times daily 60 capsule 1     hydrocortisone (CORTEF) 20 MG tablet Take 20 mg by mouth every morning       HYDROmorphone (DILAUDID) 2 MG tablet Take 0.5 tablets (1 mg) by mouth every 6 hours as needed for severe pain 60 tablet 0     hydrOXYzine (ATARAX) 25 MG tablet Take 1 tablet (25 mg) by mouth every 6 hours as needed for itching 30 tablet 1     lidocaine (XYLOCAINE) 5 % external ointment Apply topically 3 times daily as needed for moderate pain (rib pain)       loperamide (IMODIUM) 2 MG capsule Take 1 capsule (2 mg) by mouth 4 times daily as needed for diarrhea 15 capsule 0     losartan (COZAAR) 25 MG tablet Take 1 tablet (25 mg) by mouth daily       melatonin 3 MG tablet Take 2 tablets (6 mg) by mouth At Bedtime 60 tablet 1     multivitamin  "w/minerals (THERA-VIT-M) tablet Take 1 tablet by mouth daily 30 tablet 0     nicotine (NICORETTE) 2 MG gum Place 2 mg inside cheek as needed for smoking cessation       omeprazole (PRILOSEC) 20 MG DR capsule Take 20 mg by mouth daily       primidone (MYSOLINE) 50 MG tablet Take 50 mg by mouth At Bedtime       tamsulosin (FLOMAX) 0.4 MG capsule Take 0.8 mg by mouth daily       umeclidinium (INCRUSE ELLIPTA) 62.5 MCG/INH inhaler Inhale 1 puff into the lungs daily 30 each 1     vitamin C (ASCORBIC ACID) 1000 MG TABS Take 1 tablet (1,000 mg) by mouth daily 30 tablet 1     vitamin D3 (CHOLECALCIFEROL) 250 mcg (59786 units) capsule Take 1 capsule (250 mcg) by mouth daily 30 capsule 1     ROS:  10 point ROS of systems including Constitutional, Eyes, Respiratory, Cardiovascular, Gastroenterology, Genitourinary, Integumentary, Musculoskeletal, Psychiatric were all negative except for pertinent positives noted in my HPI.    Vitals:  BP (!) 150/64   Pulse 88   Temp 98.1  F (36.7  C)   Resp 18   Ht 1.651 m (5' 5\")   Wt 66 kg (145 lb 6.4 oz)   SpO2 95%   BMI 24.20 kg/m    Exam:  GENERAL APPEARANCE:  Alert, in no distress, oriented, cooperative  ENT:  Mouth and posterior oropharynx normal, moist mucous membranes, [place]  EYES:  EOM, conjunctivae, lids, pupils and irises normal  NECK:  No adenopathy,masses or thyromegaly  RESP:  respiratory effort and palpation of chest normal, lungs clear to auscultation , no respiratory distress  CV:  Palpation and auscultation of heart done , regular rate and rhythm, no murmur, rub, or gallop, no edema  ABDOMEN:  normal bowel sounds, soft, nontender, no hepatosplenomegaly or other masses, no guarding or rebound  M/S:   Pivot transfers. Uses electronic scooter for all main mobility. Does not ambulate  SKIN:  Inspection of skin and subcutaneous tissue baseline, wound healing well, no signs of infection minimal drainage present  NEURO:   Cranial nerves 2-12 are normal tested and grossly at " patient's baseline, no purposeful movement in upper and lower extremities  PSYCH:  oriented X 3, memory impaired , affect and mood normal    Lab/Diagnostic data:    Most Recent 3 CBC's:  Recent Labs   Lab Test 02/15/21  0852 02/14/21  1007 02/13/21  1212 02/12/21  0800   WBC  --  3.5* 3.1* 2.7*   HGB 7.1* 7.7* 7.0* 7.4*   MCV  --  93 92 92   PLT  --  60* 53* 42*     Most Recent 3 BMP's:  Recent Labs   Lab Test 02/14/21  1007 02/13/21  1212 02/12/21  0800    142 146*   POTASSIUM 4.0 4.0 4.3   CHLORIDE 116* 115* 123*   CO2 21 18* 19*   BUN 29 29 27   CR 1.11 1.23 1.23   ANIONGAP 4 9 4   MARTINE 8.3* 8.4* 8.0*   * 93 98     Most Recent 2 LFT's:  Recent Labs   Lab Test 02/09/21  0601 01/07/21   AST 23 27   ALT 21 24   ALKPHOS 79 91   BILITOTAL 0.5 0.4     Most Recent Cholesterol Panel:  Recent Labs   Lab Test 02/10/21  0838   CHOL 106   LDL 33   HDL 37*   TRIG 182*     Most Recent 6 Bacteria Isolates From Any Culture (See EPIC Reports for Culture Details):  Recent Labs   Lab Test 02/09/21  0602 12/03/20  0540 12/03/20  0230 12/02/20  1904 12/02/20  1844   CULT No growth  No growth Light growth  Normal adelina    Light growth  Proteus mirabilis  *  Moderate growth  Corynebacterium striatum  Identification obtained by MALDI-TOF mass spectrometry research use only database. Test   characteristics determined and verified by the [facility]   ([facility]) [place], [place].  Susceptibility testing not routinely done  * <10,000 colonies/mL  mixed urogenital adelina  Susceptibility testing not routinely done   No growth No growth     Most Recent TSH and T4:  Recent Labs   Lab Test 01/07/21   TSH 0.73     Most Recent Hemoglobin A1c:  Recent Labs   Lab Test 02/09/21  0702   A1C Canceled, Test credited     Most Recent Urinalysis:  Recent Labs   Lab Test 12/09/20  0945   COLOR Yellow   APPEARANCE Cloudy   URINEGLC Negative   URINEBILI Negative   URINEKETONE Negative   SG 1.015   UBLD Large*   URINEPH  5.5   PROTEIN 30*   NITRITE Negative   LEUKEST Large*   RBCU >182*   WBCU >182*     Most Recent Anemia Panel:  Recent Labs   Lab Test 02/15/21  0852 02/14/21  1007 12/04/20  1526 12/04/20  1526 12/04/20  1245   WBC  --  3.5*   < > 3.2* 3.5*   HGB 7.1* 7.7*   < > 6.4* 6.1*   HCT  --  23.7*   < > 19.1* 18.6*   MCV  --  93   < > 93 94   PLT  --  60*   < > 70* 74*   IRON  --   --   --   --  30*   IRONSAT  --   --   --   --  27   RETICABSCT  --   --   --  17.9*  --    RETP  --   --   --  0.9  --    FEB  --   --   --   --  112*   JÚNIOR  --   --   --   --  515*   B12  --   --   --   --  1,117*   FOLIC  --   --   --   --  6.7    < > = values in this interval not displayed.       ASSESSMENT/PLAN:  (R53.81) Physical deconditioning  (primary encounter diagnosis)  (M62.81) Generalized muscle weakness  Comment: Acute on chronic. S/T below diagnosis. Followed by [facility]. HISTORY of noncompliance.   Plan:   -Continue Physical therapy and Occupational therapy as directed  -SW to remain involved for safe discharge planning needs  -Would benefit from prison setting, however patient declines at this time.      (J96.11) chronic respiratory failure with hypoxia and hypercapnia (H)  (Z87.01) History of pneumonia  (Z87.09) History of abscess of lung  (J44.9) COPD, severe (H)  (R07.89) Musculoskeletal chest pain  Comment: Acute on chronic. Previous hospital admission Patient was treated with IV then PO antibiotics, with the assistance of infectious disease. Initially on zosyn -> unasyn -> transitioned 12/8 to Augmentin x 6 weeks (through approximately 1/13/2021). - Infectious disease peripherally following. Completed antibiotics of augmentin on 1/13/21.   Plan:   -Continue oxygen as directed which is chronic at 3L via NC.   -Monitor respiratory status. May benefit from thoracentesis in future if warranted.   -Continue mucinex 600mg BID for now. May benefit from long term use for now while infection  current.   -Continue incentive spirometry and encourage every 4 hours while awake. Encourage use.   -Continue fluticasone-vilanterol (breo 200-25mcg inhaler 1 puff daily  -Continue incruse ellipta 62.5mcg inhaler 1 puff daily.   -Continue albuterol inhaler PRN  -BMP and CBC Friday 2/19/21     (D63.8) Anemia of chronic disease  (D69.3) Chronic idiopathic thrombocytopenia (H)  Comment: Acute on chronic. Anemia and thrombocytopenia likely multifactorial from liver cirrhosis, splenomegaly, renal disease, CKD and psoriasis. Iron labs suggestive of anemia of chronic disease. 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 Patient required four units of pRBCs this admission for Hgb < 7.0. No acute GI bleed identified, likely blood loss from decreased erythropoiesis. Appreciate GI consult 12/16/2020, no acute GI bleed, no acute interventions, [facility]. Check hemoglobin periodically while inpatient - 7.0 12/28 - clinically stable and no bleeding noted. recheck hgb 7.5 12/29 and again 7.9 on 1/4/21. Hgb has been <7 since TCU admission. Discontinued aspirin due to risks. Occult stool was positive on 1/15/21. Patient is NOT interested in colonoscopy.   Plan:   -Follow up with GI at [facility] as directed.   -Recommend to follow up with hematology at [facility] post TCU as directed  -Monitor for worsening s/sx of concerns  -Continue iron 325mg every other day as directed  -Continue vitamin C 1000mg daily to assist with iron absorption along with chronic wounds.   -Monitor for active s/sx of bleeding risks.   -BMP and CBC Friday 2/19/21     (N18.30) Stage 3 chronic kidney disease, unspecified whether stage 3a or 3b CKD  (N17.9) MICHAEL  Comment: Acute on chronic. Baseline creatinine 1.2-1.3 in Jan 2020, on admission 2.49.  this has improved to baseline with  hydration.   Plan:   -Follow up with nephrology at [facility] post TCU  -Monitor urinary status  -Continue current medications without change  -BMP and CBC Friday 2/19/21     (E27.40) Chronic adrenal insufficiency (H)  Comment: Chronic. Stable.   Plan:   -Monitor for worsening s/sx of concerns  -Continue PTA hydrocortisone 20mg daily  -Follow up with PCP at [facility] post TCU  -BMP and CBC Friday 2/19/21     (I10) Hypertension, unspecified type  Comment: Acute on chronic. Amlodipine decreased to 5mg daily due to soft blood pressures. Based on JNC-8 goals,  patients age of 70 year old, presence of diabetes or CKD, and goals of care goal BP is  <140/90 mm Hg. Patient is stable with current plan of care and routine assessment..  Plan:   -Continue losartan as directed  -Monitor BP and HR  -Follow up with PCP post TCU  -BMP and CBC Friday 2/19/21     (Z87.2) Hx of psoriasis  (L29.9) Itching  Comment: Chronic. Holding PTA Humira this admission--continue to hold at discharge. Caused self inflicted trauma to wounds on RLE from back scratcher  Plan:   -Will monitor skin for ongoing concern   -PTA Humira held at discharge and not restarted upon discharge from TCU. Restart once current cellulitis has been appropriately treated  -Remove back scratcher so patient can not cause self trauma  -Continue Clobetasol Propionate Lotion 0.05 % to scalp at HS as directed  -Follow up with PCP at [facility] post TCU  -May recommend dermatology in future if warranted.   -Continue atarax 25mg every 6 hours PRN for itch complaints  -BMP and CBC Friday 2/19/21     (I73.9) PVD (peripheral vascular disease) (H)  (Z89.512) S/P below knee amputation, left (H)  (S81.801D) Open wound of right lower extremity, subsequent encounter  (L03.115) Cellulitis of RLE  Comment: Chronic. Reports worsening RLE wounds with increased erythema and pain since discharge from TCU. At  "TCU was receiving daily wound cares, but states since discharge, home cares \"fell through the cracks.\" WBC 15.1; vitals not suggestive of septic picture on admission. Lactic acid 1.1. vascular surgery evaluated, ZHOU completed, arterial insufficiency is not suspected, no surgical plans noted. treated with IV ancef initially and transition to Augment BID on 2/13. He will continue Augmentin BID x 4 more days to complete total 10 days course. patient will continue prior to admission pain regimen without changes after discharge which include dilaudid 1mg q6hrs prn, duloxetine and gabapentin.  Plan:   -Follow up with PCP post TCU  -Continue augmentin BID for 4 days until completion as directed  -Recommend to follow up with Wound clinic with Dr Odonnell on 2/24/21 as directed.   -Monitor skin for worsening s/sx of concerns.   -Continue vitamin D to 10,000U daily for wound healing  -Continue folic acid 1000mcg daily for wound healing  -Continue vitamin B12 1000mcg daily for wound healing  -BMP and CBC Friday 2/19/21  -Continue current wound care orders as directed:  *RLE  Wounds: Daily and PRN   *Wound Care to Posterior right leg: Clean wound by wetting gauze and soaking with VASHE wounbd cleanser x 20 minutes, pat dry/clean. To a mepilex lite dressing-apply smear of Iodosorb Gel and press to the wound. Secure with Spandage Size 5.   *Right LE plan of care: PERFORM \"GOOD FOOT CARE AT ALL TIMES: clean feet daily with gentle soap and water, making sure to clean between the toes. Dry thoroughly. Spray with baz from toes to knees, not in between toes. Apply fresh socks every day. Keep toenails trimmed to minimize snagging.   *Edema Wear stockings: Apply fresh pair daily- do not cut or trim stockings. Remove and set aside stockings-Do Not Throw Away. Wash stockiings with soap and HOT water. If really soiled use surgical soap then regular soap. Hang dry. PATIENT IS NONCOMPLIANT WITH EDEMA-WEAR USE.      (G89.4) Chronic pain " syndrome  (F11.20) Opioid dependence  Comment: Chronic. Stable.   Plan:   -Continue gabapentin to 300mg BID. (renally dose due to elevated creatinine)  -Continue duloxetine 60mg daily. (recently increased on 1/12/21 for pain complaints and also to assist with mood)  -Continue lidocaine topical as directed  -Continue dilaudid 1mg every 6 hours PRN  -Monitor pain complaints  -Follow up with PCP post TCU  -Would recommend pain clinic referral given history chronic history.   -BMP and CBC Friday 2/19/21     (E43) Severe protein-calorie malnutrition (H)  Comment: Acute on chronic.   Plan:   -Monitor appetite  -Continue Ensure TID  -Dietician to remain involved  -BMP and CBC Friday 2/19/21  -Continue vitamin D to 10,000 daily  -Continue folic acid daily  -Continue vitamin B12 daily     (G47.00) Insomnia  Comment: Acute on chronic. Patient wanting increase of sleeping agent. HISTORY of being on ambien in the past per [facility] records. This was discontinued.   Plan:  -Continue melatonin to 6mg at HS.   -Monitor sleeping patterns. Patient currently is drowsy and falls asleep mid conversation.   -Would NOT recommend ambien given co-morbidities above.   -BMP and CBC Friday 2/19/21     Orders written by provider at facility    Total time spent with patient visit at the skilled nursing facility was 60 min including patient visit and review of past records. Greater than 50% of total time spent with counseling and coordinating care with nursing staff due to the complexities of their diagnoses, review of HPI, development of POC, assisting HUC on appointment schedules, and patient education and review of POC with patient which includes 20 min discussion with patient on: current medications/orders changes, recent blood work results, continued discharge plan/needs, subsequent treatment plan while in TCU and thereafter, current pain control plan and controlled substances ordered.     Electronically signed  by:  Lana Stevens DNP, APRN

## 2021-02-16 NOTE — PLAN OF CARE
Occupational Therapy Discharge Summary    Reason for therapy discharge:    Discharged to transitional care facility.    Progress towards therapy goal(s). See goals on Care Plan in [facility] electronic health record for goal details.  Goals not met.  Barriers to achieving goals:   discharge from facility.    Therapy recommendation(s):    Continued therapy is recommended.  Rationale/Recommendations:  pt would benefit from continued therapy as he was below baseline for ADL's and functional transfers.

## 2021-02-17 ENCOUNTER — RECORDS - HEALTHEAST (OUTPATIENT)
Dept: LAB | Facility: CLINIC | Age: 71
End: 2021-02-17

## 2021-02-18 ENCOUNTER — RECORDS - HEALTHEAST (OUTPATIENT)
Dept: LAB | Facility: CLINIC | Age: 71
End: 2021-02-18

## 2021-02-19 ENCOUNTER — TRANSFERRED RECORDS (OUTPATIENT)
Dept: HEALTH INFORMATION MANAGEMENT | Facility: CLINIC | Age: 71
End: 2021-02-19

## 2021-02-19 LAB
ANION GAP SERPL CALCULATED.3IONS-SCNC: 7 MMOL/L (ref 5–18)
ANION GAP SERPL CALCULATED.3IONS-SCNC: 7 MMOL/L (ref 5–18)
BASOPHILS # BLD AUTO: 0 THOU/UL (ref 0–0.2)
BASOPHILS NFR BLD AUTO: 1 % (ref 0–2)
BUN SERPL-MCNC: 37 MG/DL (ref 8–22)
BUN SERPL-MCNC: 37 MG/DL (ref 8–22)
CALCIUM SERPL-MCNC: 7.5 MG/DL (ref 8.5–10.5)
CALCIUM SERPL-MCNC: 7.6 MG/DL (ref 8.5–10.5)
CHLORIDE BLD-SCNC: 106 MMOL/L (ref 98–107)
CHLORIDE SERPLBLD-SCNC: 106 MMOL/L (ref 98–107)
CO2 SERPL-SCNC: 25 MMOL/L (ref 22–31)
CO2 SERPL-SCNC: 25 MMOL/L (ref 22–31)
CREAT SERPL-MCNC: 1.28 MG/DL (ref 0.7–1.3)
CREAT SERPL-MCNC: 1.28 MG/DL (ref 0.7–1.3)
DIFFERENTIAL: ABNORMAL
EOSINOPHIL # BLD AUTO: 0.6 THOU/UL (ref 0–0.4)
EOSINOPHIL NFR BLD AUTO: 15 % (ref 0–6)
ERYTHROCYTE [DISTWIDTH] IN BLOOD BY AUTOMATED COUNT: 14.7 % (ref 11–14.5)
ERYTHROCYTE [DISTWIDTH] IN BLOOD BY AUTOMATED COUNT: 14.7 % (ref 11–14.5)
GFR SERPL CREATININE-BSD FRML MDRD: 56 ML/MIN/1.73M2
GFR SERPL CREATININE-BSD FRML MDRD: 56 ML/MIN/1.73M2
GLUCOSE BLD-MCNC: 79 MG/DL (ref 70–125)
GLUCOSE SERPL-MCNC: 79 MG/DL (ref 70–125)
HCT VFR BLD AUTO: 21.3 % (ref 40–54)
HCT VFR BLD AUTO: 21.3 % (ref 40–54)
HEMOGLOBIN: 7.2 G/DL (ref 14–18)
HGB BLD-MCNC: 7.2 G/DL (ref 14–18)
IMM GRANULOCYTES # BLD: 0 THOU/UL
IMM GRANULOCYTES NFR BLD: 1 %
LYMPHOCYTES # BLD AUTO: 0.5 THOU/UL (ref 0.8–4.4)
LYMPHOCYTES NFR BLD AUTO: 13 % (ref 20–40)
MCH RBC QN AUTO: 30.6 PG (ref 27–34)
MCH RBC QN AUTO: 30.6 PG (ref 27–34)
MCHC RBC AUTO-ENTMCNC: 33.8 G/DL (ref 32–36)
MCHC RBC AUTO-ENTMCNC: 33.8 G/DL (ref 32–36)
MCV RBC AUTO: 91 FL (ref 80–100)
MCV RBC AUTO: 91 FL (ref 80–100)
MONOCYTES # BLD AUTO: 0.3 THOU/UL (ref 0–0.9)
MONOCYTES NFR BLD AUTO: 7 % (ref 2–10)
NEUTROPHILS # BLD AUTO: 2.6 THOU/UL (ref 2–7.7)
NEUTROPHILS NFR BLD AUTO: 64 % (ref 50–70)
PLATELET # BLD AUTO: 85 THOU/UL (ref 140–440)
PLATELET # BLD AUTO: 86 10^9/L (ref 140–440)
PMV BLD AUTO: 9.4 FL (ref 8.5–12.5)
POTASSIUM BLD-SCNC: 3.9 MMOL/L (ref 3.5–5)
POTASSIUM SERPL-SCNC: 3.9 MMOL/L (ref 3.5–5)
RBC # BLD AUTO: 2.35 MILL/UL (ref 4.4–6.2)
RBC # BLD AUTO: 2.36 10^12/L (ref 4.4–?)
SODIUM SERPL-SCNC: 138 MMOL/L (ref 136–145)
SODIUM SERPL-SCNC: 138 MMOL/L (ref 136–145)
WBC # BLD AUTO: 4.1 10^9/L (ref 4–11)
WBC: 4.1 THOU/UL (ref 4–11)

## 2021-02-21 ENCOUNTER — TELEPHONE (OUTPATIENT)
Dept: GERIATRICS | Facility: CLINIC | Age: 71
End: 2021-02-21

## 2021-02-21 DIAGNOSIS — G89.4 CHRONIC PAIN SYNDROME: ICD-10-CM

## 2021-02-21 RX ORDER — HYDROMORPHONE HYDROCHLORIDE 2 MG/1
1 TABLET ORAL EVERY 6 HOURS PRN
Qty: 30 TABLET | Refills: 0 | Status: SHIPPED | OUTPATIENT
Start: 2021-02-21 | End: 2021-02-26

## 2021-02-22 ENCOUNTER — NURSING HOME VISIT (OUTPATIENT)
Dept: GERIATRICS | Facility: CLINIC | Age: 71
End: 2021-02-22
Payer: MEDICARE

## 2021-02-22 VITALS
RESPIRATION RATE: 18 BRPM | HEART RATE: 74 BPM | SYSTOLIC BLOOD PRESSURE: 140 MMHG | BODY MASS INDEX: 24.04 KG/M2 | WEIGHT: 144.3 LBS | TEMPERATURE: 98.2 F | OXYGEN SATURATION: 95 % | HEIGHT: 65 IN | DIASTOLIC BLOOD PRESSURE: 68 MMHG

## 2021-02-22 DIAGNOSIS — Z89.512 STATUS POST BELOW-KNEE AMPUTATION OF LEFT LOWER EXTREMITY (H): ICD-10-CM

## 2021-02-22 DIAGNOSIS — N40.0 BENIGN PROSTATIC HYPERPLASIA WITHOUT LOWER URINARY TRACT SYMPTOMS: ICD-10-CM

## 2021-02-22 DIAGNOSIS — E27.40 ADRENAL INSUFFICIENCY (H): ICD-10-CM

## 2021-02-22 DIAGNOSIS — D61.818 OTHER PANCYTOPENIA (H): ICD-10-CM

## 2021-02-22 DIAGNOSIS — G89.4 CHRONIC PAIN SYNDROME: ICD-10-CM

## 2021-02-22 DIAGNOSIS — I73.9 PAD (PERIPHERAL ARTERY DISEASE) (H): ICD-10-CM

## 2021-02-22 DIAGNOSIS — L40.9 PSORIASIS: ICD-10-CM

## 2021-02-22 DIAGNOSIS — K74.60 CIRRHOSIS OF LIVER WITHOUT ASCITES, UNSPECIFIED HEPATIC CIRRHOSIS TYPE (H): ICD-10-CM

## 2021-02-22 DIAGNOSIS — I12.9 BENIGN HYPERTENSIVE KIDNEY DISEASE WITH CHRONIC KIDNEY DISEASE STAGE I THROUGH STAGE IV, OR UNSPECIFIED: ICD-10-CM

## 2021-02-22 DIAGNOSIS — R53.81 PHYSICAL DECONDITIONING: ICD-10-CM

## 2021-02-22 DIAGNOSIS — J44.9 CHRONIC OBSTRUCTIVE PULMONARY DISEASE, UNSPECIFIED COPD TYPE (H): ICD-10-CM

## 2021-02-22 DIAGNOSIS — K21.9 GASTROESOPHAGEAL REFLUX DISEASE WITHOUT ESOPHAGITIS: ICD-10-CM

## 2021-02-22 DIAGNOSIS — J96.11 CHRONIC HYPOXEMIC RESPIRATORY FAILURE (H): ICD-10-CM

## 2021-02-22 DIAGNOSIS — R41.89 COGNITIVE IMPAIRMENT: ICD-10-CM

## 2021-02-22 DIAGNOSIS — L03.115 CELLULITIS OF RIGHT LOWER EXTREMITY: Primary | ICD-10-CM

## 2021-02-22 PROCEDURE — 99305 1ST NF CARE MODERATE MDM 35: CPT | Performed by: INTERNAL MEDICINE

## 2021-02-22 ASSESSMENT — MIFFLIN-ST. JEOR: SCORE: 1351.42

## 2021-02-22 NOTE — LETTER
2/22/2021        RE: Perfecto Reese  [address]  [address]  [facility] [address]        [place] [facility]  INITIAL VISIT NOTE  February 22, 2021    PRIMARY CARE PROVIDER AND CLINIC:  Polo Neil [facility] / [facility] *    Chief Complaint   Patient presents with     Hospital F/U       HPI:    Perfecto Reese is a 70 year old  (1950) male who was seen at [facility] TCU on February 22, 2021 for an initial visit. Medical history is notable for COPD, chronic hypoxic respiratory failure (3L), adrenal insufficiency, Hep C, cirrhosis and PAD s/p L BKA. He was in this TCU in January after a hospitalization with RLL PNA with abscess. BIMS 11/15 during TCU stay. He was hospitalized at [facility] from 2/9/21 ro 2/15/21 where he presented for evaluation of worsening of chronic LE wounds and was found to have a RLE cellulitis and MICHAEL on CKD. He was discharged to complete a course of antibiotics. He was admitted to this facility for medical management and rehab.     Today, Mr BOLIVAR Sterling is seen in his room laying in bed. Says he is overall doing OK, but his R leg really hurts. He's not sure if he's getting the pain meds ahead of dressing changes. Reviewed he goes to wound clinic the day after tomorrow. No chest pain or dyspnea. No abdominal pain. No constipation. No concerns per discussion with nursing. He is working with therapies and is a max assist with dressing and bed mobility and is dependent for transfers and toileting.     CODE STATUS:   CPR/Full code     ALLERGIES:     Allergies   Allergen Reactions     Darvocet [Propoxyphene N-Apap] Nausea     Vicodin [Hydrocodone-Acetaminophen] Nausea     Can use if he eats with it       PAST MEDICAL HISTORY:   Past Medical History:   Diagnosis Date     Adrenal insufficiency (H)      Anemia of chronic disease      Anemia, iron deficiency      Back pain      Benign essential hypertension      Chronic  respiratory failure with hypoxia, on home O2 therapy (H)     Secondary to COPD     CKD (chronic kidney disease), stage III      COPD (chronic obstructive pulmonary disease) (H)     dependent on 3 LPM via NC chronically     Depression      GERD (gastroesophageal reflux disease)      Hx of BKA, left (H)      Hyperlipidemia      Migraine      Non compliance w medication regimen      Pain syndrome, chronic      Primary insomnia      Psoriasis     on Humira     PVD (peripheral vascular disease) (H)        PAST SURGICAL HISTORY:   Past Surgical History:   Procedure Laterality Date     AMPUTATION BELOW KNEE RT/LT Left      AS ARTHROSCOPY SUBTALAR JOINT SUBTALAR ARTHRODESIS       EXTERNAL EAR SURGERY Left      OPEN REDUCTION INTERNAL FIXATION FOREARM      left forearm/wrist     TONSILLECTOMY         FAMILY HISTORY:   Family History   Problem Relation Age of Onset     Colon Cancer Father      Coronary Artery Disease Father      Chronic Obstructive Pulmonary Disease Father      Osteoporosis Mother      Dementia Mother        SOCIAL HISTORY:   Lives alone    MEDICATIONS:  Post Discharge Medication Reconciliation Status: discharge medications reconciled and changed, per note/orders.   Current Outpatient Medications   Medication Sig Dispense Refill     albuterol (PROAIR HFA/PROVENTIL HFA/VENTOLIN HFA) 108 (90 Base) MCG/ACT inhaler Inhale 2 puffs into the lungs every 4 hours as needed for shortness of breath / dyspnea or wheezing       calcium citrate (CITRACAL) 950 MG tablet Take 4 tablets by mouth daily       clobetasol (TEMOVATE) 0.05 % external solution Apply topically At Bedtime Apply to scalp       cyanocobalamin (VITAMIN B-12) 1000 MCG tablet Take 1 tablet (1,000 mcg) by mouth daily 30 tablet 1     dextromethorphan-guaiFENesin (MUCINEX DM)  MG 12 hr tablet Take 1 tablet by mouth every 12 hours 60 tablet 1     DULoxetine (CYMBALTA) 30 MG capsule Take 2 capsules (60 mg) by mouth daily 10 capsule 0     ferrous sulfate  (FEROSUL) 325 (65 Fe) MG tablet Take 1 tablet (325 mg) by mouth every other day 90 tablet 1     fluticasone-vilanterol (BREO ELLIPTA) 200-25 MCG/INH inhaler Inhale 1 puff into the lungs daily 28 each 1     folic acid (FOLVITE) 1 MG tablet Take 1 tablet (1 mg) by mouth daily 30 tablet 1     furosemide (LASIX) 20 MG tablet Take 1 tablet (20 mg) by mouth daily       gabapentin (NEURONTIN) 300 MG capsule Take 1 capsule (300 mg) by mouth 2 times daily 60 capsule 1     hydrocortisone (CORTEF) 20 MG tablet Take 20 mg by mouth every morning       HYDROmorphone (DILAUDID) 2 MG tablet Take 0.5 tablets (1 mg) by mouth every 6 hours as needed for severe pain 30 tablet 0     hydrOXYzine (ATARAX) 25 MG tablet Take 1 tablet (25 mg) by mouth every 6 hours as needed for itching 30 tablet 1     lidocaine (XYLOCAINE) 5 % external ointment Apply topically 3 times daily as needed for moderate pain (rib pain)       loperamide (IMODIUM) 2 MG capsule Take 1 capsule (2 mg) by mouth 4 times daily as needed for diarrhea 15 capsule 0     losartan (COZAAR) 25 MG tablet Take 1 tablet (25 mg) by mouth daily       melatonin 3 MG tablet Take 2 tablets (6 mg) by mouth At Bedtime 60 tablet 1     multivitamin w/minerals (THERA-VIT-M) tablet Take 1 tablet by mouth daily 30 tablet 0     nicotine (NICORETTE) 2 MG gum Place 2 mg inside cheek as needed for smoking cessation       omeprazole (PRILOSEC) 20 MG DR capsule Take 20 mg by mouth daily       primidone (MYSOLINE) 50 MG tablet Take 50 mg by mouth At Bedtime       tamsulosin (FLOMAX) 0.4 MG capsule Take 0.8 mg by mouth daily       umeclidinium (INCRUSE ELLIPTA) 62.5 MCG/INH inhaler Inhale 1 puff into the lungs daily 30 each 1     vitamin C (ASCORBIC ACID) 1000 MG TABS Take 1 tablet (1,000 mg) by mouth daily 30 tablet 1     vitamin D3 (CHOLECALCIFEROL) 250 mcg (81336 units) capsule Take 1 capsule (250 mcg) by mouth daily 30 capsule 1         ROS:  10 point ROS neg other than the symptoms noted above in  "the HPI.    PHYSICAL EXAM:  BP (!) 140/68   Pulse 74   Temp 98.2  F (36.8  C)   Resp 18   Ht 1.651 m (5' 5\")   Wt 65.5 kg (144 lb 4.8 oz)   SpO2 95%   BMI 24.01 kg/m     Gen: sitting up in bed, alert, cooperative and in no acute distress  HEENT: normocephalic; nasal cannula in on 3L  Card: RRR, S1, S2, no murmurs  Resp: lungs clear to auscultation bilaterally from anterior and lateral, no crackles or wheezes  GI: abdomen soft, not-tender  MSK: decreased muscle tone, s/p L BKA; RLE in padded boot with dressing c/d/i  Neuro: CX II-XII grossly in tact; ROM in all four extremities grossly in tact  Psych: alert and oriented to self and general situation; normal affect    LABORATORY/IMAGING DATA:  Reviewed as per Epic    ASSESSMENT/PLAN:    RLE Cellulitis  PAD s/p L BKA (2010)  Chronic LE Wounds  Peripheral Neuropathy  Chronic Pain Sydndrome  Completed course of Augmentin. Afebrile. Did not see wounds today as dressing was in place.   -- wound cares as ordered   -- follow up w/ Dr. Odonnell as scheduled on 2/24/21  -- analgesia with duloxetine 60 mg daily, gabapentin 300 mg BID, hydromorphone 1 mg q6h PRN and primidone 50 mg at bedtime    --  ASA 81 mg daily (currently on hold pending heme appt)    MICHAEL on CKD, Stage III  Baseline Cr low 1s. Cr 1.28 on 2/19.   -- avoid nephrotoxic meds  -- periodic BMP    COPD  Chronic Hypoxic Respiratory Failure (3L)  No signs of exacerbation. On baseline O2 today.   -- fluticasone-vilanterol 200-25 mcg daily, umeclidinium 62.5 mcg daily and albuterol MDI PRN      Adrenal Insufficiency  -- hydrocortisone 20 mg in the morning     HTN  SBPs 130s-140s  -- furosemide 20 mg daily, losartan 25 mg daily  -- follow BPs and adjust medications as needed    Cognitive Impairment   BIMS 9/15 last week. Concern for medication non compliance.   -- OT following for cog assessment    Hep C with Cirrhosis   Anemia  Thrombocytopenia  Chronic. Underlying liver disease/cirrhosis. Labs 2/19 with Hgb 7.2 " and Plt 85.   -- FeSO4 325 mg every other day  -- ASA 81 mg is on hold pending heme appt   -- [facility]    Psoriasis  -- Humira remains on hold given wounds, cellulitis    BPH  -- tamsulosin 0.8 mg daily     GERD  -- omeprazole 20 mg daily    Insomnia  -- melatonin 6 mg at bedtime      Physical Deconditioning  In setting of hospitalization and underlying medical conditions  -- ongoing PT/OT       Electronically signed by:  Donna Rodríguez MD                          Sincerely,        Donna Rodríguez MD

## 2021-02-22 NOTE — PROGRESS NOTES
[place] [facility]  INITIAL VISIT NOTE  February 22, 2021    PRIMARY CARE PROVIDER AND CLINIC:  Polo Neil [facility] / [facility] *    Chief Complaint   Patient presents with     Hospital F/U       HPI:    Perfecto Reese is a 70 year old  (1950) male who was seen at [facility] TCU on February 22, 2021 for an initial visit. Medical history is notable for COPD, chronic hypoxic respiratory failure (3L), adrenal insufficiency, Hep C, cirrhosis and PAD s/p L BKA. He was in this TCU in January after a hospitalization with RLL PNA with abscess. BIMS 11/15 during TCU stay. He was hospitalized at [facility] from 2/9/21 ro 2/15/21 where he presented for evaluation of worsening of chronic LE wounds and was found to have a RLE cellulitis and MICHAEL on CKD. He was discharged to complete a course of antibiotics. He was admitted to this facility for medical management and rehab.     Today, Mr BOLIVAR Sterling is seen in his room laying in bed. Says he is overall doing OK, but his R leg really hurts. He's not sure if he's getting the pain meds ahead of dressing changes. Reviewed he goes to wound clinic the day after tomorrow. No chest pain or dyspnea. No abdominal pain. No constipation. No concerns per discussion with nursing. He is working with therapies and is a max assist with dressing and bed mobility and is dependent for transfers and toileting.     CODE STATUS:   CPR/Full code     ALLERGIES:     Allergies   Allergen Reactions     Darvocet [Propoxyphene N-Apap] Nausea     Vicodin [Hydrocodone-Acetaminophen] Nausea     Can use if he eats with it       PAST MEDICAL HISTORY:   Past Medical History:   Diagnosis Date     Adrenal insufficiency (H)      Anemia of chronic disease      Anemia, iron deficiency      Back pain      Benign essential hypertension      Chronic respiratory failure with hypoxia, on home O2 therapy (H)     Secondary to COPD     CKD (chronic kidney disease),  stage III      COPD (chronic obstructive pulmonary disease) (H)     dependent on 3 LPM via NC chronically     Depression      GERD (gastroesophageal reflux disease)      Hx of BKA, left (H)      Hyperlipidemia      Migraine      Non compliance w medication regimen      Pain syndrome, chronic      Primary insomnia      Psoriasis     on Humira     PVD (peripheral vascular disease) (H)        PAST SURGICAL HISTORY:   Past Surgical History:   Procedure Laterality Date     AMPUTATION BELOW KNEE RT/LT Left      AS ARTHROSCOPY SUBTALAR JOINT SUBTALAR ARTHRODESIS       EXTERNAL EAR SURGERY Left      OPEN REDUCTION INTERNAL FIXATION FOREARM      left forearm/wrist     TONSILLECTOMY         FAMILY HISTORY:   Family History   Problem Relation Age of Onset     Colon Cancer Father      Coronary Artery Disease Father      Chronic Obstructive Pulmonary Disease Father      Osteoporosis Mother      Dementia Mother        SOCIAL HISTORY:   Lives alone    MEDICATIONS:  Post Discharge Medication Reconciliation Status: discharge medications reconciled and changed, per note/orders.   Current Outpatient Medications   Medication Sig Dispense Refill     albuterol (PROAIR HFA/PROVENTIL HFA/VENTOLIN HFA) 108 (90 Base) MCG/ACT inhaler Inhale 2 puffs into the lungs every 4 hours as needed for shortness of breath / dyspnea or wheezing       calcium citrate (CITRACAL) 950 MG tablet Take 4 tablets by mouth daily       clobetasol (TEMOVATE) 0.05 % external solution Apply topically At Bedtime Apply to scalp       cyanocobalamin (VITAMIN B-12) 1000 MCG tablet Take 1 tablet (1,000 mcg) by mouth daily 30 tablet 1     dextromethorphan-guaiFENesin (MUCINEX DM)  MG 12 hr tablet Take 1 tablet by mouth every 12 hours 60 tablet 1     DULoxetine (CYMBALTA) 30 MG capsule Take 2 capsules (60 mg) by mouth daily 10 capsule 0     ferrous sulfate (FEROSUL) 325 (65 Fe) MG tablet Take 1 tablet (325 mg) by mouth every other day 90 tablet 1      fluticasone-vilanterol (BREO ELLIPTA) 200-25 MCG/INH inhaler Inhale 1 puff into the lungs daily 28 each 1     folic acid (FOLVITE) 1 MG tablet Take 1 tablet (1 mg) by mouth daily 30 tablet 1     furosemide (LASIX) 20 MG tablet Take 1 tablet (20 mg) by mouth daily       gabapentin (NEURONTIN) 300 MG capsule Take 1 capsule (300 mg) by mouth 2 times daily 60 capsule 1     hydrocortisone (CORTEF) 20 MG tablet Take 20 mg by mouth every morning       HYDROmorphone (DILAUDID) 2 MG tablet Take 0.5 tablets (1 mg) by mouth every 6 hours as needed for severe pain 30 tablet 0     hydrOXYzine (ATARAX) 25 MG tablet Take 1 tablet (25 mg) by mouth every 6 hours as needed for itching 30 tablet 1     lidocaine (XYLOCAINE) 5 % external ointment Apply topically 3 times daily as needed for moderate pain (rib pain)       loperamide (IMODIUM) 2 MG capsule Take 1 capsule (2 mg) by mouth 4 times daily as needed for diarrhea 15 capsule 0     losartan (COZAAR) 25 MG tablet Take 1 tablet (25 mg) by mouth daily       melatonin 3 MG tablet Take 2 tablets (6 mg) by mouth At Bedtime 60 tablet 1     multivitamin w/minerals (THERA-VIT-M) tablet Take 1 tablet by mouth daily 30 tablet 0     nicotine (NICORETTE) 2 MG gum Place 2 mg inside cheek as needed for smoking cessation       omeprazole (PRILOSEC) 20 MG DR capsule Take 20 mg by mouth daily       primidone (MYSOLINE) 50 MG tablet Take 50 mg by mouth At Bedtime       tamsulosin (FLOMAX) 0.4 MG capsule Take 0.8 mg by mouth daily       umeclidinium (INCRUSE ELLIPTA) 62.5 MCG/INH inhaler Inhale 1 puff into the lungs daily 30 each 1     vitamin C (ASCORBIC ACID) 1000 MG TABS Take 1 tablet (1,000 mg) by mouth daily 30 tablet 1     vitamin D3 (CHOLECALCIFEROL) 250 mcg (23524 units) capsule Take 1 capsule (250 mcg) by mouth daily 30 capsule 1         ROS:  10 point ROS neg other than the symptoms noted above in the HPI.    PHYSICAL EXAM:  BP (!) 140/68   Pulse 74   Temp 98.2  F (36.8  C)   Resp 18    "Ht 1.651 m (5' 5\")   Wt 65.5 kg (144 lb 4.8 oz)   SpO2 95%   BMI 24.01 kg/m     Gen: sitting up in bed, alert, cooperative and in no acute distress  HEENT: normocephalic; nasal cannula in on 3L  Card: RRR, S1, S2, no murmurs  Resp: lungs clear to auscultation bilaterally from anterior and lateral, no crackles or wheezes  GI: abdomen soft, not-tender  MSK: decreased muscle tone, s/p L BKA; RLE in padded boot with dressing c/d/i  Neuro: CX II-XII grossly in tact; ROM in all four extremities grossly in tact  Psych: alert and oriented to self and general situation; normal affect    LABORATORY/IMAGING DATA:  Reviewed as per Epic    ASSESSMENT/PLAN:    RLE Cellulitis  PAD s/p L BKA (2010)  Chronic LE Wounds  Peripheral Neuropathy  Chronic Pain Sydndrome  Completed course of Augmentin. Afebrile. Did not see wounds today as dressing was in place.   -- wound cares as ordered   -- follow up w/ Dr. Odonnell as scheduled on 2/24/21  -- analgesia with duloxetine 60 mg daily, gabapentin 300 mg BID, hydromorphone 1 mg q6h PRN and primidone 50 mg at bedtime    --  ASA 81 mg daily (currently on hold pending heme appt)    MICHAEL on CKD, Stage III  Baseline Cr low 1s. Cr 1.28 on 2/19.   -- avoid nephrotoxic meds  -- periodic BMP    COPD  Chronic Hypoxic Respiratory Failure (3L)  No signs of exacerbation. On baseline O2 today.   -- fluticasone-vilanterol 200-25 mcg daily, umeclidinium 62.5 mcg daily and albuterol MDI PRN      Adrenal Insufficiency  -- hydrocortisone 20 mg in the morning     HTN  SBPs 130s-140s  -- furosemide 20 mg daily, losartan 25 mg daily  -- follow BPs and adjust medications as needed    Cognitive Impairment   BIMS 9/15 last week. Concern for medication non compliance.   -- OT following for cog assessment    Hep C with Cirrhosis   Anemia  Thrombocytopenia  Chronic. Underlying liver disease/cirrhosis. Labs 2/19 with Hgb 7.2 and Plt 85.   -- FeSO4 325 mg every other day  -- ASA 81 mg is on hold pending heme appt   -- " [facility]    Psoriasis  -- Humira remains on hold given wounds, cellulitis    BPH  -- tamsulosin 0.8 mg daily     GERD  -- omeprazole 20 mg daily    Insomnia  -- melatonin 6 mg at bedtime      Physical Deconditioning  In setting of hospitalization and underlying medical conditions  -- ongoing PT/OT       Electronically signed by:  Donna Rodríguez MD

## 2021-02-23 ENCOUNTER — RECORDS - HEALTHEAST (OUTPATIENT)
Dept: LAB | Facility: CLINIC | Age: 71
End: 2021-02-23

## 2021-02-24 ENCOUNTER — HOSPITAL ENCOUNTER (OUTPATIENT)
Dept: WOUND CARE | Facility: CLINIC | Age: 71
Discharge: HOME | End: 2021-02-24
Attending: SURGERY | Admitting: SURGERY
Payer: MEDICARE

## 2021-02-24 VITALS — SYSTOLIC BLOOD PRESSURE: 128 MMHG | HEART RATE: 80 BPM | TEMPERATURE: 99.2 F | DIASTOLIC BLOOD PRESSURE: 62 MMHG

## 2021-02-24 DIAGNOSIS — L97.912 ULCER OF RIGHT LOWER EXTREMITY WITH FAT LAYER EXPOSED (H): ICD-10-CM

## 2021-02-24 PROCEDURE — 99213 OFFICE O/P EST LOW 20 MIN: CPT | Performed by: SURGERY

## 2021-02-24 PROCEDURE — 29581 APPL MULTLAYER CMPRN SYS LEG: CPT

## 2021-02-24 PROCEDURE — 17250 CHEM CAUT OF GRANLTJ TISSUE: CPT

## 2021-02-24 RX ORDER — SILVER 2" X 2"
BANDAGE TOPICAL
COMMUNITY
Start: 2021-02-20 | End: 2021-02-25

## 2021-02-24 RX ORDER — CHOLECALCIFEROL (VITAMIN D3) 125 MCG
CAPSULE ORAL
COMMUNITY
Start: 2021-02-16 | End: 2021-02-25

## 2021-02-24 NOTE — PROGRESS NOTES
[place] [facility]  [facility] Record Number:  [id number]  Place of Service where encounter took place:  [facility] (TCU) [[id number]]  Chief Complaint   Patient presents with     Nursing Home Acute       HPI:    Perfecto Reese  is a 70 year old (1950), who is being seen today for an episodic care visit.  HPI information obtained from: facility chart records, facility staff, patient report, [facility] chart review and Care Everywhere [facility] chart review. Today's concern is:     Physical deconditioning  Open wound of right lower extremity, subsequent encounter  MICHAEL (acute kidney injury) (H)  History of below knee amputation, left (H)  Stage 3 chronic kidney disease, unspecified whether stage 3a or 3b CKD  Chronic respiratory failure with hypoxia (H)  Thrombocytopenia (H)  Chronic adrenal insufficiency (H)  Hx of psoriasis  Hypertension, unspecified type  Severe malnutrition (H)  Insomnia, unspecified type  Generalized muscle weakness  History of pneumonia  Hx of abscess of lung  Musculoskeletal chest pain  Cellulitis of right lower extremity  Other pancytopenia (H)  Cirrhosis of liver without ascites, unspecified hepatic cirrhosis type (H)  PAD (peripheral artery disease) (H)  Chronic pain syndrome  Chronic obstructive pulmonary disease, unspecified COPD type (H)  Chronic hypoxemic respiratory failure (H)  Benign hypertensive kidney disease with chronic kidney disease stage I through stage IV, or unspecified  Cognitive impairment  Benign prostatic hyperplasia without lower urinary tract symptoms  Gastroesophageal reflux disease without esophagitis     Met with patient who denies any chest pain, palpitations, shortness of breath, ISIDRO, lightheadedness, dizziness, or cough. Denies any abdominal discomfort. Denies N&V. Denies B&B concerns. Denies dysuria or frequency. Denies loose or constipation. Appetite good. Sleeping well.  Patient often is found sleeping throughout the day and morning and  usually wakes up around 2pm. Per patient this is his usual routine. Medication administration times were adjusted due to patient fluctuation of sleep cycle schedule.     BP Readings from Last 3 Encounters:   02/23/21 135/76   02/24/21 128/62   02/22/21 (!) 140/68     Wt Readings from Last 5 Encounters:   02/16/21 65.5 kg (144 lb 4.8 oz)   02/22/21 65.5 kg (144 lb 4.8 oz)   02/16/21 66 kg (145 lb 6.4 oz)   02/15/21 63.8 kg (140 lb 10.5 oz)   01/28/21 64.4 kg (141 lb 14.4 oz)     Past Medical and Surgical History reviewed in Epic today.    MEDICATIONS:    Current Outpatient Medications   Medication Sig Dispense Refill     HYDROmorphone (DILAUDID) 2 MG tablet Take 0.5 tablets (1 mg) by mouth every 8 hours as needed for severe pain 30 tablet 0     albuterol (PROAIR HFA/PROVENTIL HFA/VENTOLIN HFA) 108 (90 Base) MCG/ACT inhaler Inhale 2 puffs into the lungs every 4 hours as needed for shortness of breath / dyspnea or wheezing       calcium citrate (CITRACAL) 950 MG tablet Take 4 tablets by mouth daily       clobetasol (TEMOVATE) 0.05 % external solution Apply topically At Bedtime Apply to scalp       cyanocobalamin (VITAMIN B-12) 1000 MCG tablet Take 1 tablet (1,000 mcg) by mouth daily 30 tablet 1     DULoxetine (CYMBALTA) 30 MG capsule Take 2 capsules (60 mg) by mouth daily 10 capsule 0     ferrous sulfate (FEROSUL) 325 (65 Fe) MG tablet Take 1 tablet (325 mg) by mouth every other day 90 tablet 1     fluticasone-vilanterol (BREO ELLIPTA) 200-25 MCG/INH inhaler Inhale 1 puff into the lungs daily 28 each 1     folic acid (FOLVITE) 1 MG tablet Take 1 tablet (1 mg) by mouth daily 30 tablet 1     furosemide (LASIX) 20 MG tablet Take 1 tablet (20 mg) by mouth daily       gabapentin (NEURONTIN) 300 MG capsule Take 1 capsule (300 mg) by mouth At Bedtime 60 capsule 1     hydrocortisone (CORTEF) 20 MG tablet Take 20 mg by mouth every morning       lidocaine (XYLOCAINE) 5 % external ointment Apply topically 3 times daily as needed  "for moderate pain (rib pain)       loperamide (IMODIUM) 2 MG capsule Take 1 capsule (2 mg) by mouth 4 times daily as needed for diarrhea 15 capsule 0     losartan (COZAAR) 25 MG tablet Take 1 tablet (25 mg) by mouth daily       multivitamin w/minerals (THERA-VIT-M) tablet Take 1 tablet by mouth daily 30 tablet 0     nicotine (NICORETTE) 2 MG gum Place 2 mg inside cheek as needed for smoking cessation       omeprazole (PRILOSEC) 20 MG DR capsule Take 20 mg by mouth daily       primidone (MYSOLINE) 50 MG tablet Take 50 mg by mouth At Bedtime       tamsulosin (FLOMAX) 0.4 MG capsule Take 0.8 mg by mouth daily       umeclidinium (INCRUSE ELLIPTA) 62.5 MCG/INH inhaler Inhale 1 puff into the lungs daily 30 each 1     vitamin C (ASCORBIC ACID) 1000 MG TABS Take 1 tablet (1,000 mg) by mouth daily 30 tablet 1     vitamin D3 (CHOLECALCIFEROL) 250 mcg (65369 units) capsule Take 1 capsule (250 mcg) by mouth daily 30 capsule 1     Wound Cleansers (VASHE WOUND THERAPY EX) USE TO SOAK GAUZE AND CLEAN WOUND FOR TWENTY MINUTES W/ DAILY WOUND CARE CHANGES.       REVIEW OF SYSTEMS:  10 point ROS of systems including Constitutional, Eyes, Respiratory, Cardiovascular, Gastroenterology, Genitourinary, Integumentary, Musculoskeletal, Psychiatric were all negative except for pertinent positives noted in my HPI.    Objective:  /76   Pulse 70   Temp 98  F (36.7  C)   Resp 18   Ht 1.651 m (5' 5\")   Wt 65.5 kg (144 lb 4.8 oz)   SpO2 95%   BMI 24.01 kg/m    Exam:  GENERAL APPEARANCE:  Alert, in no distress, cooperative  ENT:  Mouth and posterior oropharynx normal, moist mucous membranes, [place]  EYES:  EOM, conjunctivae, lids, pupils and irises normal  NECK:  No adenopathy,masses or thyromegaly  RESP:  respiratory effort and palpation of chest normal, no respiratory distress, crackles bibasilar, expiratory wheezes  CV:  Palpation and auscultation of heart done , regular rate and rhythm, no murmur, rub, or gallop, no edema, +2 pedal " pulses  ABDOMEN:  normal bowel sounds, soft, nontender, no hepatosplenomegaly or other masses, no guarding or rebound  M/S:   Pivot transfers into electronic wheelchair. Remains noncompliant with therapy goals  SKIN:  Inspection of skin and subcutaneous tissue baseline, see picture below  NEURO:   Cranial nerves 2-12 are normal tested and grossly at patient's baseline, no purposeful movement in upper and lower extremities  PSYCH:  oriented X 3, normal insight, judgement and memory, affect and mood normal              Labs:     Most Recent 3 CBC's:  Recent Labs   Lab Test 02/19/21 02/15/21  0852 02/14/21  1007 02/13/21  1212   WBC 4.1  --  3.5* 3.1*   HGB 7.2* 7.1* 7.7* 7.0*   MCV 91  --  93 92   PLT 86*  --  60* 53*     Most Recent 3 BMP's:  Recent Labs   Lab Test 02/19/21 02/14/21  1007 02/13/21  1212    141 142   POTASSIUM 3.9 4.0 4.0   CHLORIDE 106 116* 115*   CO2 25 21 18*   BUN 37* 29 29   CR 1.28 1.11 1.23   ANIONGAP 7 4 9   MARTINE 7.6* 8.3* 8.4*   GLC 79 111* 93     Most Recent 2 LFT's:  Recent Labs   Lab Test 02/09/21  0601 01/07/21   AST 23 27   ALT 21 24   ALKPHOS 79 91   BILITOTAL 0.5 0.4     Most Recent Cholesterol Panel:  Recent Labs   Lab Test 02/10/21  0838   CHOL 106   LDL 33   HDL 37*   TRIG 182*     Most Recent 6 Bacteria Isolates From Any Culture (See EPIC Reports for Culture Details):  Recent Labs   Lab Test 02/09/21  0602 12/03/20  0540 12/03/20  0230 12/02/20  1904 12/02/20  1844   CULT No growth  No growth Light growth  Normal adelina    Light growth  Proteus mirabilis  *  Moderate growth  Corynebacterium striatum  Identification obtained by MALDI-TOF mass spectrometry research use only database. Test   characteristics determined and verified by the Infectious Diseases Diagnostic Laboratory   ([facility]) [place], [place].  Susceptibility testing not routinely done  * <10,000 colonies/mL  mixed urogenital adelina  Susceptibility testing not routinely done   No growth No growth     Most Recent TSH  and T4:  Recent Labs   Lab Test 01/07/21   TSH 0.73     Most Recent Hemoglobin A1c:  Recent Labs   Lab Test 02/09/21  0702   A1C Canceled, Test credited     Most Recent Urinalysis:  Recent Labs   Lab Test 12/09/20  0945   COLOR Yellow   APPEARANCE Cloudy   URINEGLC Negative   URINEBILI Negative   URINEKETONE Negative   SG 1.015   UBLD Large*   URINEPH 5.5   PROTEIN 30*   NITRITE Negative   LEUKEST Large*   RBCU >182*   WBCU >182*     Most Recent Anemia Panel:  Recent Labs   Lab Test 02/19/21 12/04/20  1526 12/04/20  1526 12/04/20  1245   WBC 4.1   < > 3.2* 3.5*   HGB 7.2*   < > 6.4* 6.1*   HCT 21.3*   < > 19.1* 18.6*   MCV 91   < > 93 94   PLT 86*   < > 70* 74*   IRON  --   --   --  30*   IRONSAT  --   --   --  27   RETICABSCT  --   --  17.9*  --    RETP  --   --  0.9  --    FEB  --   --   --  112*   JÚNIOR  --   --   --  515*   B12  --   --   --  1,117*   FOLIC  --   --   --  6.7    < > = values in this interval not displayed.       ASSESSMENT/PLAN:  (R53.81) Physical deconditioning  (primary encounter diagnosis)  (M62.81) Generalized muscle weakness  Comment: Acute on chronic. S/T below diagnosis. Followed by [facility]. HISTORY of noncompliance.   Plan:   -Continue Physical therapy and Occupational therapy as directed  -SW to remain involved for safe discharge planning needs  -Would benefit from [facility]P setting, however patient declines at this time.      (J96.11) chronic respiratory failure with hypoxia and hypercapnia (H)  (Z87.01) History of pneumonia  (Z87.09) History of abscess of lung  (J44.9) COPD, severe (H)  (R07.89) Musculoskeletal chest pain  Comment: Acute on chronic. Previous hospital admission Patient was treated with IV then PO antibiotics, with the assistance of infectious disease. Initially on zosyn -> unasyn -> transitioned 12/8 to Augmentin x 6 weeks (through approximately 1/13/2021). - Infectious disease peripherally following. Completed antibiotics of augmentin on  1/13/21. Lungs appear congested today with audible crackles and wheeze present despite no shortness of breath complaints. Due to chronic history as above, will complete CXR today.   Plan:   -Continue oxygen as directed which is chronic at 3L via NC.   -CXR today. If positive will treat accordingly.   -Will start Z sudha as directed  -Will start augmentin as directed  -Will start prednisone 20mg daily for 5 days.   -Will restart mucinex 600mg BID  -Monitor respiratory status. May benefit from thoracentesis in future if warranted.   -Continue incentive spirometry and encourage every 4 hours while awake. Encourage use. Patient remains NONCOMPLIANT with this.   -Continue fluticasone-vilanterol (breo 200-25mcg inhaler 1 puff daily  -Continue incruse ellipta 62.5mcg inhaler 1 puff daily.   -Continue albuterol inhaler PRN  -BMP and CBC 3/3/21         (D63.8) Anemia of chronic disease  (D69.3) Chronic idiopathic thrombocytopenia (H)  Comment: Acute on chronic. Anemia and thrombocytopenia likely multifactorial from liver cirrhosis, splenomegaly, renal disease, CKD and psoriasis. Iron labs suggestive of anemia of chronic disease. 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 Patient required four units of pRBCs this admission for Hgb < 7.0. No acute GI bleed identified, likely blood loss from decreased erythropoiesis. Appreciate GI consult 12/16/2020, no acute GI bleed, no acute interventions, [facility]. Check hemoglobin periodically while inpatient - 7.0 12/28 - clinically stable and no bleeding noted. recheck hgb 7.5 12/29 and again 7.9 on 1/4/21. Hgb has been <7 since TCU admission. Discontinued aspirin due to risks. Occult stool was positive on 1/15/21. Patient is NOT interested in colonoscopy.   Plan:   -Follow up with GI at [facility] as directed.   -Recommend to follow up with  hematology at [facility] post TCU as directed  -Monitor for worsening s/sx of concerns  -Continue iron 325mg every other day as directed  -Continue vitamin C 1000mg daily to assist with iron absorption along with chronic wounds.   -Monitor for active s/sx of bleeding risks.   -BMP and CBC 3/3/21     (N18.30) Stage 3 chronic kidney disease, unspecified whether stage 3a or 3b CKD  (N17.9) MICHAEL  Comment: Acute on chronic. Baseline creatinine 1.2-1.3 in Jan 2020, on admission 2.49.  this has improved to baseline with hydration.   Plan:   -Follow up with nephrology at [facility] post TCU  -Monitor urinary status  -Continue current medications without change  -BMP and CBC 3/3/21     (E27.40) Chronic adrenal insufficiency (H)  Comment: Chronic. Stable.   Plan:   -Monitor for worsening s/sx of concerns  -Continue PTA hydrocortisone 20mg daily  -Follow up with PCP at [facility] post TCU  -BMP and CBC 3/3/21     (I10) Hypertension, unspecified type  Comment: Acute on chronic. Amlodipine decreased to 5mg daily due to soft blood pressures. Based on JNC-8 goals,  patients age of 70 year old, presence of diabetes or CKD, and goals of care goal BP is  <140/90 mm Hg. Patient is stable with current plan of care and routine assessment..  Plan:   -Continue losartan as directed  -Monitor BP and HR  -Follow up with PCP post TCU  -BMP and CBC 3/3/21     (Z87.2) Hx of psoriasis  (L29.9) Itching  Comment: Chronic. Holding PTA Humira this admission--continue to hold at discharge. Caused self inflicted trauma to wounds on RLE from back scratcher  Plan:   -Will monitor skin for ongoing concern   -PTA Humira held at discharge and not restarted upon discharge from TCU. Restart once current cellulitis has been appropriately treated  -Remove back scratcher so patient can not cause self trauma  -Continue Clobetasol Propionate Lotion 0.05 % to scalp at HS as  "directed  -Follow up with PCP at [facility] post TCU  -May recommend dermatology in future if warranted.   -Discontinue PRN atarax due to non use.   -BMP and CBC 3/3/21     (I73.9) PVD (peripheral vascular disease) (H)  (Z89.512) S/P below knee amputation, left (H)  (S86.089D) Open wound of right lower extremity, subsequent encounter  (L03.115) Cellulitis of RLE  Comment: Chronic. Reports worsening RLE wounds with increased erythema and pain since discharge from TCU. At TCU was receiving daily wound cares, but states since discharge, home cares \"fell through the cracks.\" WBC 15.1; vitals not suggestive of septic picture on admission. Lactic acid 1.1. vascular surgery evaluated, ZHOU completed, arterial insufficiency is not suspected, no surgical plans noted. treated with IV ancef initially and transition to Augment BID on 2/13. Completed antibiotic treatment as directed  Plan:   -Follow up with PCP post TCU  -Recommend to follow up with Wound clinic with Dr Odonnell weekly as directed. Next scheduled on 3/1/21.   -Monitor skin for worsening s/sx of concerns.   -Continue vitamin D to 10,000U daily for wound healing  -Continue folic acid 1000mcg daily for wound healing  -Continue vitamin B12 1000mcg daily for wound healing  -BMP and CBC 3/3/21  -Continue current wound care orders as directed:  *RLE  Wounds: Weekly and PRN   *Wound Care to Posterior right leg: Clean wound by wetting gauze and soaking with VASHE wounbd cleanser x 20 minutes, pat dry/clean. Apply moisturizing cream (critic aid)  to surrounding the wound (but not in the wound). Cover wound with plurogel and ioplex cut to fit the size of the wound. Apply edema wear followed by kerra max dressing. Change dressing weekly as directed. Remove compression dressing if toes turn blue and/or tingling cannot be relived by raising of leg for one hour.   -Rooke boot on at all times.   *Edema Wear stockings: Apply fresh pair daily- do not cut " or trim stockings. Remove and set aside stockings-Do Not Throw Away. Wash stockiings with soap and HOT water. If really soiled use surgical soap then regular soap. Hang dry. PATIENT IS NONCOMPLIANT WITH EDEMA-WEAR USE.     (G89.4) Chronic pain syndrome  (F11.20) Opioid dependence  Comment: Chronic. Stable. Patient found sleeping on/off throughout days/night. Suspect polypharmacy related.   Plan:   -Reduced gabapentin own to 300mg at HS.   -Continue duloxetine 60mg daily. (recently increased on 1/12/21 for pain complaints and also to assist with mood)  -Continue lidocaine topical as directed  -Change dilaudid 1mg every 8 hours PRN.   -Monitor pain complaints  -Follow up with PCP post TCU  -Would recommend pain clinic referral given history chronic history.   -BMP and CBC 3/3/21     (E43) Severe protein-calorie malnutrition (H)  Comment: Acute on chronic.   Plan:   -Monitor appetite  -Continue Ensure TID  -Dietician to remain involved  -BMP and CBC 3/3/21  -Continue vitamin D to 10,000 daily  -Continue folic acid daily  -Continue vitamin B12 daily     (G47.00) Insomnia  Comment: Acute on chronic. Patient wanting increase of sleeping agent. HISTORY of being on ambien in the past per [facility] records. This was discontinued.   Plan:  -Discontinue melatonin due to increased drowsiness throughout day/night.   -Monitor sleeping patterns. Patient currently is drowsy and falls asleep mid conversation.   -Would NOT recommend ambien given co-morbidities above.   -BMP and CBC 3/3/21     Orders written by provider at facility     Electronically signed by:  Lana Stevens DNP, APRN

## 2021-02-24 NOTE — DISCHARGE INSTRUCTIONS
[facility]  [address] [address]  Appointment Phone [phone]     Perfecto Reese      1950  Keep leg dry with use of a cast protector (available at [facility] or [facility] [email or web address])  Wound Dressing Change: Right Posterior Lower Leg  Cleanse wound with vashe  Skin Care: Apply moisturizing cream to skin surrounding the wound (but not in the wound):critic aid  Cover wound with plurogel and ioplex cut to fit the size of the wound   Apply Edemawear followed by maverick martinez dressing  Change dressing weekly.   Compression:   Your compression wrap is a 2 layer coflex wrap and should stay on until next week.     Please remove compression dressing if toes turn blue and/or tingle and can not be relieved by raising the leg for one hour.     Wear Rooke boot at all times. When you sit raise your ankle above your hips to promote wound healing.      MINERVA Odonnell M.D.. February 24, 2021  Call us at [phone] if you have any questions about your wounds, have redness or swelling around your wound, have a fever of 101 or greater or if you have any other problems or concerns. We answer the phone Monday through Friday 8 am to 4 pm, please leave a message as we check the voicemail frequently throughout the day.

## 2021-02-24 NOTE — PROGRESS NOTES
[facility] [place] Progress Note    Subject: Perfecto Reese returns from hospitalization for evaluation of ulcerations.  Medical record reviewed.  He has had a amputation of left lower extremity due to trauma related to a fall from a ladder.  Chronic right lower extremity ulceration.  Will obtain consult from [facility] in [place] for development of a left lower extremity prosthetic    Patient Active Problem List   Diagnosis     Acute renal injury (H)     Pneumonia of right lower lobe due to infectious organism     Diarrhea, unspecified type     Iron deficiency anemia, unspecified     Cervical spondylosis with myelopathy     Chronic obstructive pulmonary disease (H)     Chronic pain     Depressive disorder     Esophageal reflux     Hepatic cirrhosis (H)     Hepatitis C, chronic (H)     History of lower limb amputation (H)     Hyperlipidemia     Iatrogenic adrenal insufficiency (H)     Insomnia, unspecified     Low back pain     Malaise and fatigue     Migraine headache     Opioid dependence (H)     Other psoriasis     Pain in joint, ankle and foot     Personal history of tobacco use, presenting hazards to health     Phantom limb (H)     Shoulder pain     Thrombocytopenia (H)     MICHAEL (acute kidney injury) (H)     Cellulitis of right leg     Cellulitis of extremity     History of below knee amputation, left (H)     Chronic respiratory failure with hypoxia (H)     Severe malnutrition (H)     Other pancytopenia (H)     Past Medical History:   Diagnosis Date     Adrenal insufficiency (H)      Anemia of chronic disease      Anemia, iron deficiency      Back pain      Benign essential hypertension      Chronic respiratory failure with hypoxia, on home O2 therapy (H)     Secondary to COPD     CKD (chronic kidney disease), stage III      COPD (chronic obstructive pulmonary disease) (H)     dependent on 3 LPM via NC chronically     Depression      GERD (gastroesophageal reflux disease)      Hx of BKA,  left (H)      Hyperlipidemia      Migraine      Non compliance w medication regimen      Pain syndrome, chronic      Primary insomnia      Psoriasis     on Humira     PVD (peripheral vascular disease) (H)      Exam:  /62 (BP Location: Left arm)   Pulse 80   Temp 99.2  F (37.3  C)   Wound (used by OP [facility] only) 02/24/21 0802 Right (Active)   Dressing Appearance moist drainage 02/24/21 0700   Length (cm) 14.5 02/24/21 0700   Width (cm) 6.5 02/24/21 0700   Depth (cm) 0.4 02/24/21 0700   Wound (cm^2) 94.25 cm^2 02/24/21 0700   Wound Volume (cm^3) 37.7 cm^3 02/24/21 0700   Drainage Characteristics/Odor serosanguineous;creamy 02/24/21 0700   Drainage Amount large 02/24/21 0700     Uncontrolled interstitial edema, lymphatic dysfunction, ulceration of the posterior aspect of the right calf, see photodocumentation.  Reviewed arterial imaging previously performed, adequate arterial perfusion present.        Impression: Right posterior calf wound, uncontrolled interstitial edema, lymphedema, history of left below-knee amputation    Plan: See nursing documentation for wound care, initiate edema wear utilization 24 hours/day, 7 days/week to maximally manage lymphatic dysfunction and interstitial edema to maximize microvascular perfusion for wound healing.  Wound pH management with hypochlorous acid, adjunct of micronutrients.  Patient will return to the clinic in 4 weeks time    Ralph Odonnell MD on 2/24/2021 at 8:15 AM

## 2021-02-24 NOTE — PROGRESS NOTES
Patient arrived for wound care visit. Certified Wound Care Nurse time spent evaluating patient record, completed a full evaluation and documented wound(s) & araceli-wound skin; provided recommendation based on treatment plan. Applied dressing, reviewed discharge instructions, patient education, and discussed plan of care with appropriate medical team staff members and patient and/or family members.

## 2021-02-25 DIAGNOSIS — G47.00 INSOMNIA, UNSPECIFIED TYPE: ICD-10-CM

## 2021-02-25 DIAGNOSIS — R52 PAIN: ICD-10-CM

## 2021-02-25 DIAGNOSIS — J18.9 PNEUMONIA OF RIGHT LOWER LOBE DUE TO INFECTIOUS ORGANISM: ICD-10-CM

## 2021-02-25 RX ORDER — LANOLIN ALCOHOL/MO/W.PET/CERES
3 CREAM (GRAM) TOPICAL AT BEDTIME
Qty: 60 TABLET | Refills: 1 | Status: SHIPPED | OUTPATIENT
Start: 2021-02-25 | End: 2021-02-26

## 2021-02-25 RX ORDER — GABAPENTIN 300 MG/1
300 CAPSULE ORAL AT BEDTIME
Qty: 60 CAPSULE | Refills: 1 | Status: ON HOLD | OUTPATIENT
Start: 2021-02-25 | End: 2021-03-22

## 2021-02-26 ENCOUNTER — NURSING HOME VISIT (OUTPATIENT)
Dept: GERIATRICS | Facility: CLINIC | Age: 71
End: 2021-02-26
Payer: MEDICARE

## 2021-02-26 VITALS
HEART RATE: 70 BPM | HEIGHT: 65 IN | SYSTOLIC BLOOD PRESSURE: 135 MMHG | DIASTOLIC BLOOD PRESSURE: 76 MMHG | RESPIRATION RATE: 18 BRPM | WEIGHT: 144.3 LBS | BODY MASS INDEX: 24.04 KG/M2 | OXYGEN SATURATION: 95 % | TEMPERATURE: 98 F

## 2021-02-26 DIAGNOSIS — L03.115 CELLULITIS OF RIGHT LOWER EXTREMITY: ICD-10-CM

## 2021-02-26 DIAGNOSIS — G89.4 CHRONIC PAIN SYNDROME: ICD-10-CM

## 2021-02-26 DIAGNOSIS — R07.89 MUSCULOSKELETAL CHEST PAIN: ICD-10-CM

## 2021-02-26 DIAGNOSIS — N17.9 AKI (ACUTE KIDNEY INJURY) (H): ICD-10-CM

## 2021-02-26 DIAGNOSIS — Z87.09: ICD-10-CM

## 2021-02-26 DIAGNOSIS — K74.60 CIRRHOSIS OF LIVER WITHOUT ASCITES, UNSPECIFIED HEPATIC CIRRHOSIS TYPE (H): ICD-10-CM

## 2021-02-26 DIAGNOSIS — R53.81 PHYSICAL DECONDITIONING: Primary | ICD-10-CM

## 2021-02-26 DIAGNOSIS — D61.818 OTHER PANCYTOPENIA (H): ICD-10-CM

## 2021-02-26 DIAGNOSIS — Z87.01 HISTORY OF PNEUMONIA: ICD-10-CM

## 2021-02-26 DIAGNOSIS — Z89.512 HISTORY OF BELOW KNEE AMPUTATION, LEFT (H): ICD-10-CM

## 2021-02-26 DIAGNOSIS — J44.9 CHRONIC OBSTRUCTIVE PULMONARY DISEASE, UNSPECIFIED COPD TYPE (H): ICD-10-CM

## 2021-02-26 DIAGNOSIS — K21.9 GASTROESOPHAGEAL REFLUX DISEASE WITHOUT ESOPHAGITIS: ICD-10-CM

## 2021-02-26 DIAGNOSIS — D69.6 THROMBOCYTOPENIA (H): ICD-10-CM

## 2021-02-26 DIAGNOSIS — R41.89 COGNITIVE IMPAIRMENT: ICD-10-CM

## 2021-02-26 DIAGNOSIS — S81.801D OPEN WOUND OF RIGHT LOWER EXTREMITY, SUBSEQUENT ENCOUNTER: ICD-10-CM

## 2021-02-26 DIAGNOSIS — J96.11 CHRONIC HYPOXEMIC RESPIRATORY FAILURE (H): ICD-10-CM

## 2021-02-26 DIAGNOSIS — J96.11 CHRONIC RESPIRATORY FAILURE WITH HYPOXIA (H): ICD-10-CM

## 2021-02-26 DIAGNOSIS — E43 SEVERE MALNUTRITION (H): ICD-10-CM

## 2021-02-26 DIAGNOSIS — M62.81 GENERALIZED MUSCLE WEAKNESS: ICD-10-CM

## 2021-02-26 DIAGNOSIS — N40.0 BENIGN PROSTATIC HYPERPLASIA WITHOUT LOWER URINARY TRACT SYMPTOMS: ICD-10-CM

## 2021-02-26 DIAGNOSIS — N18.30 STAGE 3 CHRONIC KIDNEY DISEASE, UNSPECIFIED WHETHER STAGE 3A OR 3B CKD (H): ICD-10-CM

## 2021-02-26 DIAGNOSIS — I12.9 BENIGN HYPERTENSIVE KIDNEY DISEASE WITH CHRONIC KIDNEY DISEASE STAGE I THROUGH STAGE IV, OR UNSPECIFIED: ICD-10-CM

## 2021-02-26 DIAGNOSIS — I73.9 PAD (PERIPHERAL ARTERY DISEASE) (H): ICD-10-CM

## 2021-02-26 DIAGNOSIS — E27.40 CHRONIC ADRENAL INSUFFICIENCY (H): ICD-10-CM

## 2021-02-26 DIAGNOSIS — I10 HYPERTENSION, UNSPECIFIED TYPE: ICD-10-CM

## 2021-02-26 DIAGNOSIS — Z87.2 HX OF PSORIASIS: ICD-10-CM

## 2021-02-26 DIAGNOSIS — G47.00 INSOMNIA, UNSPECIFIED TYPE: ICD-10-CM

## 2021-02-26 PROCEDURE — 99309 SBSQ NF CARE MODERATE MDM 30: CPT | Performed by: NURSE PRACTITIONER

## 2021-02-26 RX ORDER — HYDROMORPHONE HYDROCHLORIDE 2 MG/1
1 TABLET ORAL EVERY 8 HOURS PRN
Qty: 30 TABLET | Refills: 0
Start: 2021-02-26 | End: 2021-02-26

## 2021-02-26 RX ORDER — PREDNISONE 20 MG/1
20 TABLET ORAL DAILY
Qty: 5 TABLET | Refills: 0 | Status: SHIPPED | OUTPATIENT
Start: 2021-02-26 | End: 2021-03-03

## 2021-02-26 RX ORDER — HYDROMORPHONE HYDROCHLORIDE 2 MG/1
1 TABLET ORAL EVERY 8 HOURS PRN
Qty: 30 TABLET | Refills: 0 | Status: ON HOLD | OUTPATIENT
Start: 2021-02-26 | End: 2021-03-22

## 2021-02-26 RX ORDER — GUAIFENESIN 600 MG/1
1200 TABLET, EXTENDED RELEASE ORAL 2 TIMES DAILY
Qty: 60 TABLET | Refills: 1 | Status: SHIPPED | OUTPATIENT
Start: 2021-02-26 | End: 2021-03-09

## 2021-02-26 RX ORDER — AZITHROMYCIN 250 MG/1
TABLET, FILM COATED ORAL
Qty: 6 TABLET | Refills: 0 | Status: SHIPPED | OUTPATIENT
Start: 2021-02-26 | End: 2021-03-03

## 2021-02-26 NOTE — LETTER
2/26/2021        RE: Perfecto Reese  [address]  [address]  [facility] [address]        [facility]  [facility] Record Number:  [id number]  Place of Service where encounter took place:  [facility] (TCU) [[id number]]  Chief Complaint   Patient presents with     Nursing Home Acute       HPI:    Perfecto Reese  is a 70 year old (1950), who is being seen today for an episodic care visit.  HPI information obtained from: facility chart records, facility staff, patient report, [facility] chart review and Care Everywhere [facility] chart review. Today's concern is:     Physical deconditioning  Open wound of right lower extremity, subsequent encounter  MICHAEL (acute kidney injury) (H)  History of below knee amputation, left (H)  Stage 3 chronic kidney disease, unspecified whether stage 3a or 3b CKD  Chronic respiratory failure with hypoxia (H)  Thrombocytopenia (H)  Chronic adrenal insufficiency (H)  Hx of psoriasis  Hypertension, unspecified type  Severe malnutrition (H)  Insomnia, unspecified type  Generalized muscle weakness  History of pneumonia  Hx of abscess of lung  Musculoskeletal chest pain  Cellulitis of right lower extremity  Other pancytopenia (H)  Cirrhosis of liver without ascites, unspecified hepatic cirrhosis type (H)  PAD (peripheral artery disease) (H)  Chronic pain syndrome  Chronic obstructive pulmonary disease, unspecified COPD type (H)  Chronic hypoxemic respiratory failure (H)  Benign hypertensive kidney disease with chronic kidney disease stage I through stage IV, or unspecified  Cognitive impairment  Benign prostatic hyperplasia without lower urinary tract symptoms  Gastroesophageal reflux disease without esophagitis     Met with patient who denies any chest pain, palpitations, shortness of breath, ISIDRO, lightheadedness, dizziness, or cough. Denies any abdominal discomfort. Denies N&V. Denies B&B concerns. Denies dysuria or frequency. Denies loose or  constipation. Appetite good. Sleeping well.  Patient often is found sleeping throughout the day and morning and usually wakes up around 2pm. Per patient this is his usual routine. Medication administration times were adjusted due to patient fluctuation of sleep cycle schedule.     BP Readings from Last 3 Encounters:   02/23/21 135/76   02/24/21 128/62   02/22/21 (!) 140/68     Wt Readings from Last 5 Encounters:   02/16/21 65.5 kg (144 lb 4.8 oz)   02/22/21 65.5 kg (144 lb 4.8 oz)   02/16/21 66 kg (145 lb 6.4 oz)   02/15/21 63.8 kg (140 lb 10.5 oz)   01/28/21 64.4 kg (141 lb 14.4 oz)     Past Medical and Surgical History reviewed in Epic today.    MEDICATIONS:    Current Outpatient Medications   Medication Sig Dispense Refill     HYDROmorphone (DILAUDID) 2 MG tablet Take 0.5 tablets (1 mg) by mouth every 8 hours as needed for severe pain 30 tablet 0     albuterol (PROAIR HFA/PROVENTIL HFA/VENTOLIN HFA) 108 (90 Base) MCG/ACT inhaler Inhale 2 puffs into the lungs every 4 hours as needed for shortness of breath / dyspnea or wheezing       calcium citrate (CITRACAL) 950 MG tablet Take 4 tablets by mouth daily       clobetasol (TEMOVATE) 0.05 % external solution Apply topically At Bedtime Apply to scalp       cyanocobalamin (VITAMIN B-12) 1000 MCG tablet Take 1 tablet (1,000 mcg) by mouth daily 30 tablet 1     DULoxetine (CYMBALTA) 30 MG capsule Take 2 capsules (60 mg) by mouth daily 10 capsule 0     ferrous sulfate (FEROSUL) 325 (65 Fe) MG tablet Take 1 tablet (325 mg) by mouth every other day 90 tablet 1     fluticasone-vilanterol (BREO ELLIPTA) 200-25 MCG/INH inhaler Inhale 1 puff into the lungs daily 28 each 1     folic acid (FOLVITE) 1 MG tablet Take 1 tablet (1 mg) by mouth daily 30 tablet 1     furosemide (LASIX) 20 MG tablet Take 1 tablet (20 mg) by mouth daily       gabapentin (NEURONTIN) 300 MG capsule Take 1 capsule (300 mg) by mouth At Bedtime 60 capsule 1     hydrocortisone (CORTEF) 20 MG tablet Take 20 mg  "by mouth every morning       lidocaine (XYLOCAINE) 5 % external ointment Apply topically 3 times daily as needed for moderate pain (rib pain)       loperamide (IMODIUM) 2 MG capsule Take 1 capsule (2 mg) by mouth 4 times daily as needed for diarrhea 15 capsule 0     losartan (COZAAR) 25 MG tablet Take 1 tablet (25 mg) by mouth daily       multivitamin w/minerals (THERA-VIT-M) tablet Take 1 tablet by mouth daily 30 tablet 0     nicotine (NICORETTE) 2 MG gum Place 2 mg inside cheek as needed for smoking cessation       omeprazole (PRILOSEC) 20 MG DR capsule Take 20 mg by mouth daily       primidone (MYSOLINE) 50 MG tablet Take 50 mg by mouth At Bedtime       tamsulosin (FLOMAX) 0.4 MG capsule Take 0.8 mg by mouth daily       umeclidinium (INCRUSE ELLIPTA) 62.5 MCG/INH inhaler Inhale 1 puff into the lungs daily 30 each 1     vitamin C (ASCORBIC ACID) 1000 MG TABS Take 1 tablet (1,000 mg) by mouth daily 30 tablet 1     vitamin D3 (CHOLECALCIFEROL) 250 mcg (37705 units) capsule Take 1 capsule (250 mcg) by mouth daily 30 capsule 1     Wound Cleansers (VASHE WOUND THERAPY EX) USE TO SOAK GAUZE AND CLEAN WOUND FOR TWENTY MINUTES W/ DAILY WOUND CARE CHANGES.       REVIEW OF SYSTEMS:  10 point ROS of systems including Constitutional, Eyes, Respiratory, Cardiovascular, Gastroenterology, Genitourinary, Integumentary, Musculoskeletal, Psychiatric were all negative except for pertinent positives noted in my HPI.    Objective:  /76   Pulse 70   Temp 98  F (36.7  C)   Resp 18   Ht 1.651 m (5' 5\")   Wt 65.5 kg (144 lb 4.8 oz)   SpO2 95%   BMI 24.01 kg/m    Exam:  GENERAL APPEARANCE:  Alert, in no distress, cooperative  ENT:  Mouth and posterior oropharynx normal, moist mucous membranes, [place]  EYES:  EOM, conjunctivae, lids, pupils and irises normal  NECK:  No adenopathy,masses or thyromegaly  RESP:  respiratory effort and palpation of chest normal, no respiratory distress, crackles bibasilar, expiratory wheezes  CV:  " Palpation and auscultation of heart done , regular rate and rhythm, no murmur, rub, or gallop, no edema, +2 pedal pulses  ABDOMEN:  normal bowel sounds, soft, nontender, no hepatosplenomegaly or other masses, no guarding or rebound  M/S:   Pivot transfers into electronic wheelchair. Remains noncompliant with therapy goals  SKIN:  Inspection of skin and subcutaneous tissue baseline, see picture below  NEURO:   Cranial nerves 2-12 are normal tested and grossly at patient's baseline, no purposeful movement in upper and lower extremities  PSYCH:  oriented X 3, normal insight, judgement and memory, affect and mood normal              Labs:     Most Recent 3 CBC's:  Recent Labs   Lab Test 02/19/21 02/15/21  0852 02/14/21  1007 02/13/21  1212   WBC 4.1  --  3.5* 3.1*   HGB 7.2* 7.1* 7.7* 7.0*   MCV 91  --  93 92   PLT 86*  --  60* 53*     Most Recent 3 BMP's:  Recent Labs   Lab Test 02/19/21 02/14/21  1007 02/13/21  1212    141 142   POTASSIUM 3.9 4.0 4.0   CHLORIDE 106 116* 115*   CO2 25 21 18*   BUN 37* 29 29   CR 1.28 1.11 1.23   ANIONGAP 7 4 9   MARTINE 7.6* 8.3* 8.4*   GLC 79 111* 93     Most Recent 2 LFT's:  Recent Labs   Lab Test 02/09/21  0601 01/07/21   AST 23 27   ALT 21 24   ALKPHOS 79 91   BILITOTAL 0.5 0.4     Most Recent Cholesterol Panel:  Recent Labs   Lab Test 02/10/21  0838   CHOL 106   LDL 33   HDL 37*   TRIG 182*     Most Recent 6 Bacteria Isolates From Any Culture (See EPIC Reports for Culture Details):  Recent Labs   Lab Test 02/09/21  0602 12/03/20  0540 12/03/20  0230 12/02/20  1904 12/02/20  1844   CULT No growth  No growth Light growth  Normal adelina    Light growth  Proteus mirabilis  *  Moderate growth  Corynebacterium striatum  Identification obtained by MALDI-TOF mass spectrometry research use only database. Test   characteristics determined and verified by the Infectious [facility] Diagnostic Laboratory   ([facility]) [place], [place].  Susceptibility testing not routinely done  * <10,000  colonies/mL  mixed urogenital adelina  Susceptibility testing not routinely done   No growth No growth     Most Recent TSH and T4:  Recent Labs   Lab Test 01/07/21   TSH 0.73     Most Recent Hemoglobin A1c:  Recent Labs   Lab Test 02/09/21  0702   A1C Canceled, Test credited     Most Recent Urinalysis:  Recent Labs   Lab Test 12/09/20  0945   COLOR Yellow   APPEARANCE Cloudy   URINEGLC Negative   URINEBILI Negative   URINEKETONE Negative   SG 1.015   UBLD Large*   URINEPH 5.5   PROTEIN 30*   NITRITE Negative   LEUKEST Large*   RBCU >182*   WBCU >182*     Most Recent Anemia Panel:  Recent Labs   Lab Test 02/19/21 12/04/20  1526 12/04/20  1526 12/04/20  1245   WBC 4.1   < > 3.2* 3.5*   HGB 7.2*   < > 6.4* 6.1*   HCT 21.3*   < > 19.1* 18.6*   MCV 91   < > 93 94   PLT 86*   < > 70* 74*   IRON  --   --   --  30*   IRONSAT  --   --   --  27   RETICABSCT  --   --  17.9*  --    RETP  --   --  0.9  --    FEB  --   --   --  112*   JÚNIOR  --   --   --  515*   B12  --   --   --  1,117*   FOLIC  --   --   --  6.7    < > = values in this interval not displayed.       ASSESSMENT/PLAN:  (R53.81) Physical deconditioning  (primary encounter diagnosis)  (M62.81) Generalized muscle weakness  Comment: Acute on chronic. S/T below diagnosis. Followed by [facility]. HISTORY of noncompliance.   Plan:   -Continue Physical therapy and Occupational therapy as directed  -SW to remain involved for safe discharge planning needs  -Would benefit from [facility] setting, however patient declines at this time.      (J96.11) chronic respiratory failure with hypoxia and hypercapnia (H)  (Z87.01) History of pneumonia  (Z87.09) History of abscess of lung  (J44.9) COPD, severe (H)  (R07.89) Musculoskeletal chest pain  Comment: Acute on chronic. Previous hospital admission Patient was treated with IV then PO antibiotics, with the assistance of infectious disease. Initially on zosyn -> unasyn -> transitioned 12/8 to Augmentin x 6 weeks  (through approximately 1/13/2021). - Infectious disease peripherally following. Completed antibiotics of augmentin on 1/13/21. Lungs appear congested today with audible crackles and wheeze present despite no shortness of breath complaints. Due to chronic history as above, will complete CXR today.   Plan:   -Continue oxygen as directed which is chronic at 3L via NC.   -CXR today. If positive will treat accordingly.   -Monitor respiratory status. May benefit from thoracentesis in future if warranted.   -Continue incentive spirometry and encourage every 4 hours while awake. Encourage use. Patient remains NONCOMPLIANT with this.   -Continue fluticasone-vilanterol (breo 200-25mcg inhaler 1 puff daily  -Continue incruse ellipta 62.5mcg inhaler 1 puff daily.   -Continue albuterol inhaler PRN  -BMP and CBC 3/3/21     (D63.8) Anemia of chronic disease  (D69.3) Chronic idiopathic thrombocytopenia (H)  Comment: Acute on chronic. Anemia and thrombocytopenia likely multifactorial from liver cirrhosis, splenomegaly, renal disease, CKD and psoriasis. Iron labs suggestive of anemia of chronic disease. 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 Patient required four units of pRBCs this admission for Hgb < 7.0. No acute GI bleed identified, likely blood loss from decreased erythropoiesis. Appreciate GI consult 12/16/2020, no acute GI bleed, no acute interventions, [facility]. Check hemoglobin periodically while inpatient - 7.0 12/28 - clinically stable and no bleeding noted. recheck hgb 7.5 12/29 and again 7.9 on 1/4/21. Hgb has been <7 since TCU admission. Discontinued aspirin due to risks. Occult stool was positive on 1/15/21. Patient is NOT interested in colonoscopy.   Plan:   -Follow up with GI at [facility] as directed.   -Recommend to follow up with hematology at [facility]  [facility] post TCU as directed  -Monitor for worsening s/sx of concerns  -Continue iron 325mg every other day as directed  -Continue vitamin C 1000mg daily to assist with iron absorption along with chronic wounds.   -Monitor for active s/sx of bleeding risks.   -BMP and CBC 3/3/21     (N18.30) Stage 3 chronic kidney disease, unspecified whether stage 3a or 3b CKD  (N17.9) MICHAEL  Comment: Acute on chronic. Baseline creatinine 1.2-1.3 in Jan 2020, on admission 2.49.  this has improved to baseline with hydration.   Plan:   -Follow up with nephrology at [facility] post TCU  -Monitor urinary status  -Continue current medications without change  -BMP and CBC 3/3/21     (E27.40) Chronic adrenal insufficiency (H)  Comment: Chronic. Stable.   Plan:   -Monitor for worsening s/sx of concerns  -Continue PTA hydrocortisone 20mg daily  -Follow up with PCP at [facility] post TCU  -BMP and CBC 3/3/21     (I10) Hypertension, unspecified type  Comment: Acute on chronic. Amlodipine decreased to 5mg daily due to soft blood pressures. Based on JNC-8 goals,  patients age of 70 year old, presence of diabetes or CKD, and goals of care goal BP is  <140/90 mm Hg. Patient is stable with current plan of care and routine assessment..  Plan:   -Continue losartan as directed  -Monitor BP and HR  -Follow up with PCP post TCU  -BMP and CBC 3/3/21     (Z87.2) Hx of psoriasis  (L29.9) Itching  Comment: Chronic. Holding PTA Humira this admission--continue to hold at discharge. Caused self inflicted trauma to wounds on RLE from back scratcher  Plan:   -Will monitor skin for ongoing concern   -PTA Humira held at discharge and not restarted upon discharge from TCU. Restart once current cellulitis has been appropriately treated  -Remove back scratcher so patient can not cause self trauma  -Continue Clobetasol Propionate Lotion 0.05 % to scalp at HS as directed  -Follow up with PCP  "at [facility] post TCU  -May recommend dermatology in future if warranted.   -Discontinue PRN atarax due to non use.   -BMP and CBC 3/3/21     (I73.9) PVD (peripheral vascular disease) (H)  (Z89.512) S/P below knee amputation, left (H)  (S81.318D) Open wound of right lower extremity, subsequent encounter  (L03.115) Cellulitis of RLE  Comment: Chronic. Reports worsening RLE wounds with increased erythema and pain since discharge from TCU. At TCU was receiving daily wound cares, but states since discharge, home cares \"fell through the cracks.\" WBC 15.1; vitals not suggestive of septic picture on admission. Lactic acid 1.1. vascular surgery evaluated, ZHOU completed, arterial insufficiency is not suspected, no surgical plans noted. treated with IV ancef initially and transition to Augment BID on 2/13. Completed antibiotic treatment as directed  Plan:   -Follow up with PCP post TCU  -Recommend to follow up with Wound clinic with Dr Odonnell weekly as directed. Next scheduled on 3/1/21.   -Monitor skin for worsening s/sx of concerns.   -Continue vitamin D to 10,000U daily for wound healing  -Continue folic acid 1000mcg daily for wound healing  -Continue vitamin B12 1000mcg daily for wound healing  -BMP and CBC 3/3/21  -Continue current wound care orders as directed:  *RLE  Wounds: Weekly and PRN   *Wound Care to Posterior right leg: Clean wound by wetting gauze and soaking with VASHE wounbd cleanser x 20 minutes, pat dry/clean. Apply moisturizing cream (critic aid)  to surrounding the wound (but not in the wound). Cover wound with plurogel and ioplex cut to fit the size of the wound. Apply edema wear followed by kerra max dressing. Change dressing weekly as directed. Remove compression dressing if toes turn blue and/or tingling cannot be relived by raising of leg for one hour.   -Rooke boot on at all times.   *Edema Wear stockings: Apply fresh pair daily- do not cut or trim stockings. Remove and " set aside stockings-Do Not Throw Away. Wash stockiings with soap and HOT water. If really soiled use surgical soap then regular soap. Hang dry. PATIENT IS NONCOMPLIANT WITH EDEMA-WEAR USE.     (G89.4) Chronic pain syndrome  (F11.20) Opioid dependence  Comment: Chronic. Stable. Patient found sleeping on/off throughout days/night. Suspect polypharmacy related.   Plan:   -Reduced gabapentin own to 300mg at HS.   -Continue duloxetine 60mg daily. (recently increased on 1/12/21 for pain complaints and also to assist with mood)  -Continue lidocaine topical as directed  -Change dilaudid 1mg every 8 hours PRN.   -Monitor pain complaints  -Follow up with PCP post TCU  -Would recommend pain clinic referral given history chronic history.   -BMP and CBC 3/3/21     (E43) Severe protein-calorie malnutrition (H)  Comment: Acute on chronic.   Plan:   -Monitor appetite  -Continue Ensure TID  -Dietician to remain involved  -BMP and CBC 3/3/21  -Continue vitamin D to 10,000 daily  -Continue folic acid daily  -Continue vitamin B12 daily     (G47.00) Insomnia  Comment: Acute on chronic. Patient wanting increase of sleeping agent. HISTORY of being on ambien in the past per [facility] records. This was discontinued.   Plan:  -Discontinue melatonin due to increased drowsiness throughout day/night.   -Monitor sleeping patterns. Patient currently is drowsy and falls asleep mid conversation.   -Would NOT recommend ambien given co-morbidities above.   -BMP and CBC 3/3/21     Orders written by provider at facility     Electronically signed by:  Lana Stevens DNP, APRN        Sincerely,        Lana Stevens, APRSANDRA CNP

## 2021-03-01 ENCOUNTER — HOSPITAL ENCOUNTER (OUTPATIENT)
Dept: WOUND CARE | Facility: CLINIC | Age: 71
Discharge: HOME | End: 2021-03-01
Attending: SURGERY | Admitting: SURGERY
Payer: MEDICARE

## 2021-03-01 ENCOUNTER — RECORDS - HEALTHEAST (OUTPATIENT)
Dept: LAB | Facility: CLINIC | Age: 71
End: 2021-03-01

## 2021-03-01 VITALS
DIASTOLIC BLOOD PRESSURE: 60 MMHG | RESPIRATION RATE: 16 BRPM | SYSTOLIC BLOOD PRESSURE: 139 MMHG | HEART RATE: 76 BPM | TEMPERATURE: 98.8 F

## 2021-03-01 DIAGNOSIS — L97.912 ULCER OF RIGHT LOWER EXTREMITY WITH FAT LAYER EXPOSED (H): ICD-10-CM

## 2021-03-01 PROCEDURE — 17250 CHEM CAUT OF GRANLTJ TISSUE: CPT

## 2021-03-01 RX ORDER — HYDROXYZINE HYDROCHLORIDE 25 MG/1
TABLET, FILM COATED ORAL
COMMUNITY
Start: 2021-02-21 | End: 2021-03-09

## 2021-03-01 RX ORDER — GUAIFENESIN AND DEXTROMETHORPHAN HBR 600; 30 MG/1; MG/1
TABLET, EXTENDED RELEASE ORAL
COMMUNITY
Start: 2021-02-16 | End: 2021-03-09

## 2021-03-01 RX ORDER — LANOLIN ALCOHOL/MO/W.PET/CERES
CREAM (GRAM) TOPICAL
COMMUNITY
Start: 2021-02-25 | End: 2021-03-09

## 2021-03-01 NOTE — PROGRESS NOTES
[facility] [place] Nurse Note    Subject: Perfecto Reese presents for nurse only visit for wound check and dressing change to right posterior lower leg ulcer. Copious drainage noted on previous dressing, wound bed hypergranular.       Exam:  /60 (BP Location: Left arm)   Pulse 76   Temp 98.8  F (37.1  C) (Temporal)   Resp 16   Wound (used by OP [facility] only) 02/24/21 0802 Right other (see comments) (Active)   Thickness/Stage full thickness 03/01/21 1003   Dressing Appearance copious drainage 03/01/21 0900   Base hypergranulation 03/01/21 1003   Periwound intact;swelling 03/01/21 1003   Periwound Temperature warm 03/01/21 1003   Length (cm) 16 03/01/21 0900   Width (cm) 6.5 03/01/21 0900   Depth (cm) 0.2 03/01/21 0900   Wound (cm^2) 104 cm^2 03/01/21 0900   Wound Volume (cm^3) 20.8 cm^3 03/01/21 0900   Wound healing % -10.34 03/01/21 0900   Drainage Characteristics/Odor creamy;serosanguineous 03/01/21 0900   Drainage Amount copious 03/01/21 1003   Care, Wound chemical cautery applied 03/01/21 1003     Procedure:  Topical anesthetic of 4% lidocaine was applied, chemical cautery applied with silver nitrate stick, no bleeding occurred. Patient tolerated procedure well. Wound redressed with IoPlex, Spandage #6, KerraMax and 2 layer Coflex wrap was applied.  Plan: Patient will return to the clinic in 1 weeks time.  Jovita Barriga, FAVIOLAN, RN-BC, CWCN  March 1, 2021

## 2021-03-02 ENCOUNTER — RECORDS - HEALTHEAST (OUTPATIENT)
Dept: LAB | Facility: CLINIC | Age: 71
End: 2021-03-02

## 2021-03-02 NOTE — PROGRESS NOTES
[facility]  [place] [facility] Record Number:  [id number]  Place of Service where encounter took place:  [facility] (TCU) [[id number]]  Chief Complaint   Patient presents with     Nursing Home Acute       HPI:    Perfecto Reese  is a 70 year old (1950), who is being seen today for an episodic care visit.  HPI information obtained from: facility chart records, facility staff, patient report, [facility] chart review and Care Everywhere [facility] chart review. Today's concern is:     Physical deconditioning  Open wound of right lower extremity, subsequent encounter  MICHAEL (acute kidney injury) (H)  History of below knee amputation, left (H)  Stage 3 chronic kidney disease, unspecified whether stage 3a or 3b CKD  Chronic respiratory failure with hypoxia (H)  Thrombocytopenia (H)  Chronic adrenal insufficiency (H)  Hx of psoriasis  Hypertension, unspecified type  Severe malnutrition (H)  Insomnia, unspecified type  Generalized muscle weakness  History of pneumonia  Hx of abscess of lung  Cellulitis of right lower extremity  Musculoskeletal chest pain  Other pancytopenia (H)  Cirrhosis of liver without ascites, unspecified hepatic cirrhosis type (H)  PAD (peripheral artery disease) (H)  Chronic pain syndrome  Chronic obstructive pulmonary disease, unspecified COPD type (H)  Chronic hypoxemic respiratory failure (H)  Benign hypertensive kidney disease with chronic kidney disease stage I through stage IV, or unspecified  Cognitive impairment  Benign prostatic hyperplasia without lower urinary tract symptoms  Gastroesophageal reflux disease without esophagitis  Pneumonia of right lower lobe due to infectious organism     Met with patient who denies any chest pain, palpitations, shortness of breath, ISIDRO, lightheadedness, dizziness, or cough. Denies any abdominal discomfort. Denies N&V. Denies B&B concerns. Denies dysuria or frequency. Denies loose or constipation. Appetite good. Sleeping well. Patient is no  longer on isolation needs and has been self transferring himself in/out electronic scooter without concerns so he can go outside to smoke. Limited/poor participation with therapies continues. Nursing denies any acute concerns except he continue to sleep throughout the day    BP Readings from Last 3 Encounters:   03/01/21 130/71   03/01/21 139/60   02/23/21 135/76     Wt Readings from Last 5 Encounters:   02/16/21 65.5 kg (144 lb 4.8 oz)   02/16/21 65.5 kg (144 lb 4.8 oz)   02/22/21 65.5 kg (144 lb 4.8 oz)   02/16/21 66 kg (145 lb 6.4 oz)   02/15/21 63.8 kg (140 lb 10.5 oz)     Past Medical and Surgical History reviewed in Epic today.    MEDICATIONS:    Current Outpatient Medications   Medication Sig Dispense Refill     albuterol (PROAIR HFA/PROVENTIL HFA/VENTOLIN HFA) 108 (90 Base) MCG/ACT inhaler Inhale 2 puffs into the lungs every 4 hours as needed for shortness of breath / dyspnea or wheezing       calcium citrate (CITRACAL) 950 MG tablet Take 4 tablets by mouth daily       clobetasol (TEMOVATE) 0.05 % external solution Apply topically At Bedtime Apply to scalp       cyanocobalamin (VITAMIN B-12) 1000 MCG tablet Take 1 tablet (1,000 mcg) by mouth daily 30 tablet 1     Dextromethorphan-guaiFENesin (MUCUS RELIEF DM)  MG TB12 TAKE 1 TAB BY MOUTH EVERY 12 HOURS       DULoxetine (CYMBALTA) 30 MG capsule Take 2 capsules (60 mg) by mouth daily 10 capsule 0     ferrous sulfate (FEROSUL) 325 (65 Fe) MG tablet Take 1 tablet (325 mg) by mouth every other day 90 tablet 1     fluticasone-vilanterol (BREO ELLIPTA) 200-25 MCG/INH inhaler Inhale 1 puff into the lungs daily 28 each 1     folic acid (FOLVITE) 1 MG tablet Take 1 tablet (1 mg) by mouth daily 30 tablet 1     furosemide (LASIX) 20 MG tablet Take 1 tablet (20 mg) by mouth daily       gabapentin (NEURONTIN) 300 MG capsule Take 1 capsule (300 mg) by mouth At Bedtime 60 capsule 1     guaiFENesin (MUCINEX) 600 MG 12 hr tablet Take 2 tablets (1,200 mg) by mouth 2  "times daily 60 tablet 1     hydrocortisone (CORTEF) 20 MG tablet Take 20 mg by mouth every morning       HYDROmorphone (DILAUDID) 2 MG tablet Take 0.5 tablets (1 mg) by mouth every 8 hours as needed for severe pain 30 tablet 0     hydrOXYzine (ATARAX) 25 MG tablet TAKE 1 TAB BY MOUTH EVERY 6 HOURS AS NEEDED FOR ITCHING       lidocaine (XYLOCAINE) 5 % external ointment Apply topically 3 times daily as needed for moderate pain (rib pain)       loperamide (IMODIUM) 2 MG capsule Take 1 capsule (2 mg) by mouth 4 times daily as needed for diarrhea 15 capsule 0     losartan (COZAAR) 25 MG tablet Take 1 tablet (25 mg) by mouth daily       melatonin 3 MG tablet TAKE 1 TABLET (3 MG) BY MOUTH AT BEDTIME       multivitamin w/minerals (THERA-VIT-M) tablet Take 1 tablet by mouth daily 30 tablet 0     nicotine (NICORETTE) 2 MG gum Place 2 mg inside cheek as needed for smoking cessation       omeprazole (PRILOSEC) 20 MG DR capsule Take 20 mg by mouth daily       primidone (MYSOLINE) 50 MG tablet Take 50 mg by mouth At Bedtime       tamsulosin (FLOMAX) 0.4 MG capsule Take 0.8 mg by mouth daily       umeclidinium (INCRUSE ELLIPTA) 62.5 MCG/INH inhaler Inhale 1 puff into the lungs daily 30 each 1     vitamin C (ASCORBIC ACID) 1000 MG TABS Take 1 tablet (1,000 mg) by mouth daily 30 tablet 1     vitamin D3 (CHOLECALCIFEROL) 250 mcg (00469 units) capsule Take 1 capsule (250 mcg) by mouth daily 30 capsule 1     Wound Cleansers (VASHE WOUND THERAPY EX) USE TO SOAK GAUZE AND CLEAN WOUND FOR TWENTY MINUTES W/ DAILY WOUND CARE CHANGES.       REVIEW OF SYSTEMS:  10 point ROS of systems including Constitutional, Eyes, Respiratory, Cardiovascular, Gastroenterology, Genitourinary, Integumentary, Musculoskeletal, Psychiatric were all negative except for pertinent positives noted in my HPI.    Objective:  /71   Pulse 75   Temp 97.6  F (36.4  C)   Resp 18   Ht 1.651 m (5' 5\")   Wt 65.5 kg (144 lb 4.8 oz)   SpO2 95%   BMI 24.01 kg/m  "   Exam:  GENERAL APPEARANCE:  Alert, in no distress, oriented, cooperative  ENT:  Mouth and posterior oropharynx normal, moist mucous membranes, [place]  EYES:  EOM, conjunctivae, lids, pupils and irises normal  NECK:  No adenopathy,masses or thyromegaly  RESP:  respiratory effort and palpation of chest normal, lungs clear to auscultation , no respiratory distress  CV:  Palpation and auscultation of heart done , regular rate and rhythm, no murmur, rub, or gallop  ABDOMEN:  normal bowel sounds, soft, nontender, no hepatosplenomegaly or other masses, no guarding or rebound  M/S:   Pivot transfers. Uses electronic scooter for all mobility needs  SKIN:  Inspection of skin and subcutaneous tissue baseline, see picture  NEURO:   Cranial nerves 2-12 are normal tested and grossly at patient's baseline, no purposeful movement in upper and lower extremities  PSYCH:  oriented X 3, normal insight, judgement and memory, affect and mood normal        Labs:     Most Recent 3 CBC's:  Recent Labs   Lab Test 03/03/21 02/19/21 02/15/21  0852 02/14/21  1007   WBC 5.7 4.1  --  3.5*   HGB 7.3* 7.2* 7.1* 7.7*   MCV 94 91  --  93   PLT 93* 86*  --  60*     Most Recent 3 BMP's:  Recent Labs   Lab Test 03/03/21 02/19/21 02/14/21  1007    138 141   POTASSIUM 6.1* 3.9 4.0   CHLORIDE 105 106 116*   CO2 25 25 21   BUN 43* 37* 29   CR 1.47* 1.28 1.11   ANIONGAP 7 7 4   MARTINE 8.1* 7.6* 8.3*   GLC 86 79 111*     Most Recent 2 LFT's:  Recent Labs   Lab Test 02/09/21  0601 01/07/21   AST 23 27   ALT 21 24   ALKPHOS 79 91   BILITOTAL 0.5 0.4     Most Recent TSH and T4:  Recent Labs   Lab Test 01/07/21   TSH 0.73     Most Recent Hemoglobin A1c:  Recent Labs   Lab Test 02/09/21  0702   A1C Canceled, Test credited     Most Recent Anemia Panel:  Recent Labs   Lab Test 03/03/21 12/04/20  1526 12/04/20  1526 12/04/20  1245   WBC 5.7   < > 3.2* 3.5*   HGB 7.3*   < > 6.4* 6.1*   HCT 23.0*   < > 19.1* 18.6*   MCV 94   < > 93 94   PLT 93*   < > 70* 74*   IRON   --   --   --  30*   IRONSAT  --   --   --  27   RETICABSCT  --   --  17.9*  --    RETP  --   --  0.9  --    FEB  --   --   --  112*   JÚNIOR  --   --   --  515*   B12  --   --   --  1,117*   FOLIC  --   --   --  6.7    < > = values in this interval not displayed.       ASSESSMENT/PLAN:  (R53.81) Physical deconditioning  (primary encounter diagnosis)  (M62.81) Generalized muscle weakness  Comment: Acute on chronic. S/T below diagnosis. Followed by [facility]. HISTORY of noncompliance.   Plan:   -Continue Physical therapy and Occupational therapy as directed  -SW to remain involved for safe discharge planning needs  -Would benefit from [facility] setting, however patient declines at this time.      (J96.11) chronic respiratory failure with hypoxia and hypercapnia (H)  (Z87.01) History of pneumonia  (Z87.09) History of abscess of lung  (J44.9) COPD, severe (H)  (R07.89) Musculoskeletal chest pain  Comment: Acute on chronic. Previous hospital admission Patient was treated with IV then PO antibiotics, with the assistance of infectious disease. Initially on zosyn -> unasyn -> transitioned 12/8 to Augmentin x 6 weeks (through approximately 1/13/2021). - Infectious disease peripherally following. Completed antibiotics of augmentin on 1/13/21.CXR noted pneumonia present. Antibiotics started. Lungs clear today.   Plan:   -Continue oxygen as directed which is chronic at 3L via NC.   -Continue mucinex 600mg BID  -Monitor respiratory status. May benefit from thoracentesis in future if warranted.   -Continue incentive spirometry and encourage every 4 hours while awake. Encourage use. Patient remains NONCOMPLIANT with this.   -Continue fluticasone-vilanterol (breo 200-25mcg inhaler 1 puff daily  -Continue incruse ellipta 62.5mcg inhaler 1 puff daily.   -Continue albuterol inhaler PRN  -BMP and CBC 3/5/21--results pending     (D63.8) Anemia of chronic disease  (D69.3) Chronic idiopathic thrombocytopenia  (H)  Comment: Acute on chronic. Anemia and thrombocytopenia likely multifactorial from liver cirrhosis, splenomegaly, renal disease, CKD and psoriasis. Iron labs suggestive of anemia of chronic disease. 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 Patient required four units of pRBCs this admission for Hgb < 7.0. No acute GI bleed identified, likely blood loss from decreased erythropoiesis. Appreciate GI consult 12/16/2020, no acute GI bleed, no acute interventions, [facility]. Check hemoglobin periodically while inpatient - 7.0 12/28 - clinically stable and no bleeding noted. recheck hgb 7.5 12/29 and again 7.9 on 1/4/21. Hgb has been <7 since TCU admission. Discontinued aspirin due to risks. Occult stool was positive on 1/15/21. Patient is NOT interested in colonoscopy.   Plan:   -Follow up with GI at [facility] as directed.   -Recommend to follow up with hematology at [facility] post TCU as directed  -Monitor for worsening s/sx of concerns  -Continue iron 325mg every other day as directed  -Continue vitamin C 1000mg daily to assist with iron absorption along with chronic wounds.   -Monitor for active s/sx of bleeding risks.   -BMP and CBC 3/5/21--results pending     (N18.30) Stage 3 chronic kidney disease, unspecified whether stage 3a or 3b CKD  (N17.9) MICHAEL  Comment: Acute on chronic. Baseline creatinine 1.2-1.3 in Jan 2020, on admission 2.49.  this has improved to baseline with hydration. Creatinine today~  Plan:   -Follow up with nephrology at [facility] post TCU  -Monitor urinary status  -Continue current medications without change  -BMP and CBC 3/5/21--results pending     (E27.40) Chronic adrenal insufficiency (H)  Comment: Chronic. Stable.   Plan:   -Monitor for worsening s/sx of concerns  -Continue PTA  "hydrocortisone 20mg daily  -Follow up with PCP at [facility] post TCU  -BMP and CBC 3/5/21--results pending     (I10) Hypertension, unspecified type  Comment: Acute on chronic. Amlodipine decreased to 5mg daily due to soft blood pressures. Based on JNC-8 goals,  patients age of 70 year old, presence of diabetes or CKD, and goals of care goal BP is  <140/90 mm Hg. Patient is stable with current plan of care and routine assessment.. Baseline creatinine~1.3  Plan:   -Continue losartan as directed  -Monitor BP and HR  -Follow up with PCP post TCU  -BMP and CBC 3/5/21--results pending     (Z87.2) Hx of psoriasis  (L29.9) Itching  Comment: Chronic. Holding PTA Humira this admission--continue to hold at discharge. Caused self inflicted trauma to wounds on RLE from back scratcher  Plan:   -Will monitor skin for ongoing concern   -PTA Humira held at discharge and not restarted upon discharge from TCU. Restart once current cellulitis has been appropriately treated  -Remove back scratcher so patient can not cause self trauma  -Continue Clobetasol Propionate Lotion 0.05 % to scalp at HS as directed  -Follow up with PCP at [facility] post TCU  -May recommend dermatology in future if warranted.   -BMP and CBC 3/5/21--results pending     (I73.9) PVD (peripheral vascular disease) (H)  (Z89.512) S/P below knee amputation, left (H)  (S81.801D) Open wound of right lower extremity, subsequent encounter  (L03.115) Cellulitis of RLE  Comment: Chronic. Reports worsening RLE wounds with increased erythema and pain since discharge from TCU. At TCU was receiving daily wound cares, but states since discharge, home cares \"fell through the cracks.\" WBC 15.1; vitals not suggestive of septic picture on admission. Lactic acid 1.1. vascular surgery evaluated, ZHOU completed, arterial insufficiency is not suspected, no surgical plans noted. treated with IV ancef initially and transition to " Augment BID on 2/13. Completed antibiotic treatment as directed  Plan:   -Follow up with PCP post TCU  -Recommend to follow up with Wound clinic with Dr Odonnell weekly as directed. Next scheduled on 3/8/21.   -Monitor skin for worsening s/sx of concerns.   -Continue vitamin D to 10,000U daily for wound healing  -Continue folic acid 1000mcg daily for wound healing  -Continue vitamin B12 1000mcg daily for wound healing  -BMP and CBC 3/5/21--results pending  -Continue current wound care orders as directed:  *RLE  Wounds: Weekly and PRN   *Wound Care to Posterior right leg: Clean wound by wetting gauze and soaking with VASHE wounbd cleanser x 20 minutes, pat dry/clean. Apply moisturizing cream (critic aid)  to surrounding the wound (but not in the wound). Cover wound with plurogel and ioplex cut to fit the size of the wound. Apply edema wear followed by kerra max dressing. Change dressing weekly as directed. Remove compression dressing if toes turn blue and/or tingling cannot be relived by raising of leg for one hour.   -Rooke boot on at all times.   *Edema Wear stockings: Apply fresh pair daily- do not cut or trim stockings. Remove and set aside stockings-Do Not Throw Away. Wash stockiings with soap and HOT water. If really soiled use surgical soap then regular soap. Hang dry. PATIENT IS NONCOMPLIANT WITH EDEMA-WEAR USE.      (G89.4) Chronic pain syndrome  (F11.20) Opioid dependence  Comment: Chronic. Stable. Patient found sleeping on/off throughout days/night. Suspect polypharmacy related.   Plan:   -Reduced gabapentin down to 300mg at HS.   -Continue duloxetine 60mg daily. (recently increased on 1/12/21 for pain complaints and also to assist with mood)  -Continue lidocaine topical as directed  -Change dilaudid 1mg every 8 hours PRN. Would strongly recommend to GDR in future with PCP post TCU.   -Monitor pain complaints  -Follow up with PCP post TCU  -Would recommend pain clinic referral given history chronic  history.   -BMP and CBC 3/5/21--results pending     (E43) Severe protein-calorie malnutrition (H)  Comment: Acute on chronic.   Plan:   -Monitor appetite  -Continue Ensure TID  -Dietician to remain involved  -BMP and CBC 3/5/21--results pending  -Continue vitamin D to 10,000 daily  -Continue folic acid daily  -Continue vitamin B12 daily     (G47.00) Insomnia  Comment: Acute on chronic. Patient wanting increase of sleeping agent. HISTORY of being on ambien in the past per [facility] records. This was discontinued.   Plan:  -Discontinue melatonin due to increased drowsiness throughout day/night.   -Monitor sleeping patterns. Patient currently is drowsy and falls asleep mid conversation.   -Would NOT recommend ambien given co-morbidities above.   -BMP and CBC 3/5/21--results pending     Orders written by provider at facility     Electronically signed by:  Lana Stevens DNP, APRN

## 2021-03-03 ENCOUNTER — TRANSFERRED RECORDS (OUTPATIENT)
Dept: HEALTH INFORMATION MANAGEMENT | Facility: CLINIC | Age: 71
End: 2021-03-03

## 2021-03-03 LAB
ANION GAP SERPL CALCULATED.3IONS-SCNC: 7 MMOL/L (ref 5–18)
ANION GAP SERPL CALCULATED.3IONS-SCNC: 7 MMOL/L (ref 5–18)
BASOPHILS # BLD AUTO: 0 THOU/UL (ref 0–0.2)
BASOPHILS NFR BLD AUTO: 0 % (ref 0–2)
BUN SERPL-MCNC: 43 MG/DL (ref 8–22)
BUN SERPL-MCNC: 43 MG/DL (ref 8–22)
CALCIUM SERPL-MCNC: 8.1 MG/DL (ref 8.5–10.5)
CALCIUM SERPL-MCNC: 8.1 MG/DL (ref 8.5–10.5)
CHLORIDE BLD-SCNC: 105 MMOL/L (ref 98–107)
CHLORIDE SERPLBLD-SCNC: 105 MMOL/L (ref 98–107)
CO2 SERPL-SCNC: 25 MMOL/L (ref 22–31)
CO2 SERPL-SCNC: 25 MMOL/L (ref 22–31)
CREAT SERPL-MCNC: 1.47 MG/DL (ref 0.7–1.3)
CREAT SERPL-MCNC: 1.47 MG/DL (ref 0.7–1.3)
DIFFERENTIAL: ABNORMAL
EOSINOPHIL # BLD AUTO: 0.8 THOU/UL (ref 0–0.4)
EOSINOPHIL NFR BLD AUTO: 14 % (ref 0–6)
ERYTHROCYTE [DISTWIDTH] IN BLOOD BY AUTOMATED COUNT: 14.9 % (ref 11–14.5)
ERYTHROCYTE [DISTWIDTH] IN BLOOD BY AUTOMATED COUNT: 14.9 % (ref 11–14.5)
GFR SERPL CREATININE-BSD FRML MDRD: 48 ML/MIN/1.73M2
GFR SERPL CREATININE-BSD FRML MDRD: 48 ML/MIN/1.73M2
GLUCOSE BLD-MCNC: 86 MG/DL (ref 70–125)
GLUCOSE SERPL-MCNC: 86 MG/DL (ref 70–125)
HCT VFR BLD AUTO: 23 % (ref 40–54)
HCT VFR BLD AUTO: 23 % (ref 40–54)
HEMOGLOBIN: 7.3 G/DL (ref 14–18)
HGB BLD-MCNC: 7.3 G/DL (ref 14–18)
IMM GRANULOCYTES # BLD: 0 THOU/UL
IMM GRANULOCYTES NFR BLD: 1 %
LYMPHOCYTES # BLD AUTO: 0.6 THOU/UL (ref 0.8–4.4)
LYMPHOCYTES NFR BLD AUTO: 10 % (ref 20–40)
MCH RBC QN AUTO: 29.8 PG (ref 27–34)
MCH RBC QN AUTO: 29.8 PG (ref 27–34)
MCHC RBC AUTO-ENTMCNC: 31.7 G/DL (ref 32–36)
MCHC RBC AUTO-ENTMCNC: 31.7 G/DL (ref 32–36)
MCV RBC AUTO: 94 FL (ref 80–100)
MCV RBC AUTO: 94 FL (ref 80–100)
MONOCYTES # BLD AUTO: 0.2 THOU/UL (ref 0–0.9)
MONOCYTES NFR BLD AUTO: 4 % (ref 2–10)
NEUTROPHILS # BLD AUTO: 4 THOU/UL (ref 2–7.7)
NEUTROPHILS NFR BLD AUTO: 71 % (ref 50–70)
PLATELET # BLD AUTO: 93 THOU/UL (ref 140–440)
PLATELET # BLD AUTO: 93 THOU/UL (ref 140–440)
PMV BLD AUTO: 9 FL (ref 8.5–12.5)
POTASSIUM BLD-SCNC: 5.1 MMOL/L (ref 3.5–5)
POTASSIUM SERPL-SCNC: 6.1 MMOL/L (ref 3.5–5)
RBC # BLD AUTO: 2.45 MILL/UL (ref 4.4–6.2)
RBC # BLD AUTO: 2.45 MILL/UL (ref 4.4–6.2)
SODIUM SERPL-SCNC: 137 MMOL/L (ref 136–145)
SODIUM SERPL-SCNC: 137 MMOL/L (ref 136–145)
WBC # BLD AUTO: 5.7 THOU/UL (ref 4–11)
WBC: 5.7 THOU/UL (ref 4–11)

## 2021-03-04 ENCOUNTER — RECORDS - HEALTHEAST (OUTPATIENT)
Dept: LAB | Facility: CLINIC | Age: 71
End: 2021-03-04

## 2021-03-04 PROBLEM — N18.30 CHRONIC KIDNEY DISEASE, STAGE 3 (H): Status: ACTIVE | Noted: 2021-03-04

## 2021-03-05 ENCOUNTER — TRANSFERRED RECORDS (OUTPATIENT)
Dept: HEALTH INFORMATION MANAGEMENT | Facility: CLINIC | Age: 71
End: 2021-03-05

## 2021-03-05 ENCOUNTER — NURSING HOME VISIT (OUTPATIENT)
Dept: GERIATRICS | Facility: CLINIC | Age: 71
End: 2021-03-05
Payer: MEDICARE

## 2021-03-05 VITALS
TEMPERATURE: 97.6 F | RESPIRATION RATE: 18 BRPM | SYSTOLIC BLOOD PRESSURE: 130 MMHG | HEART RATE: 75 BPM | BODY MASS INDEX: 24.04 KG/M2 | HEIGHT: 65 IN | DIASTOLIC BLOOD PRESSURE: 71 MMHG | OXYGEN SATURATION: 95 % | WEIGHT: 144.3 LBS

## 2021-03-05 DIAGNOSIS — E43 SEVERE MALNUTRITION (H): ICD-10-CM

## 2021-03-05 DIAGNOSIS — E27.40 CHRONIC ADRENAL INSUFFICIENCY (H): ICD-10-CM

## 2021-03-05 DIAGNOSIS — N40.0 BENIGN PROSTATIC HYPERPLASIA WITHOUT LOWER URINARY TRACT SYMPTOMS: ICD-10-CM

## 2021-03-05 DIAGNOSIS — I73.9 PAD (PERIPHERAL ARTERY DISEASE) (H): ICD-10-CM

## 2021-03-05 DIAGNOSIS — J96.11 CHRONIC RESPIRATORY FAILURE WITH HYPOXIA (H): ICD-10-CM

## 2021-03-05 DIAGNOSIS — I12.9 BENIGN HYPERTENSIVE KIDNEY DISEASE WITH CHRONIC KIDNEY DISEASE STAGE I THROUGH STAGE IV, OR UNSPECIFIED: ICD-10-CM

## 2021-03-05 DIAGNOSIS — R53.81 PHYSICAL DECONDITIONING: Primary | ICD-10-CM

## 2021-03-05 DIAGNOSIS — I10 HYPERTENSION, UNSPECIFIED TYPE: ICD-10-CM

## 2021-03-05 DIAGNOSIS — J96.11 CHRONIC HYPOXEMIC RESPIRATORY FAILURE (H): ICD-10-CM

## 2021-03-05 DIAGNOSIS — G47.00 INSOMNIA, UNSPECIFIED TYPE: ICD-10-CM

## 2021-03-05 DIAGNOSIS — R41.89 COGNITIVE IMPAIRMENT: ICD-10-CM

## 2021-03-05 DIAGNOSIS — N18.30 STAGE 3 CHRONIC KIDNEY DISEASE, UNSPECIFIED WHETHER STAGE 3A OR 3B CKD (H): ICD-10-CM

## 2021-03-05 DIAGNOSIS — Z87.01 HISTORY OF PNEUMONIA: ICD-10-CM

## 2021-03-05 DIAGNOSIS — S81.801D OPEN WOUND OF RIGHT LOWER EXTREMITY, SUBSEQUENT ENCOUNTER: ICD-10-CM

## 2021-03-05 DIAGNOSIS — J18.9 PNEUMONIA OF RIGHT LOWER LOBE DUE TO INFECTIOUS ORGANISM: ICD-10-CM

## 2021-03-05 DIAGNOSIS — D61.818 OTHER PANCYTOPENIA (H): ICD-10-CM

## 2021-03-05 DIAGNOSIS — J44.9 CHRONIC OBSTRUCTIVE PULMONARY DISEASE, UNSPECIFIED COPD TYPE (H): ICD-10-CM

## 2021-03-05 DIAGNOSIS — K74.60 CIRRHOSIS OF LIVER WITHOUT ASCITES, UNSPECIFIED HEPATIC CIRRHOSIS TYPE (H): ICD-10-CM

## 2021-03-05 DIAGNOSIS — Z89.512 HISTORY OF BELOW KNEE AMPUTATION, LEFT (H): ICD-10-CM

## 2021-03-05 DIAGNOSIS — Z87.09: ICD-10-CM

## 2021-03-05 DIAGNOSIS — L03.115 CELLULITIS OF RIGHT LOWER EXTREMITY: ICD-10-CM

## 2021-03-05 DIAGNOSIS — R07.89 MUSCULOSKELETAL CHEST PAIN: ICD-10-CM

## 2021-03-05 DIAGNOSIS — Z87.2 HX OF PSORIASIS: ICD-10-CM

## 2021-03-05 DIAGNOSIS — D69.6 THROMBOCYTOPENIA (H): ICD-10-CM

## 2021-03-05 DIAGNOSIS — K21.9 GASTROESOPHAGEAL REFLUX DISEASE WITHOUT ESOPHAGITIS: ICD-10-CM

## 2021-03-05 DIAGNOSIS — N17.9 AKI (ACUTE KIDNEY INJURY) (H): ICD-10-CM

## 2021-03-05 DIAGNOSIS — M62.81 GENERALIZED MUSCLE WEAKNESS: ICD-10-CM

## 2021-03-05 DIAGNOSIS — G89.4 CHRONIC PAIN SYNDROME: ICD-10-CM

## 2021-03-05 LAB
ANION GAP SERPL CALCULATED.3IONS-SCNC: 5 MMOL/L (ref 5–18)
ANION GAP SERPL CALCULATED.3IONS-SCNC: 5 MMOL/L (ref 5–18)
BUN SERPL-MCNC: 54 MG/DL (ref 8–22)
BUN SERPL-MCNC: 54 MG/DL (ref 8–22)
CALCIUM SERPL-MCNC: 8.2 MG/DL (ref 8.5–10.5)
CALCIUM SERPL-MCNC: 8.2 MG/DL (ref 8.5–10.5)
CHLORIDE BLD-SCNC: 109 MMOL/L (ref 98–107)
CHLORIDE SERPLBLD-SCNC: 109 MMOL/L (ref 98–107)
CO2 SERPL-SCNC: 28 MMOL/L (ref 22–31)
CO2 SERPL-SCNC: 28 MMOL/L (ref 22–31)
CREAT SERPL-MCNC: 1.54 MG/DL (ref 0.7–1.3)
CREAT SERPL-MCNC: 1.54 MG/DL (ref 0.7–1.3)
ERYTHROCYTE [DISTWIDTH] IN BLOOD BY AUTOMATED COUNT: 14.9 % (ref 11–14.5)
ERYTHROCYTE [DISTWIDTH] IN BLOOD BY AUTOMATED COUNT: 14.9 % (ref 11–14.5)
GFR SERPL CREATININE-BSD FRML MDRD: 45 ML/MIN/1.73M2
GFR SERPL CREATININE-BSD FRML MDRD: 45 ML/MIN/1.73M2
GLUCOSE BLD-MCNC: 86 MG/DL (ref 70–125)
GLUCOSE SERPL-MCNC: 86 MG/DL (ref 70–125)
HCT VFR BLD AUTO: 24.3 % (ref 40–54)
HCT VFR BLD AUTO: 24.3 % (ref 40–54)
HEMOGLOBIN: 7.7 G/DL (ref 14–18)
HGB BLD-MCNC: 7.7 G/DL (ref 14–18)
MCH RBC QN AUTO: 30.4 PG (ref 27–34)
MCH RBC QN AUTO: 30.4 PG (ref 27–34)
MCHC RBC AUTO-ENTMCNC: 31.7 G/DL (ref 32–36)
MCHC RBC AUTO-ENTMCNC: 31.7 G/DL (ref 32–36)
MCV RBC AUTO: 96 FL (ref 80–100)
MCV RBC AUTO: 96 FL (ref 80–100)
PLATELET # BLD AUTO: 97 THOU/UL (ref 140–440)
PLATELET # BLD AUTO: 97 THOU/UL (ref 140–440)
PMV BLD AUTO: 9.5 FL (ref 8.5–12.5)
POTASSIUM BLD-SCNC: 4.9 MMOL/L (ref 3.5–5)
POTASSIUM SERPL-SCNC: 4.9 MMOL/L (ref 3.5–5)
RBC # BLD AUTO: 2.53 MILL/UL (ref 4.4–6.2)
RBC # BLD AUTO: 2.63 MILL/UL (ref 4.4–6.2)
SODIUM SERPL-SCNC: 142 MMOL/L (ref 136–145)
SODIUM SERPL-SCNC: 142 MMOL/L (ref 136–145)
WBC # BLD AUTO: 5.3 THOU/UL (ref 4–11)
WBC: 5.3 THOU/UL (ref 4–11)

## 2021-03-05 PROCEDURE — 99309 SBSQ NF CARE MODERATE MDM 30: CPT | Performed by: NURSE PRACTITIONER

## 2021-03-05 NOTE — LETTER
3/5/2021        RE: Perfecto Reese  [address]  [address]  [facility] [address]        [facility]  [facility] Record Number:  [id number]  Place of Service where encounter took place:  [facility] (TCU) [[id number]]  Chief Complaint   Patient presents with     Nursing Home Acute       HPI:    Perfecto Reese  is a 70 year old (1950), who is being seen today for an episodic care visit.  HPI information obtained from: facility chart records, facility staff, patient report, [facility] chart review and Care Everywhere [facility] chart review. Today's concern is:     Physical deconditioning  Open wound of right lower extremity, subsequent encounter  MICHAEL (acute kidney injury) (H)  History of below knee amputation, left (H)  Stage 3 chronic kidney disease, unspecified whether stage 3a or 3b CKD  Chronic respiratory failure with hypoxia (H)  Thrombocytopenia (H)  Chronic adrenal insufficiency (H)  Hx of psoriasis  Hypertension, unspecified type  Severe malnutrition (H)  Insomnia, unspecified type  Generalized muscle weakness  History of pneumonia  Hx of abscess of lung  Cellulitis of right lower extremity  Musculoskeletal chest pain  Other pancytopenia (H)  Cirrhosis of liver without ascites, unspecified hepatic cirrhosis type (H)  PAD (peripheral artery disease) (H)  Chronic pain syndrome  Chronic obstructive pulmonary disease, unspecified COPD type (H)  Chronic hypoxemic respiratory failure (H)  Benign hypertensive kidney disease with chronic kidney disease stage I through stage IV, or unspecified  Cognitive impairment  Benign prostatic hyperplasia without lower urinary tract symptoms  Gastroesophageal reflux disease without esophagitis  Pneumonia of right lower lobe due to infectious organism     Met with patient who denies any chest pain, palpitations, shortness of breath, ISIDRO, lightheadedness, dizziness, or cough. Denies any abdominal discomfort. Denies N&V. Denies B&B  concerns. Denies dysuria or frequency. Denies loose or constipation. Appetite good. Sleeping well. Patient is no longer on isolation needs and has been self transferring himself in/out electronic scooter without concerns so he can go outside to smoke. Limited/poor participation with therapies continues. Nursing denies any acute concerns except he continue to sleep throughout the day    BP Readings from Last 3 Encounters:   03/01/21 130/71   03/01/21 139/60   02/23/21 135/76     Wt Readings from Last 5 Encounters:   02/16/21 65.5 kg (144 lb 4.8 oz)   02/16/21 65.5 kg (144 lb 4.8 oz)   02/22/21 65.5 kg (144 lb 4.8 oz)   02/16/21 66 kg (145 lb 6.4 oz)   02/15/21 63.8 kg (140 lb 10.5 oz)     Past Medical and Surgical History reviewed in Epic today.    MEDICATIONS:    Current Outpatient Medications   Medication Sig Dispense Refill     albuterol (PROAIR HFA/PROVENTIL HFA/VENTOLIN HFA) 108 (90 Base) MCG/ACT inhaler Inhale 2 puffs into the lungs every 4 hours as needed for shortness of breath / dyspnea or wheezing       calcium citrate (CITRACAL) 950 MG tablet Take 4 tablets by mouth daily       clobetasol (TEMOVATE) 0.05 % external solution Apply topically At Bedtime Apply to scalp       cyanocobalamin (VITAMIN B-12) 1000 MCG tablet Take 1 tablet (1,000 mcg) by mouth daily 30 tablet 1     Dextromethorphan-guaiFENesin (MUCUS RELIEF DM)  MG TB12 TAKE 1 TAB BY MOUTH EVERY 12 HOURS       DULoxetine (CYMBALTA) 30 MG capsule Take 2 capsules (60 mg) by mouth daily 10 capsule 0     ferrous sulfate (FEROSUL) 325 (65 Fe) MG tablet Take 1 tablet (325 mg) by mouth every other day 90 tablet 1     fluticasone-vilanterol (BREO ELLIPTA) 200-25 MCG/INH inhaler Inhale 1 puff into the lungs daily 28 each 1     folic acid (FOLVITE) 1 MG tablet Take 1 tablet (1 mg) by mouth daily 30 tablet 1     furosemide (LASIX) 20 MG tablet Take 1 tablet (20 mg) by mouth daily       gabapentin (NEURONTIN) 300 MG capsule Take 1 capsule (300 mg) by  mouth At Bedtime 60 capsule 1     guaiFENesin (MUCINEX) 600 MG 12 hr tablet Take 2 tablets (1,200 mg) by mouth 2 times daily 60 tablet 1     hydrocortisone (CORTEF) 20 MG tablet Take 20 mg by mouth every morning       HYDROmorphone (DILAUDID) 2 MG tablet Take 0.5 tablets (1 mg) by mouth every 8 hours as needed for severe pain 30 tablet 0     hydrOXYzine (ATARAX) 25 MG tablet TAKE 1 TAB BY MOUTH EVERY 6 HOURS AS NEEDED FOR ITCHING       lidocaine (XYLOCAINE) 5 % external ointment Apply topically 3 times daily as needed for moderate pain (rib pain)       loperamide (IMODIUM) 2 MG capsule Take 1 capsule (2 mg) by mouth 4 times daily as needed for diarrhea 15 capsule 0     losartan (COZAAR) 25 MG tablet Take 1 tablet (25 mg) by mouth daily       melatonin 3 MG tablet TAKE 1 TABLET (3 MG) BY MOUTH AT BEDTIME       multivitamin w/minerals (THERA-VIT-M) tablet Take 1 tablet by mouth daily 30 tablet 0     nicotine (NICORETTE) 2 MG gum Place 2 mg inside cheek as needed for smoking cessation       omeprazole (PRILOSEC) 20 MG DR capsule Take 20 mg by mouth daily       primidone (MYSOLINE) 50 MG tablet Take 50 mg by mouth At Bedtime       tamsulosin (FLOMAX) 0.4 MG capsule Take 0.8 mg by mouth daily       umeclidinium (INCRUSE ELLIPTA) 62.5 MCG/INH inhaler Inhale 1 puff into the lungs daily 30 each 1     vitamin C (ASCORBIC ACID) 1000 MG TABS Take 1 tablet (1,000 mg) by mouth daily 30 tablet 1     vitamin D3 (CHOLECALCIFEROL) 250 mcg (43452 units) capsule Take 1 capsule (250 mcg) by mouth daily 30 capsule 1     Wound Cleansers (VASHE WOUND THERAPY EX) USE TO SOAK GAUZE AND CLEAN WOUND FOR TWENTY MINUTES W/ DAILY WOUND CARE CHANGES.       REVIEW OF SYSTEMS:  10 point ROS of systems including Constitutional, Eyes, Respiratory, Cardiovascular, Gastroenterology, Genitourinary, Integumentary, Musculoskeletal, Psychiatric were all negative except for pertinent positives noted in my HPI.    Objective:  /71   Pulse 75   Temp  "97.6  F (36.4  C)   Resp 18   Ht 1.651 m (5' 5\")   Wt 65.5 kg (144 lb 4.8 oz)   SpO2 95%   BMI 24.01 kg/m    Exam:  GENERAL APPEARANCE:  Alert, in no distress, oriented, cooperative  ENT:  Mouth and posterior oropharynx normal, moist mucous membranes, [place]  EYES:  EOM, conjunctivae, lids, pupils and irises normal  NECK:  No adenopathy,masses or thyromegaly  RESP:  respiratory effort and palpation of chest normal, lungs clear to auscultation , no respiratory distress  CV:  Palpation and auscultation of heart done , regular rate and rhythm, no murmur, rub, or gallop  ABDOMEN:  normal bowel sounds, soft, nontender, no hepatosplenomegaly or other masses, no guarding or rebound  M/S:   Pivot transfers. Uses electronic scooter for all mobility needs  SKIN:  Inspection of skin and subcutaneous tissue baseline, see picture  NEURO:   Cranial nerves 2-12 are normal tested and grossly at patient's baseline, no purposeful movement in upper and lower extremities  PSYCH:  oriented X 3, normal insight, judgement and memory, affect and mood normal        Labs:     Most Recent 3 CBC's:  Recent Labs   Lab Test 03/03/21 02/19/21 02/15/21  0852 02/14/21  1007   WBC 5.7 4.1  --  3.5*   HGB 7.3* 7.2* 7.1* 7.7*   MCV 94 91  --  93   PLT 93* 86*  --  60*     Most Recent 3 BMP's:  Recent Labs   Lab Test 03/03/21 02/19/21 02/14/21  1007    138 141   POTASSIUM 6.1* 3.9 4.0   CHLORIDE 105 106 116*   CO2 25 25 21   BUN 43* 37* 29   CR 1.47* 1.28 1.11   ANIONGAP 7 7 4   MARTINE 8.1* 7.6* 8.3*   GLC 86 79 111*     Most Recent 2 LFT's:  Recent Labs   Lab Test 02/09/21  0601 01/07/21   AST 23 27   ALT 21 24   ALKPHOS 79 91   BILITOTAL 0.5 0.4     Most Recent TSH and T4:  Recent Labs   Lab Test 01/07/21   TSH 0.73     Most Recent Hemoglobin A1c:  Recent Labs   Lab Test 02/09/21  0702   A1C Canceled, Test credited     Most Recent Anemia Panel:  Recent Labs   Lab Test 03/03/21 12/04/20  1526 12/04/20  1526 12/04/20  1245   WBC 5.7   < > 3.2* " 3.5*   HGB 7.3*   < > 6.4* 6.1*   HCT 23.0*   < > 19.1* 18.6*   MCV 94   < > 93 94   PLT 93*   < > 70* 74*   IRON  --   --   --  30*   IRONSAT  --   --   --  27   RETICABSCT  --   --  17.9*  --    RETP  --   --  0.9  --    FEB  --   --   --  112*   JÚNIOR  --   --   --  515*   B12  --   --   --  1,117*   FOLIC  --   --   --  6.7    < > = values in this interval not displayed.       ASSESSMENT/PLAN:  (R53.81) Physical deconditioning  (primary encounter diagnosis)  (M62.81) Generalized muscle weakness  Comment: Acute on chronic. S/T below diagnosis. Followed by [facility]. HISTORY of noncompliance.   Plan:   -Continue Physical therapy and Occupational therapy as directed  -SW to remain involved for safe discharge planning needs  -Would benefit from senior care setting, however patient declines at this time.      (J96.11) chronic respiratory failure with hypoxia and hypercapnia (H)  (Z87.01) History of pneumonia  (Z87.09) History of abscess of lung  (J44.9) COPD, severe (H)  (R07.89) Musculoskeletal chest pain  Comment: Acute on chronic. Previous hospital admission Patient was treated with IV then PO antibiotics, with the assistance of infectious disease. Initially on zosyn -> unasyn -> transitioned 12/8 to Augmentin x 6 weeks (through approximately 1/13/2021). - Infectious disease peripherally following. Completed antibiotics of augmentin on 1/13/21.CXR noted pneumonia present. Antibiotics started. Lungs clear today.   Plan:   -Continue oxygen as directed which is chronic at 3L via NC.   -Continue mucinex 600mg BID  -Monitor respiratory status. May benefit from thoracentesis in future if warranted.   -Continue incentive spirometry and encourage every 4 hours while awake. Encourage use. Patient remains NONCOMPLIANT with this.   -Continue fluticasone-vilanterol (breo 200-25mcg inhaler 1 puff daily  -Continue incruse ellipta 62.5mcg inhaler 1 puff daily.   -Continue albuterol inhaler PRN  -BMP and CBC  3/5/21--results pending     (D63.8) Anemia of chronic disease  (D69.3) Chronic idiopathic thrombocytopenia (H)  Comment: Acute on chronic. Anemia and thrombocytopenia likely multifactorial from liver cirrhosis, splenomegaly, renal disease, CKD and psoriasis. Iron labs suggestive of anemia of chronic disease. 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 Patient required four units of pRBCs this admission for Hgb < 7.0. No acute GI bleed identified, likely blood loss from decreased erythropoiesis. Appreciate GI consult 12/16/2020, no acute GI bleed, no acute interventions, [facility]. Check hemoglobin periodically while inpatient - 7.0 12/28 - clinically stable and no bleeding noted. recheck hgb 7.5 12/29 and again 7.9 on 1/4/21. Hgb has been <7 since TCU admission. Discontinued aspirin due to risks. Occult stool was positive on 1/15/21. Patient is NOT interested in colonoscopy.   Plan:   -Follow up with GI at [facility] as directed.   -Recommend to follow up with hematology at [facility] post TCU as directed  -Monitor for worsening s/sx of concerns  -Continue iron 325mg every other day as directed  -Continue vitamin C 1000mg daily to assist with iron absorption along with chronic wounds.   -Monitor for active s/sx of bleeding risks.   -BMP and CBC 3/5/21--results pending     (N18.30) Stage 3 chronic kidney disease, unspecified whether stage 3a or 3b CKD  (N17.9) MICHAEL  Comment: Acute on chronic. Baseline creatinine 1.2-1.3 in Jan 2020, on admission 2.49.  this has improved to baseline with hydration. Creatinine today~  Plan:   -Follow up with nephrology at [facility] post TCU  -Monitor urinary status  -Continue current medications without change  -BMP and CBC 3/5/21--results pending     (E27.40) Chronic adrenal  "insufficiency (H)  Comment: Chronic. Stable.   Plan:   -Monitor for worsening s/sx of concerns  -Continue PTA hydrocortisone 20mg daily  -Follow up with PCP at [facility] post TCU  -BMP and CBC 3/5/21--results pending     (I10) Hypertension, unspecified type  Comment: Acute on chronic. Amlodipine decreased to 5mg daily due to soft blood pressures. Based on JNC-8 goals,  patients age of 70 year old, presence of diabetes or CKD, and goals of care goal BP is  <140/90 mm Hg. Patient is stable with current plan of care and routine assessment.. Baseline creatinine~1.3  Plan:   -Continue losartan as directed  -Monitor BP and HR  -Follow up with PCP post TCU  -BMP and CBC 3/5/21--results pending     (Z87.2) Hx of psoriasis  (L29.9) Itching  Comment: Chronic. Holding PTA Humira this admission--continue to hold at discharge. Caused self inflicted trauma to wounds on RLE from back scratcher  Plan:   -Will monitor skin for ongoing concern   -PTA Humira held at discharge and not restarted upon discharge from TCU. Restart once current cellulitis has been appropriately treated  -Remove back scratcher so patient can not cause self trauma  -Continue Clobetasol Propionate Lotion 0.05 % to scalp at HS as directed  -Follow up with PCP at [facility] post TCU  -May recommend dermatology in future if warranted.   -BMP and CBC 3/5/21--results pending     (I73.9) PVD (peripheral vascular disease) (H)  (Z89.512) S/P below knee amputation, left (H)  (S81.801D) Open wound of right lower extremity, subsequent encounter  (L03.115) Cellulitis of RLE  Comment: Chronic. Reports worsening RLE wounds with increased erythema and pain since discharge from TCU. At TCU was receiving daily wound cares, but states since discharge, home cares \"fell through the cracks.\" WBC 15.1; vitals not suggestive of septic picture on admission. Lactic acid 1.1. vascular surgery evaluated, ZHOU completed, arterial " insufficiency is not suspected, no surgical plans noted. treated with IV ancef initially and transition to Augment BID on 2/13. Completed antibiotic treatment as directed  Plan:   -Follow up with PCP post TCU  -Recommend to follow up with Wound clinic with Dr Odonnell weekly as directed. Next scheduled on 3/8/21.   -Monitor skin for worsening s/sx of concerns.   -Continue vitamin D to 10,000U daily for wound healing  -Continue folic acid 1000mcg daily for wound healing  -Continue vitamin B12 1000mcg daily for wound healing  -BMP and CBC 3/5/21--results pending  -Continue current wound care orders as directed:  *RLE  Wounds: Weekly and PRN   *Wound Care to Posterior right leg: Clean wound by wetting gauze and soaking with VASHE wounbd cleanser x 20 minutes, pat dry/clean. Apply moisturizing cream (critic aid)  to surrounding the wound (but not in the wound). Cover wound with plurogel and ioplex cut to fit the size of the wound. Apply edema wear followed by kerra max dressing. Change dressing weekly as directed. Remove compression dressing if toes turn blue and/or tingling cannot be relived by raising of leg for one hour.   -Rooke boot on at all times.   *Edema Wear stockings: Apply fresh pair daily- do not cut or trim stockings. Remove and set aside stockings-Do Not Throw Away. Wash stockiings with soap and HOT water. If really soiled use surgical soap then regular soap. Hang dry. PATIENT IS NONCOMPLIANT WITH EDEMA-WEAR USE.      (G89.4) Chronic pain syndrome  (F11.20) Opioid dependence  Comment: Chronic. Stable. Patient found sleeping on/off throughout days/night. Suspect polypharmacy related.   Plan:   -Reduced gabapentin down to 300mg at HS.   -Continue duloxetine 60mg daily. (recently increased on 1/12/21 for pain complaints and also to assist with mood)  -Continue lidocaine topical as directed  -Change dilaudid 1mg every 8 hours PRN. Would strongly recommend to GDR in future with PCP post TCU.   -Monitor pain  complaints  -Follow up with PCP post TCU  -Would recommend pain clinic referral given history chronic history.   -BMP and CBC 3/5/21--results pending     (E43) Severe protein-calorie malnutrition (H)  Comment: Acute on chronic.   Plan:   -Monitor appetite  -Continue Ensure TID  -Dietician to remain involved  -BMP and CBC 3/5/21--results pending  -Continue vitamin D to 10,000 daily  -Continue folic acid daily  -Continue vitamin B12 daily     (G47.00) Insomnia  Comment: Acute on chronic. Patient wanting increase of sleeping agent. HISTORY of being on ambien in the past per [facility] records. This was discontinued.   Plan:  -Discontinue melatonin due to increased drowsiness throughout day/night.   -Monitor sleeping patterns. Patient currently is drowsy and falls asleep mid conversation.   -Would NOT recommend ambien given co-morbidities above.   -BMP and CBC 3/5/21--results pending     Orders written by provider at facility     Electronically signed by:  Lana Stevens DNP, APRN        Sincerely,        ANTONIETA Valdovinos CNP

## 2021-03-08 ENCOUNTER — HOSPITAL ENCOUNTER (OUTPATIENT)
Dept: WOUND CARE | Facility: CLINIC | Age: 71
Discharge: HOME | End: 2021-03-08
Attending: SURGERY | Admitting: SURGERY
Payer: COMMERCIAL

## 2021-03-08 VITALS — DIASTOLIC BLOOD PRESSURE: 55 MMHG | SYSTOLIC BLOOD PRESSURE: 133 MMHG | TEMPERATURE: 98.6 F | HEART RATE: 74 BPM

## 2021-03-08 DIAGNOSIS — L97.912 ULCER OF RIGHT LOWER EXTREMITY WITH FAT LAYER EXPOSED (H): ICD-10-CM

## 2021-03-08 PROCEDURE — 97597 DBRDMT OPN WND 1ST 20 CM/<: CPT

## 2021-03-08 NOTE — PROGRESS NOTES
[place] [facility] DISCHARGE SUMMARY  PATIENT'S NAME: Perfecto Reese  YOB: 1950  MEDICAL RECORD NUMBER:  [id number]  Place of Service where encounter took place:  [facility] (TCU) [[id number]]    PRIMARY CARE PROVIDER AND CLINIC RESPONSIBLE AFTER TRANSFER:   Polo Neil MD, [facility] ADMIN [facility] [address] / [facility] Provider     Transferring providers: ANTONIETA Valdovinos CNP, Dr. Donna Rodríguez MD  Recent Hospitalization/ED:  [facility] stay 2/9/21 to 2/15/21.  Date of SNF Admission: February/15/2021  Date of SNF (anticipated) Discharge: March/11/2021  Discharged to: previous independent home  Cognitive Scores: BIMS: 9/15  Physical Function: Refuses all therapies while on TCU. however patient is found self transferring appropriately into electric scooter when he wants to go smoke on unit without concerns.   DME: SNF  coordinating DME needs     CODE STATUS/ADVANCE DIRECTIVES DISCUSSION:  Full Code   ALLERGIES: Darvocet [propoxyphene n-apap] and Vicodin [hydrocodone-acetaminophen]    DISCHARGE DIAGNOSIS/NURSING FACILITY COURSE:     ASSESSMENT/PLAN:  (R53.81) Physical deconditioning  (primary encounter diagnosis)  (M62.81) Generalized muscle weakness  Comment: Acute on chronic. S/T below diagnosis. Followed by [facility]. HISTORY of noncompliance.   Plan:   -Continue Physical therapy and Occupational therapy as directed  -SW to remain involved for safe discharge planning needs  -Would benefit from [facility] setting, however patient declines at this time.      (J96.11) chronic respiratory failure with hypoxia and hypercapnia (H)  (Z87.01) History of pneumonia  (Z87.09) History of abscess of lung  (J44.9) COPD, severe (H)  (R07.89) Musculoskeletal chest pain  Comment: Acute on chronic. Previous hospital admission Patient was treated with IV then PO antibiotics, with the assistance of infectious disease. Initially  on zosyn -> unasyn -> transitioned 12/8 to Augmentin x 6 weeks (through approximately 1/13/2021). - Infectious disease peripherally following. Completed antibiotics of augmentin on 1/13/21.CXR noted pneumonia present. Antibiotics started. Lungs clear today.   Plan:   -Continue oxygen as directed which is chronic at 3L via NC.   -Continue mucinex 600mg BID  -Monitor respiratory status. May benefit from thoracentesis in future if warranted.   -Continue incentive spirometry and encourage every 4 hours while awake. Encourage use. Patient remains NONCOMPLIANT with this.   -Continue fluticasone-vilanterol (breo 200-25mcg inhaler 1 puff daily  -Continue incruse ellipta 62.5mcg inhaler 1 puff daily.   -Continue albuterol inhaler PRN  -Trend labs with PCP post TCU     (D63.8) Anemia of chronic disease  (D69.3) Chronic idiopathic thrombocytopenia (H)  Comment: Acute on chronic. Anemia and thrombocytopenia likely multifactorial from liver cirrhosis, splenomegaly, renal disease, CKD and psoriasis. Iron labs suggestive of anemia of chronic disease. 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 Patient required four units of pRBCs this admission for Hgb < 7.0. No acute GI bleed identified, likely blood loss from decreased erythropoiesis. Appreciate GI consult 12/16/2020, no acute GI bleed, no acute interventions, [facility]. Check hemoglobin periodically while inpatient - 7.0 12/28 - clinically stable and no bleeding noted. recheck hgb 7.5 12/29 and again 7.9 on 1/4/21. Hgb has been <7 since TCU admission. Discontinued aspirin due to risks. Occult stool was positive on 1/15/21. Patient is NOT interested in colonoscopy.   Plan:   -Follow up with GI at [facility] as directed.   -Recommend to follow up with hematology at [facility] post TCU as directed  -Monitor  for worsening s/sx of concerns  -Continue iron 325mg every other day as directed  -Continue vitamin C 1000mg daily to assist with iron absorption along with chronic wounds.   -Monitor for active s/sx of bleeding risks.   -Trend labs with PCP post TCU     (N18.30) Stage 3 chronic kidney disease, unspecified whether stage 3a or 3b CKD  (N17.9) MICHAEL  Comment: Acute on chronic. Baseline creatinine 1.2-1.3 in Jan 2020, on admission 2.49.  this has improved to baseline with hydration. Creatinine today~  Plan:   -Follow up with nephrology at [facility] post TCU  -Monitor urinary status  -Continue current medications without change  -Trend labs with PCP post TCU     (E27.40) Chronic adrenal insufficiency (H)  Comment: Chronic. Stable.   Plan:   -Monitor for worsening s/sx of concerns  -Continue PTA hydrocortisone 20mg daily  -Follow up with PCP at [facility] post TCU  -Trend labs with PCP post TCU     (I10) Hypertension, unspecified type  Comment: Acute on chronic. Amlodipine decreased to 5mg daily due to soft blood pressures. Based on JNC-8 goals,  patients age of 70 year old, presence of diabetes or CKD, and goals of care goal BP is  <140/90 mm Hg. Patient is stable with current plan of care and routine assessment.. Baseline creatinine~1.3  Plan:   -Continue losartan as directed  -Monitor BP and HR  -Follow up with PCP post TCU  -Trend labs with PCP post TCU     (Z87.2) Hx of psoriasis  (L29.9) Itching  Comment: Chronic. Holding PTA Humira this admission--continue to hold at discharge. Caused self inflicted trauma to wounds on RLE from back scratcher  Plan:   -Will monitor skin for ongoing concern   -PTA Humira held at discharge and not restarted upon discharge from TCU. Restart once current cellulitis has been appropriately treated  -Remove back scratcher so patient can not cause self trauma  -Continue Clobetasol Propionate Lotion 0.05 % to scalp at HS as  "directed  -Follow up with PCP at [facility] post TCU  -May recommend dermatology in future if warranted.   -Trend labs with PCP post TCU     (I73.9) PVD (peripheral vascular disease) (H)  (Z89.512) S/P below knee amputation, left (H)  (S81.801D) Open wound of right lower extremity, subsequent encounter  (L03.115) Cellulitis of RLE  Comment: Chronic. Reports worsening RLE wounds with increased erythema and pain since discharge from TCU. At TCU was receiving daily wound cares, but states since discharge, home cares \"fell through the cracks.\" WBC 15.1; vitals not suggestive of septic picture on admission. Lactic acid 1.1. vascular surgery evaluated, ZHOU completed, arterial insufficiency is not suspected, no surgical plans noted. treated with IV ancef initially and transition to Augment BID on 2/13. Completed antibiotic treatment as directed  Plan:   -Follow up with PCP post TCU  -Recommend to follow up with Wound clinic with Dr Odonnell weekly as directed. Next scheduled on 3/15/21.   -Monitor skin for worsening s/sx of concerns.   -Continue vitamin D to 10,000U daily for wound healing  -Continue folic acid 1000mcg daily for wound healing  -Continue vitamin B12 1000mcg daily for wound healing  -Trend labs with PCP post TCU  -Continue current wound care orders as directed:  *RLE  Wounds: Weekly and PRN   *Wound Care to Posterior right leg: Clean wound by wetting gauze and soaking with VASHE wounbd cleanser x 20 minutes, pat dry/clean. Apply moisturizing cream (critic aid)  to surrounding the wound (but not in the wound). Cover wound with plurogel and ioplex cut to fit the size of the wound. Apply edema wear followed by kerra max dressing. Change dressing weekly as directed. Remove compression dressing if toes turn blue and/or tingling cannot be relived by raising of leg for one hour.   -Rooke boot on at all times.   *Edema Wear stockings: Apply fresh pair daily- do not cut or trim stockings. " Remove and set aside stockings-Do Not Throw Away. Wash stockiings with soap and HOT water. If really soiled use surgical soap then regular soap. Hang dry. PATIENT IS NONCOMPLIANT WITH EDEMA-WEAR USE.      (G89.4) Chronic pain syndrome  (F11.20) Opioid dependence  Comment: Chronic. Stable. Patient found sleeping on/off throughout days/night. Suspect polypharmacy related.   Plan:   -Reduced gabapentin down to 300mg at HS.   -Continue duloxetine 60mg daily. (recently increased on 1/12/21 for pain complaints and also to assist with mood)  -Continue lidocaine topical as directed  -Change dilaudid 1mg every 8 hours PRN. Would strongly recommend to GDR in future with PCP post TCU.   -Monitor pain complaints  -Follow up with PCP post TCU  -Would recommend pain clinic referral given history chronic history.   -Trend labs with PCP post TCU     (E43) Severe protein-calorie malnutrition (H)  Comment: Acute on chronic.   Plan:   -Monitor appetite  -Continue Ensure TID  -Dietician to remain involved  -Trend labs with PCP post TCU  -Continue vitamin D to 10,000 daily  -Continue folic acid daily  -Continue vitamin B12 daily     (G47.00) Insomnia  Comment: Acute on chronic. Patient wanting increase of sleeping agent. HISTORY of being on ambien in the past per [facility] records. This was discontinued.   Plan:  -Discontinue melatonin due to increased drowsiness throughout day/night.   -Monitor sleeping patterns. Patient currently is drowsy and falls asleep mid conversation.   -Would NOT recommend ambien given co-morbidities above.   -Trend labs with PCP post TCU    Past Medical History:  has a past medical history of Adrenal insufficiency (H), Anemia of chronic disease, Anemia, iron deficiency, Back pain, Benign essential hypertension, Chronic respiratory failure with hypoxia, on home O2 therapy (H), CKD (chronic kidney disease), stage III, COPD (chronic obstructive pulmonary disease) (H), Depression, GERD  (gastroesophageal reflux disease), BKA, left (H), Hyperlipidemia, Migraine, Non compliance w medication regimen, Pain syndrome, chronic, Primary insomnia, Psoriasis, and PVD (peripheral vascular disease) (H).    Discharge Medications:    Current Outpatient Medications   Medication Sig Dispense Refill     albuterol (PROAIR HFA/PROVENTIL HFA/VENTOLIN HFA) 108 (90 Base) MCG/ACT inhaler Inhale 2 puffs into the lungs every 4 hours as needed for shortness of breath / dyspnea or wheezing       calcium citrate (CITRACAL) 950 MG tablet Take 4 tablets by mouth daily       clobetasol (TEMOVATE) 0.05 % external solution Apply topically At Bedtime Apply to scalp       cyanocobalamin (VITAMIN B-12) 1000 MCG tablet Take 1 tablet (1,000 mcg) by mouth daily 30 tablet 1     DULoxetine (CYMBALTA) 30 MG capsule Take 2 capsules (60 mg) by mouth daily 10 capsule 0     ferrous sulfate (FEROSUL) 325 (65 Fe) MG tablet Take 1 tablet (325 mg) by mouth every other day 90 tablet 1     fluticasone-vilanterol (BREO ELLIPTA) 200-25 MCG/INH inhaler Inhale 1 puff into the lungs daily 28 each 1     folic acid (FOLVITE) 1 MG tablet Take 1 tablet (1 mg) by mouth daily 30 tablet 1     furosemide (LASIX) 20 MG tablet Take 1 tablet (20 mg) by mouth daily       gabapentin (NEURONTIN) 300 MG capsule Take 1 capsule (300 mg) by mouth At Bedtime 60 capsule 1     guaiFENesin (MUCINEX) 600 MG 12 hr tablet Take 600 mg by mouth 2 times daily For pneumonia       hydrocortisone (CORTEF) 20 MG tablet Take 20 mg by mouth every morning       HYDROmorphone (DILAUDID) 2 MG tablet Take 0.5 tablets (1 mg) by mouth every 8 hours as needed for severe pain 30 tablet 0     lidocaine (XYLOCAINE) 5 % external ointment Apply topically 3 times daily as needed for moderate pain (rib pain)       loperamide (IMODIUM) 2 MG capsule Take 1 capsule (2 mg) by mouth 4 times daily as needed for diarrhea 15 capsule 0     losartan (COZAAR) 25 MG tablet Take 1 tablet (25 mg) by mouth daily    "    multivitamin w/minerals (THERA-VIT-M) tablet Take 1 tablet by mouth daily 30 tablet 0     nicotine (NICORETTE) 2 MG gum Place 2 mg inside cheek as needed for smoking cessation       omeprazole (PRILOSEC) 20 MG DR capsule Take 20 mg by mouth daily       primidone (MYSOLINE) 50 MG tablet Take 50 mg by mouth At Bedtime       tamsulosin (FLOMAX) 0.4 MG capsule Take 0.8 mg by mouth daily       umeclidinium (INCRUSE ELLIPTA) 62.5 MCG/INH inhaler Inhale 1 puff into the lungs daily 30 each 1     vitamin C (ASCORBIC ACID) 1000 MG TABS Take 1 tablet (1,000 mg) by mouth daily 30 tablet 1     vitamin D3 (CHOLECALCIFEROL) 250 mcg (29606 units) capsule Take 1 capsule (250 mcg) by mouth daily 30 capsule 1     Dextromethorphan-guaiFENesin (MUCUS RELIEF DM)  MG TB12 TAKE 1 TAB BY MOUTH EVERY 12 HOURS       guaiFENesin (MUCINEX) 600 MG 12 hr tablet Take 2 tablets (1,200 mg) by mouth 2 times daily (Patient not taking: Reported on 3/9/2021) 60 tablet 1     hydrOXYzine (ATARAX) 25 MG tablet TAKE 1 TAB BY MOUTH EVERY 6 HOURS AS NEEDED FOR ITCHING       melatonin 3 MG tablet TAKE 1 TABLET (3 MG) BY MOUTH AT BEDTIME       Wound Cleansers (VASHE WOUND THERAPY EX) USE TO SOAK GAUZE AND CLEAN WOUND FOR TWENTY MINUTES W/ DAILY WOUND CARE CHANGES.       Medication Changes/Rationale:     See above    Controlled medications sent with patient:   Medication: Dilaudid , 30 tabs given to patient at the time of discharge to take home     ROS:   10 point ROS of systems including Constitutional, Eyes, Respiratory, Cardiovascular, Gastroenterology, Genitourinary, Integumentary, Musculoskeletal, Psychiatric were all negative except for pertinent positives noted in my HPI.    Physical Exam:   Vitals: BP (!) 140/70   Pulse 88   Temp 97.7  F (36.5  C)   Resp 18   Ht 1.651 m (5' 5\")   Wt 65.5 kg (144 lb 4.8 oz)   SpO2 94%   BMI 24.01 kg/m    BMI= Body mass index is 24.01 kg/m .  GENERAL APPEARANCE:  Alert, in no distress, oriented, " cooperative  ENT:  Mouth and posterior oropharynx normal, moist mucous membranes, [place]  EYES:  EOM, conjunctivae, lids, pupils and irises normal  RESP:  respiratory effort and palpation of chest normal, lungs clear to auscultation , no respiratory distress  CV:  Palpation and auscultation of heart done , regular rate and rhythm, no murmur, rub, or gallop, no edema  ABDOMEN:  normal bowel sounds, soft, nontender, no hepatosplenomegaly or other masses, no guarding or rebound  M/S:   Wheelchair bound. ROM intact  SKIN:  Inspection of skin and subcutaneous tissue baseline, see picture below  NEURO:   Cranial nerves 2-12 are normal tested and grossly at patient's baseline, no purposeful movement in upper and lower extremities  PSYCH:  oriented X 3, normal insight, judgement and memory, affect and mood normal, Continues to make poor choices for himself         SNF labs:   Most Recent 3 CBC's:  Recent Labs   Lab Test 03/05/21 03/03/21 02/19/21   WBC 5.3 5.7 4.1   HGB 7.7* 7.3* 7.2*   MCV 96 94 91   PLT 97* 93* 86*     Most Recent 3 BMP's:  Recent Labs   Lab Test 03/05/21 03/03/21 02/19/21    137 138   POTASSIUM 4.9 6.1* 3.9   CHLORIDE 109* 105 106   CO2 28 25 25   BUN 54* 43* 37*   CR 1.54* 1.47* 1.28   ANIONGAP 5 7 7   MARTINE 8.2* 8.1* 7.6*   GLC 86 86 79     Most Recent 2 LFT's:  Recent Labs   Lab Test 02/09/21  0601 01/07/21   AST 23 27   ALT 21 24   ALKPHOS 79 91   BILITOTAL 0.5 0.4     Most Recent Anemia Panel:  Recent Labs   Lab Test 03/05/21 12/04/20  1526 12/04/20  1526 12/04/20  1245   WBC 5.3   < > 3.2* 3.5*   HGB 7.7*   < > 6.4* 6.1*   HCT 24.3*   < > 19.1* 18.6*   MCV 96   < > 93 94   PLT 97*   < > 70* 74*   IRON  --   --   --  30*   IRONSAT  --   --   --  27   RETICABSCT  --   --  17.9*  --    RETP  --   --  0.9  --    FEB  --   --   --  112*   JÚNIOR  --   --   --  515*   B12  --   --   --  1,117*   FOLIC  --   --   --  6.7    < > = values in this interval not displayed.     DISCHARGE PLAN:    Follow up labs:  No labs orders/due    Medical Follow Up:      Follow up with primary care provider in 1 weeks    MTM referral needed and placed by this provider: No    Current [place] scheduled appointments:  Next 5 appointments (look out 90 days)    Mar 15, 2021  1:10 PM  Return Visit with  WOUND HEALING NURSE  [facility] ([facility] ) [address]  [address]  [facility] [id number] [phone]   Mar 22, 2021  2:40 PM  Return Visit with Ralph Odonnell MD  [facility] ([facility] ) [address]  [address]  [facility] [id number] [phone]       Discharge Services: Home Care:  Occupational Therapy, Physical Therapy, Registered Nurse, Home Health Aide,  and From:  Home Health [facility]    Discharge Instructions Verbalized to Patient at Discharge:     Weight bearing restrictions:  Weight bearing as tolerated.     Continue to follow your diet:  regular.     Boost nutritional supplement recommended 3 times a day.     Weigh yourself daily in the morning and keep a record. Call your primary clinic: a) if you are more short of breath, or b) if your weight changes more than 3 pounds in one day or more than 5 pounds in one week.     Oxygen at 3 liters/minute via nasal cannula. Chronic use    Driving is not recommended.     24-hour supervision is recommended for safety.     Wound dressing change: Right Posterior Lower leg: Cleanse wound with Vashe. Skin care: Apply moisturizing cream to skin surrounding the wound ( but not in the wound): critic Aid. Cover wound with plurogel and ioplex cut to fit the size of the wound . Apply Edemawear followed by kerra max dressing. Keep leg dry with use of a cast protector. Your compression wrap is a 2 layer coflex wrap and should stay on until next week. Please remove compression dressing if toes turn blue and or tingle and can not be relieves by raising the leg for one hour.      wear Rooke boot at all times. when you sit raise your ankle above your hip to promote wound healing       TOTAL DISCHARGE TIME:   Greater than 30 minutes  Electronically signed by:  Lana Stevens DNP, APRN      Documentation of Face to Face and Certification for Home Health Services    I certify that patient: Perfecto Reese is under my care and that I, or a nurse practitioner or physician's assistant working with me, had a face-to-face encounter that meets the physician face-to-face encounter requirements with this patient on: 3/9/2021.    This encounter with the patient was in whole, or in part, for the following medical condition, which is the primary reason for home health care: Deconditioning S/T above diagnosis.    I certify that, based on my findings, the following services are medically necessary home health services: Nursing, Occupational Therapy, Physical Therapy and Social Work.    My clinical findings support the need for the above services because: Nurse is needed: To assess medication management, wound cares, education after changes in medications or other medical regimen.., Occupational Therapy Services are needed to assess and treat cognitive ability and address ADL safety due to impairment in deconditioning. and Physical Therapy Services are needed to assess and treat the following functional impairments: deconditioning.    Further, I certify that my clinical findings support that this patient is homebound (i.e. absences from home require considerable and taxing effort and are for medical reasons or Tenri[facility] services or infrequently or of short duration when for other reasons) because: Requires assistance of another person or specialized equipment to access medical services because patient: Is unable to walk greater than 100 feet without rest...    Based on the above findings. I certify that this patient is confined to the home and needs intermittent skilled nursing care, physical therapy  and/or speech therapy.  The patient is under my care, and I have initiated the establishment of the plan of care.  This patient will be followed by a physician who will periodically review the plan of care.  Physician/Provider to provide follow up care: Polo Neil    Attending [facility] physician (the Medicare certified [facility] provider): Dr.Sara Marcos MD, signing F2F and only signing for initial order. Please send all follow up questions and concerns or needed follow up signatures to the PCP, who [place] has on file as:  Polo Neil.    Physician Signature: See electronic signature associated with these discharge orders.  Date: 3/9/2021

## 2021-03-09 ENCOUNTER — DISCHARGE SUMMARY NURSING HOME (OUTPATIENT)
Dept: GERIATRICS | Facility: CLINIC | Age: 71
End: 2021-03-09
Payer: MEDICARE

## 2021-03-09 VITALS
DIASTOLIC BLOOD PRESSURE: 70 MMHG | RESPIRATION RATE: 18 BRPM | HEIGHT: 65 IN | SYSTOLIC BLOOD PRESSURE: 140 MMHG | OXYGEN SATURATION: 94 % | WEIGHT: 144.3 LBS | HEART RATE: 88 BPM | TEMPERATURE: 97.7 F | BODY MASS INDEX: 24.04 KG/M2

## 2021-03-09 DIAGNOSIS — J96.11 CHRONIC RESPIRATORY FAILURE WITH HYPOXIA (H): ICD-10-CM

## 2021-03-09 DIAGNOSIS — L29.9 ITCHING: ICD-10-CM

## 2021-03-09 DIAGNOSIS — S81.801D OPEN WOUND OF RIGHT LOWER EXTREMITY, SUBSEQUENT ENCOUNTER: ICD-10-CM

## 2021-03-09 DIAGNOSIS — Z87.2 HX OF PSORIASIS: ICD-10-CM

## 2021-03-09 DIAGNOSIS — G89.4 CHRONIC PAIN SYNDROME: ICD-10-CM

## 2021-03-09 DIAGNOSIS — D69.3 CHRONIC IDIOPATHIC THROMBOCYTOPENIA (H): ICD-10-CM

## 2021-03-09 DIAGNOSIS — G47.00 INSOMNIA, UNSPECIFIED TYPE: ICD-10-CM

## 2021-03-09 DIAGNOSIS — J44.9 CHRONIC OBSTRUCTIVE PULMONARY DISEASE, UNSPECIFIED COPD TYPE (H): ICD-10-CM

## 2021-03-09 DIAGNOSIS — I10 HYPERTENSION, UNSPECIFIED TYPE: ICD-10-CM

## 2021-03-09 DIAGNOSIS — Z87.01 HISTORY OF PNEUMONIA: ICD-10-CM

## 2021-03-09 DIAGNOSIS — R53.81 PHYSICAL DECONDITIONING: Primary | ICD-10-CM

## 2021-03-09 DIAGNOSIS — E27.40 CHRONIC ADRENAL INSUFFICIENCY (H): ICD-10-CM

## 2021-03-09 DIAGNOSIS — L03.115 CELLULITIS OF RIGHT LOWER EXTREMITY: ICD-10-CM

## 2021-03-09 DIAGNOSIS — F11.20 OPIOID DEPENDENCE (H): ICD-10-CM

## 2021-03-09 DIAGNOSIS — N17.9 AKI (ACUTE KIDNEY INJURY) (H): ICD-10-CM

## 2021-03-09 DIAGNOSIS — D63.8 ANEMIA OF CHRONIC DISEASE: ICD-10-CM

## 2021-03-09 DIAGNOSIS — Z87.09: ICD-10-CM

## 2021-03-09 DIAGNOSIS — M62.81 GENERALIZED MUSCLE WEAKNESS: ICD-10-CM

## 2021-03-09 DIAGNOSIS — E43 SEVERE MALNUTRITION (H): ICD-10-CM

## 2021-03-09 DIAGNOSIS — I73.9 PVD (PERIPHERAL VASCULAR DISEASE) (H): ICD-10-CM

## 2021-03-09 DIAGNOSIS — Z89.512 S/P BELOW KNEE AMPUTATION, LEFT (H): ICD-10-CM

## 2021-03-09 DIAGNOSIS — R07.89 MUSCULOSKELETAL CHEST PAIN: ICD-10-CM

## 2021-03-09 DIAGNOSIS — N18.30 STAGE 3 CHRONIC KIDNEY DISEASE, UNSPECIFIED WHETHER STAGE 3A OR 3B CKD (H): ICD-10-CM

## 2021-03-09 PROCEDURE — 99316 NF DSCHRG MGMT 30 MIN+: CPT | Performed by: NURSE PRACTITIONER

## 2021-03-09 RX ORDER — GUAIFENESIN 600 MG/1
600 TABLET, EXTENDED RELEASE ORAL 2 TIMES DAILY
Status: ON HOLD | COMMUNITY
End: 2021-03-22

## 2021-03-09 NOTE — LETTER
3/9/2021        RE: Perfecto Reese  [address]  [address]  [facility] [address]        [facility] DISCHARGE SUMMARY  PATIENT'S NAME: Perfecto Reese  YOB: 1950  MEDICAL RECORD NUMBER:  [id number]  Place of Service where encounter took place:  [facility] (TCU) [[id number]]    PRIMARY CARE PROVIDER AND CLINIC RESPONSIBLE AFTER TRANSFER:   Polo Neil MD, [facility] ADMIN MED [facility] Provider     Transferring providers: ANTONIETA Valdovinos CNP, Dr. Donna Rodríguez MD  Recent Hospitalization/ED:  [facility] stay 2/9/21 to 2/15/21.  Date of SNF Admission: February/15/2021  Date of SNF (anticipated) Discharge: March/11/2021  Discharged to: previous independent home  Cognitive Scores: BIMS: 9/15  Physical Function: Refuses all therapies while on TCU. however patient is found self transferring appropriately into electric scooter when he wants to go smoke on unit without concerns.   DME: SNF  coordinating DME needs     CODE STATUS/ADVANCE DIRECTIVES DISCUSSION:  Full Code   ALLERGIES: Darvocet [propoxyphene n-apap] and Vicodin [hydrocodone-acetaminophen]    DISCHARGE DIAGNOSIS/NURSING FACILITY COURSE:     ASSESSMENT/PLAN:  (R53.81) Physical deconditioning  (primary encounter diagnosis)  (M62.81) Generalized muscle weakness  Comment: Acute on chronic. S/T below diagnosis. Followed by [facility]. HISTORY of noncompliance.   Plan:   -Continue Physical therapy and Occupational therapy as directed  -SW to remain involved for safe discharge planning needs  -Would benefit from FDC setting, however patient declines at this time.      (J96.11) chronic respiratory failure with hypoxia and hypercapnia (H)  (Z87.01) History of pneumonia  (Z87.09) History of abscess of lung  (J44.9) COPD, severe (H)  (R07.89) Musculoskeletal chest pain  Comment: Acute on chronic. Previous hospital  admission Patient was treated with IV then PO antibiotics, with the assistance of infectious disease. Initially on zosyn -> unasyn -> transitioned 12/8 to Augmentin x 6 weeks (through approximately 1/13/2021). - Infectious disease peripherally following. Completed antibiotics of augmentin on 1/13/21.CXR noted pneumonia present. Antibiotics started. Lungs clear today.   Plan:   -Continue oxygen as directed which is chronic at 3L via NC.   -Continue mucinex 600mg BID  -Monitor respiratory status. May benefit from thoracentesis in future if warranted.   -Continue incentive spirometry and encourage every 4 hours while awake. Encourage use. Patient remains NONCOMPLIANT with this.   -Continue fluticasone-vilanterol (breo 200-25mcg inhaler 1 puff daily  -Continue incruse ellipta 62.5mcg inhaler 1 puff daily.   -Continue albuterol inhaler PRN  -Trend labs with PCP post TCU     (D63.8) Anemia of chronic disease  (D69.3) Chronic idiopathic thrombocytopenia (H)  Comment: Acute on chronic. Anemia and thrombocytopenia likely multifactorial from liver cirrhosis, splenomegaly, renal disease, CKD and psoriasis. Iron labs suggestive of anemia of chronic disease. 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 Patient required four units of pRBCs this admission for Hgb < 7.0. No acute GI bleed identified, likely blood loss from decreased erythropoiesis. Appreciate GI consult 12/16/2020, no acute GI bleed, no acute interventions, [facility]. Check hemoglobin periodically while inpatient - 7.0 12/28 - clinically stable and no bleeding noted. recheck hgb 7.5 12/29 and again 7.9 on 1/4/21. Hgb has been <7 since TCU admission. Discontinued aspirin due to risks. Occult stool was positive on 1/15/21. Patient is NOT interested in colonoscopy.   Plan:   -Follow up with GI at [facility] as  directed.   -Recommend to follow up with hematology at [facility] post TCU as directed  -Monitor for worsening s/sx of concerns  -Continue iron 325mg every other day as directed  -Continue vitamin C 1000mg daily to assist with iron absorption along with chronic wounds.   -Monitor for active s/sx of bleeding risks.   -Trend labs with PCP post TCU     (N18.30) Stage 3 chronic kidney disease, unspecified whether stage 3a or 3b CKD  (N17.9) MICHAEL  Comment: Acute on chronic. Baseline creatinine 1.2-1.3 in Jan 2020, on admission 2.49.  this has improved to baseline with hydration. Creatinine today~  Plan:   -Follow up with nephrology at [facility] post TCU  -Monitor urinary status  -Continue current medications without change  -Trend labs with PCP post TCU     (E27.40) Chronic adrenal insufficiency (H)  Comment: Chronic. Stable.   Plan:   -Monitor for worsening s/sx of concerns  -Continue PTA hydrocortisone 20mg daily  -Follow up with PCP at [facility] post TCU  -Trend labs with PCP post TCU     (I10) Hypertension, unspecified type  Comment: Acute on chronic. Amlodipine decreased to 5mg daily due to soft blood pressures. Based on JNC-8 goals,  patients age of 70 year old, presence of diabetes or CKD, and goals of care goal BP is  <140/90 mm Hg. Patient is stable with current plan of care and routine assessment.. Baseline creatinine~1.3  Plan:   -Continue losartan as directed  -Monitor BP and HR  -Follow up with PCP post TCU  -Trend labs with PCP post TCU     (Z87.2) Hx of psoriasis  (L29.9) Itching  Comment: Chronic. Holding PTA Humira this admission--continue to hold at discharge. Caused self inflicted trauma to wounds on RLE from back scratcher  Plan:   -Will monitor skin for ongoing concern   -PTA Humira held at discharge and not restarted upon discharge from TCU. Restart once current cellulitis has been appropriately treated  -Remove  "back scratcher so patient can not cause self trauma  -Continue Clobetasol Propionate Lotion 0.05 % to scalp at HS as directed  -Follow up with PCP at [facility] post TCU  -May recommend dermatology in future if warranted.   -Trend labs with PCP post TCU     (I73.9) PVD (peripheral vascular disease) (H)  (Z89.512) S/P below knee amputation, left (H)  (S81.974D) Open wound of right lower extremity, subsequent encounter  (L03.115) Cellulitis of RLE  Comment: Chronic. Reports worsening RLE wounds with increased erythema and pain since discharge from TCU. At TCU was receiving daily wound cares, but states since discharge, home cares \"fell through the cracks.\" WBC 15.1; vitals not suggestive of septic picture on admission. Lactic acid 1.1. vascular surgery evaluated, ZHOU completed, arterial insufficiency is not suspected, no surgical plans noted. treated with IV ancef initially and transition to Augment BID on 2/13. Completed antibiotic treatment as directed  Plan:   -Follow up with PCP post TCU  -Recommend to follow up with Wound clinic with Dr Odonnell weekly as directed. Next scheduled on 3/15/21.   -Monitor skin for worsening s/sx of concerns.   -Continue vitamin D to 10,000U daily for wound healing  -Continue folic acid 1000mcg daily for wound healing  -Continue vitamin B12 1000mcg daily for wound healing  -Trend labs with PCP post TCU  -Continue current wound care orders as directed:  *RLE  Wounds: Weekly and PRN   *Wound Care to Posterior right leg: Clean wound by wetting gauze and soaking with VASHE wounbd cleanser x 20 minutes, pat dry/clean. Apply moisturizing cream (critic aid)  to surrounding the wound (but not in the wound). Cover wound with plurogel and ioplex cut to fit the size of the wound. Apply edema wear followed by kerra max dressing. Change dressing weekly as directed. Remove compression dressing if toes turn blue and/or tingling cannot be relived by raising of leg for one " hour.   -Rooke boot on at all times.   *Edema Wear stockings: Apply fresh pair daily- do not cut or trim stockings. Remove and set aside stockings-Do Not Throw Away. Wash stockiings with soap and HOT water. If really soiled use surgical soap then regular soap. Hang dry. PATIENT IS NONCOMPLIANT WITH EDEMA-WEAR USE.      (G89.4) Chronic pain syndrome  (F11.20) Opioid dependence  Comment: Chronic. Stable. Patient found sleeping on/off throughout days/night. Suspect polypharmacy related.   Plan:   -Reduced gabapentin down to 300mg at HS.   -Continue duloxetine 60mg daily. (recently increased on 1/12/21 for pain complaints and also to assist with mood)  -Continue lidocaine topical as directed  -Change dilaudid 1mg every 8 hours PRN. Would strongly recommend to GDR in future with PCP post TCU.   -Monitor pain complaints  -Follow up with PCP post TCU  -Would recommend pain clinic referral given history chronic history.   -Trend labs with PCP post TCU     (E43) Severe protein-calorie malnutrition (H)  Comment: Acute on chronic.   Plan:   -Monitor appetite  -Continue Ensure TID  -Dietician to remain involved  -Trend labs with PCP post TCU  -Continue vitamin D to 10,000 daily  -Continue folic acid daily  -Continue vitamin B12 daily     (G47.00) Insomnia  Comment: Acute on chronic. Patient wanting increase of sleeping agent. HISTORY of being on ambien in the past per [facility] records. This was discontinued.   Plan:  -Discontinue melatonin due to increased drowsiness throughout day/night.   -Monitor sleeping patterns. Patient currently is drowsy and falls asleep mid conversation.   -Would NOT recommend ambien given co-morbidities above.   -Trend labs with PCP post TCU    Past Medical History:  has a past medical history of Adrenal insufficiency (H), Anemia of chronic disease, Anemia, iron deficiency, Back pain, Benign essential hypertension, Chronic respiratory failure with hypoxia, on home O2  therapy (H), CKD (chronic kidney disease), stage III, COPD (chronic obstructive pulmonary disease) (H), Depression, GERD (gastroesophageal reflux disease), BKA, left (H), Hyperlipidemia, Migraine, Non compliance w medication regimen, Pain syndrome, chronic, Primary insomnia, Psoriasis, and PVD (peripheral vascular disease) (H).    Discharge Medications:    Current Outpatient Medications   Medication Sig Dispense Refill     albuterol (PROAIR HFA/PROVENTIL HFA/VENTOLIN HFA) 108 (90 Base) MCG/ACT inhaler Inhale 2 puffs into the lungs every 4 hours as needed for shortness of breath / dyspnea or wheezing       calcium citrate (CITRACAL) 950 MG tablet Take 4 tablets by mouth daily       clobetasol (TEMOVATE) 0.05 % external solution Apply topically At Bedtime Apply to scalp       cyanocobalamin (VITAMIN B-12) 1000 MCG tablet Take 1 tablet (1,000 mcg) by mouth daily 30 tablet 1     DULoxetine (CYMBALTA) 30 MG capsule Take 2 capsules (60 mg) by mouth daily 10 capsule 0     ferrous sulfate (FEROSUL) 325 (65 Fe) MG tablet Take 1 tablet (325 mg) by mouth every other day 90 tablet 1     fluticasone-vilanterol (BREO ELLIPTA) 200-25 MCG/INH inhaler Inhale 1 puff into the lungs daily 28 each 1     folic acid (FOLVITE) 1 MG tablet Take 1 tablet (1 mg) by mouth daily 30 tablet 1     furosemide (LASIX) 20 MG tablet Take 1 tablet (20 mg) by mouth daily       gabapentin (NEURONTIN) 300 MG capsule Take 1 capsule (300 mg) by mouth At Bedtime 60 capsule 1     guaiFENesin (MUCINEX) 600 MG 12 hr tablet Take 600 mg by mouth 2 times daily For pneumonia       hydrocortisone (CORTEF) 20 MG tablet Take 20 mg by mouth every morning       HYDROmorphone (DILAUDID) 2 MG tablet Take 0.5 tablets (1 mg) by mouth every 8 hours as needed for severe pain 30 tablet 0     lidocaine (XYLOCAINE) 5 % external ointment Apply topically 3 times daily as needed for moderate pain (rib pain)       loperamide (IMODIUM) 2 MG capsule Take 1 capsule (2 mg) by mouth 4  "times daily as needed for diarrhea 15 capsule 0     losartan (COZAAR) 25 MG tablet Take 1 tablet (25 mg) by mouth daily       multivitamin w/minerals (THERA-VIT-M) tablet Take 1 tablet by mouth daily 30 tablet 0     nicotine (NICORETTE) 2 MG gum Place 2 mg inside cheek as needed for smoking cessation       omeprazole (PRILOSEC) 20 MG DR capsule Take 20 mg by mouth daily       primidone (MYSOLINE) 50 MG tablet Take 50 mg by mouth At Bedtime       tamsulosin (FLOMAX) 0.4 MG capsule Take 0.8 mg by mouth daily       umeclidinium (INCRUSE ELLIPTA) 62.5 MCG/INH inhaler Inhale 1 puff into the lungs daily 30 each 1     vitamin C (ASCORBIC ACID) 1000 MG TABS Take 1 tablet (1,000 mg) by mouth daily 30 tablet 1     vitamin D3 (CHOLECALCIFEROL) 250 mcg (48115 units) capsule Take 1 capsule (250 mcg) by mouth daily 30 capsule 1     Dextromethorphan-guaiFENesin (MUCUS RELIEF DM)  MG TB12 TAKE 1 TAB BY MOUTH EVERY 12 HOURS       guaiFENesin (MUCINEX) 600 MG 12 hr tablet Take 2 tablets (1,200 mg) by mouth 2 times daily (Patient not taking: Reported on 3/9/2021) 60 tablet 1     hydrOXYzine (ATARAX) 25 MG tablet TAKE 1 TAB BY MOUTH EVERY 6 HOURS AS NEEDED FOR ITCHING       melatonin 3 MG tablet TAKE 1 TABLET (3 MG) BY MOUTH AT BEDTIME       Wound Cleansers (VASHE WOUND THERAPY EX) USE TO SOAK GAUZE AND CLEAN WOUND FOR TWENTY MINUTES W/ DAILY WOUND CARE CHANGES.       Medication Changes/Rationale:     See above    Controlled medications sent with patient:   Medication: Dilaudid , 30 tabs given to patient at the time of discharge to take home     ROS:   10 point ROS of systems including Constitutional, Eyes, Respiratory, Cardiovascular, Gastroenterology, Genitourinary, Integumentary, Musculoskeletal, Psychiatric were all negative except for pertinent positives noted in my HPI.    Physical Exam:   Vitals: BP (!) 140/70   Pulse 88   Temp 97.7  F (36.5  C)   Resp 18   Ht 1.651 m (5' 5\")   Wt 65.5 kg (144 lb 4.8 oz)   SpO2 94%  "  BMI 24.01 kg/m    BMI= Body mass index is 24.01 kg/m .  GENERAL APPEARANCE:  Alert, in no distress, oriented, cooperative  ENT:  Mouth and posterior oropharynx normal, moist mucous membranes, Kiana  EYES:  EOM, conjunctivae, lids, pupils and irises normal  RESP:  respiratory effort and palpation of chest normal, lungs clear to auscultation , no respiratory distress  CV:  Palpation and auscultation of heart done , regular rate and rhythm, no murmur, rub, or gallop, no edema  ABDOMEN:  normal bowel sounds, soft, nontender, no hepatosplenomegaly or other masses, no guarding or rebound  M/S:   Wheelchair bound. ROM intact  SKIN:  Inspection of skin and subcutaneous tissue baseline, see picture below  NEURO:   Cranial nerves 2-12 are normal tested and grossly at patient's baseline, no purposeful movement in upper and lower extremities  PSYCH:  oriented X 3, normal insight, judgement and memory, affect and mood normal, Continues to make poor choices for himself         SNF labs:   Most Recent 3 CBC's:  Recent Labs   Lab Test 03/05/21 03/03/21 02/19/21   WBC 5.3 5.7 4.1   HGB 7.7* 7.3* 7.2*   MCV 96 94 91   PLT 97* 93* 86*     Most Recent 3 BMP's:  Recent Labs   Lab Test 03/05/21 03/03/21 02/19/21    137 138   POTASSIUM 4.9 6.1* 3.9   CHLORIDE 109* 105 106   CO2 28 25 25   BUN 54* 43* 37*   CR 1.54* 1.47* 1.28   ANIONGAP 5 7 7   MARTINE 8.2* 8.1* 7.6*   GLC 86 86 79     Most Recent 2 LFT's:  Recent Labs   Lab Test 02/09/21  0601 01/07/21   AST 23 27   ALT 21 24   ALKPHOS 79 91   BILITOTAL 0.5 0.4     Most Recent Anemia Panel:  Recent Labs   Lab Test 03/05/21 12/04/20  1526 12/04/20  1526 12/04/20  1245   WBC 5.3   < > 3.2* 3.5*   HGB 7.7*   < > 6.4* 6.1*   HCT 24.3*   < > 19.1* 18.6*   MCV 96   < > 93 94   PLT 97*   < > 70* 74*   IRON  --   --   --  30*   IRONSAT  --   --   --  27   RETICABSCT  --   --  17.9*  --    RETP  --   --  0.9  --    FEB  --   --   --  112*   JÚNIOR  --   --   --  515*   B12  --   --   --  1,117*    FOLIC  --   --   --  6.7    < > = values in this interval not displayed.     DISCHARGE PLAN:    Follow up labs: No labs orders/due    Medical Follow Up:      Follow up with primary care provider in 1 weeks    MT referral needed and placed by this provider: No    Current [place] scheduled appointments:  Next 5 appointments (look out 90 days)    Mar 15, 2021  1:10 PM  Return Visit with  WOUND HEALING NURSE  [facility] [place] ([facility] ) [address]  [address]  [facility] [address]  [phone]   Mar 22, 2021  2:40 PM  Return Visit with Ralph Odonnell MD  [facility] [place] ([facility] ) [address]  [address]  [facility] [address]  [phone]       Discharge Services: Home Care:  Occupational Therapy, Physical Therapy, Registered Nurse, Home Health Aide,  and From:  Home Health [facility]    Discharge Instructions Verbalized to Patient at Discharge:     Weight bearing restrictions:  Weight bearing as tolerated.     Continue to follow your diet:  regular.     Boost nutritional supplement recommended 3 times a day.     Weigh yourself daily in the morning and keep a record. Call your primary clinic: a) if you are more short of breath, or b) if your weight changes more than 3 pounds in one day or more than 5 pounds in one week.     Oxygen at 3 liters/minute via nasal cannula. Chronic use    Driving is not recommended.     24-hour supervision is recommended for safety.     Wound dressing change: Right Posterior Lower leg: Cleanse wound with Vashe. Skin care: Apply moisturizing cream to skin surrounding the wound ( but not in the wound): critic Aid. Cover wound with plurogel and ioplex cut to fit the size of the wound . Apply Edemawear followed by kerra max dressing. Keep leg dry with use of a cast protector. Your compression wrap is a 2 layer coflex wrap and should stay on until next week. Please remove  compression dressing if toes turn blue and or tingle and can not be relieves by raising the leg for one hour.     wear Rooke boot at all times. when you sit raise your ankle above your hip to promote wound healing       TOTAL DISCHARGE TIME:   Greater than 30 minutes  Electronically signed by:  Lana Stevens DNP, APRN      Documentation of Face to Face and Certification for Home Health Services    I certify that patient: Perfecto Reese is under my care and that I, or a nurse practitioner or physician's assistant working with me, had a face-to-face encounter that meets the physician face-to-face encounter requirements with this patient on: 3/9/2021.    This encounter with the patient was in whole, or in part, for the following medical condition, which is the primary reason for home health care: Deconditioning S/T above diagnosis.    I certify that, based on my findings, the following services are medically necessary home health services: Nursing, Occupational Therapy, Physical Therapy and Social Work.    My clinical findings support the need for the above services because: Nurse is needed: To assess medication management, wound cares, education after changes in medications or other medical regimen.., Occupational Therapy Services are needed to assess and treat cognitive ability and address ADL safety due to impairment in deconditioning. and Physical Therapy Services are needed to assess and treat the following functional impairments: deconditioning.    Further, I certify that my clinical findings support that this patient is homebound (i.e. absences from home require considerable and taxing effort and are for medical reasons or Tenriism services or infrequently or of short duration when for other reasons) because: Requires assistance of another person or specialized equipment to access medical services because patient: Is unable to walk greater than 100 feet without rest...    Based on the above findings. I  certify that this patient is confined to the home and needs intermittent skilled nursing care, physical therapy and/or speech therapy.  The patient is under my care, and I have initiated the establishment of the plan of care.  This patient will be followed by a physician who will periodically review the plan of care.  Physician/Provider to provide follow up care: Polo Neil    Attending hospital physician (the Medicare certified [facility]OS provider): Dr.Sara Marcos MD, signing F2F and only signing for initial order. Please send all follow up questions and concerns or needed follow up signatures to the PCP, who [place] has on file as:  Polo Neil.    Physician Signature: See electronic signature associated with these discharge orders.  Date: 3/9/2021                    Sincerely,        ANTONIETA Valdovinos CNP

## 2021-03-11 ENCOUNTER — MEDICAL CORRESPONDENCE (OUTPATIENT)
Dept: HEALTH INFORMATION MANAGEMENT | Facility: CLINIC | Age: 71
End: 2021-03-11

## 2021-03-11 ENCOUNTER — TRANSFERRED RECORDS (OUTPATIENT)
Dept: HEALTH INFORMATION MANAGEMENT | Facility: CLINIC | Age: 71
End: 2021-03-11

## 2021-03-18 ENCOUNTER — HOSPITAL ENCOUNTER (INPATIENT)
Facility: CLINIC | Age: 71
LOS: 3 days | Discharge: HOME HEALTH | DRG: 682 | End: 2021-03-22
Attending: EMERGENCY MEDICINE | Admitting: HOSPITALIST
Payer: MEDICARE

## 2021-03-18 DIAGNOSIS — J96.21 ACUTE ON CHRONIC RESPIRATORY FAILURE WITH HYPOXIA AND HYPERCAPNIA (H): ICD-10-CM

## 2021-03-18 DIAGNOSIS — I10 ESSENTIAL HYPERTENSION: ICD-10-CM

## 2021-03-18 DIAGNOSIS — S81.801D OPEN WOUND OF RIGHT LOWER EXTREMITY, SUBSEQUENT ENCOUNTER: ICD-10-CM

## 2021-03-18 DIAGNOSIS — J44.9 COPD, SEVERE (H): ICD-10-CM

## 2021-03-18 DIAGNOSIS — D63.8 ANEMIA OF CHRONIC DISEASE: ICD-10-CM

## 2021-03-18 DIAGNOSIS — R52 PAIN: ICD-10-CM

## 2021-03-18 DIAGNOSIS — J18.9 PNEUMONIA OF BOTH LUNGS DUE TO INFECTIOUS ORGANISM, UNSPECIFIED PART OF LUNG: ICD-10-CM

## 2021-03-18 DIAGNOSIS — J18.9 PNEUMONIA OF RIGHT LOWER LOBE DUE TO INFECTIOUS ORGANISM: ICD-10-CM

## 2021-03-18 DIAGNOSIS — J44.1 COPD EXACERBATION (H): Primary | ICD-10-CM

## 2021-03-18 DIAGNOSIS — Z78.9 UNABLE TO CARE FOR SELF: ICD-10-CM

## 2021-03-18 DIAGNOSIS — S81.801A OPEN WOUND OF LOWER LIMB, RIGHT, INITIAL ENCOUNTER: ICD-10-CM

## 2021-03-18 DIAGNOSIS — G89.4 CHRONIC PAIN SYNDROME: ICD-10-CM

## 2021-03-18 DIAGNOSIS — J96.22 ACUTE ON CHRONIC RESPIRATORY FAILURE WITH HYPOXIA AND HYPERCAPNIA (H): ICD-10-CM

## 2021-03-18 DIAGNOSIS — Z87.09 HISTORY OF COPD: ICD-10-CM

## 2021-03-18 LAB
ABO + RH BLD: ABNORMAL
ABO + RH BLD: ABNORMAL
ALBUMIN SERPL-MCNC: 3.1 G/DL (ref 3.4–5)
ALBUMIN UR-MCNC: 30 MG/DL
ALP SERPL-CCNC: 73 U/L (ref 40–150)
ALT SERPL W P-5'-P-CCNC: 16 U/L (ref 0–70)
ANION GAP SERPL CALCULATED.3IONS-SCNC: 6 MMOL/L (ref 3–14)
APPEARANCE UR: CLEAR
AST SERPL W P-5'-P-CCNC: 17 U/L (ref 0–45)
BASOPHILS # BLD AUTO: 0.1 10E9/L (ref 0–0.2)
BASOPHILS NFR BLD AUTO: 0.8 %
BILIRUB SERPL-MCNC: 0.5 MG/DL (ref 0.2–1.3)
BILIRUB UR QL STRIP: NEGATIVE
BLD GP AB INVEST PLASRBC-IMP: ABNORMAL
BLD GP AB SCN SERPL QL: ABNORMAL
BLD PROD TYP BPU: ABNORMAL
BLOOD BANK CMNT PATIENT-IMP: ABNORMAL
BUN SERPL-MCNC: 33 MG/DL (ref 7–30)
CALCIUM SERPL-MCNC: 8.6 MG/DL (ref 8.5–10.1)
CHLORIDE SERPL-SCNC: 115 MMOL/L (ref 94–109)
CO2 SERPL-SCNC: 24 MMOL/L (ref 20–32)
COLOR UR AUTO: ABNORMAL
CREAT SERPL-MCNC: 1.89 MG/DL (ref 0.66–1.25)
DAT IGG-SP REAG RBC-IMP: NORMAL
DIFFERENTIAL METHOD BLD: ABNORMAL
EOSINOPHIL # BLD AUTO: 1.4 10E9/L (ref 0–0.7)
EOSINOPHIL NFR BLD AUTO: 21.4 %
ERYTHROCYTE [DISTWIDTH] IN BLOOD BY AUTOMATED COUNT: 14.4 % (ref 10–15)
GFR SERPL CREATININE-BSD FRML MDRD: 35 ML/MIN/{1.73_M2}
GLUCOSE SERPL-MCNC: 87 MG/DL (ref 70–99)
GLUCOSE UR STRIP-MCNC: NEGATIVE MG/DL
HCT VFR BLD AUTO: 29.9 % (ref 40–53)
HGB BLD-MCNC: 9.8 G/DL (ref 13.3–17.7)
HGB UR QL STRIP: ABNORMAL
IMM GRANULOCYTES # BLD: 0 10E9/L (ref 0–0.4)
IMM GRANULOCYTES NFR BLD: 0.3 %
KETONES UR STRIP-MCNC: 5 MG/DL
LABORATORY COMMENT REPORT: NORMAL
LACTATE BLD-SCNC: 0.7 MMOL/L (ref 0.7–2)
LEUKOCYTE ESTERASE UR QL STRIP: ABNORMAL
LYMPHOCYTES # BLD AUTO: 0.7 10E9/L (ref 0.8–5.3)
LYMPHOCYTES NFR BLD AUTO: 11.2 %
MCH RBC QN AUTO: 30.5 PG (ref 26.5–33)
MCHC RBC AUTO-ENTMCNC: 32.8 G/DL (ref 31.5–36.5)
MCV RBC AUTO: 93 FL (ref 78–100)
MONOCYTES # BLD AUTO: 0.3 10E9/L (ref 0–1.3)
MONOCYTES NFR BLD AUTO: 5.3 %
MUCOUS THREADS #/AREA URNS LPF: PRESENT /LPF
NEUTROPHILS # BLD AUTO: 3.9 10E9/L (ref 1.6–8.3)
NEUTROPHILS NFR BLD AUTO: 61 %
NITRATE UR QL: NEGATIVE
NRBC # BLD AUTO: 0 10*3/UL
NRBC BLD AUTO-RTO: 0 /100
NUM BPU REQUESTED: 2
PH UR STRIP: 5.5 PH (ref 5–7)
PLATELET # BLD AUTO: 85 10E9/L (ref 150–450)
POTASSIUM SERPL-SCNC: 4.7 MMOL/L (ref 3.4–5.3)
PROT SERPL-MCNC: 7 G/DL (ref 6.8–8.8)
RBC # BLD AUTO: 3.21 10E12/L (ref 4.4–5.9)
RBC #/AREA URNS AUTO: 16 /HPF (ref 0–2)
SARS-COV-2 RNA RESP QL NAA+PROBE: NEGATIVE
SODIUM SERPL-SCNC: 145 MMOL/L (ref 133–144)
SOURCE: ABNORMAL
SP GR UR STRIP: 1.01 (ref 1–1.03)
SPECIMEN EXP DATE BLD: ABNORMAL
SPECIMEN SOURCE: NORMAL
SQUAMOUS #/AREA URNS AUTO: <1 /HPF (ref 0–1)
UROBILINOGEN UR STRIP-MCNC: 0 MG/DL (ref 0–2)
WBC # BLD AUTO: 6.4 10E9/L (ref 4–11)
WBC #/AREA URNS AUTO: 22 /HPF (ref 0–5)

## 2021-03-18 PROCEDURE — 86900 BLOOD TYPING SEROLOGIC ABO: CPT | Performed by: EMERGENCY MEDICINE

## 2021-03-18 PROCEDURE — 86922 COMPATIBILITY TEST ANTIGLOB: CPT | Performed by: EMERGENCY MEDICINE

## 2021-03-18 PROCEDURE — 86870 RBC ANTIBODY IDENTIFICATION: CPT | Performed by: EMERGENCY MEDICINE

## 2021-03-18 PROCEDURE — 258N000003 HC RX IP 258 OP 636: Performed by: EMERGENCY MEDICINE

## 2021-03-18 PROCEDURE — G0378 HOSPITAL OBSERVATION PER HR: HCPCS

## 2021-03-18 PROCEDURE — 85025 COMPLETE CBC W/AUTO DIFF WBC: CPT | Performed by: EMERGENCY MEDICINE

## 2021-03-18 PROCEDURE — 80053 COMPREHEN METABOLIC PANEL: CPT | Performed by: EMERGENCY MEDICINE

## 2021-03-18 PROCEDURE — 81001 URINALYSIS AUTO W/SCOPE: CPT | Performed by: EMERGENCY MEDICINE

## 2021-03-18 PROCEDURE — 96361 HYDRATE IV INFUSION ADD-ON: CPT

## 2021-03-18 PROCEDURE — 87186 SC STD MICRODIL/AGAR DIL: CPT | Performed by: HOSPITALIST

## 2021-03-18 PROCEDURE — 250N000013 HC RX MED GY IP 250 OP 250 PS 637: Performed by: HOSPITALIST

## 2021-03-18 PROCEDURE — 87086 URINE CULTURE/COLONY COUNT: CPT | Performed by: HOSPITALIST

## 2021-03-18 PROCEDURE — 96360 HYDRATION IV INFUSION INIT: CPT

## 2021-03-18 PROCEDURE — 87635 SARS-COV-2 COVID-19 AMP PRB: CPT | Performed by: EMERGENCY MEDICINE

## 2021-03-18 PROCEDURE — 99220 PR INITIAL OBSERVATION CARE,LEVEL III: CPT | Performed by: HOSPITALIST

## 2021-03-18 PROCEDURE — 86880 COOMBS TEST DIRECT: CPT | Performed by: HOSPITALIST

## 2021-03-18 PROCEDURE — 86901 BLOOD TYPING SEROLOGIC RH(D): CPT | Performed by: EMERGENCY MEDICINE

## 2021-03-18 PROCEDURE — 86901 BLOOD TYPING SEROLOGIC RH(D): CPT | Performed by: HOSPITALIST

## 2021-03-18 PROCEDURE — 250N000013 HC RX MED GY IP 250 OP 250 PS 637: Performed by: EMERGENCY MEDICINE

## 2021-03-18 PROCEDURE — 83605 ASSAY OF LACTIC ACID: CPT | Performed by: EMERGENCY MEDICINE

## 2021-03-18 PROCEDURE — 86900 BLOOD TYPING SEROLOGIC ABO: CPT | Performed by: HOSPITALIST

## 2021-03-18 PROCEDURE — C9803 HOPD COVID-19 SPEC COLLECT: HCPCS

## 2021-03-18 PROCEDURE — 86850 RBC ANTIBODY SCREEN: CPT | Performed by: EMERGENCY MEDICINE

## 2021-03-18 PROCEDURE — 99207 PR CDG-MDM COMPONENT: MEETS HIGH - UP CODED: CPT | Performed by: HOSPITALIST

## 2021-03-18 PROCEDURE — 86870 RBC ANTIBODY IDENTIFICATION: CPT | Performed by: HOSPITALIST

## 2021-03-18 PROCEDURE — 99285 EMERGENCY DEPT VISIT HI MDM: CPT

## 2021-03-18 PROCEDURE — 87088 URINE BACTERIA CULTURE: CPT | Performed by: HOSPITALIST

## 2021-03-18 RX ORDER — HYDROMORPHONE HYDROCHLORIDE 2 MG/1
2 TABLET ORAL ONCE
Status: COMPLETED | OUTPATIENT
Start: 2021-03-18 | End: 2021-03-18

## 2021-03-18 RX ORDER — ACETAMINOPHEN 325 MG/1
650 TABLET ORAL EVERY 4 HOURS PRN
Status: DISCONTINUED | OUTPATIENT
Start: 2021-03-18 | End: 2021-03-22 | Stop reason: HOSPADM

## 2021-03-18 RX ORDER — ONDANSETRON 4 MG/1
4 TABLET, ORALLY DISINTEGRATING ORAL EVERY 6 HOURS PRN
Status: DISCONTINUED | OUTPATIENT
Start: 2021-03-18 | End: 2021-03-22 | Stop reason: HOSPADM

## 2021-03-18 RX ORDER — POLYETHYLENE GLYCOL 3350 17 G/17G
17 POWDER, FOR SOLUTION ORAL DAILY PRN
Status: DISCONTINUED | OUTPATIENT
Start: 2021-03-18 | End: 2021-03-22 | Stop reason: HOSPADM

## 2021-03-18 RX ORDER — NALOXONE HYDROCHLORIDE 0.4 MG/ML
0.4 INJECTION, SOLUTION INTRAMUSCULAR; INTRAVENOUS; SUBCUTANEOUS
Status: DISCONTINUED | OUTPATIENT
Start: 2021-03-18 | End: 2021-03-22 | Stop reason: HOSPADM

## 2021-03-18 RX ORDER — PRIMIDONE 50 MG/1
50 TABLET ORAL AT BEDTIME
Status: DISCONTINUED | OUTPATIENT
Start: 2021-03-18 | End: 2021-03-22 | Stop reason: HOSPADM

## 2021-03-18 RX ORDER — DULOXETIN HYDROCHLORIDE 20 MG/1
20 CAPSULE, DELAYED RELEASE ORAL DAILY
Status: DISCONTINUED | OUTPATIENT
Start: 2021-03-19 | End: 2021-03-22 | Stop reason: HOSPADM

## 2021-03-18 RX ORDER — ACETAMINOPHEN 650 MG/1
650 SUPPOSITORY RECTAL EVERY 4 HOURS PRN
Status: DISCONTINUED | OUTPATIENT
Start: 2021-03-18 | End: 2021-03-22 | Stop reason: HOSPADM

## 2021-03-18 RX ORDER — AMOXICILLIN 250 MG
1 CAPSULE ORAL 2 TIMES DAILY PRN
Status: DISCONTINUED | OUTPATIENT
Start: 2021-03-18 | End: 2021-03-22 | Stop reason: HOSPADM

## 2021-03-18 RX ORDER — SODIUM CHLORIDE, SODIUM LACTATE, POTASSIUM CHLORIDE, CALCIUM CHLORIDE 600; 310; 30; 20 MG/100ML; MG/100ML; MG/100ML; MG/100ML
INJECTION, SOLUTION INTRAVENOUS CONTINUOUS
Status: DISCONTINUED | OUTPATIENT
Start: 2021-03-18 | End: 2021-03-18

## 2021-03-18 RX ORDER — GABAPENTIN 300 MG/1
300 CAPSULE ORAL AT BEDTIME
Status: DISCONTINUED | OUTPATIENT
Start: 2021-03-18 | End: 2021-03-22 | Stop reason: HOSPADM

## 2021-03-18 RX ORDER — AMOXICILLIN 250 MG
2 CAPSULE ORAL 2 TIMES DAILY PRN
Status: DISCONTINUED | OUTPATIENT
Start: 2021-03-18 | End: 2021-03-22 | Stop reason: HOSPADM

## 2021-03-18 RX ORDER — NALOXONE HYDROCHLORIDE 0.4 MG/ML
0.2 INJECTION, SOLUTION INTRAMUSCULAR; INTRAVENOUS; SUBCUTANEOUS
Status: DISCONTINUED | OUTPATIENT
Start: 2021-03-18 | End: 2021-03-22 | Stop reason: HOSPADM

## 2021-03-18 RX ORDER — BISACODYL 10 MG
10 SUPPOSITORY, RECTAL RECTAL DAILY PRN
Status: DISCONTINUED | OUTPATIENT
Start: 2021-03-18 | End: 2021-03-22 | Stop reason: HOSPADM

## 2021-03-18 RX ORDER — TAMSULOSIN HYDROCHLORIDE 0.4 MG/1
0.8 CAPSULE ORAL DAILY
Status: DISCONTINUED | OUTPATIENT
Start: 2021-03-19 | End: 2021-03-22 | Stop reason: HOSPADM

## 2021-03-18 RX ORDER — ONDANSETRON 2 MG/ML
4 INJECTION INTRAMUSCULAR; INTRAVENOUS EVERY 6 HOURS PRN
Status: DISCONTINUED | OUTPATIENT
Start: 2021-03-18 | End: 2021-03-22 | Stop reason: HOSPADM

## 2021-03-18 RX ORDER — LIDOCAINE 50 MG/G
OINTMENT TOPICAL 3 TIMES DAILY PRN
Status: DISCONTINUED | OUTPATIENT
Start: 2021-03-18 | End: 2021-03-22 | Stop reason: HOSPADM

## 2021-03-18 RX ORDER — LIDOCAINE 40 MG/G
CREAM TOPICAL
Status: DISCONTINUED | OUTPATIENT
Start: 2021-03-18 | End: 2021-03-22 | Stop reason: HOSPADM

## 2021-03-18 RX ORDER — HYDROCORTISONE 20 MG/1
20 TABLET ORAL EVERY MORNING
Status: DISCONTINUED | OUTPATIENT
Start: 2021-03-19 | End: 2021-03-22 | Stop reason: HOSPADM

## 2021-03-18 RX ADMIN — GABAPENTIN 300 MG: 300 CAPSULE ORAL at 22:51

## 2021-03-18 RX ADMIN — SODIUM CHLORIDE, POTASSIUM CHLORIDE, SODIUM LACTATE AND CALCIUM CHLORIDE 1000 ML: 600; 310; 30; 20 INJECTION, SOLUTION INTRAVENOUS at 16:18

## 2021-03-18 RX ADMIN — PRIMIDONE 50 MG: 50 TABLET ORAL at 22:51

## 2021-03-18 RX ADMIN — ACETAMINOPHEN 650 MG: 325 TABLET, FILM COATED ORAL at 20:51

## 2021-03-18 RX ADMIN — HYDROMORPHONE HYDROCHLORIDE 2 MG: 2 TABLET ORAL at 16:09

## 2021-03-18 ASSESSMENT — ENCOUNTER SYMPTOMS: WOUND: 1

## 2021-03-18 ASSESSMENT — MIFFLIN-ST. JEOR: SCORE: 1331.92

## 2021-03-18 NOTE — ED NOTES
Bed: ED11  Expected date:   Expected time:   Means of arrival:   Comments:  Maureen - 514 - 70 M leg pain no covid eta 1413

## 2021-03-18 NOTE — ED NOTES
"[facility]  ED Nurse Handoff Report    ED Chief complaint: Wound Check      ED Diagnosis:   Final diagnoses:   Unable to care for self   Open wound of lower limb, right, initial encounter   History of COPD       Code Status: not discussed by ED RN     Allergies:   Allergies   Allergen Reactions     Darvocet [Propoxyphene N-Apap] Nausea     Vicodin [Hydrocodone-Acetaminophen] Nausea     Can use if he eats with it       Patient Story: pt 70M unable to care for self  Focused Assessment:  Pt coming from home. He has a wound to his RLE that he hasn't changed the dressing for since he got out of the hospital. Pt was on home pain meds and ran out, he wasn't able to get more. Pt states he has been wheeling around the house in his wheelchair eating what food he has but that he doesn't have much left. Pt demanding IV pain meds in the ED. Pt urinated on himself in the ED because he stated he couldn't use the urinal. Pt doesn't seem able to take care of own ADLs.     Treatments and/or interventions provided: oral pain med, fluids  Patient's response to treatments and/or interventions: pt not happy with oral pain management    To be done/followed up on inpatient unit:      Does this patient have any cognitive concerns?: some sort of baseline cognitive issues     Activity level - Baseline/Home:  Wheelchair  Activity Level - Current:   Total Care    Patient's Preferred language: English   Needed?: No    Isolation: None  Infection: Not Applicable  Patient tested for COVID 19 prior to admission: YES  Bariatric?: No    Vital Signs:   Vitals:    03/18/21 1421   BP: 134/54   Pulse: 69   Resp: 18   Temp: 98.3  F (36.8  C)   TempSrc: Oral   SpO2: 99%   Weight: 63.5 kg (140 lb)   Height: 1.651 m (5' 5\")       Cardiac Rhythm:     Was the PSS-3 completed:   Yes  What interventions are required if any?               Family Comments:   OBS brochure/video discussed/provided to patient/family: Yes              Name " of person given brochure if not patient:               Relationship to patient:     For the majority of the shift this patient's behavior was Green.   Behavioral interventions performed were .    ED NURSE PHONE NUMBER: [phone]

## 2021-03-18 NOTE — ED TRIAGE NOTES
discharge from [facility] 1 week ago for wound to RLE. Pt has not changed the dressing since discharge. Ran out of pain meds a couple days ago. Lives at home alone. Had an appt with the wound clinic today that he missed because he didn't know about the appt. Wound clinic advised pt to come to the ED due to his pain. States pain has been increasing X 2 days.

## 2021-03-18 NOTE — ED NOTES
Patient very rude. Upset that he is getting oral pain meds and not IV. Patient threw blood pressure cuff off his arm and took pulse ox off and threw that down also. Patient states he can't pee in a urinal. Will offer patient to use male system similar to armando.

## 2021-03-18 NOTE — H&P
[employer] [employer]    History and Physical - Hospitalist Service       Date of Admission:  3/18/2021    Assessment & Plan   Perfecto Reese is a 70 year old male admitted on 3/18/2021.  Complex PMHx including severe COPD on 3L home O2 with ongoing tobacco use, adrenal insufficiency on chronic steroids, psoriasis, chronic Hep C with cirrhosis, PVD s/p left BKA, chronic wounds, non-compliance who presents from home for pain control after running out of his narcotics.  There is concern for safety at home and he is open to TCU so coming in for potential placement.    Note he was at TCU 2/5-3/9 following hospitalization for MICHAEL and cellulitis involving chronic RLE wounds.  He reports he has not been taking any of his meds (other than pain meds) as he was unsure what to take, and reports having not changed his RLE dressing up until today, which was the first home RN visit he had following his discharge from TCU.  TCU documents he would frequently decline therapies but was witnessed to transfer independently when he wished to go outside to smoke.  His presentation at last admission was also in the setting of failure to thrive at home 1 week following TCU discharge.   He has been evaluated by Psych in the past (December 2020) and felt to be decisional.  He is now open to TCU placement again, if needed.          Chronic right lower extremity wounds   Hx of L BKA (2010, [facility]): At TCU was receiving daily wound cares, but has had none since discharge from TCU.  Follows with Dr. Odonnell as outpatient.   - wound consult      MICHAEL on CKD III  Baseline creatinine appears 1.2-1.5, on arrival 1.89.   - received 1L bolus in ED, will hold on further fluids and repeat BMP in AM  - hold on resuming Losartan and Lasix     Hypernatremia  Admission Na 145.  - encourage PO, repeat BMP in AM    Physical deconditioning  At baseline is WC bound with pivots for transfers.  - PT/OT evaluation    Hx of  non-compliance  Noted in chart. Pt reports that he has been off of all medications (other than pain meds) since discharge from TCU.  - will require home care RN for med management if elects to discharge home     Chronic hypoxic and hypercarbic respiratory failure due to COPD utilizing 3 LPM via NC at baseline, not in acute exacerbation:   - Continue PTA inhalers and nebs once verified   ADDENDUM: will hold resuming inhalers as obs and is stable on baseline oxygen needs     Chronic normocytic anemia  Chronic thrombocytopenia  Baseline hgb 7-8 g/dL, baseline plt 40-90.  Anemia and thrombocytopenia likely multifactorial from liver cirrhosis, splenomegaly, CKD and psoriasis.  Prior iron labs suggestive of anemia of chronic disease.  [facility]   - cell counts stable at baseline     Chronic adrenal insufficiency:   - Continue PTA hydrocortisone once verified     Essential hypertension:   - will hold losartan due to MICHAEL, but awaiting med rec     History of psoriasis   Humira held at discharge and not restarted upon discharge from TCU.  Resume in outpatient setting.       Chronic pain syndrome  - continue PTA regimen once reconciled; appears TCU did reduce frequency of oral dilaudid per documentation     Severe malnutrition  - continue PTA supplements once verified  ADDENDUM:  Will hold supplements as Obs     Major depressive disorder secondary to general medical condition suspected.   - consider resuming PTA Cymbalta (will need to be titrated back up) and melatonin once verified        Diet:   Regular   DVT Prophylaxis: VTE Prophylaxis contraindicated due to thrombocytopenia and chronic RLE wounds (LLE BKA)  Bruce Catheter: not present  Code Status:   Full code per patient         Disposition Plan   Expected discharge: Tomorrow, recommended to transitional care unit once safe disposition plan/ TCU bed available and MICHAEL resolved.  Entered: Ye Peters MD 03/18/2021, 6:24  PM     The patient's care was discussed with the Patient.    Ye Peters MD  [facility]  Contact information available via [facility] Paging/Directory      ______________________________________________________________________    Chief Complaint   Chronic pain    History is obtained from the patient, chart review and discussion with emergency room provider.    History of Present Illness   Perfecto Reese is a 70 year old male who presents for pain control after running out of his home narcotics.  He reports he was discharged from TCU on 3/9 and returned home.  He reports he has not had any home care up until today when a home nurse came out to his place.  Reports he has not been taking any of his medications as he is not sure what to be taking.  He reports he has not had any dressing changes to his chronic right lower extremity wound following discharge from TCU.  He reports he still has some food in his house, though states that when this runs out he thinks he would call his brother to see if he can bring him more groceries.  Reports his chronic pain remains at baseline and presented only due to lack of medication at home.  Reports no increase in erythema or pain of his right lower extremity, however, he reports that he is typically not able to see the wound very well and would not be able to report any significant changes.  He denies any subjective fevers or chills.  He denies any worsening shortness of breath, chest pain or pressure or other complaints on review of systems.    It appears he was hospitalized in February for acute kidney injury and right lower extremity cellulitis related to his chronic right lower extremity wounds.  He was discharged to TCU 2/15 and was there through 3/9.  Per documentation, he frequently refused therapies, but was noted to safely transfer independently when he wanted to go outside to smoke.  Ultimately he was discharged home, though it remains unclear as  to why it took so long for her a home nurse to come out.    Review of Systems    The 10 point Review of Systems is negative other than noted in the HPI or here.     Past Medical History    I have reviewed this patient's medical history and updated it with pertinent information if needed.   Past Medical History:   Diagnosis Date     Adrenal insufficiency (H)      Anemia of chronic disease      Anemia, iron deficiency      Back pain      Benign essential hypertension      Chronic respiratory failure with hypoxia, on home O2 therapy (H)     Secondary to COPD     CKD (chronic kidney disease), stage III      COPD (chronic obstructive pulmonary disease) (H)     dependent on 3 LPM via NC chronically     Depression      GERD (gastroesophageal reflux disease)      Hx of BKA, left (H)      Hyperlipidemia      Migraine      Non compliance w medication regimen      Pain syndrome, chronic      Primary insomnia      Psoriasis     on Humira     PVD (peripheral vascular disease) (H)        Past Surgical History   I have reviewed this patient's surgical history and updated it with pertinent information if needed.  Past Surgical History:   Procedure Laterality Date     AMPUTATION BELOW KNEE RT/LT Left      AS ARTHROSCOPY SUBTALAR JOINT SUBTALAR ARTHRODESIS       EXTERNAL EAR SURGERY Left      OPEN REDUCTION INTERNAL FIXATION FOREARM      left forearm/wrist     TONSILLECTOMY         Social History   I have reviewed this patient's social history and updated it with pertinent information if needed.  Social History     Tobacco Use     Smoking status: Former Smoker     Packs/day: 0.50     Years: 30.00     Pack years: 15.00     Types: Cigarettes     Quit date: 4/1/2017     Years since quitting: 3.9     Smokeless tobacco: Never Used     Tobacco comment: states he is down to 3QD   Substance Use Topics     Alcohol use: No     Drug use: No       Family History   I have reviewed this patient's family history and updated it with pertinent  information if needed.  Family History   Problem Relation Age of Onset     Colon Cancer Father      Coronary Artery Disease Father      Chronic Obstructive Pulmonary Disease Father      Osteoporosis Mother      Dementia Mother        Prior to Admission Medications   Prior to Admission Medications   Prescriptions Last Dose Informant Patient Reported? Taking?   DULoxetine (CYMBALTA) 30 MG capsule   No No   Sig: Take 2 capsules (60 mg) by mouth daily   HYDROmorphone (DILAUDID) 2 MG tablet   No No   Sig: Take 0.5 tablets (1 mg) by mouth every 8 hours as needed for severe pain   Wound Cleansers (VASHE WOUND THERAPY EX)   Yes No   Sig: USE TO SOAK GAUZE AND CLEAN WOUND FOR TWENTY MINUTES W/ DAILY WOUND CARE CHANGES.   albuterol (PROAIR HFA/PROVENTIL HFA/VENTOLIN HFA) 108 (90 Base) MCG/ACT inhaler   Yes No   Sig: Inhale 2 puffs into the lungs every 4 hours as needed for shortness of breath / dyspnea or wheezing   calcium citrate (CITRACAL) 950 MG tablet   Yes No   Sig: Take 4 tablets by mouth daily   clobetasol (TEMOVATE) 0.05 % external solution   Yes No   Sig: Apply topically At Bedtime Apply to scalp   cyanocobalamin (VITAMIN B-12) 1000 MCG tablet   No No   Sig: Take 1 tablet (1,000 mcg) by mouth daily   ferrous sulfate (FEROSUL) 325 (65 Fe) MG tablet   No No   Sig: Take 1 tablet (325 mg) by mouth every other day   fluticasone-vilanterol (BREO ELLIPTA) 200-25 MCG/INH inhaler   No No   Sig: Inhale 1 puff into the lungs daily   folic acid (FOLVITE) 1 MG tablet   No No   Sig: Take 1 tablet (1 mg) by mouth daily   furosemide (LASIX) 20 MG tablet   No No   Sig: Take 1 tablet (20 mg) by mouth daily   gabapentin (NEURONTIN) 300 MG capsule   No No   Sig: Take 1 capsule (300 mg) by mouth At Bedtime   guaiFENesin (MUCINEX) 600 MG 12 hr tablet   Yes No   Sig: Take 600 mg by mouth 2 times daily For pneumonia   hydrocortisone (CORTEF) 20 MG tablet   Yes No   Sig: Take 20 mg by mouth every morning   lidocaine (XYLOCAINE) 5 % external  ointment   Yes No   Sig: Apply topically 3 times daily as needed for moderate pain (rib pain)   loperamide (IMODIUM) 2 MG capsule   No No   Sig: Take 1 capsule (2 mg) by mouth 4 times daily as needed for diarrhea   losartan (COZAAR) 25 MG tablet   No No   Sig: Take 1 tablet (25 mg) by mouth daily   multivitamin w/minerals (THERA-VIT-M) tablet   No No   Sig: Take 1 tablet by mouth daily   nicotine (NICORETTE) 2 MG gum   Yes No   Sig: Place 2 mg inside cheek as needed for smoking cessation   omeprazole (PRILOSEC) 20 MG DR capsule   Yes No   Sig: Take 20 mg by mouth daily   primidone (MYSOLINE) 50 MG tablet   Yes No   Sig: Take 50 mg by mouth At Bedtime   tamsulosin (FLOMAX) 0.4 MG capsule   Yes No   Sig: Take 0.8 mg by mouth daily   umeclidinium (INCRUSE ELLIPTA) 62.5 MCG/INH inhaler   No No   Sig: Inhale 1 puff into the lungs daily   vitamin C (ASCORBIC ACID) 1000 MG TABS   No No   Sig: Take 1 tablet (1,000 mg) by mouth daily   vitamin D3 (CHOLECALCIFEROL) 250 mcg (18392 units) capsule   No No   Sig: Take 1 capsule (250 mcg) by mouth daily      Facility-Administered Medications: None     Allergies   Allergies   Allergen Reactions     Darvocet [Propoxyphene N-Apap] Nausea     Vicodin [Hydrocodone-Acetaminophen] Nausea     Can use if he eats with it       Physical Exam   Vital Signs: Temp: 98.3  F (36.8  C) Temp src: Oral BP: 134/54 Pulse: 69   Resp: 18 SpO2: 99 % O2 Device: None (Room air)    Weight: 140 lbs 0 oz    General Appearance: Thin male in no acute distress  Eyes: Pupils equal, round reactive to light, sclera anicteric  HEENT: Mucous membranes moist, no neck lymphadenopathy  Respiratory: Lungs clear to auscultation bilaterally, no wheeze or crackles, no tachypnea  Cardiovascular: Regular rate and rhythm, normal S1/S2, no murmurs rubs or gallops  GI: Abdomen soft, nontender, nondistended, normal bowel sounds  Lymph/Hematologic: No peripheral edema  Skin: Large ulceration overlying posterior right calf without  cellulitic changes or drainage, dry and flaking skin of right lower extremity  Musculoskeletal: Left BKA, other extremities warm and well-perfused  Neurologic: Alert and appropriate, cranial is grossly intact  Psychiatric: Normal affect    Data   Data reviewed today: I reviewed all medications, new labs and imaging results over the last 24 hours. I personally reviewed no images or EKG's today.    Recent Labs   Lab 03/18/21  1434   WBC 6.4   HGB 9.8*   MCV 93   PLT 85*   *   POTASSIUM 4.7   CHLORIDE 115*   CO2 24   BUN 33*   CR 1.89*   ANIONGAP 6   MARTINE 8.6   GLC 87   ALBUMIN 3.1*   PROTTOTAL 7.0   BILITOTAL 0.5   ALKPHOS 73   ALT 16   AST 17

## 2021-03-18 NOTE — PROGRESS NOTES
RECEIVING UNIT ED HANDOFF REVIEW    ED Nurse Handoff Report was reviewed by: Susan Patricio RN on March 18, 2021 at 6:55 PM

## 2021-03-18 NOTE — ED PROVIDER NOTES
History     Chief Complaint:  Wound Check     HPI  Perfecto Reese is a 70 year old male with a history of HTN, CKD, COPD, and cellulitis who presents for evaluation of a wound check. The patient was discharged from [facility] on 3/09/21 for a wound to the right lower extremity. Since discharge the patient has not changed his wound dressing. He also denies showering or bathing since then. The patient reports that he has pain to his right leg that is worsening and does not have any pain medication left to manage the pain. He currently lives at home by himself and has been using his wheelchair to get around because he is unable to stand on his leg.     Review of Systems   Skin: Positive for wound.   All other systems reviewed and are negative.      Allergies:  Darvocet   Vicodin    Medications:   Albuterol   Citracal  Temovate  Vitamin B-12  Cymbalta  Ferosul  Folvite  Neurotonin   Lasix  Mucinex  Imodium   Xylocaine  Cortef  Cozaar  Prilosec  Nicorette  Mysoline  Flomax    Medical History:   Acute renal injury   Pneumonia of right lower lobe due to infectious organism  Diarrhea, unspecified type  Iron deficiency anemia, unspecified  Cervical spondylosis with myelopathy  Chronic obstructive pulmonary disease   Chronic pain  Depressive disorder  Esophageal reflux  Hepatic cirrhosis  Hepatitis C, chronic  History of lower limb amputation   Hyperlipidemia  Iatrogenic adrenal insufficiency   Insomnia, unspecified  Low back pain  Malaise and fatigue  Migraine headache  Opioid dependence  Other psoriasis  Pain in joint, ankle and foot  Personal history of tobacco use, presenting hazards to health  Phantom limb   Shoulder pain  Thrombocytopenia   Acute kidney injury  Cellulitis of right leg  Cellulitis of extremity  Below knee amputation, left   Chronic respiratory failure with hypoxia   Severe malnutrition   Other pancytopenia  Ulcer of right lower extremity with fat layer exposed  "  Chronic kidney disease, stage 3    Past Surgical History:    Amputation below knee Rt/Lt  As arthroscopy subtalar joint subtalar arthrodesis  Tonsillectomy   Open reduction internal fixation forearm,     Family History:    Father: colon cancer, C.A.D, COPD  Mother: osteoporosis, dementia     Social History:  The patient was unaccompanied to the ED.  PCP: Polo Neil   Lives at home alone.     Physical Exam     Patient Vitals for the past 24 hrs:   BP Temp Temp src Pulse Resp SpO2 Height Weight   03/18/21 1421 134/54 98.3  F (36.8  C) Oral 69 18 99 % 1.651 m (5' 5\") 63.5 kg (140 lb)      Physical Exam  General: Malnourished and unkept man who appears old than stated age. Resting on the bed in minimal distress watching television.    Eye:  Pupils are equal, round, and reactive.  Extraocular movements intact.    ENT:  No rhinorrhea.  Moist mucus membranes.  Normal tongue and tonsil.    Cardiac:  Regular rate and rhythm.  No murmurs, gallops, or rubs.    Pulmonary:  Clear to auscultation bilaterally.  No wheezes, rales, or rhonchi.    Abdomen:  Positive bowel sounds.  Abdomen is soft and non-distended, without focal tenderness.    Musculoskeletal:  Normal movement of all extremities without evidence for deficit. Patient is BKA left leg.     Skin:  Warm and dry without rashes. There is a large posterior lateral open wound to the right leg at various stages of healing. Wound looks improve from most recent discharge pictures though continues with broken down skin. The leg shows no other evidence of secondary infection.     Neurologic:  Non-focal exam without asymmetric weakness or numbness.     Psychiatric:  Patient is somewhat cantankerous. He has poor insight into his chronic disease process and needs for assistance at home. He is focused on receiving his chronic pain medication.          Emergency Department Course   Laboratory:  CBC: WBC 6.4, HGB 9.8 (L), PLT 85 (L)  CMP: Na 145 (H), chloride 115 (H), BUN 33 (H), " creatinine 1.89 (G), GFR 35 (L), albumin 3.1 (L) o/w WNL   Lactic acid (Resulted 1623): 0.7   ABO/Rh type and screen: pending  UA with micro: urineketon 5 (!), blood large (!), protein albumin 30 (!), leukocyte esterase large (!), WBC 22 (H), RBC 16 (H), mucous present (!) o/w negative   Asymptomatic COVID-19 Virus (Coronavirus) by PCR Nasopharyngeal swab: negative    Emergency Department Course:  Reviewed:  1504 I reviewed nursing notes, vitals, past medical history and care everywhere    Assessments:  1649 I rechecked the patient and explained findings.     Consults:   I spoke with Dr. Peters of the hospitalist service from [facility] regarding patient's presentation, findings, and plan of care.     Interventions:  1609 Dilaudid 2 mg PO    Disposition:  The patient was admitted to the hospital under the care of Dr. Peters.   Impression & Plan   Covid-19  Perfecto Reese was evaluated during a global COVID-19 pandemic, which necessitated consideration that the patient might be at risk for infection with the SARS-CoV-2 virus that causes COVID-19.   Applicable protocols for evaluation were followed during the patient's care.   COVID-19 was considered as part of the patient's evaluation. The plan for testing is:  a test was obtained during this visit.    Medical Decision Making:  This very complicated 70-year-old man returns to the hospital with ongoing issues of ability to care for himself along with an open right leg wound.  I invite the reader to review his admission approximately 1 month ago when this leg was found to be infected.  He underwent antibiotic therapy and was discharged to a TCU where he stayed for approximately 3 weeks.  He was discharged 9 days ago.  He states that since his discharge, he has not taken off his dressing, showered, or done much more than stay in his wheelchair in his apartment.  He states that he is eating most of the food that he has on hand and does not know how he will get more.   He ran out of his pain medication 2 days ago.  When he missed his wound care appointment, they called him today and advised him to come to the hospital for a refill of his pain medication along with assessment of his wound.    On my exam, the patient appears surprisingly well.  His vital signs are reassuring.  Pictures of his leg are as above.  He has significant dry skin all about the leg due to the lack of removal of his dressing, but the wound itself appears to be healing.  There is no redness or warmth.  There is no significant drainage.  Laboratory investigation was pursued which is generally reassuring.    At this juncture, I spoke with the patient several times and at length of how to proceed.  While he does not want to stay in a nursing home or an assisted living facility, he does not feel that he is able to care for himself adequately at home.  He states he cannot get in and out of his shower or bathtub.  He does not know where he is going to get more food.  He is out of his medications and does not know who will provide him with further pain medication.  For all of these issues, I do not feel that he is appropriate for discharge home as he is unable to care for himself.  Therefore, I spoke with the admitting hospitalist who will accept care of the patient.  He will be admitted under observation status as I do not see that there are any life-threatening medical issues at this time and he does not require further antibiotics.      Diagnosis:     ICD-10-CM    1. Unable to care for self  Z78.9 UA with Microscopic reflex to Culture     Asymptomatic SARS-CoV-2 COVID-19 Virus (Coronavirus) by PCR   2. Open wound of lower limb, right, initial encounter  S81.801A    3. History of COPD  Z87.09      Scribe Disclosure:  I, Rosario Hagen, am serving as a scribe at 3:04 PM on 3/18/2021 to document services personally performed by Chad A. Trierweiler, MD based on my observations and the provider's statements to me.     [facility]  [facility]               Trierweiler, Chad A, MD  03/19/21 0119

## 2021-03-19 ENCOUNTER — APPOINTMENT (OUTPATIENT)
Dept: PHYSICAL THERAPY | Facility: CLINIC | Age: 71
DRG: 682 | End: 2021-03-19
Attending: HOSPITALIST
Payer: MEDICARE

## 2021-03-19 PROBLEM — Z87.09 HISTORY OF COPD: Status: ACTIVE | Noted: 2021-03-19

## 2021-03-19 LAB
ABO + RH BLD: NORMAL
ABO + RH BLD: NORMAL
ANION GAP SERPL CALCULATED.3IONS-SCNC: 6 MMOL/L (ref 3–14)
BLD GP AB INVEST PLASRBC-IMP: NORMAL
BLOOD BANK CMNT PATIENT-IMP: NORMAL
BUN SERPL-MCNC: 32 MG/DL (ref 7–30)
CALCIUM SERPL-MCNC: 7.9 MG/DL (ref 8.5–10.1)
CHLORIDE SERPL-SCNC: 110 MMOL/L (ref 94–109)
CO2 SERPL-SCNC: 23 MMOL/L (ref 20–32)
CREAT SERPL-MCNC: 1.7 MG/DL (ref 0.66–1.25)
DAT C3-SP REAG RBC QL: NORMAL
DAT IGG-SP REAG RBC-IMP: NORMAL
DAT POLY-SP REAG RBC QL: NORMAL
GFR SERPL CREATININE-BSD FRML MDRD: 40 ML/MIN/{1.73_M2}
GLUCOSE SERPL-MCNC: 83 MG/DL (ref 70–99)
NT-PROBNP SERPL-MCNC: 850 PG/ML (ref 0–900)
POTASSIUM SERPL-SCNC: 4.2 MMOL/L (ref 3.4–5.3)
SODIUM SERPL-SCNC: 139 MMOL/L (ref 133–144)

## 2021-03-19 PROCEDURE — 250N000011 HC RX IP 250 OP 636: Performed by: PHYSICIAN ASSISTANT

## 2021-03-19 PROCEDURE — 250N000009 HC RX 250: Performed by: PHYSICIAN ASSISTANT

## 2021-03-19 PROCEDURE — 120N000001 HC R&B MED SURG/OB

## 2021-03-19 PROCEDURE — 250N000013 HC RX MED GY IP 250 OP 250 PS 637: Performed by: PHYSICIAN ASSISTANT

## 2021-03-19 PROCEDURE — 83880 ASSAY OF NATRIURETIC PEPTIDE: CPT | Performed by: HOSPITALIST

## 2021-03-19 PROCEDURE — 99207 PR CDG-CODE CATEGORY CHANGED: CPT | Performed by: HOSPITALIST

## 2021-03-19 PROCEDURE — 250N000013 HC RX MED GY IP 250 OP 250 PS 637: Performed by: HOSPITALIST

## 2021-03-19 PROCEDURE — 99233 SBSQ HOSP IP/OBS HIGH 50: CPT | Performed by: HOSPITALIST

## 2021-03-19 PROCEDURE — G0378 HOSPITAL OBSERVATION PER HR: HCPCS

## 2021-03-19 PROCEDURE — 96374 THER/PROPH/DIAG INJ IV PUSH: CPT

## 2021-03-19 PROCEDURE — 258N000003 HC RX IP 258 OP 636: Performed by: PHYSICIAN ASSISTANT

## 2021-03-19 PROCEDURE — 99207 PR APP CREDIT; MD BILLING SHARED VISIT: CPT | Performed by: PHYSICIAN ASSISTANT

## 2021-03-19 PROCEDURE — G0463 HOSPITAL OUTPT CLINIC VISIT: HCPCS

## 2021-03-19 PROCEDURE — 80048 BASIC METABOLIC PNL TOTAL CA: CPT | Performed by: HOSPITALIST

## 2021-03-19 PROCEDURE — 36415 COLL VENOUS BLD VENIPUNCTURE: CPT | Performed by: HOSPITALIST

## 2021-03-19 PROCEDURE — 97161 PT EVAL LOW COMPLEX 20 MIN: CPT | Mod: GP

## 2021-03-19 RX ORDER — GINSENG 100 MG
CAPSULE ORAL
Status: DISCONTINUED | OUTPATIENT
Start: 2021-03-19 | End: 2021-03-22 | Stop reason: HOSPADM

## 2021-03-19 RX ORDER — CEFTRIAXONE 1 G/1
1 INJECTION, POWDER, FOR SOLUTION INTRAMUSCULAR; INTRAVENOUS EVERY 24 HOURS
Status: DISCONTINUED | OUTPATIENT
Start: 2021-03-19 | End: 2021-03-21

## 2021-03-19 RX ORDER — HYDROMORPHONE HYDROCHLORIDE 2 MG/1
2 TABLET ORAL EVERY 8 HOURS PRN
Status: DISCONTINUED | OUTPATIENT
Start: 2021-03-19 | End: 2021-03-22 | Stop reason: HOSPADM

## 2021-03-19 RX ORDER — DULOXETIN HYDROCHLORIDE 30 MG/1
60 CAPSULE, DELAYED RELEASE ORAL DAILY
Status: DISCONTINUED | OUTPATIENT
Start: 2021-03-19 | End: 2021-03-19

## 2021-03-19 RX ORDER — SODIUM CHLORIDE 9 MG/ML
INJECTION, SOLUTION INTRAVENOUS CONTINUOUS
Status: ACTIVE | OUTPATIENT
Start: 2021-03-19 | End: 2021-03-19

## 2021-03-19 RX ORDER — LIDOCAINE 40 MG/G
CREAM TOPICAL
Status: DISCONTINUED | OUTPATIENT
Start: 2021-03-19 | End: 2021-03-22 | Stop reason: HOSPADM

## 2021-03-19 RX ORDER — MENTHOL AND METHYL SALICYLATE 7.6; 29 G/100G; G/100G
OINTMENT TOPICAL EVERY 6 HOURS PRN
Status: DISCONTINUED | OUTPATIENT
Start: 2021-03-19 | End: 2021-03-22 | Stop reason: HOSPADM

## 2021-03-19 RX ADMIN — GABAPENTIN 300 MG: 300 CAPSULE ORAL at 22:32

## 2021-03-19 RX ADMIN — BACITRACIN: 500 OINTMENT TOPICAL at 18:07

## 2021-03-19 RX ADMIN — UMECLIDINIUM 1 PUFF: 62.5 AEROSOL, POWDER ORAL at 18:06

## 2021-03-19 RX ADMIN — SODIUM CHLORIDE: 9 INJECTION, SOLUTION INTRAVENOUS at 13:14

## 2021-03-19 RX ADMIN — TAMSULOSIN HYDROCHLORIDE 0.8 MG: 0.4 CAPSULE ORAL at 11:24

## 2021-03-19 RX ADMIN — MICONAZOLE NITRATE: 20 POWDER TOPICAL at 18:08

## 2021-03-19 RX ADMIN — PRIMIDONE 50 MG: 50 TABLET ORAL at 22:32

## 2021-03-19 RX ADMIN — HYDROMORPHONE HYDROCHLORIDE 1 MG: 2 TABLET ORAL at 00:27

## 2021-03-19 RX ADMIN — CEFTRIAXONE SODIUM 1 G: 1 INJECTION, POWDER, FOR SOLUTION INTRAMUSCULAR; INTRAVENOUS at 13:16

## 2021-03-19 RX ADMIN — ACETAMINOPHEN 650 MG: 325 TABLET, FILM COATED ORAL at 03:43

## 2021-03-19 RX ADMIN — HYDROMORPHONE HYDROCHLORIDE 2 MG: 2 TABLET ORAL at 19:33

## 2021-03-19 RX ADMIN — DULOXETINE HYDROCHLORIDE 20 MG: 20 CAPSULE, DELAYED RELEASE ORAL at 11:24

## 2021-03-19 RX ADMIN — HYDROMORPHONE HYDROCHLORIDE 1 MG: 2 TABLET ORAL at 11:32

## 2021-03-19 RX ADMIN — MENTHOL AND METHYL SALICYLATE: 7.6; 29 OINTMENT TOPICAL at 19:34

## 2021-03-19 RX ADMIN — HYDROCORTISONE 20 MG: 20 TABLET ORAL at 11:25

## 2021-03-19 RX ADMIN — Medication 1 MG: at 22:45

## 2021-03-19 ASSESSMENT — ACTIVITIES OF DAILY LIVING (ADL)
ADLS_ACUITY_SCORE: 19
ADLS_ACUITY_SCORE: 19

## 2021-03-19 NOTE — PROGRESS NOTES
PRIOR TO DISCHARGE     Comments:   -diagnostic tests and consults completed and resulted Not Met  -vital signs normal or at patient baseline Not Met  -adequate pain control on oral analgesics Not Met  -safe disposition plan has been identified Not Met  Nurse to notify provider when observation goals have been met and patient is ready for discharge.     A&O. 3L NC. Dilaudid and Tylenol for pain control. LBKA. Right leg has a pre-existing wound.Scant bleeding, moist and flaky in some areas. Feet are cracked. Has a lambs wool boot. PT/OT/SW/ WOC nurse to see. Male urinary device intact and patent.

## 2021-03-19 NOTE — PROGRESS NOTES
Observation goals PRIOR TO DISCHARGE    Comments:     diagnostic tests and consults completed and resulted : Not met    -vital signs normal or at patient baseline : partially met    -adequate pain control on oral analgesics : Not met    -safe disposition plan has been identified : Not met

## 2021-03-19 NOTE — PLAN OF CARE
A&O x4, VSS on 3L of oxygen/baseline. Right lower leg pain 8-9/10, some relief with prn dilaudid and Tylenol. LBKA. Pre-existing wound on right lower leg, MARBIN, elevated, small drainage serosanguinous. Scrotum red, per pt request discontinued male urinary device, pt able to use urinal. Pulses with doppler. Shailesh reg diet, Ax1-2, lift or pivot. Has a lambs wool boot. Planned: PT/OT and SW/WOC nurse scheduled. Will continue to monitor.     Observation goals PRIOR TO DISCHARGE    Comments:     diagnostic tests and consults completed and resulted : Not met    -vital signs normal or at patient baseline : partially met    -adequate pain control on oral analgesics : Not met    -safe disposition plan has been identified : Not met

## 2021-03-19 NOTE — CONSULTS
[facility] Nurse Inpatient Wound Assessment   Reason for consultation: Evaluate and treat  RLE  wounds    Assessment  RLE wounds due to Mixed Etiology: malnutrition, poor perfusion, arterial self neglect  Status: initial assessment and evolving  Chronic wound, with heavy bioburden. Neglected to change his dressing since discharge from TCU.  Treatment Plan  RLE wounds: Daily    Cleanse wound and periwound with vashe #[id number] moistened gauze let sit x 10 minutes  Apply linda perineal cleanser to periwound tissue. Wipe off using dry inctontinence cloth  Apply endoform #[id number] to wound will need 5  Apply biatain ag foam #[id number] will need 2 or 3  Apply spandage size 7 (floor stock)     Orders Written, discharge instructions written   Recommended provider order: None, at this time  [facility] Nurse follow-up plan:weekly  Nursing to notify the Provider(s) and re-consult the [facility] Nurse if wound(s) deteriorates or new skin concern.    Patient History  According to provider note(s):  Perfecto Reese is a 70 year old male admitted on 3/18/2021.  Complex PMHx including severe COPD on 3L home O2 with ongoing tobacco use, adrenal insufficiency on chronic steroids, psoriasis, chronic Hep C with cirrhosis, PVD s/p left BKA, chronic wounds, non-compliance who presents from home for pain control after running out of his narcotics.  There is concern for safety at home and he is open to TCU so coming in for potential placement.     Note he was at TCU 2/5-3/9 following hospitalization for MICHAEL and cellulitis involving chronic RLE wounds.  He reports he has not been taking any of his meds (other than pain meds) as he was unsure what to take, and reports having not changed his RLE dressing up until today, which was the first home RN visit he had following his discharge from TCU.  TCU documents he would frequently decline therapies but was witnessed to transfer independently when he wished to go outside to smoke.  His presentation at last admission was  also in the setting of failure to thrive at home 1 week following TCU discharge.   He has been evaluated by Psych in the past (December 2020) and felt to be decisional.  He is now open to TCU placement again, if needed.     Objective Data  Containment of urine/stool: Incontinence Protocol    Active Diet Order  Orders Placed This Encounter      Regular Diet Adult      Output:   I/O last 3 completed shifts:  In: 450 [P.O.:450]  Out: -     Risk Assessment:   Sensory Perception: 4-->no impairment  Moisture: 3-->occasionally moist  Activity: 2-->chairfast  Mobility: 3-->slightly limited  Nutrition: 2-->probably inadequate  Friction and Shear: 2-->potential problem  Donn Score: 16                          Labs:   Recent Labs   Lab 03/18/21  1434   ALBUMIN 3.1*   HGB 9.8*   WBC 6.4       Physical Exam  Areas of skin assessed: focused RLE        Wound Location: right lower leg medial and posterior   Date of last photo today  Wound History: chronic seen at wound clinic [facility] Dr Odonnell    Wound Base: 100 % pale pink smooth slimy hypogranulation tissue      Palpation of the wound bed: normal      Drainage: moderate     Description of drainage: green and yellow     Measurements (length x width x depth, in cm) 14.0  x 8.0  x  0.1 cm      Tunneling N/A     Undermining N/A  Periwound skin: dry/scaly      Color: pale      Temperature: normal   Odor: none  Pain: mild, burning  Pain intervention prior to dressing change: PO    Interventions  Visual inspection and assessment completed in emergency department  Wound Care Rationale Improve absorptive capacity, Decrease bacterial load, Pain reduction and build new tissue with collagen   Wound Care: completed by RN  Supplies: ordered: endoform, vashe biatain silver foam  Current off-loading measures: Pillows under calves  Current support surface: Standard  Atmos Air mattress  Education provided to: plan of care  Discussed plan of care with Patient and Nurse    Vida Ventura RN BS CWON

## 2021-03-19 NOTE — PROGRESS NOTES
[facility]  Hospitalist Progress Note  Admission Date:  3/18/2021       Assessment and Plan:   Perfecto Reese is a 70 year old male admitted on 3/18/2021.  Complex PMHx including severe COPD on 3L home O2 with ongoing tobacco use, adrenal insufficiency on chronic steroids, psoriasis, chronic Hep C with cirrhosis, PVD s/p left BKA, chronic wounds, non-compliance who presents from home for pain control after running out of his narcotics.  There is concern for safety at home and he is open to TCU so coming in for potential placement.     Note he was at TCU 2/5-3/9 following hospitalization for MICHAEL and cellulitis involving chronic RLE wounds.  He reports he has not been taking any of his meds (other than pain meds) as he was unsure what to take, and reports having not changed his RLE dressing up until today, which was the first home RN visit he had following his discharge from TCU.  TCU documents he would frequently decline therapies but was witnessed to transfer independently when he wished to go outside to smoke.  His presentation at last admission was also in the setting of failure to thrive at home 1 week following TCU discharge.   He has been evaluated by Psych in the past (December 2020) and felt to be decisional.        #UTI  #MICHAEL on CKD III  #Hypernatremia; improving   #Dehydration   Baseline creatinine appears 1.2-1.5, on arrival 1.89, after fluids Cr improved to baseline  -UA dirty, UC pending  PLAN:  -IV Rocephin  -one liter IVF of NS at 100 cc  -hold on resuming Losartan and Lasix for now, might restart tomorrow  -BMP, CBC in the am  -no echo on file , check BNP and low threshold to get echo  -monitor on abx until UC returns, likely tomorrow    #Chronic right lower extremity wounds   #Hx of L BKA (2010, [facility]): At TCU was receiving daily wound cares, but has had none since discharge from TCU.  Follows with Dr. Odonnell as outpatient.   PLAN:  - wound consult/wound cares       #Physical deconditioning  At baseline is WC bound with pivots for transfers.  PLAN:  - PT evaluation     #Hx of non-compliance  Noted in chart. Pt reports that he has been off of all medications (other than pain meds) since discharge from TCU.  PLAN:  -TCU then will require home care RN for med management      #Chronic hypoxic and hypercarbic respiratory failure due to COPD utilizing 3 LPM via NC at baseline, not in acute exacerbation:   -stable and on baseline O2  PLAN:  - Continue PTA inhalers (Incruse, prn albuterol ) and nebs        #Chronic normocytic anemia  #Chronic thrombocytopenia  Baseline hgb 7-8 g/dL, baseline plt 40-90.  Anemia and thrombocytopenia likely multifactorial from liver cirrhosis, splenomegaly, CKD and psoriasis.  Prior iron labs suggestive of anemia of chronic disease.  [facility]   - cell counts stable at baseline     #Chronic adrenal insufficiency:   - Continue PTA hydrocortisone       #Essential hypertension:   - will hold losartan due to MICHAEL      #History of psoriasis   -On Humira; resume in outpatient setting.       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   -  PTA Cymbalta and melatonin         Diet:   Regular   DVT Prophylaxis: VTE Prophylaxis contraindicated due to thrombocytopenia and chronic RLE wounds (LLE BKA)  Bruce Catheter: not present  Code Status:   Full code per patient  Dispo:  Follow labs, urine culture.  Patient requires IVF and ongoing IV abx for UTI.   Hopefully to TCU in 1-3 days, pending PATIENT/OT input        Simona Cruz PA-C ([place])   Hospitalist  Pager: [phone]  12:07 PM  March 19, 2021                 Interval History:   doing well; no pain  No dysuria  Eating ok  Discussed likely possibility of going to TCU                    Medications:        "cefTRIAXone  1 g Intravenous Q24H     DULoxetine  20 mg Oral Daily     gabapentin  300 mg Oral At Bedtime     hydrocortisone  20 mg Oral QAM     primidone  50 mg Oral At Bedtime     sodium chloride (PF)  3 mL Intracatheter Q8H     tamsulosin  0.8 mg Oral Daily     acetaminophen, acetaminophen, bisacodyl, HYDROmorphone, lidocaine 4%, lidocaine, lidocaine (buffered or not buffered), melatonin, miconazole, naloxone **OR** naloxone **OR** naloxone **OR** naloxone, ondansetron **OR** ondansetron, polyethylene glycol, senna-docusate **OR** senna-docusate, sodium chloride (PF), sodium chloride (PF), sodium chloride (PF)               Physical Exam:     Patient Vitals for the past 24 hrs:   BP Temp Temp src Pulse Resp SpO2 Height Weight   21 0752 124/41 96  F (35.6  C) Axillary 54 20 96 % -- --   21 0343 131/42 -- -- 59 18 100 % -- --   21 0000 134/45 96  F (35.6  C) -- 72 18 98 % -- --   21 -- -- -- -- 18 -- -- --   21 (!) 140/47 98.4  F (36.9  C) Oral 60 20 99 % -- --   21 1933 (!) 154/54 98.1  F (36.7  C) Oral 60 20 100 % 1.651 m (5' 5\") --   21 1421 134/54 98.3  F (36.8  C) Oral 69 18 99 % 1.651 m (5' 5\") 63.5 kg (140 lb)     Wt Readings from Last 4 Encounters:   21 63.5 kg (140 lb)   21 65.5 kg (144 lb 4.8 oz)   21 65.5 kg (144 lb 4.8 oz)   21 65.5 kg (144 lb 4.8 oz)         Vital Sign Ranges  Temperature Temp  Av.4  F (36.3  C)  Min: 96  F (35.6  C)  Max: 98.4  F (36.9  C)   Blood pressure Systolic (24hrs), Av , Min:124 , Max:154        Diastolic (24hrs), Av, Min:41, Max:54      Pulse Pulse  Av.3  Min: 54  Max: 72   Respirations Resp  Av.9  Min: 18  Max: 20   Pulse oximetry SpO2  Av.7 %  Min: 96 %  Max: 100 %         Intake/Output Summary (Last 24 hours) at 3/19/2021 1227  Last data filed at 3/19/2021 0700  Gross per 24 hour   Intake 450 ml   Output 100 ml   Net 350 ml       Constitutional:   awake, alert, " cooperative, no apparent distress, and appears stated age     Neck:   Supple, symmetrical, trachea midline, no adenopathy, thyroid symmetric, not enlarged and no tenderness, skin normal     Lungs:   No increased work of breathing, good air exchange, clear to auscultation bilaterally, no crackles or wheezing     Cardiovascular:   regular rate and rhythm, normal S1 and S2, no S3 or S4, and no murmur noted     Abdomen:   + bowel sounds, soft, non-distended, non-tender, no masses palpated, no hepatosplenomegally     Extremities  No edema, cyanosis or clubbing              Data:     Recent Labs   Lab Test 03/18/21  1434 03/05/21 03/03/21 02/09/21  0601 02/09/21  0601   WBC 6.4 5.3 5.7   < >  --    HGB 9.8* 7.7* 7.3*   < >  --    MCV 93 96 94   < >  --    PLT 85* 97* 93*   < >  --    INR  --   --   --   --  1.25*    < > = values in this interval not displayed.      Recent Labs   Lab Test 03/19/21  0617 03/18/21  1434 03/05/21    145* 142   POTASSIUM 4.2 4.7 4.9   CHLORIDE 110* 115* 109*   CO2 23 24 28   BUN 32* 33* 54*   CR 1.70* 1.89* 1.54*   ANIONGAP 6 6 5   MARTINE 7.9* 8.6 8.2*   GLC 83 87 86     Recent Labs   Lab Test 03/19/21  0617 03/18/21  1434 03/05/21 03/03/21 02/19/21   GLC 83 87 86 86 79

## 2021-03-19 NOTE — PHARMACY-ADMISSION MEDICATION HISTORY
Pharmacy Medication History  Admission medication history interview status for the 3/18/2021  admission is complete. See EPIC admission navigator for prior to admission medications     Location of Interview: Phone  Medication history sources: Surescripts and recent geriatric service note at TCU; pt is unsure of what meds he takes.     Significant changes made to the medication list:    In the past week, patient estimated taking medication this percent of the time: less than 50 %, since discharged from TCU on 3-9-21, he did not take any of his meds other than pain meds.     Additional medication history information:       Medication reconciliation completed by provider prior to medication history? No    Time spent in this activity: 15 min    Prior to Admission medications    Medication Sig Last Dose Taking? Auth Provider   albuterol (PROAIR HFA/PROVENTIL HFA/VENTOLIN HFA) 108 (90 Base) MCG/ACT inhaler Inhale 2 puffs into the lungs every 4 hours as needed for shortness of breath / dyspnea or wheezing   Unknown, Entered By History   calcium citrate (CITRACAL) 950 MG tablet Take 4 tablets by mouth daily   Unknown, Entered By History   clobetasol (TEMOVATE) 0.05 % external solution Apply topically At Bedtime Apply to scalp   Unknown, Entered By History   cyanocobalamin (VITAMIN B-12) 1000 MCG tablet Take 1 tablet (1,000 mcg) by mouth daily   Lana Stevens APRN CNP   DULoxetine (CYMBALTA) 30 MG capsule Take 2 capsules (60 mg) by mouth daily   Ze Medellin MD   ferrous sulfate (FEROSUL) 325 (65 Fe) MG tablet Take 1 tablet (325 mg) by mouth every other day   Ze Medellin MD   fluticasone-vilanterol (BREO ELLIPTA) 200-25 MCG/INH inhaler Inhale 1 puff into the lungs daily   Lana Stevens APRN CNP   folic acid (FOLVITE) 1 MG tablet Take 1 tablet (1 mg) by mouth daily   Lana Stevens APRN CNP   furosemide (LASIX) 20 MG tablet Take 1 tablet (20 mg) by mouth daily   Ze Medellin MD   gabapentin  (NEURONTIN) 300 MG capsule Take 1 capsule (300 mg) by mouth At Bedtime   Lana Stevens APRN CNP   guaiFENesin (MUCINEX) 600 MG 12 hr tablet Take 600 mg by mouth 2 times daily For pneumonia   Reported, Patient   hydrocortisone (CORTEF) 20 MG tablet Take 20 mg by mouth every morning   Unknown, Entered By History   HYDROmorphone (DILAUDID) 2 MG tablet Take 0.5 tablets (1 mg) by mouth every 8 hours as needed for severe pain   Lana Stevens APRN CNP   lidocaine (XYLOCAINE) 5 % external ointment Apply topically 3 times daily as needed for moderate pain (rib pain)   Unknown, Entered By History   loperamide (IMODIUM) 2 MG capsule Take 1 capsule (2 mg) by mouth 4 times daily as needed for diarrhea   Davonte Naranjo MD   losartan (COZAAR) 25 MG tablet Take 1 tablet (25 mg) by mouth daily   Ze Medellin MD   multivitamin w/minerals (THERA-VIT-M) tablet Take 1 tablet by mouth daily   Davonte Naranjo MD   nicotine (NICORETTE) 2 MG gum Place 2 mg inside cheek as needed for smoking cessation   Unknown, Entered By History   omeprazole (PRILOSEC) 20 MG DR capsule Take 20 mg by mouth daily   Unknown, Entered By History   primidone (MYSOLINE) 50 MG tablet Take 50 mg by mouth At Bedtime   Unknown, Entered By History   tamsulosin (FLOMAX) 0.4 MG capsule Take 0.8 mg by mouth daily   Unknown, Entered By History   umeclidinium (INCRUSE ELLIPTA) 62.5 MCG/INH inhaler Inhale 1 puff into the lungs daily   Lana Stevens APRN CNP   vitamin C (ASCORBIC ACID) 1000 MG TABS Take 1 tablet (1,000 mg) by mouth daily   Lana Stevens APRN CNP   vitamin D3 (CHOLECALCIFEROL) 250 mcg (55077 units) capsule Take 1 capsule (250 mcg) by mouth daily   Lana Stevens APRN CNP   Wound Cleansers (VASHE WOUND THERAPY EX) USE TO SOAK GAUZE AND CLEAN WOUND FOR TWENTY MINUTES W/ DAILY WOUND CARE CHANGES.   Reported, Patient       The information provided in this note is only as accurate as the sources available at the time of update(s)

## 2021-03-19 NOTE — PROVIDER NOTIFICATION
MD Notification    Notified Person: PA     Notified Person Name: Simona Briggs    Notification Date/Time: 3/19/21 5:01pm     Notification Interaction: web page     Purpose of Notification: Pt requesting icy hot cream. Please order if able thanks. MR RN      Orders Received: verbal order for icy hot topical gel q6 PRN placed    Comments:

## 2021-03-19 NOTE — UTILIZATION REVIEW
Admission Status; Secondary Review Determination    Under the authority of the Utilization Management Committee, the utilization review process indicated a secondary review on the above patient. The review outcome is based on review of the medical records, discussions with staff, and applying clinical experience noted on the date of the review.    (x) Inpatient Status Appropriate - This patient's medical care is consistent with medical management for inpatient care and reasonable inpatient medical practice.    RATIONALE FOR DETERMINATION:70-year-old male with severe oxygen dependent COPD, adrenal insufficiency on chronic steroids, chronic hepatitis C with cirrhosis, peripheral vascular disease status post left below the knee amputation with chronic wounds on right lower extremity.  Patientl recently discharged from a TCU with resultant poor self-care regarding nutrition, wound care and mobility.  Patient now presents to the hospital with clinical findings of acute kidney injury, hypernatremia, UTI with ongoing need for wound care.  Patient significant increased risk is already has 1 below the knee amputation.  Patient requiring IV fluids as well as IV antibiotics for greater than 2 nights in the hospital appropriate for inpatient management.    At the time of admission with the information available to the attending physician more than 2 nights Hospital complex care was anticipated, based on patient risk of adverse outcome if treated as outpatient and complex care required. Inpatient admission is appropriate based on the Medicare guidelines.    This document was produced using voice recognition software    The information on this document is developed by the utilization review team in order for the business office to ensure compliance. This only denotes the appropriateness of proper admission status and does not reflect the quality of care rendered.    The definitions of Inpatient Status and Observation Status  used in making the determination above are those provided in the CMS Coverage Manual, Chapter 1 and Chapter 6, section 70.4.    Sincerely,    Josue Laguerre MD  Utilization Review  Physician Advisor  [facility].

## 2021-03-20 ENCOUNTER — APPOINTMENT (OUTPATIENT)
Dept: CARDIOLOGY | Facility: CLINIC | Age: 71
DRG: 682 | End: 2021-03-20
Attending: HOSPITALIST
Payer: MEDICARE

## 2021-03-20 ENCOUNTER — APPOINTMENT (OUTPATIENT)
Dept: OCCUPATIONAL THERAPY | Facility: CLINIC | Age: 71
DRG: 682 | End: 2021-03-20
Attending: PHYSICIAN ASSISTANT
Payer: MEDICARE

## 2021-03-20 LAB
ALBUMIN UR-MCNC: 10 MG/DL
ANION GAP SERPL CALCULATED.3IONS-SCNC: 5 MMOL/L (ref 3–14)
APPEARANCE UR: CLEAR
BACTERIA #/AREA URNS HPF: ABNORMAL /HPF
BASOPHILS # BLD AUTO: 0 10E9/L (ref 0–0.2)
BASOPHILS NFR BLD AUTO: 0.5 %
BILIRUB UR QL STRIP: NEGATIVE
BUN SERPL-MCNC: 28 MG/DL (ref 7–30)
CALCIUM SERPL-MCNC: 7.6 MG/DL (ref 8.5–10.1)
CHLORIDE SERPL-SCNC: 112 MMOL/L (ref 94–109)
CO2 SERPL-SCNC: 25 MMOL/L (ref 20–32)
COLOR UR AUTO: ABNORMAL
CREAT SERPL-MCNC: 1.55 MG/DL (ref 0.66–1.25)
DIFFERENTIAL METHOD BLD: ABNORMAL
EOSINOPHIL # BLD AUTO: 0.8 10E9/L (ref 0–0.7)
EOSINOPHIL NFR BLD AUTO: 21.3 %
ERYTHROCYTE [DISTWIDTH] IN BLOOD BY AUTOMATED COUNT: 14.2 % (ref 10–15)
GFR SERPL CREATININE-BSD FRML MDRD: 45 ML/MIN/{1.73_M2}
GLUCOSE SERPL-MCNC: 97 MG/DL (ref 70–99)
GLUCOSE UR STRIP-MCNC: NEGATIVE MG/DL
HCT VFR BLD AUTO: 24.2 % (ref 40–53)
HGB BLD-MCNC: 7.8 G/DL (ref 13.3–17.7)
HGB UR QL STRIP: ABNORMAL
HYALINE CASTS #/AREA URNS LPF: 1 /LPF (ref 0–2)
IMM GRANULOCYTES # BLD: 0 10E9/L (ref 0–0.4)
IMM GRANULOCYTES NFR BLD: 0.3 %
IRON SATN MFR SERPL: 43 % (ref 15–46)
IRON SERPL-MCNC: 57 UG/DL (ref 35–180)
KETONES UR STRIP-MCNC: NEGATIVE MG/DL
LEUKOCYTE ESTERASE UR QL STRIP: ABNORMAL
LYMPHOCYTES # BLD AUTO: 0.4 10E9/L (ref 0.8–5.3)
LYMPHOCYTES NFR BLD AUTO: 11.2 %
MCH RBC QN AUTO: 30.4 PG (ref 26.5–33)
MCHC RBC AUTO-ENTMCNC: 32.2 G/DL (ref 31.5–36.5)
MCV RBC AUTO: 94 FL (ref 78–100)
MONOCYTES # BLD AUTO: 0.2 10E9/L (ref 0–1.3)
MONOCYTES NFR BLD AUTO: 6 %
MUCOUS THREADS #/AREA URNS LPF: PRESENT /LPF
NEUTROPHILS # BLD AUTO: 2.3 10E9/L (ref 1.6–8.3)
NEUTROPHILS NFR BLD AUTO: 60.7 %
NITRATE UR QL: NEGATIVE
NRBC # BLD AUTO: 0 10*3/UL
NRBC BLD AUTO-RTO: 0 /100
PH UR STRIP: 5.5 PH (ref 5–7)
PLATELET # BLD AUTO: 47 10E9/L (ref 150–450)
PLATELET # BLD EST: ABNORMAL 10*3/UL
POTASSIUM SERPL-SCNC: 4 MMOL/L (ref 3.4–5.3)
RBC # BLD AUTO: 2.57 10E12/L (ref 4.4–5.9)
RBC #/AREA URNS AUTO: 24 /HPF (ref 0–2)
RBC MORPH BLD: ABNORMAL
SODIUM SERPL-SCNC: 142 MMOL/L (ref 133–144)
SOURCE: ABNORMAL
SP GR UR STRIP: 1.01 (ref 1–1.03)
SQUAMOUS #/AREA URNS AUTO: 0 /HPF (ref 0–1)
TIBC SERPL-MCNC: 133 UG/DL (ref 240–430)
UROBILINOGEN UR STRIP-MCNC: 0 MG/DL (ref 0–2)
WBC # BLD AUTO: 3.9 10E9/L (ref 4–11)
WBC #/AREA URNS AUTO: 62 /HPF (ref 0–5)

## 2021-03-20 PROCEDURE — 83540 ASSAY OF IRON: CPT | Performed by: PHYSICIAN ASSISTANT

## 2021-03-20 PROCEDURE — 81001 URINALYSIS AUTO W/SCOPE: CPT | Performed by: PHYSICIAN ASSISTANT

## 2021-03-20 PROCEDURE — 97535 SELF CARE MNGMENT TRAINING: CPT | Mod: GO

## 2021-03-20 PROCEDURE — 250N000013 HC RX MED GY IP 250 OP 250 PS 637: Performed by: PHYSICIAN ASSISTANT

## 2021-03-20 PROCEDURE — 83550 IRON BINDING TEST: CPT | Performed by: PHYSICIAN ASSISTANT

## 2021-03-20 PROCEDURE — 87086 URINE CULTURE/COLONY COUNT: CPT | Performed by: HOSPITALIST

## 2021-03-20 PROCEDURE — 85025 COMPLETE CBC W/AUTO DIFF WBC: CPT | Performed by: PHYSICIAN ASSISTANT

## 2021-03-20 PROCEDURE — 99233 SBSQ HOSP IP/OBS HIGH 50: CPT | Performed by: HOSPITALIST

## 2021-03-20 PROCEDURE — 999N000208 ECHOCARDIOGRAM COMPLETE

## 2021-03-20 PROCEDURE — 85018 HEMOGLOBIN: CPT | Performed by: PHYSICIAN ASSISTANT

## 2021-03-20 PROCEDURE — 36415 COLL VENOUS BLD VENIPUNCTURE: CPT | Performed by: PHYSICIAN ASSISTANT

## 2021-03-20 PROCEDURE — 97165 OT EVAL LOW COMPLEX 30 MIN: CPT | Mod: GO

## 2021-03-20 PROCEDURE — 250N000011 HC RX IP 250 OP 636: Performed by: PHYSICIAN ASSISTANT

## 2021-03-20 PROCEDURE — 93306 TTE W/DOPPLER COMPLETE: CPT | Mod: 26 | Performed by: INTERNAL MEDICINE

## 2021-03-20 PROCEDURE — 120N000001 HC R&B MED SURG/OB

## 2021-03-20 PROCEDURE — 250N000013 HC RX MED GY IP 250 OP 250 PS 637: Performed by: INTERNAL MEDICINE

## 2021-03-20 PROCEDURE — 80048 BASIC METABOLIC PNL TOTAL CA: CPT | Performed by: PHYSICIAN ASSISTANT

## 2021-03-20 PROCEDURE — 255N000002 HC RX 255 OP 636: Performed by: HOSPITALIST

## 2021-03-20 RX ORDER — TRAZODONE HYDROCHLORIDE 50 MG/1
50 TABLET, FILM COATED ORAL AT BEDTIME
Status: COMPLETED | OUTPATIENT
Start: 2021-03-20 | End: 2021-03-20

## 2021-03-20 RX ORDER — TRAZODONE HYDROCHLORIDE 50 MG/1
50 TABLET, FILM COATED ORAL
Status: COMPLETED | OUTPATIENT
Start: 2021-03-20 | End: 2021-03-20

## 2021-03-20 RX ADMIN — MENTHOL AND METHYL SALICYLATE: 7.6; 29 OINTMENT TOPICAL at 12:38

## 2021-03-20 RX ADMIN — DULOXETINE HYDROCHLORIDE 20 MG: 20 CAPSULE, DELAYED RELEASE ORAL at 09:54

## 2021-03-20 RX ADMIN — CEFTRIAXONE SODIUM 1 G: 1 INJECTION, POWDER, FOR SOLUTION INTRAMUSCULAR; INTRAVENOUS at 13:06

## 2021-03-20 RX ADMIN — HUMAN ALBUMIN MICROSPHERES AND PERFLUTREN 9 ML: 10; .22 INJECTION, SOLUTION INTRAVENOUS at 14:24

## 2021-03-20 RX ADMIN — HYDROMORPHONE HYDROCHLORIDE 2 MG: 2 TABLET ORAL at 19:06

## 2021-03-20 RX ADMIN — HYDROMORPHONE HYDROCHLORIDE 2 MG: 2 TABLET ORAL at 09:55

## 2021-03-20 RX ADMIN — UMECLIDINIUM 1 PUFF: 62.5 AEROSOL, POWDER ORAL at 10:04

## 2021-03-20 RX ADMIN — HYDROCORTISONE 20 MG: 20 TABLET ORAL at 09:54

## 2021-03-20 RX ADMIN — TRAZODONE HYDROCHLORIDE 50 MG: 50 TABLET ORAL at 00:47

## 2021-03-20 RX ADMIN — TRAZODONE HYDROCHLORIDE 50 MG: 50 TABLET ORAL at 23:04

## 2021-03-20 RX ADMIN — GABAPENTIN 300 MG: 300 CAPSULE ORAL at 21:39

## 2021-03-20 RX ADMIN — Medication 1 MG: at 23:04

## 2021-03-20 RX ADMIN — TAMSULOSIN HYDROCHLORIDE 0.8 MG: 0.4 CAPSULE ORAL at 09:54

## 2021-03-20 RX ADMIN — PRIMIDONE 50 MG: 50 TABLET ORAL at 21:39

## 2021-03-20 ASSESSMENT — ACTIVITIES OF DAILY LIVING (ADL)
ADLS_ACUITY_SCORE: 23
PREVIOUS_RESPONSIBILITIES: MEAL PREP;FINANCES
ADLS_ACUITY_SCORE: 23

## 2021-03-20 NOTE — PROVIDER NOTIFICATION
MD Notification    Notified Person: MD    Notified Person Name: Vila    Notification Date/Time: 03/19/21, 11:54 PM,    Notification Interaction: text page    Purpose of Notification:   Pt complaining of anxiety, he is requesting something to help with that. States he usually receives anxiety meds while here.  Thanks      Orders Received: pending response    Comments:

## 2021-03-20 NOTE — PLAN OF CARE
[id number]: A/O x4. VSS. C/o pain in R leg wounds and R shoulder, heat applied to R shoulder, oral dilaudid q8h, icy hot applied to R shoulder as well. Denies N/V, regular diet. Urinal at bedside, no BM this shift. Plan pending.

## 2021-03-20 NOTE — PROGRESS NOTES
[facility]  Hospitalist Progress Note   03/20/2021          Assessment and Plan:       Perfecto Reese is a 70 year old male with severe COPD on 3L home O2 with ongoing tobacco use, adrenal insufficiency on chronic steroids, psoriasis, chronic Hep C with cirrhosis, PVD s/p left BKA, chronic wounds, non-compliance admitted on 3/18/2021 from home for pain control after running out of narcotics (missed clinic appointment), recommended to come to ED for evaluation of wound, pain management].  Switch to inpatient on 3/19.    Patient that TCU 2/5-3/9 following hospitalization for MICHAEL and cellulitis involving chronic RLE wounds. Reported he has not been taking any of his meds (other than pain meds) as he was unsure what to take, and reports having not changed his RLE dressing up for 9 days up until ED eval. TCU documents he would frequently decline therapies but was witnessed to transfer independently when he wished to go outside to smoke.  His presentation at last admission was also in the setting of failure to thrive at home 1 week following TCU discharge.   He has been evaluated by Psych in the past (December 2020) and felt to be decisional.       Urinary tract infection  Hematuria likely in the setting of UTI.  WBC 6.4, lactic acid 0.7.  UA with large blood, large leukocyte Estrace, few bacteria.  Urine cultures pending.  Started on IV ceftriaxone, [3/19] to continue.  Repeat UA-for hematuria, clearance of infection after completing course of antibiotics.    MICHAEL on CKD III from dehydration  Hypernatremia; improved likely from dehydration  CKD stage III.  Baseline creatinine 1.2-1.5, at time of admission creatinine 1.89.  With IV hydration renal function back to baseline.  Hold we will resume PTA Lasix today, continue to hold PTA losartan.  Monitor renal function closely.  Avoid nephrotoxic drugs.    Chronic right lower extremity wounds   Peripheral vascular disease   Hx of L BKA (2010, [facility]):   At TCU was receiving daily wound cares, but has had none since discharge from TCU for 9 days.  Wound care team following.  Appreciate recommendation.  Follows with Dr. Odonnell as outpatient.      Physical deconditioning in the setting of chronic medical illness  At baseline is WC bound with pivots for transfers.  PT recommended baseline, home with home PT, TCU for medical management given noncompliance.     Hx of non-compliance  Concern for cognitive impairment.  Treat UTI as above.  TSH within normal limits in January 2021.  Patient noncompliant with care, off all medication since discharge from TCU.  OT for cognitive eval.    Will need TCU on discharge, patient seems open to TCU.  Social work assistance with transition requested.     Chronic hypoxic and hypercarbic respiratory failure on 3 L home oxygen at baseline.  Chronic obstructive airway disease, not in exacerbation.  Patient at baseline uses wheelchair, no new shortness of breath.  Reports S OB at baseline.  proBNP 850, echocardiogram for evaluation of heart function.  Continue PTA inhalers (Incruse, prn albuterol ) and nebs  Continue PTA oxygen.    Chronic normocytic anemia  Chronic thrombocytopenia  Chronic leukopenia.  Baseline hgb 7-8 g/dL, baseline plt 40-90.  Anemia and thrombocytopenia likely multifactorial from liver cirrhosis, splenomegaly, CKD and psoriasis.    Iron saturation index 43.  [facility]   Cell counts stable at baseline, no bleeding noted at this time.     Chronic adrenal insufficiency:   Continue PTA hydrocortisone       Essential hypertension:   Hold PTA losartan due to acute on chronic kidney injury.     History of psoriasis   On Humira; resume in outpatient setting.       Chronic pain syndrome  Patient not taking any medication other than Dilaudid.  Reports is frustrated with the pain regimen, Encourage Tylenol use for mild to moderate pain.  Will avoid further  escalation of pain medication [on 1 mg of Dilaudid every 8 hours as needed at home] given history of noncompliance, hypoxic respiratory failure on supplemental oxygen.   Continue PTA gabapentin.     Severe malnutrition  Hypoalbuminemia likely from poor oral intake, liver cirrhosis.  Continue PTA dietary supplements.     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   Insomnia.  Continue PTA Cymbalta and melatonin     DVT Prophylaxis prophylaxis contraindicated due to thrombocytopenia. Lower extremity wounds.,  Left lower extremity BKA  Code Status: Full Code  Disposition: Expected discharge in 1 to 2 days pending clinical improvement, safe discharge plan in place.    Discussed with patient, bedside RN  More than 60% of time spent in direct patient care, care coordination, patient counseling, and formalizing plan of care.     Romain Green MD        Interval History:      Patient lying in bed.  Drowsy and falls asleep between conversation.  Assist of 1-2, lift.  Afebrile overnight.  Continues to be on 3 L nasal oxygen.         Physical Exam:        Physical Exam   Temp:  [95.8  F (35.4  C)-98.3  F (36.8  C)] 98.3  F (36.8  C)  Pulse:  [58-77] 59  Resp:  [18-20] 18  BP: (121-138)/(37-44) 137/37  SpO2:  [97 %-99 %] 98 %    Intake/Output Summary (Last 24 hours) at 3/20/2021 0811  Last data filed at 3/20/2021 0633  Gross per 24 hour   Intake --   Output 1400 ml   Net -1400 ml       Admission Weight: 63.5 kg (140 lb)  Current Weight: 63.5 kg (140 lb)    PHYSICAL EXAM  GENERAL: Patient is in no distress.  Drowsy and falls asleep between conversation.  HEART: Regular rate and rhythm. S1S2. Systolic murmur   LUNGS: Bilateral decreased breath sounds, no wheezing or crackles.  Respirations unlabored  ABDOMEN: Soft, no abdominal tenderness, bowel sounds heard   NEURO: Moving all extremities.    EXTREMITIES: Left BKA.  Right lower extremity dressing intact.  SKIN: Warm, dry.  PSYCHIATRY drowsy        Medications:          cefTRIAXone  1 g Intravenous Q24H     DULoxetine  20 mg Oral Daily     gabapentin  300 mg Oral At Bedtime     hydrocortisone  20 mg Oral QAM     primidone  50 mg Oral At Bedtime     sodium chloride (PF)  3 mL Intracatheter Q8H     tamsulosin  0.8 mg Oral Daily     umeclidinium  1 puff Inhalation Daily     acetaminophen, acetaminophen, bacitracin, bisacodyl, HYDROmorphone, lidocaine 4%, lidocaine 4%, lidocaine, lidocaine (buffered or not buffered), lidocaine (buffered or not buffered), melatonin, methyl salicylate-menthol, miconazole, naloxone **OR** naloxone **OR** naloxone **OR** naloxone, ondansetron **OR** ondansetron, polyethylene glycol, senna-docusate **OR** senna-docusate, sodium chloride (PF), sodium chloride (PF), sodium chloride (PF), sodium chloride (PF), sodium chloride (PF)         Data:      All new lab and imaging data was reviewed.

## 2021-03-20 NOTE — PLAN OF CARE
A&O x4, VSS on 3L of oxygen/baseline.  LBKA. Pre-existing wound on right lower leg, Dressing intact.elevated, Scrotum red, pt able to use urinal.  Tolerating  reg diet, Ax1-2, lift or pivot. Has a lambs wool boot. Pt slept on and off.Will monitor

## 2021-03-20 NOTE — PLAN OF CARE
A&Ox4. VSS on 3 LPM O2 (baseline), pt states he feels his breathing/ISIDRO is at baseline. L BTK amputation, chronic RLE wound. WOC RN saw, see note. Wound cares complete per WOC instruction. RLE pain controlled w/ PO dilaudid. Icy hot ointment for R shoulder pain.  Dry area on back, continue to lotionize as able. Frequent repositioning encouraged, pt is able to reposition independently. RLE elevated, pt refuses for LLE elvation. UA shows UT; rocephin initiated. Na 139 today. Creat 1.70 (1.89 yesterday). IVF infusing. Groin area excoriated, bacitracin and nystatin applied (see EMAR). R butt cheek abrasion, (could be pressure related, but looks more like pt scraped it while transferring), cleansed and mepilex applied. See PT note. OT to see tomorrow for cognitive screen. Pt to transfer to [facility], Report given to RN who will be assuming cares upon transfer.

## 2021-03-20 NOTE — PLAN OF CARE
VSS on 3 ltrs, on Regular diet, needs to be encouraged for intake, Dilaudid 2 mg x 1, dressing on RLE changed, refused to be offloaded from bed, incontinent at times, encourage to be repositioned, echo/UA  done.

## 2021-03-21 LAB
BACTERIA SPEC CULT: NO GROWTH
CK SERPL-CCNC: 23 U/L (ref 30–300)
CREAT SERPL-MCNC: 1.54 MG/DL (ref 0.66–1.25)
CRP SERPL-MCNC: 24.4 MG/L (ref 0–8)
ERYTHROCYTE [DISTWIDTH] IN BLOOD BY AUTOMATED COUNT: 14.6 % (ref 10–15)
GFR SERPL CREATININE-BSD FRML MDRD: 45 ML/MIN/{1.73_M2}
HCT VFR BLD AUTO: 24.2 % (ref 40–53)
HGB BLD-MCNC: 7.7 G/DL (ref 13.3–17.7)
Lab: NORMAL
MCH RBC QN AUTO: 30.3 PG (ref 26.5–33)
MCHC RBC AUTO-ENTMCNC: 31.8 G/DL (ref 31.5–36.5)
MCV RBC AUTO: 95 FL (ref 78–100)
PLATELET # BLD AUTO: 50 10E9/L (ref 150–450)
RBC # BLD AUTO: 2.54 10E12/L (ref 4.4–5.9)
SPECIMEN SOURCE: NORMAL
WBC # BLD AUTO: 4 10E9/L (ref 4–11)

## 2021-03-21 PROCEDURE — 250N000013 HC RX MED GY IP 250 OP 250 PS 637: Performed by: PHYSICIAN ASSISTANT

## 2021-03-21 PROCEDURE — 36415 COLL VENOUS BLD VENIPUNCTURE: CPT | Performed by: HOSPITALIST

## 2021-03-21 PROCEDURE — 250N000011 HC RX IP 250 OP 636: Performed by: PHYSICIAN ASSISTANT

## 2021-03-21 PROCEDURE — 120N000001 HC R&B MED SURG/OB

## 2021-03-21 PROCEDURE — 86140 C-REACTIVE PROTEIN: CPT | Performed by: HOSPITALIST

## 2021-03-21 PROCEDURE — 250N000013 HC RX MED GY IP 250 OP 250 PS 637: Performed by: HOSPITALIST

## 2021-03-21 PROCEDURE — 85027 COMPLETE CBC AUTOMATED: CPT | Performed by: HOSPITALIST

## 2021-03-21 PROCEDURE — 93005 ELECTROCARDIOGRAM TRACING: CPT

## 2021-03-21 PROCEDURE — 82550 ASSAY OF CK (CPK): CPT | Performed by: HOSPITALIST

## 2021-03-21 PROCEDURE — 93010 ELECTROCARDIOGRAM REPORT: CPT | Performed by: INTERNAL MEDICINE

## 2021-03-21 PROCEDURE — 82565 ASSAY OF CREATININE: CPT | Performed by: HOSPITALIST

## 2021-03-21 PROCEDURE — 99233 SBSQ HOSP IP/OBS HIGH 50: CPT | Performed by: HOSPITALIST

## 2021-03-21 RX ORDER — FUROSEMIDE 20 MG
20 TABLET ORAL DAILY
Status: DISCONTINUED | OUTPATIENT
Start: 2021-03-21 | End: 2021-03-22 | Stop reason: HOSPADM

## 2021-03-21 RX ADMIN — PRIMIDONE 50 MG: 50 TABLET ORAL at 21:44

## 2021-03-21 RX ADMIN — HYDROCORTISONE 20 MG: 20 TABLET ORAL at 08:44

## 2021-03-21 RX ADMIN — ACETAMINOPHEN 650 MG: 325 TABLET, FILM COATED ORAL at 19:47

## 2021-03-21 RX ADMIN — GABAPENTIN 300 MG: 300 CAPSULE ORAL at 21:44

## 2021-03-21 RX ADMIN — FUROSEMIDE 20 MG: 20 TABLET ORAL at 08:44

## 2021-03-21 RX ADMIN — UMECLIDINIUM 1 PUFF: 62.5 AEROSOL, POWDER ORAL at 08:44

## 2021-03-21 RX ADMIN — DULOXETINE HYDROCHLORIDE 20 MG: 20 CAPSULE, DELAYED RELEASE ORAL at 08:44

## 2021-03-21 RX ADMIN — TAMSULOSIN HYDROCHLORIDE 0.8 MG: 0.4 CAPSULE ORAL at 08:44

## 2021-03-21 RX ADMIN — HYDROMORPHONE HYDROCHLORIDE 2 MG: 2 TABLET ORAL at 03:04

## 2021-03-21 RX ADMIN — CEFTRIAXONE SODIUM 1 G: 1 INJECTION, POWDER, FOR SOLUTION INTRAMUSCULAR; INTRAVENOUS at 11:29

## 2021-03-21 RX ADMIN — HYDROMORPHONE HYDROCHLORIDE 2 MG: 2 TABLET ORAL at 18:13

## 2021-03-21 ASSESSMENT — ACTIVITIES OF DAILY LIVING (ADL)
ADLS_ACUITY_SCORE: 23

## 2021-03-21 NOTE — PROVIDER NOTIFICATION
Brief update:    Request for sleep aid    Added 50 trazodone x1    Repeat EKG in AM    Consider seroquel, trazodone, or additional medication if this is a long standing issue (may need to consider switch off duloxetine); not certain if he only has difficulty sleeping in hospital setting. Defer to rouding provider    Kevin Vila MD  10:50 PM

## 2021-03-21 NOTE — PLAN OF CARE
Pt A&O, VSS on 3L O2 (baseline per pt). Bedrest, turn and repositioned q2h, able to do it independently but needs encouragement. IV SL in R forearm. L BKA, right LE dressing CDI with boot on. WOC following, daily dressing changes. Uses urinal independently in bed, can also be incontinent of stool. BM today. Groin excoriated, miconazole powder applied. Mepilex on coccyx. Regular diet. Pleasant and cooperative.

## 2021-03-21 NOTE — PROGRESS NOTES
03/20/21 1336   Quick Adds   Type of Visit Initial Occupational Therapy Evaluation   Living Environment   People in home alone   Current Living Arrangements apartment   Home Accessibility no concerns   Transportation Anticipated health plan transportation   Living Environment Comments Lives in fully accessible apartment.    Self-Care   Usual Activity Tolerance fair   Current Activity Tolerance poor   Regular Exercise No   Equipment Currently Used at Home grab bar, toilet;grab bar, tub/shower;raised toilet seat;tub bench;wheelchair, manual   Activity/Exercise/Self-Care Comment HH shower. Pt reports being Mod I all self-cares, has had  nurse set-up meds for quite some time while doing wound care and stated HH therapies were to be ordered prior to admit.   General Information   Onset of Illness/Injury or Date of Surgery 03/17/21   Referring Physician Dr. Green   Patient/Family Therapy Goal Statement (OT) return home w/services   Additional Occupational Profile Info/Pertinent History of Current Problem 70 year old male with severe COPD on 3L home O2 with ongoing tobacco use, adrenal insufficiency on chronic steroids, psoriasis, chronic Hep C with cirrhosis, PVD s/p left BKA, chronic wounds, non-compliance admitted on 3/18/2021 from home for pain control after running out of narcotics (missed clinic appointment), recommended to come to ED for evaluation of wound, pain management.  Switch to inpatient on 3/19. TCU 2/5-3/9 following hospitalization for MICHAEL and cellulitis involving chronic RLE wounds. Reported he has not been taking any of his meds (other than pain meds) as he was unsure what to take, and reports having not changed his RLE dressing up for 9 days up until ED eval. TCU documents he would frequently decline therapies but was witnessed to transfer independently when he wished to go outside to smoke.  His presentation at last admission was also in the setting of failure to thrive at home 1 week following  TCU discharge.   He has been evaluated by Psych in the past (December 2020) and felt to be decisional.   OT order for SLUMS/cognitive assessment.   Existing Precautions/Restrictions fall;oxygen therapy device and L/min   Limitations/Impairments other (see comments)  (L BKA, no prosthesis avail - reports he is awaiting new one)   Cognitive Status Examination   Orientation Status orientation to person, place and time   Affect/Mental Status (Cognitive) flat/blunted affect   Follows Commands WFL;75-90% accuracy;follows three-step commands   Cognitive Status Comments Will adminster SLUMS.    Pain Assessment   Patient Currently in Pain No   Transfers   Transfer Comments PT to address functional mobility (pt wc dependent)   Instrumental Activities of Daily Living (IADL)   Previous Responsibilities meal prep;finances  (pt reports he has had services for HH tasks and med mgmt,)   Clinical Impression   Criteria for Skilled Therapeutic Interventions Met (OT) yes   OT Diagnosis decreased IADL performance   OT Problem List-Impairments impacting ADL problems related to  (medication compliance)   Assessment of Occupational Performance 1-3 Performance Deficits   Identified Performance Deficits medication management   Planned Therapy Interventions (OT) cognition;ADL retraining   Clinical Decision Making Complexity (OT) low complexity   Predicted Duration of Therapy 1x eval and treatment   Risk & Benefits of therapy have been explained evaluation/treatment results reviewed;care plan/treatment goals reviewed;patient;participants voiced agreement with care plan;participants included   Total Evaluation Time (Minutes)   Total Evaluation Time (Minutes) 15   Copied into a note by Aparna Varghese OTR/L on behalf of Loren Miner OTR/L.

## 2021-03-21 NOTE — PLAN OF CARE
OT: OT orders received again today for SLUMS assessment, however it was completed yesterday. Please see OT documentation from 3/20/21.

## 2021-03-21 NOTE — PROGRESS NOTES
[facility]  Hospitalist Progress Note   03/21/2021          Assessment and Plan:       Perfecto Reese is a 70 year old male with severe COPD on 3L home O2 with ongoing tobacco use, adrenal insufficiency on chronic steroids, psoriasis, chronic Hep C with cirrhosis, PVD s/p left BKA, chronic wounds, non-compliance admitted on 3/18/2021 from home for pain control after running out of narcotics (missed clinic appointment), recommended to come to ED for evaluation of wound, pain management].  Switch to inpatient on 3/19.    Patient that TCU 2/5-3/9 following hospitalization for MICHAEL and cellulitis involving chronic RLE wounds. Reported he has not been taking any of his meds (other than pain meds) as he was unsure what to take, and reports having not changed his RLE dressing up for 9 days up until ED eval. TCU documents he would frequently decline therapies but was witnessed to transfer independently when he wished to go outside to smoke.  His presentation at last admission was also in the setting of failure to thrive at home 1 week following TCU discharge.   He has been evaluated by Psych in the past (December 2020) and felt to be decisional.       Pyuria, cultures negative.  Hematuria   WBC 6.4, lactic acid 0.7.  UA with large blood, large leukocyte Estrace, few bacteria.  Urine cultures from 3/18 with 10-50,000 VRE.  Repeat urine cultures from 3/20 negative   Was on IV ceftriaxone[3/19]- discontinue today.  Repeat UA in 1 to 2 weeks, if ongoing hematuria follow-up with urology as outpatient.    MICHAEL on CKD III from dehydration  Hypernatremia; improved likely from dehydration  CKD stage III.  Baseline creatinine 1.2-1.5, at time of admission creatinine 1.89.    With IV hydration renal function back to baseline.   PTA Lasix restarted 3/21  PTA losartan on hold, will likely restart tomorrow.  Monitor renal function closely.  Avoid nephrotoxic drugs.    Chronic right lower extremity wounds   Peripheral  vascular disease   Hx of L BKA (2010, [facility]):   At TCU was receiving daily wound cares, but has had none since discharge from TCU for 9 days PTA.  Wound care team following.  Appreciate recommendation.  Follows with Dr. Odonnell as outpatient.      Physical deconditioning in the setting of chronic medical illness  Hx of non-compliance  Concern for cognitive impairment.  [25/30 on slums]  At baseline is WC bound with pivots for transfers.  TSH within normal limits in January 2021.  Patient noncompliant with care, off all medication since discharge from TCU.  Rehab team recommending home with home PT and OT, can consider TCU for medical management given noncompliance.     Chronic hypoxic and hypercarbic respiratory failure on 3 L home oxygen at baseline.  Chronic obstructive airway disease, not in exacerbation.  Patient at baseline uses wheelchair, no new shortness of breath.    Reports SOB at baseline. proBNP 850  Echo Estimated EF of 55 to 63%, no regional wall motion abnormalities.  Dilatation of inferior vena cava present.  Moderate to severe LA dilatation.  No hemodynamically significant valve disease  Continue PTA inhalers (Incruse, prn albuterol ) and nebs  Continue PTA oxygen.  Diuresis with PTA oral Lasix.    Chronic normocytic anemia  Chronic thrombocytopenia  Chronic leukopenia.  Baseline hgb 7-8 g/dL, baseline plt 40-90.  Anemia and thrombocytopenia likely multifactorial from liver cirrhosis, splenomegaly, CKD and psoriasis.    Iron saturation index 43.  [facility]   Cell counts stable at baseline, no bleeding noted at this time.     Chronic adrenal insufficiency:   Continue PTA hydrocortisone       Essential hypertension:   Resume PTA oral Lasix 3/21.  PTA losartan on hold, will likely restart tomorrow.     History of psoriasis   On Humira; resume in outpatient setting.       Chronic pain syndrome  Patient not taking any medication other  than Dilaudid.  Reports is frustrated with the pain regimen, Encourage Tylenol use for mild to moderate pain.    Will avoid further escalation of pain medication [on 1 mg of Dilaudid every 8 hours as needed at home] given history of noncompliance, hypoxic respiratory failure on supplemental oxygen.   Continue PTA gabapentin.     Severe malnutrition  Hypoalbuminemia likely from poor oral intake, liver cirrhosis.  Continue PTA dietary supplements.     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   Insomnia.  Continue PTA Cymbalta and melatonin     DVT Prophylaxis contraindicated due to thrombocytopenia. Lower extremity wounds.,  Left lower extremity BKA  Code Status: Full Code  Disposition: Patient will benefit from TCU, initially agreed for TCU, later today declining TCU and would prefer to be discharged home.  Expected discharge tomorrow to home with home care after medications filled at [facility] in [place].  Social work assistance with home care services request.    Discussed with patient, bedside RN. More than 60% of time spent in direct patient care, care coordination, patient counseling, and formalizing plan of care.     Romain Green MD        Interval History:      Patient lying in bed.  Patient awake alert lying in bed.  Continues to complain of lower extremity pain.  Assist of 1-2, lift.  Afebrile overnight.  Continues to be on 3 L nasal oxygen.         Physical Exam:        Physical Exam   Temp:  [97.5  F (36.4  C)-98.4  F (36.9  C)] 98.4  F (36.9  C)  Pulse:  [61-69] 65  Resp:  [16-18] 18  BP: (124-143)/(36-48) 126/48  SpO2:  [92 %-99 %] 97 %    Intake/Output Summary (Last 24 hours) at 3/20/2021 0811  Last data filed at 3/20/2021 0633  Gross per 24 hour   Intake --   Output 1400 ml   Net -1400 ml       Admission Weight: 63.5 kg (140 lb)  Current Weight: 63.5 kg (140 lb)    PHYSICAL EXAM  GENERAL: Patient is in no distress.  Drowsy and falls asleep between  conversation.  HEART: Regular rate and rhythm. S1S2. Systolic murmur   LUNGS: Bilateral decreased breath sounds, no wheezing or crackles.  Respirations unlabored  ABDOMEN: Soft, no abdominal tenderness, bowel sounds heard   NEURO: Moving all extremities.    EXTREMITIES: Left BKA.  Right lower extremity dressing intact.  SKIN: Warm, dry.  PSYCHIATRY drowsy       Medications:          cefTRIAXone  1 g Intravenous Q24H     DULoxetine  20 mg Oral Daily     gabapentin  300 mg Oral At Bedtime     hydrocortisone  20 mg Oral QAM     primidone  50 mg Oral At Bedtime     sodium chloride (PF)  3 mL Intracatheter Q8H     tamsulosin  0.8 mg Oral Daily     umeclidinium  1 puff Inhalation Daily     acetaminophen, acetaminophen, bacitracin, bisacodyl, HYDROmorphone, lidocaine 4%, lidocaine 4%, lidocaine, lidocaine (buffered or not buffered), lidocaine (buffered or not buffered), melatonin, methyl salicylate-menthol, miconazole, naloxone **OR** naloxone **OR** naloxone **OR** naloxone, ondansetron **OR** ondansetron, polyethylene glycol, senna-docusate **OR** senna-docusate, sodium chloride (PF), sodium chloride (PF), sodium chloride (PF), sodium chloride (PF), sodium chloride (PF)         Data:      All new lab and imaging data was reviewed.

## 2021-03-21 NOTE — PLAN OF CARE
Pt is A and O X 4. VSS on 3L O2, per baseline. R PIV SL+ int Abx. L BKA. R LE wound. WOC following. Dressing CDI. Incontinent urine and bowels at times.  At times, able to use urinal independently.  Turn and repo q 2 hrs independently, with encouragement. Redness to groin noted. Miconazole powder applied. Protective mepilex to coccyx. Regular diet, tolerating well. Discharge plan for tomorrow after 14:00. Continue to monitor.

## 2021-03-21 NOTE — PROVIDER NOTIFICATION
MD Notification    Notified Person: MD    Notified Person Name: Dr. Green    Notification Date/Time: 3/21/21 at 0935    Notification Interaction: pager    Purpose of Notification: Per Microbiology, preliminary  UA results from 3/18/21  looking like VRE. Pending final results. Writer asked  MD for advice.    Orders Received: Dr. Green stated she will place an order for a UA today,  and PT today. Pending orders at this time.    Comments: Per Dr. Green, UA results from 3/20/21 were negative for VRE, pt okay to discharge tomorrow after 1400.

## 2021-03-21 NOTE — PLAN OF CARE
A&O x4, VSS on 3L/NC per home 02 baseline with sat in the 90s. LS diminished crackles at the bases. Denies SOB. RLE drsg CDI with rookie boots on. Up with  2 assist/lift. Pain managed with prn Dilaudid po. SW/OT/wound ostomy RN following.

## 2021-03-22 VITALS
SYSTOLIC BLOOD PRESSURE: 148 MMHG | WEIGHT: 140 LBS | DIASTOLIC BLOOD PRESSURE: 52 MMHG | RESPIRATION RATE: 16 BRPM | HEART RATE: 109 BPM | TEMPERATURE: 97.2 F | BODY MASS INDEX: 23.32 KG/M2 | OXYGEN SATURATION: 99 % | HEIGHT: 65 IN

## 2021-03-22 LAB
BLD PROD TYP BPU: NORMAL
BLD PROD TYP BPU: NORMAL
BLD UNIT ID BPU: 0
BLD UNIT ID BPU: 0
BLOOD PRODUCT CODE: NORMAL
BLOOD PRODUCT CODE: NORMAL
BPU ID: NORMAL
BPU ID: NORMAL
TRANSFUSION STATUS PATIENT QL: NORMAL

## 2021-03-22 PROCEDURE — 99239 HOSP IP/OBS DSCHRG MGMT >30: CPT | Performed by: INTERNAL MEDICINE

## 2021-03-22 PROCEDURE — 250N000013 HC RX MED GY IP 250 OP 250 PS 637: Performed by: HOSPITALIST

## 2021-03-22 PROCEDURE — 250N000013 HC RX MED GY IP 250 OP 250 PS 637: Performed by: PHYSICIAN ASSISTANT

## 2021-03-22 RX ORDER — HYDROMORPHONE HYDROCHLORIDE 2 MG/1
2 TABLET ORAL EVERY 8 HOURS PRN
Qty: 15 TABLET | Refills: 0 | Status: ON HOLD | OUTPATIENT
Start: 2021-03-22 | End: 2021-05-12

## 2021-03-22 RX ORDER — TAMSULOSIN HYDROCHLORIDE 0.4 MG/1
0.8 CAPSULE ORAL DAILY
Qty: 60 CAPSULE | Refills: 0 | Status: SHIPPED | OUTPATIENT
Start: 2021-03-22 | End: 2021-04-21

## 2021-03-22 RX ORDER — FOLIC ACID 1 MG/1
1 TABLET ORAL DAILY
Qty: 30 TABLET | Refills: 1 | Status: SHIPPED | OUTPATIENT
Start: 2021-03-22

## 2021-03-22 RX ORDER — LANOLIN ALCOHOL/MO/W.PET/CERES
1000 CREAM (GRAM) TOPICAL DAILY
Qty: 30 TABLET | Refills: 1 | Status: SHIPPED | OUTPATIENT
Start: 2021-03-22

## 2021-03-22 RX ORDER — FUROSEMIDE 20 MG
20 TABLET ORAL DAILY
Qty: 30 TABLET | Refills: 0 | Status: SHIPPED | OUTPATIENT
Start: 2021-03-22 | End: 2021-05-05

## 2021-03-22 RX ORDER — IBUPROFEN 200 MG
4 CAPSULE ORAL DAILY
Qty: 120 TABLET | Refills: 0 | Status: SHIPPED | OUTPATIENT
Start: 2021-03-22 | End: 2021-04-21

## 2021-03-22 RX ORDER — ALBUTEROL SULFATE 90 UG/1
2 AEROSOL, METERED RESPIRATORY (INHALATION) EVERY 4 HOURS PRN
Qty: 8 G | Refills: 1 | Status: SHIPPED | OUTPATIENT
Start: 2021-03-22

## 2021-03-22 RX ORDER — GABAPENTIN 300 MG/1
300 CAPSULE ORAL AT BEDTIME
Qty: 60 CAPSULE | Refills: 1 | Status: SHIPPED | OUTPATIENT
Start: 2021-03-22

## 2021-03-22 RX ORDER — PRIMIDONE 50 MG/1
50 TABLET ORAL AT BEDTIME
Qty: 30 TABLET | Refills: 0 | Status: SHIPPED | OUTPATIENT
Start: 2021-03-22 | End: 2021-05-05

## 2021-03-22 RX ORDER — HYDROCORTISONE 20 MG/1
20 TABLET ORAL EVERY MORNING
Qty: 30 TABLET | Refills: 0 | Status: SHIPPED | OUTPATIENT
Start: 2021-03-22 | End: 2021-05-05

## 2021-03-22 RX ORDER — FERROUS SULFATE 325(65) MG
325 TABLET ORAL EVERY OTHER DAY
Qty: 90 TABLET | Refills: 1 | Status: SHIPPED | OUTPATIENT
Start: 2021-03-22

## 2021-03-22 RX ORDER — DULOXETIN HYDROCHLORIDE 30 MG/1
60 CAPSULE, DELAYED RELEASE ORAL DAILY
Qty: 60 CAPSULE | Refills: 0 | Status: SHIPPED | OUTPATIENT
Start: 2021-03-22 | End: 2021-05-05

## 2021-03-22 RX ORDER — LOSARTAN POTASSIUM 25 MG/1
25 TABLET ORAL DAILY
Qty: 30 TABLET | Refills: 0 | Status: SHIPPED | OUTPATIENT
Start: 2021-03-22 | End: 2021-05-05

## 2021-03-22 RX ORDER — LIDOCAINE 50 MG/G
OINTMENT TOPICAL 3 TIMES DAILY PRN
Qty: 50 G | Refills: 0 | Status: SHIPPED | OUTPATIENT
Start: 2021-03-22

## 2021-03-22 RX ADMIN — TAMSULOSIN HYDROCHLORIDE 0.8 MG: 0.4 CAPSULE ORAL at 08:12

## 2021-03-22 RX ADMIN — HYDROCORTISONE 20 MG: 20 TABLET ORAL at 08:13

## 2021-03-22 RX ADMIN — HYDROMORPHONE HYDROCHLORIDE 2 MG: 2 TABLET ORAL at 06:46

## 2021-03-22 RX ADMIN — HYDROMORPHONE HYDROCHLORIDE 2 MG: 2 TABLET ORAL at 14:51

## 2021-03-22 RX ADMIN — UMECLIDINIUM 1 PUFF: 62.5 AEROSOL, POWDER ORAL at 08:13

## 2021-03-22 RX ADMIN — FUROSEMIDE 20 MG: 20 TABLET ORAL at 08:13

## 2021-03-22 RX ADMIN — DULOXETINE HYDROCHLORIDE 20 MG: 20 CAPSULE, DELAYED RELEASE ORAL at 08:13

## 2021-03-22 RX ADMIN — Medication 1 MG: at 01:43

## 2021-03-22 ASSESSMENT — ACTIVITIES OF DAILY LIVING (ADL)
ADLS_ACUITY_SCORE: 23

## 2021-03-22 NOTE — PLAN OF CARE
[id number] Patient is alert and oriented, vital signs stable on 3L of O2. Right PIV Saline locked. Unable to access wound to BLE. Dressing intact. Turn and reposition Q2hrs with encouragement can be done independently by patient. PRN Tylenol give for pain rated at 5. Able to use urinal at the bedside. Probably discharge tomorrow at 2pm

## 2021-03-22 NOTE — DISCHARGE SUMMARY
Patient discharged at 6:27 PM to home with [facility].  IV was discontinued. Pain at time of discharge was 0/10. Belongings returned to patient from room and security. Reviewed belongings with pt and he verified correct belongings.  Discharge instructions and medications reviewed with patient.  Patient verbalized understanding and all questions were answered.  Prescriptions given to patient.  At time of discharge, patient condition was stable and left the unit via [facility] on 3L O2.

## 2021-03-22 NOTE — DISCHARGE INSTRUCTIONS
[email or web address] [facility] will be following you at discharge for Skilled RN, PT/OT & SW services.  Please call [phone] if you have any questions or concerns.    RLE wounds: Daily    Cleanse wound and periwound with Vashe #[id number] moistened gauze let sit x 10 minutes  Apply linda perineal cleanser to periwound tissue. Wipe off using dry inctontinence cloth  Apply endoform or yani or fibracol to wound will need 5 ( or collagen such as fibracol)  Apply silver adhesive foam will need 2 or 3 (or equivalent)  Apply spandage size 7 (floor stock)

## 2021-03-22 NOTE — DISCHARGE SUMMARY
[facility]    Discharge Summary  Hospitalist    Date of Admission:  3/18/2021  Date of Discharge:  3/22/2021  Discharging Provider: Carmen Flores MD    Discharge Diagnoses   Generalized weakness     History of Present Illness   Please review admission history and physical.    Hospital Course   Perfecto Reese was admitted on 3/18/2021.  The following problems were addressed during his hospitalization:    Active Problems:    COPD exacerbation (H)    Open wound of lower limb, right, initial encounter    Unable to care for self    History of COPD  Perfecto Reese is a 70 year old male with severe COPD on 3L home O2 with ongoing tobacco use, adrenal insufficiency on chronic steroids, psoriasis, chronic Hep C with cirrhosis, PVD s/p left BKA, chronic wounds, non-compliance admitted on 3/18/2021 from home for pain control after running out of narcotics (missed clinic appointment), recommended to come to ED for evaluation of wound, pain management].  Switch to inpatient on 3/19.     Patient that TCU 2/5-3/9 following hospitalization for MICHAEL and cellulitis involving chronic RLE wounds. Reported he has not been taking any of his meds (other than pain meds) as he was unsure what to take, and reports having not changed his RLE dressing up for 9 days up until ED eval. TCU documents he would frequently decline therapies but was witnessed to transfer independently when he wished to go outside to smoke.  His presentation at last admission was also in the setting of failure to thrive at home 1 week following TCU discharge.   He has been evaluated by Psych in the past (December 2020) and felt to be decisional.        Pyuria, cultures negative.  Hematuria   WBC 6.4, lactic acid 0.7.  UA with large blood, large leukocyte Estrace, few bacteria.  Urine cultures from 3/18 with 10-50,000 VRE.  Repeat urine cultures from 3/20 negative   Was on IV ceftriaxone[3/19]- discontinue today.  Repeat UA in 1 to 2  weeks, if ongoing hematuria follow-up with urology as outpatient.     MICHAEL on CKD III from dehydration  Hypernatremia; improved likely from dehydration  CKD stage III.  Baseline creatinine 1.2-1.5, at time of admission creatinine 1.89.    With IV hydration renal function back to baseline.   PTA Lasix restarted 3/21  PTA losartan restarted upon discharge  Renal function baseline upon discharge.     Chronic right lower extremity wounds   Peripheral vascular disease   Hx of L BKA (2010, [facility]):   At TCU was receiving daily wound cares, but has had none since discharge from TCU for 9 days PTA.  Wound care team following.  Appreciate recommendation.  Follows with Dr. Odonnell as outpatient.      Physical deconditioning in the setting of chronic medical illness  Hx of non-compliance  Concern for cognitive impairment.  [25/30 on slums]  At baseline is WC bound with pivots for transfers.  TSH within normal limits in January 2021.  Patient noncompliant with care, off all medication since discharge from TCU.  Rehab team recommending home with home PT and OT, can consider TCU for medical management given noncompliance.  Patient wanted to go back home, he was discharged with home care, all his medications were filled prior to discharge at our pharmacy.     Chronic hypoxic and hypercarbic respiratory failure on 3 L home oxygen at baseline.  Chronic obstructive airway disease, not in exacerbation.  Patient at baseline uses wheelchair, no new shortness of breath.    Reports SOB at baseline. proBNP 850  Echo Estimated EF of 55 to 63%, no regional wall motion abnormalities.  Dilatation of inferior vena cava present.  Moderate to severe LA dilatation.  No hemodynamically significant valve disease  Continue PTA inhalers (Incruse, prn albuterol ) and nebs  Continue PTA oxygen.  Diuresis with PTA oral Lasix.     Chronic normocytic anemia  Chronic thrombocytopenia  Chronic leukopenia.  Baseline hgb 7-8 g/dL, baseline  plt 40-90.  Anemia and thrombocytopenia likely multifactorial from liver cirrhosis, splenomegaly, CKD and psoriasis.    Iron saturation index 43.  [facility]   Cell counts stable at baseline, no bleeding noted at this time.     Chronic adrenal insufficiency:   Continue PTA hydrocortisone       Essential hypertension:   Resume PTA oral Lasix 3/21.  PTA losartan was on hold, restarted upon discharge.     History of psoriasis   On Humira; resume in outpatient setting.       Chronic pain syndrome  Patient not taking any medication other than Dilaudid.  Reports is frustrated with the pain regimen, Encourage Tylenol use for mild to moderate pain.  Continue PTA gabapentin       Severe malnutrition  Hypoalbuminemia likely from poor oral intake, liver cirrhosis.  Continue PTA dietary supplements.     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   Insomnia.  Continue PTA Cymbalta and melatonin       Carmen Flores MD    Significant Results and Procedures       Pending Results   These results will be followed up by PCP  Unresulted Labs Ordered in the Past 30 Days of this Admission     Date and Time Order Name Status Description    3/18/2021 1843 Urine Culture Aerobic Bacterial Preliminary           Code Status   Full Code       Primary Care Physician   Polo Neil    Physical Exam   Temp: 96.8  F (36  C) Temp src: Oral BP: 130/45 Pulse: 61   Resp: 16 SpO2: 97 % O2 Device: Nasal cannula Oxygen Delivery: 2.5 LPM  Vitals:    03/18/21 1421   Weight: 63.5 kg (140 lb)     Vital Signs with Ranges  Temp:  [96.8  F (36  C)-97.9  F (36.6  C)] 96.8  F (36  C)  Pulse:  [61-83] 61  Resp:  [14-18] 16  BP: (130-148)/(42-46) 130/45  SpO2:  [97 %-98 %] 97 %  I/O last 3 completed shifts:  In: 120 [P.O.:120]  Out: 1100 [Urine:1100]    The patient was examined on the day of discharge.    Discharge Disposition   Admited to home care:   Agency:   Discharged to  home  Condition at discharge: Stable    Consultations This Hospital Stay   WOUND OSTOMY CONTINENCE NURSE  IP CONSULT  CARE MANAGEMENT / SOCIAL WORK IP CONSULT  OCCUPATIONAL THERAPY ADULT IP CONSULT  PHYSICAL THERAPY ADULT IP CONSULT  PHYSICAL THERAPY ADULT IP CONSULT  OCCUPATIONAL THERAPY ADULT IP CONSULT  SOCIAL WORK IP CONSULT  OCCUPATIONAL THERAPY ADULT IP CONSULT    Time Spent on this Encounter   ICarmen MD, personally saw the patient today and spent greater than 30 minutes discharging this patient.    Discharge Orders      Home care nursing referral      Home Care PT Referral for Hospital Discharge      Home Care OT Referral for Hospital Discharge      Reason for your hospital stay    Generalized weakness     Follow-up and recommended labs and tests     Follow up with primary care provider, Polo Neil, within 7 days to evaluate medication change and for hospital follow- up.  The following labs/tests are recommended: basic metabolic panel in 4-5 days . Please do urine culture in 1 week     Activity    Your activity upon discharge: activity as tolerated     Discharge Instructions    Continue wound care at home, follow up with primary care physician .     MD face to face encounter    Documentation of Face to Face and Certification for Home Health Services    I certify that patient: Perfecto Reese is under my care and that I, or a nurse practitioner or physician's assistant working with me, had a face-to-face encounter that meets the physician face-to-face encounter requirements with this patient on: 3/22/2021.    This encounter with the patient was in whole, or in part, for the following medical condition, which is the primary reason for home health care: .    I certify that, based on my findings, the following services are medically necessary home health services: Nursing, Occupational Therapy and Physical Therapy.    My clinical findings support the need for the above services because: Nurse is  needed: To provide assessment and oversight required in the home to assure adherence to the medical plan due to: noncompliance,cognitive issues, wound care needs .., Occupational Therapy Services are needed to assess and treat cognitive ability and address ADL safety due to impairment in cognition . and Physical Therapy Services are needed to assess and treat the following functional impairments: deconditioning .    Further, I certify that my clinical findings support that this patient is homebound (i.e. absences from home require considerable and taxing effort and are for medical reasons or Alevism services or infrequently or of short duration when for other reasons) because: Leaving home is medically contraindicated for the following reason(s): Dyspnea on exertion that makes it so they cannot leave their home for needed services without clinical deterioration...    Based on the above findings. I certify that this patient is confined to the home and needs intermittent skilled nursing care, physical therapy and/or speech therapy.  The patient is under my care, and I have initiated the establishment of the plan of care.  This patient will be followed by a physician who will periodically review the plan of care.  Physician/Provider to provide follow up care: Polo Neil    Attending hospital physician (the Medicare certified [place] provider): Carmen Flores MD  Physician Signature: See electronic signature associated with these discharge orders.  Date: 3/22/2021     Oxygen Adult/Peds    Resume home oxygen as before     Diet    Follow this diet upon discharge: Orders Placed This Encounter      Regular Diet Adult     Discharge Medications   Current Discharge Medication List      CONTINUE these medications which have NOT CHANGED    Details   albuterol (PROAIR HFA/PROVENTIL HFA/VENTOLIN HFA) 108 (90 Base) MCG/ACT inhaler Inhale 2 puffs into the lungs every 4 hours as needed for shortness of breath / dyspnea or wheezing     Comments: Pharmacy may dispense brand covered by insurance (Proair, or proventil or ventolin or generic albuterol inhaler)      calcium citrate (CITRACAL) 950 MG tablet Take 4 tablets by mouth daily      clobetasol (TEMOVATE) 0.05 % external solution Apply topically At Bedtime Apply to scalp      cyanocobalamin (VITAMIN B-12) 1000 MCG tablet Take 1 tablet (1,000 mcg) by mouth daily  Qty: 30 tablet, Refills: 1    Associated Diagnoses: Anemia of chronic disease; Open wound of right lower extremity, subsequent encounter      DULoxetine (CYMBALTA) 30 MG capsule Take 2 capsules (60 mg) by mouth daily  Qty: 10 capsule, Refills: 0    Associated Diagnoses: Chronic pain syndrome      ferrous sulfate (FEROSUL) 325 (65 Fe) MG tablet Take 1 tablet (325 mg) by mouth every other day  Qty: 90 tablet, Refills: 1    Associated Diagnoses: Anemia of chronic disease      fluticasone-vilanterol (BREO ELLIPTA) 200-25 MCG/INH inhaler Inhale 1 puff into the lungs daily  Qty: 28 each, Refills: 1    Associated Diagnoses: Acute on chronic respiratory failure with hypoxia and hypercapnia (H); Pneumonia of both lungs due to infectious organism, unspecified part of lung; COPD, severe (H)      folic acid (FOLVITE) 1 MG tablet Take 1 tablet (1 mg) by mouth daily  Qty: 30 tablet, Refills: 1    Associated Diagnoses: Anemia of chronic disease; Open wound of right lower extremity, subsequent encounter      furosemide (LASIX) 20 MG tablet Take 1 tablet (20 mg) by mouth daily  Qty:      Associated Diagnoses: Essential hypertension      gabapentin (NEURONTIN) 300 MG capsule Take 1 capsule (300 mg) by mouth At Bedtime  Qty: 60 capsule, Refills: 1    Associated Diagnoses: Pneumonia of right lower lobe due to infectious organism; Pain      guaiFENesin (MUCINEX) 600 MG 12 hr tablet Take 600 mg by mouth 2 times daily For pneumonia      hydrocortisone (CORTEF) 20 MG tablet Take 20 mg by mouth every morning      HYDROmorphone (DILAUDID) 2 MG tablet Take 0.5  tablets (1 mg) by mouth every 8 hours as needed for severe pain  Qty: 30 tablet, Refills: 0    Associated Diagnoses: Chronic pain syndrome      lidocaine (XYLOCAINE) 5 % external ointment Apply topically 3 times daily as needed for moderate pain (rib pain)      loperamide (IMODIUM) 2 MG capsule Take 1 capsule (2 mg) by mouth 4 times daily as needed for diarrhea  Qty: 15 capsule, Refills: 0    Associated Diagnoses: Pneumonia of right lower lobe due to infectious organism      losartan (COZAAR) 25 MG tablet Take 1 tablet (25 mg) by mouth daily  Qty:      Associated Diagnoses: Essential hypertension      multivitamin w/minerals (THERA-VIT-M) tablet Take 1 tablet by mouth daily  Qty: 30 tablet, Refills: 0    Associated Diagnoses: Pneumonia of right lower lobe due to infectious organism      nicotine (NICORETTE) 2 MG gum Place 2 mg inside cheek as needed for smoking cessation      omeprazole (PRILOSEC) 20 MG DR capsule Take 20 mg by mouth daily      primidone (MYSOLINE) 50 MG tablet Take 50 mg by mouth At Bedtime      tamsulosin (FLOMAX) 0.4 MG capsule Take 0.8 mg by mouth daily      umeclidinium (INCRUSE ELLIPTA) 62.5 MCG/INH inhaler Inhale 1 puff into the lungs daily  Qty: 30 each, Refills: 1    Associated Diagnoses: Acute on chronic respiratory failure with hypoxia and hypercapnia (H); Pneumonia of both lungs due to infectious organism, unspecified part of lung; COPD, severe (H)      vitamin C (ASCORBIC ACID) 1000 MG TABS Take 1 tablet (1,000 mg) by mouth daily  Qty: 30 tablet, Refills: 1    Associated Diagnoses: Anemia of chronic disease; Open wound of right lower extremity, subsequent encounter      vitamin D3 (CHOLECALCIFEROL) 250 mcg (48586 units) capsule Take 1 capsule (250 mcg) by mouth daily  Qty: 30 capsule, Refills: 1    Associated Diagnoses: Generalized muscle weakness; Open wound of right lower extremity, subsequent encounter      Wound Cleansers (VASHE WOUND THERAPY EX) USE TO SOAK GAUZE AND CLEAN WOUND  FOR TWENTY MINUTES W/ DAILY WOUND CARE CHANGES.           Allergies   Allergies   Allergen Reactions     Blood Transfusion Related (Informational Only) Other (See Comments)     Darvocet [Propoxyphene N-Apap] Nausea     Vicodin [Hydrocodone-Acetaminophen] Nausea     Can use if he eats with it     Data   Most Recent 3 CBC's:  Recent Labs   Lab Test 03/21/21  0744 03/20/21  0818 03/18/21  1434   WBC 4.0 3.9* 6.4   HGB 7.7* 7.8* 9.8*   MCV 95 94 93   PLT 50* 47* 85*      Most Recent 3 BMP's:  Recent Labs   Lab Test 03/21/21  0744 03/20/21  0818 03/19/21  0617 03/18/21  1434   NA  --  142 139 145*   POTASSIUM  --  4.0 4.2 4.7   CHLORIDE  --  112* 110* 115*   CO2  --  25 23 24   BUN  --  28 32* 33*   CR 1.54* 1.55* 1.70* 1.89*   ANIONGAP  --  5 6 6   MARTINE  --  7.6* 7.9* 8.6   GLC  --  97 83 87     Most Recent 2 LFT's:  Recent Labs   Lab Test 03/18/21  1434 02/09/21  0601   AST 17 23   ALT 16 21   ALKPHOS 73 79   BILITOTAL 0.5 0.5     Most Recent INR's and Anticoagulation Dosing History:  Anticoagulation Dose History     Recent Dosing and Labs Latest Ref Rng & Units 2/9/2021    INR 0.86 - 1.14 1.25(H)        Most Recent 3 Troponin's:  Recent Labs   Lab Test 12/22/20  1156 12/22/20  0028 12/21/20  1753   TROPI <0.015 <0.015 <0.015     Most Recent Cholesterol Panel:  Recent Labs   Lab Test 02/10/21  0838   CHOL 106   LDL 33   HDL 37*   TRIG 182*     Most Recent 6 Bacteria Isolates From Any Culture (See EPIC Reports for Culture Details):  Recent Labs   Lab Test 03/20/21  1030 03/18/21  1843 02/09/21  0602 12/03/20  0540 12/03/20  0230 12/02/20  1904   CULT No growth 10,000 to 50,000 colonies/mL  Enterococcus species (VRE)  Further speciated as:  Enterococcus raffinosus  *  Culture in progress  Critical Value/Significant Value, preliminary result only, called to and read back by  Kristel Horowitz RN at 0784 3/21/21 SRQ   No growth  No growth Light growth  Normal adelina    Light growth  Proteus mirabilis  *  Moderate  growth  Corynebacterium striatum  Identification obtained by MALDI-TOF mass spectrometry research use only database. Test   characteristics determined and verified by the [facility]   ([facility]) [place], [place].  Susceptibility testing not routinely done  * <10,000 colonies/mL  mixed urogenital adelina  Susceptibility testing not routinely done   No growth     Most Recent TSH, T4 and A1c Labs:  Recent Labs   Lab Test 21  0702 21   TSH  --  0.73   A1C Canceled, Test credited  --      Results for orders placed or performed during the hospital encounter of 21   Echocardiogram Complete    Narrative    [id number]  [id number]  [id number]  [id number]^[email or web address]^LORIE     [facility]  Echocardiography Laboratory  [address] [place] [address]     Name: DANILO CHESTER  MRN: [id number]  : 1950  Study Date: 2021 02:01 PM  Age: 70 yrs  Gender: Male  Patient Location: [facility]  Reason For Study: SOB  Ordering Physician: LORIE DIOP  Referring Physician: Polo Neil  Performed By: Keke Jones     BSA: 1.7 m2  Height: 65 in  Weight: 140 lb  HR: 60  BP: 137/37 mmHg  ______________________________________________________________________________  Procedure  Complete Portable Echo Adult. Optison (NDC #[id number]) given intravenously.  ______________________________________________________________________________  Interpretation Summary     The visual ejection fraction is estimated at 55-60%. There is mild concentric  left ventricular hypertrophy.  No regional wall motion abnormalities noted.  The right ventricle is mildly dilated. The right ventricular systolic function  is normal.  Dilation of the inferior vena cava is present with abnormal respiratory  variation in diameter.  PA pressures are difficult to accurately assess.  Moderate to severe LA dilatation.  No hemodynamically significant valve disease.  There is no pericardial  effusion.  ______________________________________________________________________________  Left Ventricle  The left ventricle is normal in size. There is mild concentric left  ventricular hypertrophy. Left ventricular systolic function is normal. The  visual ejection fraction is estimated at 55-60%. Diastolic Doppler findings  (E/E' ratio and/or other parameters) suggest left ventricular filling  pressures are normal. No regional wall motion abnormalities noted.     Right Ventricle  The right ventricle is mildly dilated. The right ventricular systolic function  is normal.     Atria  The left atrium is moderate to severely dilated. The right atrium is mild to  moderately dilated.     Mitral Valve  The mitral valve is normal in structure and function. There is mild (1+)  mitral regurgitation. There is no mitral valve stenosis.     Tricuspid Valve  The tricuspid valve is normal in structure and function. The tricuspid valve  is not well visualized. There is trace tricuspid regurgitation. Right  ventricular systolic pressure could not be approximated due to inadequate  tricuspid regurgitation.     Aortic Valve  The aortic valve is trileaflet with aortic valve sclerosis. There is trace  aortic regurgitation. No hemodynamically significant valvular aortic stenosis.     Pulmonic Valve  The pulmonic valve is not well visualized.     Vessels  The aortic root is normal size. The aortic root is not well visualized.  Dilation of the inferior vena cava is present with abnormal respiratory  variation in diameter.     Pericardium  There is no pericardial effusion.     ______________________________________________________________________________  MMode/2D Measurements & Calculations  IVSd: 1.1 cm  LVIDd: 5.0 cm  LVIDs: 3.7 cm  LVPWd: 1.1 cm  FS: 25.9 %  LV mass(C)d: 208.9 grams  LV mass(C)dI: 122.9 grams/m2     Ao root diam: 3.5 cm  LA dimension: 3.7 cm  asc Aorta Diam: 3.3 cm  LA/Ao: 1.1  LA Volume (BP): 85.2 ml  LA Volume Index  (BP): 50.1 ml/m2  RWT: 0.46  TAPSE: 2.5 cm     Doppler Measurements & Calculations  MV E max abdoulaye: 105.0 cm/sec  MV A max abdoulaye: 57.0 cm/sec  MV E/A: 1.8     MV dec time: 0.22 sec  E/E' av.9  Lateral E/e': 6.4  Medial E/e': 9.4     ______________________________________________________________________________  Report approved by: Mati Herzog 2021 02:58 PM

## 2021-03-22 NOTE — PLAN OF CARE
A/Ox4, VSS on 3 L NC. C/o moderate R leg and generalized pain, PO dilaudid covering. Good appetite.  2 loose incont stools this shift, using urinal and adequate UOP.  Wound care to RLE completed per POC. Red rash to araceli area, miconazole powder applied. Pt refusing side to side repositioning, able to shift weight with some assistance. Plan to discharge to home with home care for leg wounds @ 1800 tonight. Continue to monitor.

## 2021-03-22 NOTE — CONSULTS
Care Management Initial Consult    General Information  Assessment completed with: Patient, (Nir)  Type of CM/SW Visit: Initial Assessment    Primary Care Provider verified and updated as needed: Yes   Readmission within the last 30 days: no previous admission in last 30 days      Reason for Consult: discharge planning  Advance Care Planning:            Communication Assessment  Patient's communication style: spoken language (English or Bilingual)    Hearing Difficulty or Deaf: no   Wear Glasses or Blind: yes    Cognitive  Cognitive/Neuro/Behavioral: WDL                      Living Environment:   People in home: alone     Current living Arrangements: apartment      Able to return to prior arrangements:         Family/Social Support:  Care provided by: self, homecare agency  Provides care for: no one  Marital Status: Single  Sibling(s), Neighbor          Description of Support System: Supportive         Current Resources:   Patient receiving home care services: Yes  Skilled Home Care Services: Skilled Nursing, Physicial Therapy, Occupational Therapy(SW)  Community Resources: Home Care, Transportation Services  Equipment currently used at home: wheelchair, manual, other (see comments)(Electric scooter )  Supplies currently used at home:      Employment/Financial:  Employment Status: , previous service, retired        Financial Concerns: other (see comments)(need transportation assistance)           Lifestyle & Psychosocial Needs:        Socioeconomic History     Marital status: Single     Spouse name: Not on file     Number of children: Not on file     Years of education: Not on file     Highest education level: Not on file     Tobacco Use     Smoking status: Former Smoker     Packs/day: 0.50     Years: 30.00     Pack years: 15.00     Types: Cigarettes     Quit date: 4/1/2017     Years since quitting: 3.9     Smokeless tobacco: Never Used     Tobacco comment: states he is down to 3QD   Substance and Sexual  Activity     Alcohol use: No     Drug use: No     Sexual activity: Never       Functional Status:  Prior to admission patient needed assistance:              Mental Health Status:          Chemical Dependency Status:                Values/Beliefs:  Spiritual, Cultural Beliefs, Yarsanism Practices, Values that affect care:                 Additional Information:  Per Dr. Flores, patient is medically ready to discharge home.  PT/OT recommending home care.  Per chart review, patient open to [facility].  Called [facility] [phone] and spoke to Brittany in Intake.  Per Brittany, patient is open to [facility] for Skilled RN, PT/OT & SW services.  Informed Brittany that patient is discharging with wound care and per admission notes patient was non-compliance, after running out of his narcotics and had not done wound care since his discharge from the TCU.  Asked Brittany if patient could be seen tomorrow for start of care.  Brittany confirmed that patient would receive a visit tomorrow.  Brittany requested H&P and discharge orders be faxed to [phone].  Met with patient to discuss discharge plans.  Per pt, he lives alone and confirmed having [facility] for RN, PT/OT & SW.  Patient in agreement for resuming home care at discharge.  Discussed that the home care RN would see pt tomorrow for ongoing wound care education and support.  Patient shared that he is independent with transfers in and out of his wheel chair.  Patient stated that he doesn't drive and has transportation set up to go to [facility] appointments but needs assistance for scheduling rides to this other appointments.  Per chart review, pt has wound clinic follow-up on 4/5/2021.  Added request for assistance with rides to Home Care SW order.   Called [facility] RN Liaison to schedule pt with PCP with [facility] and left message.       Care Management Discharge Note    Discharge Date: 03/22/21(home with home care)       Discharge Disposition: Home, Home  Care    Discharge Services: None    Discharge DME: None    Discharge Transportation: agency([employer] w/c ride) scheduled ride home via wheel chair with oxygen with [employer] at 7:45 PM.  Patient informed of this ride time.  Informed patient that ride cost would be submitted to his insurance for coverage and if not covered, pt could receive a bill for ride cost.   Private pay costs discussed: Discussed transportation    PAS Confirmation Code:    Patient/family educated on Medicare website which has current facility and service quality ratings: no    Education Provided on the Discharge Plan:    Persons Notified of Discharge Plans: Patient, bedside RN  Patient/Family in Agreement with the Plan: yes    Handoff Referral Completed: Yes    Additional Information:  Patient discharging home today at 7:45PM via [employer] wheel chair ride and oxygen.  H&P and discharge summary faxed to [email or web address] [phone].    Received call from [employer], patient's ride changed to 1600.  Informed patient, bedside RN, charge RN & HUC.    Addendum:  Per bedside RN Antonieta, patient reports not having any of his discharge medications at home.  Called Dr. Flores, she had been notified by bedside RN and is in the process of ordering the medications for pt.  Called [employer], spoke to Fela and rescheduled pt's transport ride to 1800. Bedside RN updated and will inform pt on change of ride time.  Called [facility] and spoke to Carmelita.  Carmelita had received rxs.  Informed Carmelita to fill all rxs and that pt's ride is at 1800.     Addendum:  [facility] call back [facility].  Eun transferred call to Dr. Neil's RN Karli.  Spoke with Karli and scheduled pt to see Dr. Neil on Tuesday, March 30th at 9am for lab work and 1000 for Md visit.  Karli requested discharge orders and discharge summary be faxed to [phone].  Faxed only discharge orders as discharge summary not available  at this time.   Fifi Aguayo, RN  Fifi Aguayo RN, BSN, OCN   Inpatient Care Coordination [address]  Office: [phone]

## 2021-03-23 LAB — INTERPRETATION ECG - MUSE: NORMAL

## 2021-03-23 NOTE — PROGRESS NOTES
Care Management Discharge Note    Discharge Date: 03/22/21(home with home care)       Discharge Disposition: Home, Home Care    Discharge Services: None    Discharge DME: None    Discharge Transportation: agency([employer] w/c ride)    Private pay costs discussed: Not applicable    PAS Confirmation Code:    Patient/family educated on Medicare website which has current facility and service quality ratings: no    Education Provided on the Discharge Plan:    Persons Notified of Discharge Plans: N/A  Patient/Family in Agreement with the Plan: yes    Handoff Referral Completed: Yes    Additional Information:  Faxed discharge summary to [facility] at [phone].        FLORA Henry RN, BSN, OCN   Inpatient Care Coordination [address]  Office: [phone]

## 2021-03-24 LAB
BACTERIA SPEC CULT: ABNORMAL
Lab: ABNORMAL
SPECIMEN SOURCE: ABNORMAL

## 2021-05-05 ENCOUNTER — HOSPITAL ENCOUNTER (INPATIENT)
Facility: CLINIC | Age: 71
LOS: 7 days | Discharge: NURSING, REHAB OR LONG-TERM CARE | DRG: 603 | End: 2021-05-12
Attending: EMERGENCY MEDICINE | Admitting: HOSPITALIST
Payer: MEDICARE

## 2021-05-05 ENCOUNTER — APPOINTMENT (OUTPATIENT)
Dept: GENERAL RADIOLOGY | Facility: CLINIC | Age: 71
DRG: 603 | End: 2021-05-05
Attending: EMERGENCY MEDICINE
Payer: MEDICARE

## 2021-05-05 ENCOUNTER — HOSPITAL ENCOUNTER (EMERGENCY)
Facility: CLINIC | Age: 71
Discharge: HOME | End: 2021-05-05
Attending: EMERGENCY MEDICINE

## 2021-05-05 DIAGNOSIS — N18.32 STAGE 3B CHRONIC KIDNEY DISEASE (H): ICD-10-CM

## 2021-05-05 DIAGNOSIS — D64.9 ANEMIA, UNSPECIFIED TYPE: ICD-10-CM

## 2021-05-05 DIAGNOSIS — S81.801A OPEN WOUND OF RIGHT LOWER EXTREMITY, INITIAL ENCOUNTER: ICD-10-CM

## 2021-05-05 LAB
ALBUMIN SERPL-MCNC: 2.8 G/DL (ref 3.4–5)
ALP SERPL-CCNC: 63 U/L (ref 40–150)
ALT SERPL W P-5'-P-CCNC: 9 U/L (ref 0–70)
ANION GAP SERPL CALCULATED.3IONS-SCNC: 3 MMOL/L (ref 3–14)
AST SERPL W P-5'-P-CCNC: 16 U/L (ref 0–45)
BASOPHILS # BLD AUTO: 0 10E9/L (ref 0–0.2)
BASOPHILS NFR BLD AUTO: 0.5 %
BILIRUB SERPL-MCNC: 0.3 MG/DL (ref 0.2–1.3)
BUN SERPL-MCNC: 19 MG/DL (ref 7–30)
CALCIUM SERPL-MCNC: 8.5 MG/DL (ref 8.5–10.1)
CHLORIDE SERPL-SCNC: 115 MMOL/L (ref 94–109)
CO2 BLDCOV-SCNC: 26 MMOL/L (ref 21–28)
CO2 SERPL-SCNC: 26 MMOL/L (ref 20–32)
CREAT SERPL-MCNC: 1.76 MG/DL (ref 0.66–1.25)
DIFFERENTIAL METHOD BLD: ABNORMAL
EOSINOPHIL # BLD AUTO: 1 10E9/L (ref 0–0.7)
EOSINOPHIL NFR BLD AUTO: 23.5 %
ERYTHROCYTE [DISTWIDTH] IN BLOOD BY AUTOMATED COUNT: 15.2 % (ref 10–15)
ERYTHROCYTE [SEDIMENTATION RATE] IN BLOOD BY WESTERGREN METHOD: 62 MM/H (ref 0–20)
GFR SERPL CREATININE-BSD FRML MDRD: 39 ML/MIN/{1.73_M2}
GLUCOSE SERPL-MCNC: 84 MG/DL (ref 70–99)
HCT VFR BLD AUTO: 27.1 % (ref 40–53)
HGB BLD-MCNC: 8.5 G/DL (ref 13.3–17.7)
IMM GRANULOCYTES # BLD: 0 10E9/L (ref 0–0.4)
IMM GRANULOCYTES NFR BLD: 0.2 %
LABORATORY COMMENT REPORT: NORMAL
LACTATE BLD-SCNC: 0.5 MMOL/L (ref 0.7–2.1)
LYMPHOCYTES # BLD AUTO: 0.5 10E9/L (ref 0.8–5.3)
LYMPHOCYTES NFR BLD AUTO: 13.1 %
MCH RBC QN AUTO: 29.4 PG (ref 26.5–33)
MCHC RBC AUTO-ENTMCNC: 31.4 G/DL (ref 31.5–36.5)
MCV RBC AUTO: 94 FL (ref 78–100)
MONOCYTES # BLD AUTO: 0.2 10E9/L (ref 0–1.3)
MONOCYTES NFR BLD AUTO: 5.7 %
NEUTROPHILS # BLD AUTO: 2.3 10E9/L (ref 1.6–8.3)
NEUTROPHILS NFR BLD AUTO: 57 %
NRBC # BLD AUTO: 0 10*3/UL
NRBC BLD AUTO-RTO: 0 /100
PCO2 BLDV: 51 MM HG (ref 40–50)
PH BLDV: 7.31 PH (ref 7.32–7.43)
PLATELET # BLD AUTO: 75 10E9/L (ref 150–450)
PO2 BLDV: 36 MM HG (ref 25–47)
POTASSIUM SERPL-SCNC: 4.6 MMOL/L (ref 3.4–5.3)
PROCALCITONIN SERPL-MCNC: 0.1 NG/ML
PROT SERPL-MCNC: 6.7 G/DL (ref 6.8–8.8)
RBC # BLD AUTO: 2.89 10E12/L (ref 4.4–5.9)
SAO2 % BLDV FROM PO2: 63 %
SARS-COV-2 RNA RESP QL NAA+PROBE: NEGATIVE
SODIUM SERPL-SCNC: 144 MMOL/L (ref 133–144)
SPECIMEN SOURCE: NORMAL
WBC # BLD AUTO: 4 10E9/L (ref 4–11)

## 2021-05-05 PROCEDURE — 83605 ASSAY OF LACTIC ACID: CPT

## 2021-05-05 PROCEDURE — 96367 TX/PROPH/DG ADDL SEQ IV INF: CPT

## 2021-05-05 PROCEDURE — 96366 THER/PROPH/DIAG IV INF ADDON: CPT

## 2021-05-05 PROCEDURE — 250N000013 HC RX MED GY IP 250 OP 250 PS 637: Performed by: EMERGENCY MEDICINE

## 2021-05-05 PROCEDURE — 87077 CULTURE AEROBIC IDENTIFY: CPT | Performed by: EMERGENCY MEDICINE

## 2021-05-05 PROCEDURE — 96375 TX/PRO/DX INJ NEW DRUG ADDON: CPT

## 2021-05-05 PROCEDURE — 99285 EMERGENCY DEPT VISIT HI MDM: CPT | Mod: 25

## 2021-05-05 PROCEDURE — 258N000003 HC RX IP 258 OP 636: Performed by: EMERGENCY MEDICINE

## 2021-05-05 PROCEDURE — 73590 X-RAY EXAM OF LOWER LEG: CPT | Mod: RT

## 2021-05-05 PROCEDURE — 82803 BLOOD GASES ANY COMBINATION: CPT

## 2021-05-05 PROCEDURE — 99223 1ST HOSP IP/OBS HIGH 75: CPT | Mod: AI | Performed by: HOSPITALIST

## 2021-05-05 PROCEDURE — 80053 COMPREHEN METABOLIC PANEL: CPT | Performed by: EMERGENCY MEDICINE

## 2021-05-05 PROCEDURE — 250N000011 HC RX IP 250 OP 636: Performed by: EMERGENCY MEDICINE

## 2021-05-05 PROCEDURE — 87186 SC STD MICRODIL/AGAR DIL: CPT | Performed by: EMERGENCY MEDICINE

## 2021-05-05 PROCEDURE — 250N000011 HC RX IP 250 OP 636: Performed by: HOSPITALIST

## 2021-05-05 PROCEDURE — 120N000001 HC R&B MED SURG/OB

## 2021-05-05 PROCEDURE — 87070 CULTURE OTHR SPECIMN AEROBIC: CPT | Performed by: EMERGENCY MEDICINE

## 2021-05-05 PROCEDURE — 96365 THER/PROPH/DIAG IV INF INIT: CPT

## 2021-05-05 PROCEDURE — 85025 COMPLETE CBC W/AUTO DIFF WBC: CPT | Performed by: EMERGENCY MEDICINE

## 2021-05-05 PROCEDURE — 250N000013 HC RX MED GY IP 250 OP 250 PS 637: Performed by: HOSPITALIST

## 2021-05-05 PROCEDURE — 87040 BLOOD CULTURE FOR BACTERIA: CPT | Performed by: EMERGENCY MEDICINE

## 2021-05-05 PROCEDURE — 84145 PROCALCITONIN (PCT): CPT | Performed by: EMERGENCY MEDICINE

## 2021-05-05 PROCEDURE — 85652 RBC SED RATE AUTOMATED: CPT | Performed by: EMERGENCY MEDICINE

## 2021-05-05 PROCEDURE — 258N000003 HC RX IP 258 OP 636: Performed by: HOSPITALIST

## 2021-05-05 PROCEDURE — 87635 SARS-COV-2 COVID-19 AMP PRB: CPT | Performed by: EMERGENCY MEDICINE

## 2021-05-05 RX ORDER — AMOXICILLIN 250 MG
2 CAPSULE ORAL 2 TIMES DAILY PRN
Status: DISCONTINUED | OUTPATIENT
Start: 2021-05-05 | End: 2021-05-12 | Stop reason: HOSPADM

## 2021-05-05 RX ORDER — FUROSEMIDE 20 MG
20 TABLET ORAL DAILY
COMMUNITY

## 2021-05-05 RX ORDER — FAMOTIDINE 20 MG/1
20 TABLET, FILM COATED ORAL 2 TIMES DAILY
Status: DISCONTINUED | OUTPATIENT
Start: 2021-05-05 | End: 2021-05-05

## 2021-05-05 RX ORDER — ASCORBIC ACID 500 MG
1000 TABLET ORAL DAILY
Status: DISCONTINUED | OUTPATIENT
Start: 2021-05-05 | End: 2021-05-12 | Stop reason: HOSPADM

## 2021-05-05 RX ORDER — MORPHINE SULFATE 2 MG/ML
2 INJECTION, SOLUTION INTRAMUSCULAR; INTRAVENOUS
Status: DISCONTINUED | OUTPATIENT
Start: 2021-05-05 | End: 2021-05-05

## 2021-05-05 RX ORDER — TAMSULOSIN HYDROCHLORIDE 0.4 MG/1
0.8 CAPSULE ORAL
Status: DISCONTINUED | OUTPATIENT
Start: 2021-05-05 | End: 2021-05-12 | Stop reason: HOSPADM

## 2021-05-05 RX ORDER — PRIMIDONE 50 MG/1
50 TABLET ORAL AT BEDTIME
Status: DISCONTINUED | OUTPATIENT
Start: 2021-05-05 | End: 2021-05-12 | Stop reason: HOSPADM

## 2021-05-05 RX ORDER — MULTIPLE VITAMINS W/ MINERALS TAB 9MG-400MCG
1 TAB ORAL
Status: DISCONTINUED | OUTPATIENT
Start: 2021-05-06 | End: 2021-05-12 | Stop reason: HOSPADM

## 2021-05-05 RX ORDER — DULOXETIN HYDROCHLORIDE 60 MG/1
60 CAPSULE, DELAYED RELEASE ORAL DAILY
Status: DISCONTINUED | OUTPATIENT
Start: 2021-05-06 | End: 2021-05-12 | Stop reason: HOSPADM

## 2021-05-05 RX ORDER — HYDROMORPHONE HYDROCHLORIDE 2 MG/1
2-4 TABLET ORAL
Status: DISCONTINUED | OUTPATIENT
Start: 2021-05-05 | End: 2021-05-12 | Stop reason: HOSPADM

## 2021-05-05 RX ORDER — NALOXONE HYDROCHLORIDE 0.4 MG/ML
0.2 INJECTION, SOLUTION INTRAMUSCULAR; INTRAVENOUS; SUBCUTANEOUS
Status: DISCONTINUED | OUTPATIENT
Start: 2021-05-05 | End: 2021-05-12 | Stop reason: HOSPADM

## 2021-05-05 RX ORDER — OXYCODONE HYDROCHLORIDE 5 MG/1
5-10 TABLET ORAL
Status: DISCONTINUED | OUTPATIENT
Start: 2021-05-05 | End: 2021-05-05

## 2021-05-05 RX ORDER — PRIMIDONE 50 MG/1
50 TABLET ORAL AT BEDTIME
COMMUNITY

## 2021-05-05 RX ORDER — SODIUM CHLORIDE 9 MG/ML
INJECTION, SOLUTION INTRAVENOUS CONTINUOUS
Status: ACTIVE | OUTPATIENT
Start: 2021-05-05 | End: 2021-05-06

## 2021-05-05 RX ORDER — IBUPROFEN 200 MG
4 CAPSULE ORAL DAILY
COMMUNITY

## 2021-05-05 RX ORDER — DULOXETIN HYDROCHLORIDE 30 MG/1
60 CAPSULE, DELAYED RELEASE ORAL DAILY
COMMUNITY

## 2021-05-05 RX ORDER — ACETAMINOPHEN 325 MG/1
650 TABLET ORAL EVERY 4 HOURS PRN
Status: DISCONTINUED | OUTPATIENT
Start: 2021-05-05 | End: 2021-05-12 | Stop reason: HOSPADM

## 2021-05-05 RX ORDER — POLYETHYLENE GLYCOL 3350 17 G
2 POWDER IN PACKET (EA) ORAL
Status: DISCONTINUED | OUTPATIENT
Start: 2021-05-05 | End: 2021-05-12 | Stop reason: HOSPADM

## 2021-05-05 RX ORDER — PHENOL 1.4 %
10 AEROSOL, SPRAY (ML) MUCOUS MEMBRANE AT BEDTIME
COMMUNITY

## 2021-05-05 RX ORDER — FOLIC ACID 1 MG/1
1 TABLET ORAL DAILY
Status: DISCONTINUED | OUTPATIENT
Start: 2021-05-05 | End: 2021-05-12 | Stop reason: HOSPADM

## 2021-05-05 RX ORDER — AMOXICILLIN 250 MG
1 CAPSULE ORAL 2 TIMES DAILY PRN
Status: DISCONTINUED | OUTPATIENT
Start: 2021-05-05 | End: 2021-05-12 | Stop reason: HOSPADM

## 2021-05-05 RX ORDER — LOSARTAN POTASSIUM 25 MG/1
25 TABLET ORAL DAILY
COMMUNITY

## 2021-05-05 RX ORDER — TAMSULOSIN HYDROCHLORIDE 0.4 MG/1
0.8 CAPSULE ORAL DAILY
COMMUNITY

## 2021-05-05 RX ORDER — DOXYCYCLINE 100 MG/1
100 CAPSULE ORAL EVERY 12 HOURS SCHEDULED
Status: DISCONTINUED | OUTPATIENT
Start: 2021-05-05 | End: 2021-05-12 | Stop reason: HOSPADM

## 2021-05-05 RX ORDER — HYDROCORTISONE 20 MG/1
20 TABLET ORAL DAILY
COMMUNITY

## 2021-05-05 RX ORDER — LIDOCAINE 40 MG/G
CREAM TOPICAL
Status: DISCONTINUED | OUTPATIENT
Start: 2021-05-05 | End: 2021-05-12 | Stop reason: HOSPADM

## 2021-05-05 RX ORDER — CLOBETASOL PROPIONATE 0.5 MG/ML
SOLUTION TOPICAL AT BEDTIME
Status: DISCONTINUED | OUTPATIENT
Start: 2021-05-05 | End: 2021-05-12 | Stop reason: HOSPADM

## 2021-05-05 RX ORDER — LANOLIN ALCOHOL/MO/W.PET/CERES
1000 CREAM (GRAM) TOPICAL DAILY
Status: DISCONTINUED | OUTPATIENT
Start: 2021-05-05 | End: 2021-05-12 | Stop reason: HOSPADM

## 2021-05-05 RX ORDER — HYDROCORTISONE 10 MG/1
20 TABLET ORAL DAILY
Status: DISCONTINUED | OUTPATIENT
Start: 2021-05-06 | End: 2021-05-12 | Stop reason: HOSPADM

## 2021-05-05 RX ORDER — NALOXONE HYDROCHLORIDE 0.4 MG/ML
0.4 INJECTION, SOLUTION INTRAMUSCULAR; INTRAVENOUS; SUBCUTANEOUS
Status: DISCONTINUED | OUTPATIENT
Start: 2021-05-05 | End: 2021-05-12 | Stop reason: HOSPADM

## 2021-05-05 RX ORDER — OXYCODONE HYDROCHLORIDE 5 MG/1
5 TABLET ORAL ONCE
Status: COMPLETED | OUTPATIENT
Start: 2021-05-05 | End: 2021-05-05

## 2021-05-05 RX ORDER — GABAPENTIN 300 MG/1
300 CAPSULE ORAL AT BEDTIME
Status: DISCONTINUED | OUTPATIENT
Start: 2021-05-06 | End: 2021-05-12 | Stop reason: HOSPADM

## 2021-05-05 RX ORDER — ALBUTEROL SULFATE 90 UG/1
2 AEROSOL, METERED RESPIRATORY (INHALATION) EVERY 4 HOURS PRN
Status: DISCONTINUED | OUTPATIENT
Start: 2021-05-05 | End: 2021-05-12 | Stop reason: HOSPADM

## 2021-05-05 RX ORDER — CEFEPIME HYDROCHLORIDE 1 G/1
1 INJECTION, POWDER, FOR SOLUTION INTRAMUSCULAR; INTRAVENOUS EVERY 24 HOURS
Status: DISCONTINUED | OUTPATIENT
Start: 2021-05-05 | End: 2021-05-12 | Stop reason: HOSPADM

## 2021-05-05 RX ORDER — HYDROMORPHONE HCL IN WATER/PF 6 MG/30 ML
.2-.4 PATIENT CONTROLLED ANALGESIA SYRINGE INTRAVENOUS EVERY 4 HOURS PRN
Status: DISCONTINUED | OUTPATIENT
Start: 2021-05-05 | End: 2021-05-12 | Stop reason: HOSPADM

## 2021-05-05 RX ORDER — PIPERACILLIN SODIUM, TAZOBACTAM SODIUM 4; .5 G/20ML; G/20ML
4.5 INJECTION, POWDER, LYOPHILIZED, FOR SOLUTION INTRAVENOUS ONCE
Status: COMPLETED | OUTPATIENT
Start: 2021-05-05 | End: 2021-05-05

## 2021-05-05 RX ADMIN — PRIMIDONE 50 MG: 50 TABLET ORAL at 22:25

## 2021-05-05 RX ADMIN — OXYCODONE HYDROCHLORIDE 5 MG: 5 TABLET ORAL at 17:31

## 2021-05-05 RX ADMIN — PIPERACILLIN SODIUM AND TAZOBACTAM SODIUM 4.5 G: 4; .5 INJECTION, POWDER, LYOPHILIZED, FOR SOLUTION INTRAVENOUS at 13:06

## 2021-05-05 RX ADMIN — TAMSULOSIN HYDROCHLORIDE 0.8 MG: 0.4 CAPSULE ORAL at 22:24

## 2021-05-05 RX ADMIN — OXYCODONE HYDROCHLORIDE 5 MG: 5 TABLET ORAL at 13:47

## 2021-05-05 RX ADMIN — OXYCODONE HYDROCHLORIDE AND ACETAMINOPHEN 1000 MG: 500 TABLET ORAL at 22:25

## 2021-05-05 RX ADMIN — VANCOMYCIN HYDROCHLORIDE 1500 MG: 5 INJECTION, POWDER, LYOPHILIZED, FOR SOLUTION INTRAVENOUS at 13:48

## 2021-05-05 RX ADMIN — FOLIC ACID 1 MG: 1 TABLET ORAL at 21:13

## 2021-05-05 RX ADMIN — CYANOCOBALAMIN TAB 1000 MCG 1000 MCG: 1000 TAB at 22:24

## 2021-05-05 RX ADMIN — DOXYCYCLINE 100 MG: 100 CAPSULE ORAL at 22:24

## 2021-05-05 RX ADMIN — SODIUM CHLORIDE: 9 INJECTION, SOLUTION INTRAVENOUS at 21:16

## 2021-05-05 RX ADMIN — CHOLECALCIFEROL TAB 125 MCG (5000 UNIT) 250 MCG: 125 TAB at 22:24

## 2021-05-05 RX ADMIN — OMEPRAZOLE 20 MG: 20 CAPSULE, DELAYED RELEASE ORAL at 22:25

## 2021-05-05 RX ADMIN — HYDROMORPHONE HYDROCHLORIDE 2 MG: 2 TABLET ORAL at 21:13

## 2021-05-05 RX ADMIN — CEFEPIME HYDROCHLORIDE 1 G: 1 INJECTION, POWDER, FOR SOLUTION INTRAMUSCULAR; INTRAVENOUS at 23:17

## 2021-05-05 RX ADMIN — MORPHINE SULFATE 2 MG: 2 INJECTION, SOLUTION INTRAMUSCULAR; INTRAVENOUS at 12:29

## 2021-05-05 ASSESSMENT — ENCOUNTER SYMPTOMS
MYALGIAS: 0
FEVER: 0
WOUND: 1

## 2021-05-05 NOTE — ED NOTES
[facility]  ED Nurse Handoff Report    ED Chief complaint: Wound Check      ED Diagnosis:   Final diagnoses:   Open wound of right lower extremity, initial encounter   Stage 3b chronic kidney disease   Anemia, unspecified type       Code Status: Full Code per chart review    Allergies:   Allergies   Allergen Reactions     Blood Transfusion Related (Informational Only) Other (See Comments)     Darvocet [Propoxyphene N-Apap] Nausea     Vicodin [Hydrocodone-Acetaminophen] Nausea     Can use if he eats with it       Patient Story: lives alone in apartment. A neighbor used to help him with wound care and dressing changes but the neighbor passed away. Patient has wound and dressing on right calf that reportedly has not been changed or assessed in over a month.  Focused Assessment:  Severe psoriasis and impaired skin integrity, large wound R calf, baseline COPD cough with home o2 use at 3 LPM, left BKA  Labs Ordered and Resulted from Time of ED Arrival Up to the Time of Departure from the ED   CBC WITH PLATELETS DIFFERENTIAL - Abnormal; Notable for the following components:       Result Value    RBC Count 2.89 (*)     Hemoglobin 8.5 (*)     Hematocrit 27.1 (*)     MCHC 31.4 (*)     RDW 15.2 (*)     Platelet Count 75 (*)     Absolute Lymphocytes 0.5 (*)     Absolute Eosinophils 1.0 (*)     All other components within normal limits   COMPREHENSIVE METABOLIC PANEL - Abnormal; Notable for the following components:    Chloride 115 (*)     Creatinine 1.76 (*)     GFR Estimate 39 (*)     GFR Estimate If Black 45 (*)     Albumin 2.8 (*)     Protein Total 6.7 (*)     All other components within normal limits   ISTAT  GASES LACTATE FRANCHESCA POCT - Abnormal; Notable for the following components:    Ph Venous 7.31 (*)     PCO2 Venous 51 (*)     Lactic Acid 0.5 (*)     All other components within normal limits   PROCALCITONIN   SARS-COV-2 (COVID-19) VIRUS RT-PCR   PERIPHERAL IV CATHETER   PULSE OXIMETRY NURSING   CARDIAC  "CONTINUOUS MONITORING   STRICT INTAKE AND OUTPUT   ISTAT CG4 GASES LACTATE FRANCHESCA NURSING POCT   BLOOD CULTURE   BLOOD CULTURE   WOUND CULTURE AEROBIC BACTERIAL     XR Tibia & Fibula Right 2 Views   Preliminary Result   IMPRESSION: No acute fracture or focal bone destruction.            Treatments and/or interventions provided: zosyn, vanco, morphine, oxycodone  Patient's response to treatments and/or interventions: resting on cot, VSS    To be done/followed up on inpatient unit:  social work for self neglect (vulnerable adult report filed in ED)    Does this patient have any cognitive concerns?: n/a    Activity level - Baseline/Home:  Wheelchair  Activity Level - Current:   Wheelchair    Patient's Preferred language: English   Needed?: No    Isolation: Contact   Infection: VRE  Patient tested for COVID 19 prior to admission: YES  Bariatric?: No    Vital Signs:   Vitals:    05/05/21 1156 05/05/21 1305   BP: 139/63 125/70   Pulse: 70    Resp: 18    Temp: 98.6  F (37  C)    TempSrc: Oral    SpO2: 93% 100%   Height: 1.651 m (5' 5\")        Cardiac Rhythm:     Was the PSS-3 completed:   Yes  What interventions are required if any?               Family Comments: none present  OBS brochure/video discussed/provided to patient/family: N/A    For the majority of the shift this patient's behavior was Green.   Behavioral interventions performed were care, rounding.    ED NURSE PHONE NUMBER: *[phone]         "

## 2021-05-05 NOTE — H&P
"[facility]    History and Physical - Hospitalist Service       Date of Admission:  5/5/2021    Assessment & Plan   Perfecto Reese is a 70 year old male with COPD on 3L home O2 with ongoing tocacco use, adrenal insufficieny on chronic steroids, psoriasis (previously on Humira), HCV cirrhosis, PVD s/p L BKA and with chronic RLE wounds who presents today 5/5/2021 for a wound check. He has not had dressing changed on RLE for \"a couple months maybe.\" ED attending describes wounds as malodorous though patient does not appear toxic. On my assessment, wound looks surprisingly clean, but dressings already removed. At baseline he is wheelchair bound.    Patient's neighbor had been assisting patient with ADLs and things like paying bills but he passed away a week ago. He said home nursing never showed up or responded to his calls after last discharge home. A brother stepped in to bring him food this week. A vulnerable adult report was filed in ED.    Chronic right lower extremity wounds - rule out cellulitis   Hx of PVD with L BKA (2010, [facility]): No evidence of sepsis in ED. Patient was given IV Vancomycin and IV Zosyn and 5 mg oxycodone. XR tib/fib showed no bony destruction and no gas. WBC wnl. Pro mi and lactate wnl. CRP mildly elevated.  - Admit under inpatient status    - No surrounding erythema to wound. Pulses diminished in RLE.   - follow up blood and wound cultures  - Patient is non toxic appearing but inflammation may be masked by chronic steroids. Will cover with Cefepime IV and doxycycline for now until ID can weigh in. This will avoid further renal toxicity as with Vanc/zosyn but still cover for most skin adelina as well as pseudomonas (per ED doc, dressing \"smelled of pseudomonas.\" Wound was undressed and not so malodorous by the time of my exam.  - consult vascular surgery - they have followed patient on previous admissions  - consult wound nurse  - consult ID for " antibiotic recommendations   - Pain control with tylenol PRN, dilaudid 2-4 mg q4 hrs, and IV dilaudid for dressing changes  - Note pt taken off ASA in the past due to chronic thrombocytopenia      MICHAEL on CKD III: Baseline creatinine 1.2-1.3 in Jan 2020, 1.5 in March, now up to 1.8.   - 0.9% NS at 100 ccs/hr  - Follow BMP   - Monitor urine output  - Avoid nephrotoxic agents      Chronic hypoxic and hypercarbic respiratory failure in the setting of COPD utilizing 3 LPM via NC at baseline, not in acute exacerbation:   - Resume PTA inhalers and nebs once verified  - continue oxygen to keep sats > 88%  - Encourage pulmonary toilet   - TTE several months ago did not indicate CHF     HCV Cirrhosis  Chronic normocytic anemia  Chronic thrombocytopenia  Anemia and thrombocytopenia likely multifactorial from liver cirrhosis, splenomegaly, renal disease, CKD and psoriasis. Iron labs suggestive of anemia of chronic disease.   - Hgb appears to be at baseline     Chronic adrenal insufficiency:   - Continues on PTA hydrocortisone 20 mg PO daily  - Consider stress dose steroids if pt were to become hypotensive     Essential hypertension: Amlodipine decreased to 5mg daily due to soft blood pressures during last hospital stay, Losartan decreased to 25 mg po every day.   - Resume Norvasc, hold Losartan given MICHAEL       History of psoriasis   - Patient says he is no longer on Humira    Tobacco use: still smokes 2-3 cigs/week. NRT - lozenges ordered prn      Chronic pain syndrome:      Probable protein-calorie malnutrition:   - Nutrition consult      Major depressive disorder: Secondary to general medical condition suspected.   - Cymbalta increased to 60 mg every day and melatonin 3 mg at bedtime added     Vulnerable adult  Physical deconditioning: Lives alone in apartment. Friend who has helped patient with paying bills, ADLs passed away 1 week ago. At baseline is WC bound with [employer] for transfers. Has declined long term placement in  the past.  - PT/OT/SW        Diet: Combination Diet Regular Diet Adult  DVT Prophylaxis: hold chemoppx until pt seen by wound RN and surgery team. Mechanical ppx contraindicated given wound.  Bruce Catheter: not present  Code Status: Full Code         Disposition Plan   Expected discharge: 2 - 3 days, recommended to transitional care unit once antibiotic plan established and safe disposition plan/ TCU bed available.  Entered: Ania Saucedo MD 05/05/2021, 1:58 PM     The patient's care was discussed with the Attending Physician, Dr. Flores in the ED., Bedside Nurse and Patient.    Ania Saucedo MD  [facility]  Contact information available via [facility] Paging/Directory      ______________________________________________________________________    Chief Complaint   RLE wounds    History is obtained from the patient    History of Present Illness   Patient presented for wound check. Unclear why he came today, but seems to be due to increased pain. Says he has not had a dressing change for about 2 months. Pain is 9/10 at time of my exam. Patient is wheelchair bound. Lives alone at home. Home nursing was arranged after his last discharge but they never showed up or returned his calls. His neighbor has helped him with ADLs such as getting groceries, paying bills for the past 20 years. This neighbor passed away a week ago. Patient said his brother brought groceries this week so he has been eating and drinking a normal amount. Denies fever or chills. Denies CP or SOB. No abd pain, N/V/diarrhea, though stools have always been loose, they are not watery or more frequent. He is on normal amount of baseline oxygen.     Still smokes 2-3 cigarettes/week. Denies alcohol.      Review of Systems    The 10 point Review of Systems is negative other than noted in the HPI or here.     Past Medical History    I have reviewed this patient's medical history and updated it with pertinent information if needed.    Past Medical History:   Diagnosis Date     Adrenal insufficiency (H)      Anemia of chronic disease      Anemia, iron deficiency      Back pain      Benign essential hypertension      Chronic respiratory failure with hypoxia, on home O2 therapy (H)     Secondary to COPD     CKD (chronic kidney disease), stage III      COPD (chronic obstructive pulmonary disease) (H)     dependent on 3 LPM via NC chronically     Depression      GERD (gastroesophageal reflux disease)      Hx of BKA, left (H)      Hyperlipidemia      Migraine      Non compliance w medication regimen      Pain syndrome, chronic      Primary insomnia      Psoriasis     on Humira     PVD (peripheral vascular disease) (H)        Past Surgical History   I have reviewed this patient's surgical history and updated it with pertinent information if needed.  Past Surgical History:   Procedure Laterality Date     AMPUTATION BELOW KNEE RT/LT Left      AS ARTHROSCOPY SUBTALAR JOINT SUBTALAR ARTHRODESIS       EXTERNAL EAR SURGERY Left      OPEN REDUCTION INTERNAL FIXATION FOREARM      left forearm/wrist     TONSILLECTOMY         Social History   I have reviewed this patient's social history and updated it with pertinent information if needed.  Social History     Tobacco Use     Smoking status: Former Smoker     Packs/day: 0.50     Years: 30.00     Pack years: 15.00     Types: Cigarettes     Quit date: 2017     Years since quittin.0     Smokeless tobacco: Never Used     Tobacco comment: states he is down to 3QD   Substance Use Topics     Alcohol use: No     Drug use: No       Family History   I have reviewed this patient's family history and updated it with pertinent information if needed.  Family History   Problem Relation Age of Onset     Colon Cancer Father      Coronary Artery Disease Father      Chronic Obstructive Pulmonary Disease Father      Osteoporosis Mother      Dementia Mother        Prior to Admission Medications   Prior to Admission  Medications   Prescriptions Last Dose Informant Patient Reported? Taking?   DULoxetine (CYMBALTA) 30 MG capsule   No No   Sig: Take 2 capsules (60 mg) by mouth daily   HYDROmorphone (DILAUDID) 2 MG tablet   No No   Sig: Take 1 tablet (2 mg) by mouth every 8 hours as needed for severe pain   Wound Cleansers (VASHE WOUND THERAPY EX)   Yes No   Sig: USE TO SOAK GAUZE AND CLEAN WOUND FOR TWENTY MINUTES W/ DAILY WOUND CARE CHANGES.   albuterol (PROAIR HFA/PROVENTIL HFA/VENTOLIN HFA) 108 (90 Base) MCG/ACT inhaler   No No   Sig: Inhale 2 puffs into the lungs every 4 hours as needed for shortness of breath / dyspnea or wheezing   clobetasol (TEMOVATE) 0.05 % external solution   Yes No   Sig: Apply topically At Bedtime Apply to scalp   cyanocobalamin (VITAMIN B-12) 1000 MCG tablet   No No   Sig: Take 1 tablet (1,000 mcg) by mouth daily   ferrous sulfate (FEROSUL) 325 (65 Fe) MG tablet   No No   Sig: Take 1 tablet (325 mg) by mouth every other day   fluticasone-vilanterol (BREO ELLIPTA) 200-25 MCG/INH inhaler   No No   Sig: Inhale 1 puff into the lungs daily   folic acid (FOLVITE) 1 MG tablet   No No   Sig: Take 1 tablet (1 mg) by mouth daily   furosemide (LASIX) 20 MG tablet   No No   Sig: Take 1 tablet (20 mg) by mouth daily   gabapentin (NEURONTIN) 300 MG capsule   No No   Sig: Take 1 capsule (300 mg) by mouth At Bedtime   hydrocortisone (CORTEF) 20 MG tablet   No No   Sig: Take 1 tablet (20 mg) by mouth every morning   lidocaine (XYLOCAINE) 5 % external ointment   No No   Sig: Apply topically 3 times daily as needed for moderate pain (rib pain)   loperamide (IMODIUM) 2 MG capsule   No No   Sig: Take 1 capsule (2 mg) by mouth 4 times daily as needed for diarrhea   losartan (COZAAR) 25 MG tablet   No No   Sig: Take 1 tablet (25 mg) by mouth daily   multivitamin w/minerals (THERA-VIT-M) tablet   No No   Sig: Take 1 tablet by mouth daily   nicotine (NICORETTE) 2 MG gum   Yes No   Sig: Place 2 mg inside cheek as needed for  smoking cessation   primidone (MYSOLINE) 50 MG tablet   No No   Sig: Take 1 tablet (50 mg) by mouth At Bedtime   umeclidinium (INCRUSE ELLIPTA) 62.5 MCG/INH inhaler   No No   Sig: Inhale 1 puff into the lungs daily   vitamin C (ASCORBIC ACID) 1000 MG TABS   No No   Sig: Take 1 tablet (1,000 mg) by mouth daily   vitamin D3 (CHOLECALCIFEROL) 250 mcg (72708 units) capsule   No No   Sig: Take 1 capsule (250 mcg) by mouth daily      Facility-Administered Medications: None     Allergies   Allergies   Allergen Reactions     Blood Transfusion Related (Informational Only) Other (See Comments)     Darvocet [Propoxyphene N-Apap] Nausea     Vicodin [Hydrocodone-Acetaminophen] Nausea     Can use if he eats with it       Physical Exam   Vital Signs: Temp: 98.6  F (37  C) Temp src: Oral BP: 125/70 Pulse: 70   Resp: 18 SpO2: 100 % O2 Device: Nasal cannula Oxygen Delivery: 3 LPM(baseline use)  Weight: 0 lbs 0 oz    Constitutional: Awake, alert, cooperative, no apparent distress  HEENT: neck supple, no LAD noted, moist mucous membranes  Respiratory: Course breath sounds, rattling cough. On 3L NC. No wheezing noted.  Cardiovascular: Regular rate and rhythm, normal S1 and S2, and no murmur noted  GI: Normal bowel sounds, soft, non-distended, non-tender  Skin/Integumen: Diffuse psoriatic lesions over much of body.  Ext: s/p L BKA. R leg with large wound, edema below wound, and dusky foot                  Neuro: CN 2-12 intact, non focal exam    Data   Data reviewed today: I reviewed all medications, new labs and imaging results over the last 24 hours. I personally reviewed no images or EKG's today.    Recent Labs   Lab 05/05/21  1229   WBC 4.0   HGB 8.5*   MCV 94   PLT 75*      POTASSIUM 4.6   CHLORIDE 115*   CO2 26   BUN 19   CR 1.76*   ANIONGAP 3   MARTINE 8.5   GLC 84   ALBUMIN 2.8*   PROTTOTAL 6.7*   BILITOTAL 0.3   ALKPHOS 63   ALT 9   AST 16       Recent Results (from the past 24 hour(s))   XR Tibia & Fibula Right 2 Views     Narrative    TIBIA AND FIBULA RIGHT TWO VIEWS May 5, 2021 12:49 PM     HISTORY: Right lower extremity wound evaluation, rule out osteopenia,  gas.    FINDINGS: Osteopenia. There is some chronic ossification and  periosteal reaction along the fibula.      Impression    IMPRESSION: No acute fracture or focal bone destruction.

## 2021-05-05 NOTE — ED NOTES
Bed: ED29  Expected date:   Expected time:   Means of arrival:   Comments:  Luke 514 leg wound pain 68 m

## 2021-05-05 NOTE — ED TRIAGE NOTES
EMS report: lives alone in apartment. A neighbor used to help him with wound care and dressing changes but the neighbor passed away. Patient has wound and dressing on right calf that reportedly has not been changed or assessed in over a month. VSS per EMS.

## 2021-05-05 NOTE — PHARMACY-ADMISSION MEDICATION HISTORY
Pharmacy Medication History  Admission medication history interview status for the 5/5/2021  admission is complete. See EPIC admission navigator for prior to admission medications     Location of Interview: Patient room  Medication history sources: Patient and Surescripts - patient states he has been taking his medications some of the time. The patient wasn't entirely sure on all of his medications but sets up his own medications. He states that there have been no changes to his medication list since he was admitted in March.       In the past week, patient estimated taking medication this percent of the time: 50-90% due to other. Patient needs help with managing his home medications and was hoping to have home care come in more often.       Medication reconciliation completed by provider prior to medication history? No    Time spent in this activity: 25 minutes    Prior to Admission medications    Medication Sig Last Dose Taking? Auth Provider   albuterol (PROAIR HFA/PROVENTIL HFA/VENTOLIN HFA) 108 (90 Base) MCG/ACT inhaler Inhale 2 puffs into the lungs every 4 hours as needed for shortness of breath / dyspnea or wheezing prn med Yes Carmen Flores MD   calcium citrate (CITRACAL) 950 (200 Ca) MG tablet Take 4 tablets by mouth daily Past Week at Unknown time Yes Unknown, Entered By History   clobetasol (TEMOVATE) 0.05 % external solution Apply topically At Bedtime Apply to scalp Past Week at Unknown time Yes Unknown, Entered By History   cyanocobalamin (VITAMIN B-12) 1000 MCG tablet Take 1 tablet (1,000 mcg) by mouth daily Past Week at Unknown time Yes Carmen Flores MD   DULoxetine (CYMBALTA) 30 MG capsule Take 60 mg by mouth daily Past Week at Unknown time Yes Unknown, Entered By History   ferrous sulfate (FEROSUL) 325 (65 Fe) MG tablet Take 1 tablet (325 mg) by mouth every other day Past Week at Unknown time Yes Carmen Flores MD   fluticasone-vilanterol (BREO ELLIPTA) 200-25 MCG/INH inhaler Inhale 1 puff  into the lungs daily Past Week at Unknown time Yes Carmen Flores MD   folic acid (FOLVITE) 1 MG tablet Take 1 tablet (1 mg) by mouth daily Past Week at Unknown time Yes Carmen Flores MD   furosemide (LASIX) 20 MG tablet Take 20 mg by mouth daily 5/5/2021 at am Yes Unknown, Entered By History   gabapentin (NEURONTIN) 300 MG capsule Take 1 capsule (300 mg) by mouth At Bedtime 5/5/2021 at Unknown time Yes Carmen Flores MD   hydrocortisone (CORTEF) 20 MG tablet Take 20 mg by mouth daily 5/4/2021 at Unknown time Yes Unknown, Entered By History   HYDROmorphone (DILAUDID) 2 MG tablet Take 1 tablet (2 mg) by mouth every 8 hours as needed for severe pain prn med Yes Carmen Flores MD   lidocaine (XYLOCAINE) 5 % external ointment Apply topically 3 times daily as needed for moderate pain (rib pain) prn med Yes Carmen Flores MD   loperamide (IMODIUM) 2 MG capsule Take 1 capsule (2 mg) by mouth 4 times daily as needed for diarrhea prn med Yes Davonte Naranjo MD   losartan (COZAAR) 25 MG tablet Take 25 mg by mouth daily Past Week at Unknown time Yes Unknown, Entered By History   Melatonin 10 MG TABS tablet Take 10 mg by mouth At Bedtime Past Week at Unknown time Yes Unknown, Entered By History   multivitamin w/minerals (THERA-VIT-M) tablet Take 1 tablet by mouth daily Past Week at Unknown time Yes Davonte Naranjo MD   omeprazole (PRILOSEC) 20 MG DR capsule Take 20 mg by mouth daily Past Week at Unknown time Yes Unknown, Entered By History   primidone (MYSOLINE) 50 MG tablet Take 50 mg by mouth At Bedtime Past Week at Unknown time Yes Unknown, Entered By History   tamsulosin (FLOMAX) 0.4 MG capsule Take 0.8 mg by mouth daily Past Week at Unknown time Yes Unknown, Entered By History   umeclidinium (INCRUSE ELLIPTA) 62.5 MCG/INH inhaler Inhale 1 puff into the lungs daily Past Week at Unknown time Yes Carmen Flores MD   vitamin C (ASCORBIC ACID) 1000 MG TABS Take 1 tablet (1,000 mg) by mouth daily Past Week  at Unknown time Yes Lana Stevens APRN CNP   vitamin D3 (CHOLECALCIFEROL) 250 mcg (03481 units) capsule Take 1 capsule (250 mcg) by mouth daily Past Week at Unknown time Yes Lana Stevens APRN CNP   nicotine (NICORETTE) 2 MG gum Place 2 mg inside cheek as needed for smoking cessation prn med  Unknown, Entered By History   Wound Cleansers (VASHE WOUND THERAPY EX) USE TO SOAK GAUZE AND CLEAN WOUND FOR TWENTY MINUTES W/ DAILY WOUND CARE CHANGES.   Reported, Patient       The information provided in this note is only as accurate as the sources available at the time of update(s)

## 2021-05-05 NOTE — ED PROVIDER NOTES
History   Chief Complaint:  Wound Check       The history is provided by the patient, the EMS personnel and medical records.      Perfecto Reese is a 70 year old male with history of COPD, cellulitis, CKD, hypertension, and PVD who presents via EMS for evaluation of a right lower leg wound. Perfecto has had a wound on his right calf for an unknown amount of time. He was admitted to the hospital for COPD exacerbation on 3/18/21 where they noted the wound and redressed it. He has not redressed the wound since, as his friend who was helping him care for the wound has passed away. He called EMS today due to increased right lower leg pain. No recent fevers. He is on home oxygen for COPD. He reports tobacco use of a few cigarettes a week.     Review of Systems   Constitutional: Negative for fever.   Musculoskeletal: Negative for myalgias.   Skin: Positive for wound.   All other systems reviewed and are negative.        Allergies:  Blood Transfusion Related (Informational Only)  Darvocet [Propoxyphene N-Apap]  Vicodin [Hydrocodone-Acetaminophen]    Medications:  Albuterol inhaler  Cyanocobalamin   Duloxetine   Ferrous sulfate   Fluticasone-vilanterol inhaler  Folic acid   Furosemide   Gabapentin   Hydrocortisone   Hydromorphone   Loperamide   Losartan   Multivitamin w/minerals   Primidone   Umeclidinium inhaler  Vitamin C   Vitamin D3     Past Medical History:     Adrenal insufficiency  Anemia or chronic disease  Benign essential hypertension  Cellulitis of right lower leg  Chronic respiratory failure with hypoxia  CKD  COPD  Depression  GERD  Hepatic cirrhosis   Hyperlipidemia  Migraine  Noncompliance with medication regiment  Open wound of right lower extremity   Opioid dependence   Pain syndrome, chronic  Pancytopenia   Pneumonia   Primary insomnia  Psoriasis  PVD  Severe malnutrition     Past Surgical History:    Amputation below knee, left  Arthroscopy subtalar joint subtalar arthrodesis  External ear surgery  ORIF  "left forearm  Tonsillectomy      Family History:    Colon cancer  Coronary artery disease  COPD  Dementia   Osteoporosis     Social History:  Presents unaccompanied via EMS  Lives independently  Retired   PCP: Polo Neil MD  Tobacco use: a few cigarettes per week  Alcohol use: negative     Physical Exam     Patient Vitals for the past 24 hrs:   BP Temp Temp src Pulse Resp SpO2 Height   05/05/21 1505 -- -- -- -- -- 99 % --   05/05/21 1500 134/58 -- -- 73 -- -- --   05/05/21 1405 -- -- -- -- -- 99 % --   05/05/21 1400 119/76 -- -- 67 -- -- --   05/05/21 1305 125/70 -- -- -- -- 100 % --   05/05/21 1156 139/63 98.6  F (37  C) Oral 70 18 93 % 1.651 m (5' 5\")       Physical Exam    General: Alert, interactive in mild distress  Head:  Scalp is atraumatic  Eyes:  The pupils are equal, round, and reactive to light    EOM's intact    No scleral icterus  ENT:      Nose:  The external nose is normal  Ears:  External ears are normal  Mouth/Throat: The oropharynx is normal    Mucus membranes are moist       Neck:  Normal range of motion.      There is no rigidity.    Trachea is in the midline         CV:  Regular rate and rhythm    No murmur     Dopplerable DP pulse on right  Resp:  Course breath sounds    Non-labored, no retractions or accessory muscle use      GI:  Abdomen is soft, no distension, no tenderness.       MS:  Left below knee amputation.   Skin:  Right lower extremity with a foul smelling chronic leg wound with purulent discharge and surrounding erythema.                     Neuro: Strength 5/5 x4.     GCS: 15  Psych:  Awake. Alert.  Normal affect.      Appropriate interactions.    Emergency Department Course     Imaging:  XR Tibia & Fibula Right 2 views:  No acute fracture or focal bone destruction.  Per radiology.      Laboratory:  CBC: WBC 4.0, HGB 8.5(L), PLT 75(L)   CMP: Chloride: 115(H), GFR: 39(L), Albumin: 2.8(L), Protein Total: 6.7(L), o/w WNL (Creatinine 1.76(H))   Blood cultures pending " x2  ISTAT blood gas venous: pH: 7.31(L), PCO2: 51(H), PO2: 36, Bicarbonate: 26, Oxyhgb: 63, Lactic: 0.5(L)    Procalcitonin: 0.10  Erythrocyte sedimentation rate auto: 62(H)  Wound Culture Aerobic Bacterial: pending    Asymptomatic COVID19 Virus PCR by nasopharyngeal swab: Negative    Emergency Department Course:    Reviewed:  I reviewed nursing notes, vitals, past medical history and care everywhere    Assessments:  1148 I obtained history and examined the patient as noted above.   1345 I rechecked the patient and explained findings.      Consults:   1354 I consulted with Dr. Saucedo of the hospitalist services who is in agreement to accept the patient for admission.     Interventions:  1229 Morphine, 2 mg, IV  1306 Zosyn, 4.5 g, IV  1347 Oxycodone, 5 mg, PO  1348 Vancomycin, 1,500 mg, IV    Disposition:  The patient was admitted to the hospital under the care of Dr. Saucedo.       Impression & Plan     Medical Decision Making:  Perfecto Reese was seen and evaluated. The above workup was undertaken returning as stable from previous. His right lower extremity wound is foul smelling with discharge and surrounding erythema. Concern is for infection. He states that his dressing has not been changed for several weeks as his caregiver has passed away. At this point it does not appear that the patient can care for himself. He will require hospitalization and likely TCU placement. Radiograph demonstrates no sign of gas to suggest necrotizing skin infection. There are no signs of osteomyelitis. Wound cultures are pending and laboratory workup is otherwise reassuring. There are no signs of severe sepsis or septic shock. He does have a slight worsening of his chronic kidney disease. Patient was informed of the findings. He was in agreement with admission and was brought into the hospital for further evaluation and treatment. He will require consultation from the wound team for further evaluation.     Covid-19  Perfecto BELTRÁN  Reese was evaluated during a global COVID-19 pandemic, which necessitated consideration that the patient might be at risk for infection with the SARS-CoV-2 virus that causes COVID-19.   Applicable protocols for evaluation were followed during the patient's care.   COVID-19 was considered as part of the patient's evaluation. The plan for testing is:  a test was obtained during this visit.    Diagnosis:    ICD-10-CM   1. Open wound of right lower extremity, initial encounter  S81.801A   2. Stage 3b chronic kidney disease  N18.32   3. Anemia, unspecified type  D64.9       Scribe Disclosure:  Aleja GARCIA, am serving as a scribe at 11:54 AM on 5/5/2021 to personally document services performed by Will Flores M based on my observations and the provider's statements to me.         Will Flores MD  05/05/21 9882

## 2021-05-06 ENCOUNTER — APPOINTMENT (OUTPATIENT)
Dept: OCCUPATIONAL THERAPY | Facility: CLINIC | Age: 71
DRG: 603 | End: 2021-05-06
Attending: HOSPITALIST
Payer: MEDICARE

## 2021-05-06 ENCOUNTER — APPOINTMENT (OUTPATIENT)
Dept: PHYSICAL THERAPY | Facility: CLINIC | Age: 71
DRG: 603 | End: 2021-05-06
Attending: HOSPITALIST
Payer: MEDICARE

## 2021-05-06 LAB
ANION GAP SERPL CALCULATED.3IONS-SCNC: 3 MMOL/L (ref 3–14)
BASOPHILS # BLD AUTO: 0 10E9/L (ref 0–0.2)
BASOPHILS NFR BLD AUTO: 0.7 %
BUN SERPL-MCNC: 20 MG/DL (ref 7–30)
CALCIUM SERPL-MCNC: 7.4 MG/DL (ref 8.5–10.1)
CHLORIDE SERPL-SCNC: 117 MMOL/L (ref 94–109)
CO2 SERPL-SCNC: 24 MMOL/L (ref 20–32)
CREAT SERPL-MCNC: 1.77 MG/DL (ref 0.66–1.25)
DIFFERENTIAL METHOD BLD: ABNORMAL
EOSINOPHIL # BLD AUTO: 0.9 10E9/L (ref 0–0.7)
EOSINOPHIL NFR BLD AUTO: 21 %
ERYTHROCYTE [DISTWIDTH] IN BLOOD BY AUTOMATED COUNT: 15.6 % (ref 10–15)
GFR SERPL CREATININE-BSD FRML MDRD: 38 ML/MIN/{1.73_M2}
GLUCOSE SERPL-MCNC: 90 MG/DL (ref 70–99)
HCT VFR BLD AUTO: 27.3 % (ref 40–53)
HGB BLD-MCNC: 8.5 G/DL (ref 13.3–17.7)
IMM GRANULOCYTES # BLD: 0 10E9/L (ref 0–0.4)
IMM GRANULOCYTES NFR BLD: 0.5 %
LYMPHOCYTES # BLD AUTO: 0.4 10E9/L (ref 0.8–5.3)
LYMPHOCYTES NFR BLD AUTO: 9.4 %
MCH RBC QN AUTO: 30 PG (ref 26.5–33)
MCHC RBC AUTO-ENTMCNC: 31.1 G/DL (ref 31.5–36.5)
MCV RBC AUTO: 97 FL (ref 78–100)
MONOCYTES # BLD AUTO: 0.3 10E9/L (ref 0–1.3)
MONOCYTES NFR BLD AUTO: 6.4 %
NEUTROPHILS # BLD AUTO: 2.7 10E9/L (ref 1.6–8.3)
NEUTROPHILS NFR BLD AUTO: 62 %
NRBC # BLD AUTO: 0 10*3/UL
NRBC BLD AUTO-RTO: 0 /100
OVALOCYTES BLD QL SMEAR: SLIGHT
PLATELET # BLD AUTO: 61 10E9/L (ref 150–450)
PLATELET # BLD EST: ABNORMAL 10*3/UL
POTASSIUM SERPL-SCNC: 5.2 MMOL/L (ref 3.4–5.3)
RBC # BLD AUTO: 2.83 10E12/L (ref 4.4–5.9)
SODIUM SERPL-SCNC: 144 MMOL/L (ref 133–144)
WBC # BLD AUTO: 4.4 10E9/L (ref 4–11)

## 2021-05-06 PROCEDURE — 97602 WOUND(S) CARE NON-SELECTIVE: CPT

## 2021-05-06 PROCEDURE — 250N000013 HC RX MED GY IP 250 OP 250 PS 637: Performed by: INTERNAL MEDICINE

## 2021-05-06 PROCEDURE — 97110 THERAPEUTIC EXERCISES: CPT | Mod: GO

## 2021-05-06 PROCEDURE — 97165 OT EVAL LOW COMPLEX 30 MIN: CPT | Mod: GO

## 2021-05-06 PROCEDURE — 250N000013 HC RX MED GY IP 250 OP 250 PS 637: Performed by: HOSPITALIST

## 2021-05-06 PROCEDURE — 80048 BASIC METABOLIC PNL TOTAL CA: CPT | Performed by: HOSPITALIST

## 2021-05-06 PROCEDURE — 250N000011 HC RX IP 250 OP 636: Performed by: HOSPITALIST

## 2021-05-06 PROCEDURE — 85025 COMPLETE CBC W/AUTO DIFF WBC: CPT | Performed by: HOSPITALIST

## 2021-05-06 PROCEDURE — 36415 COLL VENOUS BLD VENIPUNCTURE: CPT | Performed by: HOSPITALIST

## 2021-05-06 PROCEDURE — 99233 SBSQ HOSP IP/OBS HIGH 50: CPT | Performed by: INTERNAL MEDICINE

## 2021-05-06 PROCEDURE — G0463 HOSPITAL OUTPT CLINIC VISIT: HCPCS

## 2021-05-06 PROCEDURE — 97161 PT EVAL LOW COMPLEX 20 MIN: CPT | Mod: GP

## 2021-05-06 PROCEDURE — 120N000001 HC R&B MED SURG/OB

## 2021-05-06 RX ORDER — DIPHENHYDRAMINE HCL 25 MG
25 CAPSULE ORAL EVERY 6 HOURS PRN
Status: DISCONTINUED | OUTPATIENT
Start: 2021-05-06 | End: 2021-05-12 | Stop reason: HOSPADM

## 2021-05-06 RX ORDER — DIPHENHYDRAMINE HYDROCHLORIDE 50 MG/ML
25 INJECTION INTRAMUSCULAR; INTRAVENOUS EVERY 6 HOURS PRN
Status: DISCONTINUED | OUTPATIENT
Start: 2021-05-06 | End: 2021-05-12 | Stop reason: HOSPADM

## 2021-05-06 RX ADMIN — CYANOCOBALAMIN TAB 1000 MCG 1000 MCG: 1000 TAB at 08:46

## 2021-05-06 RX ADMIN — FLUTICASONE FUROATE AND VILANTEROL TRIFENATATE 1 PUFF: 200; 25 POWDER RESPIRATORY (INHALATION) at 08:51

## 2021-05-06 RX ADMIN — UMECLIDINIUM 1 PUFF: 62.5 AEROSOL, POWDER ORAL at 08:51

## 2021-05-06 RX ADMIN — CEFEPIME HYDROCHLORIDE 1 G: 1 INJECTION, POWDER, FOR SOLUTION INTRAMUSCULAR; INTRAVENOUS at 21:46

## 2021-05-06 RX ADMIN — FOLIC ACID 1 MG: 1 TABLET ORAL at 08:46

## 2021-05-06 RX ADMIN — HYDROMORPHONE HYDROCHLORIDE 4 MG: 2 TABLET ORAL at 12:57

## 2021-05-06 RX ADMIN — GABAPENTIN 300 MG: 300 CAPSULE ORAL at 22:50

## 2021-05-06 RX ADMIN — DULOXETINE HYDROCHLORIDE 60 MG: 60 CAPSULE, DELAYED RELEASE ORAL at 08:47

## 2021-05-06 RX ADMIN — CHOLECALCIFEROL TAB 125 MCG (5000 UNIT) 250 MCG: 125 TAB at 08:46

## 2021-05-06 RX ADMIN — OXYCODONE HYDROCHLORIDE AND ACETAMINOPHEN 1000 MG: 500 TABLET ORAL at 08:46

## 2021-05-06 RX ADMIN — HYDROMORPHONE HYDROCHLORIDE 4 MG: 2 TABLET ORAL at 08:47

## 2021-05-06 RX ADMIN — TAMSULOSIN HYDROCHLORIDE 0.8 MG: 0.4 CAPSULE ORAL at 18:16

## 2021-05-06 RX ADMIN — OMEPRAZOLE 20 MG: 20 CAPSULE, DELAYED RELEASE ORAL at 08:46

## 2021-05-06 RX ADMIN — DOXYCYCLINE 100 MG: 100 CAPSULE ORAL at 20:43

## 2021-05-06 RX ADMIN — PRIMIDONE 50 MG: 50 TABLET ORAL at 22:50

## 2021-05-06 RX ADMIN — HYDROCORTISONE 20 MG: 10 TABLET ORAL at 08:46

## 2021-05-06 RX ADMIN — DIPHENHYDRAMINE HYDROCHLORIDE 25 MG: 25 CAPSULE ORAL at 08:46

## 2021-05-06 RX ADMIN — MULTIPLE VITAMINS W/ MINERALS TAB 1 TABLET: TAB at 12:57

## 2021-05-06 RX ADMIN — HYDROMORPHONE HYDROCHLORIDE 4 MG: 2 TABLET ORAL at 01:47

## 2021-05-06 RX ADMIN — DOXYCYCLINE 100 MG: 100 CAPSULE ORAL at 08:46

## 2021-05-06 RX ADMIN — HYDROMORPHONE HYDROCHLORIDE 4 MG: 2 TABLET ORAL at 18:16

## 2021-05-06 RX ADMIN — CLOBETASOL PROPIONATE: 0.5 SOLUTION TOPICAL at 22:52

## 2021-05-06 ASSESSMENT — ACTIVITIES OF DAILY LIVING (ADL)
ADLS_ACUITY_SCORE: 22
ADLS_ACUITY_SCORE: 24
ADLS_ACUITY_SCORE: 22
ADLS_ACUITY_SCORE: 22
ADLS_ACUITY_SCORE: 24
ADLS_ACUITY_SCORE: 22

## 2021-05-06 NOTE — PLAN OF CARE
ED admission ,AXO x4 .Wound on the right lower extremity..Wound nurse ,infectious and vascular need to see the pt.baseline 3 L oxygen at home.Tolerating diet.Pt is having psoriasis .Scattered  pealing and scab ,Kassidy area is red and having scabs from psoriasis .Will monitor

## 2021-05-06 NOTE — CONSULTS
[facility]    Infectious Disease Consultation     Date of Admission:  5/5/2021  Date of Consult (When I saw the patient): 05/06/21    Assessment & Plan   Perfecto Reese is a 70 year old male who was admitted on 5/5/2021.     Impression:  1. 70 y.o male with multiple co morbidities.   2. COPD on home oxygen, continued tobacco abuse.   3. Adrenal insufficiency on chronic steroids.   4. Psoriasis   5. HCV cirrhosis   6. PVD s.p left BKA  7. Chronic RLE wound.   8. Admitted for wound check, no fever, no leucocytosis, procal normal.   9. Started on broad spectrum antibiotics     Recommendations:   1. Continue on cefepime   2. Follow up on vascular consult.   3. Skin cultures from these wound usually of no benefit given the chronicity and location of the wound.       Rc Hu MD    Reason for Consult   Reason for consult: I was asked to evaluate this patient for RLE chronic wound possibly infected.    Primary Care Physician   Polo Neil    Chief Complaint   Chronic wound     History is obtained from the patient and medical records    History of Present Illness   Perfecto Reese is a 70 year old male with COPD on 3L home O2 with ongoing tocacco use, adrenal insufficieny on chronic steroids, psoriasis (previously on Humira), HCV cirrhosis, PVD s/p L BKA and with chronic RLE wounds who presents today 5/5/2021 for a wound check    Past Medical History   I have reviewed this patient's medical history and updated it with pertinent information if needed.   Past Medical History:   Diagnosis Date     Adrenal insufficiency (H)      Anemia of chronic disease      Anemia, iron deficiency      Back pain      Benign essential hypertension      Chronic respiratory failure with hypoxia, on home O2 therapy (H)     Secondary to COPD     CKD (chronic kidney disease), stage III      COPD (chronic obstructive pulmonary disease) (H)     dependent on 3 LPM via NC chronically     Depression      GERD  (gastroesophageal reflux disease)      Hx of BKA, left (H)      Hyperlipidemia      Migraine      Non compliance w medication regimen      Pain syndrome, chronic      Primary insomnia      Psoriasis     on Humira     PVD (peripheral vascular disease) (H)        Past Surgical History   I have reviewed this patient's surgical history and updated it with pertinent information if needed.  Past Surgical History:   Procedure Laterality Date     AMPUTATION BELOW KNEE RT/LT Left      AS ARTHROSCOPY SUBTALAR JOINT SUBTALAR ARTHRODESIS       EXTERNAL EAR SURGERY Left      OPEN REDUCTION INTERNAL FIXATION FOREARM      left forearm/wrist     TONSILLECTOMY         Prior to Admission Medications   Prior to Admission Medications   Prescriptions Last Dose Informant Patient Reported? Taking?   DULoxetine (CYMBALTA) 30 MG capsule Past Week at Unknown time Pharmacy Yes Yes   Sig: Take 60 mg by mouth daily   HYDROmorphone (DILAUDID) 2 MG tablet prn med Pharmacy No Yes   Sig: Take 1 tablet (2 mg) by mouth every 8 hours as needed for severe pain   Melatonin 10 MG TABS tablet Past Week at Unknown time Self Yes Yes   Sig: Take 10 mg by mouth At Bedtime   Wound Cleansers (VASHE WOUND THERAPY EX)  Pharmacy Yes No   Sig: USE TO SOAK GAUZE AND CLEAN WOUND FOR TWENTY MINUTES W/ DAILY WOUND CARE CHANGES.   albuterol (PROAIR HFA/PROVENTIL HFA/VENTOLIN HFA) 108 (90 Base) MCG/ACT inhaler prn med Pharmacy No Yes   Sig: Inhale 2 puffs into the lungs every 4 hours as needed for shortness of breath / dyspnea or wheezing   calcium citrate (CITRACAL) 950 (200 Ca) MG tablet Past Week at Unknown time  Yes Yes   Sig: Take 4 tablets by mouth daily   clobetasol (TEMOVATE) 0.05 % external solution Past Week at Unknown time Self Yes Yes   Sig: Apply topically At Bedtime Apply to scalp   cyanocobalamin (VITAMIN B-12) 1000 MCG tablet Past Week at Unknown time Self No Yes   Sig: Take 1 tablet (1,000 mcg) by mouth daily   ferrous sulfate (FEROSUL) 325 (65 Fe) MG  tablet Past Week at Unknown time Self No Yes   Sig: Take 1 tablet (325 mg) by mouth every other day   fluticasone-vilanterol (BREO ELLIPTA) 200-25 MCG/INH inhaler Past Week at Unknown time Pharmacy No Yes   Sig: Inhale 1 puff into the lungs daily   folic acid (FOLVITE) 1 MG tablet Past Week at Unknown time Self No Yes   Sig: Take 1 tablet (1 mg) by mouth daily   furosemide (LASIX) 20 MG tablet 5/5/2021 at am Pharmacy Yes Yes   Sig: Take 20 mg by mouth daily   gabapentin (NEURONTIN) 300 MG capsule 5/5/2021 at Unknown time Pharmacy No Yes   Sig: Take 1 capsule (300 mg) by mouth At Bedtime   hydrocortisone (CORTEF) 20 MG tablet 5/4/2021 at Unknown time Pharmacy Yes Yes   Sig: Take 20 mg by mouth daily   lidocaine (XYLOCAINE) 5 % external ointment prn med Pharmacy No Yes   Sig: Apply topically 3 times daily as needed for moderate pain (rib pain)   loperamide (IMODIUM) 2 MG capsule prn med Pharmacy No Yes   Sig: Take 1 capsule (2 mg) by mouth 4 times daily as needed for diarrhea   losartan (COZAAR) 25 MG tablet Past Week at Unknown time Pharmacy Yes Yes   Sig: Take 25 mg by mouth daily   multivitamin w/minerals (THERA-VIT-M) tablet Past Week at Unknown time Self No Yes   Sig: Take 1 tablet by mouth daily   nicotine (NICORETTE) 2 MG gum prn med Pharmacy Yes No   Sig: Place 2 mg inside cheek as needed for smoking cessation   omeprazole (PRILOSEC) 20 MG DR capsule Past Week at Unknown time Pharmacy Yes Yes   Sig: Take 20 mg by mouth daily   primidone (MYSOLINE) 50 MG tablet Past Week at Unknown time Pharmacy Yes Yes   Sig: Take 50 mg by mouth At Bedtime   tamsulosin (FLOMAX) 0.4 MG capsule Past Week at Unknown time Pharmacy Yes Yes   Sig: Take 0.8 mg by mouth daily   umeclidinium (INCRUSE ELLIPTA) 62.5 MCG/INH inhaler Past Week at Unknown time Pharmacy No Yes   Sig: Inhale 1 puff into the lungs daily   vitamin C (ASCORBIC ACID) 1000 MG TABS Past Week at Unknown time Pharmacy No Yes   Sig: Take 1 tablet (1,000 mg) by mouth  daily   vitamin D3 (CHOLECALCIFEROL) 250 mcg (79856 units) capsule Past Week at Unknown time Pharmacy No Yes   Sig: Take 1 capsule (250 mcg) by mouth daily      Facility-Administered Medications: None     Allergies   Allergies   Allergen Reactions     Blood Transfusion Related (Informational Only) Other (See Comments)     Darvocet [Propoxyphene N-Apap] Nausea     Vicodin [Hydrocodone-Acetaminophen] Nausea     Can use if he eats with it       Immunization History   Immunization History   Administered Date(s) Administered     COVID-19,PF,Moderna 12/31/2020, 01/28/2021     Flu, Unspecified 10/28/1997, 10/21/2014, 11/16/2019     HepA-Adult 03/12/2001     HepB-Adult 10/05/2016     Influenza (H1N1) 01/14/2010     Influenza (High Dose) 3 valent vaccine 10/05/2016     Influenza (IIV3) PF 09/29/2010, 11/16/2019     Influenza Vaccine IM 18-49 Yrs, RIV3 11/16/2019     Influenza Vaccine IM > 6 months Valent IIV4 09/29/2017, 09/20/2020     Pneumo Conj 13-V (2010&after) 02/20/2018     Pneumococcal 23 valent 11/30/2018, 03/25/2020     Pneumococcal, Unspecified 01/01/2009     TD (ADULT, 7+) 01/01/2001, 02/01/2010     TDAP Vaccine (Boostrix) 10/23/2013     Tdap (Adult) Unspecified Formulation 10/08/2008, 10/23/2013     Zoster vaccine, live 10/17/2012       Social History   I have reviewed this patient's social history and updated it with pertinent information if needed. Perfecto LEIGH ANN Reese  reports that he quit smoking about 4 years ago. His smoking use included cigarettes. He has a 15.00 pack-year smoking history. He has never used smokeless tobacco. He reports that he does not drink alcohol or use drugs.    Family History   I have reviewed this patient's family history and updated it with pertinent information if needed.   Family History   Problem Relation Age of Onset     Colon Cancer Father      Coronary Artery Disease Father      Chronic Obstructive Pulmonary Disease Father      Osteoporosis Mother      Dementia Mother         Review of Systems   The 10 point Review of Systems is negative other than noted in the HPI or here.     Physical Exam   Temp: 97.7  F (36.5  C) Temp src: Oral BP: 137/47 Pulse: 61   Resp: 18 SpO2: 98 % O2 Device: Nasal cannula Oxygen Delivery: 3 LPM  Vital Signs with Ranges  Temp:  [97.7  F (36.5  C)] 97.7  F (36.5  C)  Pulse:  [61-73] 61  Resp:  [18] 18  BP: (119-137)/(47-76) 137/47  SpO2:  [98 %-100 %] 98 %  0 lbs 0 oz  Body mass index is 23.3 kg/m .    GENERAL APPEARANCE:  awake  EYES: Eyes grossly normal to inspection, PERRL and conjunctivae and sclerae normal  HENT: ear canals and TM's normal and nose and mouth without ulcers or lesions  NECK: no adenopathy, no asymmetry, masses, or scars and thyroid normal to palpation  RESP: lungs clear to auscultation - no rales, rhonchi or wheezes  CV: regular rates and rhythm, normal S1 S2, no S3 or S4 and no murmur, click or rub  LYMPHATICS: normal ant/post cervical and supraclavicular nodes  ABDOMEN: soft, nontender, without hepatosplenomegaly or masses and bowel sounds normal  MS: chornic wound on the RLE   SKIN: no suspicious lesions or rashes      Data   Lab Results   Component Value Date    WBC 4.4 05/06/2021    HGB 8.5 (L) 05/06/2021    HCT 27.3 (L) 05/06/2021    PLT 61 (L) 05/06/2021     05/06/2021    POTASSIUM 5.2 05/06/2021    CHLORIDE 117 (H) 05/06/2021    CO2 24 05/06/2021    BUN 20 05/06/2021    CR 1.77 (H) 05/06/2021    GLC 90 05/06/2021    SED 62 (H) 05/05/2021    NTBNPI 850 03/19/2021    TROPI <0.015 12/22/2020    AST 16 05/05/2021    ALT 9 05/05/2021    ALKPHOS 63 05/05/2021    BILITOTAL 0.3 05/05/2021    INR 1.25 (H) 02/09/2021     Recent Labs   Lab 05/05/21  1303 05/05/21  1232 05/05/21  1229   CULT No growth after 12 hours Light growth  Non lactose fermenting gram negative rods  *  Culture in progress No growth after 15 hours     Recent Labs   Lab Test 05/05/21  1303 05/05/21  1232 05/05/21  1229 03/20/21  1030 03/18/21  1843 02/09/21  0602  12/03/20  0540 12/03/20  0230 12/02/20  1904   CULT No growth after 12 hours Light growth  Non lactose fermenting gram negative rods  *  Culture in progress No growth after 15 hours No growth 10,000 to 50,000 colonies/mL  -Enterococcus species (VRE)  Further speciated as:  Enterococcus raffinosus  *  Plus  10,000 to 50,000 colonies/mL  mixed urogenital adelina    Critical Value/Significant Value, preliminary result only, called to and read back by  Kristel Horowitz RN at 0728 3/21/21 SRQ   No growth  No growth Light growth  Normal adelina    Light growth  Proteus mirabilis  *  Moderate growth  Corynebacterium striatum  Identification obtained by MALDI-TOF mass spectrometry research use only database. Test   characteristics determined and verified by the [facility]   ([facility]) [place], [place].  Susceptibility testing not routinely done  * <10,000 colonies/mL  mixed urogenital adelina  Susceptibility testing not routinely done   No growth

## 2021-05-06 NOTE — PROGRESS NOTES
05/06/21 1045   Quick Adds   Type of Visit Initial PT Evaluation   Living Environment   People in home alone   Current Living Arrangements apartment   Home Accessibility no concerns   Self-Care   Usual Activity Tolerance fair   Equipment Currently Used at Home wheelchair, manual   Activity/Exercise/Self-Care Comment Patient lives alone and uses a manual wheelchair for mobility at baseline. He is independent with pivot transfers. His neighbor was coming over to assist with household chores, groceries, bills, etc but his neighter recently passed away. Nir's brother has been helping bring groceries over. He sponge bathes at the sink.    Disability/Function   Hearing Difficulty or Deaf no   Wear Glasses or Blind no   Concentrating, Remembering or Making Decisions Difficulty no   Difficulty Communicating no   Fall history within last six months no   Change in Functional Status Since Onset of Current Illness/Injury no   General Information   Onset of Illness/Injury or Date of Surgery 05/05/21   Referring Physician Ania Saucedo MD   Patient/Family Therapy Goals Statement (PT) to go home    Pertinent History of Current Problem (include personal factors and/or comorbidities that impact the POC) Patient is 69 YO M admitted with R LE wound and MICHAEL. He was supposed to have HH RN after last hospital stay, but he reports they never came to change his dressings. PMH: PVD, L BKA, CKD III, COPD on 3L home O2, chronic anemia, tobacco use, chronic R shoulder pain   Existing Precautions/Restrictions fall   General Observations Activity orders: up with assist   Cognition   Orientation Status (Cognition) oriented x 3   Affect/Mental Status (Cognition) WFL   Follows Commands (Cognition) WFL   Pain Assessment   Patient Currently in Pain Yes, see Vital Sign flowsheet  (9/10 R lower leg)   Integumentary/Edema   Integumentary/Edema Comments R LE in Rooke boot which he states he wears all the time at home, even with transfers    Posture    Posture Protracted shoulders   Range of Motion (ROM)   ROM Quick Adds ROM WFL  (B LE)   ROM Comment Pain with R shoulder AROM - chronic per patient   Strength   Strength Comments B LE strength WFL for transfers   Bed Mobility   Bed Mobility supine-sit;sit-supine;scooting/bridging   Scooting/Bridging [place] (Bed Mobility) independent   Supine-Sit [place] (Bed Mobility) independent   Sit-Supine [place] (Bed Mobility) independent   Transfers   Transfers bed-chair transfer   Bed-Chair Transfer   Bed-Chair [place] (Transfers) independent   Bed/Chair Transfer Comments Patient demonstrated pivot transfer on [place] [facility] safe technique and no imbalance.    Gait/Stairs (Locomotion)   Comment (Gait/Stairs) Patient is non-ambulatory at baseline.    Balance   Balance Comments Good sitting balance, able to scoot and reposition hips without imbalance. Standing balance deferred as patient only pivot transfers at baseline.    Sensory Examination   Sensory Perception Comments Chronic numbness R lower leg, slightly worse from his baseline   Coordination   Coordination no deficits were identified   Clinical Impression   Criteria for Skilled Therapeutic Intervention no;evaluation only;current level of function same as previous level of function   Clinical Presentation Evolving/Changing   Clinical Presentation Rationale worsened R LE wound, multiple admitted diagnoses, PMH   Clinical Decision Making (Complexity) low complexity   Therapy Frequency (PT) Evaluation only   Predicted Duration of Therapy Intervention (days/wks) Eval only   Risk & Benefits of therapy have been explained evaluation/treatment results reviewed;participants included;patient   Clinical Impression Comments Due to chronic R shoulder pain, patient would benefit from OT assessment for ADL training.    PT Discharge Planning    PT Discharge Recommendation (DC Rec) home with assist   PT Rationale for DC Rec Patient  is at baseline level of independence with functional transfers and he uses a wheelchair at baseline. He requires assist for household chores/groceries at baseline which his brother is currently providing. No acute skilled PT needs identified. Patient in supine with alarm activated at end of session, updated FLORA Naranjo.    PT Brief overview of current status  Independent supine <> sit and stand pivot transfers   Total Evaluation Time   Total Evaluation Time (Minutes) 16

## 2021-05-06 NOTE — PROGRESS NOTES
"WO Nurse Inpatient Wound Assessment   Reason for follow up visit : Evaluate and treat  RLE posterior calf wound     Assessment  Re-admission: RLE wound   RLE wounds due to Mixed Etiology: poor circulation, edema, stasis, pressure  Status:  Pt reports no dressing change to wound since caregiver upstairs passed away. States he was supposed to get help but \"No one came\" Unable to get to doctor due to \"lack of transportation\" I can't drive with this boot on his leg.No one told him he could wear a shoe\" Concern with pt understanding of health care needs.     RLE posterior calf wound: hypergranular, shiny red slick looking wound bed, odor noted in pt room. No overt s/s infection  No palpable pedal pulse    Treatment Plan  RLE posterior calf   Wounds: Daily    1. Cleanse with  Vashe moistened gauze to wound bed and let sit x 5 minutes - do not rinse. #[id number]  2. Dry and protect surrounding skin with no sting barrier film wipe #[id number],  3. Apply sween 24 to all intact skin from base of toes to below knees.  4. Paint 5x9 stirp of Vaseline gauze with Iodosorb. Cover wound bed  5. Cover with ABD   6. Secure with kerlix and tape  7. Time/Date/Initial dressing  8. WO will obtain clean Rooke boot for patient. Current boot contaminated with odorous drainage.     Orders Reviewed and Updated  Recommended provider order: none   WOC Nurse follow-up plan:weekly  Nursing to notify the Provider(s) and re-consult the WO Nurse if wound(s) deteriorates or new skin concern.    Patient History  According to provider note(s):    Perfecto Reese is a 70 year old male with history of COPD, cellulitis, CKD, hypertension, and PVD who presents via EMS for evaluation of a right lower leg wound. Perfecto has had a wound on his right calf for an unknown amount of time. He was admitted to the hospital for COPD exacerbation on 3/18/21 where they noted the wound and redressed it. He has not redressed the wound since, as his friend who was helping him " care for the wound has passed away. He called EMS today due to increased right lower leg pain. No recent fevers. He is on home oxygen for COPD. He reports tobacco use of a few cigarettes a week.      Objective Data  Containment of urine/stool: Continent of bladder and Continent of bowel    Active Diet Order  Orders Placed This Encounter      Combination Diet Regular Diet Adult      Output:   I/O last 3 completed shifts:  In: 1073.33 [P.O.:300; I.V.:773.33]  Out: 600 [Urine:600]    Risk Assessment:   Sensory Perception: 3-->slightly limited  Moisture: 3-->occasionally moist  Activity: 2-->chairfast  Mobility: 2-->very limited  Nutrition: 3-->adequate  Friction and Shear: 2-->potential problem  Donn Score: 15                          Labs:   Recent Labs   Lab 05/06/21  1052 05/05/21  1229   ALBUMIN  --  2.8*   HGB 8.5* 8.5*   WBC 4.4 4.0       Physical Exa    Wound Location:  RLE - posterior calf  Date of last photo 5-6-21       Wound Base:100% healthy granular      Palpation of the wound bed: normal      Drainage: moderate sero-sang     Measurements (length x width x depth, in cm) 10  x5x  0.1 cm with 2 small superior wounds 1.0 x 1.0 x superficial      Tunneling N/A     Undermining N/A  Periwound skin: Intact      Color: normal and consistent with surrounding tissue      Temperature: cool   Odor: pungent  Pain:tenderness      Interventions  Visual inspection and assessment completed   Wound Care Rationale Promote moist wound healing without tissue dehydration  and Decrease bacterial load  Wound Care: done per plan of care  Supplies: at bedside  Current off-loading measures: Heel off-loading boot(s) and Pillows  Current support surface: Standard  Atmos Air mattress  Education provided to: importance of repositioning and Off-loading pressure  Discussed plan of care with Patient and Nurse    Korina Gonzales RN BSN CWOCN

## 2021-05-06 NOTE — CONSULTS
CLINICAL NUTRITION SERVICES  -  ASSESSMENT NOTE      Recommendations Ordered by Registered Dietitian (RD):   Chocolate Ensure Enlive at 10 am, 2 pm, and dinner   Malnutrition:   % Weight Loss:  Unable to determine without current weight  % Intake:  No decreased intake noted  Subcutaneous Fat Loss:  Orbital region mild depletion and Upper arm region mild depletion  Muscle Loss:  Temporal region moderate depletion, Clavicle bone region moderate-severe depletion, Acromion bone region moderate-severe depletion and Dorsal hand region moderate depletion  Fluid Retention:  None noted    Malnutrition Diagnosis: Non-Severe malnutrition  In Context of:  Chronic illness or disease        REASON FOR ASSESSMENT  Perfecto Reese is a 70 year old male seen by Registered Dietitian for Provider Order - suspect malnutrition; wound healing    DX:   Chronic right lower extremity wounds - rule out cellulitis   MICHAEL on CKD    PMH:   COPD on 3L home O2 with ongoing tocacco use, adrenal insufficieny on chronic steroids, psoriasis, HCV cirrhosis, PVD s/p L BKA and with chronic RLE wounds     NUTRITION HISTORY  - Information obtained from patient and Epic records.  - Patient is on a Regular diet at home.  - Typical food/fluid intake is good (improved)  - Diet history:  His brother does the shopping and he does the meal prep at home.  Breakfast:  2 eggs, 2 slices toast, whole milk.  Lunch & Dinner:  Frozen dinner (Earlene Jimena or [employer]), whole milk.   - Supplements:  None at home, but had 3-4 Boost per day while in hospital and at rehab.   - No food allergies/intolerances.    CURRENT NUTRITION ORDERS  Diet Order:     Regular     Current Intake/Tolerance:  Good appetite and intake.  He took 300 mL oral yesterday.  Pt would like to receive 3 house supplements per day in chocolate flavor.    NUTRITION FOCUSED PHYSICAL ASSESSMENT FOR DIAGNOSING MALNUTRITION)  Yes         Observed:    Muscle wasting (refer to documentation in Malnutrition  "section)  Subcutaneous fat loss (refer to documentation in Malnutrition section)    Obtained from Chart/Interdisciplinary Team:  Non-healing wound --> WOCN consult pending  Left BKA    ANTHROPOMETRICS  Height: 5' 5\"  Current Weight:  Not available  Last Recorded Wt:  63.5 kg (3/18/21)  IBW: 58.1 kg (adjusted for BKA)  BMI:  24.6 (ajusted for BKA) (109% IBW)  Weight History:    - Weight gain 7.1 kg from Feb --> March of this year which pt attributes to eating better and taking nutritional supplements.    Wt Readings from Last 20 Encounters:   03/18/21 63.5 kg (140 lb)   02/16/21 65.5 kg (144 lb 4.8 oz)   02/09/21 56.4 kg (124 lb 6.4 oz)   02/22/21 65.5 kg (144 lb 4.8 oz)   02/16/21 66 kg (145 lb 6.4 oz)   02/15/21 63.8 kg (140 lb 10.5 oz)   01/28/21 64.4 kg (141 lb 14.4 oz)   01/26/21 64.4 kg (141 lb 14.4 oz)   01/18/21 64.4 kg (141 lb 14.4 oz)   01/14/21 64.4 kg (141 lb 14.4 oz)   01/07/21 64.4 kg (141 lb 14.4 oz)   01/05/21 64.4 kg (141 lb 14.4 oz)   12/30/20 65 kg (143 lb 4.8 oz)   03/22/18 93 kg (205 lb)   01/21/16 70.3 kg (155 lb)   01/20/16 70.3 kg (155 lb)   12/08/15 70.3 kg (155 lb)   10/08/15 72.1 kg (159 lb)   07/15/15 76.2 kg (168 lb)     LABS  Labs reviewed  BUN 20 (NL)  Cr 1.77 (H) - Pt with MICHAEL on CKD    MEDICATIONS  Medications reviewed  Vit B12, Folic acid, MVI-M, Vit C, Vit D - for supplementation      ASSESSED NUTRITION NEEDS PER APPROVED PRACTICE GUIDELINES:    Dosing Weight:  63.5 kg (3/18 last recorded wt)  Estimated Energy Needs:  0691-0700 kcals (25-30 Kcal/Kg)  Justification: maintenance  Estimated Protein Needs:  76-90 grams protein (1.2-1.4 g pro/Kg)  Justification: Repletion, wound healing, hypercatabolism with acute illness and CKD  Estimated Fluid Needs:  5237-5186 mL (1 mL/Kcal)  Justification: maintenance    MALNUTRITION:  % Weight Loss:  Unable to determine without current weight  % Intake:  No decreased intake noted  Subcutaneous Fat Loss:  Orbital region mild depletion and Upper arm " region mild depletion  Muscle Loss:  Temporal region moderate depletion, Clavicle bone region moderate-severe depletion, Acromion bone region moderate-severe depletion and Dorsal hand region moderate depletion  Fluid Retention:  None noted    Malnutrition Diagnosis: Non-Severe malnutrition  In Context of:  Chronic illness or disease    NUTRITION DIAGNOSIS:  Increased nutrient needs (protein) related to delayed wound healing as evidenced by chronic right lower extremity wounds       NUTRITION INTERVENTIONS  Recommendations / Nutrition Prescription  Chocolate Ensure Enlive at 10 am, 2 pm, and dinner    Implementation  Nutrition education: Provided education on the use of supplements to optimize intake.  Medical Food Supplement - Ordered above in Epic.    Nutrition Goals  Pt will consume > 75% meals and supplements in 3-5 days.      MONITORING AND EVALUATION:  Progress towards goals will be monitored and evaluated per protocol and Practice Guidelines  Check weight as patient may fall in Severe Malnutrition category    Jessica Harrell, RD, LD, CNSC

## 2021-05-06 NOTE — PLAN OF CARE
Wound was changed by wound nurse, oral antibiotic, Dilaudid for pain management, Itching skin due to Psoriasis, scabby skin, prn Benadryl for itching, up with assist, pivots to bsc, urinal in bed.

## 2021-05-06 NOTE — PROVIDER NOTIFICATION
ED admission with large open wound on RLE its open to air now ,no wound dressing order .yazan TEJADA paged.

## 2021-05-06 NOTE — PROGRESS NOTES
RECEIVING UNIT ED HANDOFF REVIEW    ED Nurse Handoff Report was reviewed by: Zoey Stover RN on May 5, 2021 at 7:09 PM

## 2021-05-06 NOTE — PROGRESS NOTES
05/06/21 1420   Quick Adds   Type of Visit Initial Occupational Therapy Evaluation   Living Environment   People in home alone   Current Living Arrangements apartment   Home Accessibility no concerns   Transportation Anticipated none   Living Environment Comments Pt states his apt is w/c accessible. States he does not go anywhere so has no need for driving.    Self-Care   Usual Activity Tolerance fair   Current Activity Tolerance fair   Equipment Currently Used at Home wheelchair, manual   Activity/Exercise/Self-Care Comment Pt uses W/C for mobility at [facility]. Indepedent with stand pivot transfers for ADLs, O2 at home. Brother assists with IADLs-shopping, home management. Pt sponge bathes at sink.     Disability/Function   Hearing Difficulty or Deaf yes   Describe hearing loss hearing loss on left side   Wear Glasses or Blind yes   Vision Management glasses   Fall history within last six months no   General Information   Onset of Illness/Injury or Date of Surgery 05/05/21   Referring Physician Ania Saucedo MD   Patient/Family Therapy Goal Statement (OT) get my shoulder to feel better   Additional Occupational Profile Info/Pertinent History of Current Problem Patient is 71 YO M admitted with R LE wound and MICHAEL. He was supposed to have HH RN after last hospital stay, but he reports they never came to change his dressings. PMH: PVD, L BKA, CKD III, COPD on 3L home O2, chronic anemia, tobacco use, chronic R shoulder pain   Existing Precautions/Restrictions fall   General Observations and Info L OBI in 2010, used to have a prostetic but it hasn't fit well lately so has been w/c bound for past year.    Cognitive Status Examination   Orientation Status orientation to person, place and time   Visual Perception   Visual Impairment/Limitations WNL   Integumentary/Edema   Integumentary/Edema other (describe)   Integumentary/Edema Comments Total body rash - looks like psorisis. Pt itching constantly throughout  "session.    Posture   Posture forward head position;protracted shoulders   Range of Motion Comprehensive   Comment, General Range of Motion L UE WFL, R Shoulder Flex 120 deg, R shoulder Abd, R should IR/ER   Strength Comprehensive (MMT)   Comment, General Manual Muscle Testing (MMT) Assessment L UE WFL, R Shoulder Flex 3/5, R shoulder Abd 3/5, R should IR 3/5 /ER 4/5   Coordination   Upper Extremity Coordination No deficits were identified   Bed Mobility   Bed Mobility rolling left;supine-sit   Rolling Left [place] (Bed Mobility) modified independence   Supine-Sit [place] (Bed Mobility) modified independence   Assistive Device (Bed Mobility) bed rails   Transfers   Transfer Comments Declines OOB mobility d/t RN throwing away R LE \"cushion boot\" he wears for all transfers. Pt states they ordered a new one.    Balance   Balance Comments Fair sitting EOB   Activities of Daily Living   BADL Assessment/Intervention other (see comments)  (Declines all ADLs at this time)   Clinical Impression   Criteria for Skilled Therapeutic Interventions Met (OT) yes;meets criteria;skilled treatment is necessary   OT Diagnosis Impaired ADLs and functional  mobility   OT Problem List-Impairments impacting ADL problems related to;activity tolerance impaired;balance;range of motion (ROM);strength;mobility   Assessment of Occupational Performance 1-3 Performance Deficits   Identified Performance Deficits Dressing, funcitonal transfers   Planned Therapy Interventions (OT) ADL retraining;balance training;home program guidelines;progressive activity/exercise;transfer training   Clinical Decision Making Complexity (OT) low complexity   Therapy Frequency (OT) Daily   Predicted Duration of Therapy 2   Risk & Benefits of therapy have been explained evaluation/treatment results reviewed;care plan/treatment goals reviewed   OT Discharge Planning    OT Discharge Recommendation (DC Rec) Home with assist;home with home care occupational " therapy   OT Rationale for DC Rec Pt is near baseline for functional mobility per PT eval. Pt cannot leave home without significant taxing effort. W/c bound and would benefit from Home OT for activity tolerance, UE strengthing for R shld, and to increased indep with ADLs.    Total Evaluation Time (Minutes)   Total Evaluation Time (Minutes) 10   Skin WDL   Skin Integrity/Characteristics   (psoriasis B forearm and B LE )

## 2021-05-06 NOTE — PROGRESS NOTES
"[facility]    Hospitalist Progress Note    Assessment & Plan   Perfecto Reese is a 70 year old male with COPD on 3L home O2 with ongoing tocacco use, adrenal insufficieny on chronic steroids, psoriasis (previously on Humira), HCV cirrhosis, PVD s/p L BKA and with chronic RLE wounds who presents today 5/5/2021 for a wound check. He has not had dressing changed on RLE for \"a couple months maybe.\" ED attending describes wounds as malodorous though patient does not appear toxic.  Patient is at baseline wheelchair-bound.     Patient's neighbor had been assisting patient with ADLs and things like paying bills but he passed away a week ago. He said home nursing never showed up or responded to his calls after last discharge home. A brother stepped in to bring him food this week. A vulnerable adult report was filed in ED.     Chronic right lower extremity wounds - rule out cellulitis   Hx of PVD with L BKA (2010, [facility]): No evidence of sepsis in ED. Patient was given IV Vancomycin and IV Zosyn and 5 mg oxycodone. XR tib/fib showed no bony destruction and no gas. WBC wnl. Pro mi and lactate wnl. CRP mildly elevated.  -Right lower extremity pulses reduced, no erythema noted around the wound, blood cultures pending.   -Continue IV antibiotics, vascular surgery consult pending, wound care nurse consulted.  -ID consulted for antibiotic management, continue pain control  -Patient was recently taken off of ASA due to chronic thrombocytopenia.       MICHAEL on CKD III: Baseline creatinine 1.2-1.3 in Jan 2020, 1.5 in March, now up to 1.8.   - 0.9% NS at 100 ccs/hr  - Follow BMP   - Monitor urine output  - Avoid nephrotoxic agents      Chronic hypoxic and hypercarbic respiratory failure in the setting of COPD utilizing 3 LPM via NC at baseline, not in acute exacerbation:   -Continue PTA inhalers  - continue oxygen to keep sats > 88%  - Encourage pulmonary toilet   - TTE several months " ago did not indicate CHF     HCV Cirrhosis  Chronic normocytic anemia  Chronic thrombocytopenia  Anemia and thrombocytopenia likely multifactorial from liver cirrhosis, splenomegaly, renal disease, CKD and psoriasis. Iron labs suggestive of anemia of chronic disease.   - Hgb appears to be at baseline     Chronic adrenal insufficiency:   - Continues on PTA hydrocortisone 20 mg PO daily  - Consider stress dose steroids if pt were to become hypotensive     Essential hypertension: Amlodipine decreased to 5mg daily due to soft blood pressures during last hospital stay, Losartan decreased to 25 mg po every day.   -Continue Norvasc, holding losartan, blood pressure stable.     History of psoriasis   - Patient says he is no longer on Humira     Tobacco use: still smokes 2-3 cigs/week. NRT - lozenges ordered prn      Chronic pain syndrome:      Probable protein-calorie malnutrition:   - Nutrition consult      Major depressive disorder: Secondary to general medical condition suspected.   - Cymbalta increased to 60 mg every day and melatonin 3 mg at bedtime added      Vulnerable adult  Physical deconditioning: Lives alone in apartment. Friend who has helped patient with paying bills, ADLs passed away 1 week ago. At baseline is WC bound with pivots for transfers. Has declined long term placement in the past.  - PT/OT/SW     DVT Prophylaxis: SCDs  Code Status: Full Code     Disposition: Expected discharge in 1 to 2 days    Carmen Flores MD  Text Page   (7am to 6pm)    Interval History   Patient is resting, concerns of increased itching noted, he has a rash on his upper extremities and on the chest wall, denies any chest pain.    -Data reviewed today: I reviewed all new labs and imaging results over the last 24 hours.     Physical Exam   Temp: 97  F (36.1  C) Temp src: Oral BP: 130/52 Pulse: 52   Resp: 18 SpO2: 98 % O2 Device: Nasal cannula Oxygen Delivery: 3 LPM  There were no vitals filed for this visit.  Vital Signs with  Ranges  Temp:  [97  F (36.1  C)-98  F (36.7  C)] 97  F (36.1  C)  Pulse:  [52-73] 52  Resp:  [18] 18  BP: (122-137)/(47-58) 130/52  SpO2:  [96 %-99 %] 98 %  I/O last 3 completed shifts:  In: 1073.33 [P.O.:300; I.V.:773.33]  Out: 400 [Urine:400]    Constitutional: Awake, alert, cooperative, no apparent distress  Respiratory: Clear to auscultation bilaterally, no crackles or wheezing  Cardiovascular: Regular rate and rhythm, normal S1 and S2, and no murmur noted  GI: Normal bowel sounds, soft, non-distended, non-tender  Skin/Integumen: Left BKA, right lower extremity wound is dressed and leg  is in cam boot  Neuro : moving all 4 extremities, no focal deficit noted     Medications     - MEDICATION INSTRUCTIONS -         ceFEPIme (MAXIPIME) IV  1 g Intravenous Q24H     clobetasol   Topical At Bedtime     cyanocobalamin  1,000 mcg Oral Daily     doxycycline hyclate  100 mg Oral Q12H [facility]     DULoxetine  60 mg Oral Daily     fluticasone-vilanterol  1 puff Inhalation Daily     folic acid  1 mg Oral Daily     gabapentin  300 mg Oral At Bedtime     hydrocortisone  20 mg Oral Daily     multivitamin w/minerals  1 tablet Oral Daily with lunch     omeprazole  20 mg Oral Daily     primidone  50 mg Oral At Bedtime     sodium chloride (PF)  3 mL Intracatheter Q8H     tamsulosin  0.8 mg Oral Daily with supper     umeclidinium  1 puff Inhalation Daily     vitamin C  1,000 mg Oral Daily     Vitamin D3  250 mcg Oral Daily       Data   Recent Labs   Lab 05/06/21  1052 05/06/21  0729 05/05/21  1229   WBC 4.4  --  4.0   HGB 8.5*  --  8.5*   MCV 97  --  94   PLT 61*  --  75*   NA  --  144 144   POTASSIUM  --  5.2 4.6   CHLORIDE  --  117* 115*   CO2  --  24 26   BUN  --  20 19   CR  --  1.77* 1.76*   ANIONGAP  --  3 3   MARTINE  --  7.4* 8.5   GLC  --  90 84   ALBUMIN  --   --  2.8*   PROTTOTAL  --   --  6.7*   BILITOTAL  --   --  0.3   ALKPHOS  --   --  63   ALT  --   --  9   AST  --   --  16     Recent Labs   Lab 05/06/21  0729 05/05/21  2909    GLC 90 84       Imaging:   No results found for this or any previous visit (from the past 24 hour(s)).

## 2021-05-07 ENCOUNTER — APPOINTMENT (OUTPATIENT)
Dept: OCCUPATIONAL THERAPY | Facility: CLINIC | Age: 71
DRG: 603 | End: 2021-05-07
Payer: MEDICARE

## 2021-05-07 LAB
CREAT SERPL-MCNC: 1.56 MG/DL (ref 0.66–1.25)
GFR SERPL CREATININE-BSD FRML MDRD: 45 ML/MIN/{1.73_M2}

## 2021-05-07 PROCEDURE — 36415 COLL VENOUS BLD VENIPUNCTURE: CPT | Performed by: HOSPITALIST

## 2021-05-07 PROCEDURE — 250N000011 HC RX IP 250 OP 636: Performed by: HOSPITALIST

## 2021-05-07 PROCEDURE — 120N000001 HC R&B MED SURG/OB

## 2021-05-07 PROCEDURE — 82565 ASSAY OF CREATININE: CPT | Performed by: HOSPITALIST

## 2021-05-07 PROCEDURE — 250N000013 HC RX MED GY IP 250 OP 250 PS 637: Performed by: HOSPITALIST

## 2021-05-07 PROCEDURE — 99233 SBSQ HOSP IP/OBS HIGH 50: CPT | Performed by: INTERNAL MEDICINE

## 2021-05-07 PROCEDURE — 250N000013 HC RX MED GY IP 250 OP 250 PS 637: Performed by: INTERNAL MEDICINE

## 2021-05-07 PROCEDURE — 97110 THERAPEUTIC EXERCISES: CPT | Mod: GO | Performed by: OCCUPATIONAL THERAPIST

## 2021-05-07 RX ADMIN — HYDROMORPHONE HYDROCHLORIDE 4 MG: 2 TABLET ORAL at 19:40

## 2021-05-07 RX ADMIN — CLOBETASOL PROPIONATE: 0.5 SOLUTION TOPICAL at 22:03

## 2021-05-07 RX ADMIN — CEFEPIME HYDROCHLORIDE 1 G: 1 INJECTION, POWDER, FOR SOLUTION INTRAMUSCULAR; INTRAVENOUS at 21:57

## 2021-05-07 RX ADMIN — TAMSULOSIN HYDROCHLORIDE 0.8 MG: 0.4 CAPSULE ORAL at 16:28

## 2021-05-07 RX ADMIN — HYDROMORPHONE HYDROCHLORIDE 4 MG: 2 TABLET ORAL at 04:46

## 2021-05-07 RX ADMIN — OXYCODONE HYDROCHLORIDE AND ACETAMINOPHEN 1000 MG: 500 TABLET ORAL at 09:10

## 2021-05-07 RX ADMIN — CYANOCOBALAMIN TAB 1000 MCG 1000 MCG: 1000 TAB at 09:10

## 2021-05-07 RX ADMIN — HYDROMORPHONE HYDROCHLORIDE 4 MG: 2 TABLET ORAL at 22:40

## 2021-05-07 RX ADMIN — DIPHENHYDRAMINE HYDROCHLORIDE 25 MG: 25 CAPSULE ORAL at 00:38

## 2021-05-07 RX ADMIN — OMEPRAZOLE 20 MG: 20 CAPSULE, DELAYED RELEASE ORAL at 09:10

## 2021-05-07 RX ADMIN — HYDROMORPHONE HYDROCHLORIDE 4 MG: 2 TABLET ORAL at 14:51

## 2021-05-07 RX ADMIN — CHOLECALCIFEROL TAB 125 MCG (5000 UNIT) 250 MCG: 125 TAB at 09:10

## 2021-05-07 RX ADMIN — PRIMIDONE 50 MG: 50 TABLET ORAL at 21:57

## 2021-05-07 RX ADMIN — DULOXETINE HYDROCHLORIDE 60 MG: 60 CAPSULE, DELAYED RELEASE ORAL at 09:10

## 2021-05-07 RX ADMIN — DOXYCYCLINE 100 MG: 100 CAPSULE ORAL at 09:10

## 2021-05-07 RX ADMIN — HYDROMORPHONE HYDROCHLORIDE 4 MG: 2 TABLET ORAL at 00:31

## 2021-05-07 RX ADMIN — FOLIC ACID 1 MG: 1 TABLET ORAL at 09:10

## 2021-05-07 RX ADMIN — HYDROCORTISONE 20 MG: 10 TABLET ORAL at 09:10

## 2021-05-07 RX ADMIN — GABAPENTIN 300 MG: 300 CAPSULE ORAL at 21:57

## 2021-05-07 RX ADMIN — FLUTICASONE FUROATE AND VILANTEROL TRIFENATATE 1 PUFF: 200; 25 POWDER RESPIRATORY (INHALATION) at 09:11

## 2021-05-07 RX ADMIN — DOXYCYCLINE 100 MG: 100 CAPSULE ORAL at 19:40

## 2021-05-07 RX ADMIN — MULTIPLE VITAMINS W/ MINERALS TAB 1 TABLET: TAB at 12:05

## 2021-05-07 RX ADMIN — UMECLIDINIUM 1 PUFF: 62.5 AEROSOL, POWDER ORAL at 09:11

## 2021-05-07 ASSESSMENT — ACTIVITIES OF DAILY LIVING (ADL)
ADLS_ACUITY_SCORE: 24

## 2021-05-07 NOTE — PLAN OF CARE
Neuro- A&Ox4  Resp- clear, diminished at bases, on 3 LPM via NC  Activity-  A2 with transfers  Pain- managed by Dilaudid  PIV- new IV access, old IV access is painful when flushed, Called Resource RN for new access. PIV saline locked  Skin- scaly, itching, scabs and abrasions due to scratchings  GI/- voiding adequately uses urinal    Requested for Benadryl for itching ans was effective. Calls for help.

## 2021-05-07 NOTE — PROGRESS NOTES
"[facility]    Hospitalist Progress Note    Assessment & Plan   Perfecto Reese is a 70 year old male with COPD on 3L home O2 with ongoing tocacco use, adrenal insufficieny on chronic steroids, psoriasis (previously on Humira), HCV cirrhosis, PVD s/p L BKA and with chronic RLE wounds who presents today 5/5/2021 for a wound check. He has not had dressing changed on RLE for \"a couple months maybe.\" ED attending describes wounds as malodorous though patient does not appear toxic.  Patient is at baseline wheelchair-bound.     Patient's neighbor had been assisting patient with ADLs and things like paying bills but he passed away a week ago. He said home nursing never showed up or responded to his calls after last discharge home. A brother stepped in to bring him food this week. A vulnerable adult report was filed in ED.     Chronic right lower extremity wounds - rule out cellulitis   Hx of PVD with L BKA (2010, [facility]): No evidence of sepsis in ED. Patient was given IV Vancomycin and IV Zosyn and 5 mg oxycodone. XR tib/fib showed no bony destruction and no gas. WBC wnl. Pro mi and lactate wnl. CRP mildly elevated.  -Right lower extremity pulses reduced, no erythema noted around the wound, blood cultures negative so far.   -Continue IV antibiotics, vascular surgery consult pending since admission, requested consult input again on 5/7  -appreciate infectious disease input, continue doxycycline and cefepime.  Wound culture, superficial culture is growing multiple organisms including staph and Proteus not sure about the significance of these findings.  -Patient was recently taken off of ASA due to chronic thrombocytopenia.         MICHAEL on CKD III: Baseline creatinine 1.2-1.3 in Jan 2020, 1.5 in March, now up to 1.8.   - 0.9% NS at 100 ccs/hr, creatinine improved from 1.7-1.56  - Follow BMP   - Monitor urine output  - Avoid nephrotoxic agents      Chronic hypoxic and hypercarbic " respiratory failure in the setting of COPD utilizing 3 LPM via NC at baseline, not in acute exacerbation:   -Continue PTA inhalers  - continue oxygen to keep sats > 88%  - Encourage pulmonary toilet   - TTE several months ago did not indicate CHF     HCV Cirrhosis  Chronic normocytic anemia  Chronic thrombocytopenia  Anemia and thrombocytopenia likely multifactorial from liver cirrhosis, splenomegaly, renal disease, CKD and psoriasis. Iron labs suggestive of anemia of chronic disease.   - Hgb appears to be at baseline     Chronic adrenal insufficiency:   - Continues on PTA hydrocortisone 20 mg PO daily  - Consider stress dose steroids if pt were to become hypotensive     Essential hypertension: Amlodipine decreased to 5mg daily due to soft blood pressures during last hospital stay, Losartan decreased to 25 mg po every day.   -Continue Norvasc, holding losartan, blood pressure stable.     History of psoriasis   - Patient says he is no longer on Humira  -He has ongoing significant itching and rash on the skin, improved with Benadryl.     Tobacco use: still smokes 2-3 cigs/week. NRT - lozenges ordered prn      Chronic pain syndrome:      Probable protein-calorie malnutrition:   - Nutrition consult      Major depressive disorder: Secondary to general medical condition suspected.   - Cymbalta increased to 60 mg every day and melatonin 3 mg at bedtime added      Vulnerable adult  Physical deconditioning: Lives alone in apartment. Friend who has helped patient with paying bills, ADLs passed away 1 week ago. At baseline is WC bound with pivots for transfers. Has declined long term placement in the past.  - PT/OT/SW     DVT Prophylaxis: SCDs  Code Status: Full Code     Disposition: Expected discharge in 1 to 2 days    Carmen Flores MD  Text Page   (7am to 6pm)    Interval History   Input from infectious disease and wound care noted, patient is quite sleepy today, on 2 to 3 L of oxygen at rest, he is chronically on oxygen.   His eating has improved, tolerating diet well, foul order noted in the room, wound care done by wound care nurse, it seems patient did not have any help with wound care since his neighbor passed.  Consult for vascular surgery placed on admission, still pending input.    -Data reviewed today: I reviewed all new labs and imaging results over the last 24 hours.     Physical Exam   Temp: 98  F (36.7  C) Temp src: Oral BP: 118/50 Pulse: 82   Resp: 16 SpO2: 95 % O2 Device: Nasal cannula Oxygen Delivery: 3 LPM  There were no vitals filed for this visit.  Vital Signs with Ranges  Temp:  [98  F (36.7  C)-98.2  F (36.8  C)] 98  F (36.7  C)  Pulse:  [78-89] 82  Resp:  [16-18] 16  BP: (115-144)/(46-52) 118/50  SpO2:  [95 %-96 %] 95 %  I/O last 3 completed shifts:  In: 140 [P.O.:140]  Out: 800 [Urine:800]    Constitutional: Awake, alert, cooperative, no apparent distress  Respiratory: Clear to auscultation bilaterally, no crackles or wheezing  Cardiovascular: Regular rate and rhythm, normal S1 and S2, and no murmur noted  GI: Normal bowel sounds, soft, non-distended, non-tender  Skin/Integumen: Left BKA, right lower extremity wound is dressed and leg  is in cam boot  Neuro : moving all 4 extremities, no focal deficit noted     Medications     - MEDICATION INSTRUCTIONS -         ceFEPIme (MAXIPIME) IV  1 g Intravenous Q24H     clobetasol   Topical At Bedtime     cyanocobalamin  1,000 mcg Oral Daily     doxycycline hyclate  100 mg Oral Q12H [facility]     DULoxetine  60 mg Oral Daily     fluticasone-vilanterol  1 puff Inhalation Daily     folic acid  1 mg Oral Daily     gabapentin  300 mg Oral At Bedtime     hydrocortisone  20 mg Oral Daily     multivitamin w/minerals  1 tablet Oral Daily with lunch     omeprazole  20 mg Oral Daily     primidone  50 mg Oral At Bedtime     sodium chloride (PF)  3 mL Intracatheter Q8H     tamsulosin  0.8 mg Oral Daily with supper     umeclidinium  1 puff Inhalation Daily     vitamin C  1,000 mg Oral Daily      Vitamin D3  250 mcg Oral Daily       Data   Recent Labs   Lab 05/07/21  1226 05/06/21  1052 05/06/21  0729 05/05/21  1229   WBC  --  4.4  --  4.0   HGB  --  8.5*  --  8.5*   MCV  --  97  --  94   PLT  --  61*  --  75*   NA  --   --  144 144   POTASSIUM  --   --  5.2 4.6   CHLORIDE  --   --  117* 115*   CO2  --   --  24 26   BUN  --   --  20 19   CR 1.56*  --  1.77* 1.76*   ANIONGAP  --   --  3 3   MARTINE  --   --  7.4* 8.5   GLC  --   --  90 84   ALBUMIN  --   --   --  2.8*   PROTTOTAL  --   --   --  6.7*   BILITOTAL  --   --   --  0.3   ALKPHOS  --   --   --  63   ALT  --   --   --  9   AST  --   --   --  16     Recent Labs   Lab 05/06/21  0729 05/05/21  1229   GLC 90 84       Imaging:   No results found for this or any previous visit (from the past 24 hour(s)).

## 2021-05-07 NOTE — PLAN OF CARE
Patient alert and oriented, [place], oral pain medications available, vascular consult pending, iv saline lock.daily dressing change.

## 2021-05-07 NOTE — PROGRESS NOTES
[facility]    Infectious Disease Progress Note    Date of Service (when I saw the patient): 05/07/2021     Assessment & Plan   Perfecto Reese is a 70 year old male who was admitted on 5/5/2021.     Impression:  1. 70 y.o male with multiple co morbidities.   2. COPD on home oxygen, continued tobacco abuse.   3. Adrenal insufficiency on chronic steroids.   4. Psoriasis   5. HCV cirrhosis   6. PVD s.p left BKA  7. Chronic RLE wound.   8. Admitted for wound check, no fever, no leucocytosis, procal normal.   9. Started on broad spectrum antibiotics      Recommendations:   1. Continue on cefepime and doxy  2. Follow up on vascular consult.   3. Skin cultures from these wound usually of no benefit given the chronicity and location of the wound.          Rc Hu MD    Interval History   Proteus and SA ( santos pending) in the cultures   No fevers   No new acute complaints     Physical Exam   Temp: 98  F (36.7  C) Temp src: Oral BP: 118/50 Pulse: 82   Resp: 16 SpO2: 95 % O2 Device: Nasal cannula Oxygen Delivery: 3 LPM  There were no vitals filed for this visit.  Vital Signs with Ranges  Temp:  [97  F (36.1  C)-98.2  F (36.8  C)] 98  F (36.7  C)  Pulse:  [52-89] 82  Resp:  [16-18] 16  BP: (115-144)/(46-52) 118/50  SpO2:  [95 %-98 %] 95 %    Constitutional: Awake, alert, cooperative, no apparent distress  Lungs: Clear to auscultation bilaterally, no crackles or wheezing  Cardiovascular: Regular rate and rhythm, normal S1 and S2, and no murmur noted  Abdomen: Normal bowel sounds, soft, non-distended, non-tender  Skin: No rashes, no cyanosis, no edema  Other:    Medications     - MEDICATION INSTRUCTIONS -         ceFEPIme (MAXIPIME) IV  1 g Intravenous Q24H     clobetasol   Topical At Bedtime     cyanocobalamin  1,000 mcg Oral Daily     doxycycline hyclate  100 mg Oral Q12H [facility]     DULoxetine  60 mg Oral Daily     fluticasone-vilanterol  1 puff Inhalation Daily     folic acid  1 mg Oral Daily      gabapentin  300 mg Oral At Bedtime     hydrocortisone  20 mg Oral Daily     multivitamin w/minerals  1 tablet Oral Daily with lunch     omeprazole  20 mg Oral Daily     primidone  50 mg Oral At Bedtime     sodium chloride (PF)  3 mL Intracatheter Q8H     tamsulosin  0.8 mg Oral Daily with supper     umeclidinium  1 puff Inhalation Daily     vitamin C  1,000 mg Oral Daily     Vitamin D3  250 mcg Oral Daily       Data   All microbiology laboratory data reviewed.  Recent Labs   Lab Test 05/06/21  1052 05/05/21  1229 03/21/21  0744   WBC 4.4 4.0 4.0   HGB 8.5* 8.5* 7.7*   HCT 27.3* 27.1* 24.2*   MCV 97 94 95   PLT 61* 75* 50*     Recent Labs   Lab Test 05/06/21  0729 05/05/21  1229 03/21/21  0744   CR 1.77* 1.76* 1.54*     Recent Labs   Lab Test 05/05/21  1229   SED 62*     Recent Labs   Lab Test 05/05/21  1303 05/05/21  1232 05/05/21  1229 03/20/21  1030 03/18/21  1843 02/09/21  0602 12/03/20  0540 12/03/20  0230 12/02/20  1904   CULT No growth after 2 days Light growth  Proteus mirabilis  *  Light growth  Staphylococcus aureus  Susceptibility testing in progress  * No growth after 2 days No growth 10,000 to 50,000 colonies/mL  -Enterococcus species (VRE)  Further speciated as:  Enterococcus raffinosus  *  Plus  10,000 to 50,000 colonies/mL  mixed urogenital adelina    Critical Value/Significant Value, preliminary result only, called to and read back by  Kristel Horowitz RN at 0728 3/21/21 SRQ   No growth  No growth Light growth  Normal adelina    Light growth  Proteus mirabilis  *  Moderate growth  Corynebacterium striatum  Identification obtained by MALDI-TOF mass spectrometry research use only database. Test   characteristics determined and verified by the [facility] Diagnostic Laboratory   ([facility]) [place], [place].  Susceptibility testing not routinely done  * <10,000 colonies/mL  mixed urogenital adelina  Susceptibility testing not routinely done   No growth       Attestation:  Total time on the floor involved  in the patient's care: 35 minutes. Total time spent in counseling/care coordination: >50%

## 2021-05-07 NOTE — PLAN OF CARE
Occupational Therapy Discharge Summary    Reason for therapy discharge:    Patient/family request discontinuation of services.    Progress towards therapy goal(s). See goals on Care Plan in [facility] electronic health record for goal details.  Goals partially met.  Barriers to achieving goals:   declined further acute OT.    Therapy recommendation(s):    Continued therapy is recommended.  Rationale/Recommendations:  all acute needs met. Pt is at baseline but would benefit from Home OT to address possible home modifications/adaptations to help with shoulder pain and for strengthening. Pt is unable to leave home without assist of a second person and requires taxing effort.

## 2021-05-08 LAB
ANION GAP SERPL CALCULATED.3IONS-SCNC: 3 MMOL/L (ref 3–14)
BUN SERPL-MCNC: 22 MG/DL (ref 7–30)
CALCIUM SERPL-MCNC: 7.9 MG/DL (ref 8.5–10.1)
CHLORIDE SERPL-SCNC: 110 MMOL/L (ref 94–109)
CO2 SERPL-SCNC: 25 MMOL/L (ref 20–32)
CREAT SERPL-MCNC: 1.33 MG/DL (ref 0.66–1.25)
GFR SERPL CREATININE-BSD FRML MDRD: 54 ML/MIN/{1.73_M2}
GLUCOSE SERPL-MCNC: 88 MG/DL (ref 70–99)
POTASSIUM SERPL-SCNC: 4.2 MMOL/L (ref 3.4–5.3)
SODIUM SERPL-SCNC: 138 MMOL/L (ref 133–144)

## 2021-05-08 PROCEDURE — 250N000013 HC RX MED GY IP 250 OP 250 PS 637: Performed by: HOSPITALIST

## 2021-05-08 PROCEDURE — 120N000001 HC R&B MED SURG/OB

## 2021-05-08 PROCEDURE — 80048 BASIC METABOLIC PNL TOTAL CA: CPT | Performed by: INTERNAL MEDICINE

## 2021-05-08 PROCEDURE — 36415 COLL VENOUS BLD VENIPUNCTURE: CPT | Performed by: INTERNAL MEDICINE

## 2021-05-08 PROCEDURE — 99233 SBSQ HOSP IP/OBS HIGH 50: CPT | Performed by: INTERNAL MEDICINE

## 2021-05-08 PROCEDURE — 250N000013 HC RX MED GY IP 250 OP 250 PS 637: Performed by: INTERNAL MEDICINE

## 2021-05-08 PROCEDURE — 250N000011 HC RX IP 250 OP 636: Performed by: HOSPITALIST

## 2021-05-08 RX ADMIN — UMECLIDINIUM 1 PUFF: 62.5 AEROSOL, POWDER ORAL at 08:59

## 2021-05-08 RX ADMIN — HYDROMORPHONE HYDROCHLORIDE 4 MG: 2 TABLET ORAL at 20:29

## 2021-05-08 RX ADMIN — HYDROMORPHONE HYDROCHLORIDE 4 MG: 2 TABLET ORAL at 05:49

## 2021-05-08 RX ADMIN — TAMSULOSIN HYDROCHLORIDE 0.8 MG: 0.4 CAPSULE ORAL at 18:01

## 2021-05-08 RX ADMIN — HYDROCORTISONE 20 MG: 10 TABLET ORAL at 08:56

## 2021-05-08 RX ADMIN — DULOXETINE HYDROCHLORIDE 60 MG: 60 CAPSULE, DELAYED RELEASE ORAL at 08:56

## 2021-05-08 RX ADMIN — OXYCODONE HYDROCHLORIDE AND ACETAMINOPHEN 1000 MG: 500 TABLET ORAL at 08:56

## 2021-05-08 RX ADMIN — CEFEPIME HYDROCHLORIDE 1 G: 1 INJECTION, POWDER, FOR SOLUTION INTRAMUSCULAR; INTRAVENOUS at 20:39

## 2021-05-08 RX ADMIN — OMEPRAZOLE 20 MG: 20 CAPSULE, DELAYED RELEASE ORAL at 08:56

## 2021-05-08 RX ADMIN — DOXYCYCLINE 100 MG: 100 CAPSULE ORAL at 08:56

## 2021-05-08 RX ADMIN — DOXYCYCLINE 100 MG: 100 CAPSULE ORAL at 20:29

## 2021-05-08 RX ADMIN — HYDROMORPHONE HYDROCHLORIDE 4 MG: 2 TABLET ORAL at 01:57

## 2021-05-08 RX ADMIN — DIPHENHYDRAMINE HYDROCHLORIDE 25 MG: 25 CAPSULE ORAL at 01:58

## 2021-05-08 RX ADMIN — CHOLECALCIFEROL TAB 125 MCG (5000 UNIT) 250 MCG: 125 TAB at 08:56

## 2021-05-08 RX ADMIN — FLUTICASONE FUROATE AND VILANTEROL TRIFENATATE 1 PUFF: 200; 25 POWDER RESPIRATORY (INHALATION) at 08:59

## 2021-05-08 RX ADMIN — MULTIPLE VITAMINS W/ MINERALS TAB 1 TABLET: TAB at 12:55

## 2021-05-08 RX ADMIN — HYDROMORPHONE HYDROCHLORIDE 2 MG: 2 TABLET ORAL at 15:16

## 2021-05-08 RX ADMIN — FOLIC ACID 1 MG: 1 TABLET ORAL at 08:56

## 2021-05-08 RX ADMIN — HYDROMORPHONE HYDROCHLORIDE 4 MG: 2 TABLET ORAL at 10:27

## 2021-05-08 RX ADMIN — CYANOCOBALAMIN TAB 1000 MCG 1000 MCG: 1000 TAB at 08:57

## 2021-05-08 RX ADMIN — PRIMIDONE 50 MG: 50 TABLET ORAL at 22:29

## 2021-05-08 RX ADMIN — CEFEPIME HYDROCHLORIDE 1 G: 1 INJECTION, POWDER, FOR SOLUTION INTRAMUSCULAR; INTRAVENOUS at 20:38

## 2021-05-08 ASSESSMENT — ACTIVITIES OF DAILY LIVING (ADL)
ADLS_ACUITY_SCORE: 24

## 2021-05-08 NOTE — CONSULTS
[facility] PATIENT CONSULTATION NOTE  Consulted by: Hospitalist   Reason for consultation: Chronic RLE wound    HPI:  Perfecto Reese is a 70 year old year old male who has a PMH significant for COPD on 3L home O2 with ongoing tocacco use, adrenal insufficieny on chronic steroids, psoriasis (previously on Humira), HCV cirrhosis, PVD s/p L BKA due to trauma from a fall from ladder and with chronic RLE wounds who presents today 5/5/2021 for a wound check. Previously seen by Dr. Odonnell, last 2/24 where he was started on edemawear and Vashe wash dressing changes w/ plan for f/u in 4 wks although pt was not able to follow-up. Seen by wound care here and wound appears non-infected w/ pink healthy granulating tissue. Seen by Dr. Reyes for PAD in the past which was ruled out by imaging (see below). He is wheelchair-bound at baseline and has not utilized prosthesis but says [employer] has started making his prosthesis. Has had difficulties with ADLs and paying bills due to neighbor dying recently. Quit smoking in 2017.     Review Of Systems:   General: Denies F/C  Respiratory: Denies SOB  Cardio: Denies CP  Gastrointestinal: Denies N/V  Genitourinary: Denies recent change in urination  Musculoskeletal: See HPI  Neurologic: Denies HA  Psychiatric: Denies confusion  Hematology/immunology: no unexpected bruising    PAST MEDICAL HISTORY:  Past Medical History:   Diagnosis Date     Adrenal insufficiency (H)      Anemia of chronic disease      Anemia, iron deficiency      Back pain      Benign essential hypertension      Chronic respiratory failure with hypoxia, on home O2 therapy (H)     Secondary to COPD     CKD (chronic kidney disease), stage III      COPD (chronic obstructive pulmonary disease) (H)     dependent on 3 LPM via NC chronically     Depression      GERD (gastroesophageal reflux disease)      Hx of BKA, left (H)      Hyperlipidemia      Migraine      Non compliance w medication regimen       Pain syndrome, chronic      Primary insomnia      Psoriasis     on Humira     PVD (peripheral vascular disease) (H)        PAST SURGICAL HISTORY:  Past Surgical History:   Procedure Laterality Date     AMPUTATION BELOW KNEE RT/LT Left      AS ARTHROSCOPY SUBTALAR JOINT SUBTALAR ARTHRODESIS       EXTERNAL EAR SURGERY Left      OPEN REDUCTION INTERNAL FIXATION FOREARM      left forearm/wrist     TONSILLECTOMY         FAMILY HISTORY:  Family History   Problem Relation Age of Onset     Colon Cancer Father      Coronary Artery Disease Father      Chronic Obstructive Pulmonary Disease Father      Osteoporosis Mother      Dementia Mother        SOCIAL HISTORY:   Social History     Tobacco Use     Smoking status: Former Smoker     Packs/day: 0.50     Years: 30.00     Pack years: 15.00     Types: Cigarettes     Quit date: 2017     Years since quittin.1     Smokeless tobacco: Never Used     Tobacco comment: states he is down to 3QD   Substance Use Topics     Alcohol use: No     HOME MEDICATIONS:  Prior to Admission medications    Medication Sig Start Date End Date Taking? Authorizing Provider   albuterol (PROAIR HFA/PROVENTIL HFA/VENTOLIN HFA) 108 (90 Base) MCG/ACT inhaler Inhale 2 puffs into the lungs every 4 hours as needed for shortness of breath / dyspnea or wheezing 3/22/21  Yes Carmen Flores MD   calcium citrate (CITRACAL) 950 (200 Ca) MG tablet Take 4 tablets by mouth daily   Yes Unknown, Entered By History   clobetasol (TEMOVATE) 0.05 % external solution Apply topically At Bedtime Apply to scalp   Yes Unknown, Entered By History   cyanocobalamin (VITAMIN B-12) 1000 MCG tablet Take 1 tablet (1,000 mcg) by mouth daily 3/22/21  Yes Carmen Flores MD   DULoxetine (CYMBALTA) 30 MG capsule Take 60 mg by mouth daily   Yes Unknown, Entered By History   ferrous sulfate (FEROSUL) 325 (65 Fe) MG tablet Take 1 tablet (325 mg) by mouth every other day 3/22/21  Yes Carmen Flores MD   fluticasone-vilanterol (BREO  ELLIPTA) 200-25 MCG/INH inhaler Inhale 1 puff into the lungs daily 3/22/21  Yes Carmen Flores MD   folic acid (FOLVITE) 1 MG tablet Take 1 tablet (1 mg) by mouth daily 3/22/21  Yes Carmen Flores MD   furosemide (LASIX) 20 MG tablet Take 20 mg by mouth daily   Yes Unknown, Entered By History   gabapentin (NEURONTIN) 300 MG capsule Take 1 capsule (300 mg) by mouth At Bedtime 3/22/21  Yes Carmen Flores MD   hydrocortisone (CORTEF) 20 MG tablet Take 20 mg by mouth daily   Yes Unknown, Entered By History   HYDROmorphone (DILAUDID) 2 MG tablet Take 1 tablet (2 mg) by mouth every 8 hours as needed for severe pain 3/22/21  Yes Carmen Flores MD   lidocaine (XYLOCAINE) 5 % external ointment Apply topically 3 times daily as needed for moderate pain (rib pain) 3/22/21  Yes Carmen Flores MD   loperamide (IMODIUM) 2 MG capsule Take 1 capsule (2 mg) by mouth 4 times daily as needed for diarrhea 12/31/20  Yes Davonte Naranjo MD   losartan (COZAAR) 25 MG tablet Take 25 mg by mouth daily   Yes Unknown, Entered By History   Melatonin 10 MG TABS tablet Take 10 mg by mouth At Bedtime   Yes Unknown, Entered By History   multivitamin w/minerals (THERA-VIT-M) tablet Take 1 tablet by mouth daily 12/31/20  Yes Davonte Naranjo MD   omeprazole (PRILOSEC) 20 MG DR capsule Take 20 mg by mouth daily   Yes Unknown, Entered By History   primidone (MYSOLINE) 50 MG tablet Take 50 mg by mouth At Bedtime   Yes Unknown, Entered By History   tamsulosin (FLOMAX) 0.4 MG capsule Take 0.8 mg by mouth daily   Yes Unknown, Entered By History   umeclidinium (INCRUSE ELLIPTA) 62.5 MCG/INH inhaler Inhale 1 puff into the lungs daily 3/22/21  Yes Carmen Flores MD   vitamin C (ASCORBIC ACID) 1000 MG TABS Take 1 tablet (1,000 mg) by mouth daily 1/15/21  Yes Lana Stevens APRN CNP   vitamin D3 (CHOLECALCIFEROL) 250 mcg (89321 units) capsule Take 1 capsule (250 mcg) by mouth daily 1/15/21  Yes Lana Stevens APRN CNP   nicotine (NICORETTE) 2  "MG gum Place 2 mg inside cheek as needed for smoking cessation    Unknown, Entered By History   Wound Cleansers (VASHE WOUND THERAPY EX) USE TO SOAK GAUZE AND CLEAN WOUND FOR TWENTY MINUTES W/ DAILY WOUND CARE CHANGES. 2/20/21   Reported, Patient       VITAL SIGNS:  /49 (BP Location: Left arm)   Pulse 85   Temp 98.2  F (36.8  C) (Oral)   Resp 16   Ht 1.651 m (5' 5\")   SpO2 96%   BMI 23.30 kg/m      Intake/Output Summary (Last 24 hours) at 5/8/2021 1122  Last data filed at 5/8/2021 0000  Gross per 24 hour   Intake 100 ml   Output 940 ml   Net -840 ml       Labs:  ROUTINE IP LABS (Last four results)  BMP  Recent Labs   Lab 05/08/21  0702 05/07/21  1226 05/06/21  0729 05/05/21  1229     --  144 144   POTASSIUM 4.2  --  5.2 4.6   CHLORIDE 110*  --  117* 115*   MARTINE 7.9*  --  7.4* 8.5   CO2 25  --  24 26   BUN 22  --  20 19   CR 1.33* 1.56* 1.77* 1.76*   GLC 88  --  90 84     CBC  Recent Labs   Lab 05/06/21  1052 05/05/21  1229   WBC 4.4 4.0   RBC 2.83* 2.89*   HGB 8.5* 8.5*   HCT 27.3* 27.1*   MCV 97 94   MCH 30.0 29.4   MCHC 31.1* 31.4*   RDW 15.6* 15.2*   PLT 61* 75*     INRNo lab results found in last 7 days.    PHYSICAL EXAM:  Constitutional: healthy, alert, no acute distress and cooperative   Cardiovascular: RRR  Respiratory: CTAB anteriorly, breathing unlabored without secondary muscle use  Psychiatric: mentation appears normal and affect normal/bright  Neck: no asymmetry  GI/Abdomen: +BS, abdomen soft, non-tender. No masses, no CVAT  MSK: able to move all extremities without weakness or ataxia  Extremities: s/p L BKA, palpable RLE pulses, R venous stasis ulcer with   Hematology: no bruising on visible skin            IMAGING:  ZHOU 2/9/21:  FINDINGS:  Right ZHOU:   PT: 1.14.  DP: 1.09     Left ZHOU: Below-knee amputation.     Right Digital pressure: 100.     Waveforms: Triphasic                                                                      IMPRESSION: Normal right ZHOU without evidence of " arterial  insufficiency. Normal right digital pressure.    Arterial duplex 2/9/21:  IMPRESSION: No significant stenoses identified in the sampled  infrainguinal arteries of the right lower extremity.      Patient Active Problem List   Diagnosis     Acute renal injury (H)     Pneumonia of right lower lobe due to infectious organism     Diarrhea, unspecified type     Iron deficiency anemia, unspecified     Cervical spondylosis with myelopathy     Chronic obstructive pulmonary disease (H)     Chronic pain     Depressive disorder     Esophageal reflux     Hepatic cirrhosis (H)     Hepatitis C, chronic (H)     History of lower limb amputation (H)     Hyperlipidemia     Iatrogenic adrenal insufficiency (H)     Insomnia, unspecified     Low back pain     Malaise and fatigue     Migraine headache     Opioid dependence (H)     Other psoriasis     Pain in joint, ankle and foot     Personal history of tobacco use, presenting hazards to health     Phantom limb (H)     Shoulder pain     Thrombocytopenia (H)     MICHAEL (acute kidney injury) (H)     Cellulitis of right leg     Cellulitis of extremity     History of below knee amputation, left (H)     Chronic respiratory failure with hypoxia (H)     Severe malnutrition (H)     Other pancytopenia (H)     Ulcer of right lower extremity with fat layer exposed (H)     Chronic kidney disease, stage 3     COPD exacerbation (H)     Open wound of lower limb, right, initial encounter     Unable to care for self     History of COPD     Open wound of right lower extremity, initial encounter     Anemia, unspecified type       ASSESSMENT:  70 year old year old male who has a PMH significant for COPD on 3L home O2 with ongoing tocacco use, adrenal insufficieny on chronic steroids, psoriasis (previously on Humira), HCV cirrhosis, s/p L BKA due to trauma from a fall from ladder and chronic RLE wounds who presents today 5/5/2021 for a wound check.    Vascular Surgery consulted for RLE wound (see  above). Appears clean and non-infected.  ABIs normal w/ triphasic PVRs, duplex shows triphasic waveforms, palpable pulses.      PLAN:  Wound does not need debridement but continue local wound care and compression per WOC consult  No evidence of peripheral arterial disease contributing to non-healing  PT/OT and SW to set up HHC vs TCU    Discussed w/ Dr. Stapleton. Dr. Odonnell to see pt on Monday.    Jossie Angela MD  Vascular Surgery Fellow  Pager: [phone]

## 2021-05-08 NOTE — PLAN OF CARE
Patient's wound was changed, Vascular and I&D saw patient today, voiding adequately, scabby and scaly skin, prn Benadryl for  itching.

## 2021-05-08 NOTE — PROGRESS NOTES
[facility]    Infectious Disease Progress Note    Date of Service (when I saw the patient): 05/08/2021     Assessment & Plan   Perfecto Reese is a 70 year old male who was admitted on 5/5/2021.     Impression:  1. 70 y.o male with multiple co morbidities.   2. COPD on home oxygen, continued tobacco abuse.   3. Adrenal insufficiency on chronic steroids.   4. Psoriasis   5. HCV cirrhosis   6. PVD s.p left BKA  7. Chronic RLE wound.   8. Admitted for wound check, no fever, no leucocytosis, procal normal.   9. Started on broad spectrum antibiotics      Recommendations:   1.  cefepime and doxy but no sig sepsis/cellulitis  2. Needs wd care/ vascular consult.   3. Skin cultures from these wound of no medical significance  Doubt any sig infection , main issues are wd care/edema control  At most 1 week po doxy when disposition      Neeraj Stoll MD    Interval History   Proteus and SA ( santos pending) in the cultures  But of no  Real significance  No fevers   No new acute complaints     Physical Exam   Temp: 98.2  F (36.8  C) Temp src: Oral BP: 110/49 Pulse: 85   Resp: 16 SpO2: 96 % O2 Device: Nasal cannula Oxygen Delivery: 3 LPM  There were no vitals filed for this visit.  Vital Signs with Ranges  Temp:  [98.1  F (36.7  C)-98.2  F (36.8  C)] 98.2  F (36.8  C)  Pulse:  [78-89] 85  Resp:  [16-20] 16  BP: (110-131)/(49-52) 110/49  SpO2:  [93 %-96 %] 96 %    Constitutional: Awake, alert, cooperative, no apparent distress  Lungs: Clear to auscultation bilaterally, no crackles or wheezing  Cardiovascular: Regular rate and rhythm, normal S1 and S2, and no murmur noted  Abdomen: Normal bowel sounds, soft, non-distended, non-tender  Skin: No rashes, no cyanosis, no edema  Other:    Medications     - MEDICATION INSTRUCTIONS -         ceFEPIme (MAXIPIME) IV  1 g Intravenous Q24H     clobetasol   Topical At Bedtime     cyanocobalamin  1,000 mcg Oral Daily     doxycycline hyclate  100 mg Oral Q12H ISIS      DULoxetine  60 mg Oral Daily     fluticasone-vilanterol  1 puff Inhalation Daily     folic acid  1 mg Oral Daily     gabapentin  300 mg Oral At Bedtime     hydrocortisone  20 mg Oral Daily     multivitamin w/minerals  1 tablet Oral Daily with lunch     omeprazole  20 mg Oral Daily     primidone  50 mg Oral At Bedtime     sodium chloride (PF)  3 mL Intracatheter Q8H     tamsulosin  0.8 mg Oral Daily with supper     umeclidinium  1 puff Inhalation Daily     vitamin C  1,000 mg Oral Daily     Vitamin D3  250 mcg Oral Daily       Data   All microbiology laboratory data reviewed.  Recent Labs   Lab Test 05/06/21  1052 05/05/21  1229 03/21/21  0744   WBC 4.4 4.0 4.0   HGB 8.5* 8.5* 7.7*   HCT 27.3* 27.1* 24.2*   MCV 97 94 95   PLT 61* 75* 50*     Recent Labs   Lab Test 05/08/21  0702 05/07/21  1226 05/06/21  0729   CR 1.33* 1.56* 1.77*     Recent Labs   Lab Test 05/05/21  1229   SED 62*     Recent Labs   Lab Test 05/05/21  1303 05/05/21  1232 05/05/21  1229 03/20/21  1030 03/18/21  1843 02/09/21  0602 12/03/20  0540 12/03/20  0230 12/02/20  1904   CULT No growth after 3 days Light growth  Proteus mirabilis  *  Light growth  Methicillin resistant Staphylococcus aureus (MRSA)  *  Moderate growth  Enterococcus species  Probable VRE await confirmation  Speciation in progress  *  Light growth  Corynebacterium striatum  Identification obtained by MALDI-TOF mass spectrometry research use only database. Test   characteristics determined and verified by the [facility]   ([facility]) [place], [place].  Susceptibility testing not routinely done  *  Plus  Light growth  Normal skin adelina   No growth after 3 days No growth 10,000 to 50,000 colonies/mL  -Enterococcus species (VRE)  Further speciated as:  Enterococcus raffinosus  *  Plus  10,000 to 50,000 colonies/mL  mixed urogenital adelina    Critical Value/Significant Value, preliminary result only, called to and read back by  Kristel Horowitz RN at 9172  3/21/21 SRQ   No growth  No growth Light growth  Normal adelina    Light growth  Proteus mirabilis  *  Moderate growth  Corynebacterium striatum  Identification obtained by MALDI-TOF mass spectrometry research use only database. Test   characteristics determined and verified by the [facility] Diagnostic Laboratory   ([facility]) [place], [place].  Susceptibility testing not routinely done  * <10,000 colonies/mL  mixed urogenital adelina  Susceptibility testing not routinely done   No growth       Attestation:  Total time on the floor involved in the patient's care: 35 minutes. Total time spent in counseling/care coordination: >50%

## 2021-05-08 NOTE — PROGRESS NOTES
"[facility]    Hospitalist Progress Note    Assessment & Plan   Perfecto Reese is a 70 year old male with COPD on 3L home O2 with ongoing tocacco use, adrenal insufficieny on chronic steroids, psoriasis (previously on Humira), HCV cirrhosis, PVD s/p L BKA and with chronic RLE wounds who presents today 5/5/2021 for a wound check. He has not had dressing changed on RLE for \"a couple months maybe.\" ED attending describes wounds as malodorous though patient does not appear toxic.  Patient is at baseline wheelchair-bound.     Patient's neighbor had been assisting patient with ADLs and things like paying bills but he passed away a week ago. He said home nursing never showed up or responded to his calls after last discharge home. A brother stepped in to bring him food this week. A vulnerable adult report was filed in ED.     Chronic right lower extremity wounds - rule out cellulitis   Hx of PVD with L BKA (2010, [facility]): No evidence of sepsis in ED. Patient was given IV Vancomycin and IV Zosyn and 5 mg oxycodone. XR tib/fib showed no bony destruction and no gas. WBC wnl. Pro mi and lactate wnl. CRP mildly elevated.  -Right lower extremity pulses reduced, no erythema noted around the wound, blood cultures negative so far.   -Continue IV antibiotics, vascular surgery consult pending since admission, requested consult input again on 5/7, finally on 5/8 reached out again to vascular surgery team, they visited with patient today.  -Vascular surgery is recommending to continue wound care and outpatient follow-up with care team.  -appreciate infectious disease input, continue doxycycline and cefepime.  Wound culture, superficial culture is growing multiple organisms including staph and Proteus not appear to be significant finding.  Plan to discharge on doxycycline when ready.  -Patient was recently taken off of ASA due to chronic thrombocytopenia.         MICHAEL on CKD III: Baseline " creatinine 1.2-1.3 in Jan 2020, 1.5 in March, now up to 1.8.   -Creatinine improved significantly to 1.3 following hydration avoid nephrotoxic agents.    -we will stop IV fluids and monitor, BMP in a.m.     Chronic hypoxic and hypercarbic respiratory failure in the setting of COPD utilizing 3 LPM via NC at baseline, not in acute exacerbation:   -Continue PTA inhalers  - continue oxygen to keep sats > 88%  - Encourage pulmonary toilet   - TTE several months ago did not indicate CHF     HCV Cirrhosis  Chronic normocytic anemia  Chronic thrombocytopenia  Anemia and thrombocytopenia likely multifactorial from liver cirrhosis, splenomegaly, renal disease, CKD and psoriasis. Iron labs suggestive of anemia of chronic disease.   - Hgb appears to be at baseline     Chronic adrenal insufficiency:   - Continues on PTA hydrocortisone 20 mg PO daily  - Consider stress dose steroids if pt were to become hypotensive     Essential hypertension: Amlodipine decreased to 5mg daily due to soft blood pressures during last hospital stay, Losartan decreased to 25 mg po every day.   -Continue Norvasc, holding losartan, blood pressure stable.     History of psoriasis   - Patient says he is no longer on Humira  -He has ongoing significant itching and rash on the skin, improved with Benadryl.     Tobacco use: still smokes 2-3 cigs/week. NRT - lozenges ordered prn      Chronic pain syndrome:      Probable protein-calorie malnutrition:   - Nutrition consult      Major depressive disorder: Secondary to general medical condition suspected.   - Cymbalta increased to 60 mg every day and melatonin 3 mg at bedtime added      Vulnerable adult  Physical deconditioning: Lives alone in apartment. Friend who has helped patient with paying bills, ADLs passed away 1 week ago. At baseline is WC bound with [employer] for transfers. Has declined long term placement in the past.  - PT/OT/SW, social work input requested again regarding discharge disposition, patient  could go home with home care versus TCU, PT/OT recommending home with assist.  Total time spend 35 min >50% spend on coordination of care including discharge planning, discussed with patient.    DVT Prophylaxis: SCDs  Code Status: Full Code     Disposition: Expected discharge in 1 to 2 days    Carmen Flores MD  Text Page   (7am to 6pm)    Interval History   Appreciate input from infectious disease and wound care, pictures are noted appears to have healthy granulation tissue, we were able to contact vascular again today, and they had visited with patient, no recommendation for any further work-up noted, currently plan is safe discharge disposition which includes wound care, pending input from social work.    -Data reviewed today: I reviewed all new labs and imaging results over the last 24 hours.     Physical Exam   Temp: 98.2  F (36.8  C) Temp src: Oral BP: 110/49 Pulse: 85   Resp: 16 SpO2: 96 % O2 Device: Nasal cannula Oxygen Delivery: 3 LPM  There were no vitals filed for this visit.  Vital Signs with Ranges  Temp:  [98.1  F (36.7  C)-98.2  F (36.8  C)] 98.2  F (36.8  C)  Pulse:  [78-89] 85  Resp:  [16-20] 16  BP: (110-131)/(49-52) 110/49  SpO2:  [93 %-96 %] 96 %  I/O last 3 completed shifts:  In: 100 [P.O.:100]  Out: 940 [Urine:940]    Constitutional: Awake, alert, cooperative, no apparent distress  Respiratory: Clear to auscultation bilaterally, no crackles or wheezing  Cardiovascular: Regular rate and rhythm, normal S1 and S2, and no murmur noted  GI: Normal bowel sounds, soft, non-distended, non-tender  Skin/Integumen: Left BKA, right lower extremity wound is dressed and leg  is in cam boot  Neuro : moving all 4 extremities, no focal deficit noted     Medications     - MEDICATION INSTRUCTIONS -         ceFEPIme (MAXIPIME) IV  1 g Intravenous Q24H     clobetasol   Topical At Bedtime     cyanocobalamin  1,000 mcg Oral Daily     doxycycline hyclate  100 mg Oral Q12H ISIS     DULoxetine  60 mg Oral Daily      fluticasone-vilanterol  1 puff Inhalation Daily     folic acid  1 mg Oral Daily     gabapentin  300 mg Oral At Bedtime     hydrocortisone  20 mg Oral Daily     multivitamin w/minerals  1 tablet Oral Daily with lunch     omeprazole  20 mg Oral Daily     primidone  50 mg Oral At Bedtime     sodium chloride (PF)  3 mL Intracatheter Q8H     tamsulosin  0.8 mg Oral Daily with supper     umeclidinium  1 puff Inhalation Daily     vitamin C  1,000 mg Oral Daily     Vitamin D3  250 mcg Oral Daily       Data   Recent Labs   Lab 05/08/21  0702 05/07/21  1226 05/06/21  1052 05/06/21  0729 05/05/21  1229   WBC  --   --  4.4  --  4.0   HGB  --   --  8.5*  --  8.5*   MCV  --   --  97  --  94   PLT  --   --  61*  --  75*     --   --  144 144   POTASSIUM 4.2  --   --  5.2 4.6   CHLORIDE 110*  --   --  117* 115*   CO2 25  --   --  24 26   BUN 22  --   --  20 19   CR 1.33* 1.56*  --  1.77* 1.76*   ANIONGAP 3  --   --  3 3   MARTINE 7.9*  --   --  7.4* 8.5   GLC 88  --   --  90 84   ALBUMIN  --   --   --   --  2.8*   PROTTOTAL  --   --   --   --  6.7*   BILITOTAL  --   --   --   --  0.3   ALKPHOS  --   --   --   --  63   ALT  --   --   --   --  9   AST  --   --   --   --  16     Recent Labs   Lab 05/08/21  0702 05/06/21  0729 05/05/21  1229   GLC 88 90 84       Imaging:   No results found for this or any previous visit (from the past 24 hour(s)).

## 2021-05-08 NOTE — PLAN OF CARE
A&Ox4. Wound change done. Weak pulses. Dilaudid managing pain. Intermittent abx. Tolerating diet. VSS on 3L. Up A2. Frequently using urinal, incontinent x1. Vascular to see patient.

## 2021-05-08 NOTE — PLAN OF CARE
Patient vital signs are at baseline: Yes  Patient able to ambulate as they were prior to admission or with assist devices provided by therapies during their stay:  Yes  Patient MUST void prior to discharge:  Yes  Patient able to tolerate oral intake:  Yes  Pain has adequate pain control using Oral analgesics:  Yes    AOx4, VSS on O2 via NC 3 Lpm per baseline, [place], c/o pain in RLE -- WOC and vascular following, Dilaudid given for pain w/ some relief, Benadryl x1 to assist with itching, psoriasis and scabs per baseline.

## 2021-05-09 LAB
ANION GAP SERPL CALCULATED.3IONS-SCNC: 2 MMOL/L (ref 3–14)
BUN SERPL-MCNC: 24 MG/DL (ref 7–30)
CALCIUM SERPL-MCNC: 7.9 MG/DL (ref 8.5–10.1)
CHLORIDE SERPL-SCNC: 113 MMOL/L (ref 94–109)
CO2 SERPL-SCNC: 27 MMOL/L (ref 20–32)
CREAT SERPL-MCNC: 1.32 MG/DL (ref 0.66–1.25)
GFR SERPL CREATININE-BSD FRML MDRD: 55 ML/MIN/{1.73_M2}
GLUCOSE SERPL-MCNC: 97 MG/DL (ref 70–99)
POTASSIUM SERPL-SCNC: 4 MMOL/L (ref 3.4–5.3)
SODIUM SERPL-SCNC: 142 MMOL/L (ref 133–144)

## 2021-05-09 PROCEDURE — 250N000011 HC RX IP 250 OP 636: Performed by: HOSPITALIST

## 2021-05-09 PROCEDURE — 99232 SBSQ HOSP IP/OBS MODERATE 35: CPT | Performed by: INTERNAL MEDICINE

## 2021-05-09 PROCEDURE — 80048 BASIC METABOLIC PNL TOTAL CA: CPT | Performed by: INTERNAL MEDICINE

## 2021-05-09 PROCEDURE — 250N000013 HC RX MED GY IP 250 OP 250 PS 637: Performed by: HOSPITALIST

## 2021-05-09 PROCEDURE — 36415 COLL VENOUS BLD VENIPUNCTURE: CPT | Performed by: INTERNAL MEDICINE

## 2021-05-09 PROCEDURE — 120N000001 HC R&B MED SURG/OB

## 2021-05-09 RX ADMIN — OMEPRAZOLE 20 MG: 20 CAPSULE, DELAYED RELEASE ORAL at 08:45

## 2021-05-09 RX ADMIN — HYDROMORPHONE HYDROCHLORIDE 4 MG: 2 TABLET ORAL at 13:57

## 2021-05-09 RX ADMIN — HYDROMORPHONE HYDROCHLORIDE 4 MG: 2 TABLET ORAL at 06:30

## 2021-05-09 RX ADMIN — DOXYCYCLINE 100 MG: 100 CAPSULE ORAL at 20:10

## 2021-05-09 RX ADMIN — CHOLECALCIFEROL TAB 125 MCG (5000 UNIT) 250 MCG: 125 TAB at 08:45

## 2021-05-09 RX ADMIN — CEFEPIME HYDROCHLORIDE 1 G: 1 INJECTION, POWDER, FOR SOLUTION INTRAMUSCULAR; INTRAVENOUS at 21:14

## 2021-05-09 RX ADMIN — TAMSULOSIN HYDROCHLORIDE 0.8 MG: 0.4 CAPSULE ORAL at 17:50

## 2021-05-09 RX ADMIN — DOXYCYCLINE 100 MG: 100 CAPSULE ORAL at 08:45

## 2021-05-09 RX ADMIN — HYDROMORPHONE HYDROCHLORIDE 4 MG: 2 TABLET ORAL at 01:58

## 2021-05-09 RX ADMIN — PRIMIDONE 50 MG: 50 TABLET ORAL at 21:14

## 2021-05-09 RX ADMIN — HYDROMORPHONE HYDROCHLORIDE 4 MG: 2 TABLET ORAL at 10:10

## 2021-05-09 RX ADMIN — GABAPENTIN 300 MG: 300 CAPSULE ORAL at 21:14

## 2021-05-09 RX ADMIN — HYDROCORTISONE 20 MG: 10 TABLET ORAL at 08:45

## 2021-05-09 RX ADMIN — OXYCODONE HYDROCHLORIDE AND ACETAMINOPHEN 1000 MG: 500 TABLET ORAL at 08:45

## 2021-05-09 RX ADMIN — HYDROMORPHONE HYDROCHLORIDE 4 MG: 2 TABLET ORAL at 21:19

## 2021-05-09 RX ADMIN — CLOBETASOL PROPIONATE 1 APPLICATOR: 0.5 SOLUTION TOPICAL at 21:13

## 2021-05-09 RX ADMIN — MULTIPLE VITAMINS W/ MINERALS TAB 1 TABLET: TAB at 13:57

## 2021-05-09 RX ADMIN — FLUTICASONE FUROATE AND VILANTEROL TRIFENATATE 1 PUFF: 200; 25 POWDER RESPIRATORY (INHALATION) at 08:50

## 2021-05-09 RX ADMIN — FOLIC ACID 1 MG: 1 TABLET ORAL at 08:45

## 2021-05-09 RX ADMIN — UMECLIDINIUM 1 PUFF: 62.5 AEROSOL, POWDER ORAL at 08:50

## 2021-05-09 RX ADMIN — GABAPENTIN 300 MG: 300 CAPSULE ORAL at 01:57

## 2021-05-09 RX ADMIN — CYANOCOBALAMIN TAB 1000 MCG 1000 MCG: 1000 TAB at 08:45

## 2021-05-09 RX ADMIN — DULOXETINE HYDROCHLORIDE 60 MG: 60 CAPSULE, DELAYED RELEASE ORAL at 08:45

## 2021-05-09 RX ADMIN — HYDROMORPHONE HYDROCHLORIDE 4 MG: 2 TABLET ORAL at 17:50

## 2021-05-09 ASSESSMENT — ACTIVITIES OF DAILY LIVING (ADL)
ADLS_ACUITY_SCORE: 24

## 2021-05-09 NOTE — PLAN OF CARE
Patient is A&O, on 3LNC, baseline. Denies chest pain or SOB. Pain managed with  PO dilaudid. Dressing changed on RLE, CDI. Rooke boot in place on RLE. Incontinent of bowel x1. Voiding per urinal. Will continue to monitor.

## 2021-05-09 NOTE — PLAN OF CARE
Patient vital signs are at baseline: Yes  Patient able to ambulate as they were prior to admission or with assist devices provided by therapies during their stay:  Yes  Patient MUST void prior to discharge:  Yes  Patient able to tolerate oral intake:  Yes  Pain has adequate pain control using Oral analgesics:  Yes    AOx4, 3 lpm oxygen per baseline, VSS, skin peeling, scabs all over body from psoriasis, CMS intact, RLEwound -- WOC following. Potential discharge to home with home care or a TCU.

## 2021-05-09 NOTE — PLAN OF CARE
A&OX4, VSS on 3L NC at baseline. C/o pain in the R shoulder and R leg, treated with dilaudid, helpful. Denies SOB, n&V. On regular diet. PIV Sled. Turned and repoed per pt. Scattered psoriatic rash all over. Dressing on the RLE c/d/I and covered in Rooke boot. Up with AX1 with GB and pivot. Discharge possible tomorrow to home with  home care. CC foollowing. Continue to monitor.

## 2021-05-09 NOTE — CONSULTS
Care Management Initial Consult    General Information  Assessment completed with: Patient,    Type of CM/SW Visit: Initial Assessment    Primary Care Provider verified and updated as needed: Yes   Readmission within the last 30 days: no previous admission in last 30 days      Reason for Consult: discharge planning  Advance Care Planning:            Communication Assessment  Patient's communication style: spoken language (English or Bilingual)    Hearing Difficulty or Deaf: yes   Wear Glasses or Blind: yes    Cognitive  Cognitive/Neuro/Behavioral: WDL                      Living Environment:   People in home:       Current living Arrangements: apartment      Able to return to prior arrangements:         Family/Social Support:  Care provided by: self  Provides care for: no one  Marital Status: Single  None          Description of Support System: Other (see comments)    Support Assessment: Lacks necessary supervision and assistance, Lacks adequate physical care, Lacks adequate emotional support    Current Resources:   Patient receiving home care services:       Community Resources:    Equipment currently used at home: wheelchair, manual  Supplies currently used at home:      Employment/Financial:  Employment Status: retired, unemployed        Financial Concerns:             Lifestyle & Psychosocial Needs:        Socioeconomic History     Marital status: Single     Spouse name: Not on file     Number of children: Not on file     Years of education: Not on file     Highest education level: Not on file     Tobacco Use     Smoking status: Former Smoker     Packs/day: 0.50     Years: 30.00     Pack years: 15.00     Types: Cigarettes     Quit date: 2017     Years since quittin.1     Smokeless tobacco: Never Used     Tobacco comment: states he is down to 3QD   Substance and Sexual Activity     Alcohol use: No     Drug use: No     Sexual activity: Never       Functional Status:  Prior to admission patient needed  assistance:              Mental Health Status:          Chemical Dependency Status:                Values/Beliefs:  Spiritual, Cultural Beliefs, Latter-day Practices, Values that affect care:                 Additional Information:  Writer met with patient at bedside and confirmed address and phone number.  Confirmed with patient that he is willing to have HHC RN and PT,  Writer explained the medicare.gov site and ratings; pt wanted to use [facility].  He does not  Remember who he had last time for HHC srvices.  Referral sent  Via email to [facility].  CC will continue to monitor  For  Safe discharge plan    Marylou Napoles RN

## 2021-05-09 NOTE — PROGRESS NOTES
"[facility]    Hospitalist Progress Note    Assessment & Plan   Perfecto Reese is a 70 year old male with COPD on 3L home O2 with ongoing tocacco use, adrenal insufficieny on chronic steroids, psoriasis (previously on Humira), HCV cirrhosis, PVD s/p L BKA and with chronic RLE wounds who presents today 5/5/2021 for a wound check. He has not had dressing changed on RLE for \"a couple months maybe.\" ED attending describes wounds as malodorous though patient does not appear toxic.  Patient is at baseline wheelchair-bound.     Patient's neighbor had been assisting patient with ADLs and things like paying bills but he passed away a week ago. He said home nursing never showed up or responded to his calls after last discharge home. A brother stepped in to bring him food this week. A vulnerable adult report was filed in ED.     Chronic right lower extremity wounds - rule out cellulitis   Hx of PVD with L BKA (2010, [facility]): No evidence of sepsis in ED. Patient was given IV Vancomycin and IV Zosyn and 5 mg oxycodone. XR tib/fib showed no bony destruction and no gas. WBC wnl. Pro mi and lactate wnl. CRP mildly elevated.  -Right lower extremity pulses reduced, no erythema noted around the wound, blood cultures negative so far.   -Continue IV antibiotics, vascular surgery consult pending since admission, requested consult input again on 5/7, finally on 5/8 reached out again to vascular surgery team, they visited with patient today.  -Vascular surgery is recommending to continue wound care and outpatient follow-up with care team.  -appreciate infectious disease input, continue doxycycline and cefepime.  Wound culture, superficial culture is growing multiple organisms including staph and Proteus not appear to be significant finding.  Plan to discharge on doxycycline when ready.  -Patient was recently taken off of ASA due to chronic thrombocytopenia.  -Current plan is to discharge " patient home with home care/wound care which is patient's preference, care coordinator input requested 5/8, so far no input noted.  Patient is ready for discharge as early as 5/8.         MICHAEL on CKD III: Baseline creatinine 1.2-1.3 in Jan 2020, 1.5 in March, now up to 1.8.   -Creatinine improved significantly to 1.3 following hydration avoid nephrotoxic agents.    -Creatinine remaining stable off IV fluids at 1.3, possibly his baseline.     Chronic hypoxic and hypercarbic respiratory failure in the setting of COPD utilizing 3 LPM via NC at baseline, not in acute exacerbation:   -Continue PTA inhalers  - continue oxygen to keep sats > 88%  - Encourage pulmonary toilet   - TTE several months ago did not indicate CHF     HCV Cirrhosis  Chronic normocytic anemia  Chronic thrombocytopenia  Anemia and thrombocytopenia likely multifactorial from liver cirrhosis, splenomegaly, renal disease, CKD and psoriasis. Iron labs suggestive of anemia of chronic disease.   - Hgb appears to be at baseline     Chronic adrenal insufficiency:   - Continues on PTA hydrocortisone 20 mg PO daily  - Consider stress dose steroids if pt were to become hypotensive     Essential hypertension: Amlodipine decreased to 5mg daily due to soft blood pressures during last hospital stay, Losartan decreased to 25 mg po every day.   -Continue Norvasc, holding losartan, blood pressure stable.     History of psoriasis   - Patient says he is no longer on Humira  -He has ongoing significant itching and rash on the skin, improved with Benadryl.  Patient is requesting an emollient at the time of discharge.     Tobacco use: still smokes 2-3 cigs/week. NRT - lozenges ordered prn      Chronic pain syndrome:      Probable protein-calorie malnutrition:   - Nutrition consult      Major depressive disorder: Secondary to general medical condition suspected.   - Cymbalta increased to 60 mg every day and melatonin 3 mg at bedtime added      Vulnerable adult  Physical  deconditioning: Lives alone in apartment. Friend who has helped patient with paying bills, ADLs passed away 1 week ago. At baseline is WC bound with pivots for transfers. Has declined long term placement in the past.  - PT/OT/SW, social work input requested again regarding discharge disposition, patient could go home with home care versus TCU, PT/OT recommending home with assist.      DVT Prophylaxis: SCDs  Code Status: Full Code     Disposition: Expected discharge 5/8    Carmen Flores MD  Text Page   (7am to 6pm)    Interval History   Patient is resting, no new issues noted, discussed with him about discharge planning, he would prefer to go back to his prior settings with the help for wound cares, his main concern is wound care.  He is tolerating diet well, afebrile, no nausea.  -Data reviewed today: I reviewed all new labs and imaging results over the last 24 hours.     Physical Exam   Temp: 98.2  F (36.8  C) Temp src: Oral BP: 132/51 Pulse: 72   Resp: 16 SpO2: 98 % O2 Device: Nasal cannula Oxygen Delivery: 3 LPM  There were no vitals filed for this visit.  Vital Signs with Ranges  Temp:  [98  F (36.7  C)-98.2  F (36.8  C)] 98.2  F (36.8  C)  Pulse:  [72-79] 72  Resp:  [16] 16  BP: (124-140)/(47-56) 132/51  SpO2:  [97 %-98 %] 98 %  I/O last 3 completed shifts:  In: -   Out: 500 [Urine:500]    Constitutional: Awake, alert, cooperative, no apparent distress  Respiratory: Clear to auscultation bilaterally, no crackles or wheezing  Cardiovascular: Regular rate and rhythm, normal S1 and S2, and no murmur noted  GI: Normal bowel sounds, soft, non-distended, non-tender  Skin/Integumen: Left BKA, right lower extremity wound is dressed and leg  is in cam boot  Neuro : moving all 4 extremities, no focal deficit noted     Medications     - MEDICATION INSTRUCTIONS -         ceFEPIme (MAXIPIME) IV  1 g Intravenous Q24H     clobetasol   Topical At Bedtime     cyanocobalamin  1,000 mcg Oral Daily     doxycycline hyclate  100  mg Oral Q12H [facility]     DULoxetine  60 mg Oral Daily     fluticasone-vilanterol  1 puff Inhalation Daily     folic acid  1 mg Oral Daily     gabapentin  300 mg Oral At Bedtime     hydrocortisone  20 mg Oral Daily     multivitamin w/minerals  1 tablet Oral Daily with lunch     omeprazole  20 mg Oral Daily     primidone  50 mg Oral At Bedtime     sodium chloride (PF)  3 mL Intracatheter Q8H     tamsulosin  0.8 mg Oral Daily with supper     umeclidinium  1 puff Inhalation Daily     vitamin C  1,000 mg Oral Daily     Vitamin D3  250 mcg Oral Daily       Data   Recent Labs   Lab 05/09/21  0608 05/08/21  0702 05/07/21  1226 05/06/21  1052 05/06/21  0729 05/05/21  1229   WBC  --   --   --  4.4  --  4.0   HGB  --   --   --  8.5*  --  8.5*   MCV  --   --   --  97  --  94   PLT  --   --   --  61*  --  75*    138  --   --  144 144   POTASSIUM 4.0 4.2  --   --  5.2 4.6   CHLORIDE 113* 110*  --   --  117* 115*   CO2 27 25  --   --  24 26   BUN 24 22  --   --  20 19   CR 1.32* 1.33* 1.56*  --  1.77* 1.76*   ANIONGAP 2* 3  --   --  3 3   MARTINE 7.9* 7.9*  --   --  7.4* 8.5   GLC 97 88  --   --  90 84   ALBUMIN  --   --   --   --   --  2.8*   PROTTOTAL  --   --   --   --   --  6.7*   BILITOTAL  --   --   --   --   --  0.3   ALKPHOS  --   --   --   --   --  63   ALT  --   --   --   --   --  9   AST  --   --   --   --   --  16     Recent Labs   Lab 05/09/21  0608 05/08/21  0702 05/06/21  0729 05/05/21  1229   GLC 97 88 90 84       Imaging:   No results found for this or any previous visit (from the past 24 hour(s)).

## 2021-05-10 PROCEDURE — 120N000001 HC R&B MED SURG/OB

## 2021-05-10 PROCEDURE — 250N000013 HC RX MED GY IP 250 OP 250 PS 637: Performed by: INTERNAL MEDICINE

## 2021-05-10 PROCEDURE — 250N000011 HC RX IP 250 OP 636: Performed by: HOSPITALIST

## 2021-05-10 PROCEDURE — 250N000013 HC RX MED GY IP 250 OP 250 PS 637: Performed by: HOSPITALIST

## 2021-05-10 PROCEDURE — 99232 SBSQ HOSP IP/OBS MODERATE 35: CPT | Performed by: INTERNAL MEDICINE

## 2021-05-10 RX ADMIN — CHOLECALCIFEROL TAB 125 MCG (5000 UNIT) 250 MCG: 125 TAB at 08:26

## 2021-05-10 RX ADMIN — DULOXETINE HYDROCHLORIDE 60 MG: 60 CAPSULE, DELAYED RELEASE ORAL at 08:27

## 2021-05-10 RX ADMIN — OXYCODONE HYDROCHLORIDE AND ACETAMINOPHEN 1000 MG: 500 TABLET ORAL at 08:26

## 2021-05-10 RX ADMIN — CEFEPIME HYDROCHLORIDE 1 G: 1 INJECTION, POWDER, FOR SOLUTION INTRAMUSCULAR; INTRAVENOUS at 21:06

## 2021-05-10 RX ADMIN — CLOBETASOL PROPIONATE: 0.5 SOLUTION TOPICAL at 21:15

## 2021-05-10 RX ADMIN — DIPHENHYDRAMINE HYDROCHLORIDE 25 MG: 25 CAPSULE ORAL at 00:29

## 2021-05-10 RX ADMIN — OMEPRAZOLE 20 MG: 20 CAPSULE, DELAYED RELEASE ORAL at 08:26

## 2021-05-10 RX ADMIN — UMECLIDINIUM 1 PUFF: 62.5 AEROSOL, POWDER ORAL at 08:29

## 2021-05-10 RX ADMIN — DOXYCYCLINE 100 MG: 100 CAPSULE ORAL at 21:06

## 2021-05-10 RX ADMIN — DOXYCYCLINE 100 MG: 100 CAPSULE ORAL at 08:27

## 2021-05-10 RX ADMIN — FLUTICASONE FUROATE AND VILANTEROL TRIFENATATE 1 PUFF: 200; 25 POWDER RESPIRATORY (INHALATION) at 08:29

## 2021-05-10 RX ADMIN — HYDROCORTISONE 20 MG: 10 TABLET ORAL at 08:26

## 2021-05-10 RX ADMIN — GABAPENTIN 300 MG: 300 CAPSULE ORAL at 21:14

## 2021-05-10 RX ADMIN — CYANOCOBALAMIN TAB 1000 MCG 1000 MCG: 1000 TAB at 08:26

## 2021-05-10 RX ADMIN — PRIMIDONE 50 MG: 50 TABLET ORAL at 21:14

## 2021-05-10 RX ADMIN — HYDROMORPHONE HYDROCHLORIDE 4 MG: 2 TABLET ORAL at 04:15

## 2021-05-10 RX ADMIN — HYDROMORPHONE HYDROCHLORIDE 4 MG: 2 TABLET ORAL at 08:26

## 2021-05-10 RX ADMIN — HYDROMORPHONE HYDROCHLORIDE 4 MG: 2 TABLET ORAL at 18:56

## 2021-05-10 RX ADMIN — TAMSULOSIN HYDROCHLORIDE 0.8 MG: 0.4 CAPSULE ORAL at 17:20

## 2021-05-10 RX ADMIN — FOLIC ACID 1 MG: 1 TABLET ORAL at 08:26

## 2021-05-10 RX ADMIN — HYDROMORPHONE HYDROCHLORIDE 4 MG: 2 TABLET ORAL at 21:55

## 2021-05-10 RX ADMIN — HYDROMORPHONE HYDROCHLORIDE 4 MG: 2 TABLET ORAL at 12:20

## 2021-05-10 RX ADMIN — HYDROMORPHONE HYDROCHLORIDE 4 MG: 2 TABLET ORAL at 15:33

## 2021-05-10 RX ADMIN — MULTIPLE VITAMINS W/ MINERALS TAB 1 TABLET: TAB at 12:20

## 2021-05-10 RX ADMIN — HYDROMORPHONE HYDROCHLORIDE 4 MG: 2 TABLET ORAL at 00:26

## 2021-05-10 ASSESSMENT — ACTIVITIES OF DAILY LIVING (ADL)
ADLS_ACUITY_SCORE: 24

## 2021-05-10 NOTE — PROGRESS NOTES
"Care Management Follow Up    Length of Stay (days): 5    Expected Discharge Date: 05/11/21  Expected Time of Departure: TBD  Concerns to be Addressed:       Patient plan of care discussed at interdisciplinary rounds: Yes    Anticipated Discharge Disposition:  Patient refuses TCU. Agrees to [facility]     Anticipated Discharge Services:  [facility] RN/PT/OT/SW/HHA  Anticipated Discharge DME:      Patient/family educated on Medicare website which has current facility and service quality ratings:    Education Provided on the Discharge Plan:    Patient/Family in Agreement with the Plan:      Referrals Placed by CM/SW:    Private pay costs discussed: Not applicable    Additional Information:  Spoke w/ patient. Currently he refuses TCU. \"I need to take care of my cat\" He is agreeable to [facility]. Chart review shows he used services in Feb from [facility] [phone].   [phone]  They are able to accept patient with SOC 5/13-14.  They are only able to provide dressing changes 3x/week and require a \"teachable person\".  Upon further discussion, patient says he doesn't have anyone to help. His brother helps with groceries/ but then he states he does not drive so he cannot help with dressings changes? His story does not seem to be consistent.   Await Dr Odonnell's direction for dressing changes ( daily vs 3x/week)    Upon further discussion he requests Meals on Wheels. He says he does not have a [facility]   Discussed w/ SW- she will f[place]u on the county process ( which appears to have been started during last admission)          "

## 2021-05-10 NOTE — PROGRESS NOTES
Care Management Follow Up    Length of Stay (days): 5    Expected Discharge Date: 05/11/21  Expected Time of Departure: TBD  Concerns to be Addressed:       Patient plan of care discussed at interdisciplinary rounds: Yes    Anticipated Discharge Disposition:       Anticipated Discharge Services:    Anticipated Discharge DME:      Patient/family educated on Medicare website which has current facility and service quality ratings:    Education Provided on the Discharge Plan:    Patient/Family in Agreement with the Plan:      Referrals Placed by CM/SW:    Private pay costs discussed: Not applicable    Additional Information:  Call placed to [facility] regarding possible open waivers. Writer was told that patient's account was accessed in March of 2021 but there was no additional information provided. Writer was encouraged to have patient call [facility] [phone] to schedule an assesment for waivered services.     Call received from Bob with [facility] Adult [facility] ([phone]) regarding VA report filed. She requested a call back to provide discharge plans. Writer called back and left a message requesting a call back.       DIMITRI Drake

## 2021-05-10 NOTE — PROGRESS NOTES
[facility]    Infectious Disease Progress Note    Date of Service (when I saw the patient): 05/10/2021     Assessment & Plan   Perfecto Reese is a 70 year old male who was admitted on 5/5/2021.     Impression:  1. 70 y.o male with multiple co morbidities.   2. COPD on home oxygen, continued tobacco abuse.   3. Adrenal insufficiency on chronic steroids.   4. Psoriasis   5. HCV cirrhosis   6. PVD s.p left BKA  7. Chronic RLE wound.   8. Admitted for wound check, no fever, no leucocytosis, procal normal.   9. Started on broad spectrum antibiotics      Recommendations:   1.  cefepime and doxy but no sig sepsis/cellulitis  2. Needs wd care/ vascular consult.   3. Skin cultures from these wound of no medical significance  Doubt any sig infection , main issues are wd care/edema control  At most 1 week po doxy when disposition      Rc Hu MD    Interval History   Proteus and SA (MRSA ) in the cultures  But of no  Real significance  No fevers   No new acute complaints     Physical Exam   Temp: 98.2  F (36.8  C) Temp src: Oral BP: 129/55 Pulse: 71   Resp: 16 SpO2: 100 % O2 Device: Nasal cannula Oxygen Delivery: 2 LPM  There were no vitals filed for this visit.  Vital Signs with Ranges  Temp:  [97.5  F (36.4  C)-98.2  F (36.8  C)] 98.2  F (36.8  C)  Pulse:  [69-71] 71  Resp:  [16-18] 16  BP: (129-138)/(49-59) 129/55  SpO2:  [99 %-100 %] 100 %    Constitutional: Awake, alert, cooperative, no apparent distress  Lungs: Clear to auscultation bilaterally, no crackles or wheezing  Cardiovascular: Regular rate and rhythm, normal S1 and S2, and no murmur noted  Abdomen: Normal bowel sounds, soft, non-distended, non-tender  Skin: No rashes, no cyanosis, no edema  Other:    Medications     - MEDICATION INSTRUCTIONS -         ceFEPIme (MAXIPIME) IV  1 g Intravenous Q24H     clobetasol   Topical At Bedtime     cyanocobalamin  1,000 mcg Oral Daily     doxycycline hyclate  100 mg Oral Q12H [facility]      DULoxetine  60 mg Oral Daily     fluticasone-vilanterol  1 puff Inhalation Daily     folic acid  1 mg Oral Daily     gabapentin  300 mg Oral At Bedtime     hydrocortisone  20 mg Oral Daily     multivitamin w/minerals  1 tablet Oral Daily with lunch     omeprazole  20 mg Oral Daily     primidone  50 mg Oral At Bedtime     sodium chloride (PF)  3 mL Intracatheter Q8H     tamsulosin  0.8 mg Oral Daily with supper     umeclidinium  1 puff Inhalation Daily     vitamin C  1,000 mg Oral Daily     Vitamin D3  250 mcg Oral Daily       Data   All microbiology laboratory data reviewed.  Recent Labs   Lab Test 05/06/21  1052 05/05/21  1229 03/21/21  0744   WBC 4.4 4.0 4.0   HGB 8.5* 8.5* 7.7*   HCT 27.3* 27.1* 24.2*   MCV 97 94 95   PLT 61* 75* 50*     Recent Labs   Lab Test 05/09/21  0608 05/08/21  0702 05/07/21  1226   CR 1.32* 1.33* 1.56*     Recent Labs   Lab Test 05/05/21  1229   SED 62*     Recent Labs   Lab Test 05/05/21  1303 05/05/21  1232 05/05/21  1229 03/20/21  1030 03/18/21  1843 02/09/21  0602 12/03/20  0540 12/03/20  0230 12/02/20  1904   CULT No growth after 5 days Light growth  Proteus mirabilis  *  Light growth  Methicillin resistant Staphylococcus aureus (MRSA)  *  Moderate growth  Enterococcus species (VRE)  Further speciated as:  Enterococcus raffinosus  Identification obtained by MALDI-TOF mass spectrometry research use only database. Test   characteristics determined and verified by the [facility]   ([facility]) [place], [place].  *  Light growth  Corynebacterium striatum  Identification obtained by MALDI-TOF mass spectrometry research use only database. Test   characteristics determined and verified by the [facility]   ([facility]) [place], [place].  Susceptibility testing not routinely done  *  Plus  Light growth  Normal skin adelina   No growth after 5 days No growth 10,000 to 50,000 colonies/mL  -Enterococcus species (VRE)  Further speciated  as:  Enterococcus raffinosus  *  Plus  10,000 to 50,000 colonies/mL  mixed urogenital adelina    Critical Value/Significant Value, preliminary result only, called to and read back by  Kristel Horowitz RN at 0728 3/21/21 SRQ   No growth  No growth Light growth  Normal adelina    Light growth  Proteus mirabilis  *  Moderate growth  Corynebacterium striatum  Identification obtained by MALDI-TOF mass spectrometry research use only database. Test   characteristics determined and verified by the [facility]   ([facility]) [place], [place].  Susceptibility testing not routinely done  * <10,000 colonies/mL  mixed urogenital adelina  Susceptibility testing not routinely done   No growth       Attestation:  Total time on the floor involved in the patient's care: 35 minutes. Total time spent in counseling/care coordination: >50%

## 2021-05-10 NOTE — ED NOTES
Chart accessed because Adult Protective Services called looking for more information on Vulnerable Adult report that writer submitted last week. Patient still admitted to hospital, call transferred to PCU.

## 2021-05-10 NOTE — PROGRESS NOTES
"[facility]    Hospitalist Progress Note    Assessment & Plan   Perfecto Reese is a 70 year old male with COPD on 3L home O2 with ongoing tocacco use, adrenal insufficieny on chronic steroids, psoriasis (previously on Humira), HCV cirrhosis, PVD s/p L BKA and with chronic RLE wounds who presents today 5/5/2021 for a wound check. He has not had dressing changed on RLE for \"a couple months maybe.\" ED attending describes wounds as malodorous though patient does not appear toxic.  Patient is at baseline wheelchair-bound.     Patient's neighbor had been assisting patient with ADLs and things like paying bills but he passed away a week ago. He said home nursing never showed up or responded to his calls after last discharge home. A brother stepped in to bring him food this week. A vulnerable adult report was filed in ED.     Chronic right lower extremity wounds - rule out cellulitis   Hx of PVD with L BKA (2010, [facility]): No evidence of sepsis in ED. Patient was given IV Vancomycin and IV Zosyn and 5 mg oxycodone. XR tib/fib showed no bony destruction and no gas. WBC wnl. Pro mi and lactate wnl. CRP mildly elevated.      blood cultures negative so far.    ID consult requested, I appreciate Dr. Hu's followup    continue doxycycline and cefepime.  Wound culture, superficial culture is growing multiple organisms including staph and Proteus.  Per Dr. Hu, \"Skin cultures from these wound of no medical significance.  Doubt any significant infection, main issues are wound care/edema control   Plan to discharge on doxycycline when ready.    Vascular Surgery consult requested, they recommend continuing wound care and outpatient follow-up with care team    ZHOU from right leg is wnl, no evidence of arterial insufficiency     Patient was recently taken off of ASA due to chronic thrombocytopenia.    Current plan is to discharge patient home with home care/wound care which is " patient's preference.  If dressing changes can be done 3 times weekly, performing wound cares at home is feasible.  If he needs daily dressing changes, would need TCU         MICHAEL on CKD III: Baseline creatinine 1.2-1.3 in Jan 2020, 1.5 in March, now up to 1.8.     Creatinine improved significantly to 1.3 following hydration      Chronic hypoxic and hypercarbic respiratory failure in the setting of COPD utilizing 3 LPM via NC at baseline, not in acute exacerbation:     Continue PTA inhalers    continue oxygen to keep sats > 88%    Encourage pulmonary toilet     TTE several months ago did not indicate CHF     HCV Cirrhosis  Chronic normocytic anemia  Chronic thrombocytopenia  Anemia and thrombocytopenia likely multifactorial from liver cirrhosis, splenomegaly, renal disease, CKD and psoriasis. Iron labs suggestive of anemia of chronic disease.     Hgb appears to be at baseline     Chronic adrenal insufficiency:   - Continues on PTA hydrocortisone 20 mg PO daily  - Consider stress dose steroids if pt were to become hypotensive     Essential hypertension: Amlodipine decreased to 5mg daily due to soft blood pressures during last hospital stay, Losartan decreased to 25 mg po every day.     Continue Norvasc, holding losartan, blood pressure stable.     History of psoriasis     Patient says he is no longer on Humira    He has ongoing significant itching and rash on the skin, improved with Benadryl.  Patient is requesting an emollient at the time of discharge.     Tobacco use: still smokes 2-3 cigs/week. NRT - lozenges ordered prn      Chronic pain syndrome:      Probable protein-calorie malnutrition:   - Nutrition consult      Major depressive disorder: Secondary to general medical condition suspected.     Cymbalta increased to 60 mg every day and melatonin 3 mg at bedtime added      Vulnerable adult  Physical deconditioning: Lives alone in apartment. Friend who has helped patient with paying bills, ADLs passed away 1 week  "ago. At baseline is WC bound with pivots for transfers. Has declined long term placement in the past.    PT/OT/SW, social work input requested regarding discharge disposition    See discussion above.  If dressing changes can be done 3 times weekly, it is practical to discharge the patient home with home care.  If he needs daily dressing changes, would need TCU at discharge    Await recommendations from Dr. Odonnell regarding frequency of dressing changes    DVT Prophylaxis: SCDs  Code Status: Full Code     Disposition:  Medically ready for discharge when appropriate level of care is available    Marianela Mcconnell MD   Text Page   (7am to 6pm)    Interval History   \"If you could give me something for the psoriasis, that would be great.\"  Patient says he has patches of psoriasis that itch.  He is anxious for discharge home.  We discussed that he will need home care, he will have to let the home care provider in his home to perform dressing changes.  He says, \"my door is always open.\"  He says he is able to transfer independently and that he lives in an apartment that has adaptive equipment for his disability, so he can make his own meals.  He says his brother buys his groceries and helps with errands.    -Data reviewed today: I reviewed all new labs and imaging results over the last 24 hours.     Physical Exam   Temp: 98.2  F (36.8  C) Temp src: Oral BP: 129/55 Pulse: 71   Resp: 16 SpO2: 100 % O2 Device: Nasal cannula Oxygen Delivery: 2 LPM  There were no vitals filed for this visit.  Vital Signs with Ranges  Temp:  [97.5  F (36.4  C)-98.2  F (36.8  C)] 98.2  F (36.8  C)  Pulse:  [69-71] 71  Resp:  [16-18] 16  BP: (129-138)/(49-59) 129/55  SpO2:  [99 %-100 %] 100 %  I/O last 3 completed shifts:  In: 1320 [P.O.:1320]  Out: 1500 [Urine:1500]    Constitutional: Awake, alert, cooperative, no apparent distress  Respiratory: Clear to auscultation bilaterally, no crackles or wheezing  Cardiovascular: Regular rate and rhythm, " normal S1 and S2, and no murmur noted  GI: Normal bowel sounds, soft, non-distended, non-tender  Skin/Integumen: Left BKA, right lower extremity wound is dressed and leg  is in cam boot  Neuro : moving all 4 extremities, no focal deficit noted     Medications     - MEDICATION INSTRUCTIONS -         ceFEPIme (MAXIPIME) IV  1 g Intravenous Q24H     clobetasol   Topical At Bedtime     cyanocobalamin  1,000 mcg Oral Daily     doxycycline hyclate  100 mg Oral Q12H ISIS     DULoxetine  60 mg Oral Daily     fluticasone-vilanterol  1 puff Inhalation Daily     folic acid  1 mg Oral Daily     gabapentin  300 mg Oral At Bedtime     hydrocortisone  20 mg Oral Daily     multivitamin w/minerals  1 tablet Oral Daily with lunch     omeprazole  20 mg Oral Daily     primidone  50 mg Oral At Bedtime     sodium chloride (PF)  3 mL Intracatheter Q8H     tamsulosin  0.8 mg Oral Daily with supper     umeclidinium  1 puff Inhalation Daily     vitamin C  1,000 mg Oral Daily     Vitamin D3  250 mcg Oral Daily       Data   Recent Labs   Lab 05/09/21  0608 05/08/21  0702 05/07/21  1226 05/06/21  1052 05/06/21  0729 05/05/21  1229   WBC  --   --   --  4.4  --  4.0   HGB  --   --   --  8.5*  --  8.5*   MCV  --   --   --  97  --  94   PLT  --   --   --  61*  --  75*    138  --   --  144 144   POTASSIUM 4.0 4.2  --   --  5.2 4.6   CHLORIDE 113* 110*  --   --  117* 115*   CO2 27 25  --   --  24 26   BUN 24 22  --   --  20 19   CR 1.32* 1.33* 1.56*  --  1.77* 1.76*   ANIONGAP 2* 3  --   --  3 3   MARTINE 7.9* 7.9*  --   --  7.4* 8.5   GLC 97 88  --   --  90 84   ALBUMIN  --   --   --   --   --  2.8*   PROTTOTAL  --   --   --   --   --  6.7*   BILITOTAL  --   --   --   --   --  0.3   ALKPHOS  --   --   --   --   --  63   ALT  --   --   --   --   --  9   AST  --   --   --   --   --  16     Recent Labs   Lab 05/09/21  0608 05/08/21  0702 05/06/21  0729 05/05/21  1229   GLC 97 88 90 84       Imaging:   No results found for this or any previous visit  (from the past 24 hour(s)).

## 2021-05-10 NOTE — PLAN OF CARE
Patient is A&O x4, on 3LNC, baseline. Denies chest pain or SOB. Pain managed with PO dilaudid. Dressing changed, CDI.  Pt continues to itch, applied sween cream on back, BLE and BUE x2. Incontinent of bowel x2, voiding per urinal. Turn and repo per pt comfort, refused to sit up on chair this shift. Will continue to monitor.

## 2021-05-10 NOTE — PROGRESS NOTES
Alert and oriented, continues on O2 at 3lpm/per baseline . Pain managed by Dilaudid, rates pain at 8-9/10, medication helpful, sometimes asleep on reassessment. Dressing to RLE intact with Rooke boot on, CMS intact. Voiding adequately in urinal.

## 2021-05-11 LAB
BACTERIA SPEC CULT: NO GROWTH
BACTERIA SPEC CULT: NO GROWTH
SPECIMEN SOURCE: NORMAL
SPECIMEN SOURCE: NORMAL

## 2021-05-11 PROCEDURE — 99232 SBSQ HOSP IP/OBS MODERATE 35: CPT | Performed by: INTERNAL MEDICINE

## 2021-05-11 PROCEDURE — 250N000013 HC RX MED GY IP 250 OP 250 PS 637: Performed by: HOSPITALIST

## 2021-05-11 PROCEDURE — 250N000011 HC RX IP 250 OP 636: Performed by: HOSPITALIST

## 2021-05-11 PROCEDURE — 120N000001 HC R&B MED SURG/OB

## 2021-05-11 RX ADMIN — CYANOCOBALAMIN TAB 1000 MCG 1000 MCG: 1000 TAB at 08:22

## 2021-05-11 RX ADMIN — CLOBETASOL PROPIONATE: 0.5 SOLUTION TOPICAL at 21:10

## 2021-05-11 RX ADMIN — TAMSULOSIN HYDROCHLORIDE 0.8 MG: 0.4 CAPSULE ORAL at 17:48

## 2021-05-11 RX ADMIN — HYDROMORPHONE HYDROCHLORIDE 4 MG: 2 TABLET ORAL at 17:48

## 2021-05-11 RX ADMIN — DOXYCYCLINE 100 MG: 100 CAPSULE ORAL at 21:06

## 2021-05-11 RX ADMIN — HYDROCORTISONE 20 MG: 10 TABLET ORAL at 08:22

## 2021-05-11 RX ADMIN — FLUTICASONE FUROATE AND VILANTEROL TRIFENATATE 1 PUFF: 200; 25 POWDER RESPIRATORY (INHALATION) at 08:26

## 2021-05-11 RX ADMIN — HYDROMORPHONE HYDROCHLORIDE 4 MG: 2 TABLET ORAL at 10:32

## 2021-05-11 RX ADMIN — GABAPENTIN 300 MG: 300 CAPSULE ORAL at 21:06

## 2021-05-11 RX ADMIN — HYDROMORPHONE HYDROCHLORIDE 4 MG: 2 TABLET ORAL at 14:40

## 2021-05-11 RX ADMIN — CHOLECALCIFEROL TAB 125 MCG (5000 UNIT) 250 MCG: 125 TAB at 08:22

## 2021-05-11 RX ADMIN — UMECLIDINIUM 1 PUFF: 62.5 AEROSOL, POWDER ORAL at 08:26

## 2021-05-11 RX ADMIN — CEFEPIME HYDROCHLORIDE 1 G: 1 INJECTION, POWDER, FOR SOLUTION INTRAMUSCULAR; INTRAVENOUS at 21:06

## 2021-05-11 RX ADMIN — DULOXETINE HYDROCHLORIDE 60 MG: 60 CAPSULE, DELAYED RELEASE ORAL at 08:24

## 2021-05-11 RX ADMIN — FOLIC ACID 1 MG: 1 TABLET ORAL at 08:22

## 2021-05-11 RX ADMIN — HYDROMORPHONE HYDROCHLORIDE 4 MG: 2 TABLET ORAL at 21:06

## 2021-05-11 RX ADMIN — OMEPRAZOLE 20 MG: 20 CAPSULE, DELAYED RELEASE ORAL at 08:23

## 2021-05-11 RX ADMIN — HYDROMORPHONE HYDROCHLORIDE 4 MG: 2 TABLET ORAL at 01:00

## 2021-05-11 RX ADMIN — DOXYCYCLINE 100 MG: 100 CAPSULE ORAL at 08:22

## 2021-05-11 RX ADMIN — OXYCODONE HYDROCHLORIDE AND ACETAMINOPHEN 1000 MG: 500 TABLET ORAL at 08:22

## 2021-05-11 RX ADMIN — HYDROMORPHONE HYDROCHLORIDE 4 MG: 2 TABLET ORAL at 07:19

## 2021-05-11 RX ADMIN — MULTIPLE VITAMINS W/ MINERALS TAB 1 TABLET: TAB at 12:44

## 2021-05-11 RX ADMIN — PRIMIDONE 50 MG: 50 TABLET ORAL at 21:06

## 2021-05-11 RX ADMIN — HYDROMORPHONE HYDROCHLORIDE 4 MG: 2 TABLET ORAL at 04:10

## 2021-05-11 ASSESSMENT — ACTIVITIES OF DAILY LIVING (ADL)
ADLS_ACUITY_SCORE: 24

## 2021-05-11 NOTE — PROVIDER NOTIFICATION
Called placed to Dr. Odonnell regarding dressing change on RLE.  Pt declined TCU, wanted to go home with home care. Home care is available for dressing change on right LE three times a week per CC RN. Dr Odonnell prefers daily dressing change and if pt refused, may go home [facility]. Updated patient and CC RN.

## 2021-05-11 NOTE — PROGRESS NOTES
"[facility]  Hospitalist Progress Note  Ye Baldwin MD  05/11/2021    Assessment & Plan      Perfecto Reese is a 70 year old male with COPD on 3L home O2 with ongoing tocacco use, adrenal insufficieny on chronic steroids, psoriasis (previously on Humira), HCV cirrhosis, PVD s/p L BKA and with chronic RLE wounds who presents today 5/5/2021 for a wound check. He has not had dressing changed on RLE for \"a couple months maybe.\" ED attending describes wounds as malodorous though patient does not appear toxic.  Patient is at baseline wheelchair-bound.     Patient's neighbor had been assisting patient with ADLs and things like paying bills but he passed away a week ago. He said home nursing never showed up or responded to his calls after last discharge home. A brother stepped in to bring him food this week. A vulnerable adult report was filed in ED.     Chronic right lower extremity wounds - rule out cellulitis   Hx of PVD with L BKA (2010, [facility]): No evidence of sepsis in ED. Patient was given IV Vancomycin and IV Zosyn and 5 mg oxycodone. XR tib/fib showed no bony destruction and no gas. WBC wnl. Pro mi and lactate wnl. CRP mildly elevated.     ? blood cultures negative so far.  ? ID consult requested, appreciate Dr. Hu's followup  ? continue doxycycline and cefepime.  Wound culture, superficial culture is growing multiple organisms including staph and Proteus.  Per Dr. Hu, \"Skin cultures from these wound of no medical significance.  Doubt any significant infection, main issues are wound care/edema control   Plan to discharge on doxycycline when ready.  ? Vascular Surgery consult requested, they recommend continuing wound care and outpatient follow-up with care team  ? ZHOU from right leg is wnl, no evidence of arterial insufficiency   ? Patient was recently taken off of ASA due to chronic thrombocytopenia.  ? Current plan is to discharge patient home with " home care/wound care which is patient's preference.  If dressing changes can be done 3 times weekly, performing wound cares at home is feasible.  If he needs daily dressing changes, would need TCU           MICHAEL on CKD III: Baseline creatinine 1.2-1.3 in Jan 2020, 1.5 in March, now up to 1.8.   ? Creatinine improved significantly to 1.3 following hydration      Chronic hypoxic and hypercarbic respiratory failure in the setting of COPD utilizing 3 LPM via NC at baseline, not in acute exacerbation:   ? Continue PTA inhalers  ? continue oxygen to keep sats > 88%  ? Encourage pulmonary toilet   ? TTE several months ago did not indicate CHF     HCV Cirrhosis  Chronic normocytic anemia  Chronic thrombocytopenia  Anemia and thrombocytopenia likely multifactorial from liver cirrhosis, splenomegaly, renal disease, CKD and psoriasis. Iron labs suggestive of anemia of chronic disease.   ? Hgb appears to be at baseline     Chronic adrenal insufficiency:   - Continues on PTA hydrocortisone 20 mg PO daily  - Consider stress dose steroids if pt were to become hypotensive     Essential hypertension: Amlodipine decreased to 5mg daily due to soft blood pressures during last hospital stay, Losartan decreased to 25 mg po every day.   ? Continue Norvasc, holding losartan, blood pressure stable.     History of psoriasis   ? Patient says he is no longer on Humira  ? He has ongoing significant itching and rash on the skin, improved with Benadryl.  Patient is requesting an emollient at the time of discharge.     Tobacco use: still smokes 2-3 cigs/week. NRT - lozenges ordered prn      Chronic pain syndrome:      Probable protein-calorie malnutrition:   - Nutrition consult      Major depressive disorder: Secondary to general medical condition suspected.   ? Cymbalta increased to 60 mg every day and melatonin 3 mg at bedtime added      Vulnerable adult  Physical deconditioning: Lives alone in apartment. Friend who has helped patient with paying  "bills, ADLs passed away 1 week ago. At baseline is WC bound with pivots for transfers. Has declined long term placement in the past.  ? PT/OT/SW, social work input requested regarding discharge disposition  ? See discussion above. Patient needs daily dressing changes to TCU at discharge     DVT Prophylaxis: SCDs  Code Status: Full Code      Disposition:   -- awaiting placement at TCU  -- willing to go only for 14 days    Interval History   -- chart reviewed  -- ID consult and recs noted  -- patient now ok for tcu    -Data reviewed today: I reviewed all new labs and imaging over the last 24 hours. I personally reviewed no images or EKG's today.    Physical Exam    , Blood pressure 135/52, pulse 61, temperature 98.3  F (36.8  C), temperature source Oral, resp. rate 16, height 1.651 m (5' 5\"), SpO2 98 %.  There were no vitals filed for this visit.  Vital Signs with Ranges  Temp:  [97.9  F (36.6  C)-98.3  F (36.8  C)] 98.3  F (36.8  C)  Pulse:  [61-71] 61  Resp:  [16-18] 16  BP: (135-141)/(52-58) 135/52  SpO2:  [97 %-98 %] 98 %  I/O's Last 24 hours  I/O last 3 completed shifts:  In: 1680 [P.O.:1680]  Out: 2150 [Urine:2150]    Constitutional: Awake, alert, cooperative, no apparent distress  Respiratory: Clear to auscultation bilaterally, no crackles or wheezing  Cardiovascular: Regular rate and rhythm, normal S1 and S2   GI: Normal bowel sounds, soft, non-distended, non-tender  Skin/Integumen: minimal edema, left BKA, RLE wounds  Other:      Medications   All medications were reviewed.    - MEDICATION INSTRUCTIONS -         ceFEPIme (MAXIPIME) IV  1 g Intravenous Q24H     clobetasol   Topical At Bedtime     cyanocobalamin  1,000 mcg Oral Daily     doxycycline hyclate  100 mg Oral Q12H ISIS     DULoxetine  60 mg Oral Daily     fluticasone-vilanterol  1 puff Inhalation Daily     folic acid  1 mg Oral Daily     gabapentin  300 mg Oral At Bedtime     hydrocortisone  20 mg Oral Daily     multivitamin w/minerals  1 tablet Oral " Daily with lunch     omeprazole  20 mg Oral Daily     primidone  50 mg Oral At Bedtime     sodium chloride (PF)  3 mL Intracatheter Q8H     tamsulosin  0.8 mg Oral Daily with supper     umeclidinium  1 puff Inhalation Daily     vitamin C  1,000 mg Oral Daily     Vitamin D3  250 mcg Oral Daily        Data   Recent Labs   Lab 05/09/21  0608 05/08/21  0702 05/07/21  1226 05/06/21  1052 05/06/21  0729 05/05/21  1229   WBC  --   --   --  4.4  --  4.0   HGB  --   --   --  8.5*  --  8.5*   MCV  --   --   --  97  --  94   PLT  --   --   --  61*  --  75*    138  --   --  144 144   POTASSIUM 4.0 4.2  --   --  5.2 4.6   CHLORIDE 113* 110*  --   --  117* 115*   CO2 27 25  --   --  24 26   BUN 24 22  --   --  20 19   CR 1.32* 1.33* 1.56*  --  1.77* 1.76*   ANIONGAP 2* 3  --   --  3 3   MARTINE 7.9* 7.9*  --   --  7.4* 8.5   GLC 97 88  --   --  90 84   ALBUMIN  --   --   --   --   --  2.8*   PROTTOTAL  --   --   --   --   --  6.7*   BILITOTAL  --   --   --   --   --  0.3   ALKPHOS  --   --   --   --   --  63   ALT  --   --   --   --   --  9   AST  --   --   --   --   --  16       No results found for this or any previous visit (from the past 24 hour(s)).    Ye Baldwin MD  Text Page  (7am to 6pm)

## 2021-05-11 NOTE — PROGRESS NOTES
SPIRITUAL HEALTH SERVICES Progress Note  [facility]    Brief visit with pt per LOS.  No  needs at present.   team available if needs arise.                                                                                                                                                 Phyllis Skinner M.A.  Staff   Phone [phone]

## 2021-05-11 NOTE — PROGRESS NOTES
CLINICAL NUTRITION SERVICES - REASSESSMENT NOTE      Future/Additional Recommendations: Protein supplement 1-2 times a day and MVI with minerals, Vit C 500 mg daily  when at home until wound is healed.    Malnutrition:Malnutrition: 5/6  % Weight Loss:  Unable to determine without current weight  % Intake:  No decreased intake noted  Subcutaneous Fat Loss:  Orbital region mild depletion and Upper arm region mild depletion  Muscle Loss:  Temporal region moderate depletion, Clavicle bone region moderate-severe depletion, Acromion bone region moderate-severe depletion and Dorsal hand region moderate depletion  Fluid Retention:  None noted     Malnutrition Diagnosis: Non-Severe malnutrition  In Context of:  Chronic illness or disease       EVALUATION OF PROGRESS TOWARD GOALS   Diet:  Regular  Ensure Enlive TID    Intake/Tolerance:    -Meal intake has been good, > 75% of meals and 100% of supplements + good fluid intake, 1020 ml/day.   -Total ordered with supplements = 8304-3143 kcal and 118-147 g protein/day, meeting 100% of estimated needs  -Supplements provide 1050 kcal, 60 g protein total.       ASSESSED NUTRITION NEEDS:  Dosing Weight::  No weight this admission      NEW FINDINGS:   Pt discharge to TCU pending place  Meds: Iv abx, B12, abx, folic acid, mvi with minerals, prilosec, vit C 1000 mg, Vit D3    Previous Goals:   Pt will consume > 75% meals and supplements in 3-5 days.  Evaluation: Met    Previous Nutrition Diagnosis:   Increased nutrient needs (protein) related to delayed wound healing as evidenced by chronic right lower extremity wounds   Evaluation: Improving      CURRENT NUTRITION DIAGNOSIS  Increased nutrient needs (protein) related to delayed wound healing as evidenced by chronic right lower extremity wounds     INTERVENTIONS  Recommendations / Nutrition Prescription  Regular diet  Ensure enlive tid     Implementation  Order new weight    Goals  Continue nutrition intake > 75% of meals and  supplements  Maintain weight      MONITORING AND EVALUATION:  Progress towards goals will be monitored and evaluated per protocol and Practice Guidelines

## 2021-05-11 NOTE — PROGRESS NOTES
"Care Management Follow Up    Length of Stay (days): 6    Expected Discharge Date: 05/12/21(TCU)  Expected Time of Departure: TBD  Concerns to be Addressed:   No support at home    Patient plan of care discussed at interdisciplinary rounds: Yes    Anticipated Discharge Disposition:  TCU     Anticipated Discharge Services:    Anticipated Discharge DME:      Patient/family educated on Medicare website which has current facility and service quality ratings:    Education Provided on the Discharge Plan:    Patient/Family in Agreement with the Plan:      Referrals Placed by CM/BENEDICT:    Private pay costs discussed: Not applicable    Additional Information:    Lengthy discussion w/patient. [facility] will only be able to provide dressing changes 3x/week and they want a \"teachable person\" for support with this. At this time Dr Odonnell is recommending daily dressing changes.  Patient has very limited support at home. His brother unable to help at this time due to his own \"transportation issues\". He does have a nephew who can help with groceries once a week, but cannot help w/ dressings as \" he works full time\"  He is reluctant to go to TCU because of his cat. He was encouraged to problem solve this issue, suggested landlord, neighbor or nephew assist with this  After further discussion regarding home not being a safe discharge plan, he agrees to sending out TCU referrals. He does not want to return to [facility], she will send referrals          "

## 2021-05-11 NOTE — PLAN OF CARE
Contact precautions maintained; MRSA. Pt is AOx4. VSS on 3L NC; baseline. Incontinent bowel; voiding per urinal. Controlled pain w/ PRN dilaudid; q3hr. PIV; SL. Dressing; CDI; WOC following. Pt continues to itch; applied sween cream. Turn/repo per pt comfort. Discharge plans pending; home with home care per MD note. Waiting TCU placement. Continue to monitor.

## 2021-05-12 VITALS
TEMPERATURE: 98.2 F | DIASTOLIC BLOOD PRESSURE: 53 MMHG | HEIGHT: 65 IN | WEIGHT: 135.1 LBS | OXYGEN SATURATION: 97 % | BODY MASS INDEX: 22.51 KG/M2 | SYSTOLIC BLOOD PRESSURE: 141 MMHG | RESPIRATION RATE: 18 BRPM | HEART RATE: 77 BPM

## 2021-05-12 LAB
LABORATORY COMMENT REPORT: NORMAL
SARS-COV-2 RNA RESP QL NAA+PROBE: NEGATIVE
SPECIMEN SOURCE: NORMAL

## 2021-05-12 PROCEDURE — U0005 INFEC AGEN DETEC AMPLI PROBE: HCPCS | Performed by: INTERNAL MEDICINE

## 2021-05-12 PROCEDURE — U0003 INFECTIOUS AGENT DETECTION BY NUCLEIC ACID (DNA OR RNA); SEVERE ACUTE RESPIRATORY SYNDROME CORONAVIRUS 2 (SARS-COV-2) (CORONAVIRUS DISEASE [COVID-19]), AMPLIFIED PROBE TECHNIQUE, MAKING USE OF HIGH THROUGHPUT TECHNOLOGIES AS DESCRIBED BY CMS-2020-01-R: HCPCS | Performed by: INTERNAL MEDICINE

## 2021-05-12 PROCEDURE — 250N000013 HC RX MED GY IP 250 OP 250 PS 637: Performed by: HOSPITALIST

## 2021-05-12 PROCEDURE — 99239 HOSP IP/OBS DSCHRG MGMT >30: CPT | Performed by: INTERNAL MEDICINE

## 2021-05-12 PROCEDURE — G0463 HOSPITAL OUTPT CLINIC VISIT: HCPCS

## 2021-05-12 RX ORDER — HYDROMORPHONE HYDROCHLORIDE 2 MG/1
2 TABLET ORAL EVERY 4 HOURS PRN
Qty: 30 TABLET | Refills: 0 | Status: SHIPPED | OUTPATIENT
Start: 2021-05-12

## 2021-05-12 RX ORDER — DOXYCYCLINE HYCLATE 100 MG
100 TABLET ORAL 2 TIMES DAILY
Qty: 14 TABLET | Refills: 0 | DISCHARGE
Start: 2021-05-12 | End: 2021-05-19

## 2021-05-12 RX ADMIN — DULOXETINE HYDROCHLORIDE 60 MG: 60 CAPSULE, DELAYED RELEASE ORAL at 08:53

## 2021-05-12 RX ADMIN — HYDROCORTISONE 20 MG: 10 TABLET ORAL at 08:54

## 2021-05-12 RX ADMIN — FOLIC ACID 1 MG: 1 TABLET ORAL at 08:54

## 2021-05-12 RX ADMIN — TAMSULOSIN HYDROCHLORIDE 0.8 MG: 0.4 CAPSULE ORAL at 16:13

## 2021-05-12 RX ADMIN — HYDROMORPHONE HYDROCHLORIDE 4 MG: 2 TABLET ORAL at 03:51

## 2021-05-12 RX ADMIN — HYDROMORPHONE HYDROCHLORIDE 4 MG: 2 TABLET ORAL at 13:35

## 2021-05-12 RX ADMIN — CHOLECALCIFEROL TAB 125 MCG (5000 UNIT) 250 MCG: 125 TAB at 08:54

## 2021-05-12 RX ADMIN — CYANOCOBALAMIN TAB 1000 MCG 1000 MCG: 1000 TAB at 08:54

## 2021-05-12 RX ADMIN — DOXYCYCLINE 100 MG: 100 CAPSULE ORAL at 08:54

## 2021-05-12 RX ADMIN — FLUTICASONE FUROATE AND VILANTEROL TRIFENATATE 1 PUFF: 200; 25 POWDER RESPIRATORY (INHALATION) at 08:55

## 2021-05-12 RX ADMIN — HYDROMORPHONE HYDROCHLORIDE 4 MG: 2 TABLET ORAL at 00:24

## 2021-05-12 RX ADMIN — OXYCODONE HYDROCHLORIDE AND ACETAMINOPHEN 1000 MG: 500 TABLET ORAL at 08:54

## 2021-05-12 RX ADMIN — UMECLIDINIUM 1 PUFF: 62.5 AEROSOL, POWDER ORAL at 08:55

## 2021-05-12 RX ADMIN — HYDROMORPHONE HYDROCHLORIDE 4 MG: 2 TABLET ORAL at 10:35

## 2021-05-12 RX ADMIN — MULTIPLE VITAMINS W/ MINERALS TAB 1 TABLET: TAB at 11:48

## 2021-05-12 RX ADMIN — OMEPRAZOLE 20 MG: 20 CAPSULE, DELAYED RELEASE ORAL at 09:08

## 2021-05-12 RX ADMIN — HYDROMORPHONE HYDROCHLORIDE 4 MG: 2 TABLET ORAL at 16:38

## 2021-05-12 RX ADMIN — HYDROMORPHONE HYDROCHLORIDE 4 MG: 2 TABLET ORAL at 06:51

## 2021-05-12 ASSESSMENT — ACTIVITIES OF DAILY LIVING (ADL)
ADLS_ACUITY_SCORE: 24

## 2021-05-12 ASSESSMENT — MIFFLIN-ST. JEOR: SCORE: 1309.69

## 2021-05-12 NOTE — PLAN OF CARE
Contact precautions maintained; MRSA. Pt is AOx4. VSS on 2L NC; baseline. Incontinent bowel; voiding per urinal. Controlled pain w/ PRN dilaudid; q3hr. PIV; SL. Dressing; CDI; WOC following; Rooke boot in place. Pt continues to itch; applied sween cream. Pt refused to get OOB; turn/repo per pt comfort. Discharge plans pending TCU placement; continue to monitor.

## 2021-05-12 NOTE — PROGRESS NOTES
"WO Nurse Inpatient Wound Assessment   Reason for follow up visit : Evaluate and treat  RLE posterior calf wound     Assessment  RLE wounds due to Mixed Etiology: poor circulation, edema, stasis, pressure  Status:   Slowly improving.  On admission pt reports no dressing change to wound at home since caregiver upstairs passed away some weeks ago. States he was supposed to get help but \"No one came\"; unable to get to doctor due to \"lack of transportation\" and \"I can't drive with this boot on his leg,\" said no one told him he could wear a shoe. Concern with pt understanding of health care needs.     RLE posterior calf wound: mildly hypergranular, now pink and slick, no overt s/s infection.  Will modify cares to alternate between Iodosorb for debridement and Aquacel Ag for moisture management.  Daily cares are still ideal at this point.       Treatment Plan  Wound care to RLE posterior calf wounds: Daily (alternate Iodosorb gel (even days) and Aquacel Ag (odd days), as noted below):    1.  Cleanse intact/scaly skin on leg and foot with mild skin cleanser, then apply Sween 24 cream  2.  Cleanse wounds with Vashe and gauze, wiping away any old gel    3.  Apply a new Vashe-moist gauze to main wound bed and let this sit x 5 minutes; do not rinse  (Vashe comes from  #[id number])  4.  Even days: Iodosorb gel: paint a large adaptic with moderate layer Iodosorb gel, and apply to wound bed  5.  Odd days: Apply dry Aquacel Ag to wound bed, cutting piece(s) to fit  6.  Cover with ABD pad, secure with Kerlix  7.  Rooke boot   8.  Keep leg elevated whenever possible      Orders Reviewed and Updated  Recommended provider order: none   WO Nurse follow-up plan:weekly  Nursing to notify the Provider(s) and re-consult the WO Nurse if wound(s) deteriorates or new skin concern.    Patient History  According to provider note(s):    Perfecto Reese is a 70 year old male with history of COPD, cellulitis, CKD, hypertension, and PVD who " presents via EMS for evaluation of a right lower leg wound. Perfecto has had a wound on his right calf for an unknown amount of time. He was admitted to the hospital for COPD exacerbation on 3/18/21 where they noted the wound and redressed it. He has not redressed the wound since, as his friend who was helping him care for the wound has passed away. He called EMS today due to increased right lower leg pain. No recent fevers. He is on home oxygen for COPD. He reports tobacco use of a few cigarettes a week.      Objective Data  Containment of urine/stool: Continent of bladder and Continent of bowel    Active Diet Order  Orders Placed This Encounter      Combination Diet Regular Diet Adult      Output:   I/O last 3 completed shifts:  In: 1563 [P.O.:1560; I.V.:3]  Out: 2350 [Urine:2350]    Risk Assessment:   Sensory Perception: 4-->no impairment  Moisture: 3-->occasionally moist  Activity: 2-->chairfast  Mobility: 2-->very limited  Nutrition: 3-->adequate  Friction and Shear: 2-->potential problem  Donn Score: 16                          Labs:   Recent Labs   Lab 05/06/21  1052 05/05/21  1229   ALBUMIN  --  2.8*   HGB 8.5* 8.5*   WBC 4.4 4.0       Physical Exa    Wound Location:  RLE - posterior calf  Date of last photo: 5-12-21 5-6-21       Wound Base:100% pink, slick, slightly hypergranular      Palpation of the wound bed: normal      Drainage: moderate sero-sang     Measurements (length x width x depth, in cm) approx 10 x 5 x 0.2cm with 2 small superior wounds 1 x 1cm x superficial      Tunneling N/A     Undermining N/A  Periwound skin: Intact      Color: normal and consistent with surrounding tissue      Temperature: cool   Odor: minimal today  Pain: tender with palpation in some areas    5-12-21 BLE:      BLE have numerous small scabs and dry sloughing scales, no s/s infection.       Interventions  Visual inspection and assessment completed   Wound Care Rationale Protect periwound skin, Improve absorptive  capacity, Promote moist wound healing without tissue dehydration , Decrease bacterial load and keep wound clean/debrided  Wound Care: done per plan of care  Supplies: at bedside  Current off-loading measures: Heel off-loading boot(s) and Pillows  Current support surface: Standard  Atmos Air mattress  Education provided to: importance of repositioning and Off-loading pressure  Discussed plan of care with Patient and Nurse    Rosangela Luciano RN BSN CWOCN

## 2021-05-12 NOTE — DISCHARGE SUMMARY
"   [facility]  Discharge Summary        Perfecto Reese MRN# [id number]   YOB: 1950 Age: 70 year old     Date of Admission:  5/5/2021  Date of Discharge:  5/12/2021  Admitting Physician:  Ania Saucedo MD  Discharge Physician: Ye Baldwin MD  Discharging Service: Hospitalist     Primary Provider:  Polo Neil  Primary Care Physician Phone Number: [phone]         Discharge Diagnoses/Problem Oriented Hospital Course (Providers):    Perfecto Reese was admitted on 5/5/2021 by Ania Saucedo MD and I would refer you to their history and physical.  The following problems were addressed during his hospitalization:         Perfecto Reese is a 70 year old male with COPD on 3L home O2 with ongoing tocacco use, adrenal insufficieny on chronic steroids, psoriasis (previously on Humira), HCV cirrhosis, PVD s/p L BKA and with chronic RLE wounds who presents today 5/5/2021 for a wound check. He has not had dressing changed on RLE for \"a couple months maybe.\" ED attending describes wounds as malodorous though patient does not appear toxic.  Patient is at baseline wheelchair-bound.     Patient's neighbor had been assisting patient with ADLs and things like paying bills but he passed away a week ago. He said home nursing never showed up or responded to his calls after last discharge home. A brother stepped in to bring him food this week. A vulnerable adult report was filed in ED.     Chronic right lower extremity wounds - rule out cellulitis   Hx of PVD with L BKA (2010, [facility]): No evidence of sepsis in ED. Patient was given IV Vancomycin and IV Zosyn and 5 mg oxycodone. XR tib/fib showed no bony destruction and no gas. WBC wnl. Pro mi and lactate wnl. CRP mildly elevated.     ? blood cultures negative so far.  ? ID consult requested, appreciate Dr. Hu's followup  ? continue doxycycline and cefepime.  Wound culture, superficial culture is growing " "multiple organisms including staph and Proteus.  Per Dr. Hu, \"Skin cultures from these wound of no medical significance.  Doubt any significant infection, main issues are wound care/edema control   Plan to discharge on doxycycline for 7 days  ? Vascular Surgery consult requested, they recommend continuing wound care and outpatient follow-up with care team  ? ZHOU from right leg is wnl, no evidence of arterial insufficiency   ? Patient was recently taken off of ASA due to chronic thrombocytopenia.  ? Current plan is to discharge patient home with home care/wound care which is patient's preference.  If dressing changes can be done 3 times weekly, performing wound cares at home is feasible.  If he needs daily dressing changes, would need TCU           MICHAEL on CKD III: Baseline creatinine 1.2-1.3 in Jan 2020, 1.5 in March, now up to 1.8.   ? Creatinine improved significantly to 1.3 following hydration      Chronic hypoxic and hypercarbic respiratory failure in the setting of COPD utilizing 3 LPM via NC at baseline, not in acute exacerbation:   ? Continue PTA inhalers  ? continue oxygen to keep sats > 88%  ? Encourage pulmonary toilet   ? TTE several months ago did not indicate CHF     HCV Cirrhosis  Chronic normocytic anemia  Chronic thrombocytopenia  Anemia and thrombocytopenia likely multifactorial from liver cirrhosis, splenomegaly, renal disease, CKD and psoriasis. Iron labs suggestive of anemia of chronic disease.   ? Hgb appears to be at baseline     Chronic adrenal insufficiency:   - Continues on PTA hydrocortisone 20 mg PO daily  - Consider stress dose steroids if pt were to become hypotensive     Essential hypertension: Amlodipine decreased to 5mg daily due to soft blood pressures during last hospital stay, Losartan decreased to 25 mg po every day.   ? Continue Norvasc, holding losartan, blood pressure stable.     History of psoriasis   ? Patient says he is no longer on Humira  ? He has ongoing significant " itching and rash on the skin, improved with Benadryl.  Patient is requesting an emollient at the time of discharge.     Tobacco use: still smokes 2-3 cigs/week. NRT - lozenges ordered prn      Chronic pain syndrome: Hx opioid dependence           - getting po dilaudid           - discharge with limited prescription for lower ext wound dressing changes      Probable protein-calorie malnutrition:   - Nutrition consult      Major depressive disorder: Secondary to general medical condition suspected.   ? Cymbalta increased to 60 mg every day and melatonin 3 mg at bedtime added      Vulnerable adult  Physical deconditioning: Lives alone in apartment. Friend who has helped patient with paying bills, ADLs passed away 1 week ago. At baseline is WC bound with [employer] for transfers. Has declined long term placement in the past.  ? PT/OT/SW, social work input requested regarding discharge disposition  ? See discussion above. Patient needs daily dressing changes to TCU at discharge          Code Status:      Full Code         Important Results:      NAD  HEENT NORMAL  PULM CTA  CV RRR  GI SOFT +BS  MS LE WOUNDS  NEURO NON FOCAL  DERM WARM AND DRY         Pending Results:        Unresulted Labs Ordered in the Past 30 Days of this Admission     No orders found from 4/5/2021 to 5/6/2021.               Discharge Instructions and Follow-Up:      Follow-up Appointments     Follow Up and recommended labs and tests      Follow up with primary care provider after discharge from TCU                  Discharge Disposition:      Discharged to rehabilitation facility         Discharge Medications:        Current Discharge Medication List      START taking these medications    Details   doxycycline hyclate (VIBRA-TABS) 100 MG tablet Take 1 tablet (100 mg) by mouth 2 times daily for 7 days  Qty: 14 tablet, Refills: 0    Associated Diagnoses: Open wound of right lower extremity, initial encounter         CONTINUE these medications which have  CHANGED    Details   HYDROmorphone (DILAUDID) 2 MG tablet Take 1 tablet (2 mg) by mouth every 4 hours as needed for severe pain  Qty: 30 tablet, Refills: 0    Associated Diagnoses: Open wound of right lower extremity, initial encounter         CONTINUE these medications which have NOT CHANGED    Details   albuterol (PROAIR HFA/PROVENTIL HFA/VENTOLIN HFA) 108 (90 Base) MCG/ACT inhaler Inhale 2 puffs into the lungs every 4 hours as needed for shortness of breath / dyspnea or wheezing  Qty: 8 g, Refills: 1    Comments: Pharmacy may dispense brand covered by insurance (Proair, or proventil or ventolin or generic albuterol inhaler)  Associated Diagnoses: COPD exacerbation (H)      calcium citrate (CITRACAL) 950 (200 Ca) MG tablet Take 4 tablets by mouth daily      clobetasol (TEMOVATE) 0.05 % external solution Apply topically At Bedtime Apply to scalp      cyanocobalamin (VITAMIN B-12) 1000 MCG tablet Take 1 tablet (1,000 mcg) by mouth daily  Qty: 30 tablet, Refills: 1    Associated Diagnoses: Anemia of chronic disease; Open wound of right lower extremity, subsequent encounter      DULoxetine (CYMBALTA) 30 MG capsule Take 60 mg by mouth daily      ferrous sulfate (FEROSUL) 325 (65 Fe) MG tablet Take 1 tablet (325 mg) by mouth every other day  Qty: 90 tablet, Refills: 1    Associated Diagnoses: Anemia of chronic disease      fluticasone-vilanterol (BREO ELLIPTA) 200-25 MCG/INH inhaler Inhale 1 puff into the lungs daily  Qty: 28 each, Refills: 1    Associated Diagnoses: Acute on chronic respiratory failure with hypoxia and hypercapnia (H); Pneumonia of both lungs due to infectious organism, unspecified part of lung; COPD, severe (H)      folic acid (FOLVITE) 1 MG tablet Take 1 tablet (1 mg) by mouth daily  Qty: 30 tablet, Refills: 1    Associated Diagnoses: Anemia of chronic disease; Open wound of right lower extremity, subsequent encounter      furosemide (LASIX) 20 MG tablet Take 20 mg by mouth daily      gabapentin  (NEURONTIN) 300 MG capsule Take 1 capsule (300 mg) by mouth At Bedtime  Qty: 60 capsule, Refills: 1    Associated Diagnoses: Pneumonia of right lower lobe due to infectious organism; Pain      hydrocortisone (CORTEF) 20 MG tablet Take 20 mg by mouth daily      lidocaine (XYLOCAINE) 5 % external ointment Apply topically 3 times daily as needed for moderate pain (rib pain)  Qty: 50 g, Refills: 0    Associated Diagnoses: COPD exacerbation (H)      loperamide (IMODIUM) 2 MG capsule Take 1 capsule (2 mg) by mouth 4 times daily as needed for diarrhea  Qty: 15 capsule, Refills: 0    Associated Diagnoses: Pneumonia of right lower lobe due to infectious organism      losartan (COZAAR) 25 MG tablet Take 25 mg by mouth daily      Melatonin 10 MG TABS tablet Take 10 mg by mouth At Bedtime      multivitamin w/minerals (THERA-VIT-M) tablet Take 1 tablet by mouth daily  Qty: 30 tablet, Refills: 0    Associated Diagnoses: Pneumonia of right lower lobe due to infectious organism      omeprazole (PRILOSEC) 20 MG DR capsule Take 20 mg by mouth daily      primidone (MYSOLINE) 50 MG tablet Take 50 mg by mouth At Bedtime      tamsulosin (FLOMAX) 0.4 MG capsule Take 0.8 mg by mouth daily      umeclidinium (INCRUSE ELLIPTA) 62.5 MCG/INH inhaler Inhale 1 puff into the lungs daily  Qty: 30 each, Refills: 1    Associated Diagnoses: Acute on chronic respiratory failure with hypoxia and hypercapnia (H); Pneumonia of both lungs due to infectious organism, unspecified part of lung; COPD, severe (H)      vitamin C (ASCORBIC ACID) 1000 MG TABS Take 1 tablet (1,000 mg) by mouth daily  Qty: 30 tablet, Refills: 1    Associated Diagnoses: Anemia of chronic disease; Open wound of right lower extremity, subsequent encounter      vitamin D3 (CHOLECALCIFEROL) 250 mcg (83566 units) capsule Take 1 capsule (250 mcg) by mouth daily  Qty: 30 capsule, Refills: 1    Associated Diagnoses: Generalized muscle weakness; Open wound of right lower extremity, subsequent  encounter      nicotine (NICORETTE) 2 MG gum Place 2 mg inside cheek as needed for smoking cessation      Wound Cleansers (VASHE WOUND THERAPY EX) USE TO SOAK GAUZE AND CLEAN WOUND FOR TWENTY MINUTES W/ DAILY WOUND CARE CHANGES.                  Allergies:         Allergies   Allergen Reactions     Blood Transfusion Related (Informational Only) Other (See Comments)     Darvocet [Propoxyphene N-Apap] Nausea     Vicodin [Hydrocodone-Acetaminophen] Nausea     Can use if he eats with it            Consultations This Hospital Stay:      Consultation during this admission received from infectious disease          Discharge Orders for Skilled Facility (from Discharge Orders):        After Care Instructions     Activity - Up with nursing assistance      Advance Diet as Tolerated      Follow this diet upon discharge: Orders Placed This Encounter      Snacks/Supplements Adult: Ensure Enlive; Between Meals      Combination Diet Regular Diet Adult         Daily weights      Call Provider for weight gain of more than 2 pounds per day or 5 pounds per week.         General info for SNF      Length of Stay Estimate: Short Term Care: Estimated # of Days <30  Condition at Discharge: Improving  Level of care:skilled   Rehabilitation Potential: Good  Admission H&P remains valid and up-to-date: Yes  Recent Chemotherapy: N/A  Use Nursing Home Standing Orders: N/A         Intake and output      Every shift         Mantoux instructions      Give two-step Mantoux (PPD) Per Facility Policy Yes         Wound care (specify)      legs         Wound care and dressings      Instructions to care for your wound at home:  1. Cleanse with  Vashe moistened gauze to wound bed and let sit x 5 minutes - do not rinse. #[id number]   2. Dry and protect surrounding skin with no sting barrier film wipe #[id number],   3. Apply sween 24 to all intact skin from base of toes to below knees.   4. Paint 5x9 stirp of Vaseline gauze with Iodosorb. Cover wound bed   5. Cover  with ABD   6. Secure with kerlix and tape   7. Time/Date/Initial dressing   8.  Rooke boot                        Rehab orders for Skilled Facility (from Discharge Orders):      Referrals     Future Labs/Procedures    Occupational Therapy Adult Consult     Comments:    Evaluate and treat as clinically indicated.    Reason: cellulitis    Physical Therapy Adult Consult     Comments:    Evaluate and treat as clinically indicated.    Reason:  cellulitis             Discharge Time:       Greater than 30 minutes.        Image Results From This Hospital Stay (For Non-EPIC Providers):        Results for orders placed or performed during the hospital encounter of 05/05/21   XR Tibia & Fibula Right 2 Views    Narrative    TIBIA AND FIBULA RIGHT TWO VIEWS May 5, 2021 12:49 PM     HISTORY: Right lower extremity wound evaluation, rule out osteopenia,  gas.    FINDINGS: Osteopenia. There is some chronic ossification and  periosteal reaction along the fibula.      Impression    IMPRESSION: No acute fracture or focal bone destruction.    TAHIR WILLOUGHBY MD           Most Recent Lab Results In EPIC (For Non-EPIC Providers):    Most Recent 3 CBC's:  Recent Labs   Lab Test 05/06/21  1052 05/05/21  1229 03/21/21  0744   WBC 4.4 4.0 4.0   HGB 8.5* 8.5* 7.7*   MCV 97 94 95   PLT 61* 75* 50*      Most Recent 3 BMP's:  Recent Labs   Lab Test 05/09/21  0608 05/08/21  0702 05/07/21  1226 05/06/21  0729    138  --  144   POTASSIUM 4.0 4.2  --  5.2   CHLORIDE 113* 110*  --  117*   CO2 27 25  --  24   BUN 24 22  --  20   CR 1.32* 1.33* 1.56* 1.77*   ANIONGAP 2* 3  --  3   MARTINE 7.9* 7.9*  --  7.4*   GLC 97 88  --  90     Most Recent 3 Troponin's:  Recent Labs   Lab Test 12/22/20  1156 12/22/20  0028 12/21/20  1753   TROPI <0.015 <0.015 <0.015     Most Recent 3 INR's:  Recent Labs   Lab Test 02/09/21  0601   INR 1.25*     Most Recent 2 LFT's:  Recent Labs   Lab Test 05/05/21  1229 03/18/21  1434   AST 16 17   ALT 9 16   ALKPHOS 63 73   BILITOTAL  0.3 0.5     Most Recent Cholesterol Panel:  Recent Labs   Lab Test 02/10/21  0838   CHOL 106   LDL 33   HDL 37*   TRIG 182*     Most Recent 6 Bacteria Isolates From Any Culture (See EPIC Reports for Culture Details):  Recent Labs   Lab Test 05/05/21  1303 05/05/21  1232 05/05/21  1229 03/20/21  1030 03/18/21  1843 02/09/21  0602   CULT No growth Light growth  Proteus mirabilis  *  Light growth  Methicillin resistant Staphylococcus aureus (MRSA)  *  Moderate growth  Enterococcus species (VRE)  Further speciated as:  Enterococcus raffinosus  Identification obtained by MALDI-TOF mass spectrometry research use only database. Test   characteristics determined and verified by the [facility]   ([facility]) [place], [place].  *  Light growth  Corynebacterium striatum  Identification obtained by MALDI-TOF mass spectrometry research use only database. Test   characteristics determined and verified by the [facility]   ([facility]) [place], [place].  Susceptibility testing not routinely done  *  Plus  Light growth  Normal skin adelina   No growth No growth 10,000 to 50,000 colonies/mL  -Enterococcus species (VRE)  Further speciated as:  Enterococcus raffinosus  *  Plus  10,000 to 50,000 colonies/mL  mixed urogenital adelina    Critical Value/Significant Value, preliminary result only, called to and read back by  Kristel Horowitz RN at 0728 3/21/21 SRQ   No growth  No growth     Most Recent TSH, T4 and HgbA1c:  Recent Labs   Lab Test 02/09/21  0702 01/07/21   TSH  --  0.73   A1C Canceled, Test credited  --

## 2021-05-12 NOTE — PROGRESS NOTES
Care Management Discharge Note    Discharge Date: 05/12/21  Expected Time of Departure: [facility] stretcher 1800    Discharge Disposition:      Discharge Services: Transportation Services    Discharge DME:      Discharge Transportation: none    Private pay costs discussed: transportation costs    PAS Confirmation Code: [id number]  Patient/family educated on Medicare website which has current facility and service quality ratings: yes    Education Provided on the Discharge Plan:    Persons Notified of Discharge Plans: yes  Patient/Family in Agreement with the Plan: yes    Handoff Referral Completed: No    Additional Information:  Patient accepted to [facility] today. Writer spoke to Paola with [facility] who noted that if patient is discharged on Humera that would be a barrier. Writer spoke to CC RN and bedside nurse who noted that did not appear to be the case. Writer paged Dr. Baldwin informing him that patient has been accepted today.     Call placed to  [facility] transport and stretcher scheduled today at 1800 for BKA and wound on right leg.  Writer spoke to patient who is in agreement. Orders received for discharge today and faxed. PAS completed. [facility] liaison updated and in agreement.    PAS-RR    D: Per DHS regulation, BENEDICT completed and submitted PAS-RR to [place] [employer] Direct Connect via the Senior LinkAge Line.  PAS-RR confirmation # is : [id number]    I: SW spoke with patient and they are aware a PAS-RR has been submitted.  SW reviewed with patient that they may be contacted for a follow up appointment within 10 days of hospital discharge if their SNF stay is < 30 days.  Contact information for Senior [employer]Age Line was also provided.    A: patient verbalized understanding.    P: Further questions may be directed to Senior LinkAge Line at #[phone], option #4 for PAS-RR staff.          DIMITRI Drake

## 2021-05-12 NOTE — PLAN OF CARE
Patient A&0x4. Up with lift. He has a left BKA. Now with a right leg wound. Dorsal pedal pulse on right is weak. Post tibular is +2. He has a red, scaly and itchy rash on both arms. Using sween cream for comfort.  Iso for MRSA in wound.  Lungs diminished, Exp wheezes in left lung. Infrequent congested cough. On 3 ltrs 02 at baseline. No edema noted. Tolerating regular diet. To TCU when placement found.

## 2021-05-12 NOTE — PLAN OF CARE
Patient is A&O x4. Refused to get OOB, turn and repo per pt's comfort. Dressing changed on RLE, CDI, applied sween cream on BLE, BUE and back.  Rooke boot in place on RLE. Pain managed with PO dilaudid. Voiding per urinal, had BM x1 this shift. Contact precaution maintained. Will continue to monitor.

## 2021-05-16 ENCOUNTER — RECORDS - HEALTHEAST (OUTPATIENT)
Dept: LAB | Facility: CLINIC | Age: 71
End: 2021-05-16

## 2021-05-17 LAB
ALBUMIN SERPL-MCNC: 2.4 G/DL (ref 3.5–5)
ALP SERPL-CCNC: 58 U/L (ref 45–120)
ALT SERPL W P-5'-P-CCNC: 11 U/L (ref 0–45)
ANION GAP SERPL CALCULATED.3IONS-SCNC: 9 MMOL/L (ref 5–18)
AST SERPL W P-5'-P-CCNC: 22 U/L (ref 0–40)
BASOPHILS # BLD AUTO: 0 THOU/UL (ref 0–0.2)
BASOPHILS NFR BLD AUTO: 0 % (ref 0–2)
BILIRUB SERPL-MCNC: 0.4 MG/DL (ref 0–1)
BUN SERPL-MCNC: 38 MG/DL (ref 8–22)
CALCIUM SERPL-MCNC: 8.3 MG/DL (ref 8.5–10.5)
CHLORIDE BLD-SCNC: 99 MMOL/L (ref 98–107)
CO2 SERPL-SCNC: 32 MMOL/L (ref 22–31)
CREAT SERPL-MCNC: 1.18 MG/DL (ref 0.7–1.3)
EOSINOPHIL # BLD AUTO: 0.6 THOU/UL (ref 0–0.4)
EOSINOPHIL NFR BLD AUTO: 13 % (ref 0–6)
ERYTHROCYTE [DISTWIDTH] IN BLOOD BY AUTOMATED COUNT: 15.9 % (ref 11–14.5)
GFR SERPL CREATININE-BSD FRML MDRD: >60 ML/MIN/1.73M2
GLUCOSE BLD-MCNC: 79 MG/DL (ref 70–125)
HCT VFR BLD AUTO: 24.5 % (ref 40–54)
HGB BLD-MCNC: 7.7 G/DL (ref 14–18)
IMM GRANULOCYTES # BLD: 0.1 THOU/UL
IMM GRANULOCYTES NFR BLD: 1 %
LYMPHOCYTES # BLD AUTO: 0.7 THOU/UL (ref 0.8–4.4)
LYMPHOCYTES NFR BLD AUTO: 14 % (ref 20–40)
MAGNESIUM SERPL-MCNC: 1.8 MG/DL (ref 1.8–2.6)
MCH RBC QN AUTO: 30.3 PG (ref 27–34)
MCHC RBC AUTO-ENTMCNC: 31.4 G/DL (ref 32–36)
MCV RBC AUTO: 97 FL (ref 80–100)
MONOCYTES # BLD AUTO: 0.3 THOU/UL (ref 0–0.9)
MONOCYTES NFR BLD AUTO: 6 % (ref 2–10)
NEUTROPHILS # BLD AUTO: 3.3 THOU/UL (ref 2–7.7)
NEUTROPHILS NFR BLD AUTO: 67 % (ref 50–70)
PLATELET # BLD AUTO: 91 THOU/UL (ref 140–440)
PMV BLD AUTO: 9.6 FL (ref 8.5–12.5)
POTASSIUM BLD-SCNC: 4.5 MMOL/L (ref 3.5–5)
PROT SERPL-MCNC: 5.5 G/DL (ref 6–8)
RBC # BLD AUTO: 2.54 MILL/UL (ref 4.4–6.2)
SODIUM SERPL-SCNC: 140 MMOL/L (ref 136–145)
WBC: 5 THOU/UL (ref 4–11)

## 2021-05-18 LAB
BACTERIA SPEC CULT: ABNORMAL
Lab: ABNORMAL
SPECIMEN SOURCE: ABNORMAL

## 2021-05-20 ENCOUNTER — RECORDS - HEALTHEAST (OUTPATIENT)
Dept: LAB | Facility: CLINIC | Age: 71
End: 2021-05-20

## 2021-05-27 ENCOUNTER — TELEPHONE (OUTPATIENT)
Dept: WOUND CARE | Facility: CLINIC | Age: 71
End: 2021-05-27

## 2021-05-27 NOTE — TELEPHONE ENCOUNTER
Konstantin from [facility] wants to know the status of Nir and whether or not he is still in the hospital.

## 2024-06-20 NOTE — PLAN OF CARE
Alert and oriented x's 4.  Taking Dilaudid for pain.  Patient complains of right sided chest pain, MS aware.  Per MD, right sided chest pain is due to right sided lung abcess.  Patient shifts weight, but declined to turn.  Regular diet.  Voiding per urinal and incontinent.  Will continue to monitor.   Dyspnea on exertion: PFTs in 2022 without obstruction - previously on trelegy, but stopped.   - start albuterol 2 puffs every 4-6 hours as needed     2. Allergic rhinitis:   - restart flonase 1 spray each nostril daily -- aim towards your ear     3. Pulmonary nodules: former smoker - > 15 years ago. Family hx of lung cancer.   - will get CT chest     Thank you for visiting the Pulmonary clinic today!   Return to clinic 3 months after CT chest  or sooner if needed - can be virtual   Isabel Montoya CNP  My office -  [phone]- Cecy is my nurse.   Radiology scheduling [phone]   Appointment scheduling [phone]   Pulmonary function testing - [phone]

## 2024-11-14 NOTE — PROGRESS NOTES
"\"Tried calling [facility] at 10:32am to get patient ready for visit and clarify details as far as video - as patient is not on my chart.  A nurse answered and stated she had to go to a meeting so she would call me back - inquired about the email address listed in appt notes and she stated she knew nothing about that but would transfer me to the person that helped with that  - instead of transferring me she hung up.    She has not contacted me back and I have tried calling nursing home and there is no answer.    Tried calling patient's ph# - no answer.\"        Please see CMA (Sukhdeep Aguayo) note above in regards to trying to contact the [facility]. I also tried to call the [facility] at 1:00 pm (the time of the video appointment) however I could not reach out  to anyone. I also called the patient's phone number, but again was not able to reach him.     I will contact the schedulers to reschedule the appointment.         "
.  
No